# Patient Record
Sex: FEMALE | Race: WHITE | NOT HISPANIC OR LATINO | Employment: FULL TIME | URBAN - METROPOLITAN AREA
[De-identification: names, ages, dates, MRNs, and addresses within clinical notes are randomized per-mention and may not be internally consistent; named-entity substitution may affect disease eponyms.]

---

## 2017-01-19 ENCOUNTER — COMMUNICATION - HEALTHEAST (OUTPATIENT)
Dept: PHYSICAL MEDICINE AND REHAB | Facility: CLINIC | Age: 52
End: 2017-01-19

## 2017-01-19 DIAGNOSIS — M54.12 CERVICAL RADICULOPATHY: ICD-10-CM

## 2017-01-19 DIAGNOSIS — M54.2 NECK PAIN: ICD-10-CM

## 2017-02-14 ENCOUNTER — OFFICE VISIT - HEALTHEAST (OUTPATIENT)
Dept: FAMILY MEDICINE | Facility: CLINIC | Age: 52
End: 2017-02-14

## 2017-02-14 ENCOUNTER — COMMUNICATION - HEALTHEAST (OUTPATIENT)
Dept: FAMILY MEDICINE | Facility: CLINIC | Age: 52
End: 2017-02-14

## 2017-02-14 DIAGNOSIS — F41.1 ANXIETY STATE: ICD-10-CM

## 2017-02-14 DIAGNOSIS — G43.119: ICD-10-CM

## 2017-02-14 DIAGNOSIS — N18.30 CHRONIC KIDNEY DISEASE, STAGE III (MODERATE) (H): ICD-10-CM

## 2017-02-14 DIAGNOSIS — Z00.00 HEALTH CARE MAINTENANCE: ICD-10-CM

## 2017-02-14 DIAGNOSIS — J35.8 TONSILLAR DEBRIS: ICD-10-CM

## 2017-02-14 DIAGNOSIS — Z01.818 PREOP EXAMINATION: ICD-10-CM

## 2017-02-14 DIAGNOSIS — F32.9 MAJOR DEPRESSIVE DISORDER, SINGLE EPISODE, UNSPECIFIED: ICD-10-CM

## 2017-02-14 ASSESSMENT — MIFFLIN-ST. JEOR: SCORE: 1485.5

## 2017-02-23 ENCOUNTER — COMMUNICATION - HEALTHEAST (OUTPATIENT)
Dept: PHYSICAL MEDICINE AND REHAB | Facility: CLINIC | Age: 52
End: 2017-02-23

## 2017-02-23 DIAGNOSIS — M54.12 CERVICAL RADICULAR PAIN: ICD-10-CM

## 2017-02-23 DIAGNOSIS — M48.02 FORAMINAL STENOSIS OF CERVICAL REGION: ICD-10-CM

## 2017-02-23 DIAGNOSIS — M54.2 NECK PAIN: ICD-10-CM

## 2017-02-24 ENCOUNTER — RECORDS - HEALTHEAST (OUTPATIENT)
Dept: ADMINISTRATIVE | Facility: OTHER | Age: 52
End: 2017-02-24

## 2017-02-24 LAB
LAB AP CHARGES (HE HISTORICAL CONVERSION): NORMAL
PATH REPORT.COMMENTS IMP SPEC: NORMAL
PATH REPORT.FINAL DX SPEC: NORMAL
PATH REPORT.GROSS SPEC: NORMAL
PATH REPORT.MICROSCOPIC SPEC OTHER STN: NORMAL
PATH REPORT.RELEVANT HX SPEC: NORMAL
RESULT FLAG (HE HISTORICAL CONVERSION): NORMAL

## 2017-03-11 ENCOUNTER — COMMUNICATION - HEALTHEAST (OUTPATIENT)
Dept: FAMILY MEDICINE | Facility: CLINIC | Age: 52
End: 2017-03-11

## 2017-03-11 DIAGNOSIS — G43.009 MIGRAINE WITHOUT AURA: ICD-10-CM

## 2017-03-20 ENCOUNTER — AMBULATORY - HEALTHEAST (OUTPATIENT)
Dept: PHYSICAL MEDICINE AND REHAB | Facility: CLINIC | Age: 52
End: 2017-03-20

## 2017-03-24 ENCOUNTER — AMBULATORY - HEALTHEAST (OUTPATIENT)
Dept: PHYSICAL MEDICINE AND REHAB | Facility: CLINIC | Age: 52
End: 2017-03-24

## 2017-03-29 ENCOUNTER — RECORDS - HEALTHEAST (OUTPATIENT)
Dept: ADMINISTRATIVE | Facility: OTHER | Age: 52
End: 2017-03-29

## 2017-04-11 ENCOUNTER — RECORDS - HEALTHEAST (OUTPATIENT)
Dept: ADMINISTRATIVE | Facility: OTHER | Age: 52
End: 2017-04-11

## 2017-04-26 ENCOUNTER — RECORDS - HEALTHEAST (OUTPATIENT)
Dept: ADMINISTRATIVE | Facility: OTHER | Age: 52
End: 2017-04-26

## 2017-05-01 ENCOUNTER — COMMUNICATION - HEALTHEAST (OUTPATIENT)
Dept: FAMILY MEDICINE | Facility: CLINIC | Age: 52
End: 2017-05-01

## 2017-05-01 DIAGNOSIS — F41.1 ANXIETY STATE: ICD-10-CM

## 2017-05-01 DIAGNOSIS — F32.9 MAJOR DEPRESSIVE DISORDER, SINGLE EPISODE, UNSPECIFIED: ICD-10-CM

## 2017-05-03 ENCOUNTER — RECORDS - HEALTHEAST (OUTPATIENT)
Dept: ADMINISTRATIVE | Facility: OTHER | Age: 52
End: 2017-05-03

## 2017-05-08 ENCOUNTER — HOSPITAL ENCOUNTER (OUTPATIENT)
Dept: MRI IMAGING | Facility: HOSPITAL | Age: 52
Discharge: HOME | End: 2017-05-08
Attending: PSYCHIATRY & NEUROLOGY

## 2017-05-08 DIAGNOSIS — G43.009 MIGRAINE WITHOUT AURA: ICD-10-CM

## 2017-05-17 ENCOUNTER — COMMUNICATION - HEALTHEAST (OUTPATIENT)
Dept: FAMILY MEDICINE | Facility: CLINIC | Age: 52
End: 2017-05-17

## 2017-05-17 ENCOUNTER — RECORDS - HEALTHEAST (OUTPATIENT)
Dept: ADMINISTRATIVE | Facility: OTHER | Age: 52
End: 2017-05-17

## 2017-05-17 DIAGNOSIS — G43.009 MIGRAINE WITHOUT AURA: ICD-10-CM

## 2017-06-04 ENCOUNTER — COMMUNICATION - HEALTHEAST (OUTPATIENT)
Dept: FAMILY MEDICINE | Facility: CLINIC | Age: 52
End: 2017-06-04

## 2017-06-04 DIAGNOSIS — G43.009 MIGRAINE WITHOUT AURA: ICD-10-CM

## 2017-06-12 ENCOUNTER — RECORDS - HEALTHEAST (OUTPATIENT)
Dept: ADMINISTRATIVE | Facility: OTHER | Age: 52
End: 2017-06-12

## 2017-06-21 ENCOUNTER — RECORDS - HEALTHEAST (OUTPATIENT)
Dept: ADMINISTRATIVE | Facility: OTHER | Age: 52
End: 2017-06-21

## 2017-06-24 ENCOUNTER — RECORDS - HEALTHEAST (OUTPATIENT)
Dept: ADMINISTRATIVE | Facility: OTHER | Age: 52
End: 2017-06-24

## 2017-07-06 ENCOUNTER — COMMUNICATION - HEALTHEAST (OUTPATIENT)
Dept: FAMILY MEDICINE | Facility: CLINIC | Age: 52
End: 2017-07-06

## 2017-07-06 DIAGNOSIS — G43.009 MIGRAINE WITHOUT AURA: ICD-10-CM

## 2017-08-11 ENCOUNTER — COMMUNICATION - HEALTHEAST (OUTPATIENT)
Dept: FAMILY MEDICINE | Facility: CLINIC | Age: 52
End: 2017-08-11

## 2017-08-11 DIAGNOSIS — G43.009 MIGRAINE WITHOUT AURA: ICD-10-CM

## 2017-09-05 ENCOUNTER — RECORDS - HEALTHEAST (OUTPATIENT)
Dept: ADMINISTRATIVE | Facility: OTHER | Age: 52
End: 2017-09-05

## 2017-09-12 ENCOUNTER — COMMUNICATION - HEALTHEAST (OUTPATIENT)
Dept: FAMILY MEDICINE | Facility: CLINIC | Age: 52
End: 2017-09-12

## 2017-09-12 DIAGNOSIS — F41.1 ANXIETY STATE: ICD-10-CM

## 2017-09-12 DIAGNOSIS — F32.9 MAJOR DEPRESSIVE DISORDER, SINGLE EPISODE, UNSPECIFIED: ICD-10-CM

## 2017-10-05 ENCOUNTER — COMMUNICATION - HEALTHEAST (OUTPATIENT)
Dept: PHYSICAL MEDICINE AND REHAB | Facility: CLINIC | Age: 52
End: 2017-10-05

## 2017-10-05 DIAGNOSIS — M54.12 CERVICAL RADICULOPATHY: ICD-10-CM

## 2017-10-05 DIAGNOSIS — M54.2 NECK PAIN: ICD-10-CM

## 2017-10-19 ENCOUNTER — COMMUNICATION - HEALTHEAST (OUTPATIENT)
Dept: SCHEDULING | Facility: CLINIC | Age: 52
End: 2017-10-19

## 2017-10-23 ENCOUNTER — COMMUNICATION - HEALTHEAST (OUTPATIENT)
Dept: FAMILY MEDICINE | Facility: CLINIC | Age: 52
End: 2017-10-23

## 2017-10-26 ENCOUNTER — OFFICE VISIT - HEALTHEAST (OUTPATIENT)
Dept: FAMILY MEDICINE | Facility: CLINIC | Age: 52
End: 2017-10-26

## 2017-10-26 DIAGNOSIS — Z12.11 SCREEN FOR COLON CANCER: ICD-10-CM

## 2017-10-26 DIAGNOSIS — M54.12 CERVICAL RADICULOPATHY: ICD-10-CM

## 2017-10-26 DIAGNOSIS — M54.2 CERVICALGIA: ICD-10-CM

## 2017-10-26 DIAGNOSIS — R79.89 ELEVATED LFTS: ICD-10-CM

## 2017-10-26 DIAGNOSIS — J20.8 ACUTE VIRAL BRONCHITIS: ICD-10-CM

## 2017-10-26 DIAGNOSIS — M54.2 NECK PAIN: ICD-10-CM

## 2017-10-26 DIAGNOSIS — Z12.31 VISIT FOR SCREENING MAMMOGRAM: ICD-10-CM

## 2017-10-27 ENCOUNTER — COMMUNICATION - HEALTHEAST (OUTPATIENT)
Dept: FAMILY MEDICINE | Facility: CLINIC | Age: 52
End: 2017-10-27

## 2017-10-27 ENCOUNTER — AMBULATORY - HEALTHEAST (OUTPATIENT)
Dept: FAMILY MEDICINE | Facility: CLINIC | Age: 52
End: 2017-10-27

## 2017-10-27 DIAGNOSIS — R79.89 ELEVATED LFTS: ICD-10-CM

## 2017-11-02 ENCOUNTER — AMBULATORY - HEALTHEAST (OUTPATIENT)
Dept: LAB | Facility: CLINIC | Age: 52
End: 2017-11-02

## 2017-11-02 ENCOUNTER — AMBULATORY - HEALTHEAST (OUTPATIENT)
Dept: FAMILY MEDICINE | Facility: CLINIC | Age: 52
End: 2017-11-02

## 2017-11-02 ENCOUNTER — COMMUNICATION - HEALTHEAST (OUTPATIENT)
Dept: FAMILY MEDICINE | Facility: CLINIC | Age: 52
End: 2017-11-02

## 2017-11-02 DIAGNOSIS — R74.8 ELEVATED LIVER ENZYMES: ICD-10-CM

## 2017-11-02 DIAGNOSIS — R79.89 ELEVATED LFTS: ICD-10-CM

## 2017-11-09 ENCOUNTER — COMMUNICATION - HEALTHEAST (OUTPATIENT)
Dept: FAMILY MEDICINE | Facility: CLINIC | Age: 52
End: 2017-11-09

## 2017-11-16 ENCOUNTER — AMBULATORY - HEALTHEAST (OUTPATIENT)
Dept: LAB | Facility: CLINIC | Age: 52
End: 2017-11-16

## 2017-11-16 DIAGNOSIS — R74.8 ELEVATED LIVER ENZYMES: ICD-10-CM

## 2017-11-17 ENCOUNTER — COMMUNICATION - HEALTHEAST (OUTPATIENT)
Dept: FAMILY MEDICINE | Facility: CLINIC | Age: 52
End: 2017-11-17

## 2018-01-04 ENCOUNTER — COMMUNICATION - HEALTHEAST (OUTPATIENT)
Dept: FAMILY MEDICINE | Facility: CLINIC | Age: 53
End: 2018-01-04

## 2018-01-04 DIAGNOSIS — G43.009 MIGRAINE WITHOUT AURA: ICD-10-CM

## 2018-01-09 ENCOUNTER — COMMUNICATION - HEALTHEAST (OUTPATIENT)
Dept: FAMILY MEDICINE | Facility: CLINIC | Age: 53
End: 2018-01-09

## 2018-01-09 DIAGNOSIS — M54.2 NECK PAIN: ICD-10-CM

## 2018-01-09 DIAGNOSIS — M54.12 CERVICAL RADICULOPATHY: ICD-10-CM

## 2018-02-15 ENCOUNTER — COMMUNICATION - HEALTHEAST (OUTPATIENT)
Dept: FAMILY MEDICINE | Facility: CLINIC | Age: 53
End: 2018-02-15

## 2018-02-15 DIAGNOSIS — G43.009 MIGRAINE WITHOUT AURA: ICD-10-CM

## 2018-03-06 ENCOUNTER — AMBULATORY - HEALTHEAST (OUTPATIENT)
Dept: LAB | Facility: CLINIC | Age: 53
End: 2018-03-06

## 2018-03-06 DIAGNOSIS — Z52.4 DONOR OF KIDNEY FOR TRANSPLANT: ICD-10-CM

## 2018-03-20 ENCOUNTER — COMMUNICATION - HEALTHEAST (OUTPATIENT)
Dept: FAMILY MEDICINE | Facility: CLINIC | Age: 53
End: 2018-03-20

## 2018-03-20 DIAGNOSIS — M54.12 CERVICAL RADICULOPATHY: ICD-10-CM

## 2018-03-20 DIAGNOSIS — M54.2 NECK PAIN: ICD-10-CM

## 2018-04-12 ENCOUNTER — COMMUNICATION - HEALTHEAST (OUTPATIENT)
Dept: FAMILY MEDICINE | Facility: CLINIC | Age: 53
End: 2018-04-12

## 2018-04-12 DIAGNOSIS — M54.2 NECK PAIN: ICD-10-CM

## 2018-04-12 DIAGNOSIS — M54.12 CERVICAL RADICULOPATHY: ICD-10-CM

## 2018-05-16 ENCOUNTER — COMMUNICATION - HEALTHEAST (OUTPATIENT)
Dept: FAMILY MEDICINE | Facility: CLINIC | Age: 53
End: 2018-05-16

## 2018-05-16 DIAGNOSIS — F32.9 MAJOR DEPRESSIVE DISORDER, SINGLE EPISODE: ICD-10-CM

## 2018-05-16 DIAGNOSIS — F41.1 ANXIETY STATE: ICD-10-CM

## 2018-05-29 ENCOUNTER — COMMUNICATION - HEALTHEAST (OUTPATIENT)
Dept: FAMILY MEDICINE | Facility: CLINIC | Age: 53
End: 2018-05-29

## 2018-05-29 DIAGNOSIS — M54.2 NECK PAIN: ICD-10-CM

## 2018-05-29 DIAGNOSIS — M54.12 CERVICAL RADICULOPATHY: ICD-10-CM

## 2018-06-15 ENCOUNTER — OFFICE VISIT - HEALTHEAST (OUTPATIENT)
Dept: FAMILY MEDICINE | Facility: CLINIC | Age: 53
End: 2018-06-15

## 2018-06-15 DIAGNOSIS — R32 URINARY INCONTINENCE: ICD-10-CM

## 2018-06-15 DIAGNOSIS — F10.10 EXCESSIVE DRINKING ALCOHOL: ICD-10-CM

## 2018-06-15 DIAGNOSIS — Z83.49 FAMILY HISTORY OF THYROID DISEASE: ICD-10-CM

## 2018-06-15 DIAGNOSIS — Z00.00 VISIT FOR PREVENTIVE HEALTH EXAMINATION: ICD-10-CM

## 2018-06-15 DIAGNOSIS — R79.89 ELEVATED LFTS: ICD-10-CM

## 2018-06-15 DIAGNOSIS — N64.4 BREAST PAIN, RIGHT: ICD-10-CM

## 2018-06-15 DIAGNOSIS — R63.5 ABNORMAL WEIGHT GAIN: ICD-10-CM

## 2018-06-15 DIAGNOSIS — R14.0 ABDOMINAL BLOATING: ICD-10-CM

## 2018-06-15 DIAGNOSIS — E66.9 OBESITY: ICD-10-CM

## 2018-06-15 DIAGNOSIS — D64.9 ANEMIA: ICD-10-CM

## 2018-06-15 LAB
ALBUMIN SERPL-MCNC: 4.2 G/DL (ref 3.5–5)
ALP SERPL-CCNC: 70 U/L (ref 45–120)
ALT SERPL W P-5'-P-CCNC: 28 U/L (ref 0–45)
ANION GAP SERPL CALCULATED.3IONS-SCNC: 9 MMOL/L (ref 5–18)
AST SERPL W P-5'-P-CCNC: 22 U/L (ref 0–40)
BASOPHILS # BLD AUTO: 0 THOU/UL (ref 0–0.2)
BASOPHILS NFR BLD AUTO: 1 % (ref 0–2)
BILIRUB SERPL-MCNC: 0.7 MG/DL (ref 0–1)
BUN SERPL-MCNC: 23 MG/DL (ref 8–22)
CALCIUM SERPL-MCNC: 10.1 MG/DL (ref 8.5–10.5)
CHLORIDE BLD-SCNC: 106 MMOL/L (ref 98–107)
CHOLEST SERPL-MCNC: 247 MG/DL
CO2 SERPL-SCNC: 26 MMOL/L (ref 22–31)
CREAT SERPL-MCNC: 1.01 MG/DL (ref 0.6–1.1)
EOSINOPHIL # BLD AUTO: 0.1 THOU/UL (ref 0–0.4)
EOSINOPHIL NFR BLD AUTO: 3 % (ref 0–6)
ERYTHROCYTE [DISTWIDTH] IN BLOOD BY AUTOMATED COUNT: 11.6 % (ref 11–14.5)
FASTING STATUS PATIENT QL REPORTED: NO
GFR SERPL CREATININE-BSD FRML MDRD: 58 ML/MIN/1.73M2
GLUCOSE BLD-MCNC: 87 MG/DL (ref 70–125)
HBA1C MFR BLD: 5.2 % (ref 3.5–6)
HCT VFR BLD AUTO: 42.2 % (ref 35–47)
HDLC SERPL-MCNC: 73 MG/DL
HGB BLD-MCNC: 14 G/DL (ref 12–16)
LDLC SERPL CALC-MCNC: 154 MG/DL
LYMPHOCYTES # BLD AUTO: 2.1 THOU/UL (ref 0.8–4.4)
LYMPHOCYTES NFR BLD AUTO: 49 % (ref 20–40)
MCH RBC QN AUTO: 30.5 PG (ref 27–34)
MCHC RBC AUTO-ENTMCNC: 33.3 G/DL (ref 32–36)
MCV RBC AUTO: 92 FL (ref 80–100)
MONOCYTES # BLD AUTO: 0.3 THOU/UL (ref 0–0.9)
MONOCYTES NFR BLD AUTO: 7 % (ref 2–10)
NEUTROPHILS # BLD AUTO: 1.8 THOU/UL (ref 2–7.7)
NEUTROPHILS NFR BLD AUTO: 41 % (ref 50–70)
PLATELET # BLD AUTO: 301 THOU/UL (ref 140–440)
PMV BLD AUTO: 7 FL (ref 7–10)
POTASSIUM BLD-SCNC: 5.1 MMOL/L (ref 3.5–5)
PROT SERPL-MCNC: 7.5 G/DL (ref 6–8)
RBC # BLD AUTO: 4.6 MILL/UL (ref 3.8–5.4)
SODIUM SERPL-SCNC: 141 MMOL/L (ref 136–145)
TRIGL SERPL-MCNC: 101 MG/DL
TSH SERPL DL<=0.005 MIU/L-ACNC: 1.15 UIU/ML (ref 0.3–5)
WBC: 4.3 THOU/UL (ref 4–11)

## 2018-06-15 ASSESSMENT — MIFFLIN-ST. JEOR: SCORE: 1491.06

## 2018-07-02 ENCOUNTER — COMMUNICATION - HEALTHEAST (OUTPATIENT)
Dept: FAMILY MEDICINE | Facility: CLINIC | Age: 53
End: 2018-07-02

## 2018-07-27 ENCOUNTER — COMMUNICATION - HEALTHEAST (OUTPATIENT)
Dept: FAMILY MEDICINE | Facility: CLINIC | Age: 53
End: 2018-07-27

## 2018-07-27 DIAGNOSIS — G43.009 MIGRAINE WITHOUT AURA: ICD-10-CM

## 2018-08-24 ENCOUNTER — COMMUNICATION - HEALTHEAST (OUTPATIENT)
Dept: FAMILY MEDICINE | Facility: CLINIC | Age: 53
End: 2018-08-24

## 2018-08-25 ENCOUNTER — AMBULATORY - HEALTHEAST (OUTPATIENT)
Dept: FAMILY MEDICINE | Facility: CLINIC | Age: 53
End: 2018-08-25

## 2018-08-25 ENCOUNTER — COMMUNICATION - HEALTHEAST (OUTPATIENT)
Dept: FAMILY MEDICINE | Facility: CLINIC | Age: 53
End: 2018-08-25

## 2018-08-25 DIAGNOSIS — F41.1 ANXIETY STATE: ICD-10-CM

## 2018-08-25 DIAGNOSIS — F32.9 MAJOR DEPRESSIVE DISORDER, SINGLE EPISODE: ICD-10-CM

## 2018-08-26 ENCOUNTER — COMMUNICATION - HEALTHEAST (OUTPATIENT)
Dept: FAMILY MEDICINE | Facility: CLINIC | Age: 53
End: 2018-08-26

## 2018-08-26 DIAGNOSIS — G43.009 MIGRAINE WITHOUT AURA: ICD-10-CM

## 2018-09-02 ENCOUNTER — COMMUNICATION - HEALTHEAST (OUTPATIENT)
Dept: FAMILY MEDICINE | Facility: CLINIC | Age: 53
End: 2018-09-02

## 2018-09-02 DIAGNOSIS — G43.009 MIGRAINE WITHOUT AURA: ICD-10-CM

## 2018-09-06 ENCOUNTER — OFFICE VISIT - HEALTHEAST (OUTPATIENT)
Dept: FAMILY MEDICINE | Facility: CLINIC | Age: 53
End: 2018-09-06

## 2018-09-06 DIAGNOSIS — M54.2 CERVICALGIA: ICD-10-CM

## 2018-09-06 DIAGNOSIS — M48.02 SPINAL STENOSIS IN CERVICAL REGION: ICD-10-CM

## 2018-09-06 DIAGNOSIS — M54.2 NECK PAIN: ICD-10-CM

## 2018-09-12 ENCOUNTER — COMMUNICATION - HEALTHEAST (OUTPATIENT)
Dept: FAMILY MEDICINE | Facility: CLINIC | Age: 53
End: 2018-09-12

## 2018-09-12 DIAGNOSIS — M54.12 CERVICAL RADICULOPATHY: ICD-10-CM

## 2018-09-12 DIAGNOSIS — M54.2 NECK PAIN: ICD-10-CM

## 2018-09-14 ENCOUNTER — HOSPITAL ENCOUNTER (OUTPATIENT)
Dept: MRI IMAGING | Facility: HOSPITAL | Age: 53
Discharge: HOME | End: 2018-09-14
Attending: NURSE PRACTITIONER

## 2018-09-14 DIAGNOSIS — M54.2 NECK PAIN: ICD-10-CM

## 2018-09-14 DIAGNOSIS — M54.2 CERVICALGIA: ICD-10-CM

## 2018-09-14 DIAGNOSIS — M48.02 SPINAL STENOSIS IN CERVICAL REGION: ICD-10-CM

## 2018-09-24 ENCOUNTER — HOSPITAL ENCOUNTER (OUTPATIENT)
Dept: PHYSICAL MEDICINE AND REHAB | Facility: CLINIC | Age: 53
Discharge: HOME | End: 2018-09-24
Attending: PHYSICIAN ASSISTANT

## 2018-09-24 ENCOUNTER — COMMUNICATION - HEALTHEAST (OUTPATIENT)
Dept: PHYSICAL MEDICINE AND REHAB | Facility: CLINIC | Age: 53
End: 2018-09-24

## 2018-09-24 DIAGNOSIS — Z91.041 CONTRAST MEDIA ALLERGY: ICD-10-CM

## 2018-09-24 DIAGNOSIS — R20.2 PARESTHESIA: ICD-10-CM

## 2018-09-24 DIAGNOSIS — M47.812 FACET ARTHROPATHY, CERVICAL: ICD-10-CM

## 2018-09-24 DIAGNOSIS — M54.12 CERVICAL RADICULITIS: ICD-10-CM

## 2018-10-02 ENCOUNTER — OFFICE VISIT - HEALTHEAST (OUTPATIENT)
Dept: PHYSICAL THERAPY | Facility: REHABILITATION | Age: 53
End: 2018-10-02

## 2018-10-02 DIAGNOSIS — R20.2 PARESTHESIAS IN LEFT HAND: ICD-10-CM

## 2018-10-02 DIAGNOSIS — R29.898 DECREASED ROM OF NECK: ICD-10-CM

## 2018-10-02 DIAGNOSIS — M54.2 ACUTE NECK PAIN: ICD-10-CM

## 2018-10-02 DIAGNOSIS — R20.2 PARESTHESIAS IN RIGHT HAND: ICD-10-CM

## 2018-10-04 ENCOUNTER — OFFICE VISIT - HEALTHEAST (OUTPATIENT)
Dept: PHYSICAL THERAPY | Facility: REHABILITATION | Age: 53
End: 2018-10-04

## 2018-10-04 DIAGNOSIS — R20.2 PARESTHESIAS IN LEFT HAND: ICD-10-CM

## 2018-10-04 DIAGNOSIS — R29.898 DECREASED ROM OF NECK: ICD-10-CM

## 2018-10-04 DIAGNOSIS — R20.2 PARESTHESIAS IN RIGHT HAND: ICD-10-CM

## 2018-10-04 DIAGNOSIS — M54.2 ACUTE NECK PAIN: ICD-10-CM

## 2018-10-09 ENCOUNTER — OFFICE VISIT - HEALTHEAST (OUTPATIENT)
Dept: PHYSICAL THERAPY | Facility: REHABILITATION | Age: 53
End: 2018-10-09

## 2018-10-09 DIAGNOSIS — R20.2 PARESTHESIAS IN LEFT HAND: ICD-10-CM

## 2018-10-09 DIAGNOSIS — M54.2 ACUTE NECK PAIN: ICD-10-CM

## 2018-10-09 DIAGNOSIS — R20.2 PARESTHESIAS IN RIGHT HAND: ICD-10-CM

## 2018-10-09 DIAGNOSIS — R29.898 DECREASED ROM OF NECK: ICD-10-CM

## 2018-10-10 ENCOUNTER — COMMUNICATION - HEALTHEAST (OUTPATIENT)
Dept: PHYSICAL MEDICINE AND REHAB | Facility: CLINIC | Age: 53
End: 2018-10-10

## 2018-10-11 ENCOUNTER — OFFICE VISIT - HEALTHEAST (OUTPATIENT)
Dept: FAMILY MEDICINE | Facility: CLINIC | Age: 53
End: 2018-10-11

## 2018-10-11 DIAGNOSIS — H66.92 ACUTE OTITIS MEDIA, LEFT: ICD-10-CM

## 2018-10-11 DIAGNOSIS — R03.0 ELEVATED BLOOD PRESSURE READING WITHOUT DIAGNOSIS OF HYPERTENSION: ICD-10-CM

## 2018-10-11 DIAGNOSIS — J01.91 ACUTE RECURRENT SINUSITIS, UNSPECIFIED LOCATION: ICD-10-CM

## 2018-10-11 DIAGNOSIS — J20.9 ACUTE BRONCHITIS: ICD-10-CM

## 2018-10-11 LAB
BASOPHILS # BLD AUTO: 0 THOU/UL (ref 0–0.2)
BASOPHILS NFR BLD AUTO: 1 % (ref 0–2)
EOSINOPHIL # BLD AUTO: 0.2 THOU/UL (ref 0–0.4)
EOSINOPHIL NFR BLD AUTO: 3 % (ref 0–6)
ERYTHROCYTE [DISTWIDTH] IN BLOOD BY AUTOMATED COUNT: 11.5 % (ref 11–14.5)
HCT VFR BLD AUTO: 39.5 % (ref 35–47)
HGB BLD-MCNC: 13.4 G/DL (ref 12–16)
LYMPHOCYTES # BLD AUTO: 3 THOU/UL (ref 0.8–4.4)
LYMPHOCYTES NFR BLD AUTO: 36 % (ref 20–40)
MCH RBC QN AUTO: 31.1 PG (ref 27–34)
MCHC RBC AUTO-ENTMCNC: 34 G/DL (ref 32–36)
MCV RBC AUTO: 91 FL (ref 80–100)
MONOCYTES # BLD AUTO: 0.4 THOU/UL (ref 0–0.9)
MONOCYTES NFR BLD AUTO: 4 % (ref 2–10)
NEUTROPHILS # BLD AUTO: 4.8 THOU/UL (ref 2–7.7)
NEUTROPHILS NFR BLD AUTO: 57 % (ref 50–70)
PLATELET # BLD AUTO: 246 THOU/UL (ref 140–440)
PMV BLD AUTO: 7 FL (ref 7–10)
RBC # BLD AUTO: 4.32 MILL/UL (ref 3.8–5.4)
WBC: 8.3 THOU/UL (ref 4–11)

## 2018-10-14 ENCOUNTER — OFFICE VISIT - HEALTHEAST (OUTPATIENT)
Dept: FAMILY MEDICINE | Facility: CLINIC | Age: 53
End: 2018-10-14

## 2018-10-14 ENCOUNTER — COMMUNICATION - HEALTHEAST (OUTPATIENT)
Dept: SCHEDULING | Facility: CLINIC | Age: 53
End: 2018-10-14

## 2018-10-14 DIAGNOSIS — R51.9 HEADACHE: ICD-10-CM

## 2018-10-14 DIAGNOSIS — R05.9 COUGH: ICD-10-CM

## 2018-10-14 DIAGNOSIS — R91.8 LUNG FIELD ABNORMAL FINDING ON EXAMINATION: ICD-10-CM

## 2018-10-14 DIAGNOSIS — J10.1 INFLUENZA B: ICD-10-CM

## 2018-10-14 DIAGNOSIS — R11.2 NAUSEA WITH VOMITING: ICD-10-CM

## 2018-10-14 LAB
FLUAV AG SPEC QL IA: ABNORMAL
FLUBV AG SPEC QL IA: ABNORMAL

## 2018-10-15 ENCOUNTER — COMMUNICATION - HEALTHEAST (OUTPATIENT)
Dept: FAMILY MEDICINE | Facility: CLINIC | Age: 53
End: 2018-10-15

## 2018-10-15 DIAGNOSIS — M54.12 CERVICAL RADICULOPATHY: ICD-10-CM

## 2018-10-15 DIAGNOSIS — M54.2 NECK PAIN: ICD-10-CM

## 2018-11-03 ENCOUNTER — COMMUNICATION - HEALTHEAST (OUTPATIENT)
Dept: FAMILY MEDICINE | Facility: CLINIC | Age: 53
End: 2018-11-03

## 2018-11-03 DIAGNOSIS — M54.2 NECK PAIN: ICD-10-CM

## 2018-11-03 DIAGNOSIS — M54.12 CERVICAL RADICULOPATHY: ICD-10-CM

## 2018-11-13 ENCOUNTER — COMMUNICATION - HEALTHEAST (OUTPATIENT)
Dept: TELEHEALTH | Facility: CLINIC | Age: 53
End: 2018-11-13

## 2019-01-28 ENCOUNTER — COMMUNICATION - HEALTHEAST (OUTPATIENT)
Dept: FAMILY MEDICINE | Facility: CLINIC | Age: 54
End: 2019-01-28

## 2019-01-28 ENCOUNTER — COMMUNICATION - HEALTHEAST (OUTPATIENT)
Dept: SCHEDULING | Facility: CLINIC | Age: 54
End: 2019-01-28

## 2019-01-28 DIAGNOSIS — K57.32 DIVERTICULITIS OF COLON: ICD-10-CM

## 2019-01-30 ENCOUNTER — COMMUNICATION - HEALTHEAST (OUTPATIENT)
Dept: SCHEDULING | Facility: CLINIC | Age: 54
End: 2019-01-30

## 2019-01-31 ENCOUNTER — OFFICE VISIT - HEALTHEAST (OUTPATIENT)
Dept: FAMILY MEDICINE | Facility: CLINIC | Age: 54
End: 2019-01-31

## 2019-01-31 ENCOUNTER — COMMUNICATION - HEALTHEAST (OUTPATIENT)
Dept: FAMILY MEDICINE | Facility: CLINIC | Age: 54
End: 2019-01-31

## 2019-01-31 DIAGNOSIS — M54.12 CERVICAL RADICULOPATHY: ICD-10-CM

## 2019-01-31 DIAGNOSIS — M54.2 NECK PAIN: ICD-10-CM

## 2019-01-31 DIAGNOSIS — R39.89 ABNORMAL URINE COLOR: ICD-10-CM

## 2019-01-31 DIAGNOSIS — K57.32 DIVERTICULITIS OF COLON: ICD-10-CM

## 2019-01-31 LAB
ALBUMIN SERPL-MCNC: 3.9 G/DL (ref 3.5–5)
ALBUMIN UR-MCNC: NEGATIVE MG/DL
ALP SERPL-CCNC: 278 U/L (ref 45–120)
ALT SERPL W P-5'-P-CCNC: 136 U/L (ref 0–45)
ANION GAP SERPL CALCULATED.3IONS-SCNC: 11 MMOL/L (ref 5–18)
APPEARANCE UR: CLEAR
AST SERPL W P-5'-P-CCNC: 52 U/L (ref 0–40)
BACTERIA #/AREA URNS HPF: ABNORMAL HPF
BASOPHILS # BLD AUTO: 0 THOU/UL (ref 0–0.2)
BASOPHILS NFR BLD AUTO: 0 % (ref 0–2)
BILIRUB SERPL-MCNC: 0.5 MG/DL (ref 0–1)
BILIRUB UR QL STRIP: NEGATIVE
BUN SERPL-MCNC: 12 MG/DL (ref 8–22)
CALCIUM SERPL-MCNC: 10.1 MG/DL (ref 8.5–10.5)
CHLORIDE BLD-SCNC: 100 MMOL/L (ref 98–107)
CO2 SERPL-SCNC: 28 MMOL/L (ref 22–31)
COLOR UR AUTO: ABNORMAL
CREAT SERPL-MCNC: 1.23 MG/DL (ref 0.6–1.1)
EOSINOPHIL # BLD AUTO: 0.1 THOU/UL (ref 0–0.4)
EOSINOPHIL NFR BLD AUTO: 2 % (ref 0–6)
ERYTHROCYTE [DISTWIDTH] IN BLOOD BY AUTOMATED COUNT: 11.6 % (ref 11–14.5)
GFR SERPL CREATININE-BSD FRML MDRD: 46 ML/MIN/1.73M2
GLUCOSE BLD-MCNC: 106 MG/DL (ref 70–125)
GLUCOSE UR STRIP-MCNC: NEGATIVE MG/DL
HCT VFR BLD AUTO: 41.8 % (ref 35–47)
HGB BLD-MCNC: 13.9 G/DL (ref 12–16)
HGB UR QL STRIP: NEGATIVE
KETONES UR STRIP-MCNC: NEGATIVE MG/DL
LEUKOCYTE ESTERASE UR QL STRIP: ABNORMAL
LYMPHOCYTES # BLD AUTO: 1.4 THOU/UL (ref 0.8–4.4)
LYMPHOCYTES NFR BLD AUTO: 20 % (ref 20–40)
MCH RBC QN AUTO: 30.3 PG (ref 27–34)
MCHC RBC AUTO-ENTMCNC: 33.2 G/DL (ref 32–36)
MCV RBC AUTO: 91 FL (ref 80–100)
MONOCYTES # BLD AUTO: 0.4 THOU/UL (ref 0–0.9)
MONOCYTES NFR BLD AUTO: 6 % (ref 2–10)
NEUTROPHILS # BLD AUTO: 4.9 THOU/UL (ref 2–7.7)
NEUTROPHILS NFR BLD AUTO: 71 % (ref 50–70)
NITRATE UR QL: NEGATIVE
PH UR STRIP: 5.5 [PH] (ref 5–8)
PLATELET # BLD AUTO: 301 THOU/UL (ref 140–440)
PMV BLD AUTO: 7.6 FL (ref 7–10)
POTASSIUM BLD-SCNC: 4.9 MMOL/L (ref 3.5–5)
PROT SERPL-MCNC: 7.6 G/DL (ref 6–8)
RBC # BLD AUTO: 4.58 MILL/UL (ref 3.8–5.4)
RBC #/AREA URNS AUTO: ABNORMAL HPF
SODIUM SERPL-SCNC: 139 MMOL/L (ref 136–145)
SP GR UR STRIP: 1.02 (ref 1–1.03)
SQUAMOUS #/AREA URNS AUTO: ABNORMAL LPF
UROBILINOGEN UR STRIP-ACNC: ABNORMAL
WBC #/AREA URNS AUTO: ABNORMAL HPF
WBC: 6.9 THOU/UL (ref 4–11)

## 2019-02-01 LAB — BACTERIA SPEC CULT: NO GROWTH

## 2019-02-05 ENCOUNTER — AMBULATORY - HEALTHEAST (OUTPATIENT)
Dept: CARE COORDINATION | Facility: CLINIC | Age: 54
End: 2019-02-05

## 2019-02-05 DIAGNOSIS — K57.92 DIVERTICULITIS: ICD-10-CM

## 2019-02-05 DIAGNOSIS — N17.9 ACUTE RENAL FAILURE, UNSPECIFIED ACUTE RENAL FAILURE TYPE (H): ICD-10-CM

## 2019-02-06 ENCOUNTER — COMMUNICATION - HEALTHEAST (OUTPATIENT)
Dept: CARE COORDINATION | Facility: CLINIC | Age: 54
End: 2019-02-06

## 2019-02-07 ENCOUNTER — COMMUNICATION - HEALTHEAST (OUTPATIENT)
Dept: FAMILY MEDICINE | Facility: CLINIC | Age: 54
End: 2019-02-07

## 2019-02-07 ENCOUNTER — OFFICE VISIT - HEALTHEAST (OUTPATIENT)
Dept: FAMILY MEDICINE | Facility: CLINIC | Age: 54
End: 2019-02-07

## 2019-02-07 DIAGNOSIS — R77.8 ABNORMAL SPEP: ICD-10-CM

## 2019-02-07 DIAGNOSIS — R79.89 ELEVATED LFTS: ICD-10-CM

## 2019-02-07 LAB
ALBUMIN SERPL-MCNC: 3.7 G/DL (ref 3.5–5)
ALP SERPL-CCNC: 229 U/L (ref 45–120)
ALT SERPL W P-5'-P-CCNC: 153 U/L (ref 0–45)
ANION GAP SERPL CALCULATED.3IONS-SCNC: 12 MMOL/L (ref 5–18)
AST SERPL W P-5'-P-CCNC: 99 U/L (ref 0–40)
BILIRUB SERPL-MCNC: 0.2 MG/DL (ref 0–1)
BUN SERPL-MCNC: 19 MG/DL (ref 8–22)
CALCIUM SERPL-MCNC: 10.3 MG/DL (ref 8.5–10.5)
CHLORIDE BLD-SCNC: 106 MMOL/L (ref 98–107)
CO2 SERPL-SCNC: 24 MMOL/L (ref 22–31)
CREAT SERPL-MCNC: 0.88 MG/DL (ref 0.6–1.1)
GFR SERPL CREATININE-BSD FRML MDRD: >60 ML/MIN/1.73M2
GLUCOSE BLD-MCNC: 83 MG/DL (ref 70–125)
POTASSIUM BLD-SCNC: 4.8 MMOL/L (ref 3.5–5)
PROT SERPL-MCNC: 7.6 G/DL (ref 6–8)
SODIUM SERPL-SCNC: 142 MMOL/L (ref 136–145)

## 2019-02-08 ENCOUNTER — COMMUNICATION - HEALTHEAST (OUTPATIENT)
Dept: FAMILY MEDICINE | Facility: CLINIC | Age: 54
End: 2019-02-08

## 2019-02-08 ENCOUNTER — RECORDS - HEALTHEAST (OUTPATIENT)
Dept: ADMINISTRATIVE | Facility: OTHER | Age: 54
End: 2019-02-08

## 2019-02-08 ENCOUNTER — AMBULATORY - HEALTHEAST (OUTPATIENT)
Dept: FAMILY MEDICINE | Facility: CLINIC | Age: 54
End: 2019-02-08

## 2019-02-08 DIAGNOSIS — R79.89 ELEVATED LFTS: ICD-10-CM

## 2019-02-12 ENCOUNTER — COMMUNICATION - HEALTHEAST (OUTPATIENT)
Dept: FAMILY MEDICINE | Facility: CLINIC | Age: 54
End: 2019-02-12

## 2019-02-12 LAB
PATH ICD:: NORMAL
PROT PATTERN SERPL IFE-IMP: NORMAL
REVIEWING PATHOLOGIST: NORMAL

## 2019-02-15 ENCOUNTER — COMMUNICATION - HEALTHEAST (OUTPATIENT)
Dept: NURSING | Facility: CLINIC | Age: 54
End: 2019-02-15

## 2019-02-27 ENCOUNTER — OFFICE VISIT - HEALTHEAST (OUTPATIENT)
Dept: SURGERY | Facility: CLINIC | Age: 54
End: 2019-02-27

## 2019-02-27 ENCOUNTER — COMMUNICATION - HEALTHEAST (OUTPATIENT)
Dept: FAMILY MEDICINE | Facility: CLINIC | Age: 54
End: 2019-02-27

## 2019-02-27 DIAGNOSIS — M54.12 CERVICAL RADICULOPATHY: ICD-10-CM

## 2019-02-27 DIAGNOSIS — K57.32 DIVERTICULITIS OF COLON: ICD-10-CM

## 2019-02-27 DIAGNOSIS — M54.2 NECK PAIN: ICD-10-CM

## 2019-02-27 ASSESSMENT — MIFFLIN-ST. JEOR: SCORE: 1536.65

## 2019-02-28 ENCOUNTER — COMMUNICATION - HEALTHEAST (OUTPATIENT)
Dept: FAMILY MEDICINE | Facility: CLINIC | Age: 54
End: 2019-02-28

## 2019-03-01 ENCOUNTER — AMBULATORY - HEALTHEAST (OUTPATIENT)
Dept: FAMILY MEDICINE | Facility: CLINIC | Age: 54
End: 2019-03-01

## 2019-03-01 ENCOUNTER — COMMUNICATION - HEALTHEAST (OUTPATIENT)
Dept: FAMILY MEDICINE | Facility: CLINIC | Age: 54
End: 2019-03-01

## 2019-03-01 DIAGNOSIS — F41.9 ANXIETY: ICD-10-CM

## 2019-03-01 DIAGNOSIS — F32.9 MAJOR DEPRESSIVE DISORDER, SINGLE EPISODE: ICD-10-CM

## 2019-03-01 DIAGNOSIS — F41.1 ANXIETY STATE: ICD-10-CM

## 2019-03-05 ENCOUNTER — COMMUNICATION - HEALTHEAST (OUTPATIENT)
Dept: SURGERY | Facility: CLINIC | Age: 54
End: 2019-03-05

## 2019-03-07 ENCOUNTER — RECORDS - HEALTHEAST (OUTPATIENT)
Dept: ADMINISTRATIVE | Facility: OTHER | Age: 54
End: 2019-03-07

## 2019-03-08 ENCOUNTER — OFFICE VISIT - HEALTHEAST (OUTPATIENT)
Dept: FAMILY MEDICINE | Facility: CLINIC | Age: 54
End: 2019-03-08

## 2019-03-08 DIAGNOSIS — Z01.818 PRE-OP EXAM: ICD-10-CM

## 2019-03-08 DIAGNOSIS — N18.30 CHRONIC KIDNEY DISEASE, STAGE III (MODERATE) (H): ICD-10-CM

## 2019-03-08 DIAGNOSIS — G43.119 INTRACTABLE MIGRAINE WITH AURA WITHOUT STATUS MIGRAINOSUS: ICD-10-CM

## 2019-03-08 DIAGNOSIS — E66.811 CLASS 1 OBESITY DUE TO EXCESS CALORIES WITHOUT SERIOUS COMORBIDITY IN ADULT, UNSPECIFIED BMI: ICD-10-CM

## 2019-03-08 DIAGNOSIS — E66.09 CLASS 1 OBESITY DUE TO EXCESS CALORIES WITHOUT SERIOUS COMORBIDITY IN ADULT, UNSPECIFIED BMI: ICD-10-CM

## 2019-03-08 DIAGNOSIS — K90.0 CELIAC DISEASE: ICD-10-CM

## 2019-03-08 DIAGNOSIS — F32.9 CURRENT EPISODE OF MAJOR DEPRESSIVE DISORDER WITHOUT PRIOR EPISODE, UNSPECIFIED DEPRESSION EPISODE SEVERITY: ICD-10-CM

## 2019-03-08 DIAGNOSIS — K57.32 DIVERTICULITIS OF COLON: ICD-10-CM

## 2019-03-08 DIAGNOSIS — R79.89 ELEVATED LFTS: ICD-10-CM

## 2019-03-08 LAB
ALBUMIN SERPL-MCNC: 4.1 G/DL (ref 3.5–5)
ALP SERPL-CCNC: 133 U/L (ref 45–120)
ALT SERPL W P-5'-P-CCNC: 137 U/L (ref 0–45)
ANION GAP SERPL CALCULATED.3IONS-SCNC: 10 MMOL/L (ref 5–18)
AST SERPL W P-5'-P-CCNC: 63 U/L (ref 0–40)
ATRIAL RATE - MUSE: 74 BPM
BASOPHILS # BLD AUTO: 0 THOU/UL (ref 0–0.2)
BASOPHILS NFR BLD AUTO: 1 % (ref 0–2)
BILIRUB SERPL-MCNC: 0.6 MG/DL (ref 0–1)
BUN SERPL-MCNC: 21 MG/DL (ref 8–22)
CALCIUM SERPL-MCNC: 10.5 MG/DL (ref 8.5–10.5)
CHLORIDE BLD-SCNC: 105 MMOL/L (ref 98–107)
CO2 SERPL-SCNC: 25 MMOL/L (ref 22–31)
CREAT SERPL-MCNC: 0.92 MG/DL (ref 0.6–1.1)
DIASTOLIC BLOOD PRESSURE - MUSE: NORMAL MMHG
EOSINOPHIL # BLD AUTO: 0.2 THOU/UL (ref 0–0.4)
EOSINOPHIL NFR BLD AUTO: 4 % (ref 0–6)
ERYTHROCYTE [DISTWIDTH] IN BLOOD BY AUTOMATED COUNT: 13.1 % (ref 11–14.5)
GFR SERPL CREATININE-BSD FRML MDRD: >60 ML/MIN/1.73M2
GLUCOSE BLD-MCNC: 90 MG/DL (ref 70–125)
HCT VFR BLD AUTO: 41.8 % (ref 35–47)
HGB BLD-MCNC: 14 G/DL (ref 12–16)
INTERPRETATION ECG - MUSE: NORMAL
LYMPHOCYTES # BLD AUTO: 1.8 THOU/UL (ref 0.8–4.4)
LYMPHOCYTES NFR BLD AUTO: 41 % (ref 20–40)
MCH RBC QN AUTO: 30.9 PG (ref 27–34)
MCHC RBC AUTO-ENTMCNC: 33.5 G/DL (ref 32–36)
MCV RBC AUTO: 92 FL (ref 80–100)
MONOCYTES # BLD AUTO: 0.3 THOU/UL (ref 0–0.9)
MONOCYTES NFR BLD AUTO: 6 % (ref 2–10)
NEUTROPHILS # BLD AUTO: 2.2 THOU/UL (ref 2–7.7)
NEUTROPHILS NFR BLD AUTO: 50 % (ref 50–70)
P AXIS - MUSE: 68 DEGREES
PLATELET # BLD AUTO: 306 THOU/UL (ref 140–440)
PMV BLD AUTO: 7 FL (ref 7–10)
POTASSIUM BLD-SCNC: 5 MMOL/L (ref 3.5–5)
PR INTERVAL - MUSE: 156 MS
PROT SERPL-MCNC: 7.7 G/DL (ref 6–8)
QRS DURATION - MUSE: 76 MS
QT - MUSE: 376 MS
QTC - MUSE: 417 MS
R AXIS - MUSE: 36 DEGREES
RBC # BLD AUTO: 4.53 MILL/UL (ref 3.8–5.4)
SODIUM SERPL-SCNC: 140 MMOL/L (ref 136–145)
SYSTOLIC BLOOD PRESSURE - MUSE: NORMAL MMHG
T AXIS - MUSE: 49 DEGREES
VENTRICULAR RATE- MUSE: 74 BPM
WBC: 4.5 THOU/UL (ref 4–11)

## 2019-03-08 ASSESSMENT — MIFFLIN-ST. JEOR: SCORE: 1555.25

## 2019-03-09 ENCOUNTER — COMMUNICATION - HEALTHEAST (OUTPATIENT)
Dept: FAMILY MEDICINE | Facility: CLINIC | Age: 54
End: 2019-03-09

## 2019-03-14 ASSESSMENT — MIFFLIN-ST. JEOR: SCORE: 1553.43

## 2019-03-15 ENCOUNTER — ANESTHESIA - HEALTHEAST (OUTPATIENT)
Dept: SURGERY | Facility: AMBULATORY SURGERY CENTER | Age: 54
End: 2019-03-15

## 2019-03-18 ENCOUNTER — SURGERY - HEALTHEAST (OUTPATIENT)
Dept: SURGERY | Facility: AMBULATORY SURGERY CENTER | Age: 54
End: 2019-03-18

## 2019-03-18 ASSESSMENT — MIFFLIN-ST. JEOR: SCORE: 1553.43

## 2019-03-19 ENCOUNTER — ANESTHESIA - HEALTHEAST (OUTPATIENT)
Dept: SURGERY | Facility: HOSPITAL | Age: 54
End: 2019-03-19

## 2019-03-19 ENCOUNTER — SURGERY - HEALTHEAST (OUTPATIENT)
Dept: SURGERY | Facility: HOSPITAL | Age: 54
End: 2019-03-19

## 2019-03-19 ASSESSMENT — MIFFLIN-ST. JEOR
SCORE: 1548.9
SCORE: 1589.72
SCORE: 1553.43

## 2019-03-20 ASSESSMENT — MIFFLIN-ST. JEOR: SCORE: 1589.72

## 2019-03-24 ASSESSMENT — MIFFLIN-ST. JEOR: SCORE: 1574.75

## 2019-03-25 ASSESSMENT — MIFFLIN-ST. JEOR: SCORE: 1568.4

## 2019-03-26 ASSESSMENT — MIFFLIN-ST. JEOR
SCORE: 1567.04
SCORE: 1567.04

## 2019-03-27 ENCOUNTER — HOME CARE/HOSPICE - HEALTHEAST (OUTPATIENT)
Dept: HOME HEALTH SERVICES | Facility: HOME HEALTH | Age: 54
End: 2019-03-27

## 2019-03-28 ENCOUNTER — COMMUNICATION - HEALTHEAST (OUTPATIENT)
Dept: FAMILY MEDICINE | Facility: CLINIC | Age: 54
End: 2019-03-28

## 2019-04-04 ENCOUNTER — RECORDS - HEALTHEAST (OUTPATIENT)
Dept: ADMINISTRATIVE | Facility: OTHER | Age: 54
End: 2019-04-04

## 2019-04-04 ENCOUNTER — OFFICE VISIT - HEALTHEAST (OUTPATIENT)
Dept: SURGERY | Facility: CLINIC | Age: 54
End: 2019-04-04

## 2019-04-04 DIAGNOSIS — Z98.890 POST-OPERATIVE STATE: ICD-10-CM

## 2019-04-05 ENCOUNTER — COMMUNICATION - HEALTHEAST (OUTPATIENT)
Dept: FAMILY MEDICINE | Facility: CLINIC | Age: 54
End: 2019-04-05

## 2019-04-05 DIAGNOSIS — M54.12 CERVICAL RADICULOPATHY: ICD-10-CM

## 2019-04-05 DIAGNOSIS — M54.2 NECK PAIN: ICD-10-CM

## 2019-04-10 ENCOUNTER — OFFICE VISIT - HEALTHEAST (OUTPATIENT)
Dept: SURGERY | Facility: CLINIC | Age: 54
End: 2019-04-10

## 2019-04-10 DIAGNOSIS — K57.32 DIVERTICULITIS OF COLON: ICD-10-CM

## 2019-04-10 ASSESSMENT — MIFFLIN-ST. JEOR: SCORE: 1499

## 2019-04-22 ENCOUNTER — OFFICE VISIT - HEALTHEAST (OUTPATIENT)
Dept: SURGERY | Facility: CLINIC | Age: 54
End: 2019-04-22

## 2019-04-22 DIAGNOSIS — K57.92 DIVERTICULITIS: ICD-10-CM

## 2019-04-22 ASSESSMENT — MIFFLIN-ST. JEOR: SCORE: 1499

## 2019-04-28 ENCOUNTER — COMMUNICATION - HEALTHEAST (OUTPATIENT)
Dept: FAMILY MEDICINE | Facility: CLINIC | Age: 54
End: 2019-04-28

## 2019-04-28 DIAGNOSIS — F41.1 ANXIETY STATE: ICD-10-CM

## 2019-04-28 DIAGNOSIS — F32.9 MAJOR DEPRESSIVE DISORDER, SINGLE EPISODE: ICD-10-CM

## 2019-04-29 ENCOUNTER — COMMUNICATION - HEALTHEAST (OUTPATIENT)
Dept: SURGERY | Facility: CLINIC | Age: 54
End: 2019-04-29

## 2019-04-30 ENCOUNTER — COMMUNICATION - HEALTHEAST (OUTPATIENT)
Dept: SURGERY | Facility: CLINIC | Age: 54
End: 2019-04-30

## 2019-05-02 ENCOUNTER — COMMUNICATION - HEALTHEAST (OUTPATIENT)
Dept: SURGERY | Facility: CLINIC | Age: 54
End: 2019-05-02

## 2019-05-03 ENCOUNTER — OFFICE VISIT - HEALTHEAST (OUTPATIENT)
Dept: SURGERY | Facility: CLINIC | Age: 54
End: 2019-05-03

## 2019-05-03 DIAGNOSIS — Z98.890 POST-OPERATIVE STATE: ICD-10-CM

## 2019-05-03 ASSESSMENT — MIFFLIN-ST. JEOR: SCORE: 1499

## 2019-05-06 ENCOUNTER — COMMUNICATION - HEALTHEAST (OUTPATIENT)
Dept: FAMILY MEDICINE | Facility: CLINIC | Age: 54
End: 2019-05-06

## 2019-05-06 DIAGNOSIS — F32.9 MAJOR DEPRESSIVE DISORDER, SINGLE EPISODE: ICD-10-CM

## 2019-05-06 DIAGNOSIS — F41.1 ANXIETY STATE: ICD-10-CM

## 2019-05-09 ENCOUNTER — COMMUNICATION - HEALTHEAST (OUTPATIENT)
Dept: SURGERY | Facility: CLINIC | Age: 54
End: 2019-05-09

## 2019-05-13 ENCOUNTER — COMMUNICATION - HEALTHEAST (OUTPATIENT)
Dept: SURGERY | Facility: CLINIC | Age: 54
End: 2019-05-13

## 2019-05-14 ENCOUNTER — COMMUNICATION - HEALTHEAST (OUTPATIENT)
Dept: FAMILY MEDICINE | Facility: CLINIC | Age: 54
End: 2019-05-14

## 2019-05-14 DIAGNOSIS — F32.9 MAJOR DEPRESSIVE DISORDER, SINGLE EPISODE: ICD-10-CM

## 2019-05-14 DIAGNOSIS — F41.1 ANXIETY STATE: ICD-10-CM

## 2019-05-24 ENCOUNTER — COMMUNICATION - HEALTHEAST (OUTPATIENT)
Dept: FAMILY MEDICINE | Facility: CLINIC | Age: 54
End: 2019-05-24

## 2019-05-24 DIAGNOSIS — M54.2 NECK PAIN: ICD-10-CM

## 2019-05-24 DIAGNOSIS — M54.12 CERVICAL RADICULOPATHY: ICD-10-CM

## 2019-05-29 ENCOUNTER — COMMUNICATION - HEALTHEAST (OUTPATIENT)
Dept: SURGERY | Facility: CLINIC | Age: 54
End: 2019-05-29

## 2019-06-04 ENCOUNTER — RECORDS - HEALTHEAST (OUTPATIENT)
Dept: ADMINISTRATIVE | Facility: OTHER | Age: 54
End: 2019-06-04

## 2019-07-04 ENCOUNTER — COMMUNICATION - HEALTHEAST (OUTPATIENT)
Dept: FAMILY MEDICINE | Facility: CLINIC | Age: 54
End: 2019-07-04

## 2019-07-04 DIAGNOSIS — M54.2 NECK PAIN: ICD-10-CM

## 2019-07-04 DIAGNOSIS — M54.12 CERVICAL RADICULOPATHY: ICD-10-CM

## 2019-08-03 ENCOUNTER — COMMUNICATION - HEALTHEAST (OUTPATIENT)
Dept: FAMILY MEDICINE | Facility: CLINIC | Age: 54
End: 2019-08-03

## 2019-08-03 DIAGNOSIS — G43.119 INTRACTABLE MIGRAINE WITH AURA WITHOUT STATUS MIGRAINOSUS: ICD-10-CM

## 2019-08-10 ENCOUNTER — COMMUNICATION - HEALTHEAST (OUTPATIENT)
Dept: SCHEDULING | Facility: CLINIC | Age: 54
End: 2019-08-10

## 2019-08-27 ENCOUNTER — COMMUNICATION - HEALTHEAST (OUTPATIENT)
Dept: SURGERY | Facility: CLINIC | Age: 54
End: 2019-08-27

## 2019-08-28 ENCOUNTER — OFFICE VISIT - HEALTHEAST (OUTPATIENT)
Dept: SURGERY | Facility: CLINIC | Age: 54
End: 2019-08-28

## 2019-08-28 DIAGNOSIS — K43.9 VENTRAL HERNIA WITHOUT OBSTRUCTION OR GANGRENE: ICD-10-CM

## 2019-08-28 DIAGNOSIS — K43.2 INCISIONAL HERNIA, WITHOUT OBSTRUCTION OR GANGRENE: ICD-10-CM

## 2019-09-04 ENCOUNTER — HOSPITAL ENCOUNTER (OUTPATIENT)
Dept: CT IMAGING | Facility: HOSPITAL | Age: 54
Discharge: HOME | End: 2019-09-04
Attending: SURGERY

## 2019-09-04 ENCOUNTER — HOSPITAL ENCOUNTER (OUTPATIENT)
Dept: CT IMAGING | Facility: CLINIC | Age: 54
Discharge: HOME | End: 2019-09-04
Attending: SURGERY

## 2019-09-04 DIAGNOSIS — K43.2 INCISIONAL HERNIA, WITHOUT OBSTRUCTION OR GANGRENE: ICD-10-CM

## 2019-09-04 DIAGNOSIS — K43.9 VENTRAL HERNIA WITHOUT OBSTRUCTION OR GANGRENE: ICD-10-CM

## 2019-09-06 ENCOUNTER — OFFICE VISIT - HEALTHEAST (OUTPATIENT)
Dept: SURGERY | Facility: CLINIC | Age: 54
End: 2019-09-06

## 2019-09-06 DIAGNOSIS — K43.9 VENTRAL HERNIA WITHOUT OBSTRUCTION OR GANGRENE: ICD-10-CM

## 2019-09-06 ASSESSMENT — MIFFLIN-ST. JEOR: SCORE: 1525.31

## 2019-09-30 ENCOUNTER — COMMUNICATION - HEALTHEAST (OUTPATIENT)
Dept: FAMILY MEDICINE | Facility: CLINIC | Age: 54
End: 2019-09-30

## 2019-09-30 ENCOUNTER — OFFICE VISIT - HEALTHEAST (OUTPATIENT)
Dept: FAMILY MEDICINE | Facility: CLINIC | Age: 54
End: 2019-09-30

## 2019-09-30 DIAGNOSIS — Z12.31 VISIT FOR SCREENING MAMMOGRAM: ICD-10-CM

## 2019-09-30 DIAGNOSIS — M48.02 SPINAL STENOSIS IN CERVICAL REGION: ICD-10-CM

## 2019-09-30 DIAGNOSIS — Z00.00 HEALTH CARE MAINTENANCE: ICD-10-CM

## 2019-09-30 DIAGNOSIS — K90.0 CELIAC DISEASE: ICD-10-CM

## 2019-09-30 DIAGNOSIS — K43.9 ABDOMINAL WALL HERNIA: ICD-10-CM

## 2019-09-30 DIAGNOSIS — G43.009 MIGRAINE WITHOUT AURA AND WITHOUT STATUS MIGRAINOSUS, NOT INTRACTABLE: ICD-10-CM

## 2019-09-30 DIAGNOSIS — F41.1 ANXIETY STATE: ICD-10-CM

## 2019-09-30 DIAGNOSIS — Z23 FLU VACCINE NEED: ICD-10-CM

## 2019-09-30 DIAGNOSIS — Z01.818 PRE-OP EXAM: ICD-10-CM

## 2019-09-30 DIAGNOSIS — F32.9 MAJOR DEPRESSIVE DISORDER WITH SINGLE EPISODE, REMISSION STATUS UNSPECIFIED: ICD-10-CM

## 2019-09-30 DIAGNOSIS — K57.92 DIVERTICULITIS: ICD-10-CM

## 2019-09-30 DIAGNOSIS — N18.30 CHRONIC KIDNEY DISEASE, STAGE III (MODERATE) (H): ICD-10-CM

## 2019-09-30 DIAGNOSIS — E87.5 HYPERKALEMIA: ICD-10-CM

## 2019-09-30 LAB
ALBUMIN SERPL-MCNC: 4.1 G/DL (ref 3.5–5)
ALP SERPL-CCNC: 79 U/L (ref 45–120)
ALT SERPL W P-5'-P-CCNC: 26 U/L (ref 0–45)
ANION GAP SERPL CALCULATED.3IONS-SCNC: 9 MMOL/L (ref 5–18)
AST SERPL W P-5'-P-CCNC: 18 U/L (ref 0–40)
BASOPHILS # BLD AUTO: 0 THOU/UL (ref 0–0.2)
BASOPHILS NFR BLD AUTO: 1 % (ref 0–2)
BILIRUB SERPL-MCNC: 0.3 MG/DL (ref 0–1)
BUN SERPL-MCNC: 28 MG/DL (ref 8–22)
CALCIUM SERPL-MCNC: 10.1 MG/DL (ref 8.5–10.5)
CHLORIDE BLD-SCNC: 109 MMOL/L (ref 98–107)
CO2 SERPL-SCNC: 24 MMOL/L (ref 22–31)
CREAT SERPL-MCNC: 0.96 MG/DL (ref 0.6–1.1)
EOSINOPHIL # BLD AUTO: 0.2 THOU/UL (ref 0–0.4)
EOSINOPHIL NFR BLD AUTO: 4 % (ref 0–6)
ERYTHROCYTE [DISTWIDTH] IN BLOOD BY AUTOMATED COUNT: 11.5 % (ref 11–14.5)
GFR SERPL CREATININE-BSD FRML MDRD: >60 ML/MIN/1.73M2
GLUCOSE BLD-MCNC: 91 MG/DL (ref 70–125)
HCT VFR BLD AUTO: 38.8 % (ref 35–47)
HGB BLD-MCNC: 13.3 G/DL (ref 12–16)
LYMPHOCYTES # BLD AUTO: 2.3 THOU/UL (ref 0.8–4.4)
LYMPHOCYTES NFR BLD AUTO: 52 % (ref 20–40)
MCH RBC QN AUTO: 31.6 PG (ref 27–34)
MCHC RBC AUTO-ENTMCNC: 34.3 G/DL (ref 32–36)
MCV RBC AUTO: 92 FL (ref 80–100)
MONOCYTES # BLD AUTO: 0.3 THOU/UL (ref 0–0.9)
MONOCYTES NFR BLD AUTO: 7 % (ref 2–10)
NEUTROPHILS # BLD AUTO: 1.7 THOU/UL (ref 2–7.7)
NEUTROPHILS NFR BLD AUTO: 37 % (ref 50–70)
PLATELET # BLD AUTO: 251 THOU/UL (ref 140–440)
PMV BLD AUTO: 7.4 FL (ref 7–10)
POTASSIUM BLD-SCNC: 5.2 MMOL/L (ref 3.5–5)
PROT SERPL-MCNC: 7 G/DL (ref 6–8)
RBC # BLD AUTO: 4.21 MILL/UL (ref 3.8–5.4)
SODIUM SERPL-SCNC: 142 MMOL/L (ref 136–145)
WBC: 4.5 THOU/UL (ref 4–11)

## 2019-09-30 ASSESSMENT — MIFFLIN-ST. JEOR: SCORE: 1529.62

## 2019-10-01 ENCOUNTER — AMBULATORY - HEALTHEAST (OUTPATIENT)
Dept: SURGERY | Facility: CLINIC | Age: 54
End: 2019-10-01

## 2019-10-01 ENCOUNTER — COMMUNICATION - HEALTHEAST (OUTPATIENT)
Dept: FAMILY MEDICINE | Facility: CLINIC | Age: 54
End: 2019-10-01

## 2019-10-04 ENCOUNTER — COMMUNICATION - HEALTHEAST (OUTPATIENT)
Dept: FAMILY MEDICINE | Facility: CLINIC | Age: 54
End: 2019-10-04

## 2019-10-04 DIAGNOSIS — M54.2 NECK PAIN: ICD-10-CM

## 2019-10-04 DIAGNOSIS — M54.12 CERVICAL RADICULOPATHY: ICD-10-CM

## 2019-10-07 ENCOUNTER — AMBULATORY - HEALTHEAST (OUTPATIENT)
Dept: LAB | Facility: CLINIC | Age: 54
End: 2019-10-07

## 2019-10-07 ENCOUNTER — COMMUNICATION - HEALTHEAST (OUTPATIENT)
Dept: FAMILY MEDICINE | Facility: CLINIC | Age: 54
End: 2019-10-07

## 2019-10-07 DIAGNOSIS — E87.5 HYPERKALEMIA: ICD-10-CM

## 2019-10-07 DIAGNOSIS — R79.89 ABNORMAL CBC: ICD-10-CM

## 2019-10-07 LAB
ANION GAP SERPL CALCULATED.3IONS-SCNC: 9 MMOL/L (ref 5–18)
BUN SERPL-MCNC: 16 MG/DL (ref 8–22)
CALCIUM SERPL-MCNC: 10.3 MG/DL (ref 8.5–10.5)
CHLORIDE BLD-SCNC: 106 MMOL/L (ref 98–107)
CO2 SERPL-SCNC: 27 MMOL/L (ref 22–31)
CREAT SERPL-MCNC: 1.18 MG/DL (ref 0.6–1.1)
GFR SERPL CREATININE-BSD FRML MDRD: 48 ML/MIN/1.73M2
GLUCOSE BLD-MCNC: 93 MG/DL (ref 70–125)
POTASSIUM BLD-SCNC: 5.1 MMOL/L (ref 3.5–5)
SODIUM SERPL-SCNC: 142 MMOL/L (ref 136–145)

## 2019-10-08 ENCOUNTER — AMBULATORY - HEALTHEAST (OUTPATIENT)
Dept: FAMILY MEDICINE | Facility: CLINIC | Age: 54
End: 2019-10-08

## 2019-10-08 ENCOUNTER — COMMUNICATION - HEALTHEAST (OUTPATIENT)
Dept: FAMILY MEDICINE | Facility: CLINIC | Age: 54
End: 2019-10-08

## 2019-10-08 DIAGNOSIS — R94.4 DECREASED GFR: ICD-10-CM

## 2019-10-08 LAB
BASOPHILS # BLD AUTO: 0.1 THOU/UL (ref 0–0.2)
BASOPHILS NFR BLD AUTO: 1 % (ref 0–2)
EOSINOPHIL # BLD AUTO: 0.2 THOU/UL (ref 0–0.4)
EOSINOPHIL NFR BLD AUTO: 3 % (ref 0–6)
ERYTHROCYTE [DISTWIDTH] IN BLOOD BY AUTOMATED COUNT: 10.8 % (ref 11–14.5)
HCT VFR BLD AUTO: 41.6 % (ref 35–47)
HGB BLD-MCNC: 13.9 G/DL (ref 12–16)
LYMPHOCYTES # BLD AUTO: 2.4 THOU/UL (ref 0.8–4.4)
LYMPHOCYTES NFR BLD AUTO: 46 % (ref 20–40)
MCH RBC QN AUTO: 31.1 PG (ref 27–34)
MCHC RBC AUTO-ENTMCNC: 33.3 G/DL (ref 32–36)
MCV RBC AUTO: 93 FL (ref 80–100)
MONOCYTES # BLD AUTO: 0.3 THOU/UL (ref 0–0.9)
MONOCYTES NFR BLD AUTO: 6 % (ref 2–10)
NEUTROPHILS # BLD AUTO: 2.3 THOU/UL (ref 2–7.7)
NEUTROPHILS NFR BLD AUTO: 44 % (ref 50–70)
PLATELET # BLD AUTO: 263 THOU/UL (ref 140–440)
PMV BLD AUTO: 8.7 FL (ref 7–10)
RBC # BLD AUTO: 4.45 MILL/UL (ref 3.8–5.4)
WBC: 5.2 THOU/UL (ref 4–11)

## 2019-10-11 ENCOUNTER — COMMUNICATION - HEALTHEAST (OUTPATIENT)
Dept: FAMILY MEDICINE | Facility: CLINIC | Age: 54
End: 2019-10-11

## 2019-10-11 ENCOUNTER — AMBULATORY - HEALTHEAST (OUTPATIENT)
Dept: FAMILY MEDICINE | Facility: CLINIC | Age: 54
End: 2019-10-11

## 2019-10-11 ENCOUNTER — AMBULATORY - HEALTHEAST (OUTPATIENT)
Dept: LAB | Facility: CLINIC | Age: 54
End: 2019-10-11

## 2019-10-11 DIAGNOSIS — R94.4 DECREASED GFR: ICD-10-CM

## 2019-10-11 DIAGNOSIS — E83.52 HYPERCALCEMIA: ICD-10-CM

## 2019-10-11 LAB
ANION GAP SERPL CALCULATED.3IONS-SCNC: 9 MMOL/L (ref 5–18)
BUN SERPL-MCNC: 24 MG/DL (ref 8–22)
CALCIUM SERPL-MCNC: 10.8 MG/DL (ref 8.5–10.5)
CHLORIDE BLD-SCNC: 106 MMOL/L (ref 98–107)
CO2 SERPL-SCNC: 26 MMOL/L (ref 22–31)
CREAT SERPL-MCNC: 1.16 MG/DL (ref 0.6–1.1)
GFR SERPL CREATININE-BSD FRML MDRD: 49 ML/MIN/1.73M2
GLUCOSE BLD-MCNC: 101 MG/DL (ref 70–125)
POTASSIUM BLD-SCNC: 5.2 MMOL/L (ref 3.5–5)
SODIUM SERPL-SCNC: 141 MMOL/L (ref 136–145)

## 2019-10-21 ENCOUNTER — OFFICE VISIT - HEALTHEAST (OUTPATIENT)
Dept: FAMILY MEDICINE | Facility: CLINIC | Age: 54
End: 2019-10-21

## 2019-10-21 DIAGNOSIS — R05.9 COUGH: ICD-10-CM

## 2019-10-21 DIAGNOSIS — E87.5 SERUM POTASSIUM ELEVATED: ICD-10-CM

## 2019-10-21 LAB
ANION GAP SERPL CALCULATED.3IONS-SCNC: 12 MMOL/L (ref 5–18)
BUN SERPL-MCNC: 14 MG/DL (ref 8–22)
CALCIUM SERPL-MCNC: 10.5 MG/DL (ref 8.5–10.5)
CHLORIDE BLD-SCNC: 103 MMOL/L (ref 98–107)
CO2 SERPL-SCNC: 26 MMOL/L (ref 22–31)
CREAT SERPL-MCNC: 1.04 MG/DL (ref 0.6–1.1)
GFR SERPL CREATININE-BSD FRML MDRD: 56 ML/MIN/1.73M2
GLUCOSE BLD-MCNC: 93 MG/DL (ref 70–125)
POTASSIUM BLD-SCNC: 5.1 MMOL/L (ref 3.5–5)
SODIUM SERPL-SCNC: 141 MMOL/L (ref 136–145)

## 2019-10-23 ENCOUNTER — COMMUNICATION - HEALTHEAST (OUTPATIENT)
Dept: FAMILY MEDICINE | Facility: CLINIC | Age: 54
End: 2019-10-23

## 2019-10-23 DIAGNOSIS — R05.9 COUGH: ICD-10-CM

## 2019-10-24 ENCOUNTER — COMMUNICATION - HEALTHEAST (OUTPATIENT)
Dept: FAMILY MEDICINE | Facility: CLINIC | Age: 54
End: 2019-10-24

## 2019-10-24 ENCOUNTER — COMMUNICATION - HEALTHEAST (OUTPATIENT)
Dept: SCHEDULING | Facility: CLINIC | Age: 54
End: 2019-10-24

## 2019-10-24 DIAGNOSIS — R05.9 COUGH: ICD-10-CM

## 2019-10-28 ENCOUNTER — COMMUNICATION - HEALTHEAST (OUTPATIENT)
Dept: SCHEDULING | Facility: CLINIC | Age: 54
End: 2019-10-28

## 2019-10-28 DIAGNOSIS — R05.9 COUGH: ICD-10-CM

## 2019-11-11 ENCOUNTER — OFFICE VISIT - HEALTHEAST (OUTPATIENT)
Dept: FAMILY MEDICINE | Facility: CLINIC | Age: 54
End: 2019-11-11

## 2019-11-11 DIAGNOSIS — J40 BRONCHITIS: ICD-10-CM

## 2019-11-11 DIAGNOSIS — R05.9 COUGH: ICD-10-CM

## 2019-11-11 LAB — DEPRECATED S PYO AG THROAT QL EIA: NORMAL

## 2019-11-11 ASSESSMENT — MIFFLIN-ST. JEOR: SCORE: 1537.33

## 2019-11-12 LAB — GROUP A STREP BY PCR: NORMAL

## 2019-11-14 ENCOUNTER — COMMUNICATION - HEALTHEAST (OUTPATIENT)
Dept: SURGERY | Facility: CLINIC | Age: 54
End: 2019-11-14

## 2019-11-15 ENCOUNTER — COMMUNICATION - HEALTHEAST (OUTPATIENT)
Dept: FAMILY MEDICINE | Facility: CLINIC | Age: 54
End: 2019-11-15

## 2019-11-15 ENCOUNTER — OFFICE VISIT - HEALTHEAST (OUTPATIENT)
Dept: FAMILY MEDICINE | Facility: CLINIC | Age: 54
End: 2019-11-15

## 2019-11-15 DIAGNOSIS — K21.9 GASTROESOPHAGEAL REFLUX DISEASE WITHOUT ESOPHAGITIS: ICD-10-CM

## 2019-11-15 DIAGNOSIS — R05.9 COUGH: ICD-10-CM

## 2019-11-15 DIAGNOSIS — Z01.818 PRE-OPERATIVE EXAMINATION: ICD-10-CM

## 2019-11-15 DIAGNOSIS — F32.9 MAJOR DEPRESSIVE DISORDER WITH SINGLE EPISODE, REMISSION STATUS UNSPECIFIED: ICD-10-CM

## 2019-11-15 DIAGNOSIS — Z52.4 DONOR OF KIDNEY FOR TRANSPLANT: ICD-10-CM

## 2019-11-15 DIAGNOSIS — K43.9 ABDOMINAL WALL HERNIA: ICD-10-CM

## 2019-11-15 DIAGNOSIS — G43.009 MIGRAINE WITHOUT AURA AND WITHOUT STATUS MIGRAINOSUS, NOT INTRACTABLE: ICD-10-CM

## 2019-11-15 LAB
ALBUMIN SERPL-MCNC: 4.2 G/DL (ref 3.5–5)
ALP SERPL-CCNC: 91 U/L (ref 45–120)
ALT SERPL W P-5'-P-CCNC: 30 U/L (ref 0–45)
ANION GAP SERPL CALCULATED.3IONS-SCNC: 9 MMOL/L (ref 5–18)
AST SERPL W P-5'-P-CCNC: 14 U/L (ref 0–40)
BASOPHILS # BLD AUTO: 0.1 THOU/UL (ref 0–0.2)
BASOPHILS NFR BLD AUTO: 1 % (ref 0–2)
BILIRUB SERPL-MCNC: 0.4 MG/DL (ref 0–1)
BUN SERPL-MCNC: 22 MG/DL (ref 8–22)
CALCIUM SERPL-MCNC: 10 MG/DL (ref 8.5–10.5)
CHLORIDE BLD-SCNC: 106 MMOL/L (ref 98–107)
CO2 SERPL-SCNC: 27 MMOL/L (ref 22–31)
CREAT SERPL-MCNC: 1.02 MG/DL (ref 0.6–1.1)
EOSINOPHIL # BLD AUTO: 0.2 THOU/UL (ref 0–0.4)
EOSINOPHIL NFR BLD AUTO: 3 % (ref 0–6)
ERYTHROCYTE [DISTWIDTH] IN BLOOD BY AUTOMATED COUNT: 11.9 % (ref 11–14.5)
GFR SERPL CREATININE-BSD FRML MDRD: 57 ML/MIN/1.73M2
GLUCOSE BLD-MCNC: 79 MG/DL (ref 70–125)
HCT VFR BLD AUTO: 42 % (ref 35–47)
HGB BLD-MCNC: 14 G/DL (ref 12–16)
LYMPHOCYTES # BLD AUTO: 4 THOU/UL (ref 0.8–4.4)
LYMPHOCYTES NFR BLD AUTO: 43 % (ref 20–40)
MCH RBC QN AUTO: 31.1 PG (ref 27–34)
MCHC RBC AUTO-ENTMCNC: 33.4 G/DL (ref 32–36)
MCV RBC AUTO: 93 FL (ref 80–100)
MONOCYTES # BLD AUTO: 0.5 THOU/UL (ref 0–0.9)
MONOCYTES NFR BLD AUTO: 5 % (ref 2–10)
NEUTROPHILS # BLD AUTO: 4.5 THOU/UL (ref 2–7.7)
NEUTROPHILS NFR BLD AUTO: 49 % (ref 50–70)
PLATELET # BLD AUTO: 265 THOU/UL (ref 140–440)
PMV BLD AUTO: 7.2 FL (ref 7–10)
POTASSIUM BLD-SCNC: 4.8 MMOL/L (ref 3.5–5)
PROT SERPL-MCNC: 7.5 G/DL (ref 6–8)
RBC # BLD AUTO: 4.51 MILL/UL (ref 3.8–5.4)
SODIUM SERPL-SCNC: 142 MMOL/L (ref 136–145)
WBC: 9.3 THOU/UL (ref 4–11)

## 2019-11-15 ASSESSMENT — MIFFLIN-ST. JEOR: SCORE: 1569.08

## 2019-11-15 ASSESSMENT — PATIENT HEALTH QUESTIONNAIRE - PHQ9: SUM OF ALL RESPONSES TO PHQ QUESTIONS 1-9: 11

## 2019-11-20 ENCOUNTER — COMMUNICATION - HEALTHEAST (OUTPATIENT)
Dept: FAMILY MEDICINE | Facility: CLINIC | Age: 54
End: 2019-11-20

## 2019-11-20 ASSESSMENT — MIFFLIN-ST. JEOR: SCORE: 1568.17

## 2019-11-21 ENCOUNTER — COMMUNICATION - HEALTHEAST (OUTPATIENT)
Dept: FAMILY MEDICINE | Facility: CLINIC | Age: 54
End: 2019-11-21

## 2019-11-22 ENCOUNTER — SURGERY - HEALTHEAST (OUTPATIENT)
Dept: SURGERY | Facility: HOSPITAL | Age: 54
End: 2019-11-22

## 2019-11-22 ENCOUNTER — ANESTHESIA - HEALTHEAST (OUTPATIENT)
Dept: SURGERY | Facility: HOSPITAL | Age: 54
End: 2019-11-22

## 2019-11-22 ASSESSMENT — MIFFLIN-ST. JEOR: SCORE: 1584.73

## 2019-11-27 ENCOUNTER — COMMUNICATION - HEALTHEAST (OUTPATIENT)
Dept: SURGERY | Facility: CLINIC | Age: 54
End: 2019-11-27

## 2019-11-27 DIAGNOSIS — K43.9 VENTRAL HERNIA WITHOUT OBSTRUCTION OR GANGRENE: ICD-10-CM

## 2019-12-06 ENCOUNTER — OFFICE VISIT - HEALTHEAST (OUTPATIENT)
Dept: SURGERY | Facility: CLINIC | Age: 54
End: 2019-12-06

## 2019-12-06 DIAGNOSIS — K43.9 VENTRAL HERNIA WITHOUT OBSTRUCTION OR GANGRENE: ICD-10-CM

## 2019-12-06 LAB
ALBUMIN UR-MCNC: NEGATIVE MG/DL
APPEARANCE UR: CLEAR
BILIRUB UR QL STRIP: NEGATIVE
COLOR UR AUTO: YELLOW
GLUCOSE UR STRIP-MCNC: NEGATIVE MG/DL
HGB UR QL STRIP: NEGATIVE
KETONES UR STRIP-MCNC: NEGATIVE MG/DL
LEUKOCYTE ESTERASE UR QL STRIP: NEGATIVE
NITRATE UR QL: NEGATIVE
PH UR STRIP: 5.5 [PH] (ref 5–8)
SP GR UR STRIP: 1.02 (ref 1–1.03)
UROBILINOGEN UR STRIP-ACNC: NORMAL

## 2019-12-06 ASSESSMENT — MIFFLIN-ST. JEOR: SCORE: 1544.36

## 2019-12-20 ENCOUNTER — COMMUNICATION - HEALTHEAST (OUTPATIENT)
Dept: FAMILY MEDICINE | Facility: CLINIC | Age: 54
End: 2019-12-20

## 2019-12-20 DIAGNOSIS — M54.12 CERVICAL RADICULOPATHY: ICD-10-CM

## 2019-12-20 DIAGNOSIS — M54.2 NECK PAIN: ICD-10-CM

## 2019-12-23 ENCOUNTER — COMMUNICATION - HEALTHEAST (OUTPATIENT)
Dept: SURGERY | Facility: CLINIC | Age: 54
End: 2019-12-23

## 2020-03-06 ENCOUNTER — OFFICE VISIT - HEALTHEAST (OUTPATIENT)
Dept: FAMILY MEDICINE | Facility: CLINIC | Age: 55
End: 2020-03-06

## 2020-03-06 DIAGNOSIS — E83.52 HYPERCALCEMIA: ICD-10-CM

## 2020-03-06 DIAGNOSIS — E66.09 CLASS 1 OBESITY DUE TO EXCESS CALORIES WITHOUT SERIOUS COMORBIDITY WITH BODY MASS INDEX (BMI) OF 33.0 TO 33.9 IN ADULT: ICD-10-CM

## 2020-03-06 DIAGNOSIS — Z00.00 HEALTH CARE MAINTENANCE: ICD-10-CM

## 2020-03-06 DIAGNOSIS — M54.2 CERVICALGIA: ICD-10-CM

## 2020-03-06 DIAGNOSIS — F32.9 MAJOR DEPRESSIVE DISORDER WITH SINGLE EPISODE, REMISSION STATUS UNSPECIFIED: ICD-10-CM

## 2020-03-06 DIAGNOSIS — K90.0 CELIAC DISEASE: ICD-10-CM

## 2020-03-06 DIAGNOSIS — G43.119 INTRACTABLE MIGRAINE WITH AURA WITHOUT STATUS MIGRAINOSUS: ICD-10-CM

## 2020-03-06 DIAGNOSIS — R94.4 DECREASED GFR: ICD-10-CM

## 2020-03-06 DIAGNOSIS — E66.811 CLASS 1 OBESITY DUE TO EXCESS CALORIES WITHOUT SERIOUS COMORBIDITY WITH BODY MASS INDEX (BMI) OF 33.0 TO 33.9 IN ADULT: ICD-10-CM

## 2020-03-06 DIAGNOSIS — E55.9 VITAMIN D DEFICIENCY: ICD-10-CM

## 2020-03-06 DIAGNOSIS — E83.42 HYPOMAGNESEMIA: ICD-10-CM

## 2020-03-06 DIAGNOSIS — Z52.4 DONOR OF KIDNEY FOR TRANSPLANT: ICD-10-CM

## 2020-03-06 LAB
ALBUMIN SERPL-MCNC: 4.3 G/DL (ref 3.5–5)
ALP SERPL-CCNC: 184 U/L (ref 45–120)
ALT SERPL W P-5'-P-CCNC: 173 U/L (ref 0–45)
ANION GAP SERPL CALCULATED.3IONS-SCNC: 12 MMOL/L (ref 5–18)
AST SERPL W P-5'-P-CCNC: 85 U/L (ref 0–40)
BILIRUB SERPL-MCNC: 0.4 MG/DL (ref 0–1)
BUN SERPL-MCNC: 19 MG/DL (ref 8–22)
CALCIUM SERPL-MCNC: 10.4 MG/DL (ref 8.5–10.5)
CHLORIDE BLD-SCNC: 106 MMOL/L (ref 98–107)
CHOLEST SERPL-MCNC: 267 MG/DL
CO2 SERPL-SCNC: 25 MMOL/L (ref 22–31)
CREAT SERPL-MCNC: 0.99 MG/DL (ref 0.6–1.1)
FASTING STATUS PATIENT QL REPORTED: ABNORMAL
GFR SERPL CREATININE-BSD FRML MDRD: 59 ML/MIN/1.73M2
GLUCOSE BLD-MCNC: 96 MG/DL (ref 70–125)
HDLC SERPL-MCNC: 76 MG/DL
LDLC SERPL CALC-MCNC: 162 MG/DL
MAGNESIUM SERPL-MCNC: 1.8 MG/DL (ref 1.8–2.6)
POTASSIUM BLD-SCNC: 4.9 MMOL/L (ref 3.5–5)
PROT SERPL-MCNC: 7.7 G/DL (ref 6–8)
SODIUM SERPL-SCNC: 143 MMOL/L (ref 136–145)
TRIGL SERPL-MCNC: 143 MG/DL
TSH SERPL DL<=0.005 MIU/L-ACNC: 1.35 UIU/ML (ref 0.3–5)

## 2020-03-06 ASSESSMENT — PATIENT HEALTH QUESTIONNAIRE - PHQ9: SUM OF ALL RESPONSES TO PHQ QUESTIONS 1-9: 7

## 2020-03-06 ASSESSMENT — ANXIETY QUESTIONNAIRES
4. TROUBLE RELAXING: NEARLY EVERY DAY
7. FEELING AFRAID AS IF SOMETHING AWFUL MIGHT HAPPEN: NOT AT ALL
6. BECOMING EASILY ANNOYED OR IRRITABLE: NOT AT ALL
1. FEELING NERVOUS, ANXIOUS, OR ON EDGE: MORE THAN HALF THE DAYS
5. BEING SO RESTLESS THAT IT IS HARD TO SIT STILL: NOT AT ALL
GAD7 TOTAL SCORE: 5
IF YOU CHECKED OFF ANY PROBLEMS ON THIS QUESTIONNAIRE, HOW DIFFICULT HAVE THESE PROBLEMS MADE IT FOR YOU TO DO YOUR WORK, TAKE CARE OF THINGS AT HOME, OR GET ALONG WITH OTHER PEOPLE: NOT DIFFICULT AT ALL
2. NOT BEING ABLE TO STOP OR CONTROL WORRYING: NOT AT ALL
3. WORRYING TOO MUCH ABOUT DIFFERENT THINGS: NOT AT ALL

## 2020-03-06 ASSESSMENT — MIFFLIN-ST. JEOR: SCORE: 1595.39

## 2020-03-09 LAB
25(OH)D3 SERPL-MCNC: 43.6 NG/ML (ref 30–80)
25(OH)D3 SERPL-MCNC: 43.6 NG/ML (ref 30–80)

## 2020-03-11 ENCOUNTER — COMMUNICATION - HEALTHEAST (OUTPATIENT)
Dept: FAMILY MEDICINE | Facility: CLINIC | Age: 55
End: 2020-03-11

## 2020-03-11 ENCOUNTER — AMBULATORY - HEALTHEAST (OUTPATIENT)
Dept: FAMILY MEDICINE | Facility: CLINIC | Age: 55
End: 2020-03-11

## 2020-03-11 DIAGNOSIS — R74.8 ELEVATED LIVER ENZYMES: ICD-10-CM

## 2020-03-11 DIAGNOSIS — Z52.4 DONOR OF KIDNEY FOR TRANSPLANT: ICD-10-CM

## 2020-03-12 ENCOUNTER — COMMUNICATION - HEALTHEAST (OUTPATIENT)
Dept: FAMILY MEDICINE | Facility: CLINIC | Age: 55
End: 2020-03-12

## 2020-03-12 DIAGNOSIS — G43.119 INTRACTABLE MIGRAINE WITH AURA WITHOUT STATUS MIGRAINOSUS: ICD-10-CM

## 2020-03-13 ENCOUNTER — AMBULATORY - HEALTHEAST (OUTPATIENT)
Dept: LAB | Facility: CLINIC | Age: 55
End: 2020-03-13

## 2020-03-13 ENCOUNTER — COMMUNICATION - HEALTHEAST (OUTPATIENT)
Dept: FAMILY MEDICINE | Facility: CLINIC | Age: 55
End: 2020-03-13

## 2020-03-13 DIAGNOSIS — R74.8 ELEVATED LIVER ENZYMES: ICD-10-CM

## 2020-03-13 LAB
ALBUMIN SERPL-MCNC: 4.4 G/DL (ref 3.5–5)
ALP SERPL-CCNC: 144 U/L (ref 45–120)
ALT SERPL W P-5'-P-CCNC: 160 U/L (ref 0–45)
ANION GAP SERPL CALCULATED.3IONS-SCNC: 13 MMOL/L (ref 5–18)
AST SERPL W P-5'-P-CCNC: 48 U/L (ref 0–40)
BILIRUB SERPL-MCNC: 0.6 MG/DL (ref 0–1)
BUN SERPL-MCNC: 24 MG/DL (ref 8–22)
CALCIUM SERPL-MCNC: 10.3 MG/DL (ref 8.5–10.5)
CHLORIDE BLD-SCNC: 106 MMOL/L (ref 98–107)
CO2 SERPL-SCNC: 25 MMOL/L (ref 22–31)
CREAT SERPL-MCNC: 1.06 MG/DL (ref 0.6–1.1)
GFR SERPL CREATININE-BSD FRML MDRD: 54 ML/MIN/1.73M2
GLUCOSE BLD-MCNC: 110 MG/DL (ref 70–125)
POTASSIUM BLD-SCNC: 4.6 MMOL/L (ref 3.5–5)
PROT SERPL-MCNC: 7.7 G/DL (ref 6–8)
SODIUM SERPL-SCNC: 144 MMOL/L (ref 136–145)

## 2020-03-16 ENCOUNTER — COMMUNICATION - HEALTHEAST (OUTPATIENT)
Dept: FAMILY MEDICINE | Facility: CLINIC | Age: 55
End: 2020-03-16

## 2020-03-16 ENCOUNTER — AMBULATORY - HEALTHEAST (OUTPATIENT)
Dept: FAMILY MEDICINE | Facility: CLINIC | Age: 55
End: 2020-03-16

## 2020-03-16 DIAGNOSIS — R94.4 DECREASED GFR: ICD-10-CM

## 2020-03-16 DIAGNOSIS — R74.8 ELEVATED LIVER ENZYMES: ICD-10-CM

## 2020-03-16 LAB
HAV IGM SERPL QL IA: NEGATIVE
HBV CORE IGM SERPL QL IA: NEGATIVE
HBV SURFACE AG SERPL QL IA: NEGATIVE
HCV AB SERPL QL IA: NEGATIVE

## 2020-03-24 ENCOUNTER — COMMUNICATION - HEALTHEAST (OUTPATIENT)
Dept: FAMILY MEDICINE | Facility: CLINIC | Age: 55
End: 2020-03-24

## 2020-03-24 ENCOUNTER — AMBULATORY - HEALTHEAST (OUTPATIENT)
Dept: FAMILY MEDICINE | Facility: CLINIC | Age: 55
End: 2020-03-24

## 2020-03-24 ENCOUNTER — MEDICAL CORRESPONDENCE (OUTPATIENT)
Dept: HEALTH INFORMATION MANAGEMENT | Facility: CLINIC | Age: 55
End: 2020-03-24

## 2020-03-24 ENCOUNTER — AMBULATORY - HEALTHEAST (OUTPATIENT)
Dept: LAB | Facility: CLINIC | Age: 55
End: 2020-03-24

## 2020-03-24 DIAGNOSIS — R53.82 CHRONIC FATIGUE: ICD-10-CM

## 2020-03-24 DIAGNOSIS — R74.8 ELEVATED LIVER ENZYMES: ICD-10-CM

## 2020-03-24 DIAGNOSIS — E87.5 HYPERKALEMIA: ICD-10-CM

## 2020-03-24 DIAGNOSIS — R94.4 DECREASED GFR: ICD-10-CM

## 2020-03-24 DIAGNOSIS — E83.52 HYPERCALCEMIA: ICD-10-CM

## 2020-03-24 LAB
ALBUMIN SERPL-MCNC: 4.5 G/DL (ref 3.5–5)
ALP SERPL-CCNC: 96 U/L (ref 45–120)
ALT SERPL W P-5'-P-CCNC: 34 U/L (ref 0–45)
ANION GAP SERPL CALCULATED.3IONS-SCNC: 10 MMOL/L (ref 5–18)
AST SERPL W P-5'-P-CCNC: 19 U/L (ref 0–40)
BILIRUB SERPL-MCNC: 0.3 MG/DL (ref 0–1)
BUN SERPL-MCNC: 23 MG/DL (ref 8–22)
CALCIUM SERPL-MCNC: 10.7 MG/DL (ref 8.5–10.5)
CHLORIDE BLD-SCNC: 106 MMOL/L (ref 98–107)
CO2 SERPL-SCNC: 26 MMOL/L (ref 22–31)
CREAT SERPL-MCNC: 0.98 MG/DL (ref 0.6–1.1)
GFR SERPL CREATININE-BSD FRML MDRD: 60 ML/MIN/1.73M2
GLUCOSE BLD-MCNC: 85 MG/DL (ref 70–125)
POTASSIUM BLD-SCNC: 5.2 MMOL/L (ref 3.5–5)
PROT SERPL-MCNC: 7.8 G/DL (ref 6–8)
SODIUM SERPL-SCNC: 142 MMOL/L (ref 136–145)

## 2020-04-21 ENCOUNTER — AMBULATORY - HEALTHEAST (OUTPATIENT)
Dept: LAB | Facility: CLINIC | Age: 55
End: 2020-04-21

## 2020-04-21 DIAGNOSIS — E87.5 HYPERKALEMIA: ICD-10-CM

## 2020-04-21 DIAGNOSIS — E83.52 HYPERCALCEMIA: ICD-10-CM

## 2020-04-21 LAB
ALBUMIN SERPL-MCNC: 4.3 G/DL (ref 3.5–5)
ALP SERPL-CCNC: 171 U/L (ref 45–120)
ALT SERPL W P-5'-P-CCNC: 324 U/L (ref 0–45)
ANION GAP SERPL CALCULATED.3IONS-SCNC: 15 MMOL/L (ref 5–18)
AST SERPL W P-5'-P-CCNC: 124 U/L (ref 0–40)
BILIRUB SERPL-MCNC: 0.6 MG/DL (ref 0–1)
BUN SERPL-MCNC: 26 MG/DL (ref 8–22)
CALCIUM SERPL-MCNC: 10.2 MG/DL (ref 8.5–10.5)
CHLORIDE BLD-SCNC: 104 MMOL/L (ref 98–107)
CO2 SERPL-SCNC: 22 MMOL/L (ref 22–31)
CREAT SERPL-MCNC: 1.03 MG/DL (ref 0.6–1.1)
GFR SERPL CREATININE-BSD FRML MDRD: 56 ML/MIN/1.73M2
GLUCOSE BLD-MCNC: 97 MG/DL (ref 70–125)
POTASSIUM BLD-SCNC: 4.8 MMOL/L (ref 3.5–5)
PROT SERPL-MCNC: 7.9 G/DL (ref 6–8)
SODIUM SERPL-SCNC: 141 MMOL/L (ref 136–145)

## 2020-04-22 ENCOUNTER — COMMUNICATION - HEALTHEAST (OUTPATIENT)
Dept: FAMILY MEDICINE | Facility: CLINIC | Age: 55
End: 2020-04-22

## 2020-04-22 DIAGNOSIS — R79.89 ELEVATED LFTS: ICD-10-CM

## 2020-04-29 ENCOUNTER — AMBULATORY - HEALTHEAST (OUTPATIENT)
Dept: LAB | Facility: CLINIC | Age: 55
End: 2020-04-29

## 2020-04-29 DIAGNOSIS — R79.89 ELEVATED LFTS: ICD-10-CM

## 2020-04-29 LAB
ALBUMIN SERPL-MCNC: 4.3 G/DL (ref 3.5–5)
ALP SERPL-CCNC: 128 U/L (ref 45–120)
ALT SERPL W P-5'-P-CCNC: 84 U/L (ref 0–45)
ANION GAP SERPL CALCULATED.3IONS-SCNC: 11 MMOL/L (ref 5–18)
AST SERPL W P-5'-P-CCNC: 24 U/L (ref 0–40)
BILIRUB SERPL-MCNC: 0.4 MG/DL (ref 0–1)
BUN SERPL-MCNC: 26 MG/DL (ref 8–22)
C REACTIVE PROTEIN LHE: 0.8 MG/DL (ref 0–0.8)
CALCIUM SERPL-MCNC: 10.4 MG/DL (ref 8.5–10.5)
CHLORIDE BLD-SCNC: 106 MMOL/L (ref 98–107)
CO2 SERPL-SCNC: 23 MMOL/L (ref 22–31)
CREAT SERPL-MCNC: 0.97 MG/DL (ref 0.6–1.1)
FERRITIN SERPL-MCNC: 100 NG/ML (ref 10–130)
GFR SERPL CREATININE-BSD FRML MDRD: 60 ML/MIN/1.73M2
GLUCOSE BLD-MCNC: 77 MG/DL (ref 70–125)
POTASSIUM BLD-SCNC: 5.1 MMOL/L (ref 3.5–5)
PROT SERPL-MCNC: 7.6 G/DL (ref 6–8)
SODIUM SERPL-SCNC: 140 MMOL/L (ref 136–145)

## 2020-05-01 LAB — MITOCHONDRIAL M2 ABY IGG: 3 U/ML

## 2020-05-04 ENCOUNTER — COMMUNICATION - HEALTHEAST (OUTPATIENT)
Dept: FAMILY MEDICINE | Facility: CLINIC | Age: 55
End: 2020-05-04

## 2020-05-08 VITALS — HEIGHT: 67 IN | WEIGHT: 200 LBS | BODY MASS INDEX: 31.39 KG/M2

## 2020-05-08 RX ORDER — SENNOSIDES 8.6 MG
1 TABLET ORAL DAILY
COMMUNITY

## 2020-05-08 RX ORDER — TURMERIC/TURMERIC EXT/PEPR EXT 900-100 MG
1 CAPSULE ORAL DAILY
COMMUNITY
End: 2022-09-19

## 2020-05-08 RX ORDER — LANOLIN ALCOHOL/MO/W.PET/CERES
400 CREAM (GRAM) TOPICAL DAILY
COMMUNITY
End: 2024-08-09

## 2020-05-08 RX ORDER — SUMATRIPTAN SUCCINATE 50 MG/1
50 TABLET ORAL
COMMUNITY
End: 2021-07-23 | Stop reason: DRUGHIGH

## 2020-05-08 RX ORDER — MULTIVIT-MIN/IRON/FOLIC ACID/K 18-600-40
500 CAPSULE ORAL 2 TIMES DAILY
COMMUNITY
End: 2023-06-01

## 2020-05-08 RX ORDER — LANOLIN ALCOHOL/MO/W.PET/CERES
CREAM (GRAM) TOPICAL
COMMUNITY
End: 2021-07-23

## 2020-05-08 RX ORDER — PHENOL 1.4 %
10 AEROSOL, SPRAY (ML) MUCOUS MEMBRANE
COMMUNITY
End: 2022-09-19

## 2020-05-08 RX ORDER — DIPHENHYDRAMINE HCL 25 MG
25 TABLET ORAL EVERY 6 HOURS PRN
COMMUNITY
End: 2021-07-23

## 2020-05-08 RX ORDER — FLUOXETINE 20 MG/1
20 TABLET, FILM COATED ORAL DAILY
COMMUNITY
End: 2022-09-19

## 2020-05-08 RX ORDER — ERGOCALCIFEROL 1.25 MG/1
50000 CAPSULE, LIQUID FILLED ORAL WEEKLY
COMMUNITY
End: 2021-07-23

## 2020-05-08 ASSESSMENT — MIFFLIN-ST. JEOR: SCORE: 1544.82

## 2020-05-08 NOTE — PATIENT INSTRUCTIONS
Your BMI is Body mass index is 31.32 kg/m .  Weight management is a personal decision.  If you are interested in exploring weight loss strategies, the following discussion covers the approaches that may be successful. Body mass index (BMI) is one way to tell whether you are at a healthy weight, overweight, or obese. It measures your weight in relation to your height.  A BMI of 18.5 to 24.9 is in the healthy range. A person with a BMI of 25 to 29.9 is considered overweight, and someone with a BMI of 30 or greater is considered obese. More than two-thirds of [place] adults are considered overweight or obese.  Being overweight or obese increases the risk for further weight gain. Excess weight may lead to heart disease and diabetes.  Creating and following plans for healthy eating and physical activity may help you improve your health.  Weight control is part of healthy lifestyle and includes exercise, emotional health, and healthy eating habits. Careful eating habits lifelong are the mainstay of weight control. Though there are significant health benefits from weight loss, long-term weight loss with diet alone may be very difficult to achieve- studies show long-term success with dietary management in less than 10% of people. Attaining a healthy weight may be especially difficult to achieve in those with severe obesity. In some cases, medications, devices and surgical management might be considered.  What can you do?  If you are overweight or obese and are interested in methods for weight loss, you should discuss this with your provider.     Consider reducing daily calorie intake by 500 calories.     Keep a food journal.     Avoiding skipping meals, consider cutting portions instead.    Diet combined with exercise helps maintain muscle while optimizing fat loss. Strength training is particularly important for building and maintaining muscle mass. Exercise helps reduce stress, increase energy, and improves fitness.  Increasing exercise without diet control, however, may not burn enough calories to loose weight.       Start walking three days a week 10-20 minutes at a time    Work towards walking thirty minutes five days a week     Eventually, increase the speed of your walking for 1-2 minutes at time    In addition, we recommend that you review healthy lifestyles and methods for weight loss available through the National Institutes of Health patient information sites:  http://win.niddk.nih.gov/publications/index.htm    And look into health and wellness programs that may be available through your health insurance provider, employer, local community center, or go club.    Weight management plan: Patient was referred to their PCP to discuss a diet and exercise plan.    Drive Safe... Drive Alive     Sleep health profoundly affects your health, mood, and your safety. 33% of the population (one in three of us) is not getting enough sleep and many have a sleep disorder. Not getting enough sleep or having an untreated / undertreated sleep condition may make us sleepy without even knowing it. In fact, our driving could be dramatically impaired due to our sleep health. As your provider, here are some things I would like you to know about driving:     Here are some warning signs for impairment and dangerous drowsy driving:              -Having been awake more than 16 hours               -Looking tired               -Eyelid drooping              -Head nodding (it could be too late at this point)              -Driving for more than 30 minutes     Some things you could do to make the driving safer if you are experiencing some drowsiness:              -Stop driving and rest              -Call for transportation              -Make sure your sleep disorder is adequately treated     Some things that have been shown NOT to work when experiencing drowsiness while driving:              -Turning on the radio              -Opening windows               -Eating any  distracting  /  entertaining  foods (e.g., sunflower seeds, candy, or any other)              -Talking on the phone      Your decision may not only impact your life, but also the life of others. Please, remember to drive safe for yourself and all of us.    [facility]      Treating Insomnia  Good sleeping habits are a key part of treatment. If needed, some medications may help you sleep better at first. Making healthy lifestyle changes and learning to relax can improve your sleep. Treating insomnia takes commitment, but trust that your efforts will pay off. Talk to your doctor before taking any medication.    Healthy Lifestyle  Your lifestyle affects your health and your sleep. Here are some healthy habits:    Keep a regular sleep schedule. Go to bed and get up at the same time each day.    Exercise regularly. It may help you reduce stress. Avoid strenuous exercise for two to four hours before bedtime.    Avoid or limit naps.    Use your bed only for sleep and sex.    Don t spend too much time in bed trying to fall asleep. If you can t fall asleep, get up and do something until you become tired and drowsy.    Avoid or limit caffeine and nicotine. They can keep you awake at night. Also avoid alcohol. It may help you fall asleep at first, but your sleep will not be restful.    Before Bedtime  To sleep better every night, try these tips:    Have a bedtime routine to let your body and mind know when it s time to sleep.    Going to bed should be relaxing so try to do only relaxing things around bedtime. Sleep will come sooner.    If your worries don t let you sleep, write them down in a diary. Then close it, and go to bed.    Make sure the room is not too hot or too cold. If it s not dark enough, an eye mask can help. If it s noisy, try using earplugs.    Learn to Relax  Stress, anxiety, and body tension may keep you awake at night. To unwind before bedtime, try reading a book, meditation,  or yoga. Also, try the following:    Deep breathing. Sit or lie back in a chair. Take a slow, deep breath. Hold it for 5 counts. Then breathe out slowly through your mouth. Keep doing this until you feel relaxed.    Imagery. Think of the last fun trip you took. In your mind, walk through the trip from start to finish. Put as much detail into the memory as you can remember. It will help you relax.    Cognitive Behavioral Treatment (CBT)  CBT is the most effective treatment for long-term insomnia. It tries to address the underlying causes of your sleep problems, including your habits and how you think about sleep.        Suggested Resources  Insomnia Treatment Books     Overcoming Insomnia by Phillip Dimas and Victorina Bucio (2008)    No More Sleepless Nights by Javier Pichardo and Tg Mendez (1996)    [place] to Insomnia by Mike Solis (2009)    The Insomnia Workbook by Jeanette Aaron and Kush Lara (2009)    The Insomnia Answer by Dre Nicole and Bala Damico (2006)      Stress Management and Relaxation Books    The Relaxation and Stress Reduction Workbook by Adrianne Harley, Cindy Piña and Grant Choe (2008)    Stress Management Workbook: Techniques and Self-Assessment Procedures by Sheron Urrutia and Maikel Dukes (1997)    A Mindfulness-Based Stress Reduction Workbook by Cheikh Hale and Lorena Angulo (2010)    The Complete Stress Management Workbook by James Truong and Ed Hancock (1996)    Assert Yourself by Nga Hutchins and Donte Hutchins (1977)    Relaxation Resources for Computer Download   These websites offer resources to help you relax. This list is for information only. [facility] is not responsible for the quality of services or the actions of any person or organization.  Progressive Muscle Relaxation (PMR):     http://www.[employer].com/progressive-muscle-relaxation-exercise.html      http://studentsupport.[facility]/counseling/resources/self-help/relaxation-and-stress-management/   Deep Breathing Exercises:    http://www.[employer]/breathing-awareness.html     Meditation:     www.[employer]rantheTuTanda    www.SM[employer]guidedkaufDAmeditation-site.com You may have to pay for some of these resources.    Guided Imagery:    http://www.[employer]/guided-imagery-scripts.html     http://500px/library/dspecqyspo-rinalt-wzouemy/     Counseling / Behavioral Health  [facility]  Visit [email or web address] or call [phone] to find a clinic close to you.      This is not a prescription and these resources are optional. You must pay for any costs when using these resources. Please ask your insurance carrier if you can be reimbursed for these resources. If so, you are responsible for sending the needed details to your insurance carrier. These resources may also be tax deductible as medical expenses. Check with your .     These programs and publications are not affiliated in any way with [facility].

## 2020-05-11 ENCOUNTER — VIRTUAL VISIT (OUTPATIENT)
Dept: SLEEP MEDICINE | Facility: CLINIC | Age: 55
End: 2020-05-11
Payer: COMMERCIAL

## 2020-05-11 DIAGNOSIS — F51.04 PSYCHOPHYSIOLOGICAL INSOMNIA: Primary | ICD-10-CM

## 2020-05-11 DIAGNOSIS — G47.10 HYPERSOMNOLENCE: ICD-10-CM

## 2020-05-11 PROCEDURE — 99205 OFFICE O/P NEW HI 60 MIN: CPT | Mod: 95 | Performed by: INTERNAL MEDICINE

## 2020-05-11 NOTE — PROGRESS NOTES
"Keiry Mchugh is a 55 year old female who is being evaluated via a billable video visit.      The patient has been notified of following:     \"This video visit will be conducted via a call between you and your physician/provider. We have found that certain health care needs can be provided without the need for an in-person physical exam.  This service lets us provide the care you need with a video conversation.  If a prescription is necessary we can send it directly to your pharmacy.  If lab work is needed we can place an order for that and you can then stop by our lab to have the test done at a later time.    Video visits are billed at different rates depending on your insurance coverage.  Please reach out to your insurance provider with any questions.    If during the course of the call the physician/provider feels a video visit is not appropriate, you will not be charged for this service.\"    Patient has given verbal consent for Video visit? Yes    How would you like to obtain your AVS? Mail a copy    Patient would like the video invitation sent by: Text to cell phone: [phone]    Will anyone else be joining your video visit? No      Sleep Consultation Note:    Date on this visit: 5/11/2020    Keiry Mchugh is sent by No ref. provider found for a sleep consultation regarding chronic fatigue    Primary Physician: No Ref-Primary, Physician     Chief complaint: chronic fatigue    Keiry Mchugh 55 year old with PMH of anxiety, depression, GERD,  who complains of chronic fatigue(for 10 years), insomnia and excessive daytime sleepiness.  She has not had a previous sleep study.    When she was experiencing fatigue that  started 10 years ago, she was thinking of Lyme's disease. She had extensive work up and was negative for Lyme's.  She reports insomnia for several years, the insomnia has become more concerning since last year. 2019 was a hard year for her-she was diagnosed with diverticulitis Jan 2019 was " "hospitalized for one week; March 2019 she underwent colon resection-infection; Oct 2019 Influenza ; Nov 19 underwent hernia surgery.    Sleep Disordered Breathing  Keiry sleeps alone. She was told that she is a quiet sleeper. She does not complain of snoring, snort arousals, choking/gasping for air, witnessed apneas, dry mouth.  She reports occasional morning headaches, but she has migraine headaches.   She reports weight gain of 30 lbs approx in last couple of years.    Sleep Schedule/Sleep Complaints//Daytime Functioning  She reports difficulty both falling asleep and staying asleep. She says\"I don't sleep. I just lay wide awake. I don't have good night sleep\".  She does shift work-morning shift from 6:30AM-2PM or afternoon shift: 1-8 PM. She reports that it is miserable working morning shift and she likes the afternoon shift better.  If she needs to be at work by 6:30AM, she needs to wake up at 5:30AM.   Her usual routine is that she takes her meds at 9 PM: 20 mg melatonin,Magnesium supplement  and one tab benadryl 25 mg. She goes to bed after taking the medications. She usually feels sleepy around 11 AM-1PM. She doesn't t feel like she had slept. She is  conscious of every thing going around and reports frequent awakenings. When she wakes up between 2-4 AM, she may stay awake for long time and may eventually fall asleep or is up for the rest of the night. On non-work days, She falls asleep at 10 PM and gets up 630-8 AM.   She gets 3-6 hours of sleep per night(6 hours for her is a good night sleep but the sleep is still not solid)  Night time awakenings occurs due to bathroom, noises, hotflashes(once in a blue moon)  After awakening, she reports difficulty resuming sleep.  Patient does report active mind, neck pain(ocasionallly)  affect her sleep. She reports frustation about sleep, but does not think she has anxiety/depresion that affects sleep.  She reports non-refreshing sleep, daytime " sleepiness/fatigue.  She tries not to nap, because she is  afraid about her sleep at night. She may nap once in a while for  20 minutes and feels unrefreshed after naps.  Travelled 1.5 yr ago on vacation,  her sleep was still the same.  She reports occasional drowsiness while driving - head bobbing , swerved off the center line, fortunately never met met with accidents.  Patient reads sometimes, uses white noise, TV for noise. Does  use electronics in bed to watch You tube videos on relaxation/mediation.    SLEEP SCALES:  [place] Sleepiness score 20/24   Insomnia severity index:28    Restless Legs symptoms  Keiry does not complain of restlessness feelings in the legs.    Sleep Behaviors  She reports that when she is laughing hard her legs feel like jello. This has been going on for 5 years.  She recently recognized that the symptoms she has been experiencing for the past  5 years is Cataplexy and that she may have Narcolepsy. She denies  sleep paralysis. She reports occasional sleep visual hallucinations , usually after she is first waking up, she describes it as a sparkle coming down from ceiling that turns into a spider/sometime scary, though once in a while it could turn into something pretty like a butterfly.   She denies   sleep walking or sleep eating. She occasionally sleep talks.  She does not complain acting out dreams.    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 Work schedule is as follows:M-Thursday first or second shift detailed above under HPI (she works double shift on Mondays from 630AM-8PM)    She does drink caffeine, about one cup coffee 5 times a week usually in the morning. Last caffeine intake is  not within 6 hours of bed time.  She doesn't drink alcohol.  Patient is former smoker - quit >20 years ago   Patient does  not use drugs.    Allergies:    Allergies   Allergen Reactions     Contrast Dye      Levaquin [Levofloxacin]      Penicillins      Rocephin [Ceftriaxone]       Zithromax [Azithromycin]        Medications:    Current Outpatient Medications   Medication Sig Dispense Refill     Ascorbic Acid (VITAMIN C) 500 MG CAPS        BIOTIN PO Take 10,000 mcg by mouth       Black Pepper-Turmeric (TURMERIC CURCUMIN) 5-1000 MG CAPS Take 2,000 mg by mouth       Coenzyme Q10 (COQ-10) 400 MG CAPS        Cyanocobalamin (VITAMIN B12) 500 MCG TABS        diphenhydrAMINE (BENADRYL) 25 MG tablet Take 25 mg by mouth every 6 hours as needed for itching or allergies       FLUoxetine 20 MG tablet Take 20 mg by mouth daily       magnesium oxide 200 MG TABS Take 400 mg by mouth       Melatonin 10 MG TABS tablet Take 20 mg by mouth nightly as needed for sleep       SUMAtriptan (IMITREX) 50 MG tablet Take 50 mg by mouth at onset of headache for migraine       vitamin D2 (ERGOCALCIFEROL) 47932 units (1250 mcg) capsule Take 50,000 Units by mouth once a week         Problem List:  There are no active problems to display for this patient.       Past Medical/Surgical History:  Per care everywhere:  Migraines       Depression   Seasonal   Anxiety       Anemia       Hiatal hernia       Diverticulitis       GERD (gastroesophageal reflux disease)       Peripheral neuropathy       Celiac disease       Ventral hernia       Chronic kidney disease   donated 1 kidney   Cough       PONV (postoperative nausea and vomiting)       Surgery Date Site/Laterality Comments   LA LAP,SLING OPERATION     Description: Laparoscopic Sling Operation For Stress Incontinence; Recorded: 02/17/2009;     LA CORRJ HALLUX VALGUS W/SESMDC W/DIST METAR OSTEOT     Description: Bunion Correction With Metatarsal Osteotomy Herman Procedure; Proc Date: 07/16/2010;     NEPHRECTOMY LIVING DONOR 4/1/2012 - 4/30/2012 Left      CYSTOSCOPY 7/16/2015 Bladder/N/A Procedure: CYSTOSCOPY; Surgeon: Nate Horta MD; Location: [facility]; Service:      HYSTERECTOMY          COLONOSCOPY 3/18/2019 N/A Procedure: COLONOSCOPY; Surgeon:  Davis Mosqueda MD; Location: [facility] OR; Service: General     HI LAP,SURG,COLECTOMY, PARTIAL, W/ANAST 3/19/2019 Abdomen/N/A Procedure: LAPAROSCOPIC SIGMOID COLON RESECTION; Surgeon: Davis Mosqueda MD; Location: [facility]; Service: General     HI LAP,BILIARY TRACT,UNLISTED 3/19/2019 Abdomen/N/A Procedure: BIOPSY, LIVER, LAPAROSCOPIC; Surgeon: Davis Mosqueda MD; Location: [facility]; Service: General     SIGMOIDOSCOPY 3/19/2019 N/A Procedure: FLEXIBLE SIGMOIDOSCOPY; Surgeon: Davis Mosqueda MD; Location: [facility]; Service: General     CHOLECYSTECTOMY          APPENDECTOMY          COLON SURGERY          HYSTERECTOMY, PAP NO LONGER INDICATED 2009 - 2009            Social History:  Social History     Socioeconomic History     Marital status: Single     Spouse name: Not on file     Number of children: Not on file     Years of education: Not on file     Highest education level: Not on file   Occupational History     Not on file   Social Needs     Financial resource strain: Not on file     Food insecurity     Worry: Not on file     Inability: Not on file     Transportation needs     Medical: Not on file     Non-medical: Not on file   Tobacco Use     Smoking status: Former Smoker     Last attempt to quit: 2000     Years since quittin.0     Smokeless tobacco: Never Used   Substance and Sexual Activity     Alcohol use: Not on file     Drug use: Not on file     Sexual activity: Not on file   Lifestyle     Physical activity     Days per week: Not on file     Minutes per session: Not on file     Stress: Not on file   Relationships     Social connections     Talks on phone: Not on file     Gets together: Not on file     Attends [employer] service: Not on file     Active member of club or organization: Not on file     Attends meetings of clubs or organizations: Not on file     Relationship status: Not on file     Intimate partner violence     Fear of current or ex  partner: Not on file     Emotionally abused: Not on file     Physically abused: Not on file     Forced sexual activity: Not on file   Other Topics Concern     Not on file   Social History Narrative     Not on file       Family History:  Family history pertinent to sleep disorders: mom has CYNTHIA  Per care everywhere:  Breast cancer Maternal Aunt   Hypothyroidism Maternal Aunt   Hypothyroidism Maternal Aunt   Hashimoto's thyroiditis Maternal Aunt   Hashimoto's thyroiditis Maternal Grandfather   Breast cancer Mother   Hashimoto's thyroiditis Mother   Graves' disease Sister   Hypothyroidism Sister   Anesthesia problems Neg Hx         Review of Systems:  A complete review of systems reviewed by me is negative with the exeption of what has been mentioned in the history of present illness,  and symptoms listed below, highlighted in red:  CONSTITUTIONAL: weight gain,  sweats or night sweats(hotflashes), drug allergies.NEGATIVE for fever, chills.  EYES: double vision from eye surgery.NEGATIVE for changes in vision, blind spots  ENT: hearing loss, tinnitus rt. ear which she attribute to azithromycin); NEGATIVE for ear pain, sore throat, sinus pain, post-nasal drip, runny nose, bloody nose  CARDIAC: NEGATIVE for fast heartbeats or fluttering in chest, chest pain or pressure, breathlessness when lying flat, swollen legs or swollen feet.  NEUROLOGIC: headaches, NEGATIVE for weakness or numbness in the arms or legs.  DERMATOLOGIC: rosacea  PULMONARY: NEGATIVE SOB at rest, SOB with activity, dry cough, productive cough, coughing up blood, wheezing or whistling when breathing.    GASTROINTESTINAL:  nausea or vomiting, celiac,NEGATIVE for loose or watery stools, fat or grease in stools, constipation, abdominal pain, bowel movements black in color or blood noted.  GENITOURINARY: NEGATIVE for pain during urination, blood in urine, urinating more frequently than usual  MUSCULOSKELETAL: NEGATIVE for muscle pain, bone or joint pain,  "swollen joints.  ENDOCRINE: NEGATIVE for increased thirst or urination, diabetes.  LYMPHATIC: NEGATIVE for swollen lymph nodes, lumps or bumps in the breasts or nipple discharge.  PSYCHE: NEGATIVE for depression, anxiety    Physical Examination:  Vitals: Ht 1.702 m (5' 7\")   Wt 90.7 kg (200 lb)   BMI 31.32 kg/m    BMI= Body mass index is 31.32 kg/m .  [place] Total Score 5/8/2020   Total score - [place] 20     General: No apparent distress, appropriately groomed  Head: Normocephalic, atraumatic  PSYCH: Alert and oriented times 3; coherent speech, normal   rate and volume, able to articulate logical thoughts, able   to abstract reason, no tangential thoughts, no hallucinations   or delusions  Her affect is normal   RESP: No cough, no audible wheezing, able to talk in full sentences  Remainder of exam unable to be completed due to video visit      Other tests:  Last Comprehensive Metabolic Panel:(04/29/20)    Ref Range & Units  12d ago    Sodium  136 - 145 mmol/L  140     Potassium  3.5 - 5.0 mmol/L  5.1High       Chloride  98 - 107 mmol/L  106     CO2  22 - 31 mmol/L  23     Anion Gap, Calculation  5 - 18 mmol/L  11     Glucose  70 - 125 mg/dL  77     BUN  8 - 22 mg/dL  26High       Creatinine  0.60 - 1.10 mg/dL  0.97     GFR MDRD Af Amer  >60 mL/min/1.73m2  >60     GFR MDRD Non Af Amer  >60 mL/min/1.73m2  60Low       Bilirubin, Total  0.0 - 1.0 mg/dL  0.4     Calcium  8.5 - 10.5 mg/dL  10.4     Protein, Total  6.0 - 8.0 g/dL  7.6     Albumin  3.5 - 5.0 g/dL  4.3     Alkaline Phosphatase  45 - 120 U/L  128High       AST  0 - 40 U/L  24     ALT  0 - 45 U/L  84High       Resulting Agency   [facility]      TSH LEVELS:   Ref Range & Units  2mo ago    TSH  0.30 - 5.00 uIU/mL  1.35     Resulting Agency   [facility]    Specimen Collected: 03/06/20 11:04 AM  Last Resulted: 03/06/20  8:55 PM        VITAMIN D levels(03/06/20)   Ref Range & Units  2mo ago    Vitamin D, Total (25-Hydroxy)  30.0 - 80.0 ng/mL  43.6   "   Resulting Agency   [facility]          Impression/Report/Plan:  Chronic insomnia(initiation and maintenance insomnia)-multifactorial- psychophysiological, paradoxical insomnia, circadian rhythm sleep wake disorder/delayed sleep phase, anxiety,depression, inadequate sleep hygiene  Encouraged to follow good sleep hygiene/behavioral techniques.  We discussed stimulus control measures.  She would be a good candidate for Cognitive Behavioral Therapy, referral has been placed.    Non-restorative  sleep, excessive daytime sleepiness/fatigue:   The symptoms of cataplexy, sleep hallucinations suggest possible narcolepsy,  but the diagnosis cannot be considered without first ruling out other potential causes of the hypersomnolence  such as insufficient sleep and /CYNTHIA.  Recommend the following tests to evaluate for hypersomnolence:  Actigraphy with sleep logs for 2 weeks, followed by overnight PSG, followed by MSLT. (Marked Urgent due to the degree of sleepines and driving concerns)  We also discussed that medication fluoxetine is a REM suppressant and we usually recommend holding off of the medication(that has to be done under physician supervision) for at least 2 weeks preceding the MSLT.  The decision regarding whether or not to proceed with the MSLT following the overnight PSG will be made in the morning after reviewing the actigraphy/sleep log and the PSG results.  Patient was informed that currently because of the Covid pandemic we have not been conducting any sleep tests at our center but as soon as we start the tests our clinic staff will contact him to schedule the sleep tests.  Sleep tests were reviewed.      Frequent nocturnal awakenings could be due to possible CYNTHIA. Risk factors for CYNTHIA are post menopausal status and  positive family h/o CYNTHIA. Obstructive sleep apnea reviewed. Complications of untreated sleep apnea were reviewed . Treatment for CYNTHIA have been discussed.     Patient is a  former smoker and has  "been encouraged to continue to not smoke.    Obesity: We discussed weight management with healthy diet, and exercise.    Patient was strongly advised to avoid driving, operating any heavy machinery or other hazardous situations while drowsy or sleepy.  Patient was counseled on the importance of driving while alert, to pull over if drowsy, or nap before getting into the vehicle if sleepy.        Plan is to follow up via virtual visit within one week after her tests are competed to review the results and discuss plan of care.      CC: No ref. provider found        Video-Visit Details    Type of service:  Video Visit  Total time:55 minutes  Video Start Time:  1:16PM    Video End Time: 2:11 PM    Originating Location (pt. Location): patient was seated in her car parked.   Distant Location (provider location):  [facility] [place]     Platform used for Video Visit: Doximity    The above note was dictated using voice recognition software. Although reviewed after completion, some word and grammatical error may remain . Please contact the author for any clarifications.    \"I spent a total of  55 minutes with Keiry Mchugh during today's video visit. Over 50% of this time was spent counseling the patient and  coordinating care regarding insomnia, psychophysiological insomnia, hypersomnolence, narcolepsy, sleep tests,driving safety, CYNTHIA and chart review.\"     Meri Aquino MD  [facility]  [id number] [place] [address] [id number] [phone]  Dept: [phone]              "

## 2020-05-13 ENCOUNTER — TELEPHONE (OUTPATIENT)
Dept: SLEEP MEDICINE | Facility: CLINIC | Age: 55
End: 2020-05-13

## 2020-05-13 NOTE — TELEPHONE ENCOUNTER
Per Germán Referral to sleep psychology, I clld pt to schedule with Dr. Hunter, NEIL. Mailed AVS to pt.

## 2020-05-15 ENCOUNTER — AMBULATORY - HEALTHEAST (OUTPATIENT)
Dept: FAMILY MEDICINE | Facility: CLINIC | Age: 55
End: 2020-05-15

## 2020-05-15 ENCOUNTER — COMMUNICATION - HEALTHEAST (OUTPATIENT)
Dept: FAMILY MEDICINE | Facility: CLINIC | Age: 55
End: 2020-05-15

## 2020-05-15 ENCOUNTER — VIRTUAL VISIT (OUTPATIENT)
Dept: FAMILY MEDICINE | Facility: OTHER | Age: 55
End: 2020-05-15

## 2020-05-15 ENCOUNTER — COMMUNICATION - HEALTHEAST (OUTPATIENT)
Dept: SCHEDULING | Facility: CLINIC | Age: 55
End: 2020-05-15

## 2020-05-15 DIAGNOSIS — Z20.822 SUSPECTED COVID-19 VIRUS INFECTION: ICD-10-CM

## 2020-05-15 NOTE — PROGRESS NOTES
"Date: 05/15/2020 15:28:01  Clinician: Rizwana Soto  Clinician NPI: [id number]  Patient: Keiry Mchugh  Patient : 1965  Patient Address: [address] [place] [address]  Patient Phone: [phone]  Visit Protocol: URI  Patient Summary:  Keiry is a 55 year old ( : 1965 ) female who initiated a Visit for COVID-19 (Coronavirus) evaluation and screening. When asked the question \"Please sign me up to receive news, health information and promotions. \", Keiry responded \"No\".    Keiry states her symptoms started today.   Her symptoms consist of a cough, chills, a headache, malaise, myalgia, and a sore throat. She is experiencing mild difficulty breathing with activities but can speak normally in full sentences. Keiry also feels feverish.   Symptom details     Cough: Keiry coughs every 5-10 minutes and her cough is more bothersome at night. Phlegm does not come into her throat when she coughs. She does not believe her cough is caused by post-nasal drip.     Sore throat: Keiry reports having mild throat pain (1-3 on a 10 point pain scale), does not have exudate on her tonsils, and can swallow liquids. She is not sure if the lymph nodes in her neck are enlarged. A rash has not appeared on the skin since the sore throat started.     Temperature: Her current temperature is 100.5 degrees Fahrenheit. Keiry has had a temperature over 100 degrees Fahrenheit for 1-2 days.     Headache: She states the headache is moderate (4-6 on a 10 point pain scale).      Keiry denies having nasal congestion, vomiting, nausea, teeth pain, ageusia, diarrhea, facial pain or pressure, ear pain, rhinitis, anosmia, and wheezing. She also denies having a sinus infection within the past year, taking antibiotic medication for the symptoms, and having recent facial or sinus surgery in the past 60 days.   Precipitating events  Within the past week, Keiry has not been exposed to someone with strep throat. She has " not recently been exposed to someone with influenza. Keiry has been in close contact with the following high risk individuals: people with asthma, heart disease or diabetes and adults 65 or older.   Pertinent COVID-19 (Coronavirus) information  In the past 14 days, Keiry has not worked in a congregate living setting.   She either works or volunteers as a healthcare worker or a , or works or volunteers in a healthcare facility. She does not provide direct patient care. 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   Keiry also has not lived in a congregate living setting in the past 14 days. She does not live with a healthcare worker.   Keiry has not had a close contact with a laboratory-confirmed COVID-19 patient within 14 days of symptom onset.   Pertinent medical history  Keiry does not get yeast infections when she takes antibiotics.   Keiry does not need a return to work/school note.   Weight: 200 lbs   Keiry does not smoke or use smokeless tobacco.   Additional information as reported by the patient (free text): My symptoms really only started today but my fever has continued to rise all day and I have severe body aches.   I also only have 1 kidney which fluctuates with problems and I also have liver issues.   Also concerned that I have been hospitalized twice with lung/breathing problems for several days in the past.   Weight: 200 lbs    MEDICATIONS: turmeric root extract oral, Ultra CoQ10 oral, melatonin-trypto-valer-vvlvq-piamn-fqne-B6 oral, vitamin B12-folic acid oral, arginine-vitamin C-vitamin E oral, calcium-vitamin D3-vitamin K oral, sumatriptan-naproxen oral, fluoxetine oral, ALLERGIES: Penicillins, Iodinated Contrast Media, tramadol, Levaquin, Rocephin  Clinician Response:  Dear Keiry,   Dear Keiry  Your symptoms show that you may have coronavirus  "(COVID-19). This illness can cause fever, cough and trouble breathing. Many people get a mild case and get better on their own. Some people can get very sick.  What should I do?  We would like to test you for this virus. This will be a curbside test done outside the clinic.  Please call [phone] to schedule your visit. Explain that you were referred by [facility] to have a COVID-19 test. Be ready to share your [facility] visit ID number.  Starting now:  Stay at least 6 feet away from others. (If someone will drive you to your test, stay in the backseat, as far away from the  as you can.)   Don't go to work, school or anywhere else. When it's time for your test, go straight to the testing site. Don't make any stops on the way there or back.   Wash your hands and face often. Use soap and water.   Cover your mouth and nose with a mask, tissue or washcloth.   Don't touch anyone. No hugging, kissing or handshakes.  While at home   Stay home and away from others (self-isolate) until:  You've had no fever---and no medicine that reduces fever---for 3 full days (72 hours). And...  Your other symptoms have gotten better. For example, your cough or breathing has improved. And...  At least 10 days have passed since your symptoms started.  During this time:  Stay in your own room (and use your own bathroom), if you can.  Don't go to work, school or anywhere else.  Stay away from others in your home. No hugging, kissing or shaking hands.  Don't let anyone visit.  Cover your mouth and nose with a mask, tissue or washcloth to avoid spreading germs.  Clean \"high touch\" surfaces often (doorknobs, counters, handles, etc.). Use a household cleaning spray or wipes.  Wash your hands and face often. Use soap and water.  How can I take care of myself?  1. Get lots of rest. Drink extra fluids (unless your doctor has told you not to).  2. Take Tylenol (acetaminophen) for fever or pain. If you have liver or kidney problems, ask your family " doctor if it's okay to take Tylenol.  Adults can take either:   650 mg (two 325 mg pills) every 4 to 6 hours, or...  1,000 mg (two 500 mg pills) every 8 hours as needed.   Note: Don't take more than 3,000 mg in one day.   Acetaminophen is found in many medicines (both prescribed and over-the-counter medicines). Read all labels to be sure you don't take too much.   For children, check the Tylenol bottle for the right dose. The dose is based on the child's age or weight.  3. If you have other health problems (like cancer, heart failure, an organ transplant or severe kidney disease): Call your specialty clinic if you don't feel better in the next 2 days.  4. Know when to call 911: If your breathing is so bad that it keeps you from doing normal activities, call 911 or go to the emergency room. Tell them that you've been staying home and may have COVID-19.  5. Sign up for [employer]. We know it's scary to hear that you might have COVID-19. We want to track your symptoms to make sure you're okay over the next 2 weeks. Please look for an email from [employer]---this is a free, online program that we'll use to keep in touch. To sign up, follow the link in the email. Learn more at [email or web address].  6. The following will serve as your written order for this Covid Test ordered by me for the indication of suspected Covid [Z20.828]: The test will be ordered in [facility], our electronic health record after you are scheduled and will show as ordered and authorized by Kevin Cortez MD   Order: Covid-19 (Coronavirus) PCR for SYMPTOMATIC testing from [facility]  Where can I get more information?  To learn more about COVID-19 and how to care for yourself at home, please visit the CDC website at https://www.cdc.gov/coronavirus/2019-ncov/about/steps-when-sick.html.  For more about your care at [facility], please visit [email or web address]  If you'd like to be part of a COVID-19 clinical trial  (research study) at the [facility], go to [email or web address] for details.    Diagnosis: Cough  Diagnosis ICD: R05

## 2020-05-16 ENCOUNTER — OFFICE VISIT - HEALTHEAST (OUTPATIENT)
Dept: FAMILY MEDICINE | Facility: CLINIC | Age: 55
End: 2020-05-16

## 2020-05-16 DIAGNOSIS — Z20.822 SUSPECTED COVID-19 VIRUS INFECTION: ICD-10-CM

## 2020-05-17 ENCOUNTER — COMMUNICATION - HEALTHEAST (OUTPATIENT)
Dept: FAMILY MEDICINE | Facility: CLINIC | Age: 55
End: 2020-05-17

## 2020-06-05 ENCOUNTER — COMMUNICATION - HEALTHEAST (OUTPATIENT)
Dept: FAMILY MEDICINE | Facility: CLINIC | Age: 55
End: 2020-06-05

## 2020-06-05 DIAGNOSIS — F32.9 MAJOR DEPRESSIVE DISORDER WITH SINGLE EPISODE, REMISSION STATUS UNSPECIFIED: ICD-10-CM

## 2020-06-05 DIAGNOSIS — G43.119 INTRACTABLE MIGRAINE WITH AURA WITHOUT STATUS MIGRAINOSUS: ICD-10-CM

## 2020-06-10 VITALS — HEIGHT: 67 IN | WEIGHT: 200 LBS | BODY MASS INDEX: 31.39 KG/M2

## 2020-06-10 ASSESSMENT — MIFFLIN-ST. JEOR: SCORE: 1544.82

## 2020-06-10 NOTE — PROGRESS NOTES
"Keiry Mchugh is a 55 year old female who is being evaluated via a billable video visit.      The patient has been notified of following:     \"This video visit will be conducted via a call between you and your physician/provider. We have found that certain health care needs can be provided without the need for an in-person physical exam.  This service lets us provide the care you need with a video conversation.  If a prescription is necessary we can send it directly to your pharmacy.  If lab work is needed we can place an order for that and you can then stop by our lab to have the test done at a later time.    Video visits are billed at different rates depending on your insurance coverage.  Please reach out to your insurance provider with any questions.    If during the course of the call the physician/provider feels a video visit is not appropriate, you will not be charged for this service.\"    Patient has given verbal consent for Video visit? Yes    How would you like to obtain your AVS? Mail a copy    Patient would like the video invitation sent by: Text to cell phone: [phone]    Will anyone else be joining your video visit? No        Video-Visit Details    Type of service:  Video Visit    Video Start Time: 9:40 AM  Video End Time: 10:03 AM    Originating Location (pt. Location): Home    Distant Location (provider location):  [facility] [place]     Platform used for Video Visit: Graciela Hunter PsyD    SLEEP MEDICINE VIRTUAL VIDEO VISIT  Sleep Psychology    Patient Name: Keiry Mchugh  MRN:  [id number]  Date of Service: Jun 11, 2020       Subjective Report     Keiry Mchugh  returns for a telehealth video visit to discuss progress in implementing behavioral strategies for the management of chronic insomnia associated with excessive daytime sleepiness, snoring, snort arousals.  Patient consent for initiation of video visit was obtained and documented prior to initiation of " "visit.     Keiry reports that she has been sleep compression.  She has goes to bed later around 1030-11 PM sleep latency is now within 20 minutes.  She continues however to have frequent awakenings which she feels is gasping worse on arousal with her racing.  She continues to feel sleepy.  Patient states that since she discontinued use of diphenhydramine however that she feels a bit more alert in the morning.    We discussed risk for sleep apnea given that her mother and several other family members have been diagnosed with sleep apnea.  Patient would like to complete a home sleep test and based on our scale data this provider concurs..     .  Patient reports insomnia continues to be severe with multiple awakenings throughout the night and nonrestorative sleep.     Sleep Data:     Source of Data: Sleep diaries    Sleep Latency: 20 min.  Times Awakened: 7-8  Wake Time After Sleep Onset: 45 min.  Total Sleep Time: 7 hours 10 min.  Sleep Efficiency: Greater than 85 %    Total score - Forrest: 21 .    CANDICE Total Score: 28       Interventions     Strategies and recommendations including maintenance of stimulus control, screening for sleep apnea, orientation to signs and symptoms of sleep apnea and its treatment were discussed today.       Vital Signs     Ht 1.702 m (5' 7\")   Wt 90.7 kg (200 lb)   BMI 31.32 kg/m       Mental Status     Speech/Language:  Normal  Thought Process: Intact  Associations:  Normal  Thought Content: Normal    Diagnostic Impressions and Plan       1. Chronic insomnia  Chronic insomnia with improvement in sleep latency but continued excessive daytime sleepiness, related arousals, brief and with gasping/choking.  Extended time in the middle the night nonrestorative sleep.  Recommend completion of home sleep test to evaluate for possible sleep apnea, continue stimulus control, current sleep window of 8 hours and consistently time.        Follow-up: With sleep medicine in 6 to 8 weeks weeks or " after completion of sleep study      Sujit Hunter PsyD, SYLWIA, DBSM  Diplomate, Behavioral Sleep Medicine  [facility] [place]

## 2020-06-10 NOTE — PATIENT INSTRUCTIONS
An order was placed for completion of a home sleep test.  The Sleep clinic will reach out to you when testing is available. You should followup with your sleep provider after completion of the study.  You will be called regarding the results.  Please feel free to set up another appointment with me as needed to discuss your insomnia.

## 2020-06-11 ENCOUNTER — VIRTUAL VISIT (OUTPATIENT)
Dept: SLEEP MEDICINE | Facility: CLINIC | Age: 55
End: 2020-06-11
Payer: COMMERCIAL

## 2020-06-11 DIAGNOSIS — G47.19 EXCESSIVE DAYTIME SLEEPINESS: ICD-10-CM

## 2020-06-11 DIAGNOSIS — F51.04 CHRONIC INSOMNIA: ICD-10-CM

## 2020-06-11 DIAGNOSIS — R29.818 SUSPECTED SLEEP APNEA: Primary | ICD-10-CM

## 2020-06-11 DIAGNOSIS — R06.83 SNORING: ICD-10-CM

## 2020-06-11 PROCEDURE — 90832 PSYTX W PT 30 MINUTES: CPT | Mod: 95 | Performed by: PSYCHOLOGIST

## 2020-06-19 ENCOUNTER — COMMUNICATION - HEALTHEAST (OUTPATIENT)
Dept: FAMILY MEDICINE | Facility: CLINIC | Age: 55
End: 2020-06-19

## 2020-06-19 ENCOUNTER — AMBULATORY - HEALTHEAST (OUTPATIENT)
Dept: LAB | Facility: CLINIC | Age: 55
End: 2020-06-19

## 2020-06-19 ENCOUNTER — OFFICE VISIT - HEALTHEAST (OUTPATIENT)
Dept: FAMILY MEDICINE | Facility: CLINIC | Age: 55
End: 2020-06-19

## 2020-06-19 DIAGNOSIS — R60.9 EDEMA, UNSPECIFIED TYPE: ICD-10-CM

## 2020-06-19 LAB
ALBUMIN SERPL-MCNC: 4.4 G/DL (ref 3.5–5)
ALBUMIN UR-MCNC: NEGATIVE MG/DL
ALP SERPL-CCNC: 125 U/L (ref 45–120)
ALT SERPL W P-5'-P-CCNC: 67 U/L (ref 0–45)
ANION GAP SERPL CALCULATED.3IONS-SCNC: 10 MMOL/L (ref 5–18)
APPEARANCE UR: CLEAR
AST SERPL W P-5'-P-CCNC: 30 U/L (ref 0–40)
BILIRUB SERPL-MCNC: 0.3 MG/DL (ref 0–1)
BILIRUB UR QL STRIP: NEGATIVE
BNP SERPL-MCNC: <10 PG/ML (ref 0–78)
BUN SERPL-MCNC: 21 MG/DL (ref 8–22)
CALCIUM SERPL-MCNC: 10.5 MG/DL (ref 8.5–10.5)
CHLORIDE BLD-SCNC: 105 MMOL/L (ref 98–107)
CO2 SERPL-SCNC: 24 MMOL/L (ref 22–31)
COLOR UR AUTO: YELLOW
CREAT SERPL-MCNC: 0.93 MG/DL (ref 0.6–1.1)
GFR SERPL CREATININE-BSD FRML MDRD: >60 ML/MIN/1.73M2
GLUCOSE BLD-MCNC: 106 MG/DL (ref 70–125)
GLUCOSE UR STRIP-MCNC: NEGATIVE MG/DL
HGB UR QL STRIP: NEGATIVE
KETONES UR STRIP-MCNC: NEGATIVE MG/DL
LEUKOCYTE ESTERASE UR QL STRIP: NEGATIVE
NITRATE UR QL: NEGATIVE
PH UR STRIP: 5 [PH] (ref 5–8)
POTASSIUM BLD-SCNC: 4.7 MMOL/L (ref 3.5–5)
PROT SERPL-MCNC: 7.8 G/DL (ref 6–8)
SODIUM SERPL-SCNC: 139 MMOL/L (ref 136–145)
SP GR UR STRIP: 1.02 (ref 1–1.03)
UROBILINOGEN UR STRIP-ACNC: NORMAL

## 2020-06-20 ENCOUNTER — COMMUNICATION - HEALTHEAST (OUTPATIENT)
Dept: FAMILY MEDICINE | Facility: CLINIC | Age: 55
End: 2020-06-20

## 2020-06-22 ENCOUNTER — COMMUNICATION - HEALTHEAST (OUTPATIENT)
Dept: FAMILY MEDICINE | Facility: CLINIC | Age: 55
End: 2020-06-22

## 2020-06-25 ENCOUNTER — HOSPITAL ENCOUNTER (OUTPATIENT)
Dept: CARDIOLOGY | Facility: HOSPITAL | Age: 55
Discharge: HOME | End: 2020-06-25
Attending: FAMILY MEDICINE

## 2020-06-25 ENCOUNTER — COMMUNICATION - HEALTHEAST (OUTPATIENT)
Dept: FAMILY MEDICINE | Facility: CLINIC | Age: 55
End: 2020-06-25

## 2020-06-25 LAB
AORTIC ROOT: 2.8 CM
AORTIC VALVE MEAN VELOCITY: 85.2 CM/S
ASCENDING AORTA: 3.1 CM
AV DIMENSIONLESS INDEX VTI: 0.9
AV MEAN GRADIENT: 3 MMHG
AV PEAK GRADIENT: 7.3 MMHG
AV VALVE AREA: 3 CM2
AV VELOCITY RATIO: 0.9
BSA FOR ECHO PROCEDURE: 2.13 M2
CV BLOOD PRESSURE: ABNORMAL MMHG
CV ECHO HEIGHT: 67 IN
CV ECHO WEIGHT: 212 LBS
DOP CALC AO PEAK VEL: 135 CM/S
DOP CALC AO VTI: 25.7 CM
DOP CALC LVOT AREA: 3.46 CM2
DOP CALC LVOT DIAMETER: 2.1 CM
DOP CALC LVOT PEAK VEL: 115 CM/S
DOP CALC LVOT STROKE VOLUME: 78.2 CM3
DOP CALCLVOT PEAK VEL VTI: 22.6 CM
EJECTION FRACTION: 65 % (ref 55–75)
FRACTIONAL SHORTENING: 41.8 % (ref 28–44)
INTERVENTRICULAR SEPTUM IN END DIASTOLE: 1.22 CM (ref 0.6–0.9)
IVS/PW RATIO: 1.2
LA AREA 1: 20.6 CM2
LA AREA 2: 15.4 CM2
LEFT ATRIUM LENGTH: 4.9 CM
LEFT ATRIUM SIZE: 3.4 CM
LEFT ATRIUM TO AORTIC ROOT RATIO: 1.21 NO UNITS
LEFT ATRIUM VOLUME INDEX: 25.8 ML/M2
LEFT ATRIUM VOLUME: 55 ML
LEFT VENTRICLE CARDIAC INDEX: 2.8 L/MIN/M2
LEFT VENTRICLE CARDIAC OUTPUT: 5.9 L/MIN
LEFT VENTRICLE DIASTOLIC VOLUME INDEX: 46.9 CM3/M2 (ref 29–61)
LEFT VENTRICLE DIASTOLIC VOLUME: 99.8 CM3 (ref 46–106)
LEFT VENTRICLE HEART RATE: 75 BPM
LEFT VENTRICLE MASS INDEX: 93.4 G/M2
LEFT VENTRICLE SYSTOLIC VOLUME INDEX: 16.5 CM3/M2 (ref 8–24)
LEFT VENTRICLE SYSTOLIC VOLUME: 35.2 CM3 (ref 14–42)
LEFT VENTRICULAR INTERNAL DIMENSION IN DIASTOLE: 4.88 CM (ref 3.8–5.2)
LEFT VENTRICULAR INTERNAL DIMENSION IN SYSTOLE: 2.84 CM (ref 2.2–3.5)
LEFT VENTRICULAR MASS: 198.9 G
LEFT VENTRICULAR OUTFLOW TRACT MEAN GRADIENT: 2 MMHG
LEFT VENTRICULAR OUTFLOW TRACT MEAN VELOCITY: 70 CM/S
LEFT VENTRICULAR OUTFLOW TRACT PEAK GRADIENT: 5 MMHG
LEFT VENTRICULAR POSTERIOR WALL IN END DIASTOLE: 0.98 CM (ref 0.6–0.9)
LV STROKE VOLUME INDEX: 36.7 ML/M2
MITRAL VALVE DECELERATION SLOPE: 5380 MM/S2
MITRAL VALVE E/A RATIO: 1
MITRAL VALVE PRESSURE HALF-TIME: 56 MS
MV AVERAGE E/E' RATIO: 10.2 CM/S
MV DECELERATION TIME: 190 MS
MV E'TISSUE VEL-LAT: 10.1 CM/S
MV E'TISSUE VEL-MED: 9.86 CM/S
MV LATERAL E/E' RATIO: 10.1
MV MEDIAL E/E' RATIO: 10.3
MV PEAK A VELOCITY: 98.5 CM/S
MV PEAK E VELOCITY: 102 CM/S
MV VALVE AREA PRESSURE 1/2 METHOD: 3.9 CM2
NUC REST DIASTOLIC VOLUME INDEX: 3392 LBS
NUC REST SYSTOLIC VOLUME INDEX: 67 IN
TRICUSPID VALVE ANULAR PLANE SYSTOLIC EXCURSION: 1.8 CM

## 2020-06-25 ASSESSMENT — MIFFLIN-ST. JEOR: SCORE: 1594.26

## 2020-07-03 ENCOUNTER — COMMUNICATION - HEALTHEAST (OUTPATIENT)
Dept: FAMILY MEDICINE | Facility: CLINIC | Age: 55
End: 2020-07-03

## 2020-07-03 DIAGNOSIS — M54.2 NECK PAIN: ICD-10-CM

## 2020-07-03 DIAGNOSIS — M54.12 CERVICAL RADICULOPATHY: ICD-10-CM

## 2020-07-14 DIAGNOSIS — Z11.59 SCREENING FOR VIRAL DISEASE: ICD-10-CM

## 2020-08-06 ENCOUNTER — COMMUNICATION - HEALTHEAST (OUTPATIENT)
Dept: FAMILY MEDICINE | Facility: CLINIC | Age: 55
End: 2020-08-06

## 2020-08-11 ENCOUNTER — AMBULATORY - HEALTHEAST (OUTPATIENT)
Dept: FAMILY MEDICINE | Facility: CLINIC | Age: 55
End: 2020-08-11

## 2020-08-11 DIAGNOSIS — K21.9 GASTROESOPHAGEAL REFLUX DISEASE WITHOUT ESOPHAGITIS: ICD-10-CM

## 2020-08-11 DIAGNOSIS — R79.89 ELEVATED LFTS: ICD-10-CM

## 2020-08-11 DIAGNOSIS — R22.43 LOCALIZED SWELLING OF BOTH LOWER LEGS: ICD-10-CM

## 2020-08-11 DIAGNOSIS — K90.0 CELIAC DISEASE/SPRUE: ICD-10-CM

## 2020-08-26 ENCOUNTER — AMBULATORY - HEALTHEAST (OUTPATIENT)
Dept: FAMILY MEDICINE | Facility: CLINIC | Age: 55
End: 2020-08-26

## 2020-08-26 ENCOUNTER — COMMUNICATION - HEALTHEAST (OUTPATIENT)
Dept: FAMILY MEDICINE | Facility: CLINIC | Age: 55
End: 2020-08-26

## 2020-08-26 DIAGNOSIS — R74.8 ELEVATED LIVER ENZYMES: ICD-10-CM

## 2020-09-09 ENCOUNTER — TELEPHONE (OUTPATIENT)
Dept: SLEEP MEDICINE | Facility: CLINIC | Age: 55
End: 2020-09-09

## 2020-09-09 NOTE — TELEPHONE ENCOUNTER
Reason for call:  Other   Patient called regarding (reason for call): appointment  Additional comments:Clinic calling on behalf of pt regarding scheduling pt's sleep study appointment. Please call pt to schedule appointment    Phone number to reach patient:  Home number on file [phone] (home)    Best Time:  ANY    Can we leave a detailed message on this number?  Not Applicable    Travel screening: Not Applicable

## 2020-09-14 ENCOUNTER — TELEPHONE (OUTPATIENT)
Dept: SLEEP MEDICINE | Facility: CLINIC | Age: 55
End: 2020-09-14

## 2020-09-14 ENCOUNTER — HOSPITAL ENCOUNTER (OUTPATIENT)
Dept: MAMMOGRAPHY | Facility: CLINIC | Age: 55
Discharge: HOME | End: 2020-09-14
Attending: FAMILY MEDICINE

## 2020-09-14 DIAGNOSIS — Z12.31 VISIT FOR SCREENING MAMMOGRAM: ICD-10-CM

## 2020-10-19 ENCOUNTER — DOCUMENTATION ONLY (OUTPATIENT)
Dept: SLEEP MEDICINE | Facility: CLINIC | Age: 55
End: 2020-10-19

## 2020-10-19 ENCOUNTER — OFFICE VISIT (OUTPATIENT)
Dept: SLEEP MEDICINE | Facility: CLINIC | Age: 55
End: 2020-10-19
Payer: COMMERCIAL

## 2020-10-19 DIAGNOSIS — R06.83 SNORING: ICD-10-CM

## 2020-10-19 DIAGNOSIS — R29.818 SUSPECTED SLEEP APNEA: ICD-10-CM

## 2020-10-19 DIAGNOSIS — G47.19 EXCESSIVE DAYTIME SLEEPINESS: ICD-10-CM

## 2020-10-19 PROCEDURE — G0399 HOME SLEEP TEST/TYPE 3 PORTA: HCPCS | Performed by: INTERNAL MEDICINE

## 2020-10-20 ENCOUNTER — DOCUMENTATION ONLY (OUTPATIENT)
Dept: SLEEP MEDICINE | Facility: CLINIC | Age: 55
End: 2020-10-20
Payer: COMMERCIAL

## 2020-10-20 DIAGNOSIS — R29.818 SUSPECTED SLEEP APNEA: ICD-10-CM

## 2020-10-21 ENCOUNTER — TELEPHONE (OUTPATIENT)
Dept: SLEEP MEDICINE | Facility: CLINIC | Age: 55
End: 2020-10-21

## 2020-10-21 NOTE — TELEPHONE ENCOUNTER
Left message asking patient to call to schedule an HST follow up with Dr. Dunlap. Patient is scheduled with Dr. Hunter for insomnia return on 10/27.    Janneth Reed MA

## 2020-10-23 NOTE — PROGRESS NOTES
This HSAT was performed using a Noxturnal T3 device which recorded snore, sound, movement activity, body position, nasal pressure, oronasal thermal airflow, pulse, oximetry and both chest and abdominal respiratory effort. HSAT data was restricted to the time patient states they were in bed.     HSAT was scored using 1B 4% hypopnea rule.     AHI 3.5  Snoring was reported as mild.  Time with SpO2 below 89% was 0 minutes.   Overall signal quality was good     Pt will follow up with sleep provider to determine appropriate therapy.       HST POST-STUDY QUESTIONNAIRE    1. What time did you go to bed?  2200  2. How long do you think it took to fall asleep?  0030   3. What time did you wake up to start the day?  0800  4. Did you get up during the night at all?  I woke up several times but did not get out of bed  5. If you woke up, do you remember approximately what time(s)? Unsure   6. Did you have any difficulty with the equipment?  No  7. Did you us any type of treatment with this study?  Oral Appliance  8. Was the head of the bed elevated? Yes  9. Did you sleep in a recliner?  No  10. Did you stop using CPAP at least 3 days before this test?  NA  11. Any other information you'd like us to know? No

## 2020-10-27 ENCOUNTER — VIRTUAL VISIT (OUTPATIENT)
Dept: SLEEP MEDICINE | Facility: CLINIC | Age: 55
End: 2020-10-27
Payer: COMMERCIAL

## 2020-10-27 DIAGNOSIS — F51.04 CHRONIC INSOMNIA: Primary | ICD-10-CM

## 2020-10-27 PROCEDURE — 90832 PSYTX W PT 30 MINUTES: CPT | Mod: 95 | Performed by: PSYCHOLOGIST

## 2020-10-27 NOTE — PROGRESS NOTES
"Keiry Mchugh is a 55 year old female who is being evaluated via a billable telephone visit.      The patient has been notified of following:     \"This telephone visit will be conducted via a call between you and your physician/provider. We have found that certain health care needs can be provided without the need for a physical exam.  This service lets us provide the care you need with a short phone conversation.  If a prescription is necessary we can send it directly to your pharmacy.  If lab work is needed we can place an order for that and you can then stop by our lab to have the test done at a later time.    Telephone visits are billed at different rates depending on your insurance coverage. During this emergency period, for some insurers they may be billed the same as an in-person visit.  Please reach out to your insurance provider with any questions.    If during the course of the call the physician/provider feels a telephone visit is not appropriate, you will not be charged for this service.\"    Patient has given verbal consent for Telephone visit?  Yes    What phone number would you like to be contacted at? [phone]    How would you like to obtain your AVS? Mail a copy    Phone call duration: 36 minutes    Meri Aquino MD  [address] [address] [phone]  Dept: [phone]      Sleep Clinic follow up Telephone visit  Note     Date on this visit: 10/28/2020    Chief complaint: Excessive daytime sleepiness    Keiry Mchugh 55 year old with PMH  of anxiety, depression, GERD, with concerns of  chronic fatigue,  insomnia and excessive daytime sleepiness.    She was last seen by me at the sleep clinic in May 2020.  During the visit, she was recommended referral to sleep psychologist for CBT-I and orders were generated for sleep tests(actigraphy, PSG followed by MSLT) to evaluate for hypersomnolence. These tests " were not scheduled.  She has been following up with my colleague Dr. Sujit Hunter for insomnia.    She underwent HST recently that showed respiratory event index of 3.5 events/hour.    She reported that since her sleep clinic visit in May 2020, she has reduced the dose of melatonin to 5 mg before bed and  discontinued Benadryl.    She reports frequent awakenings and early morning awakenings. There has been no  improvement in insomnia.  She wakes up a couple hours after she falls asleep between 2-5 AM and she may stay awake for 1 to 3 hours or longer. She wakes up with her heart pounding.  She denies other associated symptoms with palpitations such as chest pain or shortness of breath or dizziness.  She reports noticing symptoms suggestive of cataplexy that started  about 5 years ago.  She reported a recent incident when she went hiking with her sister and her sister made her laugh and she lost control and fell on a sharp rock.  She reported another incident when she was walking across the street and was laughing while at the middle of the street and she fell down and just could not move and  a stranger picked her up and moved her to the side of the street.     She does report drowsiness during long distance driving.  It is a 10-minute commute from her home to work and she has had no problems with drowsiness while driving to and from from work and home. 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 Work schedule is as follows:Tues-Fri. She works one Saturday per month with work start time 7:30 AM. On Tuesdays her work starts at 10 AM. On Wednesdays , her work starts at noon.  On Thursdays and Fridays work start time is 6:30 AM. She works double shift on Thursdays.    SLEEP SCALES:  Patient's [place] Sleepiness score 21/24   Insomnia severity index:28    Allergies:    Allergies   Allergen Reactions     Contrast Dye      Levaquin [Levofloxacin]      Penicillins      Rocephin [Ceftriaxone]      Zithromax  [Azithromycin]        Medications:    Current Outpatient Medications   Medication Sig Dispense Refill     Ascorbic Acid (VITAMIN C) 500 MG CAPS        BIOTIN PO Take 10,000 mcg by mouth       Black Pepper-Turmeric (TURMERIC CURCUMIN) 5-1000 MG CAPS Take 2,000 mg by mouth       Coenzyme Q10 (COQ-10) 400 MG CAPS        Cyanocobalamin (VITAMIN B12) 500 MCG TABS        diphenhydrAMINE (BENADRYL) 25 MG tablet Take 25 mg by mouth every 6 hours as needed for itching or allergies       FLUoxetine 20 MG tablet Take 20 mg by mouth daily       magnesium oxide 200 MG TABS Take 400 mg by mouth       Melatonin 10 MG TABS tablet Take 20 mg by mouth nightly as needed for sleep       SUMAtriptan (IMITREX) 50 MG tablet Take 50 mg by mouth at onset of headache for migraine       vitamin D2 (ERGOCALCIFEROL) 67244 units (1250 mcg) capsule Take 50,000 Units by mouth once a week         Problem List:  Patient Active Problem List    Diagnosis Date Noted     Chronic insomnia 2020     Priority: Medium     Insomnia associated with inadequate sleep hygiene and suspected sleep apnea.       Suspected sleep apnea 2020     Priority: Medium     Recommend HST to evaluate for CYNTHIA          Past Medical/Surgical History:    Social History:  Social History     Socioeconomic History     Marital status: Single     Spouse name: Not on file     Number of children: Not on file     Years of education: Not on file     Highest education level: Not on file   Occupational History     Not on file   Social Needs     Financial resource strain: Not on file     Food insecurity     Worry: Not on file     Inability: Not on file     Transportation needs     Medical: Not on file     Non-medical: Not on file   Tobacco Use     Smoking status: Former Smoker     Quit date: 2000     Years since quittin.5     Smokeless tobacco: Never Used   Substance and Sexual Activity     Alcohol use: Yes     Comment: rare     Drug use: Never     Sexual activity: Not on  file   Lifestyle     Physical activity     Days per week: Not on file     Minutes per session: Not on file     Stress: Not on file   Relationships     Social connections     Talks on phone: Not on file     Gets together: Not on file     Attends Protestant service: Not on file     Active member of club or organization: Not on file     Attends meetings of clubs or organizations: Not on file     Relationship status: Not on file     Intimate partner violence     Fear of current or ex partner: Not on file     Emotionally abused: Not on file     Physically abused: Not on file     Forced sexual activity: Not on file   Other Topics Concern     Not on file   Social History Narrative     Not on file       Family History:  No family history on file.    Physical Examination:  Vitals: There were no vitals taken for this visit.  BMI= There is no height or weight on file to calculate BMI.         [place] Total Score 10/27/2020   Total score - [place] 21     General: No apparent distress  Chest: No cough, no audible wheezing, able to talk in full sentences  Psych: coherent speech, normal rate and volume, able to articulate logical thoughts, able   to abstract reason, no tangential thoughts, no hallucinations   or delusions   Her affect is normal  Neuro:  Mental status: Alert and  Oriented X 3  Speech: normal     OTHER TESTS:  [facility](6/19/2020)   Ref Range & Units 4mo ago   Sodium 136 - 145 mmol/L 139    Potassium 3.5 - 5.0 mmol/L 4.7    Chloride 98 - 107 mmol/L 105    CO2 22 - 31 mmol/L 24    Anion Gap, Calculation 5 - 18 mmol/L 10    Glucose 70 - 125 mg/dL 106    BUN 8 - 22 mg/dL 21    Creatinine 0.60 - 1.10 mg/dL 0.93    GFR MDRD Af Amer >60 mL/min/1.73m2 >60    GFR MDRD Non Af Amer >60 mL/min/1.73m2 >60    Bilirubin, Total 0.0 - 1.0 mg/dL 0.3    Calcium 8.5 - 10.5 mg/dL 10.5    Protein, Total 6.0 - 8.0 g/dL 7.8    Albumin 3.5 - 5.0 g/dL 4.4    Alkaline Phosphatase 45 - 120 U/L 125High     AST 0 - 40 U/L 30    ALT 0 - 45 U/L 67High        BNP(6/19/2020):   Ref Range & Units 4mo ago   BNP 0 - 78 pg/mL <10          Impression/Report/ Plan:  Hypersomnolence:  The symptoms of cataplexy and EDS suggest possible narcolepsy,  but the diagnosis cannot be considered without first ruling out other potential causes of the hypersomnolence  such as insufficient sleep  and /CYNTHIA.  The recent  Home sleep apnea test  was negative for CYNTHIA but HSAT may lack sensitivity to identify mild disease. The frequent awakenings with palpitations and nonrestorative sleep still suggests possible CYNTHIA.    Recommend the following tests to evaluate for hypersomnolence:  Actigraphy with sleep logs for 2 weeks, followed by overnight PSG, followed by MSLT. (Marked Urgent)  The decision regarding whether or not to proceed with the MSLT following the overnight PSG will be made in the morning after reviewing the actigraphy/sleep log and the PSG results. Urine tox screen will be obtained between naps 1 and 2 during MSLT.  She is on fluoxetine which is a REM suppressant but the a forementioned sleep tests will be considered while she is on the medication, due to the concern for withdrawal symptoms that can occur with holding off  the medication, and also considering the fact that the medication can be helpful treatment for cataplexy.     We discussed going on short-term medical leave until all the work-up for hypersomnolence is completed.  She shared that it is not an option for her at this time for financial reasons.      Recommended her to avoid driving and I encouraged her to use public transportation or carpooling.   Patient was strongly advised to avoid driving, operating any heavy machinery or other hazardous situations while drowsy or sleepy.  Patient was counseled on the importance of driving while alert, to pull over if drowsy, or nap before getting into the vehicle if sleepy.      Insomnia(Chronic): She was recommended to continue  following the behavioral sleep interventions  "that were recommended by Dr. Sujit Hunter.  Plan is to communicate results of sleep tests in 2-3 days.     CC: No ref. provider found    The above note was dictated using voice recognition software. Although reviewed after completion, some word and grammatical error may remain . Please contact the author for any clarifications.    \"I spent a total of  36 minutes  with Keiry Mchugh during today's telephone visit. Over 50% of this time was spent counseling the patient and  coordinating care regarding hypersomnolence, insomnia, CYNTHIA, actigraphy, PSG and MSLT, and chart review..\"     Meri Aquino MD  [facility] [address] [address] [phone]  Dept: [phone]                      "

## 2020-10-28 ENCOUNTER — VIRTUAL VISIT (OUTPATIENT)
Dept: SLEEP MEDICINE | Facility: CLINIC | Age: 55
End: 2020-10-28
Payer: COMMERCIAL

## 2020-10-28 DIAGNOSIS — G47.10 HYPERSOMNOLENCE: Primary | ICD-10-CM

## 2020-10-28 DIAGNOSIS — R29.818 SUSPECTED SLEEP APNEA: ICD-10-CM

## 2020-10-28 PROCEDURE — 99214 OFFICE O/P EST MOD 30 MIN: CPT | Mod: 95 | Performed by: INTERNAL MEDICINE

## 2020-10-29 ENCOUNTER — TELEPHONE (OUTPATIENT)
Dept: SLEEP MEDICINE | Facility: CLINIC | Age: 55
End: 2020-10-29

## 2020-10-30 NOTE — PROCEDURES
"HOME SLEEP STUDY INTERPRETATION    Patient: Keiry Mchugh  MRN: [id number]  YOB: 1965  Study Date: 10/19/2020  Referring Provider: No Ref-Primary, Physician  Ordering Provider: Le Aquino MD     Indications for Home Study: Keiry Mchugh is a 55 year old female with symptoms suggestive of obstructive sleep apnea.    Estimated body mass index is 31.32 kg/m  as calculated from the following:    Height as of 6/10/20: 1.702 m (5' 7\").    Weight as of 6/10/20: 90.7 kg (200 lb).  Total score - [place]: 21 (10/27/2020  3:00 PM)  STOP-BAN/8    Data: A full night home sleep study was performed recording the standard physiologic parameters including body position, movement, sound, nasal pressure, thermal oral airflow, chest and abdominal movements with respiratory inductance plethysmography, and oxygen saturation by pulse oximetry. Pulse rate was estimated by oximetry recording. This study was considered adequate based on > 4 hours of quality oximetry and respiratory recording. As specified by the AASM Manual for the Scoring of Sleep and Associated events, version 2.3, Rule VIII.D 1B, 4% oxygen desaturation scoring for hypopneas is used as a standard of care on all home sleep apnea testing.    Analysis Time: 569.7 minutes    Respiration:   Sleep Associated Hypoxemia: sustained hypoxemia was not present. Baseline oxygen saturation was 94.0%.  Time with saturation less than or equal to 88% was 0 minutes. The lowest oxygen saturation was 89%.   Snoring: Snoring was present.  Respiratory events: The home study revealed a presence of 5 obstructive apneas and 5 mixed and central apneas. There were 23 hypopneas resulting in a combined apnea/hypopnea index [AHI] of 3.5 events per hour.  AHI was 5.1 per hour supine, n/a prone, 2.1 per hour on left side, and 1.5 per hour on right side.   Pattern: Excluding events noted above, respiratory rate and pattern was Normal.    Position: Percent of time spent: " supine -49.4%, prone -0%, on left -19%, on right -27.7%.    Heart Rate: By pulse oximetry normal rate was noted.     Assessment:   Snoring was reported, but there is no evidence of clinically significant sleep-related breathing disorder.     Recommendations:  Consider obtaining supervised polysomnography if the clinical index of suspicion for CYNTHIA is high. Home sleep apnea testing may lack sensitivity to identify mild disease.  Suggest optimizing sleep hygiene and avoiding sleep deprivation.  Weight management.    Diagnosis Code(s): Snoring R06.83, Sleep disturbance unspecified G 47.9    Meri Aquino MD, October 29, 2020   Diplomate, American Board of Internal Medicine, Sleep Medicine

## 2020-11-09 ENCOUNTER — VIRTUAL VISIT (OUTPATIENT)
Dept: FAMILY MEDICINE | Facility: OTHER | Age: 55
End: 2020-11-09

## 2020-11-09 NOTE — PROGRESS NOTES
"Date: 2020 14:27:08  Clinician: Guevara Bagley  Clinician NPI: [id number]  Patient: Keiry Mchugh  Patient : 1965  Patient Address: [address] [place] [address]  Patient Phone: [phone]  Visit Protocol: URI  Patient Summary:  Keiry is a 55 year old ( : 1965 ) female who initiated a [facility] Visit for COVID-19 (Coronavirus) evaluation and screening. When asked the question \"Please sign me up to receive news, health information and promotions. \", Keiry responded \"No\".    When asked when her symptoms started, Keiry reported that she does not have any symptoms.   She denies taking antibiotic medication in the past month and having recent facial or sinus surgery in the past 60 days.    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 In the past 14 days, Keiry has worked or volunteered at a healthcare facility or a group living setting. 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   In the past 14 days, she has not lived in a congregate living setting.   Keiry has had a close contact with a laboratory-confirmed COVID-19 patient in the last 14 days. She was exposed at her work. Date Keiry was exposed to the laboratory-confirmed COVID-19 patient: 2020   Additional information about contact with COVID-19 (Coronavirus) patient as reported by the patient (free text): My coworker tested positive over the weekend. On 20 she, myself and a few other coworkers ate lunch together at the same table and unmasked.   Keiry is not living in the same household with the COVID-19 positive patient. She was in an enclosed space for greater than 15 minutes with the COVID-19 patient.   During the encounter, neither were wearing masks.   Since 2019, Keiry has been tested for COVID-19 " and has not had upper respiratory infection or influenza-like illness.      Result of COVID-19 test: Negative     Date of her COVID-19 test: 05/27/2020      Pertinent medical history  Keiry does not get yeast infections when she takes antibiotics.   Keiry does not need a return to work/school note.   Weight: 215 lbs   Keiry does not smoke or use smokeless tobacco.   Weight: 215 lbs    MEDICATIONS: sumatriptan-naproxen oral, turmeric root extract oral, arginine-vitamin C-vitamin E oral, fluoxetine oral, calcium-vitamin D3-vitamin K oral, vitamin B12-folic acid oral, Ultra CoQ10 oral, melatonin-trypto-valer-qmynw-ffbhk-rson-B6 oral, ALLERGIES: Rocephin, Levaquin, tramadol, Iodinated Contrast Media, Penicillins, azithromycin  Clinician Response:  Dear Keiry,   Based on your exposure to COVID-19 (coronavirus), we would like to test you for this virus.  1. Please call [phone] to schedule your visit. Explain that you were referred by [facility] to have a COVID-19 test. Be ready to share your [facility] visit ID number.  * If you need to schedule in [facility] please call [phone] or for [employer] [facility] employees please call [phone].   * If you need to schedule in the [place] area please call [phone]. [employer] employees call [phone].   The following will serve as your written order for this COVID Test, ordered by me, for the indication of suspected COVID [Z20.828]: The test will be ordered in [facility], our electronic health record, after you are scheduled. It will show as ordered and authorized by Alexander Cortez MD.  Order: COVID-19 (coronavirus) PCR for ASYMPTOMATIC EXPOSURE testing from [facility].   If you know you have had close contact with someone who tested positive, you should be quarantined for 14 days after this exposure. You should stay in quarantine for the14 days even if the covid test is negative, the optimal time to test after exposure is 5-7 days from the exposure  Quarantine means   What should I  do?  For safety, it's very important to follow these rules. Do this for 14 days after the date you were last exposed to the virus..  Stay home and away from others. Don't go to school or anywhere else. Generally quarantine means staying home from work but there are some exceptions to this. Please contact your workplace.   No hugging, kissing or shaking hands.  Don't let anyone visit.  Cover your mouth and nose with a mask, tissue or washcloth to avoid spreading germs.  Wash your hands and face often. Use soap and water.  What are the symptoms of COVID-19?  The most common symptoms are cough, fever and trouble breathing. Less common symptoms include headache, body aches, fatigue (feeling very tired), chills, sore throat, stuffy or runny nose, diarrhea (loose poop), loss of taste or smell, belly pain, and nausea or vomiting (feeling sick to your stomach or throwing up).  After 14 days, if you have still don't have symptoms, you likely don't have this virus.  If you develop symptoms, follow these guidelines.  If you're normally healthy: Please start another [facility] visit to report your symptoms. Go to [email or web address].  If you have a serious health problem (like cancer, heart failure, an organ transplant or kidney disease): Call your specialty clinic. Let them know that you might have COVID-19.  2. When it's time for your COVID test:  Stay at least 6 feet away from others. (If someone will drive you to your test, stay in the backseat, as far away from the  as you can.)  Cover your mouth and nose with a mask, tissue or washcloth.  Go straight to the testing site. Don't make any stops on the way there or back.  Please note  Caregivers in these groups are at risk for severe illness due to COVID-19:  o People 65 years and older  o People who live in a nursing home or long-term care facility  o People with chronic disease (lung, heart, cancer, diabetes, kidney, liver, immunologic)  o People who have a weakened immune system,  including those who:  Are in cancer treatment  Take medicine that weakens the immune system, such as corticosteroids  Had a bone marrow or organ transplant  Have an immune deficiency  Have poorly controlled HIV or AIDS  Are obese (body mass index of 40 or higher)  Smoke regularly  Where can I get more information?  [facility] -- About COVID-19: [email or web address]  CDC -- What to Do If You're Sick: www.cdc.gov/coronavirus/2019-ncov/about/steps-when-sick.html  CDC -- Ending Home Isolation: www.cdc.gov/coronavirus/2019-ncov/hcp/disposition-in-home-patients.html  [facility] -- Caring for Someone: www.cdc.gov/coronavirus/2019-ncov/if-you-are-sick/care-for-someone.html  [facility] -- Interim Guidance for Hospital Discharge to Home: www.health.[facility].mn./diseases/coronavirus/hcp/hospdischarge.pdf  [facility] clinical trials (COVID-19 research studies): clinicalaffairs.[facility]-clinical-trials  Below are the COVID-19 hotlines at the [place] Department of Health ([facility]). Interpreters are available.  For health questions: Call 691-832-6415 or 1-572.467.6515 (7 a.m. to 7 p.m.)  For questions about schools and childcare: Call 988-737-8917 or 1-523.596.9921 (7 a.m. to 7 p.m.)    Diagnosis: Contact with and (suspected) exposure to other viral communicable diseases  Diagnosis ICD: Z20.828

## 2020-11-10 ENCOUNTER — RECORDS - HEALTHEAST (OUTPATIENT)
Dept: ADMINISTRATIVE | Facility: OTHER | Age: 55
End: 2020-11-10

## 2020-11-13 DIAGNOSIS — Z20.822 SUSPECTED COVID-19 VIRUS INFECTION: Primary | ICD-10-CM

## 2020-11-14 DIAGNOSIS — Z20.822 SUSPECTED COVID-19 VIRUS INFECTION: ICD-10-CM

## 2020-11-14 PROCEDURE — U0003 INFECTIOUS AGENT DETECTION BY NUCLEIC ACID (DNA OR RNA); SEVERE ACUTE RESPIRATORY SYNDROME CORONAVIRUS 2 (SARS-COV-2) (CORONAVIRUS DISEASE [COVID-19]), AMPLIFIED PROBE TECHNIQUE, MAKING USE OF HIGH THROUGHPUT TECHNOLOGIES AS DESCRIBED BY CMS-2020-01-R: HCPCS | Performed by: PHYSICIAN ASSISTANT

## 2020-11-15 LAB
SARS-COV-2 RNA SPEC QL NAA+PROBE: NOT DETECTED
SPECIMEN SOURCE: NORMAL

## 2020-11-17 ENCOUNTER — COMMUNICATION - HEALTHEAST (OUTPATIENT)
Dept: FAMILY MEDICINE | Facility: CLINIC | Age: 55
End: 2020-11-17

## 2020-11-17 DIAGNOSIS — G43.119 INTRACTABLE MIGRAINE WITH AURA WITHOUT STATUS MIGRAINOSUS: ICD-10-CM

## 2020-11-19 ENCOUNTER — COMMUNICATION - HEALTHEAST (OUTPATIENT)
Dept: FAMILY MEDICINE | Facility: CLINIC | Age: 55
End: 2020-11-19

## 2020-11-19 DIAGNOSIS — M54.12 CERVICAL RADICULOPATHY: ICD-10-CM

## 2020-11-19 DIAGNOSIS — M54.2 NECK PAIN: ICD-10-CM

## 2020-11-22 ENCOUNTER — NURSE TRIAGE (OUTPATIENT)
Dept: NURSING | Facility: CLINIC | Age: 55
End: 2020-11-22

## 2020-11-22 NOTE — TELEPHONE ENCOUNTER

## 2020-11-29 ENCOUNTER — COMMUNICATION - HEALTHEAST (OUTPATIENT)
Dept: SCHEDULING | Facility: CLINIC | Age: 55
End: 2020-11-29

## 2020-12-01 ENCOUNTER — RECORDS - HEALTHEAST (OUTPATIENT)
Dept: ADMINISTRATIVE | Facility: OTHER | Age: 55
End: 2020-12-01

## 2020-12-01 LAB
ALT SERPL W/O P-5'-P-CCNC: 148 U/L (ref 7–45)
AST SERPL-CCNC: 56 U/L (ref 8–43)
CHOLEST SERPL-MCNC: 311 MG/DL
CREAT SERPL-MCNC: 1.04 MG/DL (ref 0.59–1.04)
GFR ESTIMATE EXT - HISTORICAL: 61 ML/MIN/BSA
GFR ESTIMATE, IF BLACK EXT - HISTORICAL: 71 ML/MIN/BSA
HDLC SERPL-MCNC: 75 MG/DL
LDLC SERPL CALC-MCNC: 180 MG/DL
NON HDL CHOL. (LDL+VLDL): 236 MG/DL
TRIGLYCERIDES (HISTORICAL CONVERSION): 278 MG/DL

## 2020-12-02 ENCOUNTER — RECORDS - HEALTHEAST (OUTPATIENT)
Dept: ADMINISTRATIVE | Facility: OTHER | Age: 55
End: 2020-12-02

## 2020-12-04 ENCOUNTER — RECORDS - HEALTHEAST (OUTPATIENT)
Dept: ADMINISTRATIVE | Facility: OTHER | Age: 55
End: 2020-12-04

## 2020-12-06 ENCOUNTER — DOCUMENTATION ONLY (OUTPATIENT)
Dept: SLEEP MEDICINE | Facility: CLINIC | Age: 55
End: 2020-12-06

## 2020-12-06 ENCOUNTER — THERAPY VISIT (OUTPATIENT)
Dept: SLEEP MEDICINE | Facility: CLINIC | Age: 55
End: 2020-12-06
Payer: COMMERCIAL

## 2020-12-06 DIAGNOSIS — G47.10 HYPERSOMNOLENCE: ICD-10-CM

## 2020-12-06 PROCEDURE — 95810 POLYSOM 6/> YRS 4/> PARAM: CPT | Performed by: INTERNAL MEDICINE

## 2020-12-07 NOTE — PATIENT INSTRUCTIONS
[facility]    1. Your sleep study will be reviewed by a sleep physician within the next few days.     2. Please follow up in the sleep clinic as scheduled, or, make an appointment with your sleep provider to be seen within two weeks to discuss the results of the sleep study.    3. If you have any questions or problems with your treatment plan, please contact your sleep clinic provider at [phone] to further manage your condition.    4. Please review your attached medication list, and, at your follow-up appointment advise your sleep clinic provider about any changes.    5. Go to [email or web address] for more information about your sleep problems.    Leann Mejía, RPSGT  December 7, 2020

## 2020-12-13 NOTE — PROCEDURES
" SLEEP STUDY INTERPRETATION  DIAGNOSTIC POLYSOMNOGRAPHY REPORT      Patient: VENUS JIMENEZ  YOB: 1965  Study Date: 12/6/2020  MRN: [id number]  Referring Provider: Self  Ordering Provider: MD Kenia, Meri Dunlap    Indications for Polysomnography: The patient is a 55-year-old Female who is 5' 7\" and weighs 200.0 lbs. Her BMI is 31.4, [employer] sleepiness scale 21 and neck circumference is 39 cm. Relevant medical history includes anxiety, depression, GERD, with concerns of chronic fatigue, insomnia and excessive daytime sleepiness. A diagnostic polysomnogram was performed to evaluate for sleep apnea/PLMS.    Polysomnogram Data: A full night polysomnogram recorded the standard physiologic parameters including EEG, EOG, EMG, ECG, nasal and oral airflow. Respiratory parameters of chest and abdominal movements were recorded with respiratory inductance plethysmography. Oxygen saturation was recorded by pulse oximetry. Hypopnea scoring rule used: 1B 4%.    Sleep Architecture: Sleep architecture was remarkable for prolonged initial sleep latency, sleep fragmentation and increased wake after sleep onset with reduced sleep efficiency.  All sleep stages were observed.  Both stages N3 and REM were significantly increased, suggestive of possible sleep deprivation.    The total recording time of the polysomnogram was 537.0 minutes. The total sleep time was 312.0 minutes. Sleep latency was increased at 101.5 minutes with the use of a sleep aid (melatonin 5 mg). REM latency was 100.0 minutes. Arousal index was increased at 24.0 arousals per hour(mostly spontaneous arousals). Sleep efficiency was decreased at 58.1%. Wake after sleep onset was increased at 116.0 minutes. The patient spent 6.7% of total sleep time in Stage N1, 40.9% in Stage N2, 25.5% in Stage N3, and 26.9% in REM. Time in REM supine was 7.0 minutes.    Respiration: Snoring was reported, but there is no evidence of clinically " significant sleep-related breathing disorder.  Events ? The polysomnogram revealed a presence of - obstructive, - central, and - mixed apneas resulting in an apnea index of - events per hour. There were 5 obstructive hypopneas and - central hypopneas resulting in an obstructive hypopnea index of 1.0 and central hypopnea index of - events per hour. The combined apnea/hypopnea index was 1.0 events per hour (central apnea/hypopnea index was - events per hour). The REM AHI was 2.9 events per hour. The supine AHI was 1.8 events per hour. The RERA index was 1.7 events per hour.  The RDI was 2.7 events per hour.    Snoring - was reported as infrequent, generally mild in volume, occasionally loud.    Respiratory rate and pattern - was notable for normal respiratory rate and pattern.    Sustained Sleep Associated Hypoventilation - Transcutaneous carbon dioxide monitoring was not used, however significant hypoventilation was not suggested by oximetry.    Sleep Associated Hypoxemia - (Greater than 5 minutes O2 sat at or below 88%) was not present. Baseline oxygen saturation was 95.1%. Lowest oxygen saturation was 90.0%. Time spent less than or equal to 88% was 0 minutes. Time spent less than or equal to 89% was 0 minutes.    Movement Activity: There were no significant limb movements during the study. Abnormal sleep related behaviors were not noted.    Periodic Limb Activity - There were 15 PLMs during the entire study. The PLM index was 2.9 movements per hour. The PLM Arousal Index was 0.8 per hour.    REM EMG Activity - Excessive transient/sustained muscle activity was not present.    Nocturnal Behavior - Abnormal sleep related behaviors were not noted during/arising out of NREM / REM sleep.     Bruxism - None apparent. (she used mouth guard during the study).    Cardiac Summary: Normal sinus rhythm was noted.  The average pulse rate was 74.9 bpm. The minimum pulse rate was 56.0 bpm. The maximum pulse rate during sleep was  90.1 bpm. (The maximum pulse rate reported as 129.5 bpm was artifactual during wakefulness)  Arrhythmias were not noted.      Assessment:     Snoring was reported, but there is no evidence of clinically significant sleep-related breathing disorder.      Sleep architecture was remarkable for prolonged initial sleep latency, sleep fragmentation and increased wake after sleep onset with reduced sleep efficiency.  All sleep stages were observed.  Both stages N3 and REM were significantly increased, suggestive of possible sleep deprivation.      There were no significant limb movements during the study.      Abnormal sleep related behaviors were not noted.    Normal sinus rhythm was noted.      Recommendations:    Patient may be a candidate for modified dental appliance through referral to Sleep Dentistry for the treatment snoring and bruxism.     Suggest optimizing sleep schedule, sleep hygeine and avoiding sleep deprivation.    Advice regarding the risks of drowsy driving.    Weight management (if BMI > 30).    Pharmacologic therapy should be used for management of restless legs syndrome only if present and clinically indicated and not based on the presence of periodic limb movements alone.    Diagnostic Codes:   Repetitive Intrusions Into Sleep F51.8    12/6/2020 [place] [facility] Sleep Study (200.0 lbs) - AHI 1.0, RDI 2.7, Supine AHI 1.8, REM AHI 2.9, Low O2 90.0%, Time Spent ?88% 0 minutes / Time Spent ?89% 0 minutes.     _____________________________________   Electronically Signed By: (Le Aquino MD), 12/12/2020

## 2020-12-14 ENCOUNTER — VIRTUAL VISIT (OUTPATIENT)
Dept: SLEEP MEDICINE | Facility: CLINIC | Age: 55
End: 2020-12-14
Payer: COMMERCIAL

## 2020-12-14 DIAGNOSIS — R29.818 SUSPECTED SLEEP APNEA: ICD-10-CM

## 2020-12-14 LAB — SLPCOMP: NORMAL

## 2020-12-14 PROCEDURE — 99213 OFFICE O/P EST LOW 20 MIN: CPT | Mod: 95 | Performed by: INTERNAL MEDICINE

## 2020-12-14 NOTE — PROGRESS NOTES
"Keiry Mchugh is a 55 year old female who is being evaluated via a billable telephone visit.      The patient has been notified of following:     \"This telephone visit will be conducted via a call between you and your physician/provider. We have found that certain health care needs can be provided without the need for a physical exam.  This service lets us provide the care you need with a short phone conversation.  If a prescription is necessary we can send it directly to your pharmacy.  If lab work is needed we can place an order for that and you can then stop by our lab to have the test done at a later time.    Telephone visits are billed at different rates depending on your insurance coverage. During this emergency period, for some insurers they may be billed the same as an in-person visit.  Please reach out to your insurance provider with any questions.    If during the course of the call the physician/provider feels a telephone visit is not appropriate, you will not be charged for this service.\"    Patient has given verbal consent for Telephone visit?  Yes    What phone number would you like to be contacted at? [phone]     How would you like to obtain your AVS? Mail a copy    Phone call duration: 18 minutes    Meri Aquino MD  [address] [address] [phone]  Dept: [phone]       SLEEP CLINIC FOLLOW UP VISIT    Date of visit: 12/14/2020    Purpose of visit: review actigraphy/PSG results    HPI: Keiry Mchugh is a 55-year-old female with h/o anxiety, depression, GERD, with concerns of chronic fatigue, insomnia and excessive daytime sleepiness.   Actigraphy with sleep logs for 2 weeks, followed by overnight PSG, followed by MSLT were recommended to evaluate for hypersomnolence.  Actigraphy and PSG were obtained. The diagnostic polysomnogram was performed on 12/6/2020 to evaluate for sleep apnea/PLMS. " Due to insufficient sleep time, she did not qualify for the MSLT hence the MSLT was not obtained. Telephone visit has been scheduled to review the test results.      SLEEP STUDY RESULTS:    Study Date: 12/6/2020    Polysomnogram Data: A full night polysomnogram recorded the standard physiologic parameters including EEG, EOG, EMG, ECG, nasal and oral airflow. Respiratory parameters of chest and abdominal movements were recorded with respiratory inductance plethysmography. Oxygen saturation was recorded by pulse oximetry. Hypopnea scoring rule used: 1B 4%.     Sleep Architecture: Sleep architecture was remarkable for prolonged initial sleep latency, sleep fragmentation and increased wake after sleep onset with reduced sleep efficiency.  All sleep stages were observed.  Both stages N3 and REM were significantly increased, suggestive of possible sleep deprivation.    The total recording time of the polysomnogram was 537.0 minutes. The total sleep time was 312.0 minutes. Sleep latency was increased at 101.5 minutes with the use of a sleep aid (melatonin 5 mg). REM latency was 100.0 minutes. Arousal index was increased at 24.0 arousals per hour(mostly spontaneous arousals). Sleep efficiency was decreased at 58.1%. Wake after sleep onset was increased at 116.0 minutes. The patient spent 6.7% of total sleep time in Stage N1, 40.9% in Stage N2, 25.5% in Stage N3, and 26.9% in REM. Time in REM supine was 7.0 minutes.     Respiration: Snoring was reported, but there is no evidence of clinically significant sleep-related breathing disorder.    Events ? The polysomnogram revealed a presence of - obstructive, - central, and - mixed apneas resulting in an apnea index of - events per hour. There were 5 obstructive hypopneas and - central hypopneas resulting in an obstructive hypopnea index of 1.0 and central hypopnea index of - events per hour. The combined apnea/hypopnea index was 1.0 events per hour (central apnea/hypopnea index  was - events per hour). The REM AHI was 2.9 events per hour. The supine AHI was 1.8 events per hour. The RERA index was 1.7 events per hour.  The RDI was 2.7 events per hour.    Snoring - was reported as infrequent, generally mild in volume, occasionally loud.    Respiratory rate and pattern - was notable for normal respiratory rate and pattern.    Sustained Sleep Associated Hypoventilation - Transcutaneous carbon dioxide monitoring was not used, however significant hypoventilation was not suggested by oximetry.    Sleep Associated Hypoxemia - (Greater than 5 minutes O2 sat at or below 88%) was not present. Baseline oxygen saturation was 95.1%. Lowest oxygen saturation was 90.0%. Time spent less than or equal to 88% was 0 minutes. Time spent less than or equal to 89% was 0 minutes.     Movement Activity: There were no significant limb movements during the study. Abnormal sleep related behaviors were not noted.    Periodic Limb Activity - There were 15 PLMs during the entire study. The PLM index was 2.9 movements per hour. The PLM Arousal Index was 0.8 per hour.    REM EMG Activity - Excessive transient/sustained muscle activity was not present.    Nocturnal Behavior - Abnormal sleep related behaviors were not noted during/arising out of NREM / REM sleep.     Bruxism - None apparent. (she used mouth guard during the study).     Cardiac Summary: Normal sinus rhythm was noted.  The average pulse rate was 74.9 bpm. The minimum pulse rate was 56.0 bpm. The maximum pulse rate during sleep was 90.1 bpm. (The maximum pulse rate reported as 129.5 bpm was artifactual during wakefulness)  Arrhythmias were not noted.        Assessment:     Snoring was reported, but there is no evidence of clinically significant sleep-related breathing disorder.      Sleep architecture was remarkable for prolonged initial sleep latency, sleep fragmentation and increased wake after sleep onset with reduced sleep efficiency.  All sleep stages were  observed.  Both stages N3 and REM were significantly increased, suggestive of possible sleep deprivation.      There were no significant limb movements during the study.      Abnormal sleep related behaviors were not noted.    Normal sinus rhythm was noted.     Test results were discussed with patient in detail.    Current meds:  Current Outpatient Medications   Medication Sig Dispense Refill     Ascorbic Acid (VITAMIN C) 500 MG CAPS        BIOTIN PO Take 10,000 mcg by mouth       Black Pepper-Turmeric (TURMERIC CURCUMIN) 5-1000 MG CAPS Take 2,000 mg by mouth       Coenzyme Q10 (COQ-10) 400 MG CAPS        Cyanocobalamin (VITAMIN B12) 500 MCG TABS        diphenhydrAMINE (BENADRYL) 25 MG tablet Take 25 mg by mouth every 6 hours as needed for itching or allergies       FLUoxetine 20 MG tablet Take 20 mg by mouth daily       magnesium oxide 200 MG TABS Take 400 mg by mouth       Melatonin 10 MG TABS tablet Take 20 mg by mouth nightly as needed for sleep       SUMAtriptan (IMITREX) 50 MG tablet Take 50 mg by mouth at onset of headache for migraine       vitamin D2 (ERGOCALCIFEROL) 60244 units (1250 mcg) capsule Take 50,000 Units by mouth once a week       Problem list:   Patient Active Problem List   Diagnosis     Chronic insomnia     Suspected sleep apnea     Past medical history, Past surgical history, Allergies, Social history, Family history: reviewed, per EMR    Exam:  There were no vitals taken for this visit.  GENERAL APPEARANCE: alert, in no distress  PSYCH: Alert and oriented times 3; coherent speech, normal   rate and volume, able to articulate logical thoughts, able   to abstract reason, no tangential thoughts, no hallucinations   or delusions  Her affect is normal  RESP: No cough, no audible wheezing, able to talk in full sentences  Remainder of exam unable to be completed due to telephone visit     Assessment/Plan:  Hypersomnolence: The results of the sleep tests were discussed with the patient. To  "summarize, during the PSG, sleep architecture was remarkable for prolonged initial sleep latency. Both stages N3 and REM were significantly increased, suggestive of possible sleep deprivation. Due to insufficient sleep time, she did not qualify for the MSLT hence the MSLT was not obtained.   I discussed with the patient that with narcolepsy, we would expect a significantly reduced initial sleep latency. A diagnosis of pathological hypersomnolence cannot be made based on the sleep tests. It is important for her to prioritize sleep, follow a regular sleep schedule and avoid sleep  deprivation. Her work schedule is very erratic and she mentions that she cannot change the work schedule.  She was recommended to continue follow up with Dr. Hunter and to follow the behavioral sleep interventions that were recommended by .  If the symptoms of excessive daytime sleepiness persist despite regularizing sleep schedule and adequate sleep intake of at least 7-8 hours per night, she will return to sleep clinic for further evaluation.    Recommended her to avoid driving and I encouraged her to use public transportation or carpooling.   Patient was strongly advised to avoid driving, operating any heavy machinery or other hazardous situations while drowsy or sleepy.  Patient was counseled on the importance of driving while alert, to pull over if drowsy, or nap before getting into the vehicle if sleepy.       The above note was dictated using voice recognition software. Although reviewed after completion, some word and grammatical error may remain . Please contact the author for any clarifications.    \"I spent a total of 18 minutes with Keiry Mchugh during today's telephone visit. Over 50% of this time was spent counseling the patient and  coordinating care regarding hypersomnolence, regularizing sleep schedule, avoiding sleep deprivation,and  going over sleep study results and chart review..\"     Meri S " MD [place]   of Medicine,  Division of Pulmonary/Sleep Medicine  [facility].

## 2020-12-15 ENCOUNTER — DOCUMENTATION ONLY (OUTPATIENT)
Dept: SLEEP MEDICINE | Facility: CLINIC | Age: 55
End: 2020-12-15
Payer: COMMERCIAL

## 2020-12-15 DIAGNOSIS — R29.818 SUSPECTED SLEEP APNEA: ICD-10-CM

## 2020-12-15 NOTE — PROGRESS NOTES
STM Recheck: Patient called and left a message to schedule with Dr Hunter called patient back and left message to call back. .

## 2020-12-16 ENCOUNTER — RECORDS - HEALTHEAST (OUTPATIENT)
Dept: HEALTH INFORMATION MANAGEMENT | Facility: CLINIC | Age: 55
End: 2020-12-16

## 2020-12-22 ENCOUNTER — DOCUMENTATION ONLY (OUTPATIENT)
Dept: SLEEP MEDICINE | Facility: CLINIC | Age: 55
End: 2020-12-22
Payer: COMMERCIAL

## 2020-12-22 DIAGNOSIS — R29.818 SUSPECTED SLEEP APNEA: ICD-10-CM

## 2020-12-22 NOTE — PROGRESS NOTES
STM Recheck: Patient called and left message to schedule [facility] called patient back and left message to call back.

## 2021-01-13 ENCOUNTER — TELEPHONE (OUTPATIENT)
Dept: SLEEP MEDICINE | Facility: CLINIC | Age: 56
End: 2021-01-13

## 2021-01-14 ENCOUNTER — TELEPHONE (OUTPATIENT)
Dept: SLEEP MEDICINE | Facility: CLINIC | Age: 56
End: 2021-01-14

## 2021-01-14 DIAGNOSIS — R29.818 SUSPECTED SLEEP APNEA: ICD-10-CM

## 2021-01-14 NOTE — TELEPHONE ENCOUNTER
A request to schedule an appointment has been received for Dr. Sujit Hunter.   The patient has been notified that a member of the [facility] will contact the patient to schedule the appointment.    Patient must be called on Monday 1/17/21 between 9am-10am. Per patient she has been trying to get an appointment. [facility]

## 2021-01-18 ENCOUNTER — DOCUMENTATION ONLY (OUTPATIENT)
Dept: SLEEP MEDICINE | Facility: CLINIC | Age: 56
End: 2021-01-18
Payer: COMMERCIAL

## 2021-01-18 DIAGNOSIS — R29.818 SUSPECTED SLEEP APNEA: ICD-10-CM

## 2021-01-25 ENCOUNTER — AMBULATORY - HEALTHEAST (OUTPATIENT)
Dept: NURSING | Facility: CLINIC | Age: 56
End: 2021-01-25

## 2021-02-15 ENCOUNTER — AMBULATORY - HEALTHEAST (OUTPATIENT)
Dept: NURSING | Facility: CLINIC | Age: 56
End: 2021-02-15

## 2021-02-27 ENCOUNTER — COMMUNICATION - HEALTHEAST (OUTPATIENT)
Dept: FAMILY MEDICINE | Facility: CLINIC | Age: 56
End: 2021-02-27

## 2021-02-27 DIAGNOSIS — F32.9 MAJOR DEPRESSIVE DISORDER WITH SINGLE EPISODE, REMISSION STATUS UNSPECIFIED: ICD-10-CM

## 2021-04-20 ENCOUNTER — RECORDS - HEALTHEAST (OUTPATIENT)
Dept: ADMINISTRATIVE | Facility: OTHER | Age: 56
End: 2021-04-20

## 2021-05-02 ENCOUNTER — RECORDS - HEALTHEAST (OUTPATIENT)
Dept: FAMILY MEDICINE | Facility: CLINIC | Age: 56
End: 2021-05-02

## 2021-05-26 ENCOUNTER — RECORDS - HEALTHEAST (OUTPATIENT)
Dept: ADMINISTRATIVE | Facility: CLINIC | Age: 56
End: 2021-05-26

## 2021-05-26 ASSESSMENT — PATIENT HEALTH QUESTIONNAIRE - PHQ9: SUM OF ALL RESPONSES TO PHQ QUESTIONS 1-9: 11

## 2021-05-27 ASSESSMENT — PATIENT HEALTH QUESTIONNAIRE - PHQ9: SUM OF ALL RESPONSES TO PHQ QUESTIONS 1-9: 7

## 2021-05-27 NOTE — PROGRESS NOTES
"HPI: Pt is here for follow up of a lap sigmoid colon resection.  Post op complications of ileus and wound infection that required packing of the lower portion of her incision, home care has been coming out daily to complete.   she is doing well.  Pain is well controlled.  she is eating well and denies fever and chills.         /77   Pulse 94   Temp 97.7  F (36.5  C)   Ht 5' 7\" (1.702 m)   Wt 191 lb (86.6 kg)   BMI 29.91 kg/m      EXAM:  GENERAL:Appears well  ABDOMEN:  Soft, +BS  SURGICAL WOUNDS:  Incisions healing well, 2 wound noted on lower portion of incisions.  Superior opening healing well with 100% granulation tissue, fairly superficial at 2cm x 2 cm x 2 cm.   Lower wound with serosanguinous drainage but does not appear to be infected, tracks straight down roughly 4 cm, fascia intact.      Suture noted to be in between the two open areas holding the wound together.     Repacked with dry gauze and covered.     Assessment/Plan: .   Continue daily dressing changes, pack the wound with dry gauze for 1 week  Pack the wound with Nu Gauze ribbon for 1 week  After that leave the wound without packing and cover with dry gauze until it is completely healed.    The suture was removed from the wound today.    Otherwise doing well after surgery and should follow up as needed.      [facility]  [phone]    "

## 2021-05-27 NOTE — TELEPHONE ENCOUNTER
RN cannot approve Refill Request    RN can NOT refill this medication med is not covered by policy/route to provider. Last office visit: 2/7/2019 Porfirio Linton MD Last Physical: 3/8/2019 Last MTM visit: Visit date not found Last visit same specialty: 2/7/2019 Porfirio Linton MD.  Next visit within 3 mo: Visit date not found  Next physical within 3 mo: Visit date not found      Adrianne Baumann, [facility] Triage/Med Refill 4/5/2019    Requested Prescriptions   Pending Prescriptions Disp Refills     baclofen (LIORESAL) 10 MG tablet [Pharmacy Med Name: BACLOFEN 10MG TABLETS] 30 tablet 0     Sig: TAKE 1 TABLET(10 MG) BY MOUTH EVERY 6 HOURS AS NEEDED    There is no refill protocol information for this order

## 2021-05-27 NOTE — PROGRESS NOTES
HPI: Pt is here for follow up of a lap sigmoid colon resection.  Post op complications of ileus and wound infection that required packing of the lower portion of her incision, home care has been coming out daily to complete.   she is doing well.  Pain is well controlled.  No difficulties with the surgical wound/wounds.  she is eating well and denies fever and chills.         /84 (Patient Site: Right Arm, Patient Position: Sitting, Cuff Size: Adult Regular)   Pulse 81   SpO2 99%   Breastfeeding? No     EXAM:  GENERAL:Appears well  ABDOMEN:  Soft, +BS  SURGICAL WOUNDS:  Incisions healing well, 2 wound noted on lower portion of incisions.  Superior opening healing well with 100% granulation tissue, fairly superficial at 2cm x 2 cm x 2 cm.   Lower wound with serosanguinous drainage with mild malodor but does not appear to be infected, tracks straight down roughly 5 cm, fascia intact.      Suture noted to be in between the two open areas holding the wound together.     Repacked with dry gauze and covered.     Assessment/Plan: .   Continue daily dressing changes and follow up with Dr. Mosqueda in 1 week - can likely have suture removed at that time.    Anticipate packing the lower wound a few more weeks before completely healed.     Otherwise doing well after surgery and should follow up as needed.      Yan Hernandez, [facility] Department of Surgery

## 2021-05-27 NOTE — TELEPHONE ENCOUNTER
Who is calling:  Jae  Reason for Call:  Wants verbal confirmation that PCP will follow patient for care  Date of last appointment with primary care:   Okay to leave a detailed message: Yes

## 2021-05-28 ASSESSMENT — ANXIETY QUESTIONNAIRES: GAD7 TOTAL SCORE: 5

## 2021-05-28 NOTE — TELEPHONE ENCOUNTER
Received FMLA form with request for leave to be extended to 5/20/19. Available information filled out on form and form is currently in provider's folder awaiting final signature.    David Oneil CMA ([facility])     Ph: [phone]    Fx: [phone]

## 2021-05-28 NOTE — TELEPHONE ENCOUNTER
Medication Request  Medication name: fluoxetine 20 mg - take 1 tab daily  Pharmacy Name and Location: [facility]  Reason for request: Refill. Patient stated she requested this a week ago and the Rx was denied, see refill encounter on 4/28 and 5/6. Patient stated Porfirio Linton MD increased her fluoxetine to 40 mg, because of her surgery. Patient stated that now her surgery is done, she would like to go back on the 20 mg - 1 tab per day.  When did you use medication last?:  1 week ago, patient is out  Patient offered appointment:  n/a  Okay to leave a detailed message: yes  [phone]

## 2021-05-28 NOTE — TELEPHONE ENCOUNTER
Pt called with an update of her wound. She reports the wound is doing much better. She has significantly less drainage and the wound is getting smaller. She states she does not need to use much packing. She was scheduled for a wound check today in the office but would like to cancel since she has no concerns.     I cancelled this appointment per her request. She was advised to call us in the future with any concerns.

## 2021-05-28 NOTE — TELEPHONE ENCOUNTER
Patient called and said that she just got off the phone with her disability people because she hasn't been getting paid since April 8th.  She has continued to have issues with her abdominal wound healing and so hasn't been able to return to work as expected with the original date of April 8th that Dr. Mosqueda thought.  She is hoping to go back to work sometime next week but has been talking with Mis Seals and may need to come in on Monday and see Dr. Mosqueda.  She is hoping to have her [place] paperwork revised (she is having this sent to our fax [phone]) and would like us to put that she will be out from 3/18/2019-5/20/2019 (she doesn't work on Fridays and her disability people suggested to put the dates out for more time off just in case she isn't better in a week like she hoped).  Mis Ellis RN

## 2021-05-28 NOTE — TELEPHONE ENCOUNTER
FMLA form signed and faxed to [employer] fx# [phone]. Originals sent to HIM to be scanned into chart.     David Oneil CMA ([facility])     Ph: [phone]    Fx: [phone]

## 2021-05-28 NOTE — PROGRESS NOTES
HPI: Pt is here for follow up of a lap sigmoid colon resection.  Post op complications of ileus and wound infection that required packing of the lower portion of her incision, home care has been coming out daily to complete.   she is doing well.  Pain is well controlled.  she is eating well and denies fever and chills.         There were no vitals taken for this visit.    EXAM:  GENERAL:Appears well  ABDOMEN:  Soft, +BS  SURGICAL WOUNDS:  Incisions healing well,  wound noted on lower portion of incisions.  Superior opening healing well with 100% granulation tissue, fairly superficial at 1cm x 1 cm x 1 cm.       Repacked with dry gauze and covered.     Assessment/Plan: .   Continue daily dressing changes, pack the wound with dry gauze for 1 week    After that leave the wound without packing and cover with dry gauze until it is completely healed.    Otherwise doing well after surgery and should follow up as needed.      [facility]  [phone]

## 2021-05-28 NOTE — TELEPHONE ENCOUNTER
Left message to call back for: Fluoxetine 20 mg  Information to relay to patient: Left detailed message with patient that Dr. Linton has sent Fluoxetine 20 mg refill to [facility] pharmacy. Pt to call back with any further questions or concerns.

## 2021-05-29 NOTE — TELEPHONE ENCOUNTER
RN cannot approve Refill Request    RN can NOT refill this medication med is not covered by policy/route to provider. Last office visit: 2/7/2019 Porfirio Linton MD Last Physical: 3/8/2019 Last MTM visit: Visit date not found Last visit same specialty: 2/7/2019 Porfirio Linton MD.  Next visit within 3 mo: Visit date not found  Next physical within 3 mo: Visit date not found      Madhavi Medina, [facility] Triage/Med Refill 5/24/2019    Requested Prescriptions   Pending Prescriptions Disp Refills     baclofen (LIORESAL) 10 MG tablet [Pharmacy Med Name: BACLOFEN 10MG TABLETS] 30 tablet 0     Sig: TAKE 1 TABLET(10 MG) BY MOUTH EVERY 6 HOURS AS NEEDED       There is no refill protocol information for this order

## 2021-05-29 NOTE — TELEPHONE ENCOUNTER
Received, completed, and faxed [facility] paperwork to [facility] Fx# [phone]. Originals sent to HIM to be scanned into chart.     David Oneil CMA ([facility])     Ph: [phone]    Fx: [phone]

## 2021-05-30 ENCOUNTER — RECORDS - HEALTHEAST (OUTPATIENT)
Dept: ADMINISTRATIVE | Facility: CLINIC | Age: 56
End: 2021-05-30

## 2021-05-30 VITALS — WEIGHT: 188.9 LBS | HEIGHT: 67 IN | BODY MASS INDEX: 29.65 KG/M2

## 2021-05-30 NOTE — TELEPHONE ENCOUNTER
RN cannot approve Refill Request    RN can NOT refill this medication med is not covered by policy/route to provider. Last office visit: 2/7/2019 Porfirio Linton MD Last Physical: 3/8/2019 Last MTM visit: Visit date not found Last visit same specialty: 2/7/2019 Porfirio Linton MD.  Next visit within 3 mo: Visit date not found  Next physical within 3 mo: Visit date not found      Madhavi Medina, [facility] Triage/Med Refill 7/5/2019    Requested Prescriptions   Pending Prescriptions Disp Refills     baclofen (LIORESAL) 10 MG tablet [Pharmacy Med Name: BACLOFEN 10MG TABLETS] 30 tablet 0     Sig: TAKE 1 TABLET(10 MG) BY MOUTH EVERY 6 HOURS AS NEEDED       There is no refill protocol information for this order

## 2021-05-31 ENCOUNTER — RECORDS - HEALTHEAST (OUTPATIENT)
Dept: ADMINISTRATIVE | Facility: CLINIC | Age: 56
End: 2021-05-31

## 2021-05-31 VITALS — BODY MASS INDEX: 30.23 KG/M2 | WEIGHT: 193 LBS

## 2021-05-31 NOTE — TELEPHONE ENCOUNTER
"Pt calling with post op concerns. She is s/p  lap sigmoid colon resection.  Post op complications of ileus and wound infection that required packing of the lower portion of her incision. Pt was doing much better and wound was nearly completely healed when I spoke with her last in early May.     Today pt reports that over the past couple weeks, she has increasing tenderness and bulging to the left of her incision. She states intermittent sensations that \"feel like twisting.\" She denies any changes in bowels, nausea, vomiting, fever.     Pt is concerned with a recurrent hernia and would like to be evaluated by Dr. Mosqueda. I have scheduled her tomorrow in clinic.   "

## 2021-05-31 NOTE — PROGRESS NOTES
GENERAL SURGICAL CONSULTATION    I was requested by Porfirio Linton MD to consult on this pt to evaluate them for a bulge in her abdomin    HPI:  This is a 54 y.o. female here today with a swollen RLQ mass.  She is s/p a sigmoid colon resection and had a post op infection of the sub cut tissues.  She has noticed new swelling to the Right of the lower midline incision that is painful, especially to pressure.     Allergies:Azithromycin; Ceftriaxone; Iodine and iodide containing products; Levofloxacin; Other allergy (see comments); Penicillins; and Tramadol    Past Medical History:   Diagnosis Date     Acute viral bronchitis      Anemia      Anxiety      Chronic kidney disease     Has 1 kidney     Dehydration      Depression     Seasonal     Diverticulitis      Erythema migrans (Lyme disease) 5/31/2016     Hiatal hernia      Migraines        Past Surgical History:   Procedure Laterality Date     COLONOSCOPY N/A 3/18/2019    Procedure: COLONOSCOPY;  Surgeon: Davis Mosqueda MD;  Location: [facility];  Service: General     CYSTOSCOPY N/A 7/16/2015    Procedure: CYSTOSCOPY;  Surgeon: Nate Horta MD;  Location: [facility];  Service:      HYSTERECTOMY       NEPHRECTOMY LIVING DONOR Left APRIL 2012     MN APPENDECTOMY      Description: Appendectomy;  Recorded: 11/20/2007;     MN CORRJ HALLUX VALGUS W/SESMDC W/DIST METAR OSTEOT      Description: Bunion Correction With Metatarsal Osteotomy Herman Procedure;  Proc Date: 07/16/2010;     MN LAP,BILIARY TRACT,UNLISTED N/A 3/19/2019    Procedure: BIOPSY, LIVER, LAPAROSCOPIC;  Surgeon: Davis Mosqueda MD;  Location: [facility];  Service: General     MN LAP,SLING OPERATION      Description: Laparoscopic Sling Operation For Stress Incontinence;  Recorded: 02/17/2009;     MN LAP,SURG,COLECTOMY, PARTIAL, W/ANAST N/A 3/19/2019    Procedure: LAPAROSCOPIC SIGMOID COLON RESECTION;  Surgeon: Davis Mosqueda MD;  Location: [facility];   Service: General     MD REMOVAL GALLBLADDER      Description: Cholecystectomy;  Recorded: 02/17/2009;     MD TOTAL ABDOM HYSTERECTOMY      Description: Hysterectomy;  Recorded: 02/17/2009;     SIGMOIDOSCOPY N/A 3/19/2019    Procedure: FLEXIBLE SIGMOIDOSCOPY;  Surgeon: Davis Mosqueda MD;  [place] [facility]  Service: General       CURRENT MEDS:  Current Outpatient Medications   Medication Sig Dispense Refill     atenolol (TENORMIN) 50 MG tablet Take 50 mg by mouth daily as needed (flush of skin/migraines).              baclofen (LIORESAL) 10 MG tablet TAKE 1 TABLET(10 MG) BY MOUTH EVERY 6 HOURS AS NEEDED 30 tablet 2     BIOTIN ORAL Take 1 tablet by mouth daily.       cholecalciferol, vitamin D3, 5,000 unit Tab Take 5,000 Units by mouth daily.       co-enzyme Q-10 50 mg capsule Take 100 mg by mouth daily.       diphenhydrAMINE (BENADRYL) 50 MG capsule Take 1 cap 6 hrs before procedure, bring 2nd cap to procedure, may take immediately before procedure 2 capsule 0     michael primrose/linoleic/gamoleni (PRIMROSE OIL ORAL) Take by mouth.       FLUoxetine (PROZAC) 20 MG capsule Take 1 capsule (20 mg total) by mouth daily. 90 capsule 3     hydrOXYzine pamoate (VISTARIL) 50 MG capsule Take 1 capsule (50 mg total) by mouth every 4 (four) hours as needed for itching (augment relief of pain). 20 capsule 0     magnesium oxide (MAG-OX) 400 mg tablet Take 400 mg by mouth daily.              melatonin 10 mg Tab Take 1 tablet by mouth at bedtime.              mv,Ca,min-FA-herbal no.157 (MENOPAUSE SUPPLEMENT) 400 mcg Tab Take 2 tablets by oral route once daily       SUMAtriptan (IMITREX) 100 MG tablet TAKE 1/2 TO 1 TABLET BY MOUTH AT ONSET OF HEADACHE 9 tablet 5     UNABLE TO FIND 2 tablets daily. Med Name: AMBEREN       TURMERIC ROOT EXTRACT ORAL Take 2,000 mg by mouth daily.       No current facility-administered medications for this visit.        Family History   Problem Relation Age of Onset     Hashimoto's  thyroiditis Mother      Breast cancer Mother 50     Graves' disease Sister      Hypothyroidism Maternal Aunt      Breast cancer Maternal Aunt 50     Hashimoto's thyroiditis Maternal Grandfather      Hypothyroidism Sister      Hypothyroidism Maternal Aunt      Hashimoto's thyroiditis Maternal Aunt      Anesthesia problems Neg Hx      Family history is not pertinent to this patients Chief Complaint.     reports that she quit smoking about 20 years ago. She has never used smokeless tobacco. She reports that she does not drink alcohol or use drugs.    Review of Systems -   10 point Review of systems is negative except for; as mentioned above in HPI and PMHx    /68 (Patient Site: Right Arm, Patient Position: Sitting, Cuff Size: Adult Small)   Pulse 82   Wt 197 lb 3.2 oz (89.4 kg)   SpO2 99%   BMI 30.89 kg/m    Body mass index is 30.89 kg/m .    EXAM:  GENERAL: Well developed female  HEENT: EOMI, Anicteric Sclera, Moist Mucous Membranes,  In Mouth the pt does not have redness or bleeding gums  CARDIOVASCULAR: RRR w/out murmur   CHEST/LUNG: Clear to Auscultation  ABDOMEN:  Non tender to palpation, +BS, bulge in the lower ab, Right of the midline.  MUSCULOSKELETAL:  No deformities with good range of motion in all extremities  NEURO: She is ambulatory with good strength in both legs.  HEME/LYMPH: No Cervical Adenopathy or tenderness.     IMAGES:  none    Assessment/Plan:  I suspect a ventral hernia status post her colon resection.    CT scan and then re-evaluate in 1 week.      Davis Mosqueda MD  [facility]  [phone]

## 2021-05-31 NOTE — TELEPHONE ENCOUNTER
Refill Approved    Rx renewed per Medication Renewal Policy. Medication was last renewed on 3/8/2019 for 9 tabs/3 refills.  Last OV 3/8/2019 pre-op  Jenni Garsia, [facility] Triage/Med Refill 8/3/2019     Requested Prescriptions   Pending Prescriptions Disp Refills     SUMAtriptan (IMITREX) 100 MG tablet [Pharmacy Med Name: SUMATRIPTAN 100MG TABLETS] 9 tablet 0     Sig: TAKE 1/2 TO 1 TABLET BY MOUTH AT ONSET OF HEADACHE       Triptans Refill Protocol Passed - 8/3/2019 12:59 AM        Passed - PCP or prescribing provider visit in past 12 months       Last office visit with prescriber/PCP: 2/7/2019 Porfirio Linton MD OR same dept: 2/7/2019 Porfirio Linton MD OR same specialty: 2/7/2019 Porfirio Linton MD  Last physical: 3/8/2019 Last MTM visit: Visit date not found   Next visit within 3 mo: Visit date not found  Next physical within 3 mo: Visit date not found  Prescriber OR PCP: Porfirio Linton MD  Last diagnosis associated with med order: 1. Intractable migraine with aura without status migrainosus  - SUMAtriptan (IMITREX) 100 MG tablet [Pharmacy Med Name: SUMATRIPTAN 100MG TABLETS]; TAKE 1/2 TO 1 TABLET BY MOUTH AT ONSET OF HEADACHE  Dispense: 9 tablet; Refill: 0    If protocol passes may refill for 12 months if within 3 months of last provider visit (or a total of 15 months).

## 2021-06-01 ENCOUNTER — RECORDS - HEALTHEAST (OUTPATIENT)
Dept: ADMINISTRATIVE | Facility: CLINIC | Age: 56
End: 2021-06-01

## 2021-06-01 VITALS — WEIGHT: 192.5 LBS | BODY MASS INDEX: 30.6 KG/M2

## 2021-06-01 VITALS — HEIGHT: 67 IN | WEIGHT: 191 LBS | BODY MASS INDEX: 29.98 KG/M2

## 2021-06-01 NOTE — TELEPHONE ENCOUNTER
Patient Returning Call  Reason for call:  Returning call  Information relayed to patient:    Relayed below information to patient.  Patient has additional questions:  Yes  If YES, what are your questions/concerns:    Patient did not seem to understand that below message was from Dr Linton and that her labs were normal.  Patient would not accept that her labs were normal.  Please reach out to patient and advise.  Okay to leave a detailed message?: Yes

## 2021-06-01 NOTE — PROGRESS NOTES
Preoperative Exam    Scheduled Procedure: Hernia Repair (Abdominal Wall)  Surgery Date:  10/17/19  Surgery Location: [facility], fax [phone]    Surgeon:  Dr. Mosqueda    Assessment/Plan:     1. Pre-op exam    - Comprehensive Metabolic Panel  - HM1(CBC and Differential)  - HM1 (CBC with Diff)    2. Abdominal wall hernia    - Comprehensive Metabolic Panel  - HM1(CBC and Differential)  - HM1 (CBC with Diff)    3. Visit for screening mammogram    - Mammo Screening Bilateral; Future    4. Migraine without aura and without status migrainosus, not intractable      5. Cervical Spine Stenosis, STABLE      6. Anxiety Disorder, STABLE      7. Major depressive disorder with single episode, STABLE      8. Celiac disease, STABLE      9. Flu vaccine need    - Influenza, Recombinant, Inj, Quadrivalent, PF, 18+YRS    10. Health care maintenance    - Td, Preservative Free (green label)        Surgical Procedure Risk: Low (reported cardiac risk generally < 1%)  Have you had prior anesthesia?: Yes  Have you or any family members had a previous anesthesia reaction: No  Do you or any family members have a history of a clotting or bleeding disorder?: Yes: mother with a h/o PE  Cardiac Risk Assessment: no increased risk for major cardiac complications    APPROVAL GIVEN to proceed with proposed procedure, without further diagnostic evaluation     Fasting labs    NO VIT E, FISH OIL OR ANTI-INFLAMMATORIES FOR ONE WEEK PRE-OP    Update Td    Flu vaccine           Functional Status: Independent  Patient plans to recover at home with family.     Subjective:      Keiry Mchugh is a 54 y.o. female who presents for a preoperative consultation.      Abdominal pain for a couple months, intermittent, sharp when in the liver area, sharp when in the hernia area and mostly when brushed up against, up to 6/10    All other systems reviewed and are negative, other than those listed in the HPI.    Pertinent History  Do you have difficulty  breathing or chest pain after walking up a flight of stairs: No  History of obstructive sleep apnea: No  Steroid use in the last 6 months: No  Frequent Aspirin/NSAID use: Yes: OCCAS IBUPROFEN USE  Prior Blood Transfusion: No  Prior Blood Transfusion Reaction: No  If for some reason prior to, during or after the procedure, if it is medically indicated, would you be willing to have a blood transfusion?:  There is no transfusion refusal.    Current Outpatient Medications   Medication Sig Dispense Refill     atenolol (TENORMIN) 50 MG tablet Take 50 mg by mouth daily as needed (flush of skin/migraines).              baclofen (LIORESAL) 10 MG tablet TAKE 1 TABLET(10 MG) BY MOUTH EVERY 6 HOURS AS NEEDED 30 tablet 2     BIOTIN ORAL Take 1 tablet by mouth daily.       cholecalciferol, vitamin D3, 5,000 unit Tab Take 5,000 Units by mouth daily.       co-enzyme Q-10 50 mg capsule Take 100 mg by mouth daily.       diphenhydrAMINE (BENADRYL) 50 MG capsule Take 1 cap 6 hrs before procedure, bring 2nd cap to procedure, may take immediately before procedure 2 capsule 0     FLUoxetine (PROZAC) 20 MG capsule Take 1 capsule (20 mg total) by mouth daily. 90 capsule 3     magnesium oxide (MAG-OX) 400 mg tablet Take 400 mg by mouth daily.              melatonin 10 mg Tab Take 2 tablets by mouth at bedtime.              SUMAtriptan (IMITREX) 100 MG tablet TAKE 1/2 TO 1 TABLET BY MOUTH AT ONSET OF HEADACHE 9 tablet 5     No current facility-administered medications for this visit.         Allergies   Allergen Reactions     Azithromycin Tinnitus     Ceftriaxone Hives     Iodine And Iodide Containing Products Hives     Levofloxacin      Annotation: IV Levaquin ---> PO Levaquin ---> large purple blotches.  Tolerates moxifloxacin (2014)       Other Allergy (See Comments) Hives     Contrast Media Ready-Box MISC, 08/04/2008.       Penicillins Hives     Tramadol Itching       Patient Active Problem List   Diagnosis     Symptomatic Post-artificial  Menopause State     Obesity     Anxiety Disorder NOS     Rosacea     Anemia     Peripheral Neuropathy     Esophageal Reflux     Fatigue     Cervical Radiculopathy     Vitamin D Deficiency     Cervical Spondylosis     Cervical Spine Stenosis     Cervicalgia     Common Migraine (Without Aura)     Major Depression, Single Episode     Classic Migraine (With Aura) With Intractable Migraine     Microscopic Hematuria     Diverticulosis     Elevated LFTs     Psychophysiological insomnia     Acute renal failure, unspecified acute renal failure type (H)     Celiac disease/sprue     Celiac disease       Past Medical History:   Diagnosis Date     Acute viral bronchitis      Anemia      Anxiety      Chronic kidney disease     Has 1 kidney     Dehydration      Depression     Seasonal     Diverticulitis      Erythema migrans (Lyme disease) 5/31/2016     Hiatal hernia      Migraines        Past Surgical History:   Procedure Laterality Date     COLONOSCOPY N/A 3/18/2019    Procedure: COLONOSCOPY;  Surgeon: Davis Mosqueda MD;  Location: [facility];  Service: General     CYSTOSCOPY N/A 7/16/2015    Procedure: CYSTOSCOPY;  Surgeon: Nate Horta MD;  Location: [facility];  Service:      HYSTERECTOMY       NEPHRECTOMY LIVING DONOR Left APRIL 2012     NV APPENDECTOMY      Description: Appendectomy;  Recorded: 11/20/2007;     NV CORRJ HALLUX VALGUS W/SESMDC W/DIST METAR OSTEOT      Description: Bunion Correction With Metatarsal Osteotomy Herman Procedure;  Proc Date: 07/16/2010;     NV LAP,BILIARY TRACT,UNLISTED N/A 3/19/2019    Procedure: BIOPSY, LIVER, LAPAROSCOPIC;  Surgeon: Davis Mosqueda MD;  Location: [facility] [place];  Service: General     NV LAP,SLING OPERATION      Description: Laparoscopic Sling Operation For Stress Incontinence;  Recorded: 02/17/2009;     NV LAP,SURG,COLECTOMY, PARTIAL, W/ANAST N/A 3/19/2019    Procedure: LAPAROSCOPIC SIGMOID COLON RESECTION;  Surgeon: Davis Mosqueda MD;   Location: [facility] [place];  Service: General     WA REMOVAL GALLBLADDER      Description: Cholecystectomy;  Recorded: 2009;     WA TOTAL ABDOM HYSTERECTOMY      Description: Hysterectomy;  Recorded: 2009;     SIGMOIDOSCOPY N/A 3/19/2019    Procedure: FLEXIBLE SIGMOIDOSCOPY;  Surgeon: Davis Mosqueda MD;  Location: [facility];  Service: General       Social History     Socioeconomic History     Marital status: Single     Spouse name: Not on file     Number of children: 2     Years of education: Not on file     Highest education level: Not on file   Occupational History     Occupation: Dental assistant     Employer: [employer]   Social Needs     Financial resource strain: Not on file     Food insecurity:     Worry: Not on file     Inability: Not on file     Transportation needs:     Medical: Not on file     Non-medical: Not on file   Tobacco Use     Smoking status: Former Smoker     Last attempt to quit: 1999     Years since quittin.7     Smokeless tobacco: Never Used   Substance and Sexual Activity     Alcohol use: No     Drug use: No     Sexual activity: Not Currently     Partners: Male   Lifestyle     Physical activity:     Days per week: Not on file     Minutes per session: Not on file     Stress: Not on file   Relationships     Social connections:     Talks on phone: Not on file     Gets together: Not on file     Attends Druze service: Not on file     Active member of club or organization: Not on file     Attends meetings of clubs or organizations: Not on file     Relationship status: Not on file     Intimate partner violence:     Fear of current or ex partner: Not on file     Emotionally abused: Not on file     Physically abused: Not on file     Forced sexual activity: Not on file   Other Topics Concern     Not on file   Social History Narrative     Not on file       Patient Care Team:  Porfirio Linton MD as PCP - General  Porfirio Linton MD as Assigned  "PCP       12 SYSTEM REVIEW:  Sweating  Hotflashes  Headaches  Ringing in the right ear and some hearing loss  Pain in the eyes, blind spots and blurred vision with migraines.  Neck pain  Always thirsty and increase in hunger  Urinating more than once a night.  Sleep disturbance      Objective:     Vitals:    09/30/19 0853   BP: 124/75   Pulse: 77   Temp: 98.4  F (36.9  C)   TempSrc: Oral   SpO2: 99%   Weight: 199 lb 8 oz (90.5 kg)   Height: 5' 6.5\" (1.689 m)     Physical Exam:    GEN: FEMALE, ALERT, ORIENTED TIMES THREE, NAD  HEENT:  TM'S NL                  THROAT CL                  PERRL  NECK: SUPPLE, WITHOUT ADENOPATHY, THYROMEGALY, OR CAROTID BRUIT  LUNGS: CTA  COR: RRR WITHOUT MURMUR  ABD: SOFT, POSITIVE BOWEL SOUNDS, NONTX, WITHOUT MASS OR HSM  EXT:  NO CYANOSIS, CLUBBING OR EDEMA  SKIN:  WITHOUT ATYPICAL APPEARING LESIONS.      Labs:  Labs pending at this time.  Results will be reviewed when available.\  Results for orders placed or performed during the hospital encounter of 03/19/19   Basic Metabolic Panel   Result Value Ref Range    Sodium 138 136 - 145 mmol/L    Potassium 4.2 3.5 - 5.0 mmol/L    Chloride 103 98 - 107 mmol/L    CO2 30 22 - 31 mmol/L    Anion Gap, Calculation 5 5 - 18 mmol/L    Glucose 100 70 - 125 mg/dL    Calcium 9.4 8.5 - 10.5 mg/dL    BUN 9 8 - 22 mg/dL    Creatinine 0.90 0.60 - 1.10 mg/dL    GFR MDRD Af Amer >60 >60 mL/min/1.73m2    GFR MDRD Non Af Amer >60 >60 mL/min/1.73m2           Immunization History   Administered Date(s) Administered     DT (pediatric) 01/01/2000     Hep A, Adult IM (19yr & older) 11/12/2007, 03/23/2009     Hep A, historic 11/12/2007, 03/23/2009     Hep B, historic 04/01/1993     Influenza, inj, historic,unspecified 12/20/2010, 10/20/2011     Influenza,seasonal,quad inj =/> 6months 09/14/2016     MMR 05/03/1990, 05/05/1993     Td, Adult, Absorbed 05/05/1993     Tdap 07/14/2010     Electrocardiogram Perform and Read   Order: [id number]   Status:  Final result "   Visible to patient:  Yes (MyChart) Next appt:  10/30/2019 at 10:00 AM in General Surgery (Davis Mosqueda MD) Dx:  Pre-op exam; Diverticulitis of colon     Ref Range & Units 3/8/19 1021 1/27/18 2309    SYSTOLIC BLOOD PRESSURE mmHg      DIASTOLIC BLOOD PRESSURE mmHg      VENTRICULAR RATE BPM 74  68     ATRIAL RATE BPM 74  68     P-R INTERVAL ms 156  184     QRS DURATION ms 76  80     Q-T INTERVAL ms 376  404     QTC CALCULATION (BEZET) ms 417  429     P Axis degrees 68  60     R AXIS degrees 36  31     T AXIS degrees 49  28     MUSE DIAGNOSIS  Normal sinus rhythm   Normal ECG   When compared with ECG of 27-JAN-2018 23:09,   No significant change was found   Confirmed by TATIANA NORMAN MD LOC:[facility] ([id number]) on 3/8/2019 3:49:03 PM  Normal sinus rhythm   Low voltage QRS   Borderline ECG   When compared with ECG of 19-OCT-2017 12:12,   Vent. rate has decreased BY  37 BPM   Confirmed by JD LÓPEZ MD LOC:[facility] ([id number]) on 1/28/2018 6:40:07 PM                  Electronically signed by Porfirio Linton MD 09/30/19 8:55 AM

## 2021-06-01 NOTE — PROGRESS NOTES
Received Short term disability paperwork from [place]. It is in Dr. Mosqueda's folder to be filled out and signed.     Muriel Tobias CMA ([facility])

## 2021-06-01 NOTE — TELEPHONE ENCOUNTER
Left message to call back for: Results  Information to relay to patient:  LMTCB. Please relay Dr. Linton's message below to patient and ask her what questions she had for Dr. Linton regarding her lab results.       Hi Samantha:  Your hemoglobin is back into the NORMAL range.  The liver enzymes have also returned to NORMAL.  The potassium is just slightly elevated, probably artifactual.  Please repeat your potassium in one week in a well hydrated state.  I will place a follow up lab order so just set up a lab appt.  The remaining labs are NORMAL.    Porfirio Linton MD

## 2021-06-01 NOTE — PATIENT INSTRUCTIONS - HE
Fasting labs    NO VIT E, FISH OIL OR ANTI-INFLAMMATORIES FOR ONE WEEK PRE-OP    Update Td    Flu vaccine

## 2021-06-01 NOTE — TELEPHONE ENCOUNTER
Left message to call back for: Results  Information to relay to patient:  LMTCB. Please ask patient what questions she has for Dr. Linton regarding her lab results.

## 2021-06-01 NOTE — PROGRESS NOTES
GENERAL SURGICAL CONSULTATION    I was requested by Porfirio Linton MD to consult on this pt to evaluate them for a bulge in her abdomin    HPI:  This is a 54 y.o. female here today with a swollen RLQ mass.  She is s/p a sigmoid colon resection and had a post op infection of the sub cut tissues.  She has noticed new swelling to the Right of the lower midline incision that is painful, especially to pressure.  Since our last clinic visit the patient had a CT scan which clearly demonstrates a lower midline ventral hernia.    Allergies:Azithromycin; Ceftriaxone; Iodine and iodide containing products; Levofloxacin; Other allergy (see comments); Penicillins; and Tramadol    Past Medical History:   Diagnosis Date     Acute viral bronchitis      Anemia      Anxiety      Chronic kidney disease     Has 1 kidney     Dehydration      Depression     Seasonal     Diverticulitis      Erythema migrans (Lyme disease) 5/31/2016     Hiatal hernia      Migraines        Past Surgical History:   Procedure Laterality Date     COLONOSCOPY N/A 3/18/2019    Procedure: COLONOSCOPY;  Surgeon: Davis Mosqueda MD;  Location: [facility];  Service: General     CYSTOSCOPY N/A 7/16/2015    Procedure: CYSTOSCOPY;  Surgeon: Nate Horta MD;  Location: [facility];  Service:      HYSTERECTOMY       NEPHRECTOMY LIVING DONOR Left APRIL 2012     PA APPENDECTOMY      Description: Appendectomy;  Recorded: 11/20/2007;     PA CORRJ HALLUX VALGUS W/SESMDC W/DIST METAR OSTEOT      Description: Bunion Correction With Metatarsal Osteotomy Herman Procedure;  Proc Date: 07/16/2010;     PA LAP,BILIARY TRACT,UNLISTED N/A 3/19/2019    Procedure: BIOPSY, LIVER, LAPAROSCOPIC;  Surgeon: Davsi Mosqueda MD;  [place] [facility] [place];  Service: General     PA LAP,SLING OPERATION      Description: Laparoscopic Sling Operation For Stress Incontinence;  Recorded: 02/17/2009;     PA LAP,SURG,COLECTOMY, PARTIAL, W/ANAST N/A 3/19/2019     Procedure: LAPAROSCOPIC SIGMOID COLON RESECTION;  Surgeon: Davis Mosqueda MD;  Location: [facility];  Service: General     MS REMOVAL GALLBLADDER      Description: Cholecystectomy;  Recorded: 02/17/2009;     MS TOTAL ABDOM HYSTERECTOMY      Description: Hysterectomy;  Recorded: 02/17/2009;     SIGMOIDOSCOPY N/A 3/19/2019    Procedure: FLEXIBLE SIGMOIDOSCOPY;  Surgeon: Davis Mosqueda MD;  Location: [facility];  Service: General       CURRENT MEDS:  Current Outpatient Medications   Medication Sig Dispense Refill     atenolol (TENORMIN) 50 MG tablet Take 50 mg by mouth daily as needed (flush of skin/migraines).              baclofen (LIORESAL) 10 MG tablet TAKE 1 TABLET(10 MG) BY MOUTH EVERY 6 HOURS AS NEEDED 30 tablet 2     BIOTIN ORAL Take 1 tablet by mouth daily.       cholecalciferol, vitamin D3, 5,000 unit Tab Take 5,000 Units by mouth daily.       co-enzyme Q-10 50 mg capsule Take 100 mg by mouth daily.       diphenhydrAMINE (BENADRYL) 50 MG capsule Take 1 cap 6 hrs before procedure, bring 2nd cap to procedure, may take immediately before procedure 2 capsule 0     michael primrose/linoleic/gamoleni (PRIMROSE OIL ORAL) Take by mouth.       FLUoxetine (PROZAC) 20 MG capsule Take 1 capsule (20 mg total) by mouth daily. 90 capsule 3     hydrOXYzine pamoate (VISTARIL) 50 MG capsule Take 1 capsule (50 mg total) by mouth every 4 (four) hours as needed for itching (augment relief of pain). 20 capsule 0     magnesium oxide (MAG-OX) 400 mg tablet Take 400 mg by mouth daily.              melatonin 10 mg Tab Take 1 tablet by mouth at bedtime.              mv,Ca,min-FA-herbal no.157 (MENOPAUSE SUPPLEMENT) 400 mcg Tab Take 2 tablets by oral route once daily       SUMAtriptan (IMITREX) 100 MG tablet TAKE 1/2 TO 1 TABLET BY MOUTH AT ONSET OF HEADACHE 9 tablet 5     TURMERIC ROOT EXTRACT ORAL Take 2,000 mg by mouth daily.       UNABLE TO FIND 2 tablets daily. Med Name: ZOILA Ambriz current  "facility-administered medications for this visit.        Family History   Problem Relation Age of Onset     Hashimoto's thyroiditis Mother      Breast cancer Mother 50     Graves' disease Sister      Hypothyroidism Maternal Aunt      Breast cancer Maternal Aunt 50     Hashimoto's thyroiditis Maternal Grandfather      Hypothyroidism Sister      Hypothyroidism Maternal Aunt      Hashimoto's thyroiditis Maternal Aunt      Anesthesia problems Neg Hx      Family history is not pertinent to this patients Chief Complaint.     reports that she quit smoking about 20 years ago. She has never used smokeless tobacco. She reports that she does not drink alcohol or use drugs.    Review of Systems -   10 point Review of systems is negative except for; as mentioned above in HPI and PMHx    /80 (Patient Site: Right Arm, Patient Position: Sitting, Cuff Size: Adult Regular)   Pulse 84   Ht 5' 7\" (1.702 m)   Wt 196 lb 12.8 oz (89.3 kg)   SpO2 99%   BMI 30.82 kg/m    Body mass index is 30.82 kg/m .    EXAM:  GENERAL: Well developed female  HEENT: EOMI, Anicteric Sclera, Moist Mucous Membranes,  In Mouth the pt does not have redness or bleeding gums  CARDIOVASCULAR: RRR w/out murmur   CHEST/LUNG: Clear to Auscultation  ABDOMEN:  Non tender to palpation, +BS, bulge in the lower ab, Right of the midline.  MUSCULOSKELETAL:  No deformities with good range of motion in all extremities  NEURO: She is ambulatory with good strength in both legs.  HEME/LYMPH: No Cervical Adenopathy or tenderness.     IMAGES:  EXAM: CT ABDOMEN PELVIS W ORAL WO IV CONTRAST  LOCATION: [facility]  DATE/TIME: 9/4/2019 5:14 PM     INDICATION: Swollen right lower quadrant mass post sigmoid colon resection with postop infection.  COMPARISON: 01/31/2019.  TECHNIQUE: Helical thin-section CT scan of the abdomen and pelvis was performed without IV contrast. Multiplanar reformats were obtained. Dose reduction techniques were used.  CONTRAST: " None.     FINDINGS:   LUNG BASES: No change.     ABDOMEN: There has been considerable increase in size of infraumbilical anterior abdominal wall hernia with nonobstructed small bowel within it. Fascial defect approximately 5.3 cm. Tiny fat-containing paraumbilical hernia. Cholecystectomy. Noncontrast   images of liver, spleen, adrenal glands, right kidney, and pancreas unchanged and unremarkable. Small posterior gastric diverticulum thought to represent an adenoma on prior stable. No aortic aneurysm. No adenopathy. Multiple surgical clips as on prior   within the left upper quadrant and retroperitoneum.     PELVIS: Anterior abdominal wall hernia discussed above. Interval partial sigmoid colon resection. Diverticulosis without diverticulitis. Appendectomy. Hysterectomy. Pelvic organs otherwise unremarkable.     MUSCULOSKELETAL: Degenerative disease. Potential nerve root impingement.     IMPRESSION:   CONCLUSION:   1.  Considerable increase in size of infraumbilical anterior abdominal wall hernia with nonobstructed small bowel within it.  2.  Interval partial sigmoid colon resection. Diverticulosis without diverticulitis.  3.  Remainder stable    Assessment/Plan:  A very pleasant 54-year-old patient status post a colon resection who has a ventral hernia following her surgery.  This patient would benefit from a robotic ventral hernia repair where we can suture the defect closed and reinforce the closure with a piece of mesh..      Davis Mosqueda MD  [facility]  [phone]

## 2021-06-01 NOTE — TELEPHONE ENCOUNTER
Who is calling:  Patient  Reason for Call:  Calling with concerns about her recent lab results from 9/30. Patient has read the message from provider but is still concerned with the elevated numbers she sees on her my chart. Patient was vague regarding her concerns and would not elaborate further. Please advise!  Date of last appointment with primary care: 9/30  Okay to leave a detailed message: Yes

## 2021-06-02 ENCOUNTER — COMMUNICATION - HEALTHEAST (OUTPATIENT)
Dept: FAMILY MEDICINE | Facility: CLINIC | Age: 56
End: 2021-06-02

## 2021-06-02 ENCOUNTER — RECORDS - HEALTHEAST (OUTPATIENT)
Dept: ADMINISTRATIVE | Facility: CLINIC | Age: 56
End: 2021-06-02

## 2021-06-02 VITALS — HEIGHT: 67 IN | BODY MASS INDEX: 29.98 KG/M2 | WEIGHT: 191 LBS

## 2021-06-02 VITALS — BODY MASS INDEX: 30.48 KG/M2 | WEIGHT: 191.7 LBS

## 2021-06-02 VITALS — BODY MASS INDEX: 29.98 KG/M2 | WEIGHT: 191 LBS | HEIGHT: 67 IN

## 2021-06-02 VITALS — WEIGHT: 199.3 LBS | HEIGHT: 67 IN | BODY MASS INDEX: 31.28 KG/M2

## 2021-06-02 VITALS — BODY MASS INDEX: 32.05 KG/M2 | WEIGHT: 201.6 LBS

## 2021-06-02 VITALS — BODY MASS INDEX: 31.86 KG/M2 | WEIGHT: 203 LBS | HEIGHT: 67 IN

## 2021-06-02 VITALS — BODY MASS INDEX: 31.64 KG/M2 | WEIGHT: 199 LBS

## 2021-06-02 VITALS — WEIGHT: 203.4 LBS | BODY MASS INDEX: 31.92 KG/M2 | HEIGHT: 67 IN

## 2021-06-02 VITALS — BODY MASS INDEX: 30.53 KG/M2 | WEIGHT: 192 LBS

## 2021-06-02 VITALS — BODY MASS INDEX: 32.33 KG/M2 | HEIGHT: 67 IN | WEIGHT: 206 LBS

## 2021-06-02 VITALS — WEIGHT: 194.6 LBS | BODY MASS INDEX: 30.48 KG/M2

## 2021-06-02 NOTE — TELEPHONE ENCOUNTER
Left message for pt to call back - please relay message from provider -          Is she out of the cough medicine already?

## 2021-06-02 NOTE — TELEPHONE ENCOUNTER
----- Message from Jatinder Dang CMA sent at 10/23/2019 11:28 AM CDT -----  Pt agrees to the plan for potassium, she also wanted to let you know she still has temp. Last night it was 100.2, today it is 99.2 and starting to raise again. Highest has been around 102. Worse at night. She has only been on the medication for a little over 24 hours. Wondering if she should wait longer for the medication to work or if she should be doing something else.

## 2021-06-02 NOTE — TELEPHONE ENCOUNTER
Last Office Visit  10/21/2019 Patrick Hall MD  Notes:  2. Cough  Exam findings were discussed   Plan:   - albuterol (PROAIR HFA;PROVENTIL HFA;VENTOLIN HFA) 90 mcg/actuation inhaler; Inhale 2 puffs every 6 (six) hours as needed for wheezing.  Dispense: 1 each; Refill: 0  - doxycycline (MONODOX) 100 MG capsule; Take 1 capsule (100 mg total) by mouth 2 (two) times a day for 10 days.  Dispense: 20 capsule; Refill: 0  - codeine-guaiFENesin (CODEINE-GUAIFENESIN)  mg/5 mL liquid; Take 5-10 mL by mouth 4 (four) times a day as needed for cough.  Dispense: 200 mL; Refill: 0          Last Filled:  10/21/19     Next OV:  11/15/19 Porfirio Linton MD          Medication teed up for provider signature

## 2021-06-02 NOTE — TELEPHONE ENCOUNTER
Who is calling:  Patient   Reason for Call:  Patient stated that she would like Porfirio Linton MD to put in an order for HM1 (CBC and Differential) recheck. Patient stated that she is concerned because the neutrophils, lymphocytes, and neutrophils absolute was elevated. Patient stated that she had asked the lab to draw an extra tube for this and is waiting for an order to process.   Date of last appointment with primary care: 9/30/19  Okay to leave a detailed message: Yes  [phone]

## 2021-06-02 NOTE — TELEPHONE ENCOUNTER
Left message to call back for: Return call  Information to relay to patient:    Received message from triage below.  Unclear what is wanted?    Message says cough is not as deep.  Is it productive?  Is she still running a fever? Any other sx?    Which cough medicine?    Does she need more?    What is patient requesting?

## 2021-06-02 NOTE — PROGRESS NOTES
Evin paperwork has been filled out, signed by Dr. Mosqueda and faxed to [phone]. Will be sent to HIM to be scanned into the chart.    Muriel Tobias CMA ([facility])

## 2021-06-02 NOTE — TELEPHONE ENCOUNTER
New Appointment Needed  What is the reason for the visit:    Pre-Op Appt Request  When is the surgery? :  11/22  Where is the surgery?:   [facility]   Who is the surgeon? :  Dr. Frederick  What type of surgery is being done?: Hernia Repair  Provider Preference: PCP only  How soon do you need to be seen?: it was advised to have pre op a week before surgery. There was no times to book. Please call patient with availability.  Waitlist offered?: No  Okay to leave a detailed message:  Yes

## 2021-06-02 NOTE — TELEPHONE ENCOUNTER
Left message explaining recent labs.  The potassium was 5.2 and I have already placed an order for a recheck of her potassium before her upcoming surgery.  I re-iterated that the kidney function itself is normal with a very slight elevation in the BUN, likely on a volume depletion basis.

## 2021-06-02 NOTE — TELEPHONE ENCOUNTER
Left message to call back for: Results  Information to relay to patient:  Would like to clarify lab results for patient.  Please ask what she has questions about or a good number for us to call her back at.

## 2021-06-02 NOTE — TELEPHONE ENCOUNTER
Patient's surgery has been postponed due to cough for the second time. Now her surgery is scheduling for the end of Nov.  The patient is going to make an appointment to come in a week before surgery to update pre-op.  She is asking to refill this ASAP.  Each cough makes her hernia more painful.  Writer suggested she have a large pillow to hug into herself when she coughs to help see if that helps  Patient did not want to talk to RN at this time.  .  She states she has an abd belt also from her last repair that she is going to use to see if that relieves the abd pain.   Patient continues to take the antibiotic and use the inhaler.  Please review.  Controlled Substance Refill Request  Medication Name:   Requested Prescriptions     Pending Prescriptions Disp Refills     codeine-guaiFENesin (CODEINE-GUAIFENESIN)  mg/5 mL liquid 200 mL 0     Sig: Take 5-10 mL by mouth 4 (four) times a day as needed for cough.     Date Last Fill: 10/21/19  Pharmacy: [facility]      Submit electronically to pharmacy  Controlled Substance Agreement Date Scanned:   Encounter-Level CSA Scan Date:    There are no encounter-level csa scan date.       Last office visit with prescriber/PCP: 2/7/2019 Porfirio Linton MD OR same dept: 2/7/2019 Porfirio Linton MD OR same specialty: 10/21/2019 Patrick Hall MD  Last physical: 9/30/2019 Last MTM visit: Visit date not found

## 2021-06-02 NOTE — TELEPHONE ENCOUNTER
RN cannot approve Refill Request    RN can NOT refill this medication med is not covered by policy/route to provider. Last office visit: 2/7/2019 Porfirio Linton MD Last Physical: 9/30/2019 Last MTM visit: Visit date not found Last visit same specialty: 2/7/2019 Porfirio Linton MD.  Next visit within 3 mo: Visit date not found  Next physical within 3 mo: Visit date not found      Jeanette Alfonso, [facility] Triage/Med Refill 10/4/2019    Requested Prescriptions   Pending Prescriptions Disp Refills     baclofen (LIORESAL) 10 MG tablet [Pharmacy Med Name: BACLOFEN 10MG TABLETS] 30 tablet 0     Sig: TAKE 1 TABLET(10 MG) BY MOUTH EVERY 6 HOURS AS NEEDED       There is no refill protocol information for this order

## 2021-06-02 NOTE — TELEPHONE ENCOUNTER
Patient Returning Call  Reason for call:  Returning clinic call.  Information relayed to patient:  Read to the patient note from 10/1/19 .  Patient has additional questions:  Yes  If YES, what are your questions/concerns:  The patient is very concerned with the abnormal results of the kidney tests, as the patient has only one kidney, wishes to talk with the PCP, and ;is asking about getting all the labs rechecked on 10/07/19 when she come in for the potassium recheck.    Can leave detailed message on phone if unable to answer please call [phone]    Okay'to leave a detailed message?: Yes

## 2021-06-02 NOTE — TELEPHONE ENCOUNTER
Call back from pt       Yes is out of the cough medication as of last night      Cough is starting to get better - not as deep - but still bothersome      Nov 22 surgery has been rescheduled  - cough has to have been cleared before then      OK to leave message          Maikel Bourne, RN   Triage and Medication Refills

## 2021-06-02 NOTE — TELEPHONE ENCOUNTER
Who is calling:  Patient    Reason for Call:    Rx request for cough medicine:  codeine-guaiFENesin (CODEINE-GUAIFENESIN)  mg/5 mL liquid 200 mL 0 10/21/2019     Sig - Route: Take 5-10 mL by mouth 4 (four) times a day as needed for cough.      Patient states she getting better, however symptoms still persistent.  Patient is out of this med and is requesting refill.      Date of last appointment with primary care: 10/21/19    Okay to leave a detailed message: Yes

## 2021-06-02 NOTE — PROGRESS NOTES
Assessment/Plan:        1. Serum potassium elevated  - Basic Metabolic Panel; Future  - Basic Metabolic Panel    2. Cough  Exam findings were discussed   Plan:   - albuterol (PROAIR HFA;PROVENTIL HFA;VENTOLIN HFA) 90 mcg/actuation inhaler; Inhale 2 puffs every 6 (six) hours as needed for wheezing.  Dispense: 1 each; Refill: 0  - doxycycline (MONODOX) 100 MG capsule; Take 1 capsule (100 mg total) by mouth 2 (two) times a day for 10 days.  Dispense: 20 capsule; Refill: 0  - codeine-guaiFENesin (CODEINE-GUAIFENESIN)  mg/5 mL liquid; Take 5-10 mL by mouth 4 (four) times a day as needed for cough.  Dispense: 200 mL; Refill: 0        At the conclusion of the encounter the plan of care, disposition and all questions were answered and reviewed, and the patient acknowledged understanding and was involved in the decision making regarding the overall care plan.           Subjective:    Patient ID:   Keiry Mchugh is a 54 y.o. female presenting with fever, congestion and cough x8 days.  She has been getting out of breath easily, and every time she coughs its hurting her stomach due to having a hernia that needs to be repaired.  Her lab work form the recent preop showed mildly elevated potassium and was said to repeat it.      She reports to having had a flu last year and wondering if she has a flu now.  She has been feeling feverish but not running any fever.  She is asking something for the cough, been taking Robitussin and NyQuil over-the-counter.  She states that has used albuterol in the past helping her coughing.         Review of Systems  Allergy: reviewed  General : negative  A complete 5 point review of systems was obtained and is negative other than what is stated in the HPI.       The following patient's history were reviewed and updated as appropriate:   She  has a past medical history of Anemia, Anxiety, Celiac disease, Chronic kidney disease, Depression, Diverticulitis, GERD (gastroesophageal reflux  disease), Hiatal hernia, Migraines, and Peripheral neuropathy..      Outpatient Encounter Medications as of 10/21/2019   Medication Sig Dispense Refill     atenolol (TENORMIN) 50 MG tablet Take 50 mg by mouth daily as needed (flush of skin/migraines).              baclofen (LIORESAL) 10 MG tablet TAKE 1 TABLET(10 MG) BY MOUTH EVERY 6 HOURS AS NEEDED 30 tablet 2     BIOTIN ORAL Take 1 tablet by mouth daily.       cholecalciferol, vitamin D3, 5,000 unit Tab Take 5,000 Units by mouth daily.       co-enzyme Q-10 50 mg capsule Take 100 mg by mouth daily.       diphenhydrAMINE (BENADRYL) 50 MG capsule Take 1 cap 6 hrs before procedure, bring 2nd cap to procedure, may take immediately before procedure (Patient taking differently: Take 25 mg by mouth at bedtime as needed. Take 1 cap 6 hrs before procedure, bring 2nd cap to procedure, may take immediately before procedure      ) 2 capsule 0     FLUoxetine (PROZAC) 20 MG capsule Take 1 capsule (20 mg total) by mouth daily. 90 capsule 3     HEMP OIL OR EXTRACT OR OTHER CBD CANNABINOID, NOT MEDICAL CANNABIS, 1.5 drops.       magnesium oxide (MAG-OX) 400 mg tablet Take 400 mg by mouth daily.              melatonin 10 mg Tab Take 2 tablets by mouth at bedtime.              SUMAtriptan (IMITREX) 100 MG tablet TAKE 1/2 TO 1 TABLET BY MOUTH AT ONSET OF HEADACHE 9 tablet 5     No facility-administered encounter medications on file as of 10/21/2019.          Objective:   /79 (Patient Site: Right Arm, Patient Position: Sitting, Cuff Size: Adult Large)   Pulse 82   Temp 99  F (37.2  C)   Wt 201 lb (91.2 kg)   SpO2 98%   BMI 31.96 kg/m        Physical Exam    General Appearance:    Alert, cooperative, no distress   Head:    Normocephalic, Sinus: Non tender     Eyes:    PERRL, conjunctiva/corneas clear, EOM's intact, both eyes   Throat:   mucous membranes moist, pharynx normal without lesions   Neck:   Supple, symmetrical, trachea midline, no adenopathy;     thyroid:  no  enlargement/tenderness/nodules;    Lungs:     clear to auscultation, no wheezes, rales or rhonchi, symmetric air entry    Chest Wall:    No tenderness or deformity    Heart:    Regular rate and rhythm, S1 and S2 normal, no murmur, rub   or gallop   Skin:   Skin color, texture, turgor normal, no rashes or lesions

## 2021-06-03 ENCOUNTER — AMBULATORY - HEALTHEAST (OUTPATIENT)
Dept: FAMILY MEDICINE | Facility: CLINIC | Age: 56
End: 2021-06-03

## 2021-06-03 VITALS
HEIGHT: 67 IN | HEART RATE: 77 BPM | WEIGHT: 199.5 LBS | OXYGEN SATURATION: 99 % | BODY MASS INDEX: 31.31 KG/M2 | TEMPERATURE: 98.4 F | DIASTOLIC BLOOD PRESSURE: 75 MMHG | SYSTOLIC BLOOD PRESSURE: 124 MMHG

## 2021-06-03 VITALS
DIASTOLIC BLOOD PRESSURE: 88 MMHG | WEIGHT: 201.2 LBS | SYSTOLIC BLOOD PRESSURE: 132 MMHG | HEIGHT: 67 IN | RESPIRATION RATE: 18 BRPM | HEART RATE: 94 BPM | TEMPERATURE: 98.7 F | OXYGEN SATURATION: 98 % | BODY MASS INDEX: 31.58 KG/M2

## 2021-06-03 VITALS — BODY MASS INDEX: 30.89 KG/M2 | WEIGHT: 197.2 LBS

## 2021-06-03 VITALS
DIASTOLIC BLOOD PRESSURE: 84 MMHG | RESPIRATION RATE: 24 BRPM | SYSTOLIC BLOOD PRESSURE: 137 MMHG | HEIGHT: 67 IN | WEIGHT: 208.2 LBS | HEART RATE: 85 BPM | BODY MASS INDEX: 32.68 KG/M2 | TEMPERATURE: 98.6 F

## 2021-06-03 VITALS
TEMPERATURE: 99 F | OXYGEN SATURATION: 98 % | SYSTOLIC BLOOD PRESSURE: 128 MMHG | DIASTOLIC BLOOD PRESSURE: 79 MMHG | WEIGHT: 201 LBS | BODY MASS INDEX: 31.96 KG/M2 | HEART RATE: 82 BPM

## 2021-06-03 VITALS
DIASTOLIC BLOOD PRESSURE: 80 MMHG | OXYGEN SATURATION: 99 % | SYSTOLIC BLOOD PRESSURE: 120 MMHG | BODY MASS INDEX: 30.89 KG/M2 | HEIGHT: 67 IN | HEART RATE: 84 BPM | WEIGHT: 196.8 LBS

## 2021-06-03 VITALS — BODY MASS INDEX: 29.98 KG/M2 | WEIGHT: 191 LBS | HEIGHT: 67 IN

## 2021-06-03 DIAGNOSIS — J34.89 SORE IN NOSE: ICD-10-CM

## 2021-06-03 NOTE — TELEPHONE ENCOUNTER
Patient Returning Call  Reason for call:  Patient is returning call.  Information relayed to patient:  Message below asking if there is paperwork needed by her employer.  Patient has additional questions:  Yes  If YES, what are your questions/concerns:  Patient stated there are no forms that are needed.     Patient stated her short term disability company, [employer], is requesting a letter be written by her provider because she is beginning short term disability sooner than her surgery date.  Patient stated the start date in the letter should be listed as Monday, 11/18/2019 and should also state that she will continue to be on short term disability until she is cleared by her surgeon to return back to work following her surgery.     Please see claim number and return fax number in the original message below.  Okay to leave a detailed message?: Yes

## 2021-06-03 NOTE — ANESTHESIA CARE TRANSFER NOTE
Last vitals:   Vitals:    11/22/19 1537   BP: 133/80   Pulse: 80   Resp: 20   Temp: 36  C (96.8  F)   SpO2: 100%     Patient's level of consciousness is awake and drowsy  Spontaneous respirations: yes  Maintains airway independently: yes  Dentition unchanged: yes  Oropharynx: oropharynx clear of all foreign objects    QCDR Measures:  ASA# 20 - Surgical Safety Checklist: WHO surgical safety checklist completed prior to induction    PQRS# 430 - Adult PONV Prevention: 4558F - Pt received => 2 anti-emetic agents (different classes) preop & intraop  ASA# 8 - Peds PONV Prevention: 4558F - Pt received => 2 anti-emetic agents (different classes) preop & intraop  PQRS# 424 - Ericka-op Temp Management: 4559F - At least one body temp DOCUMENTED => 35.5C or 95.9F within required timeframe  PQRS# 426 - PACU Transfer Protocol: - Transfer of care checklist used  ASA# 14 - Acute Post-op Pain: ASA14B - Patient did NOT experience pain >= 7 out of 10

## 2021-06-03 NOTE — ANESTHESIA POSTPROCEDURE EVALUATION
Patient: Keiry Mchugh  ROBOTIC VENTRAL HERNIA REPAIR  Anesthesia type: general    Patient location: PACU  Last vitals:   Vitals Value Taken Time   /76 11/22/2019  5:03 PM   Temp 36.6  C (97.8  F) 11/22/2019  5:03 PM   Pulse 86 11/22/2019  5:03 PM   Resp 17 11/22/2019  5:03 PM   SpO2 97 % 11/22/2019  5:03 PM     Post vital signs: stable  Level of consciousness: awake and responds to simple questions  Post-anesthesia pain: pain controlled  Post-anesthesia nausea and vomiting: no  Pulmonary: unassisted  Cardiovascular: stable  Hydration: adequate  Anesthetic events: no    QCDR Measures:  ASA# 11 - Ericka-op Cardiac Arrest: ASA11B - Patient did NOT experience unanticipated cardiac arrest  ASA# 12 - Ericka-op Mortality Rate: ASA12B - Patient did NOT die  ASA# 13 - PACU Re-Intubation Rate: ASA13B - Patient did NOT require a new airway mgmt  ASA# 10 - Composite Anes Safety: ASA10A - No serious adverse event    Additional Notes:

## 2021-06-03 NOTE — PROGRESS NOTES
Preoperative Exam    Scheduled Procedure: Hernia Repair  Surgery Date:  11/22/19  Surgery Location: [facility], fax [phone]    Surgeon:  Dr. Mosqueda    Assessment/Plan:     1. Pre-operative examination    2. Abdominal wall hernia    3. Cough  Normal chest xray on 11-11-19; cough ? Related to diaphragmatic irritation, TRIAL OF TESSALON PERLES  - omeprazole (PRILOSEC) 20 MG capsule; Take 1 capsule (20 mg total) by mouth 2 (two) times a day before meals.  Dispense: 60 capsule; Refill: 0    4. Gastroesophageal reflux disease without esophagitis  On two times a day omperoazole    5. Major depressive disorder with single episode, remission status unspecified  stable    6. Migraine without aura and without status migrainosus, not intractable  On chronic beta-blocker    7. Donor of kidney for transplant  Stable renal function       Surgical Procedure Risk: Intermediate (reported cardiac risk generally 1-5%)  Have you had prior anesthesia?: Yes  Have you or any family members had a previous anesthesia reaction:  No  Do you or any family members have a history of a clotting or bleeding disorder?: Yes: mother once had a PE in the per-operative period  Cardiac Risk Assessment: no increased risk for major cardiac complications    APPROVAL GIVEN to proceed with proposed procedure, without further diagnostic evaluation      Functional Status: Independent  Patient plans to recover at home with family.     Subjective:      Keiry Mchugh is a 54 y.o. female who presents for a preoperative consultation.      All other systems reviewed and are negative, other than those listed in the HPI.    Pertinent History  Do you have difficulty breathing or chest pain after walking up a flight of stairs: No  (just slight now if coughing)  History of obstructive sleep apnea: No  Steroid use in the last 6 months: Yes:  Last dose 11-14-19  Frequent Aspirin/NSAID use: No  Prior Blood Transfusion: No  Prior Blood Transfusion Reaction:  No  If for some reason prior to, during or after the procedure, if it is medically indicated, would you be willing to have a blood transfusion?:  There is no transfusion refusal.    Current Outpatient Medications   Medication Sig Dispense Refill     albuterol (PROAIR HFA;PROVENTIL HFA;VENTOLIN HFA) 90 mcg/actuation inhaler Inhale 2 puffs every 6 (six) hours as needed for wheezing. 1 each 0     atenolol (TENORMIN) 50 MG tablet Take 50 mg by mouth daily as needed (flush of skin/migraines).              baclofen (LIORESAL) 10 MG tablet TAKE 1 TABLET(10 MG) BY MOUTH EVERY 6 HOURS AS NEEDED 30 tablet 2     BIOTIN ORAL Take 1 tablet by mouth daily.       cholecalciferol, vitamin D3, 5,000 unit Tab Take 5,000 Units by mouth daily.       co-enzyme Q-10 50 mg capsule Take 100 mg by mouth daily.       diphenhydrAMINE (BENADRYL) 50 MG capsule Take 1 cap 6 hrs before procedure, bring 2nd cap to procedure, may take immediately before procedure 2 capsule 0     FLUoxetine (PROZAC) 20 MG capsule Take 1 capsule (20 mg total) by mouth daily. 90 capsule 3     HEMP OIL OR EXTRACT OR OTHER CBD CANNABINOID, NOT MEDICAL CANNABIS, 1.5 drops.       magnesium oxide (MAG-OX) 400 mg tablet Take 400 mg by mouth daily.              melatonin 10 mg Tab Take 2 tablets by mouth at bedtime.              omeprazole (PRILOSEC) 20 MG capsule Take 1 capsule (20 mg total) by mouth 2 (two) times a day before meals. 60 capsule 0     SUMAtriptan (IMITREX) 100 MG tablet TAKE 1/2 TO 1 TABLET BY MOUTH AT ONSET OF HEADACHE 9 tablet 5     benzonatate (TESSALON PERLES) 100 MG capsule Take 1 capsule (100 mg total) by mouth every 6 (six) hours as needed for cough. 30 capsule 0     No current facility-administered medications for this visit.         Allergies   Allergen Reactions     Azithromycin Tinnitus     Iodine And Iodide Containing Products Hives     Latex      Added based on information entered during case entry, please review and add reactions, type, and  severity as needed     Levofloxacin      Annotation: IV Levaquin ---> PO Levaquin ---> large purple blotches.  Tolerates moxifloxacin (2014)       Penicillins Hives     Rocephin [Ceftriaxone] Hives     Tramadol Itching     Latex Rash       Patient Active Problem List   Diagnosis     Symptomatic Post-artificial Menopause State     Obesity     Anxiety Disorder NOS     Rosacea     Anemia     Peripheral Neuropathy     Esophageal Reflux     Fatigue     Cervical Radiculopathy     Vitamin D Deficiency     Cervical Spondylosis     Cervical Spine Stenosis     Cervicalgia     Common Migraine (Without Aura)     Major Depression, Single Episode     Classic Migraine (With Aura) With Intractable Migraine     Microscopic Hematuria     Diverticulosis     Elevated LFTs     Psychophysiological insomnia     Acute renal failure, unspecified acute renal failure type (H)     Celiac disease/sprue     Celiac disease     Donor of kidney for transplant       Past Medical History:   Diagnosis Date     Anemia      Anxiety      Celiac disease      Chronic kidney disease     Has 1 kidney     Depression     Seasonal     Diverticulitis      GERD (gastroesophageal reflux disease)      Hiatal hernia      Migraines      Peripheral neuropathy        Past Surgical History:   Procedure Laterality Date     APPENDECTOMY       CHOLECYSTECTOMY       COLON SURGERY       COLONOSCOPY N/A 3/18/2019    Procedure: COLONOSCOPY;  Surgeon: Davis Mosqueda MD;  Location: [facility];  Service: General     CYSTOSCOPY N/A 7/16/2015    Procedure: CYSTOSCOPY;  Surgeon: Nate Horta MD;  Location: [facility];  Service:      HYSTERECTOMY       HYSTERECTOMY, PAP NO LONGER INDICATED  02/2009     NEPHRECTOMY LIVING DONOR Left APRIL 2012     IL CORRJ HALLUX VALGUS W/SESMDC W/DIST METAR OSTEOT      Description: Bunion Correction With Metatarsal Osteotomy Herman Procedure;  Proc Date: 07/16/2010;     IL LAP,BILIARY TRACT,UNLISTED N/A 3/19/2019     Procedure: BIOPSY, LIVER, LAPAROSCOPIC;  Surgeon: Davis Mosqueda MD;  [place] [facility] [place];  Service: General     KS LAP,SLING OPERATION      Description: Laparoscopic Sling Operation For Stress Incontinence;  Recorded: 2009;     KS LAP,SURG,COLECTOMY, PARTIAL, W/ANAST N/A 3/19/2019    Procedure: LAPAROSCOPIC SIGMOID COLON RESECTION;  Surgeon: Davis Mosqueda MD;  [place] [facility] [place];  Service: General     SIGMOIDOSCOPY N/A 3/19/2019    Procedure: FLEXIBLE SIGMOIDOSCOPY;  Surgeon: Davis Mosqueda MD;  [place] [facility] [place];  Service: General       Social History     Socioeconomic History     Marital status: Single     Spouse name: Not on file     Number of children: 2     Years of education: Not on file     Highest education level: Not on file   Occupational History     Occupation: Dental assistant     Employer: [employer]   Social Needs     Financial resource strain: Not on file     Food insecurity:     Worry: Not on file     Inability: Not on file     Transportation needs:     Medical: Not on file     Non-medical: Not on file   Tobacco Use     Smoking status: Former Smoker     Packs/day: 0.00     Last attempt to quit: 1999     Years since quittin.8     Smokeless tobacco: Never Used   Substance and Sexual Activity     Alcohol use: No     Drug use: No     Sexual activity: Not Currently     Partners: Male   Lifestyle     Physical activity:     Days per week: Not on file     Minutes per session: Not on file     Stress: Not on file   Relationships     Social connections:     Talks on phone: Not on file     Gets together: Not on file     Attends Mu-ism service: Not on file     Active member of club or organization: Not on file     Attends meetings of clubs or organizations: Not on file     Relationship status: Not on file     Intimate partner violence:     Fear of current or ex partner: Not on file     Emotionally abused: Not on file     Physically abused: Not on  "file     Forced sexual activity: Not on file   Other Topics Concern     Not on file   Social History Narrative     Not on file             12 system review:    Headaches  Rosacea problems  Hearing loss and ringing in the ear  Changes in vision  Eyes sensitive to light  Pain in the eyes with mimibraines  Light flashes with migraines  Blurred vision  Diplopia occas  Nasal discharge  Hoarseness  Pain in the neckintolerance of cold  Dry cough  Fingers turn white in cold  N, V, D with gluten exposure  abd pain in the area of hernia and above  Can't get to sleep  Urinating multiple times at night  Urinating excessively    Objective:     Vitals:    11/15/19 0954   BP: 137/84   Pulse: 85   Resp: 24   Temp: 98.6  F (37  C)   TempSrc: Oral   Weight: 208 lb 3.2 oz (94.4 kg)   Height: 5' 6.5\" (1.689 m)       Physical Exam:    GEN: FEMALE, ALERT, ORIENTED TIMES THREE, NAD  HEENT:  TM'S NL                  THROAT CL                  PERRL  NECK: SUPPLE, WITHOUT ADENOPATHY, THYROMEGALY, OR CAROTID BRUIT  LUNGS: CTA  COR: RRR WITHOUT MURMUR  ABD: SOFT, POSITIVE BOWEL SOUNDS, MILD LOWER ABDOMINAL TX IN AREA OF HERNIA,  MILD EPIGATRIC TX, WITHOUT MASS OR HSM  EXT:  NO CYANOSIS, CLUBBING OR EDEMA  SKIN:  WITHOUT ATYPICAL APPEARING LESIONS.    Labs:  Labs pending at this time.  Results will be reviewed when available.    Immunization History   Administered Date(s) Administered     DT (pediatric) 01/01/2000     Hep A, Adult IM (19yr & older) 11/12/2007, 03/23/2009     Hep A, historic 11/12/2007, 03/23/2009     Hep B, historic 04/01/1993     INFLUENZA,RECOMBINANT,INJ,PF QUADRIVALENT 18+YRS 09/30/2019     Influenza, inj, historic,unspecified 12/20/2010, 10/20/2011     Influenza,seasonal,quad inj =/> 6months 09/14/2016     MMR 05/03/1990, 05/05/1993     Td, Adult, Absorbed 05/05/1993     Td, adult adsorbed, PF 09/30/2019     Tdap 07/14/2010       XR Chest 2 Views (Order [id number])   Imaging   Date: 11/11/2019 Department: [facility] " Care Ordering/Authorizing: Jeovanny Alcocer MD   Study Result        EXAM DATE:         11/11/2019     EXAM: X-RAY CHEST, 2 VIEWS, FRONTAL AND LATERAL  LOCATION: [facility]  DATE/TIME: 11/11/2019 7:15 PM     INDICATION: Chronic cough  COMPARISON: 10/23/2019     IMPRESSION: No pleural fluid or pneumothorax. No airspace disease or edema.  Normal size of the heart.           Electrocardiogram Perform and Read   Order: [id number]   Status:  Final result   Visible to patient:  Yes (MyChart) Next appt:  12/06/2019 at 01:30 PM in General Surgery (Davis Mosqueda MD) Dx:  Pre-op exam; Diverticulitis of colon     Ref Range & Units 3/8/19 1021 1/27/18 2309    SYSTOLIC BLOOD PRESSURE mmHg      DIASTOLIC BLOOD PRESSURE mmHg      VENTRICULAR RATE BPM 74  68     ATRIAL RATE BPM 74  68     P-R INTERVAL ms 156  184     QRS DURATION ms 76  80     Q-T INTERVAL ms 376  404     QTC CALCULATION (BEZET) ms 417  429     P Axis degrees 68  60     R AXIS degrees 36  31     T AXIS degrees 49  28     MUSE DIAGNOSIS  Normal sinus rhythm   Normal ECG   When compared with ECG of 27-JAN-2018 23:09,   No significant change was found   Confirmed by EMERSON JAVIER, TATAINA LOC:JN ([id number]) on 3/8/2019 3:49:03 PM  Normal sinus rhythm   Low voltage QRS   Borderline ECG   When compared with ECG of 19-OCT-2017 12:12,   Vent. rate has decreased BY  37 BPM   Confirmed by MARIBEL JAVIER, JD LOC:SJ ([id number]) on 1/28/2018 6:40:07 PM                Electronically signed by Porfirio Linton MD 11/15/19 9:56 AM

## 2021-06-03 NOTE — TELEPHONE ENCOUNTER
Per Samantha, she was cleared for surgery 11/22/19.    Cortney Maguire,  Surgery Scheduling  [phone]

## 2021-06-03 NOTE — TELEPHONE ENCOUNTER
Who is requesting the letter?  Patient  Why is the letter needed? Patient stated she needs Porfirio Linton MD to write a letter to Evin Lazaro, to excuse her from 11/18/19 until after her surgery. Patient stated the letter also needs to state she will not be attending work after her surgery on 11/22/19. Please make sure to put the patient's claim #[id number], on the letter.  How would you like this letter returned? Fax to [phone]   Okay to leave a detailed message? Yes  [phone]

## 2021-06-03 NOTE — TELEPHONE ENCOUNTER
Pt calling for a refill of Dilaudid. She is s/p ventral hernia repair on 11/22/19. Was admitted for post-op pain control. Percocet did not control pain. Pt was sent home with Dilaudid #12 tablets. She reports this has been helpful but has #2 tablets left. She has not been taking the medication regularly because she afraid of running out, especially with the upcoming Holiday weekend.     She still reports having a lot of pain. She is also recovering from bronchitis, causing an increase in pain with coughing.     Will review with Dr. Mosqueda and call pt back if refill is ok.

## 2021-06-03 NOTE — TELEPHONE ENCOUNTER
Pt called to report that she is still sick with Bronchitis and was not cleared for her pre-op. She is scheduled for a robotic ventral hernia repair with Dr. Mosqueda on 11/22/19.     I will send this message to Cortney Maguire, surgery scheduler. Recommend that she wait to re-schedule a new date until she is fully recovered.

## 2021-06-03 NOTE — PROGRESS NOTES
Walk In Care Note                                                                                 Date of Visit: 11/11/2019     Chief Complaint   Keiry Mchugh is a(n) 54 y.o. White or  female who presents to [facility] with the following complaint(s):  Cough (X5 weeks. Surgery 11/22 (hernia in stomach). )       Assessment and Plan   1. Cough  - Rapid Strep A Screen-Throat  - XR Chest 2 Views  - omeprazole (PRILOSEC) 20 MG capsule; Take 1 capsule (20 mg total) by mouth daily before breakfast.  Dispense: 30 capsule; Refill: 0  - Group A Strep, RNA Direct Detection, Throat    2. Bronchitis  - predniSONE (DELTASONE) 10 mg tablet; Take 40mg x 3 days, then 30mg x 3 days, then 20mg x 3 days, then 10mg x 3 days.  Dispense: 30 tablet; Refill: 0      Patient presenting with 5-week history of persistent dry cough. Was seen by primary care on 10/21/2019 and prescribed doxycycline x10 days, albuterol as needed, and guaifenesin-codeine as needed.  Had a negative chest x-ray on 10/23/2019.  Received a refill of guaifenesin-codeine on 10/28/2019.  Is scheduled to have a ventral hernia repair on 11/22/2019. Has history of gastroesophageal reflux disease, currently untreated. Strep screen is negative. Reflex strep testing is in process. Chest x-ray completed to evaluate for infiltrate, effusion, and lymphadenopathy. I have personally reviewed this patient's chest x-ray and noted the following: No infiltrate, pleural effusion, pneumothorax, vascular congestion, or lymphadenopathy.  Patient has history of similar prolonged episodes of bronchitis in the past, and actually required hospitalization for a similar illness in October 2017.  Treating bronchitis with prednisone burst/taper as listed above.  No indication for antibiotic therapy at this time, especially given the fact that the patient completed a 10-day course of doxycycline last month without improvement in her symptoms.  Have prescribed omeprazole as listed above  due to potential contribution of GERD to her cough symptoms.  Recommended use of over-the-counter guaifenesin/dextromethorphan as needed for daytime cough control.  Instructed patient to hydrate well while taking this medication.  Patient will continue using guaifenesin/codeine at bedtime.    Counseled patient regarding assessment and plan for evaluation and treatment. Questions were answered. See AVS for the specific written instructions and educational handout(s) regarding viral bronchitis and chronic cough that were provided at the conclusion of the visit.     Discussed signs / symptoms that warrant urgent / emergent medical attention.     Follow up with Primary Care as scheduled in 4 days.     History of Present Illness   Primary symptom: Cough  Onset: 5 weeks ago  Progression: Persisting  Description of cough: Dry  Sputum production: Occasional, clear to white.   Hemoptysis: No  Shortness of breath: Yes  Chest wall pain: Only while coughing.   Upper respiratory symptoms: Denies nasal congestion, rhinorrhea, post-nasal drip, and sinus pain / pressure. Has a sore throat with coughing since yesterday.   Fevers: In the first 1-2 weeks of illness, Tmax 103 F.   Chills: Initially  Additional symptoms: None  Home therapies utilized: Zyrtec, diphenhydramine, acetaminophen. Completed doxycycline as prescribed on 10/21/2019. Using the albuterol inhaler that was prescribed on 10/21/2019 at least three times a day with temporary improvement in cough for approximately 15 minutes. Using guaifenesin-codeine at bedtime.   History of asthma: No  History of COPD: No  History of pneumonia: Yes  History of CHF: No  Recent travel: No  Ill contacts: No  Tobacco use / exposure: Former  Additional pertinent history: Was seen by primary care on 10/21/2019 and prescribed doxycycline x10 days, albuterol as needed, and guaifenesin-codeine as needed.  Had a negative chest x-ray on 10/23/2019.  Received a refill of guaifenesin-codeine on  "10/28/2019.  Is scheduled to have a ventral hernia repair on 11/22/2019.       Review of Systems   Review of Systems   All other systems reviewed and are negative.       Physical Exam   Vitals:    11/11/19 1829   BP: 132/88   Patient Site: Right Arm   Patient Position: Sitting   Cuff Size: Adult Large   Pulse: 94   Resp: 18   Temp: 98.7  F (37.1  C)   TempSrc: Oral   SpO2: 98%   Weight: 201 lb 3.2 oz (91.3 kg)   Height: 5' 6.5\" (1.689 m)     Physical Exam  Vitals signs and nursing note reviewed.   Constitutional:       General: She is not in acute distress.     Appearance: She is well-developed and normal weight. She is not ill-appearing or toxic-appearing.   HENT:      Head: Normocephalic and atraumatic.      Right Ear: Tympanic membrane, ear canal and external ear normal.      Left Ear: Tympanic membrane, ear canal and external ear normal.      Nose: No mucosal edema or rhinorrhea.      Mouth/Throat:      Mouth: Mucous membranes are moist. No oral lesions.      Pharynx: Uvula midline. Posterior oropharyngeal erythema (streaky) present. No oropharyngeal exudate.   Eyes:      General: Lids are normal.      Conjunctiva/sclera: Conjunctivae normal.   Neck:      Musculoskeletal: Neck supple. No edema or erythema.   Cardiovascular:      Rate and Rhythm: Normal rate and regular rhythm.      Heart sounds: S1 normal and S2 normal. No murmur. No friction rub. No gallop.    Pulmonary:      Effort: Pulmonary effort is normal.      Breath sounds: Normal breath sounds. No stridor. No wheezing, rhonchi or rales.      Comments: Demonstrates a frequent dry, harsh cough.   Lymphadenopathy:      Cervical: No cervical adenopathy.   Skin:     General: Skin is warm and dry.      Coloration: Skin is not pale.      Findings: No rash.   Neurological:      General: No focal deficit present.      Mental Status: She is alert and oriented to person, place, and time.          Diagnostic Studies   Laboratory:  Results for orders placed or " performed in visit on 11/11/19   Rapid Strep A Screen-Throat   Result Value Ref Range    Rapid Strep A Antigen No Group A Strep detected, presumptive negative No Group A Strep detected, presumptive negative     Radiology:  EXAM DATE:         11/11/2019  EXAM: X-RAY CHEST, 2 VIEWS, FRONTAL AND LATERAL  LOCATION: [facility]  DATE/TIME: 11/11/2019 7:15 PM  INDICATION: Chronic cough  COMPARISON: 10/23/2019  IMPRESSION: No pleural fluid or pneumothorax. No airspace disease or edema.  Normal size of the heart.    Electrocardiogram:  N/A     Procedure Note   N/A     Pertinent History   The following portions of the patient's history were reviewed and updated as appropriate: allergies, current medications, past family history, past medical history, past social history, past surgical history and problem list.    Patient has Symptomatic Post-artificial Menopause State; Obesity; Anxiety Disorder NOS; Rosacea; Anemia; Peripheral Neuropathy; Esophageal Reflux; Fatigue; Cervical Radiculopathy; Vitamin D Deficiency; Cervical Spondylosis; Cervical Spine Stenosis; Cervicalgia; Common Migraine (Without Aura); Major Depression, Single Episode; Classic Migraine (With Aura) With Intractable Migraine; Microscopic Hematuria; Diverticulosis; Elevated LFTs; Psychophysiological insomnia; Acute renal failure, unspecified acute renal failure type (H); Celiac disease/sprue; and Celiac disease on their problem list.    Patient has a past medical history of Anemia, Anxiety, Celiac disease, Chronic kidney disease, Depression, Diverticulitis, GERD (gastroesophageal reflux disease), Hiatal hernia, Migraines, and Peripheral neuropathy.    Patient has a past surgical history that includes pr lap,sling operation; pr corrj hallux valgus w/sesmdc w/dist metar osteot; Nephrectomy living donor (Left, APRIL 2012); Cystoscopy (N/A, 7/16/2015); Hysterectomy; Colonoscopy (N/A, 3/18/2019); pr lap,surg,colectomy, partial, w/anast (N/A,  3/19/2019); pr lap,biliary tract,unlisted (N/A, 3/19/2019); Sigmoidoscopy (N/A, 3/19/2019); Cholecystectomy; Appendectomy; Colon surgery; and Hysterectomy - Pap no longer indicated (02/2009).    Patient's family history includes Breast cancer (age of onset: 50) in her maternal aunt and mother; Graves' disease in her sister; Hashimoto's thyroiditis in her maternal aunt, maternal grandfather, and mother; Hypothyroidism in her maternal aunt, maternal aunt, and sister.    Patient reports that she quit smoking about 20 years ago. She smoked 0.00 packs per day. She has never used smokeless tobacco. She reports that she does not drink alcohol or use drugs.     Portions of this note have been dictated using voice recognition software. Any grammatical or context distortions are unintentional and inherent to the software.     Jeovanny Alcocer MD  [facility]

## 2021-06-03 NOTE — TELEPHONE ENCOUNTER
Left message for pt asking if her employer has paperwork that needs to be filled out for her leave. Typically they send us paperwork instead of us just faxing a letter. Please verify with pt and document response.

## 2021-06-03 NOTE — ANESTHESIA PREPROCEDURE EVALUATION
Anesthesia Evaluation      Patient summary reviewed   No history of anesthetic complications     Airway   Mallampati: II  Neck ROM: full   Pulmonary - negative ROS and normal exam    breath sounds clear to auscultation    ROS comment: Chronic dry cough, unclear etiology. Mildly improved with PPI. No improvement with tessalon pearls. CXR negative. Hypothesized to be 2/2 diaphragmatic irritation from hernia.                          Cardiovascular - negative ROS and normal exam  Exercise tolerance: > or = 4 METS  ECG reviewed  Rhythm: regular  Rate: normal,         Neuro/Psych    (+) neuromuscular disease (cervical radiculopathy; peripheral neuropathy),  depression, anxiety/panic attacks,     Comments: migraines    Endo/Other    (+) obesity (bmi 32),      GI/Hepatic/Renal    (+) hiatal hernia, GERD,   chronic renal disease (s/p kidney donor),     Comments: Diverticulitis  Celiac disease  S/f robotic ventral hernia repair        Other findings: Frequent dry cough. Hoarse voice.       Dental - normal exam                          Anesthesia Plan  Planned anesthetic: general endotracheal  GETA  Pre-op tylenol 1g, gabapentin 300mg  Ketamine 50 mg IV post induction, magnesium gtt for opioid sparing analgesia   Will discuss TAP blocks pending surgeon request   Scop patch, Decadron 10 mg IV, Zofran 4mg IV  ASA 3   Induction: intravenous   Anesthetic plan and risks discussed with: patient  Anesthesia plan special considerations: antiemetics,   Post-op plan: routine recovery      Results for orders placed or performed in visit on 11/15/19   Comprehensive Metabolic Panel   Result Value Ref Range    Sodium 142 136 - 145 mmol/L    Potassium 4.8 3.5 - 5.0 mmol/L    Chloride 106 98 - 107 mmol/L    CO2 27 22 - 31 mmol/L    Anion Gap, Calculation 9 5 - 18 mmol/L    Glucose 79 70 - 125 mg/dL    BUN 22 8 - 22 mg/dL    Creatinine 1.02 0.60 - 1.10 mg/dL    GFR MDRD Af Amer >60 >60 mL/min/1.73m2    GFR MDRD Non Af Amer 57 (L) >60  mL/min/1.73m2    Bilirubin, Total 0.4 0.0 - 1.0 mg/dL    Calcium 10.0 8.5 - 10.5 mg/dL    Protein, Total 7.5 6.0 - 8.0 g/dL    Albumin 4.2 3.5 - 5.0 g/dL    Alkaline Phosphatase 91 45 - 120 U/L    AST 14 0 - 40 U/L    ALT 30 0 - 45 U/L   HM1 (CBC with Diff)   Result Value Ref Range    WBC 9.3 4.0 - 11.0 thou/uL    RBC 4.51 3.80 - 5.40 mill/uL    Hemoglobin 14.0 12.0 - 16.0 g/dL    Hematocrit 42.0 35.0 - 47.0 %    MCV 93 80 - 100 fL    MCH 31.1 27.0 - 34.0 pg    MCHC 33.4 32.0 - 36.0 g/dL    RDW 11.9 11.0 - 14.5 %    Platelets 265 140 - 440 thou/uL    MPV 7.2 7.0 - 10.0 fL    Neutrophils % 49 (L) 50 - 70 %    Lymphocytes % 43 (H) 20 - 40 %    Monocytes % 5 2 - 10 %    Eosinophils % 3 0 - 6 %    Basophils % 1 0 - 2 %    Neutrophils Absolute 4.5 2.0 - 7.7 thou/uL    Lymphocytes Absolute 4.0 0.8 - 4.4 thou/uL    Monocytes Absolute 0.5 0.0 - 0.9 thou/uL    Eosinophils Absolute 0.2 0.0 - 0.4 thou/uL    Basophils Absolute 0.1 0.0 - 0.2 thou/uL

## 2021-06-03 NOTE — TELEPHONE ENCOUNTER
I spoke to Samantha this afternoon. Samantha still has a cough from her bronchitis. She is seeing her primary care provider tomorrow for a pre-op physical. I told Samantha to keep that appointment and see what her primary doctor says. If PCP wants the patient to wait, I told Samantha to ask if 12/6/19 would be a possibility as I do have something available that day for surgery with Dr. Mosqueda. Samantha will call me back Friday afternoon with an update.    Cortney Maguire,  Surgery Scheduling  [phone]

## 2021-06-04 VITALS — WEIGHT: 212 LBS | HEIGHT: 67 IN | BODY MASS INDEX: 33.27 KG/M2

## 2021-06-04 VITALS
DIASTOLIC BLOOD PRESSURE: 88 MMHG | HEART RATE: 78 BPM | SYSTOLIC BLOOD PRESSURE: 136 MMHG | WEIGHT: 212.25 LBS | HEIGHT: 67 IN | RESPIRATION RATE: 16 BRPM | BODY MASS INDEX: 33.31 KG/M2 | TEMPERATURE: 97.9 F

## 2021-06-04 VITALS
HEART RATE: 89 BPM | SYSTOLIC BLOOD PRESSURE: 134 MMHG | DIASTOLIC BLOOD PRESSURE: 84 MMHG | BODY MASS INDEX: 31.55 KG/M2 | WEIGHT: 201 LBS | HEIGHT: 67 IN | OXYGEN SATURATION: 99 %

## 2021-06-04 VITALS — WEIGHT: 209.9 LBS | BODY MASS INDEX: 32.94 KG/M2 | HEIGHT: 67 IN

## 2021-06-04 NOTE — PROGRESS NOTES
"Surgery:  Post op Check    HPI: Pt is here for follow up of a Robotic Ventral hernia repair.   she is doing well except she is having some symptoms with urination.  Her urine is darker then usual.   Pain is fairly well controlled.  No difficulties with the surgical wound/wounds.  she is eating well and denies fever and chills.         /84 (Patient Site: Right Arm, Patient Position: Sitting, Cuff Size: Adult Regular)   Pulse 89   Ht 5' 7\" (1.702 m)   Wt 201 lb (91.2 kg)   SpO2 99%   Breastfeeding No   BMI 31.48 kg/m      EXAM:  GENERAL:Appears well  ABDOMEN:  Soft, +BS  SURGICAL WOUNDS:  Incisions healing well, some enduration or drainage.    Urinalysis Pending    Assessment/Plan: . Doing well after surgery with some of the expected discomfort.  She does have dark urine and we will do a Urinalysis to further assess.  She should follow up as needed.      Davis Mosqueda MD  [facility] Department of Surgery  "

## 2021-06-04 NOTE — TELEPHONE ENCOUNTER
RN cannot approve Refill Request    RN can NOT refill this medication med is not covered by policy/route to provider. Last office visit: 2/7/2019 Porfirio Linton MD Last Physical: 11/15/2019 Last MTM visit: Visit date not found Last visit same specialty: 10/21/2019 Patrick Hall MD.  Next visit within 3 mo: Visit date not found  Next physical within 3 mo: Visit date not found      Stefani Plascencia, [facility] Triage/Med Refill 12/21/2019    Requested Prescriptions   Pending Prescriptions Disp Refills     baclofen (LIORESAL) 10 MG tablet [Pharmacy Med Name: BACLOFEN 10MG TABLETS] 30 tablet 2     Sig: TAKE 1 TABLET(10 MG) BY MOUTH EVERY 6 HOURS AS NEEDED       There is no refill protocol information for this order

## 2021-06-06 NOTE — TELEPHONE ENCOUNTER
Left message to call back for: Recommendations  Information to relay to patient:  See message below.  Please relay information.

## 2021-06-06 NOTE — PATIENT INSTRUCTIONS - HE
"Fasting labs    Mammogram is scheduled    Pap smear not indicated but pt will be seeing [facility] in the near future.    Colonoscopy is UTD    Referral to the Bariatric Clinic for weight loss discussiion    Trial of \"BLACK COHASH\" for hot flashes.    "

## 2021-06-06 NOTE — TELEPHONE ENCOUNTER
Patient Returning Call  Reason for call:  Return call   Information relayed to patient:  No message was relay.   Patient has additional questions:  Yes  If YES, what are your questions/concerns:  Caller is wondering if the call was about her lab appointment. Caller stated that she is already schedule for today at 11:15 to come into clinic to get them done, and if Porfirio Linton MD nurse would like to ask her something to find her than or to call as soon as possible if its regards to something else.   Okay to leave a detailed message?: Yes

## 2021-06-06 NOTE — TELEPHONE ENCOUNTER
Please convey the lab letter to the pt.  I had just sent it but want someone to review it with her.  Thanks.

## 2021-06-06 NOTE — TELEPHONE ENCOUNTER
Test Results  Who is calling?:  patient  Who ordered the test:  Porfirio Linton MD  Type of test: Lab  Date of test:  3/6/2020  Where was the test performed:  [place]  What are your questions/concerns?:  Requesting results, please advise!  Okay to leave a detailed message?:  Yes

## 2021-06-06 NOTE — TELEPHONE ENCOUNTER
Message left for pt regarding orders I placed to recheck the liver functions in one week along with a hepatitis panel.  If she is not dcomfortable with waiting one week we could recheck these tests on Friday 3-13-20

## 2021-06-06 NOTE — PROGRESS NOTES
"Assessment:      Healthy female exam.    Encounter Diagnoses   Name Primary?     Donor of kidney for transplant Yes     Celiac , stable on gluten free diet.      Intractable migraine with aura without status migrainosus      Major depressive disorder with single episode, remission status unspecified, stable on fluoxetine      Vitamin D deficiency, on replacement      Cervicalgia, stable on baclofen, sumitriptan      Health care maintenance      Hypomagnesemia, on replacement.      Class 1 obesity due to excess calories without serious comorbidity with body mass index (BMI) of 33.0 to 33.9 in adult              Ongoing concerns with weight gain,  Referral to the bariatric clinic.       Plan:       All questions answered.       Fasting labs    Mammogram is scheduled    Pap smear not indicated but pt will be seeing [facility] in the near future.    Colonoscopy is UTD    Referral to the Bariatric Clinic for weight loss discussiion    Trial of \"BLACK COHASH\" for hot flashes.         Subjective:      Keiry Mchugh is a 54 y.o. female who presents for an annual exam. The patient is not sexually active. The patient participates in regular exercise: yes. The patient reports that there is not domestic violence in her life.     Healthy Habits:   Regular Exercise: Yes and mallwaks 3 times a week  Sunscreen Use: Yes  Healthy Diet: Yes  Dental Visits Regularly: Yes  Seat Belt: Yes  Sexually active: No  Self Breast Exam Monthly:occas  Hemoccults: N/A  Flex Sig: N/A  Colonoscopy: Yes and 3-18-19  Lipid Profile: Yes  Glucose Screen: Yes  Prevention of Osteoporosis: Yes  Last Dexa: N/A  Guns at Home:  No      Immunization History   Administered Date(s) Administered     DT (pediatric) 01/01/2000     Hep A, Adult IM (19yr & older) 11/12/2007, 03/23/2009     Hep A, historic 11/12/2007, 03/23/2009     Hep B, historic 04/01/1993     INFLUENZA,RECOMBINANT,INJ,PF QUADRIVALENT 18+YRS 09/30/2019     Influenza, inj, historic,unspecified " 12/20/2010, 10/20/2011     Influenza,seasonal,quad inj =/> 6months 09/14/2016     MMR 05/03/1990, 05/05/1993     Td, Adult, Absorbed 05/05/1993     Td, adult adsorbed, PF 09/30/2019     Tdap 07/14/2010     Immunization status: up to date and documented.    No exam data present    Gynecologic History  No LMP recorded. Patient has had a hysterectomy.  Contraception: none  Last Pap: hysto 2003 for menorrhagia . Results were: no h/o abnormal pap smears  Last mammogram: 10-5-16. Results were: normal      OB History   No obstetric history on file.       Current Outpatient Medications   Medication Sig Dispense Refill     acetaminophen (TYLENOL) 500 MG tablet Take 500 mg by mouth every 6 (six) hours as needed for pain.       atenolol (TENORMIN) 50 MG tablet Take 50 mg by mouth daily as needed (flush of skin/migraines).              baclofen (LIORESAL) 10 MG tablet TAKE 1 TABLET(10 MG) BY MOUTH EVERY 6 HOURS AS NEEDED 30 tablet 2     BIOTIN ORAL Take 1 tablet by mouth daily.       cholecalciferol, vitamin D3, 5,000 unit Tab Take 5,000 Units by mouth daily.       diphenhydrAMINE (BENADRYL) 25 mg capsule Take 25 mg by mouth at bedtime.       FLUoxetine (PROZAC) 20 MG capsule Take 20 mg by mouth at bedtime.       HEMP OIL OR EXTRACT OR OTHER CBD CANNABINOID, NOT MEDICAL CANNABIS, Take 1.5 drops by mouth as needed.              magnesium oxide (MAG-OX) 400 mg tablet Take 400 mg by mouth at bedtime.        melatonin 10 mg Tab Take 20 mg by mouth at bedtime.        SUMAtriptan (IMITREX) 100 MG tablet Take  mg by mouth every 2 (two) hours as needed for migraine.       ubidecarenone (COENZYME Q10 ORAL) Take 1 capsule by mouth at bedtime.       No current facility-administered medications for this visit.      Past Medical History:   Diagnosis Date     Anemia      Anxiety      Celiac disease      Chronic kidney disease     donated 1 kidney     Cough      Depression     Seasonal     Diverticulitis      GERD (gastroesophageal  reflux disease)      Hiatal hernia      Migraines      Peripheral neuropathy      PONV (postoperative nausea and vomiting)      Ventral hernia      Past Surgical History:   Procedure Laterality Date     APPENDECTOMY       CHOLECYSTECTOMY       COLON SURGERY       COLONOSCOPY N/A 3/18/2019    Procedure: COLONOSCOPY;  Surgeon: Davis Mosqueda MD;  Location: [facility];  Service: General     CYSTOSCOPY N/A 7/16/2015    Procedure: CYSTOSCOPY;  Surgeon: Nate Horta MD;  Location: [facility];  Service:      HYSTERECTOMY       HYSTERECTOMY, PAP NO LONGER INDICATED  02/2009     NEPHRECTOMY LIVING DONOR Left APRIL 2012     MA CORRJ HALLUX VALGUS W/SESMDC W/DIST METAR OSTEOT      Description: Bunion Correction With Metatarsal Osteotomy Herman Procedure;  Proc Date: 07/16/2010;     MA LAP,BILIARY TRACT,UNLISTED N/A 3/19/2019    Procedure: BIOPSY, LIVER, LAPAROSCOPIC;  Surgeon: Davis Mosqueda MD;  Location: [facility];  Service: General     MA LAP,SLING OPERATION      Description: Laparoscopic Sling Operation For Stress Incontinence;  Recorded: 02/17/2009;     MA LAP,SURG,COLECTOMY, PARTIAL, W/ANAST N/A 3/19/2019    Procedure: LAPAROSCOPIC SIGMOID COLON RESECTION;  Surgeon: Davis Mosqueda MD;  Location: [facility];  Service: General     SIGMOIDOSCOPY N/A 3/19/2019    Procedure: FLEXIBLE SIGMOIDOSCOPY;  Surgeon: Davis Mosqueda MD;  Location: [facility];  Service: General     Azithromycin; Gluten; Iodine and iodide containing products; Levofloxacin; Nsaids (non-steroidal anti-inflammatory drug); Penicillins; Rocephin [ceftriaxone]; Tramadol; and Latex  Family History   Problem Relation Age of Onset     Hashimoto's thyroiditis Mother      Breast cancer Mother 50     Graves' disease Sister      Hypothyroidism Maternal Aunt      Breast cancer Maternal Aunt 50     Hashimoto's thyroiditis Maternal Grandfather      Hypothyroidism Sister      Hypothyroidism Maternal Aunt       Hashimoto's thyroiditis Maternal Aunt      Anesthesia problems Neg Hx      Social History     Socioeconomic History     Marital status: Single     Spouse name: Not on file     Number of children: 2     Years of education: Not on file     Highest education level: Not on file   Occupational History     Occupation: Dental assistant     Employer: [employer]   Social Needs     Financial resource strain: Not on file     Food insecurity:     Worry: Not on file     Inability: Not on file     Transportation needs:     Medical: Not on file     Non-medical: Not on file   Tobacco Use     Smoking status: Former Smoker     Packs/day: 0.00     Last attempt to quit: 1999     Years since quittin.1     Smokeless tobacco: Never Used   Substance and Sexual Activity     Alcohol use: No     Drug use: Not Currently     Sexual activity: Not Currently     Partners: Male   Lifestyle     Physical activity:     Days per week: Not on file     Minutes per session: Not on file     Stress: Not on file   Relationships     Social connections:     Talks on phone: Not on file     Gets together: Not on file     Attends [employer] service: Not on file     Active member of club or organization: Not on file     Attends meetings of clubs or organizations: Not on file     Relationship status: Not on file     Intimate partner violence:     Fear of current or ex partner: Not on file     Emotionally abused: Not on file     Physically abused: Not on file     Forced sexual activity: Not on file   Other Topics Concern     Not on file   Social History Narrative     Not on file       Review of Systems  General:  hoarseness currently assoc with her cold;  Intolerance to heat or cold; can't go to sleep  Eyes: changes in vision, eyes sensitive to light, pain in the eyes, eyes tire easily, seeing spots, blind spots, blurred and double vision  Ears/Nose/Throat: hearing loss in right ear after use of zithromax;  Ringing in the right ear  Cardiovascular:  "Denies problem  Respiratory: dry cough  Gastrointestinal:  N/V/D with gluten;  H/o heartburn better since  surgery  Genitourinary: urinary incontinence, has had a sling and other urological procedures.  Musculoskeletal:  pain in the neck;  Pain in fingers exposed to cold  Skin: Denies problem  Neurologic: headaches  Psychiatric: Denies problem  Endocrine: Denies problem  Heme/Lymphatic: Denies problem   Allergic/Immunologic: Denies problem        Objective:         Vitals:    03/06/20 1013   BP: 148/86   Pulse: 84   Resp: 16   Temp: 97.9  F (36.6  C)   TempSrc: Oral   Weight: 212 lb 4 oz (96.3 kg)   Height: 5' 7\" (1.702 m)     Body mass index is 33.24 kg/m .  Wt Readings from Last 3 Encounters:   03/06/20 212 lb 4 oz (96.3 kg)   12/06/19 201 lb (91.2 kg)   11/22/19 209 lb 14.4 oz (95.2 kg)      Physical Exam:  General Appearance: Alert, cooperative, no distress, appears stated age  Head: Normocephalic, without obvious abnormality, atraumatic  Eyes: PERRL, conjunctiva/corneas clear, EOM's intact  Ears: Normal TM's and external ear canals, both ears  Nose: Nares normal, septum midline,mucosa normal, no drainage  Throat: Lips, mucosa, and tongue normal; teeth and gums normal  Neck: Supple, symmetrical, trachea midline, no adenopathy;  thyroid: not enlarged, symmetric, no tenderness/mass/nodules; no carotid bruit or JVD  Back: Symmetric, no curvature, ROM normal, no CVA tenderness  Lungs: Clear to auscultation bilaterally, respirations unlabored  Breasts:  To be done at upcoming [facility] appt  Heart: Regular rate and rhythm, S1 and S2 normal, no murmur, rub, or gallop,   Abdomen: Soft, non-tender, bowel sounds active all four quadrants,  no masses, no organomegaly  Pelvic:Not examined  Extremities: Extremities normal, atraumatic, no cyanosis or edema  Skin: Skin color, texture, turgor normal, no rashes or lesions  Lymph nodes: Cervical, supraclavicular, and axillary nodes normal  Neurologic: Normal        "

## 2021-06-06 NOTE — TELEPHONE ENCOUNTER
I would suggest checking them no sooner than one week.  At that time I will also include an order for a hepatitis screen.  The liver enzymes tend to change slowly so one week should be ok.  If she is still not ok with that then we could recheck Friday.

## 2021-06-06 NOTE — TELEPHONE ENCOUNTER
Spoke to pt and reviewed result letter. Pt is very concerned about liver levels and waiting a month to recheck them. Pt does not drink alcohol other than maybe 1-2 times per year for special occasions. Pt has had issues with elevated liver enzymes in the past and has gone to a completely gluten free diet per recommendations to help with the liver. She said they did come down after the diet modification so she is concerned that they are now going back up. She stated in the past when they were high they were checking them every couple days so pt is wondering if we can recheck in a couple days to see how they are trending

## 2021-06-07 ENCOUNTER — COMMUNICATION - HEALTHEAST (OUTPATIENT)
Dept: OTOLARYNGOLOGY | Facility: CLINIC | Age: 56
End: 2021-06-07

## 2021-06-07 ENCOUNTER — OFFICE VISIT - HEALTHEAST (OUTPATIENT)
Dept: OTOLARYNGOLOGY | Facility: CLINIC | Age: 56
End: 2021-06-07

## 2021-06-07 DIAGNOSIS — J30.2 SEASONAL ALLERGIC RHINITIS, UNSPECIFIED TRIGGER: ICD-10-CM

## 2021-06-07 DIAGNOSIS — J34.89 NASAL VESTIBULITIS: ICD-10-CM

## 2021-06-07 DIAGNOSIS — J01.91 ACUTE RECURRENT SINUSITIS, UNSPECIFIED LOCATION: ICD-10-CM

## 2021-06-07 DIAGNOSIS — J34.89 NASAL SORE: ICD-10-CM

## 2021-06-07 DIAGNOSIS — J34.2 DEVIATED NASAL SEPTUM: ICD-10-CM

## 2021-06-08 NOTE — PROGRESS NOTES
3...............................................................................3  Assessment/Plan:      Visit for Preoperative Exam.    Encounter Diagnoses   Name Primary?     Preop examination Yes     Tonsillar debris      Chronic Kidney Disease, Stage 3      Intractable migraine with aura      Major Depression, Single Episode      Anxiety Disorder NOS         Patient approved for surgery with general or local anesthesia.   Patient Instructions   STAY OFF SULINDAC AND EXCEDRIN FOR 10 DAYS PRE-OP    CHECK CBC AND BMP       Subjective:     Scheduled Procedure: TONSILLECTOMY  Surgery Date:  23 FEB 2017  Surgery Location:  [facility] [place]  Surgeon:  DR KALIN DUNCAN    Current Outpatient Prescriptions   Medication Sig Dispense Refill     acetaminophen (TYLENOL) 650 MG CR tablet Take 1,300 mg by mouth every 8 (eight) hours as needed for pain.       atenolol (TENORMIN) 50 MG tablet TAKE 1 TABLET BY MOUTH ONCE DAILY WITH FLUSHING OF SKIN IF NEEDED 90 tablet 0     baclofen (LIORESAL) 10 MG tablet Take 1 tablet po q 6-8 hours PRN pain/spasms. 60 tablet 3     BIOTIN ORAL Take 1 tablet by mouth daily.       cholecalciferol, vitamin D3, 10,000 unit capsule Take 10,000 Units by mouth every other day.       FLUoxetine (PROZAC) 20 MG capsule TAKE 1 CAPSULE BY MOUTH EVERY DAY 90 capsule 0     magnesium oxide-Mg AA chelate (MAGNESIUM, AMINO ACID CHELATE,) 133 mg Tab Take 1 tablet by mouth daily.        ONABOTULINUMTOXINA (BOTOX INJ) Inject 1 application as directed every 3 (three) months.       sulindac (CLINORIL) 150 MG tablet TAKE 1 TABLET BY MOUTH TWICE DAILY. TAKE WITH FOOD/WATER TO PREVENT STOMACH UPSET 60 tablet 0     SUMAtriptan (IMITREX) 50 MG tablet Take 1 tablet (50 mg total) by mouth 2 (two) times a day as needed for migraine. 1 tab at onset of headache, may repeat in 2 hours as needed 10 tablet 3     No current facility-administered medications for this visit.        Allergies   Allergen  Reactions     Ceftriaxone Hives     Levofloxacin      Annotation: IV Levaquin ---> PO Levaquin ---> large purple blotches.  Tolerates moxifloxacin (2014)       Other Allergy (See Comments) Hives     Contrast Media Ready-Box MISC, 08/04/2008.       Penicillins Hives     Tramadol Itching       Immunization History   Administered Date(s) Administered     DT (pediatric) 01/01/2000     Hep A, historic 11/12/2007, 03/23/2009     Influenza, inj, historic 12/20/2010, 10/20/2011     Influenza, seasonal,quad inj 36+ mos 09/14/2016     Tdap 07/14/2010       Patient Active Problem List   Diagnosis     Symptomatic Post-artificial Menopause State     Obesity     Anxiety Disorder NOS     Rosacea     Anemia     Peripheral Neuropathy     Esophageal Reflux     Fatigue     Cervical Radiculopathy     Vitamin D Deficiency     Cervical Spondylosis     Cervical Spine Stenosis     Cervicalgia     Common Migraine (Without Aura)     Major Depression, Single Episode     Classic Migraine (With Aura) With Intractable Migraine     Microscopic Hematuria     Diverticulosis     Chronic Kidney Disease, Stage 3     Erythema migrans (Lyme disease)       Past Medical History:   Diagnosis Date     Migraines        Social History     Social History     Marital status: Single     Spouse name: N/A     Number of children: 2     Years of education: N/A     Occupational History     Dental assistant       [employer]     Social History Main Topics     Smoking status: Former Smoker     Smokeless tobacco: Not on file     Alcohol use No     Drug use: No     Sexual activity: Not on file     Other Topics Concern     Not on file     Social History Narrative       Past Surgical History:   Procedure Laterality Date     CYSTOSCOPY N/A 7/16/2015    Procedure: CYSTOSCOPY;  Surgeon: Nate Horta MD;  Location: [facility] Main OR;  Service:      HYSTERECTOMY       NEPHRECTOMY LIVING DONOR Left APRIL 2012     UT APPENDECTOMY      Description: Appendectomy;   Recorded: 11/20/2007;     AR CORRJ HALLUX VALGUS W/SESMDC W/DIST METAR OSTEOT      Description: Bunion Correction With Metatarsal Osteotomy Herman Procedure;  Proc Date: 07/16/2010;     AR LAP,SLING OPERATION      Description: Laparoscopic Sling Operation For Stress Incontinence;  Recorded: 02/17/2009;     AR REMOVAL GALLBLADDER      Description: Cholecystectomy;  Recorded: 02/17/2009;     AR TOTAL ABDOM HYSTERECTOMY      Description: Hysterectomy;  Recorded: 02/17/2009;       History of Present Illness  Recent Health  Fever: no  Chills: no  Fatigue: yes  Chest Pain: no  Cough: no  Dyspnea: no  Urinary Frequency: yes  Nausea: no  Vomiting: no  Diarrhea: no  Abdominal Pain: no  Easy Bruising: no  Lower Extremity Swelling: no  Poor Exercise Tolerance: no    Most recent Health Maintenance Visit:  5 month(s) ago    Pertinent History  Prior Anesthesia: yes  Previous Anesthesia Reaction:  no  Diabetes: no  Cardiovascular Disease: no  Pulmonary Disease: no  Renal Disease: yes;  H/O MILD CKD  POST NEPHRECTOMY  GI Disease: no  Sleep Apnea: no  Thromboembolic Problems: no  Clotting Disorder: no  Bleeding Disorder: no  Transfusion Reaction: NO H/O OF PRIOR BLOOD TRANSFUSION  Impaired Immunity: no  Steroid use in the last 6 months: YES; ROUND OF STEROIDS A COUPLE MONTHS AGO  Frequent Aspirin use: yes; EXCEDRIN FOR MIGRAINES    Family history of anesthesia reaction (MOTHER) and MI (MOTHER)     Social history of patient does not wear denture or partial plates, there is no transfusion refusal and there are no concerns regarding care after surgery    After surgery, the patient plans to recover at home with family.    Review of Systems  A 12 point comprehensive review of systems was negative except as noted.   HAS ROSACEA  CONSTANT MIGRAINES  CONSTANT SORE THROATS  VOIDS AT LEAST 1+ TIMES DURING NIGHT  NIGHT-TIME BURNING IN FEET          Objective:         Vitals:    02/14/17 0932   BP: 102/70   Pulse: 68   Resp: 16   Temp: 98.7  F  "(37.1  C)   TempSrc: Oral   Weight: 188 lb 14.4 oz (85.7 kg)   Height: 5' 6.75\" (1.695 m)       Physical Exam:  Physical Exam:  General Appearance: Alert, cooperative, no distress, appears stated age  Head: Normocephalic, without obvious abnormality, atraumatic  Eyes: PERRL, conjunctiva/corneas clear, EOM's intact  Ears: Normal TM's and external ear canals, both ears  Nose: Nares normal, septum midline,mucosa normal, no drainage  Throat: Lips, mucosa, and tongue normal; teeth and gums normal  Neck: Supple, symmetrical, trachea midline, no adenopathy;  thyroid: not enlarged, symmetric, no tenderness/mass/nodules; no carotid bruit or JVD  Back: Symmetric, no curvature, ROM normal, no CVA tenderness  Lungs: Clear to auscultation bilaterally, respirations unlabored  Breasts: Not examined  Heart: Regular rate and rhythm, S1 and S2 normal, no murmur, rub, or gallop,   Abdomen: Soft, non-tender, bowel sounds active all four quadrants,  no masses, no organomegaly  Pelvic:Not examined  Extremities: Extremities normal, atraumatic, no cyanosis or edema  Skin: Skin color, texture, turgor normal, no rashes or lesions  Lymph nodes: Cervical, supraclavicular, and axillary nodes normal  Neurologic: Normal        "

## 2021-06-08 NOTE — TELEPHONE ENCOUNTER
RN cannot approve Refill Request    RN can NOT refill this medication historical medication requested.      Kaye Case, [facility] Triage/Med Refill 6/9/2020    Requested Prescriptions   Pending Prescriptions Disp Refills     SUMAtriptan (IMITREX) 100 MG tablet [Pharmacy Med Name: SUMATRIPTAN 100MG TABLETS] 9 tablet 2     Sig: TAKE 1/2 TO 1 TABLET BY MOUTH AT ONSET OF HEADACHE       Triptans Refill Protocol Passed - 6/5/2020  9:36 PM        Passed - PCP or prescribing provider visit in past 12 months       Last office visit with prescriber/PCP: 2/7/2019 Porfirio Linton MD OR same dept: Visit date not found OR same specialty: 10/21/2019 Patrick Hall MD  Last physical: 3/6/2020 Last MTM visit: Visit date not found   Next visit within 3 mo: Visit date not found  Next physical within 3 mo: Visit date not found  Prescriber OR PCP: Porfirio Linton MD  Last diagnosis associated with med order: 1. Intractable migraine with aura without status migrainosus  - SUMAtriptan (IMITREX) 100 MG tablet [Pharmacy Med Name: SUMATRIPTAN 100MG TABLETS]; TAKE 1/2 TO 1 TABLET BY MOUTH AT ONSET OF HEADACHE  Dispense: 9 tablet; Refill: 2    If protocol passes may refill for 12 months if within 3 months of last provider visit (or a total of 15 months).                FLUoxetine (PROZAC) 20 MG capsule [Pharmacy Med Name: FLUOXETINE 20MG CAPSULES] 90 capsule 0     Sig: TAKE 1 CAPSULE(20 MG) BY MOUTH DAILY       SSRI Refill Protocol  Passed - 6/5/2020  9:36 PM        Passed - PCP or prescribing provider visit in last year     Last office visit with prescriber/PCP: 2/7/2019 Porfirio Linton MD OR same dept: Visit date not found OR same specialty: 10/21/2019 Patrick Hall MD  Last physical: 3/6/2020 Last MTM visit: Visit date not found   Next visit within 3 mo: Visit date not found  Next physical within 3 mo: Visit date not found  Prescriber OR PCP: Porfirio Linton MD  Last diagnosis  associated with med order: 1. Intractable migraine with aura without status migrainosus  - SUMAtriptan (IMITREX) 100 MG tablet [Pharmacy Med Name: SUMATRIPTAN 100MG TABLETS]; TAKE 1/2 TO 1 TABLET BY MOUTH AT ONSET OF HEADACHE  Dispense: 9 tablet; Refill: 2    If protocol passes may refill for 12 months if within 3 months of last provider visit (or a total of 15 months).

## 2021-06-08 NOTE — PATIENT INSTRUCTIONS - HE
Your symptoms show that you may have coronavirus (COVID-19). This illness can cause fever, cough and trouble breathing. Many people get a mild case and get better on their own. Some people can get very sick.     Not all patients are tested for COVID-19. If you need to be tested, your care team will let you know.     How can I protect others?    Without a test, we can't know for sure that you have COVID-19. For safety, it's very important to follow these rules.    Stay home and away from others (self-isolate) until:    At least 10 days have passed since your symptoms started. And     You've had no fever--and no medicine that reduces fever--for 3 full days (72 hours). And      Your other symptoms have resolved (gotten better).     During this time:    Stay in your own room (and use your own bathroom), if you can.    Stay away from others in your home. No hugging, kissing or shaking hands.    No visitors.    Don't go to work, school or anywhere else.     Clean  high touch  surfaces often (doorknobs, counters, handles, etc.). Use a household cleaning spray or wipes.    Cover your mouth and nose with a mask, tissue or wash cloth to avoid spreading germs.    Wash your hands and face often. Use soap and water.    For more tips, go to https://www.cdc.gov/coronavirus/2019-ncov/downloads/10Things.pdf.    How can I take care of myself?    1. Get lots of rest. Drink extra fluids (unless a doctor has told you not to).     2. Take Tylenol (acetaminophen) for fever or pain. If you have liver or kidney problems, ask your family doctor if it's okay to take Tylenol.     Adults can take either:     650 mg (two 325 mg pills) every 4 to 6 hours, or     1,000 mg (two 500 mg pills) every 8 hours as needed.     Note: Don't take more than 3,000 mg in one day.   Acetaminophen is found in many medicines (both prescribed and over-the-counter medicines). Read all labels to be sure you don't take too much.   For children, check the Tylenol  bottle for the right dose. The dose is based on the child's age or weight.    3. If you have other health problems (like cancer, heart failure, an organ transplant or severe kidney disease): Call your specialty clinic if you don't feel better in the next 2 days.    4. Know when to call 911: If your breathing is so bad that it keeps you from doing normal activities, call 911 or go to the emergency room. Tell them that you've been staying home and may have COVID-19.      What are the symptoms of COVID-19?     The most common symptoms are cough, fever and trouble breathing.     Less common symptoms include body aches, chills, diarrhea (loose, watery poops), fatigue (feeling very tired), headache, runny nose, sore throat and loss of smell.     COVID-19 can cause severe coughing (bronchitis) and lung infection (pneumonia).    How does it spread?     The virus may spread when a person coughs or sneezes into the air. The virus can travel about 6 feet this way, and it can live on surfaces.      Common  (household disinfectants) will kill the virus.    Who is at risk?  Anyone can catch COVID-19 if they're around someone who has the virus.    How can others protect themselves?     Stay away from people who have COVID-19 (or symptoms of COVID-19).    Wash hands often with soap and water. Or, use hand  with at least 60% alcohol.    Avoid touching the eyes, nose or mouth.     Wear a face mask when you go out in public, when sick or when caring for a sick person.      For more about COVID-19 and caring for yourself at home, please visit the CDC website at https://www.cdc.gov/coronavirus/2019-ncov/about/steps-when-sick.html.     To learn about care at [facility], go to [email or web address].    Below are the COVID-19 hotlines at the [place] Department of Health ([facility]). Interpreters are available.     For health questions: Call 035-987-8114 or 1-525.294.1768 (7 a.m. to 7  p.m.)    For questions about schools and childcare: Call 826-498-4667 or 1-286.662.4641 (7 a.m. to 7 p.m.)

## 2021-06-08 NOTE — TELEPHONE ENCOUNTER
Informed patient Dr. Poole would like her to be assessed through [facility] and that Dr. Linton said it is OK for her to take 1 dose of tylenol to get her fever down.  Pt voiced understanding.  Appt today with Dr. Poole cancelled.

## 2021-06-08 NOTE — TELEPHONE ENCOUNTER
"Triage Call  Patient calls with concerns of having a cough, non productive  Fever, last checked 1330, 100.5 - increasing over the last couple hours  Shortness of breath \"minimal\" with exertion  Started \"feeling not great\" a couple days ago, today says she \"feels sick\"  Body aches  Sore throat  headache  Reports only having one kidney and liver problems, concerned that since she can not take tylenol or ibuprofen wondering what she should take    Disposition  Call PCP now. Will scheduled a telephone visit. Educated per Care Advice, verbalized understanding. Scheduled with Dr Poole at 1530    Reason for Disposition    MILD difficulty breathing (e.g., minimal/no SOB at rest, SOB with walking, pulse <100)    COVID-19 Home Isolation, questions about    COVID-19 Prevention and Healthy Living, questions about    COVID-19 Testing, questions about    COVID-19, questions about    Protocols used: CORONAVIRUS (COVID-19) DIAGNOSED OR AZFETIRZV-J-UP 4.22.20    COVID 19 Nurse Triage Plan/Patient Instructions    Please be aware that novel coronavirus (COVID-19) may be circulating in the community. If you develop symptoms such as fever, cough, or SOB or if you have concerns about the presence of another infection including coronavirus (COVID-19), please contact your health care provider or visit [email or web address].     Disposition/Instructions    Patient to stay at home and follow home care protocol based instructions., Patient to have scheduled Telephone Visit with a provider. Follow System Ambulatory Workflow for COVID 19.     The clinic staff will assist you to schedule an appointment to complete the Telephone Visit with a provider during normal clinic hours.       Call Back If: Your symptoms worsen before you are able to complete your Telephone Visit with a provider.     and Additional COVID19 information to add for patients.       Additional General Information About COVID-19    Whether or not you've been tested for COVID-19    Stay " home and away from others (self-isolate) until:    At least 10 days have passed since your symptoms started. And     You've had no fever--and no medicine that reduces fever--for 3 full days (72 hours). And      Your other symptoms have resolved (gotten better).     During this time:    Stay in your own room (and use your own bathroom), if you can.    Stay away from others in your home. No hugging, kissing or shaking hands.    No visitors.    Don't go to work, school or anywhere else.     Clean  high touch  surfaces often (doorknobs, counters, handles, etc.). Use a household cleaning spray or wipes.    Cover your mouth and nose with a mask, tissue or wash cloth to avoid spreading germs.    Wash your hands and face often. Use soap and water.    For more tips, go to https://www.cdc.gov/coronavirus/2019-ncov/downloads/10Things.pdf.    How can I take care of myself?    1. Get lots of rest. Drink extra fluids (unless a doctor has told you not to).     2. Take Tylenol (acetaminophen) for fever or pain. If you have liver or kidney problems, ask your family doctor if it's okay to take Tylenol.     Adults can take either:     650 mg (two 325 mg pills) every 4 to 6 hours, or     1,000 mg (two 500 mg pills) every 8 hours as needed.     Note: Don't take more than 3,000 mg in one day.   Acetaminophen is found in many medicines (both prescribed and over-the-counter medicines). Read all labels to be sure you don't take too much.   For children, check the Tylenol bottle for the right dose. The dose is based on  the child's age or weight.    3. If you have other health problems (like cancer, heart failure, an organ transplant or severe kidney disease): Call your specialty clinic if you don't feel better in the next 2 days.    4. Know when to call 911: If your breathing is so bad that it keeps you from doing normal activities, call 911 or go to the emergency room. Tell them that you've been staying home and may have  COVID-19..      What are the symptoms of COVID-19?     The most common symptoms are cough, fever and trouble breathing.     Less common symptoms include body aches, chills, diarrhea (loose, watery poops), fatigue (feeling very tired), headache, runny nose, sore throat and loss of smell.     COVID-19 can cause severe coughing (bronchitis) and lung infection (pneumonia).    How does it spread?     The virus may spread when a person coughs or sneezes into the air. The virus can travel about 6 feet this way, and it can live on surfaces.      Common  (household disinfectants) will kill the virus.    Who is at risk?  Anyone can catch COVID-19 if they're around someone who has the virus.    How can others protect themselves?     Stay away from people who have COVID-19 (or symptoms of COVID-19).    Wash hands often with soap and water. Or, use hand  with at least 60% alcohol.    Avoid touching the eyes, nose or mouth.     Wear a face mask when you go out in public, when sick or when caring for a sick person.      For more about COVID-19 and caring for yourself at home, please visit the CDC website at https://www.cdc.gov/coronavirus/2019-ncov/about/steps-when-sick.html.     To learn about care at [facility], go to [email or web address].    Below are the COVID-19 hotlines at the [place] Department of Health ([facility]). Interpreters are available.     For health questions: Call 400-439-0038 or 1-584.766.7144 (7 a.m. to 7 p.m.)    For questions about schools and childcare: Call 633-102-0446 or 1-285.258.2235 (7 a.m. to 7 p.m.)          Thank you for limiting contact with others, wearing a simple mask to cover your cough, practice good hand hygiene habits and accessing our virtual services where possible to limit the spread of this virus.    For more information about COVID19 and options for caring for yourself at home, please visit the CDC website at  https://www.cdc.gov/coronavirus/2019-ncov/about/steps-when-sick.html  For more options for care at [facility], please visit our website at [email or web address]    For more information, please use the [place] Department of Health COVID-19 Website: [email or web address]  [place] Department of Health ([facility]) COVID-19 Hotlines (Interpreters available):      Health questions: Phone Number: 144.824.8224 or 1-664.107.2629 and Hours: 7 a.m. to 7 p.m.    Schools and  questions: Phone Number: 200.660.8251 or 1-420.706.4254 and Hours 7 a.m. to 7 p.m.    Alycia Rader RN [facility] 5/15/2020 2:24 PM

## 2021-06-09 NOTE — PROGRESS NOTES
"Keiry Mchugh is a 55 y.o. female who is being evaluated via a billable video visit.      The patient has been notified of following:     \"This video visit will be conducted via a call between you and your physician/provider. We have found that certain health care needs can be provided without the need for an in-person physical exam.  This service lets us provide the care you need with a video conversation.  If a prescription is necessary we can send it directly to your pharmacy.  If lab work is needed we can place an order for that and you can then stop by our lab to have the test done at a later time.    Video visits are billed at different rates depending on your insurance coverage. Please reach out to your insurance provider with any questions.    If during the course of the call the physician/provider feels a video visit is not appropriate, you will not be charged for this service.\"    Patient has given verbal consent to a Video visit? Yes    Will anyone else be joining your video visit? No     Text Cell Phone: [phone]        Video Start Time: 8:58 AM     HPI:      < 3 weeks ago had bad swelling in the feet.  Saw Dr Beckford who works for company and he is not comfortable with her returning to work.  The feet are not always swelling.   They will seem fine for a period of time and then they may swell for 3-4 days.  Could feel fluid jiggling on top of the feet.  Feels like she does get winded more easily.  No chest pain.  Tries to be careful about limiting salt intake.  BP yesterday 118/80.  No fluttering in the chest.  When the legs would swell it is always bilaterally and symmetric and up to the knees.  Usually in the morning more of the swlling is in the face and fingers and as the day goes on it settles in the feetl.    Results for orders placed or performed in visit on 10/21/19   Basic Metabolic Panel   Result Value Ref Range    Sodium 141 136 - 145 mmol/L    Potassium 5.1 (H) 3.5 - 5.0 mmol/L    " Chloride 103 98 - 107 mmol/L    CO2 26 22 - 31 mmol/L    Anion Gap, Calculation 12 5 - 18 mmol/L    Glucose 93 70 - 125 mg/dL    Calcium 10.5 8.5 - 10.5 mg/dL    BUN 14 8 - 22 mg/dL    Creatinine 1.04 0.60 - 1.10 mg/dL    GFR MDRD Af Amer >60 >60 mL/min/1.73m2    GFR MDRD Non Af Amer 56 (L) >60 mL/min/1.73m2     Results for orders placed or performed in visit on 04/29/20   Comprehensive Metabolic Panel   Result Value Ref Range    Sodium 140 136 - 145 mmol/L    Potassium 5.1 (H) 3.5 - 5.0 mmol/L    Chloride 106 98 - 107 mmol/L    CO2 23 22 - 31 mmol/L    Anion Gap, Calculation 11 5 - 18 mmol/L    Glucose 77 70 - 125 mg/dL    BUN 26 (H) 8 - 22 mg/dL    Creatinine 0.97 0.60 - 1.10 mg/dL    GFR MDRD Af Amer >60 >60 mL/min/1.73m2    GFR MDRD Non Af Amer 60 (L) >60 mL/min/1.73m2    Bilirubin, Total 0.4 0.0 - 1.0 mg/dL    Calcium 10.4 8.5 - 10.5 mg/dL    Protein, Total 7.6 6.0 - 8.0 g/dL    Albumin 4.3 3.5 - 5.0 g/dL    Alkaline Phosphatase 128 (H) 45 - 120 U/L    AST 24 0 - 40 U/L    ALT 84 (H) 0 - 45 U/L     Neg covid testing May 2020.  Lab Results   Component Value Date    TSH 1.35 03/06/2020     Lab Results   Component Value Date    WBC 9.3 11/15/2019    HGB 14.0 11/15/2019    HCT 42.0 11/15/2019    MCV 93 11/15/2019     11/15/2019       Encounter Diagnosis   Name Primary?     Edema, unspecified type Yes     PLAN:   Check BNP    Recheck CMP    Check  UA    Schedule ECHO  I spent 25 min with patient face-to-face, of which 50% or greater was spent in counseling and coordination of care in regards to patient's  Leg and feet swelling and evaluation going forward.   Please see plan above              Additional provider notes: GENERAL: Healthy, alert and no distress  EYES: Eyes grossly normal to inspection. No discharge or erythema, or obvious scleral/conjunctival abnormalities.  RESP: No audible wheeze, cough, or visible cyanosis.  No visible retractions or increased work of breathing.    NEURO: Cranial nerves  grossly intact. Mentation and speech appropriate for age.  PSYCH: Mentation appears normal, affect normal/bright, judgement and insight intact, normal speech and appearance well-groomed      Video-Visit Details    Type of service:  Video Visit    Video End Time (time video stopped): 9:24 AM  Originating Location (pt. Location): Home    Distant Location (provider location):  [facility] MEDICINE/OB     Platform used for Video Visit: SysClass      Porfirio Linton MD

## 2021-06-09 NOTE — TELEPHONE ENCOUNTER
RN cannot approve Refill Request    RN can NOT refill this medication med is not covered by policy/route to provider. Last office visit: 2/7/2019 Porfirio Linton MD Last Physical: 3/6/2020 Last MTM visit: Visit date not found Last visit same specialty: 10/21/2019 Patrick Hall MD.  Next visit within 3 mo: Visit date not found  Next physical within 3 mo: Visit date not found      Stefani Plascencia, [facility] Triage/Med Refill 7/4/2020    Requested Prescriptions   Pending Prescriptions Disp Refills     baclofen (LIORESAL) 10 MG tablet [Pharmacy Med Name: BACLOFEN 10MG TABLETS] 30 tablet 2     Sig: TAKE 1 TABLET(10 MG) BY MOUTH EVERY 6 HOURS AS NEEDED       There is no refill protocol information for this order

## 2021-06-09 NOTE — PATIENT INSTRUCTIONS - HE
Check BNP    Recheck CMP    Check  UA.  IF ANY TRACES OF PROTEIN IN THE URINE THEN NEEDS A 24 HOUR URINE COLLECTION TO QUANTIFY PROTEIN.    Schedule ECHO

## 2021-06-09 NOTE — TELEPHONE ENCOUNTER
Spoke with pt and she just wanted to update you that she will need and ok to go back to work ASAP once her echo is done on Thursday. She heard from her boss that if she is unable to return on Monday 6/29 she will lose her job. The pt does not want to lose her job and said that she does feel safe going back to work because her company has gone above and beyond the recommendations for precautions and feels she is more at risk going to the grocery store than going to work. Pt said she will call back after her echo is complete to check in.

## 2021-06-09 NOTE — TELEPHONE ENCOUNTER
Who is calling:  The patient   Reason for Call:  The patient states she would like a call from Shaila Burciaga Mark Richard, MD's nurse to discuss going back to work.   Date of last appointment with primary care:   Okay to leave a detailed message: Yes

## 2021-06-09 NOTE — TELEPHONE ENCOUNTER
On 6-25-20 I discussed the ECHO report with the pt.  I also wrote a letter clearing her to return to work without restriction.  She will access this letter on [place].

## 2021-06-10 NOTE — TELEPHONE ENCOUNTER
I spoke to [facility] - they are need the order/referral with dx about the liver. THey two you placed does not indicate about the liver in the dx. They will not schedule until they receive the correct referral. Please advise and place order if appropriate. Thanks!

## 2021-06-10 NOTE — TELEPHONE ENCOUNTER
Referral Request  Type of referral: Cardiology ( recent echo ) gastroenterology , liver specialist and esophogeal   Who s requesting: Patient  Why the request: Caller stated that she was told to get each referral sent over due to different specialty that patient is getting done.   Have you been seen for this request: N/A  Does patient have a preference on a group/provider? [phone] [facility]   Okay to leave a detailed message?  Yes  ID # FOR [facility] [id number]

## 2021-06-10 NOTE — TELEPHONE ENCOUNTER
Referral Request  Type of referral: Hepatic   Who s requesting: Patient  Why the request:  Per provider  Have you been seen for this request: N/A  Does patient have a preference on a group/provider? [facility], they are still waiting on this referral.  Okay to leave a detailed message?  Yes

## 2021-06-10 NOTE — TELEPHONE ENCOUNTER
Please clarify the last phone message.   Is pt needing multiple referrals to [facility]?  Usually when a pt is in their system they take care of the internal referrals.  Please T up any referrals needed for my signature.

## 2021-06-10 NOTE — TELEPHONE ENCOUNTER
I reached out to [facility] - spoke to Deborah with [facility] Cardiology dept first and they are waiting on the imaging for the Echo. I called our imaging to push those over. [facility] should have it soon.    I also spoke to Mee with [facility] Gastroenterology and she confirmed they have everything we faxed over. She states that one of her colleagues attempted to call patient yesterday to schedule appointment but unable to get a hold of pt. They are just waiting for patient to return the call to schedule the appointment. Mee provided phone # [phone] to me to give to pt to call them back to schedule.     I reached out to patient but unable to get a hold of patient. I left detailed msg on pt's VM. OK to call back if there's any questions.

## 2021-06-10 NOTE — TELEPHONE ENCOUNTER
Type of referral:    [facility]  1. Gastro- per gastro requesting two separate for individual body part.  A. Upper Esophageal   B. Liver    2. Cardiology     What provider or facility are you being referred to?    [facility]    Do you have an appointment scheduled?  No     What is your question?    Caller states if there are 3 or more referral request,the [facility] requires a request for each of the referrals.  2 for gastro and one for cardio.    Okay to leave a detailed message: Yes     Please place referral, send demographics, Insurance info as well as any pertinent medical records. Notify the patient to schedule appointment.

## 2021-06-10 NOTE — TELEPHONE ENCOUNTER
Left message to call back for: Clarification  Information to relay to patient:  MD message below

## 2021-06-10 NOTE — TELEPHONE ENCOUNTER
Called [facility] they stated they never got it and to refax it to them. I refaxed the orders and records to them at the fax # [phone].    Dr Prado [phone]

## 2021-06-10 NOTE — TELEPHONE ENCOUNTER
Who is calling:  Patient   Reason for Call:  Patient states her referrals for   1. Gastro- per gastro requesting two separate for individual body part.  A. Upper Esophageal   B. Liver   2. Cardiology    [facility] clinic did not received patient is requesting to to call [facility] phone # [phone] and give verbal order .[facility] patient ID is [id number]. Please call patient and inform after calling [facility] that referrals processed. [facility] fax # [phone] .  Date of last appointment with primary care: 06/19/20  Okay to leave a detailed message: No

## 2021-06-10 NOTE — TELEPHONE ENCOUNTER
Who is calling:  Patient   Reason for Call:  Calling stating that [facility] never got the liver referral. Please call a verbal  referral into [facility]  instead of faxing. Numbers are listed below  Date of last appointment with primary care: 06/19/20  Okay to leave a detailed message: Yes    Patient would like to know when this has been done. Please call and leave a message is she does not answer.

## 2021-06-13 NOTE — TELEPHONE ENCOUNTER
Refill Approved    Rx renewed per Medication Renewal Policy. Medication was last renewed on 6/9/20.    Kaye Case, [facility] Connection Triage/Med Refill 11/19/2020     Requested Prescriptions   Pending Prescriptions Disp Refills     SUMAtriptan (IMITREX) 100 MG tablet [Pharmacy Med Name: SUMATRIPTAN 100MG TABLETS] 9 tablet 2     Sig: TAKE 1/2 TO 1 TABLET BY MOUTH AT ONSET OF HEADACHE       Triptans Refill Protocol Passed - 11/17/2020  5:46 PM        Passed - PCP or prescribing provider visit in past 12 months       Last office visit with prescriber/PCP: 2/7/2019 Porfirio Linton MD OR same dept: Visit date not found OR same specialty: 10/21/2019 Patrick Hall MD  Last physical: 3/6/2020 Last MTM visit: Visit date not found   Next visit within 3 mo: Visit date not found  Next physical within 3 mo: Visit date not found  Prescriber OR PCP: Porfirio Linton MD  Last diagnosis associated with med order: 1. Intractable migraine with aura without status migrainosus  - SUMAtriptan (IMITREX) 100 MG tablet [Pharmacy Med Name: SUMATRIPTAN 100MG TABLETS]; TAKE 1/2 TO 1 TABLET BY MOUTH AT ONSET OF HEADACHE  Dispense: 9 tablet; Refill: 2    If protocol passes may refill for 12 months if within 3 months of last provider visit (or a total of 15 months).

## 2021-06-13 NOTE — TELEPHONE ENCOUNTER
"Pt is calling in about pain that she has on the skin of her back and stomach that goes around to the mid left side around to her back that started last night. Pt reports the pain is about a \"3\", but has occasional sharp pains that are about \"7-8\". Pt denies any rash or redness in the area, and no itching. Pt denies any fever.   Care advice given, and unable to find protocol for shingle that has no rash, but patient thinks she has shingles. On call doctor called. Pt is not contagious, as she does not have any rash or blisters. Pt has a 3 day appointment at the [facility], so she should have a doctor look at it down there. Pt was called to relay message and she agrees with plan. Pt was advised to call back if she has any other questions or concerns. Pt verbalized understanding.       Clifford Guzman RN [facility] Triage/Medication Refill    Reason for Disposition    SEVERE pain (e.g., excruciating)    Additional Information    Negative: Difficult to awaken or acting confused (e.g., disoriented, slurred speech)    Negative: Sounds like a life-threatening emergency to the triager    Negative: [1] Localized rash AND [2] doesn't match the SYMPTOMS of shingles    Negative: [1] Back pain AND [2] doesn't match the SYMPTOMS of shingles    Negative: Shingles Vaccine (Recombinant Zoster Vaccine; RZV; Shingrix), questions about    Negative: Patient sounds very sick or weak to the triager    Negative: [1] Shingles rash (matches SYMPTOMS) AND [2] weak immune system (e.g., HIV positive,  cancer chemotherapy, chronic steroid treatment, splenectomy) AND [3] NOT taking antiviral medication    Negative: Shingles rash on the eyelid or tip of the nose    Negative: [1] Shingles rash of face AND [2] eye pain or blurred vision    Negative: [1] Shingles rash of face AND [2] facial weakness    Negative: [1] Shingles rash of face or ear AND [2] earache or ringing in the ear    Negative: [1] Shingles rash AND [2] spots start appearing " other places on body    Negative: Fever > 100.5 F (38.1 C)    Protocols used: SHINGLES-A-CHAD

## 2021-06-13 NOTE — PROGRESS NOTES
DIAGNOSIS:  1. Visit for screening mammogram  Mammo Screening Bilateral   2. Screen for colon cancer  Ambulatory referral for Colonoscopy   3. Neck pain  baclofen (LIORESAL) 10 MG tablet   4. Cervical radiculopathy  baclofen (LIORESAL) 10 MG tablet   5. Acute viral bronchitis  Basic Metabolic Panel    HM1(CBC and Differential)    HM1 (CBC with Diff)   6. Elevated LFTs  Hepatic Profile   7. Cervicalgia         PLAN:  There are no Patient Instructions on file for this visit.    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  She also felt chest tightness and was unable to take deep breaths.  She was given nebulizer in the ER and felt better but was unable to walk due to generalized weakness.     Acute viral bronchitis  --Improving with low-dose steroids and nebulizers  Patient refused Tamiflu  --Pro calcitonin is normal therefore discontinue azithromycin  --Continue low-dose prednisone and nebulizers for symptomatic relief.  --Continue dextromethorphan for symptomatic relief of cough.  --patient was able to ambulate to the bathroom without shortness of breath   --discharge home today.       Generalized malaise, myalgias  --Likely due to acute viral syndrome  --Given acute symptoms, postpone flu shot by 1 week      Elevated LFT   --trending downwards  --Likely due to underlying viral illness  --Monospot screen ordered and it is negative  --CT of the abdomen is negative for gallstones   ++++++++++++++++++++++++++++++++++++++++++++++++++++++++++++++++++++++++++++++++++++++++++++++++++++    At present feels better than a week ago.  Still coughing a lot, low grade fever and with shortness of  breath.  Eating ok, appetite is up with the prednisone.  Energy is low.  Myalgias are gone.  No abd pain.  RARE USE OF ALCOHOL            Current Outpatient Prescriptions on File Prior to Visit   Medication Sig Dispense Refill     acetaminophen (TYLENOL) 325 MG tablet Take 650 mg by mouth every 4 (four) hours as needed for pain.       albuterol (PROAIR HFA;PROVENTIL HFA;VENTOLIN HFA) 90 mcg/actuation inhaler Inhale 2 puffs every 6 (six) hours as needed for wheezing. 8.5 g 0     atenolol (TENORMIN) 50 MG tablet Take 50 mg by mouth daily as needed. With flushing of skin/migraines       BIOTIN ORAL Take 1 tablet by mouth daily.       cholecalciferol, vitamin D3, 1,000 unit tablet Take 2,000 Units by mouth daily.       dextromethorphan-guaiFENesin (ROBITUSSIN-DM)  mg/5 mL liquid Take 5 mL by mouth every 4 (four) hours as needed. 118 mL 0     FLUoxetine (PROZAC) 20 MG capsule Take 20 mg by mouth daily.       ONABOTULINUMTOXINA (BOTOX INJ) Inject 1 application as directed every 3 (three) months.        predniSONE (DELTASONE) 20 MG tablet Take 1 tablet (20 mg total) by mouth 2 (two) times a day. 10 tablet 0     SUMAtriptan (IMITREX) 50 MG tablet Take 50 mg by mouth every 2 (two) hours as needed for migraine.       traZODone (DESYREL) 50 MG tablet Take 1 tablet (50 mg total) by mouth at bedtime as needed for sleep. 10 tablet 0     UNABLE TO FIND Take 1 capsule by mouth daily. Med Name:Tuermic       [DISCONTINUED] baclofen (LIORESAL) 10 MG tablet Take 1 tablet po q 6-8 hours PRN pain/spasms. (Patient taking differently: Take 10 mg by mouth every 6 (six) hours as needed. ) 60 tablet 3     famotidine (PEPCID) 20 MG tablet Take 1 tablet (20 mg total) by mouth 2 (two) times a day for 10 days. 20 tablet 0     magnesium oxide (MAG-OX) 400 mg tablet Take 400 mg by mouth daily.       No current facility-administered medications on file prior to visit.        Pmh: reviewed  Psh: reviewed  Allergy:  reviewed      EXAM:    BP  102/70 (Patient Site: Right Arm, Patient Position: Sitting, Cuff Size: Adult Large)  Pulse 80  Temp 99  F (37.2  C) (Oral)   Resp 18  Wt 193 lb (87.5 kg)  SpO2 96%  BMI 30.23 kg/m2  GEN:   ALERT, NAD, ORIENTED TIMES THREE  NECK: SUPPLE WITHOUT ADENOPATHY OR THYROMEGALY  LUNGS: CTA  COR: RRR WITHOUT MURMUR  ABD: SOFT, +BS, NONTX WITHOUT GUARDING OR PERITONEAL SIGNS  SKIN: NO RASH , ULCERS OR LESIONS NOTED  EXT: WITHOUT EDEMA OR SWELLING    Recent Results (from the past 168 hour(s))   ECG 12 lead nursing unit performed    Collection Time: 10/19/17 12:12 PM   Result Value Ref Range    SYSTOLIC BLOOD PRESSURE  mmHg    DIASTOLIC BLOOD PRESSURE  mmHg    VENTRICULAR RATE 105 BPM    ATRIAL RATE 105 BPM    P-R INTERVAL 152 ms    QRS DURATION 68 ms    Q-T INTERVAL 332 ms    QTC CALCULATION (BEZET) 438 ms    P Axis 78 degrees    R AXIS 45 degrees    T AXIS 51 degrees    MUSE DIAGNOSIS       Sinus tachycardia  Otherwise normal ECG  No previous ECGs available  Confirmed by ARUN GRIFFIN MD LOC: ([id number]) on 10/19/2017 4:08:43 PM     Comprehensive Metabolic Panel    Collection Time: 10/19/17 12:30 PM   Result Value Ref Range    Sodium 141 136 - 145 mmol/L    Potassium 4.6 3.5 - 5.0 mmol/L    Chloride 105 98 - 107 mmol/L    CO2 27 22 - 31 mmol/L    Anion Gap, Calculation 9 5 - 18 mmol/L    Glucose 97 70 - 125 mg/dL    BUN 11 8 - 22 mg/dL    Creatinine 0.90 0.60 - 1.10 mg/dL    GFR MDRD Af Amer >60 >60 mL/min/1.73m2    GFR MDRD Non Af Amer >60 >60 mL/min/1.73m2    Bilirubin, Total 0.3 0.0 - 1.0 mg/dL    Calcium 9.7 8.5 - 10.5 mg/dL    Protein, Total 7.8 6.0 - 8.0 g/dL    Albumin 3.9 3.5 - 5.0 g/dL    Alkaline Phosphatase 179 (H) 45 - 120 U/L     (H) 0 - 40 U/L     (H) 0 - 45 U/L   Lactic Acid    Collection Time: 10/19/17 12:30 PM   Result Value Ref Range    Lactic Acid 1.7 0.5 - 2.2 mmol/L   Procalcitonin    Collection Time: 10/19/17 12:30 PM   Result Value Ref Range    Procalcitonin 0.09 0.00 - 0.49 ng/mL    Influenza A/B Rapid Test    Collection Time: 10/19/17 12:30 PM   Result Value Ref Range    Influenza  A, Rapid Antigen No Influenza A antigen detected No Influenza A antigen detected    Influenza B, Rapid Antigen No Influenza B antigen detected No Influenza B antigen detected   HM1 (CBC with Diff)    Collection Time: 10/19/17 12:30 PM   Result Value Ref Range    WBC 5.2 4.0 - 11.0 thou/uL    RBC 4.66 3.80 - 5.40 mill/uL    Hemoglobin 14.7 12.0 - 16.0 g/dL    Hematocrit 43.1 35.0 - 47.0 %    MCV 93 80 - 100 fL    MCH 31.5 27.0 - 34.0 pg    MCHC 34.1 32.0 - 36.0 g/dL    RDW 11.9 11.0 - 14.5 %    Platelets 242 140 - 440 thou/uL    MPV 10.1 8.5 - 12.5 fL    Neutrophils % 52 50 - 70 %    Lymphocytes % 37 20 - 40 %    Monocytes % 8 2 - 10 %    Eosinophils % 2 0 - 6 %    Basophils % 1 0 - 2 %    Neutrophils Absolute 2.7 2.0 - 7.7 thou/uL    Lymphocytes Absolute 1.9 0.8 - 4.4 thou/uL    Monocytes Absolute 0.4 0.0 - 0.9 thou/uL    Eosinophils Absolute 0.1 0.0 - 0.4 thou/uL    Basophils Absolute 0.0 0.0 - 0.2 thou/uL   Blood Culture 1st    Collection Time: 10/19/17 12:30 PM   Result Value Ref Range    Anaerobic Blood Culture Bottle No Growth No Growth, No organisms seen, bottle returned to instrument, Specimen not received    Aerobic Blood Culture Bottle No Growth No Growth, No organisms seen, bottle returned to instrument, Specimen not received   Mononucleosis Screen    Collection Time: 10/19/17 12:30 PM   Result Value Ref Range    Mono Screen Negative Negative   Acetaminophen(Tylenol )    Collection Time: 10/19/17 12:30 PM   Result Value Ref Range    Acetaminophen <3.0 (L) 10.0 - 20.0 ug/mL   Acetaminophen(Tylenol )    Collection Time: 10/19/17 12:30 PM   Result Value Ref Range    Acetaminophen <3.0 (L) 10.0 - 20.0 ug/mL   Blood Culture 2nd    Collection Time: 10/19/17  3:00 PM   Result Value Ref Range    Anaerobic Blood Culture Bottle No Growth No Growth, No organisms seen, bottle returned to instrument, Specimen not  received    Aerobic Blood Culture Bottle No Growth No Growth, No organisms seen, bottle returned to instrument, Specimen not received   CMV (Cytomegalovirus) Antibodies IgG & IgM    Collection Time: 10/19/17  3:00 PM   Result Value Ref Range    Cytomegalovirus Antibody IgM Negative Negative    Cytomegalovirus Antibody IgG Positive (!) Negative   Hepatitis Acute Evaluation    Collection Time: 10/19/17  3:01 PM   Result Value Ref Range    Hepatitis A Antibody, IgM Negative Negative    Hepatitis B Core Earlene, IgM Negative Negative    Hepatitis B Surface Ag Negative Negative    Hepatitis C Ab Negative Negative   Abad-Barr Virus (EBV) Antibody Profile    Collection Time: 10/19/17  3:01 PM   Result Value Ref Range    EBV VCA IGM AB, S Negative Negative    EBV VCA IGG AB, S Positive Negative    EBNA AB, S Positive Negative    INTERPRETATION Results suggest past infection.    Basic Metabolic Panel    Collection Time: 10/20/17  6:12 AM   Result Value Ref Range    Sodium 137 136 - 145 mmol/L    Potassium 5.1 (H) 3.5 - 5.0 mmol/L    Chloride 105 98 - 107 mmol/L    CO2 23 22 - 31 mmol/L    Anion Gap, Calculation 9 5 - 18 mmol/L    Glucose 112 70 - 125 mg/dL    Calcium 9.5 8.5 - 10.5 mg/dL    BUN 10 8 - 22 mg/dL    Creatinine 0.79 0.60 - 1.10 mg/dL    GFR MDRD Af Amer >60 >60 mL/min/1.73m2    GFR MDRD Non Af Amer >60 >60 mL/min/1.73m2   Hepatic Profile    Collection Time: 10/20/17  6:12 AM   Result Value Ref Range    Bilirubin, Total 0.4 0.0 - 1.0 mg/dL    Bilirubin, Direct 0.1 <=0.5 mg/dL    Protein, Total 6.6 6.0 - 8.0 g/dL    Albumin 3.3 (L) 3.5 - 5.0 g/dL    Alkaline Phosphatase 152 (H) 45 - 120 U/L     (H) 0 - 40 U/L     (H) 0 - 45 U/L   HM2(CBC w/o Differential)    Collection Time: 10/20/17  6:12 AM   Result Value Ref Range    WBC 7.4 4.0 - 11.0 thou/uL    RBC 3.97 3.80 - 5.40 mill/uL    Hemoglobin 12.3 12.0 - 16.0 g/dL    Hematocrit 36.7 35.0 - 47.0 %    MCV 92 80 - 100 fL    MCH 31.0 27.0 - 34.0 pg    MCHC  33.5 32.0 - 36.0 g/dL    RDW 11.9 11.0 - 14.5 %    Platelets 203 140 - 440 thou/uL    MPV 10.4 8.5 - 12.5 fL   Procalcitonin    Collection Time: 10/20/17  6:12 AM   Result Value Ref Range    Procalcitonin <0.05 0.00 - 0.49 ng/mL   Mononucleosis Screen    Collection Time: 10/20/17  6:12 AM   Result Value Ref Range    Mono Screen Negative Negative   Hepatic Profile    Collection Time: 10/21/17  6:17 AM   Result Value Ref Range    Bilirubin, Total 0.3 0.0 - 1.0 mg/dL    Bilirubin, Direct <0.1 <=0.5 mg/dL    Protein, Total 6.9 6.0 - 8.0 g/dL    Albumin 3.5 3.5 - 5.0 g/dL    Alkaline Phosphatase 123 (H) 45 - 120 U/L    AST 67 (H) 0 - 40 U/L     (H) 0 - 45 U/L   Basic Metabolic Panel    Collection Time: 10/21/17  6:17 AM   Result Value Ref Range    Sodium 142 136 - 145 mmol/L    Potassium 4.4 3.5 - 5.0 mmol/L    Chloride 106 98 - 107 mmol/L    CO2 29 22 - 31 mmol/L    Anion Gap, Calculation 7 5 - 18 mmol/L    Glucose 99 70 - 125 mg/dL    Calcium 9.6 8.5 - 10.5 mg/dL    BUN 15 8 - 22 mg/dL    Creatinine 0.82 0.60 - 1.10 mg/dL    GFR MDRD Af Amer >60 >60 mL/min/1.73m2    GFR MDRD Non Af Amer >60 >60 mL/min/1.73m2        Study Result   XR CHEST PA AND LATERAL  10/20/2017 8:29 AM     INDICATION: cough  COMPARISON: 10/19/2017     FINDINGS: Negative chest

## 2021-06-13 NOTE — TELEPHONE ENCOUNTER
RN cannot approve Refill Request    RN can NOT refill this medication med is not covered by policy/route to provider. Last office visit: 2/7/2019 Porfirio Linton MD Last Physical: 3/6/2020 Last MTM visit: Visit date not found Last visit same specialty: 10/21/2019 Patrick Hall MD.  Next visit within 3 mo: Visit date not found  Next physical within 3 mo: Visit date not found      Janneth Bowden, [facility] Triage/Med Refill 11/19/2020    Requested Prescriptions   Pending Prescriptions Disp Refills     baclofen (LIORESAL) 10 MG tablet 30 tablet 2       There is no refill protocol information for this order

## 2021-06-13 NOTE — TELEPHONE ENCOUNTER
Requested Prescriptions     Pending Prescriptions Disp Refills     baclofen (LIORESAL) 10 MG tablet 30 tablet 2     Last Office Visit:  Pt had virtual visit on 6/19/2020  Last Refill:  10/25/2020  CSA:  N/A  Date of Last Labs:  6/19/2020

## 2021-06-15 NOTE — TELEPHONE ENCOUNTER
Refill Approved    Rx renewed per Medication Renewal Policy. Medication was last renewed on 6/9/20.    Porfirio Bridges, [facility] Connection Triage/Med Refill 2/28/2021     Requested Prescriptions   Pending Prescriptions Disp Refills     FLUoxetine (PROZAC) 20 MG capsule [Pharmacy Med Name: FLUOXETINE 20MG CAPSULES] 90 capsule 1     Sig: TAKE 1 CAPSULE(20 MG) BY MOUTH DAILY       SSRI Refill Protocol  Passed - 2/27/2021 10:19 AM        Passed - PCP or prescribing provider visit in last year     Last office visit with prescriber/PCP: 2/7/2019 Porfirio Linton MD OR same dept: Visit date not found OR same specialty: 10/21/2019 Patrick Hall MD  Last physical: 3/6/2020 Last MTM visit: Visit date not found   Next visit within 3 mo: Visit date not found  Next physical within 3 mo: Visit date not found  Prescriber OR PCP: Porfirio Linton MD  Last diagnosis associated with med order: 1. Major depressive disorder with single episode, remission status unspecified  - FLUoxetine (PROZAC) 20 MG capsule [Pharmacy Med Name: FLUOXETINE 20MG CAPSULES]; TAKE 1 CAPSULE(20 MG) BY MOUTH DAILY  Dispense: 90 capsule; Refill: 1    If protocol passes may refill for 12 months if within 3 months of last provider visit (or a total of 15 months).

## 2021-06-16 NOTE — TELEPHONE ENCOUNTER
Telephone Encounter by Diamond Parekh CMA at 10/24/2019  4:30 PM     Author: Diamond Parekh CMA Service: -- Author Type: Certified Medical Assistant    Filed: 10/24/2019  4:30 PM Encounter Date: 10/24/2019 Status: Signed    : Diamond Parekh CMA (Certified Medical Assistant)       Porfirio Linton MD [place], Mark Care Team Pool 23 minutes ago (4:06 PM)      May use saved or reserved slots for her needed pre-op appt    Routing comment

## 2021-06-16 NOTE — TELEPHONE ENCOUNTER
Telephone Encounter by Cindy Carter LPN at 10/29/2019  9:25 AM     Author: Cindy Carter LPN Service: -- Author Type: Licensed Nurse    Filed: 10/29/2019  9:31 AM Encounter Date: 10/28/2019 Status: Signed    : Cindy Carter LPN (Licensed Nurse)       Pt has productive phlegmy cough, denies fever, chest congestion, shortness of breath/wheezing. Pt states cough is painful due to abdominal hernia.  Pt is requesting refill of codeine-guaiFENesin (CODEINE-GUAIFENESIN)  mg/5 mL liquid to use at bedtime.  Please advise.    Medication: codeine-guaiFENesin (CODEINE-GUAIFENESIN)  mg/5 mL liquid    Last Date Filled: 10/21/19     pulled: YES      Only PCP Prescribing?: NO    Taken as prescribed from physician notes?: Seen by Dr. Hall 10/21/19 for cough  2. Cough  Exam findings were discussed   Plan:   - albuterol (PROAIR HFA;PROVENTIL HFA;VENTOLIN HFA) 90 mcg/actuation inhaler; Inhale 2 puffs every 6 (six) hours as needed for wheezing.  Dispense: 1 each; Refill: 0  - doxycycline (MONODOX) 100 MG capsule; Take 1 capsule (100 mg total) by mouth 2 (two) times a day for 10 days.  Dispense: 20 capsule; Refill: 0  - codeine-guaiFENesin (CODEINE-GUAIFENESIN)  mg/5 mL liquid; Take 5-10 mL by mouth 4 (four) times a day as needed for cough.  Dispense: 200 mL; Refill: 0     CSA in last year: NO    Random Utox in last year: NO    Opioids + benzodiazepines? NO

## 2021-06-17 NOTE — TELEPHONE ENCOUNTER
Telephone Encounter by Sabrina Marinelli LPN at 8/7/2020 12:10 PM     Author: Sabrina Marinelli LPN Service: -- Author Type: Licensed Nurse    Filed: 8/7/2020 12:16 PM Encounter Date: 8/6/2020 Status: Signed    : Sabrina Marinelli LPN (Licensed Nurse)       Patient Returning Call  Reason for call:  Patient returning call   Information relayed to patient:  Jennifer Rolon MA           10:48 AM   Note      Left message to call back for: Clarification  Information to relay to patient:  MD message below         August 6, 2020   Porfirio Linton MD   to Porfirio Linton Care Team [place]         9:23 PM   Note      Please clarify the last phone message.   Is pt needing multiple referrals to [facility]?  Usually when a pt is in their system they take care of the internal referrals.  Please T up any referrals needed for my signature.      Patient has additional questions:  No  If YES, what are your questions/concerns:  Patient states if the referral is less than two [place] can do internal referral , if it is  more than two the PCP had to send referral .   Okay to leave a detailed message?: No

## 2021-06-17 NOTE — TELEPHONE ENCOUNTER
RN cannot approve Refill Request    RN can NOT refill this medication med is not covered by policy/route to provider. Last office visit: 2/7/2019 Porfirio Linton MD Last Physical: 3/6/2020 Last MTM visit: Visit date not found Last visit same specialty: 10/21/2019 Patrick Hall MD.  Next visit within 3 mo: Visit date not found  Next physical within 3 mo: Visit date not found      Janneth Bowden, [facility] Triage/Med Refill 5/2/2021    Requested Prescriptions   Pending Prescriptions Disp Refills     baclofen (LIORESAL) 10 MG tablet [Pharmacy Med Name: BACLOFEN 10MG TABLETS] 30 tablet 2     Sig: TAKE 1 TABLET(10 MG) BY MOUTH EVERY 6 HOURS AS NEEDED       There is no refill protocol information for this order

## 2021-06-17 NOTE — PATIENT INSTRUCTIONS - HE
Patient Instructions by Jeovanny Alcocer MD at 11/11/2019  6:20 PM     Author: Jeovanny Alcocer MD Service: -- Author Type: Physician    Filed: 11/11/2019  7:33 PM Encounter Date: 11/11/2019 Status: Addendum    : Jeovanny Alcocer MD (Physician)    Related Notes: Original Note by Jeovanny Alcocer MD (Physician) filed at 11/11/2019  7:32 PM       -Rapid strep test is negative.  A confirmatory strep test is in process and will be finalized tomorrow.  -We will only reach out to you if the confirmatory strep test is positive.  An antibiotic will be prescribed if this test is positive.  -Chest x-ray is normal/negative.  -No indication for antibiotic therapy at this time.  -Take prednisone burst/taper as directed.  Be sure to take this with food.  -Take omeprazole once daily for the next month to treat possible underlying reflux.  -Continue using your albuterol inhaler as needed.  -Recommend using over-the-counter guaifenesin + dextromethorphan (such as Robitussin-DM) as needed for daytime cough.  Be sure to hydrate well while taking this medication.  -Continue using guaifenesin + codeine as needed at bedtime for cough suppression.  Patient Education     Viral Bronchitis (Adult)    You have a viral bronchitis. Bronchitis is inflammation and swelling of the lining of the lungs. This is often caused by an infection. Symptoms include a dry, hacking cough that is worse at night. The cough may bring up yellow-green mucus. You may also feel short of breath or wheeze. Other symptoms may include tiredness, chest discomfort, and chills.  Bronchitis that is caused by a virus is not treated with antibiotics. Instead, medicines may be given to help relieve symptoms. Symptoms can last up to 2 weeks, although the cough may last much longer.  This illness is contagious during the first few days and is spread through the air by coughing and sneezing, or by direct contact (touching the sick person and then touching your  own eyes, nose, or mouth).  Most viral illnesses resolve within 10 to 14 days with rest and simple home remedies, although they may sometimes last for several weeks.  Home care    If symptoms are severe, rest at home for the first 2 to 3 days. When you go back to your usual activities, don't let yourself get too tired.    Do not smoke. Also avoid being exposed to secondhand smoke.    You may use over-the-counter medicine to control fever or pain, unless another pain medicine was prescribed. If you have chronic liver or kidney disease or have ever had a stomach ulcer or gastrointestinal bleeding, talk with your healthcare provider before using these medicines. Also talk to your provider if you are taking medicine to prevent blood clots. Aspirin should never be given to anyone younger than 18 years of age who is ill with a viral infection or fever. It may cause severe liver or brain damage.    Your appetite may be poor, so a light diet is fine. Avoid dehydration by drinking 6 to 8 glasses of fluids per day (such as water, soft drinks, sports drinks, juices, tea, or soup). Extra fluids will help loosen secretions in the nose and lungs.    Over-the-counter cough, cold, and sore-throat medicines will not shorten the length of the illness, but they may help to reduce symptoms. Don't use decongestants if you have high blood pressure.  Follow-up care  Follow up with your healthcare provider, or as advised. If you had an X-ray or ECG (electrocardiogram), a specialist will review it. You will be notified of any new findings that may affect your care.  If you are age 65 or older, or if you have a chronic lung disease or condition that affects your immune system, or you smoke, ask your healthcare provider about getting a pneumococcal vaccine and a yearly flu shot (influenza vaccine).  When to seek medical advice  Call your healthcare provider right away if any of these occur:    Fever of 100.4 F (38 C) or higher, or as directed  by your healthcare provider    Coughing up increased amounts of colored sputum    Weakness, drowsiness, headache, facial pain, ear pain, or a stiff neck  Call 911  Call 911 if any of these occur:    Coughing up blood    Worsening weakness, drowsiness, headache, or stiff neck    Trouble breathing, wheezing, or pain with breathing  Date Last Reviewed: 9/13/2015 2000-2017 [employer]. [address] [place] [address]. All rights reserved. This information is not intended as a substitute for professional medical care. Always follow your healthcare professional's instructions.           Patient Education     Chronic Cough with Uncertain Cause (Adult)    Everyone has had a cough as part of the common cold, flu, or bronchitis. This kind of cough occurs along with an achy feeling, low-grade fever, nasal and sinus congestion, and a scratchy or sore throat. This usually gets better in 2 to 3 weeks. A cough that lasts longer than 3 weeks may be due to other causes.  If your cough does not improve over the next 2 weeks, further testing may be needed. Follow up with your healthcare provider as advised. Cough suppressants may be recommended. Based on your exam today, the exact cause of your cough is not certain. Below are some common causes for persistent cough.  Smoker's cough  Smokers cough doesnt go away. If you continue to smoke, it only gets worse. The cough is from irritation in the air passages. Talk to your healthcare provider about quitting. Medicines or nicotine-replacement products, like gum or the patch, may make quitting easier.  Postnasal drip  A cough that is worse at night may be due to postnasal drip. Excess mucus in the nose drains from the back of your nose to your throat. This triggers the cough reflex. Postnasal drip may be due to a sinus infection or allergy. Common allergens include dust, tobacco smoke (both inhaled and secondhand smoke), environmental pollutants, pollen, mold,  pets, cleaning agents, room deodorizers, and chemical fumes. Over-the-counter antihistamines or decongestants may be helpful for allergies. A sinus infection may requires antibiotic treatment. See your healthcare provider if symptoms continue.  Medicines  Certain prescribed medicines can cause a chronic cough in some people:    ACE inhibitors for high blood pressure. These include benazepril, captopril, enalapril, fosinopril, lisinopril, quinapril, ramipril, and others.    Beta-blockers for high blood pressure and other conditions. These include propranolol, atenolol, metoprolol, nadolol, and others.  Let your healthcare provider know if you are taking any of these.  Asthma  Cough may be the only sign of mild asthma. You may have tests to find out if asthma is causing your cough. You may also take asthma medicine on a trial basis.  Acid reflux (heartburn, GERD)  The esophagus is the tube that carries food from the mouth to the stomach. A valve at its lower end prevents stomach acids from flowing upward. If this valve does not work properly, acid from the stomach enters the esophagus. This may cause a burning pain in the upper abdomen or lower chest, belching, or cough. Symptoms are often worse when lying flat. Avoid eating or drinking before bedtime. Try using extra pillows to raise your upper body, or place 4-inch blocks under the head of your bed. You may try an over-the-counter antacid or an acid-blocking medicine such as famotidine, cimetidine, ranitidine, esomeprazole, lansoprazole, or omeprazole. Stronger medicines for this condition can be prescribed by your healthcare provider.  Follow-up care  Follow up with your healthcare provider, or as advised, if your cough does not improve. Further testing may be needed.  Note: If an X-ray was taken, a specialist will review it. You will be notified of any new findings that may affect your care.  When to seek medical advice  Call your healthcare provider right away if  any of these occur:    Mild wheezing or difficulty breathing    Fever of 100.4 F (38 C) or higher, or as directed by your healthcare provider    Unexpected weight loss    Coughing up large amounts of colored sputum    Night sweats (sheets and pajamas get soaking wet)  Call 911  Call 911 if any of these occur:    Coughing up blood    Moderate to severe trouble breathing or wheezing  Date Last Reviewed: 9/13/2015 2000-2017 [employer]. [address] [place] [address]. All rights reserved. This information is not intended as a substitute for professional medical care. Always follow your healthcare professional's instructions.

## 2021-06-18 NOTE — LETTER
Letter by Porfirio Linton MD at      Author: Porfirio Linton MD Service: -- Author Type: --    Filed:  Encounter Date: 1/31/2019 Status: (Other)       Keiry Mchugh  [address]  [facility] [address]             February 1, 2019         Dear Ms. Mchugh,    Below are the results from your recent visit:    Resulted Orders   Urinalysis-UC if Indicated   Result Value Ref Range    Color, UA Other (!) Colorless, Yellow, Straw, Light Yellow      Comment:      COLOR: DARK YELLOW    Clarity, UA Clear Clear    Glucose, UA Negative Negative    Bilirubin, UA Negative Negative    Ketones, UA Negative Negative    Specific Gravity, UA 1.020 1.005 - 1.030    Blood, UA Negative Negative    pH, UA 5.5 5.0 - 8.0    Protein, UA Negative Negative mg/dL    Urobilinogen, UA 0.2 E.U./dL 0.2 E.U./dL, 1.0 E.U./dL    Nitrite, UA Negative Negative    Leukocytes, UA Trace (!) Negative    Bacteria, UA None Seen None Seen hpf    RBC, UA None Seen None Seen, 0-2 hpf    WBC, UA 0-5 None Seen, 0-5 hpf    Squam Epithel, UA 0-5 None Seen, 0-5 lpf    Narrative    Urine Culture ordered based on [facility] criteria   Comprehensive Metabolic Panel   Result Value Ref Range    Sodium 139 136 - 145 mmol/L    Potassium 4.9 3.5 - 5.0 mmol/L    Chloride 100 98 - 107 mmol/L    CO2 28 22 - 31 mmol/L    Anion Gap, Calculation 11 5 - 18 mmol/L    Glucose 106 70 - 125 mg/dL    BUN 12 8 - 22 mg/dL    Creatinine 1.23 (H) 0.60 - 1.10 mg/dL    GFR MDRD Af Amer 56 (L) >60 mL/min/1.73m2    GFR MDRD Non Af Amer 46 (L) >60 mL/min/1.73m2    Bilirubin, Total 0.5 0.0 - 1.0 mg/dL    Calcium 10.1 8.5 - 10.5 mg/dL    Protein, Total 7.6 6.0 - 8.0 g/dL    Albumin 3.9 3.5 - 5.0 g/dL    Alkaline Phosphatase 278 (H) 45 - 120 U/L    AST 52 (H) 0 - 40 U/L     (H) 0 - 45 U/L    Narrative    Fasting Glucose reference range is 70-99 mg/dL per  American Diabetes Association (ADA) guidelines.   HM1 (CBC with Diff)   Result Value Ref Range    WBC  6.9 4.0 - 11.0 thou/uL    RBC 4.58 3.80 - 5.40 mill/uL    Hemoglobin 13.9 12.0 - 16.0 g/dL    Hematocrit 41.8 35.0 - 47.0 %    MCV 91 80 - 100 fL    MCH 30.3 27.0 - 34.0 pg    MCHC 33.2 32.0 - 36.0 g/dL    RDW 11.6 11.0 - 14.5 %    Platelets 301 140 - 440 thou/uL    MPV 7.6 7.0 - 10.0 fL    Neutrophils % 71 (H) 50 - 70 %    Lymphocytes % 20 20 - 40 %    Monocytes % 6 2 - 10 %    Eosinophils % 2 0 - 6 %    Basophils % 0 0 - 2 %    Neutrophils Absolute 4.9 2.0 - 7.7 thou/uL    Lymphocytes Absolute 1.4 0.8 - 4.4 thou/uL    Monocytes Absolute 0.4 0.0 - 0.9 thou/uL    Eosinophils Absolute 0.1 0.0 - 0.4 thou/uL    Basophils Absolute 0.0 0.0 - 0.2 thou/uL   Culture, Urine   Result Value Ref Range    Culture No Growth        Hi Samantha:  The urine culture showed no infection.    Please call with questions or contact us using [employer].    Sincerely,        Electronically signed by Porfirio Linton MD

## 2021-06-18 NOTE — LETTER
Letter by Porfirio Linton MD at      Author: Porfirio Linton MD Service: -- Author Type: --    Filed:  Encounter Date: 2/12/2019 Status: (Other)       Keiry Mchugh  [address] [place]  [place] [address]             February 12, 2019         Dear Ms. Mchugh,    Below are the results from your recent visit:    Resulted Orders   Comprehensive Metabolic Panel   Result Value Ref Range    Sodium 142 136 - 145 mmol/L    Potassium 4.8 3.5 - 5.0 mmol/L    Chloride 106 98 - 107 mmol/L    CO2 24 22 - 31 mmol/L    Anion Gap, Calculation 12 5 - 18 mmol/L    Glucose 83 70 - 125 mg/dL    BUN 19 8 - 22 mg/dL    Creatinine 0.88 0.60 - 1.10 mg/dL    GFR MDRD Af Amer >60 >60 mL/min/1.73m2    GFR MDRD Non Af Amer >60 >60 mL/min/1.73m2    Bilirubin, Total 0.2 0.0 - 1.0 mg/dL    Calcium 10.3 8.5 - 10.5 mg/dL    Protein, Total 7.6 6.0 - 8.0 g/dL    Albumin 3.7 3.5 - 5.0 g/dL    Alkaline Phosphatase 229 (H) 45 - 120 U/L    AST 99 (H) 0 - 40 U/L     (H) 0 - 45 U/L    Narrative    Fasting Glucose reference range is 70-99 mg/dL per  American Diabetes Association (ADA) guidelines.   Immunofixation Electrophoresis, Serum   Result Value Ref Range    Immunofixation Electrophoresis, Serum Unremarkable immunofixation electrophoresis.      Path ICD: R77.8     Interpreted By: Nicholas Salguero MD        Hi Samantha:  The immunofilxation testing was unremarkable.  I read your recent Hepatology consultation.  Will watch for Dr Mosqueda's notes.      Please call with questions or contact us using [employer].    Sincerely,        Electronically signed by Porfirio Linton MD

## 2021-06-18 NOTE — LETTER
Letter by Porfirio Linton MD at      Author: Porfirio Linton MD Service: -- Author Type: --    Filed:  Encounter Date: 3/9/2019 Status: (Other)       Keiry Mchugh  [address]  [place] [address]             March 9, 2019         Dear Ms. Mchugh,    Below are the results from your recent visit:    Resulted Orders   Comprehensive Metabolic Panel   Result Value Ref Range    Sodium 140 136 - 145 mmol/L    Potassium 5.0 3.5 - 5.0 mmol/L    Chloride 105 98 - 107 mmol/L    CO2 25 22 - 31 mmol/L    Anion Gap, Calculation 10 5 - 18 mmol/L    Glucose 90 70 - 125 mg/dL    BUN 21 8 - 22 mg/dL    Creatinine 0.92 0.60 - 1.10 mg/dL    GFR MDRD Af Amer >60 >60 mL/min/1.73m2    GFR MDRD Non Af Amer >60 >60 mL/min/1.73m2    Bilirubin, Total 0.6 0.0 - 1.0 mg/dL    Calcium 10.5 8.5 - 10.5 mg/dL    Protein, Total 7.7 6.0 - 8.0 g/dL    Albumin 4.1 3.5 - 5.0 g/dL    Alkaline Phosphatase 133 (H) 45 - 120 U/L    AST 63 (H) 0 - 40 U/L     (H) 0 - 45 U/L    Narrative    Fasting Glucose reference range is 70-99 mg/dL per  American Diabetes Association (ADA) guidelines.   HM1 (CBC with Diff)   Result Value Ref Range    WBC 4.5 4.0 - 11.0 thou/uL    RBC 4.53 3.80 - 5.40 mill/uL    Hemoglobin 14.0 12.0 - 16.0 g/dL    Hematocrit 41.8 35.0 - 47.0 %    MCV 92 80 - 100 fL    MCH 30.9 27.0 - 34.0 pg    MCHC 33.5 32.0 - 36.0 g/dL    RDW 13.1 11.0 - 14.5 %    Platelets 306 140 - 440 thou/uL    MPV 7.0 7.0 - 10.0 fL    Neutrophils % 50 50 - 70 %    Lymphocytes % 41 (H) 20 - 40 %    Monocytes % 6 2 - 10 %    Eosinophils % 4 0 - 6 %    Basophils % 1 0 - 2 %    Neutrophils Absolute 2.2 2.0 - 7.7 thou/uL    Lymphocytes Absolute 1.8 0.8 - 4.4 thou/uL    Monocytes Absolute 0.3 0.0 - 0.9 thou/uL    Eosinophils Absolute 0.2 0.0 - 0.4 thou/uL    Basophils Absolute 0.0 0.0 - 0.2 thou/uL       Hi Samantha:  Your pre-op labs look good.  The hemoglobin is back up to normal.  The liver enzymes remain elevated but have improved from one  month ago.    Please call with questions or contact us using [employer]    Sincerely,        Electronically signed by Porfirio Linton MD

## 2021-06-18 NOTE — LETTER
Letter by Porfirio Linton MD at      Author: Porfirio Linton MD Service: -- Author Type: --    Filed:  Encounter Date: 1/31/2019 Status: (Other)       Keiry Mchugh  [address]  [facility] [address]             January 31, 2019         Dear Ms. Mchugh,    Below are the results from your recent visit:    Resulted Orders   Urinalysis-UC if Indicated   Result Value Ref Range    Color, UA Other (!) Colorless, Yellow, Straw, Light Yellow      Comment:      COLOR: DARK YELLOW    Clarity, UA Clear Clear    Glucose, UA Negative Negative    Bilirubin, UA Negative Negative    Ketones, UA Negative Negative    Specific Gravity, UA 1.020 1.005 - 1.030    Blood, UA Negative Negative    pH, UA 5.5 5.0 - 8.0    Protein, UA Negative Negative mg/dL    Urobilinogen, UA 0.2 E.U./dL 0.2 E.U./dL, 1.0 E.U./dL    Nitrite, UA Negative Negative    Leukocytes, UA Trace (!) Negative    Bacteria, UA None Seen None Seen hpf    RBC, UA None Seen None Seen, 0-2 hpf    WBC, UA 0-5 None Seen, 0-5 hpf    Squam Epithel, UA 0-5 None Seen, 0-5 lpf    Narrative    Urine Culture ordered based on [facility] criteria   Comprehensive Metabolic Panel   Result Value Ref Range    Sodium 139 136 - 145 mmol/L    Potassium 4.9 3.5 - 5.0 mmol/L    Chloride 100 98 - 107 mmol/L    CO2 28 22 - 31 mmol/L    Anion Gap, Calculation 11 5 - 18 mmol/L    Glucose 106 70 - 125 mg/dL    BUN 12 8 - 22 mg/dL    Creatinine 1.23 (H) 0.60 - 1.10 mg/dL    GFR MDRD Af Amer 56 (L) >60 mL/min/1.73m2    GFR MDRD Non Af Amer 46 (L) >60 mL/min/1.73m2    Bilirubin, Total 0.5 0.0 - 1.0 mg/dL    Calcium 10.1 8.5 - 10.5 mg/dL    Protein, Total 7.6 6.0 - 8.0 g/dL    Albumin 3.9 3.5 - 5.0 g/dL    Alkaline Phosphatase 278 (H) 45 - 120 U/L    AST 52 (H) 0 - 40 U/L     (H) 0 - 45 U/L    Narrative    Fasting Glucose reference range is 70-99 mg/dL per  American Diabetes Association (ADA) guidelines.   HM1 (CBC with Diff)   Result Value Ref Range    WBC  6.9 4.0 - 11.0 thou/uL    RBC 4.58 3.80 - 5.40 mill/uL    Hemoglobin 13.9 12.0 - 16.0 g/dL    Hematocrit 41.8 35.0 - 47.0 %    MCV 91 80 - 100 fL    MCH 30.3 27.0 - 34.0 pg    MCHC 33.2 32.0 - 36.0 g/dL    RDW 11.6 11.0 - 14.5 %    Platelets 301 140 - 440 thou/uL    MPV 7.6 7.0 - 10.0 fL    Neutrophils % 71 (H) 50 - 70 %    Lymphocytes % 20 20 - 40 %    Monocytes % 6 2 - 10 %    Eosinophils % 2 0 - 6 %    Basophils % 0 0 - 2 %    Neutrophils Absolute 4.9 2.0 - 7.7 thou/uL    Lymphocytes Absolute 1.4 0.8 - 4.4 thou/uL    Monocytes Absolute 0.4 0.0 - 0.9 thou/uL    Eosinophils Absolute 0.1 0.0 - 0.4 thou/uL    Basophils Absolute 0.0 0.0 - 0.2 thou/uL       Hi Samantha:  As we discussed today the liver enzymes are rising and the renal function is decreasing.  Because of this in combination with your persistent fever and diverticulitis I have sent you to the ER for another evaluation and  probable in-patient treatment.  I did review your case with an ER physician at [facility].    Please call with questions or contact us using [employer].    Sincerely,        Electronically signed by Porfirio Linton MD

## 2021-06-18 NOTE — LETTER
Letter by Porfirio Linton MD at      Author: Porfirio Linton MD Service: -- Author Type: --    Filed:  Encounter Date: 2/12/2019 Status: (Other)       February 12, 2019     Patient: Keiry Mchugh   YOB: 1965   Date of Visit: 2/12/2019       To Whom It May Concern:    It is my medical opinion that Keiry Mchugh may return to work on 02/14/19.    If you have any questions or concerns, please don't hesitate to call.    Sincerely,        Electronically signed by Porfirio Linton MD

## 2021-06-18 NOTE — LETTER
Letter by Porfirio Linton MD at      Author: Porfirio Linton MD Service: -- Author Type: --    Filed:  Encounter Date: 2/7/2019 Status: (Other)       February 7, 2019     Patient: Keiry Mchugh   YOB: 1965   Date of Visit: 2/7/2019       To Whom It May Concern:    It is my medical opinion that Keiry Mchugh may return to work on 2-11-19 without restriction.    If you have any questions or concerns, please don't hesitate to call.    Sincerely,        Electronically signed by Porfirio Linton MD

## 2021-06-18 NOTE — PROGRESS NOTES
FEMALE ADULT PREVENTIVE EXAM      ASSESSMENT & PLAN  Keiry was seen today for annual exam.    Diagnoses and all orders for this visit:    Visit for preventive health examination  Routine history and physical, updated in EMR. Lab obtained today. Hasn't had colon cancer screening and decided to go with [facility]. Hasn't had mammogram last year yet.   -     Lipid Cascade  -     Comprehensive Metabolic Panel  -     Cologuard    Elevated LFTs  Excessive drinking alcohol  She was seen in the ED on 1/27/2018 for alcohol intoxication and fall. Not drinking heavily anymore. It was more binge drinking. Will check lab work today.  -     Comprehensive Metabolic Panel    Anemia  -     HM1(CBC and Differential)  -     HM1 (CBC with Diff)    Abnormal weight gain  She showed me her food diary, not much carbohydrates yet continue to gain weight. Is postmenopausal, s/p hysterectomy.   -     Thyroid Cascade    Family history of thyroid disease  -     Thyroid Cascade    Abdominal bloating  In the last 2 months. Constant. Not associated with food. Was on estrogen without progesterone for a few months. Will check pelvis US for ovarian causes or any other causes of her bloating.   -     US Pelvis With Transvaginal Non OB; Future  - US Abdomen Complete    Obesity  Performed high BMI intervention including dietary/exercise counseling. She will return in a month. She will log her food.  -     Glycosylated Hemoglobin A1c    Urinary incontinence  H/o hysterectomy, bladder sling. Will review chart and see what happened to her. Will need GYN referral.     Breast pain, right  Didn't feel mass on exam today but there was tenderness on palpation right breast at 9 o'clock position.   -     US Breast Limited (Focal) Bilateral; Future  -     Cancel: Mammo Screening Bilateral; Future  -     Mammo Diagnostic Bilateral; Future    General  Alcohol use, safety and moderation discussed.  Recommended adequate calcium intake/osteoporosis  prevention.  Discussed colon cancer screening at age 50, 45 if -American.  Diet and exercise reviewed, including goal of aerobic exercise 30-90 minutes most days of the week, moderation of portions sizes, avoiding eating out and fast food and increase in fruits and vegetables.  Discussed sun protection.  Discussed weight management.    This note was created using Dragon dictation software, spelling errors may occur.    Edna Ramos MD      CHIEF COMPLAINT:  Female preventive exam.    SUBJECTIVE:  Keiry Mchugh is a 53 y.o. female with a pmh of cervicalgia, excessive alcohol drinking, obesity who is here for annual physical examination. Went to [facility] about 7 months ago and was started on HRTfor hot flashes but couldn't be seen in follow up and she finally just stopped taking progesterone at least 3-4 months ago and stopped estrogen 2 weeks ago because of repeated yeast infection. Since then she has had bloating in her stomach all the time constant in the last 2 months but denies belching or flatus. Has fatigue. Has cramps sometimes. Denies vaginal bleeding. Still has ovaries but s/p hysterectomy. Hasn't had a papsmear because of no cervix. Continues to gain weight. Her hot flashes have not returned since discontinuation of estrogen. Couldn't tolerate gabapentin in the past. Also complains of breast pain on the right breast. Hasn't had a mammogram in the last year. Has h/o breast cancer in her family - mother and maternal aunt. Also reports urinary incontinence recently. Has had hysterectomy, mesh placement, bladder sling in the past.       She  has a past medical history of Acute viral bronchitis; Dehydration; Erythema migrans (Lyme disease) (5/31/2016); and Migraines.    Lab Results   Component Value Date    WBC 6.0 01/27/2018    HGB 14.6 01/27/2018    HCT 42.4 01/27/2018    MCV 90 01/27/2018     01/27/2018     01/27/2018    K 3.7 01/27/2018    BUN 14 01/27/2018     Lab Results    Component Value Date    CHOL 232 (H) 09/14/2016    HDL 71 09/14/2016    LDLCALC 138 (H) 09/14/2016    TRIG 115 09/14/2016     Lab Results   Component Value Date    TSH 1.52 09/14/2016     BP Readings from Last 3 Encounters:   06/15/18 122/84   01/28/18 96/58   10/26/17 102/70       Surgeries:    Past Surgical History:   Procedure Laterality Date     CYSTOSCOPY N/A 7/16/2015    Procedure: CYSTOSCOPY;  Surgeon: Nate Horta MD;  Location: [facility] OR;  Service:      HYSTERECTOMY       NEPHRECTOMY LIVING DONOR Left APRIL 2012     TN APPENDECTOMY      Description: Appendectomy;  Recorded: 11/20/2007;     TN CORRJ HALLUX VALGUS W/SESMDC W/DIST METAR OSTEOT      Description: Bunion Correction With Metatarsal Osteotomy Herman Procedure;  Proc Date: 07/16/2010;     TN LAP,SLING OPERATION      Description: Laparoscopic Sling Operation For Stress Incontinence;  Recorded: 02/17/2009;     TN REMOVAL GALLBLADDER      Description: Cholecystectomy;  Recorded: 02/17/2009;     TN TOTAL ABDOM HYSTERECTOMY      Description: Hysterectomy;  Recorded: 02/17/2009;       Family History:  Her family history includes Breast cancer (age of onset: 50) in her maternal aunt and mother; Graves' disease in her sister; Hashimoto's thyroiditis in her maternal aunt, maternal grandfather, and mother; Hypothyroidism in her maternal aunt, maternal aunt, and sister. There is no history of Anesthesia problems.    Social History:  She  reports that she has quit smoking. She has never used smokeless tobacco. She reports that she does not drink alcohol or use illicit drugs.    Medications:    Current Outpatient Prescriptions:      melatonin 10 mg Tab, Take 1 tablet by mouth., Disp: , Rfl:      acetaminophen (TYLENOL) 325 MG tablet, Take 650 mg by mouth every 4 (four) hours as needed for pain., Disp: , Rfl:      albuterol (PROAIR HFA;PROVENTIL HFA;VENTOLIN HFA) 90 mcg/actuation inhaler, Inhale 2 puffs every 6 (six) hours as needed for  wheezing., Disp: 8.5 g, Rfl: 0     atenolol (TENORMIN) 50 MG tablet, Take 50 mg by mouth daily as needed. With flushing of skin/migraines, Disp: , Rfl:      baclofen (LIORESAL) 10 MG tablet, TAKE 1 TABLET(10 MG) BY MOUTH EVERY 6 HOURS AS NEEDED, Disp: 30 tablet, Rfl: 3     BIOTIN ORAL, Take 1 tablet by mouth daily., Disp: , Rfl:      cholecalciferol, vitamin D3, 1,000 unit tablet, Take 2,000 Units by mouth daily., Disp: , Rfl:      dextromethorphan-guaiFENesin (ROBITUSSIN-DM)  mg/5 mL liquid, Take 5 mL by mouth every 4 (four) hours as needed., Disp: 118 mL, Rfl: 0     FLUoxetine (PROZAC) 20 MG capsule, Take 20 mg by mouth daily., Disp: , Rfl:      FLUoxetine (PROZAC) 20 MG capsule, Take 1 capsule (20 mg total) by mouth daily., Disp: 90 capsule, Rfl: 0     magnesium oxide (MAG-OX) 400 mg tablet, Take 500 mg by mouth daily. , Disp: , Rfl:      ONABOTULINUMTOXINA (BOTOX INJ), Inject 1 application as directed every 3 (three) months. , Disp: , Rfl:      predniSONE (DELTASONE) 20 MG tablet, Take 1 tablet (20 mg total) by mouth 2 (two) times a day., Disp: 10 tablet, Rfl: 0     SUMAtriptan (IMITREX) 50 MG tablet, Take 50 mg by mouth every 2 (two) hours as needed for migraine., Disp: , Rfl:      SUMAtriptan (IMITREX) 50 MG tablet, TAKE 1 TABLET BY MOUTH AT ONSET OF HEADACHE. MAY REPEAT IN 2 HOURS AS NEEDED, Disp: 10 tablet, Rfl: 4     traZODone (DESYREL) 50 MG tablet, Take 1 tablet (50 mg total) by mouth at bedtime as needed for sleep., Disp: 10 tablet, Rfl: 0     UNABLE TO FIND, Take 1 capsule by mouth daily. Med Name:Lisaic, Disp: , Rfl:   HELD MEDICATIONS: None.    Allergies:  No latex allergies.  Allergies   Allergen Reactions     Ceftriaxone Hives     Levofloxacin      Annotation: IV Levaquin ---> PO Levaquin ---> large purple blotches.  Tolerates moxifloxacin (2014)       Other Allergy (See Comments) Hives     Contrast Media Ready-Box MISC, 08/04/2008.       Penicillins Hives     Tramadol Itching       RISK  BEHAVIOR & HEALTH HABITS  History of abnormal pap smear: none.  Date of previous pap smear: long time ago due to hysterectomy.  Mammography: 10/5/2016 and normal. .  Self Breast Exam: No.  Colon/Flex Sig: None.  DEXA: None.   Regular Exercise: Yes.  Balanced diet: Yes.  Seat Belt Use: Yes.  Calcium intake/Osteoporosis prevention: No.  Guns: NO  Sun Screen: YES  Dental Care: YES    REVIEW OF SYSTEMS:  Complete head to toe review of systems is otherwise negative except as above.    OBJECTIVE:  VITAL SIGNS:    Vitals:    06/15/18 1249   BP: 122/84   Pulse: 68   Temp: 98.8  F (37.1  C)   SpO2: 98%     GENERAL:  Patient alert, in no acute distress.  EYES: PERRLA. Extraoccular movements intact, pupils equal, reactive to light and accommodation.  Normal conjunctiva and lids.    ENT:  Hearing grossly normal.  Normal appearance to ears and nose. Normal posterior oropharynx normal. Normal lips, gums and teeth.  Normal nasal mucosa, septum and turbinates.  NECK:  Supple, without thyromegaly or mass.  RESP:  Clear to auscultation without crackles, wheezes or distress.  Normal respiratory effort.   CV:  Regular rate and rhythm without murmurs, rubs or gallops.  Normal carotid, abdominal aorta, femoral and pedal pulses.  No varicosities or edema.  ABDOMEN:  Soft, non-tender, without hepatosplenomegaly, masses, or hernias.   BREASTS:  Tender to palpation of right breast, 9 o'clock position, didn't feel a mass, no nipple discharge, erythema, or axillary adenopathy.    :  Normal pelvic exam, including external genitalia with normal appearance and no lesions.  Normal vaginal exam, including no discharge and normal pelvic support, and no lesions.   Normal ureters, with no masses, tenerness or scarring.  Normal bladder, with no fullness, masses or tenderness.  Cervix not visualized. No adnexal tenderness  LYMPHATIC: Normal palpation of neck, groin and axilla. No lymphadenopathy.  No bruising.  NEURO:  CN II-XII intact, motor &  sensory function all intact.  DTR and reflexes normal.  PSYCHIATRIC:  Alert & oriented with normal mood and affect.  Good judgment and insight.  SKIN:  Normal inspection and palpation.  MUSCULOSKELETAL: Normal gait and station.  - Spine / Ribs / Pelvis: Normal inspection, ROM, stability and strength: Spine, Head, Neck, Upper and Lower Extremities.

## 2021-06-19 NOTE — LETTER
Letter by Davis Mosqueda MD at      Author: Davis Mosqueda MD Service: -- Author Type: --    Filed:  Encounter Date: 4/10/2019 Status: (Other)         April 10, 2019     Patient: Keiry Mchugh   YOB: 1965   Date of Visit: 4/10/2019       To Whom It May Concern:    It is my medical opinion that Keiry Mchugh may return to work on Tuesday April 23th with no restrictions. .    If you have any questions or concerns, please don't hesitate to call.    Sincerely,        Electronically signed by Davis Mosqueda MD

## 2021-06-19 NOTE — LETTER
Letter by Mis Seals RN at      Author: Mis Seals RN Service: -- Author Type: --    Filed:  Encounter Date: 4/30/2019 Status: (Other)         April 30, 2019     Patient: Keiry Mchugh   YOB: 1965   Date of Visit: 4/30/2019       To Whom it May Concern:    It is my medical opinion that Keiry Mchugh remain out of work until 5/7/19 to ensure necessary healing from her surgery. She will follow up in clinic next week for re-evaluation if she is not progressing.        Electronically signed by Davis Mosqueda MD

## 2021-06-19 NOTE — LETTER
Letter by Porfirio Linton MD at      Author: Porfirio Linton MD Service: -- Author Type: --    Filed:  Encounter Date: 11/15/2019 Status: Signed         Keiry Morillo [facility] [address]             November 15, 2019         Dear Ms. Mchugh,    Below are the results from your recent visit:    Resulted Orders   Comprehensive Metabolic Panel   Result Value Ref Range    Sodium 142 136 - 145 mmol/L    Potassium 4.8 3.5 - 5.0 mmol/L    Chloride 106 98 - 107 mmol/L    CO2 27 22 - 31 mmol/L    Anion Gap, Calculation 9 5 - 18 mmol/L    Glucose 79 70 - 125 mg/dL    BUN 22 8 - 22 mg/dL    Creatinine 1.02 0.60 - 1.10 mg/dL    GFR MDRD Af Amer >60 >60 mL/min/1.73m2    GFR MDRD Non Af Amer 57 (L) >60 mL/min/1.73m2    Bilirubin, Total 0.4 0.0 - 1.0 mg/dL    Calcium 10.0 8.5 - 10.5 mg/dL    Protein, Total 7.5 6.0 - 8.0 g/dL    Albumin 4.2 3.5 - 5.0 g/dL    Alkaline Phosphatase 91 45 - 120 U/L    AST 14 0 - 40 U/L    ALT 30 0 - 45 U/L    Narrative    Fasting Glucose reference range is 70-99 mg/dL per  American Diabetes Association (ADA) guidelines.   HM1 (CBC with Diff)   Result Value Ref Range    WBC 9.3 4.0 - 11.0 thou/uL    RBC 4.51 3.80 - 5.40 mill/uL    Hemoglobin 14.0 12.0 - 16.0 g/dL    Hematocrit 42.0 35.0 - 47.0 %    MCV 93 80 - 100 fL    MCH 31.1 27.0 - 34.0 pg    MCHC 33.4 32.0 - 36.0 g/dL    RDW 11.9 11.0 - 14.5 %    Platelets 265 140 - 440 thou/uL    MPV 7.2 7.0 - 10.0 fL    Neutrophils % 49 (L) 50 - 70 %    Lymphocytes % 43 (H) 20 - 40 %    Monocytes % 5 2 - 10 %    Eosinophils % 3 0 - 6 %    Basophils % 1 0 - 2 %    Neutrophils Absolute 4.5 2.0 - 7.7 thou/uL    Lymphocytes Absolute 4.0 0.8 - 4.4 thou/uL    Monocytes Absolute 0.5 0.0 - 0.9 thou/uL    Eosinophils Absolute 0.2 0.0 - 0.4 thou/uL    Basophils Absolute 0.1 0.0 - 0.2 thou/uL       Hi Samantha:  The potassium is back into the normal range!  The kidney function improved by a point (GFR was 56 and now 57) and is negligibly  diminished.  The remaining pre-op labs are NORMAL.  All should be ok for surgery.    Please call with questions or contact us using MyChart.    Sincerely,        Electronically signed by Porfirio Linton MD

## 2021-06-19 NOTE — LETTER
Letter by Jeovanny Alcocer MD at      Author: Jeovanny Alcocer MD Service: -- Author Type: --    Filed:  Encounter Date: 11/11/2019 Status: Signed         November 11, 2019     Patient: Keiry Mchugh   YOB: 1965   Date of Visit: 11/11/2019       To Whom it May Concern:    Keiry Mchugh was seen in my clinic on 11/11/2019.  Please excuse her absence from work on Tuesday, 11/12/2019 and Wednesday, 11/13/2019 due to illness.  Anticipate return to work on Thursday, 11/14/2019.    If you have any questions or concerns, please don't hesitate to call.    Sincerely,         Electronically signed by Jeovanny Alcocer MD

## 2021-06-19 NOTE — LETTER
Letter by Porfirio Linton MD at      Author: Porfirio Linton MD Service: -- Author Type: --    Filed:  Encounter Date: 9/30/2019 Status: Signed         Keiry ELIZONDO  [facility], [place] [address]             September 30, 2019         Dear Ms. Mchugh,    Below are the results from your recent visit:    Resulted Orders   Comprehensive Metabolic Panel   Result Value Ref Range    Sodium 142 136 - 145 mmol/L    Potassium 5.2 (H) 3.5 - 5.0 mmol/L    Chloride 109 (H) 98 - 107 mmol/L    CO2 24 22 - 31 mmol/L    Anion Gap, Calculation 9 5 - 18 mmol/L    Glucose 91 70 - 125 mg/dL    BUN 28 (H) 8 - 22 mg/dL    Creatinine 0.96 0.60 - 1.10 mg/dL    GFR MDRD Af Amer >60 >60 mL/min/1.73m2    GFR MDRD Non Af Amer >60 >60 mL/min/1.73m2    Bilirubin, Total 0.3 0.0 - 1.0 mg/dL    Calcium 10.1 8.5 - 10.5 mg/dL    Protein, Total 7.0 6.0 - 8.0 g/dL    Albumin 4.1 3.5 - 5.0 g/dL    Alkaline Phosphatase 79 45 - 120 U/L    AST 18 0 - 40 U/L    ALT 26 0 - 45 U/L    Narrative    Fasting Glucose reference range is 70-99 mg/dL per  American Diabetes Association (ADA) guidelines.   HM1 (CBC with Diff)   Result Value Ref Range    WBC 4.5 4.0 - 11.0 thou/uL    RBC 4.21 3.80 - 5.40 mill/uL    Hemoglobin 13.3 12.0 - 16.0 g/dL    Hematocrit 38.8 35.0 - 47.0 %    MCV 92 80 - 100 fL    MCH 31.6 27.0 - 34.0 pg    MCHC 34.3 32.0 - 36.0 g/dL    RDW 11.5 11.0 - 14.5 %    Platelets 251 140 - 440 thou/uL    MPV 7.4 7.0 - 10.0 fL    Neutrophils % 37 (L) 50 - 70 %    Lymphocytes % 52 (H) 20 - 40 %    Monocytes % 7 2 - 10 %    Eosinophils % 4 0 - 6 %    Basophils % 1 0 - 2 %    Neutrophils Absolute 1.7 (L) 2.0 - 7.7 thou/uL    Lymphocytes Absolute 2.3 0.8 - 4.4 thou/uL    Monocytes Absolute 0.3 0.0 - 0.9 thou/uL    Eosinophils Absolute 0.2 0.0 - 0.4 thou/uL    Basophils Absolute 0.0 0.0 - 0.2 thou/uL       Hi Samantha:  Your hemoglobin is back into the NORMAL range.  The liver enzymes have also returned to NORMAL.  The potassium is just  slightly elevated, probably artifactual.  Please repeat your potassium in one week in a well hydrated state.  I will place a follow up lab order so just set up a lab appt.  The remaining labs are NORMAL.      Please call with questions or contact us using [employer].    Sincerely,        Electronically signed by Porfirio Linton MD

## 2021-06-19 NOTE — LETTER
Letter by Mis Seals RN at      Author: Mis Seals RN Service: -- Author Type: --    Filed:  Encounter Date: 4/29/2019 Status: (Other)         April 29, 2019     Patient: Keiry Mchugh   YOB: 1965   Date of Visit: 4/29/2019       To Whom It May Concern:    It is my medical opinion that Keiry Mchugh may return to work on 4/30/19 with no restrictions. Her wound is healing well and I have no concerns about her being back at work.    If you have any questions or concerns, please don't hesitate to call.    Sincerely,        Electronically signed by Davis Rodriguez MD

## 2021-06-19 NOTE — LETTER
Letter by Mis Seals RN at      Author: Mis Seals RN Service: -- Author Type: --    Filed:  Encounter Date: 5/13/2019 Status: (Other)         May 13, 2019     Patient: Keiry Mchugh   YOB: 1965   Date of Visit: 5/13/2019       To Whom It May Concern:    It is my medical opinion that Keiry Mchugh may return to work on 5/14/19 with no restrictions.     If you have any questions or concerns, please don't hesitate to call.    Sincerely,        Electronically signed by Davis Mosqueda MD

## 2021-06-19 NOTE — LETTER
Letter by Porfirio Linton MD at      Author: Porfirio Linton MD Service: -- Author Type: --    Filed:  Encounter Date: 10/11/2019 Status: Signed         Keiry Morillo [facility] [address]             October 11, 2019         Dear Ms. Mchugh,    Below are the results from your recent visit:    Resulted Orders   Basic Metabolic Panel   Result Value Ref Range    Sodium 141 136 - 145 mmol/L    Potassium 5.2 (H) 3.5 - 5.0 mmol/L    Chloride 106 98 - 107 mmol/L    CO2 26 22 - 31 mmol/L    Anion Gap, Calculation 9 5 - 18 mmol/L    Glucose 101 70 - 125 mg/dL    Calcium 10.8 (H) 8.5 - 10.5 mg/dL    BUN 24 (H) 8 - 22 mg/dL    Creatinine 1.16 (H) 0.60 - 1.10 mg/dL    GFR MDRD Af Amer 59 (L) >60 mL/min/1.73m2    GFR MDRD Non Af Amer 49 (L) >60 mL/min/1.73m2    Narrative    Fasting Glucose reference range is 70-99 mg/dL per  American Diabetes Association (ADA) guidelines.       Hi Samantha:  Your GFR is now 49 instead of 48 and the potassium is still at 5.2.  I think we should have you follow up with your transplant clinic because of this slight change.  Also the calcium is slightly up and should be rechecked in a month or so.  If you need help in scheduling or a referral to a nephrologist let me know.  If you have questions you can reach me on Talend this weekend.    Please call with questions or contact us using Talend.    Sincerely,        Electronically signed by Porfirio Linton MD

## 2021-06-19 NOTE — LETTER
Letter by Porfirio Linton MD at      Author: Porfirio Linton MD Service: -- Author Type: --    Filed:  Encounter Date: 11/21/2019 Status: Signed         November 21, 2019       Patient: Keiry Mchugh   YOB: 1965   Date of Visit: 11/21/2019     To [place] [facility]  Fax:  [phone]   Claim:  #[id number]    To Whom It May Concern:    It is my medical opinion that Keiry Mchugh should begin her Short Term Disability on 11-18-19.  The patient had a laparoscopic sigmoid resection on 3-19-19.  This was complicated by a slow post op healing process and also the development of an abdominal wall hernia.  The hernia has been very limiting in terms of the pt's ability to function without pain.  She will be having surgical repair of her abdominal wall hernia on 11-22-19 by Dr Davis Mosqueda.  I am in agreement with Short Term Disability beginning on 11-18-19 and continuing until her surgeon releases her for return to work.    If you have any questions or concerns, please don't hesitate to call.    Sincerely,        Electronically signed by Porfirio Linton MD

## 2021-06-19 NOTE — LETTER
Letter by Porfirio Linton MD at      Author: Porfirio Linton MD Service: -- Author Type: --    Filed:  Encounter Date: 10/8/2019 Status: Signed         Keiry ELIZONDO  [facility] [address]             October 8, 2019         Dear Ms. Mchugh,    Below are the results from your recent visit:    Resulted Orders   Basic Metabolic Panel   Result Value Ref Range    Sodium 142 136 - 145 mmol/L    Potassium 5.1 (H) 3.5 - 5.0 mmol/L    Chloride 106 98 - 107 mmol/L    CO2 27 22 - 31 mmol/L    Anion Gap, Calculation 9 5 - 18 mmol/L    Glucose 93 70 - 125 mg/dL    Calcium 10.3 8.5 - 10.5 mg/dL    BUN 16 8 - 22 mg/dL    Creatinine 1.18 (H) 0.60 - 1.10 mg/dL    GFR MDRD Af Amer 58 (L) >60 mL/min/1.73m2    GFR MDRD Non Af Amer 48 (L) >60 mL/min/1.73m2    Narrative    Fasting Glucose reference range is 70-99 mg/dL per  American Diabetes Association (ADA) guidelines.   HM1 (CBC with Diff)   Result Value Ref Range    WBC 5.2 4.0 - 11.0 thou/uL    RBC 4.45 3.80 - 5.40 mill/uL    Hemoglobin 13.9 12.0 - 16.0 g/dL    Hematocrit 41.6 35.0 - 47.0 %    MCV 93 80 - 100 fL    MCH 31.1 27.0 - 34.0 pg    MCHC 33.3 32.0 - 36.0 g/dL    RDW 10.8 (L) 11.0 - 14.5 %    Platelets 263 140 - 440 thou/uL    MPV 8.7 7.0 - 10.0 fL    Neutrophils % 44 (L) 50 - 70 %    Lymphocytes % 46 (H) 20 - 40 %    Monocytes % 6 2 - 10 %    Eosinophils % 3 0 - 6 %    Basophils % 1 0 - 2 %    Neutrophils Absolute 2.3 2.0 - 7.7 thou/uL    Lymphocytes Absolute 2.4 0.8 - 4.4 thou/uL    Monocytes Absolute 0.3 0.0 - 0.9 thou/uL    Eosinophils Absolute 0.2 0.0 - 0.4 thou/uL    Basophils Absolute 0.1 0.0 - 0.2 thou/uL       Hi Samantha:  Your potassium is a hair up at 5.1 but not worrisome.  The GFR we discussed this morning (10-8-19) and we plan to recheck it in a well hydrated state on Friday 10-11-19.  Once again the blood sugar is entirely normal, so no evidence of diabetes or pre-diabetes.    Please call with questions or contact us using  [employer]Doc[employer].    Sincerely,        Electronically signed by Porfirio Linton MD

## 2021-06-20 NOTE — LETTER
Letter by Porfirio Linton MD at      Author: Porfirio Linton MD Service: -- Author Type: --    Filed:  Encounter Date: 6/25/2020 Status: (Other)         June 25, 2020     Patient: Keiry Mchugh   YOB: 1965   Date of Visit: 6/25/2020       To Whom It May Concern:    It is my medical opinion that Keiry Mchugh may return to work without restriction.  She may proceed with fitting for a protective face mask.    If you have any questions or concerns, please don't hesitate to call.    Sincerely,        Electronically signed by Porfirio Linton MD

## 2021-06-20 NOTE — PROGRESS NOTES
Assessment:     1. Acute otitis media, left  flunisolide (NASALIDE) 25 mcg (0.025 %) Spry    oxymetazoline (AFRIN, OXYMETAZOLINE,) 0.05 % nasal spray    azithromycin (ZITHROMAX) 250 MG tablet   2. Acute bronchitis  HM1(CBC and Differential)    albuterol nebulizer solution 3 mL (PROVENTIL)    HM1 (CBC with Diff)   3. Elevated blood pressure reading without diagnosis of hypertension     4. Acute recurrent sinusitis, unspecified location          Acute Bronchitis and Acute otitis media of the left ear.    Discussed with her to check her blood pressures once or twice a week.  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  She informs me that usually her blood pressure is only mildly high.  She will return for follow-up with PCP after the acute illness.     Plan:      Antibiotics per medication orders.  Avoid exposure to tobacco smoke and fumes.  B-agonist inhaler.  Call if shortness of breath worsens, blood in sputum, change in character of cough, development of fever or chills, inability to maintain nutrition and hydration. Avoid exposure to tobacco smoke and fumes.  Trial of steroid nasal spray.     Subjective:      Chief Complaint   Patient presents with     Cough     cough progressively getting worse since monday, SOB, ear pain       Keiry Mchugh is a 53 y.o. female here for evaluation of a cough. Onset of symptoms was 4 days ago. Symptoms have been rapidly worsening since that time. The cough is dry, harsh, nonproductive, paroxysmal and raspy and is aggravated by exercise. Associated symptoms include: change in voice, chills, shortness of breath and wheezing. Patient does not have a history of asthma. Patient does have a history of environmental allergens. Patient has not traveled recently. Patient does not have a history of smoking. Patient has had a previous chest x-ray.     She feels as though she is unable to get a good breath in.  Her ears are hurting really bad.  She has not  had any high fevers.    Had similar symptoms last year where she was admitted briefly.  The discharge note from last hospital admission 10/2017 was reviewed as follows  Assessment/Plan     Acute viral bronchitis  Patient refused Tamiflu  --Pro calcitonin is normal therefore discontinue azithromycin  --Order low-dose prednisone and nebulizers for symptomatic relief.  --Order dextromethorphan for symptomatic relief of cough.  --Likely discharge next a.m.     Generalized malaise, myalgias  --Likely due to acute viral syndrome  --Given acute symptoms no flu vaccine to be given today     Elevated LFT   --trending downwards  --Likely due to underlying viral illness  --Monospot screen ordered and it is negative  --CT of the abdomen is negative for gallstones     Dehydration resolved  --DC IV fluids     Hyperkalemia mild  --Continue to monitor  --dc IVF       The following portions of the patient's history were reviewed and updated as appropriate: allergies, current medications, past family history, past medical history, past social history, past surgical history and problem list.  Allergies   Allergen Reactions     Ceftriaxone Hives     Levofloxacin      Annotation: IV Levaquin ---> PO Levaquin ---> large purple blotches.  Tolerates moxifloxacin (2014)       Other Allergy (See Comments) Hives     Contrast Media Ready-Box MISC, 08/04/2008.       Penicillins Hives     Tramadol Itching       Current Outpatient Prescriptions on File Prior to Visit   Medication Sig Dispense Refill     acetaminophen (TYLENOL) 325 MG tablet Take 650 mg by mouth every 4 (four) hours as needed for pain.       atenolol (TENORMIN) 50 MG tablet Take 50 mg by mouth daily as needed. With flushing of skin/migraines       baclofen (LIORESAL) 10 MG tablet TAKE 1 TABLET(10 MG) BY MOUTH EVERY 6 HOURS AS NEEDED 30 tablet 0     BIOTIN ORAL Take 1 tablet by mouth daily.       cholecalciferol, vitamin D3, 1,000 unit tablet Take 2,000 Units by mouth daily.        FLUoxetine (PROZAC) 20 MG capsule TAKE 1 CAPSULE BY MOUTH EVERY DAY 90 capsule 1     magnesium oxide (MAG-OX) 400 mg tablet Take 500 mg by mouth daily.        melatonin 10 mg Tab Take 1 tablet by mouth.       methylPREDNISolone (MEDROL, JULIANNA,) 4 mg tablet follow package directions 21 tablet 0     NON FORMULARY amberen -otc for menopause       SUMAtriptan (IMITREX) 50 MG tablet Take 50 mg by mouth every 2 (two) hours as needed for migraine.       TURMERIC ROOT EXTRACT ORAL Take 2,000 mg by mouth daily.       UNABLE TO FIND Take 1 capsule by mouth daily. Med Name:Tuermic       diphenhydrAMINE (BENADRYL) 50 MG capsule Take 1 cap 6 hrs before procedure, bring 2nd cap to procedure, may take immediately before procedure 2 capsule 0     predniSONE (DELTASONE) 50 MG tablet Take 1 tab 13 hrs before injection, take 1 tab 7 hrs before injection, take 1 tab 1 hr before injection 3 tablet 0     No current facility-administered medications on file prior to visit.        Patient Active Problem List   Diagnosis     Symptomatic Post-artificial Menopause State     Obesity     Anxiety Disorder NOS     Rosacea     Anemia     Peripheral Neuropathy     Esophageal Reflux     Fatigue     Cervical Radiculopathy     Vitamin D Deficiency     Cervical Spondylosis     Cervical Spine Stenosis     Cervicalgia     Common Migraine (Without Aura)     Major Depression, Single Episode     Classic Migraine (With Aura) With Intractable Migraine     Microscopic Hematuria     Diverticulosis     Chronic Kidney Disease, Stage 3     Cough     Fever     Myalgia     Elevated LFTs     Psychophysiological insomnia     Excessive drinking alcohol       Past Medical History:   Diagnosis Date     Acute viral bronchitis      Dehydration      Erythema migrans (Lyme disease) 5/31/2016     Migraines        Past Surgical History:   Procedure Laterality Date     CYSTOSCOPY N/A 7/16/2015    Procedure: CYSTOSCOPY;  Surgeon: Nate Horta MD;  Location: [facility]  Main OR;  Service:      HYSTERECTOMY       NEPHRECTOMY LIVING DONOR Left APRIL 2012     [place] APPENDECTOMY      Description: Appendectomy;  Recorded: 11/20/2007;     [place] CORRJ HALLUX VALGUS W/SESMDC W/DIST METAR OSTEOT      Description: Bunion Correction With Metatarsal Osteotomy Herman Procedure;  Proc Date: 07/16/2010;     NY LAP,SLING OPERATION      Description: Laparoscopic Sling Operation For Stress Incontinence;  Recorded: 02/17/2009;     NY REMOVAL GALLBLADDER      Description: Cholecystectomy;  Recorded: 02/17/2009;     [place] TOTAL ABDOM HYSTERECTOMY      Description: Hysterectomy;  Recorded: 02/17/2009;       Family History   Problem Relation Age of Onset     Hashimoto's thyroiditis Mother      Breast cancer Mother 50     Graves' disease Sister      Hypothyroidism Maternal Aunt      Breast cancer Maternal Aunt 50     Hashimoto's thyroiditis Maternal Grandfather      Hypothyroidism Sister      Hypothyroidism Maternal Aunt      Hashimoto's thyroiditis Maternal Aunt      Anesthesia problems Neg Hx        Social History     Social History     Marital status: Single     Spouse name: N/A     Number of children: 2     Years of education: N/A     Occupational History     Dental assistant       [employer]     Social History Main Topics     Smoking status: Former Smoker     Smokeless tobacco: Never Used     Alcohol use No     Drug use: No     Sexual activity: Not Currently     Partners: Male     Other Topics Concern     None     Social History Narrative       Review of Systems  Cardiovascular: negative  Gastrointestinal: negative  Genitourinary:negative  Hematologic/lymphatic: negative      Objective:     BP (!) 163/95  Pulse 97  Temp 98.4  F (36.9  C) (Oral)   Resp 24  Wt 201 lb 9.6 oz (91.4 kg)  SpO2 99%  BMI 32.05 kg/m2   Blood pressure pressure recheck by me and it is 156/85 mmHg    General:Healthy, alert and in no acute distress  Head:  NCAT w/o lesions or tenderness  Eyes: conjunctivae/corneas clear. PERRL,  EOM's intact. Fundi benign  Ears: normal external ear canals bilateral, the left ear is acutely inflamed in the upper part with some fluid behind the membrane.  Sinus tender: negative  Nose: Enlarged and erythematous turbinates  Mouth: lips, mucosa, and tongue normal. Teeth and gums normal. No tonsillar endangerment , moderate erythema of pharynx  Neck: supple, symmetrical, trachea midline.  Lungs: clear to auscultation bilaterally  Heart: RRR, No murmurs  Abdomen: Soft NTND, No HSM  Skin: No rashes

## 2021-06-20 NOTE — LETTER
Letter by Porfirio Linton MD at      Author: Porfirio Linton MD Service: -- Author Type: --    Filed:  Encounter Date: 3/11/2020 Status: (Other)         Keiry [address] [facility] [address]             March 11, 2020         Dear Ms. Mchugh,    Below are the results from your recent visit:    Resulted Orders   Lipid Walden FASTING   Result Value Ref Range    Cholesterol 267 (H) <=199 mg/dL    Triglycerides 143 <=149 mg/dL    HDL Cholesterol 76 >=50 mg/dL    LDL Calculated 162 (H) <=129 mg/dL    Patient Fasting > 8hrs? Unknown    Thyroid Cascade   Result Value Ref Range    TSH 1.35 0.30 - 5.00 uIU/mL   Vitamin D, Total (25-Hydroxy)   Result Value Ref Range    Vitamin D, Total (25-Hydroxy) 43.6 30.0 - 80.0 ng/mL    Narrative    Deficiency <10.0 ng/mL  Insufficiency 10.0-29.9 ng/mL  Sufficiency 30.0-80.0 ng/mL  Toxicity (possible) >100.0 ng/mL   Magnesium   Result Value Ref Range    Magnesium 1.8 1.8 - 2.6 mg/dL   Comprehensive Metabolic Panel   Result Value Ref Range    Sodium 143 136 - 145 mmol/L    Potassium 4.9 3.5 - 5.0 mmol/L    Chloride 106 98 - 107 mmol/L    CO2 25 22 - 31 mmol/L    Anion Gap, Calculation 12 5 - 18 mmol/L    Glucose 96 70 - 125 mg/dL    BUN 19 8 - 22 mg/dL    Creatinine 0.99 0.60 - 1.10 mg/dL    GFR MDRD Af Amer >60 >60 mL/min/1.73m2    GFR MDRD Non Af Amer 59 (L) >60 mL/min/1.73m2    Bilirubin, Total 0.4 0.0 - 1.0 mg/dL    Calcium 10.4 8.5 - 10.5 mg/dL    Protein, Total 7.7 6.0 - 8.0 g/dL    Albumin 4.3 3.5 - 5.0 g/dL    Alkaline Phosphatase 184 (H) 45 - 120 U/L    AST 85 (H) 0 - 40 U/L     (H) 0 - 45 U/L    Narrative    Fasting Glucose reference range is 70-99 mg/dL per  American Diabetes Association (ADA) guidelines.       Hi Samantha:  Your kidney function is near normal.  The GFR of 59 is a just a tad low.  Avoid use of any anti-inflammatory (advil, ibuprofen, motrin, aleve, excedrin, or aspirin (in excess of 81 mg daily))    Your liver enzymes are  markedly elevated.  If you are drinking any alcohol you must stop completely and recheck your liver enzymes in a month.  I will put in the order for follow up labs.    The Vit D, magnesium, thyroid function and blood sugar are all normal.  Remaining labs are NORMAL.  The cholesterol panel  Has a mildly elevated LDL but and excellent HDL.  This puts you at a low risk for a vascular event.    Sincerely,        Electronically signed by Porfirio Linton MD

## 2021-06-20 NOTE — LETTER
Letter by Porfirio Linton MD at      Author: Porfirio Linton MD Service: -- Author Type: --    Filed:  Encounter Date: 3/16/2020 Status: (Other)         Keiry Mchugh  [address] [place] [address]             March 16, 2020         Dear Ms. Mchugh,    Below are the results from your recent visit:    Resulted Orders   Comprehensive Metabolic Panel   Result Value Ref Range    Sodium 144 136 - 145 mmol/L    Potassium 4.6 3.5 - 5.0 mmol/L    Chloride 106 98 - 107 mmol/L    CO2 25 22 - 31 mmol/L    Anion Gap, Calculation 13 5 - 18 mmol/L    Glucose 110 70 - 125 mg/dL    BUN 24 (H) 8 - 22 mg/dL    Creatinine 1.06 0.60 - 1.10 mg/dL    GFR MDRD Af Amer >60 >60 mL/min/1.73m2    GFR MDRD Non Af Amer 54 (L) >60 mL/min/1.73m2    Bilirubin, Total 0.6 0.0 - 1.0 mg/dL    Calcium 10.3 8.5 - 10.5 mg/dL    Protein, Total 7.7 6.0 - 8.0 g/dL    Albumin 4.4 3.5 - 5.0 g/dL    Alkaline Phosphatase 144 (H) 45 - 120 U/L    AST 48 (H) 0 - 40 U/L     (H) 0 - 45 U/L    Narrative    Fasting Glucose reference range is 70-99 mg/dL per  American Diabetes Association (ADA) guidelines.   Hepatitis Acute Evaluation   Result Value Ref Range    Hepatitis A Antibody, IgM Negative Negative    Hepatitis B Core Earlene, IgM Negative Negative    Hepatitis B Surface Ag Negative Negative    Hepatitis C Ab Negative Negative       Hi Samantha:  Your screening for Hepatitis A,B and C is NEGATIVE.  I would repeat the liver enzymes in a couple weeks to make sure they are continuing to fall.  There was a downward trend on the last check and as I have mentioned this usually is a slow decline in the enzyme levels.    Please call with questions or contact us using [facility].    Sincerely,        Electronically signed by Porfirio Linton MD

## 2021-06-20 NOTE — PROGRESS NOTES
Assessment:   Keiry Mchugh is a 53 y.o. y.o. female with past medical history significant for obesity, anxiety, peripheral neuropathy, GERD, vitamin D deficiency, migraine, chronic kidney disease stage III who presents today for follow-up regarding a 2.5 month flare up of chronic right neck pain with right upper extremity paresthesias and a 2.5 month history of left neck pain with left upper extremity paresthesias.  MRI cervical spine shows a diffuse annular bulge at C6-7 which results in moderate right and mild left foraminal stenosis.  Patient also has significant facet arthropathy, worse on the left than the right.  Patient previously had greater than 75% relief of her pain lasting over 2 years following a right C6-7 transforaminal epidural steroid injection which was performed in December 2015.  Patient states the current pain is the exact same as the pain was before that injection, although at that time he was only right-sided and now she has bilateral symptoms. Patient demonstrated a sensory deficit in the first 3 fingers bilaterally.    NDI: 66       Plan:     A shared decision making plan was used.  The patient's values and choices were respected.  The following represents what was discussed and decided upon by the physician assistant and the patient.      1.  DIAGNOSTIC TESTS: I reviewed the MRI cervical spine in detail with the patient with help of a spine model.  No further diagnostic test was ordered.  -If paresthesias persist, I would recommend obtaining an EMG of bilateral upper extremities.  Patient reportedly had an EMG several years ago which was normal.    2.  PHYSICAL THERAPY: I placed an order for patient to attend physical therapy at the [facility].    3.  MEDICATIONS:   -Patient has a contrast dye allergy.  I provided a prescription for a prednisone prep.  Also provided a prescription for Benadryl to be taken prior to the procedure.  Patient reports that with her first  epidural steroid injections, she had significant itching which required IV Benadryl, even though she had completed a prednisone prep.  With her second cervical epidural steroid injection she pretreated with both prednisone and Benadryl and did not have any reaction.  -No other changes are made to the patient's medications.  Uses baclofen 10 mg at bedtime and ibuprofen 800 mg twice daily.  She would really like to avoid opiates.  -If patient is unable to get epidural steroid injection before she leaves for her trip to [place] with her grandson in three weeks, I would prescribe a course of oral steroids.  Patient completed a course of oral steroids earlier this month with some improvement in her pain.    4.  INTERVENTIONS: Patient is requesting a repeat epidural steroid injection.  Her right C6-7 transforaminal epidural steroid injection in December 2015 provided 75% relief of her pain for over 2 years.  Patient states that with her current level of pain she is having significant difficulty at work.  She works as a dental hygienist.  She is also having significant difficulty sleeping.  I offered the patient a C7-T1 intralaminar epidural steroid injection.  Patient indicated she would like to proceed and an order was placed.  -If the epidural steroid injection fails to provide relief, I recommend cervical facet joint injections.    5.  PATIENT EDUCATION: Patient is in agreement with the above plan.  All questions were answered.    6.  FOLLOW-UP: I will see the patient back in the clinic for a two-week post procedure follow-up visit.  If she has any questions or concerns in the meantime, she should not hesitate to contact our clinic.    Subjective:     Keiry Mchugh is a 53 y.o. female who presents today for follow-up regarding a flare up of chronic right neck pain with right upper extremity paresthesias and a 2.5 month history of left neck pain with left upper paresthesias.  Patient is status post a right C6-7  transforaminal epidural steroid injection on December 4, 2015.  Patient reports this provided 75% relief of her pain for over 2-1/2 years.  She states that she had some persistent mild neck pain but it was easily managed with turmeric.  Patient states that 2-1/2 months ago, she woke up with severe pain.  She denies any injury or event to cause the pain.  Patient states that the right-sided pain feels exactly like the pain that she had before her injection and she also woke up with left-sided pain.  Patient states that left-sided neck pain feels just like the right-sided neck pain, although left-sided neck pain is more severe.    Patient complains of bilateral posterior neck pain.  Pain is worse on the left than the right.  She states the pain is 70% left-sided and 30% right-sided.  Pain extends into the upper trapezius muscles bilaterally.  She denies any pain radiating further down the arms, however, she experiences numbness and tingling involving digits 1 through 3 bilaterally.  Numbness and tingling is worse in the right hand compared to the left.  She states that she feels weak in her hands.  She states that she has been dropping instruments at work.  She states that she feels that some of the dentists do not want to work with her anymore because they are concerned about her ability to perform her job safely.  Pain is aggravated with turning her neck, driving, looking down.  She states that during her workday she spends most of the day with her head flexed and laterally rotated which makes her pain much worse.  She is having significant difficulty sleeping because of the pain.  Her pain is alleviated somewhat with applying heat and ice.  Patient states that she has chronic migraine.  She endorses difficulty with balance.  She denies any recent fevers, chills, sweats.    Patient participating in physical therapy 3 years ago.  She states that she had been doing her home exercises on a daily basis, however, at this  point her pain is so severe that she cannot tolerate her home exercise program.  She tried chiropractic manipulation once at the onset of her pain 2 and half months ago.  This was not helpful.  She has not had any more recent epidural steroid injection since 2015.  Patient has been using ibuprofen 800 mg twice daily.  She states that she would like to be able to use less of this medication because she only has one kidney.  She also uses baclofen 10 mg at bedtime.  Patient completed a course of oral steroids earlier this month.  She said that provided slight relief of her pain.    Past medical history is reviewed and is pertinent for being hospitalized in October 2017 for viral bronchitis.  It is also pertinent for an ER visit in January 2018 due to an intoxicated fall.  Cervical spine CT was negative for fracture.  Head CT was negative.    Family history is reviewed and is pertinent for her mother being diagnosed with cervical spinal stenosis.    Review of Systems:  Positive for hoarseness, poor sleep quality, joint pain, headache, heat intolerance, color changes in hands or feet, numbness/tingling, weakness, balance changes.  Negative for loss of bowel/bladder control, footdrop dizziness, blurry vision, nausea/vomiting.     Objective:   CONSTITUTIONAL:  Vital signs as above.  No acute distress.  The patient is well nourished and well groomed.    PSYCHIATRIC:  The patient is awake, alert, oriented to person, place and time.  The patient is answering questions appropriately with clear speech.  Normal affect.  HEENT: Normocephalic, atraumatic.  Sclera clear.  Neck is supple.  SKIN:  Skin over the face, neck bilateral upper extremities is clean, dry, intact without rashes.  MUSCULOSKELETAL: The patient has 5/5 strength for the bilateral shoulder abductors, elbow flexors/extensors, wrist extensors, finger flexors/abductors.  Cervical range of motion is mildly restricted with flexion, moderately restricted with  extension, mildly restricted lateral rotation right, moderately with lateral rotation left, severely restricted lateral flexion bilaterally.  Patient has significant hypertonicity in the left greater than right cervical paraspinous muscles and upper trapezius muscles, but she does not endorse tenderness to palpation.  Positive Spurling sign bilaterally to reproduce paresthesias in the hands.  Spurling sign on the right also causes a stinging pain down the right arm.  NEUROLOGICAL: Reflexes are 2+ bilateral biceps, 1+ triceps, 1+ brachioradialis.  Negative Kaur's bilaterally.  Sensation to light touch is subjectively diminished over digits 1 through 3 bilaterally.  Negative Tinel sign at the wrist bilaterally.  Negative Phalen sign.     RESULTS:  I reviewed the MRI cervical spine from [facility] dated September 14, 20180  .At C6-7 there is a diffuse annular bulge with moderate right and mild left foraminal stenosis.  At C2-3 there is mild left foraminal stenosis.  At C3-4 there is moderate right foraminal stenosis.  At C4-5 there is mild right foraminal stenosis.  Patient has facet arthropathy which is prominent on the left at C2-3.  There is also facet arthropathy at C3-4, C4-5, and C5-6.  Please report for further details.

## 2021-06-20 NOTE — PROGRESS NOTES
Pt given Nebulizer treatment at 2:05 pm  Albuterol Sulfate solution  2.5 mg/ 3 ml  Lot number [id number]  Expiration date 1/2020  NDC number 4967-1977-03  CECILIO BorregoA

## 2021-06-20 NOTE — LETTER
Letter by Mis Seals RN at      Author: Mis Seals RN Service: -- Author Type: --    Filed:  Encounter Date: 12/23/2019 Status: Signed         December 23, 2019     Patient: Keiry Mchugh   YOB: 1965   Date of Visit: 12/23/2019       To Whom It May Concern:    It is my medical opinion that Keiry Mchugh may return to work on 12/23/19 with no restrictions.    If you have any questions or concerns, please don't hesitate to call.    Sincerely,        Electronically signed by Davis Mosqueda MD

## 2021-06-20 NOTE — PROGRESS NOTES
[facility] Daily Progress     Patient Name: Keiry Mchugh  Date: 10/9/2018  Visit #: 3  PTA visit #:1   PRECAUTIONS: none  Referral Diagnosis: Cervical radiculitis  Referring provider: Damaris Leslie PA-C  Visit Diagnosis:     ICD-10-CM    1. Acute neck pain M54.2    2. Decreased ROM of neck R29.898    3. Paresthesias in left hand R20.2    4. Paresthesias in right hand R20.2          Assessment:     Signs/sx consistent with acute bilateral cervical radiculopathy within the C6 distribution, with differential diagnoses for bilateral thoracic outlet syndrome, although relatively unlikely.  HEP/POC compliance is  good .  Patient is appropriate to continue with skilled physical therapy intervention, as indicated by initial plan of care.   Pt reports decreased neck pain and bilateral hand paresthesias with cervical distraction.    Goal Status:Ongoing  Pt. will be independent with home exercise program in : 4 weeks  Pt. will decrease use of medication for pain for improved quality of life in : 4 weeks  Pt. will have improved quality of sleep: waking less times/night;in 4 weeks  Patient will twist/turn : in bed;with no pain;in 6 weeks  Patient will transfer: supine/sit;for in/out of bed;with no pain;in 6 weeks  Patient Turn [place]: for driving;Comment  Comment: L/R ROT >= 65* with pain <= 1/10  Patient will look up / down: for computer work;Comment;in 6 weeks  Comment: flexion/extension >= 45* with pain <= 1/10  Patient will decrease : NDI score;for improved quality of life;in 6 weeks;by _ points  by ___ points: >= 5  Patient will show increased : tolerance for ___;in 6 weeks  Patient will show increased tolerance for : work without increaed pain     Plan / Patient Education:     Continue cervical distraction, as well as other MT as appropriate to reduce pain and improve mobility.  As tolerated, continue to progress C-T spine postural strengthening ex back to baseline.    Subjective:     Pain Rating:  "8-9/10  Reports increased pain with cervical rot and SB, doing minimal ex  Injections to the neck scheduled for next Wed, 10/10. Hoping to get over the pain quickly, because she does have a vacation to [place] scheduled for 10/16.    Objective:     Reviewed HEP with min cueing for correct performance..  Increased muscle spasm present cervical paraspinals, scalenes. Improved moderately post distraction.    Treatment Today     TREATMENT MINUTES COMMENTS   Evaluation     Self-care/ Home management     Manual therapy 20 -Prone central PAs grade II to C2-C7, T1, grade III to T2-T5  -Supine cervical distraction 60\" hold, 10\" rest   Neuromuscular Re-education     Therapeutic Activity     Therapeutic Exercises 5 Ex per flow sheet   Gait training     Modality__________________                Total 25    Blank areas are intentional and mean the treatment did not include these items.       Holly Beltran  10/9/2018    "

## 2021-06-20 NOTE — PROGRESS NOTES
[facility] Daily Progress     Patient Name: Keiry Mchugh  Date: 10/4/2018  Visit #: 2  PTA visit #:    PRECAUTIONS: none  Referral Diagnosis: Cervical radiculitis  Referring provider: Damaris Leslie PA-C  Visit Diagnosis:     ICD-10-CM    1. Acute neck pain M54.2    2. Decreased ROM of neck R29.898    3. Paresthesias in left hand R20.2    4. Paresthesias in right hand R20.2          Assessment:     Signs/sx consistent with acute bilateral cervical radiculopathy within the C6 distribution, with differential diagnoses for bilateral thoracic outlet syndrome, although relatively unlikely.  HEP/POC compliance is  good .  Patient is appropriate to continue with skilled physical therapy intervention, as indicated by initial plan of care.   Pt reports decreased neck pain and bilateral hand paresthesias with cervical distraction.    Goal Status:  Pt. will be independent with home exercise program in : 4 weeks  Pt. will decrease use of medication for pain for improved quality of life in : 4 weeks  Pt. will have improved quality of sleep: waking less times/night;in 4 weeks  Patient will twist/turn : in bed;with no pain;in 6 weeks  Patient will transfer: supine/sit;for in/out of bed;with no pain;in 6 weeks  Patient Turn [place]: for driving;Comment  Comment: L/R ROT >= 65* with pain <= 1/10  Patient will look up / down: for computer work;Comment;in 6 weeks  Comment: flexion/extension >= 45* with pain <= 1/10  Patient will decrease : NDI score;for improved quality of life;in 6 weeks;by _ points  by ___ points: >= 5  Patient will show increased : tolerance for ___;in 6 weeks  Patient will show increased tolerance for : work without increaed pain     Plan / Patient Education:     Continue cervical distraction, as well as other MT as appropriate to reduce pain and improve mobility.  As tolerated, continue to progress C-T spine postural strengthening ex back to baseline.    Subjective:     Pain Ratin-8/10  Reports  "pain feels to be getting worse and worse. Did get some short-term relief with last PT session, but overall pain worse than that day. Doing HEP regularly.  Injections to the neck scheduled for next Wed, 10/10. Hoping to get over the pain quickly, because she does have a vacation to [place] scheduled for 10/16.    Objective:     Reviewed HEP with min cueing for correct performance.  Mild hypomobility present with central PAs to C-T spine.  Increased muscle spasm present cervical paraspinals, scalenes. Improved moderately post distraction.    Treatment Today     TREATMENT MINUTES COMMENTS   Evaluation     Self-care/ Home management     Manual therapy 17 -Prone central PAs grade II to C2-C7, T1, grade III to T2-T5  -Supine cervical distraction 60\" hold, 10\" rest   Neuromuscular Re-education     Therapeutic Activity     Therapeutic Exercises 9 Ex per flow sheet   Gait training     Modality__________________                Total 26    Blank areas are intentional and mean the treatment did not include these items.       Alin Fam  10/4/2018    "

## 2021-06-20 NOTE — LETTER
Letter by Rosario Mccallum RN at      Author: Rosario Mccallum RN Service: -- Author Type: --    Filed:  Encounter Date: 5/17/2020 Status: (Other)       5/17/2020        [place] [address]    This letter provides a written record that you were tested for COVID-19 on 5/16/20.     Your result was negative.    This means that we didnt find the virus that causes COVID-19 in your sample. A test may show negative when you do actually have the virus. This can happen when the virus is in the early stages of infection, before you feel illness symptoms.    Even if you dont have symptoms, they may still appear. For safety, its very important to follow these rules.    Keep yourself away from others (self-isolation):      Stay home. Dont go to work, school or anywhere else.     Stay in your own room (and use your own bathroom), if you can.    Stay away from others in your home. No hugging, kissing or shaking hands. No visitors.    Clean high touch surfaces often (doorknobs, counters, handles, etc.). Use a household cleaning spray or wipes.    Cover your mouth and nose with a mask, tissue or washcloth to avoid spreading germs.    Wash your hands and face often with soap and water.    Stay in self-isolation until you meet ALL of the guidelines below:    1. You have had no fever for at least 72 hours (that is 3 full days of no fever without the use of medicine that reduces fevers), AND  2. other symptoms (such as cough, shortness of breath) have gotten better, AND  3. at least 10 days have passed since your symptoms first appeared.    Going back to work  Check with your employer for any guidelines to follow for going back to work.    Employers: This document serves as formal notice that your employee tested negative for COVID-19, as of the testing date shown above.    For questions regarding this letter or your Negative COVID-19 result, call [phone] between 8A to 6:30P (M-F) and 10A to  6:30P (weekends).

## 2021-06-20 NOTE — LETTER
Letter by Porfirio Linton MD at      Author: Porfirio Linton MD Service: -- Author Type: --    Filed:  Encounter Date: 4/22/2020 Status: (Other)         Keiry Mchugh  [address] [place] [address]             April 22, 2020         Dear Ms. Mchugh,    Below are the results from your recent visit:    Resulted Orders   Comprehensive Metabolic Panel   Result Value Ref Range    Sodium 141 136 - 145 mmol/L    Potassium 4.8 3.5 - 5.0 mmol/L    Chloride 104 98 - 107 mmol/L    CO2 22 22 - 31 mmol/L    Anion Gap, Calculation 15 5 - 18 mmol/L    Glucose 97 70 - 125 mg/dL    BUN 26 (H) 8 - 22 mg/dL    Creatinine 1.03 0.60 - 1.10 mg/dL    GFR MDRD Af Amer >60 >60 mL/min/1.73m2    GFR MDRD Non Af Amer 56 (L) >60 mL/min/1.73m2    Bilirubin, Total 0.6 0.0 - 1.0 mg/dL    Calcium 10.2 8.5 - 10.5 mg/dL    Protein, Total 7.9 6.0 - 8.0 g/dL    Albumin 4.3 3.5 - 5.0 g/dL    Alkaline Phosphatase 171 (H) 45 - 120 U/L     (H) 0 - 40 U/L     (H) 0 - 45 U/L    Narrative    Fasting Glucose reference range is 70-99 mg/dL per  American Diabetes Association (ADA) guidelines.       Hi Samantha:  Here are the labs we discussed on the phone today (4-22-20).  The plan is to repeat the CMP in one week along with a ferritin, CRP and anti-microsomal antibodies.  Then we will decide if you need to follow up with the liver specialist.    Please call with questions or contact us using [employer].    Sincerely,        Electronically signed by Porfirio Linton MD

## 2021-06-20 NOTE — PROGRESS NOTES
[facility]   Initial Evaluation    Patient Name: Keiry Mchugh  Date of evaluation: 10/2/2018  PRECAUTIONS: none  Referral Diagnosis: Cervical radiculitis  Referring provider: Damaris Leslie PA-C  Visit Diagnosis:     ICD-10-CM    1. Acute neck pain M54.2    2. Decreased ROM of neck R29.898    3. Paresthesias in left hand R20.2    4. Paresthesias in right hand R20.2        Assessment:      Pt. is appropriate for skilled PT intervention as outlined in the Plan of Care (POC).  Pt. is a good candidate for skilled PT services to improve pain levels and function.  Goals and POC established in collaboration with the patient.  Signs/sx consistent with acute bilateral cervical radiculopathy within the C6 distribution, with differential diagnoses for bilateral thoracic outlet syndrome.  Pt reports decreased neck pain and bilateral hand paresthesias with cervical distraction.  HEP initiated today, and patient with good tolerance for all ex.    Goals:  Pt. will be independent with home exercise program in : 4 weeks  Pt. will decrease use of medication for pain for improved quality of life in : 4 weeks  Pt. will have improved quality of sleep: waking less times/night;in 4 weeks  Patient will twist/turn : in bed;with no pain;in 6 weeks  Patient will transfer: supine/sit;for in/out of bed;with no pain;in 6 weeks  Patient Turn [place]: for driving;Comment  Comment: L/R ROT >= 65* with pain <= 1/10  Patient will look up / down: for computer work;Comment;in 6 weeks  Comment: flexion/extension >= 45* with pain <= 1/10  Patient will decrease : NDI score;for improved quality of life;in 6 weeks;by _ points  by ___ points: >= 5  Patient will show increased : tolerance for ___;in 6 weeks  Patient will show increased tolerance for : work without increaed pain     Patient's expectations/goals are realistic.    Barriers to Learning or Achieving Goals:  Chronicity: prior neck issues       Plan / Patient Instructions:        Plan  "of Care:   Communication with: Referral Source  Patient Related Instruction: Nature of Condition;Treatment plan and rationale;Self Care instruction;Basis of treatment;Body mechanics;Posture;Precautions;Next steps;Expected outcome  Times per Week: 2  Number of Weeks: 4-6  Number of Visits: 12  Therapeutic Exercise: ROM;Stretching;Strengthening  Neuromuscular Reeducation: posture  Manual Therapy: joint mobilization;muscle energy;myofascial release;soft tissue mobilization  Modalities: traction;electrical stimulation (PRN)    Plan for next visit: UBE > Review HEP, adding doorway pec stretch as tolerated. Continue c-distraction.     Subjective:       History of Present Illness:    Keiry is a 53 y.o. female who presents to therapy today with complaints of left>right neck pain, 1-3rd digit numbness/tingling.  Has been getting HA, but feels to be more migraines in nature, which she has had for quite some time. Feels to be hormonal.   [facility]24/18: \"Keiry Mchugh is a 53 y.o. y.o. female with past medical history significant for obesity, anxiety, peripheral neuropathy, GERD, vitamin D deficiency, migraine, chronic kidney disease stage III who presents today for follow-up regarding a 2.5 month flare up of chronic right neck pain with right upper extremity paresthesias and a 2.5 month history of left neck pain with left upper extremity paresthesias.  MRI cervical spine shows a diffuse annular bulge at C6-7 which results in moderate right and mild left foraminal stenosis.  Patient also has significant facet arthropathy, worse on the left than the right.  Patient previously had greater than 75% relief of her pain lasting over 2 years following a right C6-7 transforaminal epidural steroid injection which was performed in December 2015.  Patient states the current pain is the exact same as the pain was before that injection, although at that time he was only right-sided and now she has bilateral " "symptoms. Patient demonstrated a sensory deficit in the first 3 fingers bilaterally.\"  Onset: Woke up 3 months ago to intense pain.  Duration: Constant  Worse with getting in/out of bed, turning over in bed, work, lifting/carrying, washing/doing hair, typing/writing/gripping, mvmt of the head and neck  Better with previous injection, muscle relaxers but only temporarily  Pain Medication: Taking muscle relaxers and Advil frequently for pain relief.  Sleep: Reports waking 10+ times/night due to pain.  Works as a dental assistant; still working full time.    Pt seeks PT to \"relieve pain, get feeling back in fingers.\"    Pain Ratin  Pain rating at best: 6  Pain rating at worst: 10  Pain description: aching, burning, dull, numbness, pain, sharp, soreness and tingling     Objective:      Patient Outcome Measures :    Neck Disability Score in %: 68   Scores range from 0-100%, where a score of 0% represents minimal pain and maximal function. The minmal clinically important difference is a score reduction of 10%.     PER EMR:  [facility]  MR CERVICAL SPINE WO CONTRAST  2018 1:22 PM     INDICATION: Spinal stenosis, cervical  TECHNIQUE: MRI of the cervical spine without contrast.  COMPARISON: 2014.     FINDINGS: Good anatomic alignment of cervical spine with no evidence of subluxation. The vertebral body heights are well-maintained throughout and have appropriate signal characteristics for the patient's age. Normal appearance of the craniocervical   junction and C1-C2. Patent foramen magnum. Normal cervicomedullary junction. No abnormal cord signal. The visualized intracranial contents are grossly normal. There is soft tissue filling the left side of the vallecula. This was seen on the previous MRI.   It may represent a mucous retention cyst. Recommend clinical correlation and direct visualization.     C2-C3: The disc space height and signal is maintained. There is no significant disc bulge or " herniation. There is prominent left facet arthropathy now leading to mild left neural foraminal narrowing. This has progressed in the interval.     C3-C4: There is a slight loss of disc space height and signal. There is a broad posterior disc osteophyte complex again more prominent to the right paracentral region. There is no evidence of canal compromise. This along with the facet arthropathy and   uncinate spurring does lead to moderate right neural foraminal narrowing.     C4-C5: The disc space height and signal is maintained. There is a mild diffuse annular bulge more prominent to the right posterior lateral region. There is no evidence of canal compromise. There is stable mild right neural foraminal narrowing secondary   to uncinate spurring and facet arthropathy.     C5-C6: The disc space height and signal is maintained. There is no significant disc bulge or herniation. There is facet arthropathy but the neural foramen are patent bilaterally.     C6-C7: There is a moderate loss of disc space height and signal. There is a diffuse annular bulge with no evidence of canal compromise. There is facet arthropathy and uncinate spurring leading to moderate right neural foraminal narrowing and mild left   neural foraminal narrowing.     C7-T1: At the disc space height and signal is maintained. There is no significant disc bulge or herniation to lead to canal compromise. There is no significant facet arthropathy     IMPRESSION:   CONCLUSION:  1.  Good anatomic alignment of the cervical spine and the vertebral body heights are maintained.      2.  At the C2-C3 disc space level there is new mild left neural foraminal narrowing.     3.  At the C3-C4 disc space level stable moderate right neural foraminal narrowing.     4.  At the C4-C5 disc space level there is stable mild right neural foraminal narrowing.     5.  A the C6-C7 disc space level there is stable moderate right neural foraminal narrowing and mild left neural  foraminal narrowing    Examination  1. Acute neck pain     2. Decreased ROM of neck     3. Paresthesias in left hand     4. Paresthesias in right hand       Involved side: Bilateral  Posture Observation:      General sitting posture is  fair.  General standing posture is fair.  Cervical:  Mild forward head  Shoulder/Thoracic complex: Mild bilateral scapular protraction   Mildly increased CT junction thoracic kyphosis  Mildly increased upper thoracic kyphosis    Cervical ROM. Reports pain with all motions, worst with extension, L SB, and L ROT.  Date:      *Degrees AROM AROM AROM   Cervical Flexion 20     Cervical Extension 20      Right Left Right Left Right Left   Cervical Sidebending 15 5       Cervical Rotation 50 40       Cervical Protraction      Cervical Retraction      Thoracic Flexion      Thoracic Extension      Thoracic Sidebending         Thoracic Rotation           Strength  Date:      Cervical Myotomes/5 Right Left Right Left Right Left   Cervical Flexion (C1-2)         Cervical Sidebending (C3)         Shoulder Elevation (C4) 5 5       Shoulder Abduction (C5) 5 5       Elbow Flexion (C6) 5 5       Elbow Extension (C7) 5 5       Wrist Flexion (C7)         Wrist Extension (C6)         Thumb abduction (C8) 4+ 4+       Finger Abduction (T1) 4+ 4+         Cervical Special Tests  Cervical Special Tests Right Left UE Nerve Mobility Right Left   Cervical compression   Median nerve + +   Cervical distraction + + Ulnar nerve     Spurling s test   Radial nerve     Shoulder abduction sign   Thoracic outlet     Deep neck flexor endurance test   Neville + +   Upper cervical rotation   Adson s Inconclusive Inconclusive   Sharper-Yaritza   Cervical rotation lateral flexion Inconclusive Inconclusive   Alar ligament test   Other:  Bracing - -   Other:   Other: HyperABD test               Treatment Today     TREATMENT MINUTES COMMENTS   Evaluation 20    Self-care/ Home management     Manual therapy 15 Supine cervical  "distraction 60\" hold, 10\" rest   Neuromuscular Re-education     Therapeutic Activity     Therapeutic Exercises 10 -Discussed therapy diagnosis, prognosis, POC, relevant anatomy and basis for tx.  -Reviewed and performed HEP with handouts provided.   Gait training     Modality__________________                Total 45    Blank areas are intentional and mean the treatment did not include these items.            PT Evaluation Code: (Please list factors)  Patient History/Comorbidities: chronicity/previous neck issues  Examination: Neck  Clinical Presentation: Stable  Clinical Decision Making: Low    Patient History/  Comorbidities Examination  (body structures and functions, activity limitations, and/or participation restrictions) Clinical Presentation Clinical Decision Making (Complexity)   No documented Comorbidities or personal factors 1-2 Elements Stable and/or uncomplicated Low   1-2 documented comorbidities or personal factor 3 Elements Evolving clinical presentation with changing characteristics Moderate   3-4 documented comorbidities or personal factors 4 or more Unstable and unpredictable High           Alin Fam  10/2/2018  9:46 AM                 "

## 2021-06-20 NOTE — LETTER
Letter by Porfirio Linton MD at      Author: Porfirio Linton MD Service: -- Author Type: --    Filed:  Encounter Date: 6/25/2020 Status: (Other)         Keiry Mchugh  [address] [place]  [place] [facility] [address]             June 25, 2020         Dear Ms. Mchugh,    Below are the results from your recent visit:    Resulted Orders   Echo Complete   Result Value Ref Range    LV volume diastolic 99.8 46 - 106 cm3    LV volume systolic 35.2 14 - 42 cm3    HR 75 bpm    IVSd 1.22 (!) 0.6 - 0.9 cm    LVIDd 4.88 3.8 - 5.2 cm    LVIDs 2.84 2.2 - 3.5 cm    LVOT diam 2.1 cm    LVOT mean gradient 2 mmHg    LVOT peak VTI 22.6 cm    LVOT mean lisa 70 cm/s    LVOT peak lisa 115 cm/s    LVOT peak gradient 5 mmHg    LV PWd 0.978 (!) 0.6 - 0.9 cm    MV E' lat lisa 10.1 cm/s    MV E' med lisa 9.86 cm/s    AV mean lisa 85.2 cm/s    AV mean gradient 3 mmHg    AV VTI 25.7 cm    AV peak lisa 135 cm/s    AO root 2.8 cm    AO ascending 3.1 cm    LA size 3.4 cm    LA/AO root ratio 1.21 no units    MV decel slope 5,380 mm/s2    MV decel time 190 ms    MV P 1/2 time 56 ms    MV peak A lisa 98.5 cm/s    MV peak E lisa 102 cm/s    BSA 2.13 m2    Hieght 67 in    Weight 3,392 lbs    /82 mmHg    IVS/PW ratio 1.2     LV FS 41.8 28 - 44 %    Echo LVEF calculated 65 55 - 75 %    LV mass 198.9 g    AV area 3.0 cm2    AV DIM IND lisa 0.9     MV area p 1/2 time 3.9 cm2    MV E/A Ratio 1.0     LVOT area 3.46 cm2    LVOT SV 78.2 cm3    AV peak gradient 7.3 mmHg    LV systolic volume index 16.5 8 - 24 cm3/m2    LV diastolic volume index 46.9 29 - 61 cm3/m2    LV mass index 93.4 g/m2    LV SVi 36.7 ml/m2    MV med E/e' ratio 10.3     MV lat E/e' ratio 10.1     LV CO 5.9 l/min    LV Ci 2.8 l/min/m2    Height 67.0 in    Weight 212 lbs    MV Avg E/e' Ratio 10.2 cm/s    AV DIM IND VTI 0.9     LA volume 55.0 mL    LA volume index 25.8 mL/m2    TAPSE 1.8 cm    LA area 2 15.4 cm2    LA area 1 20.6 cm2    LA length 4.9 cm    Narrative      Normal left  "ventricular size with borderline increased wall thickness.    Left ventricle ejection fraction is normal. The calculated left   ventricular ejection fraction is 65%.    Normal right ventricular size and systolic function.    No hemodynamically significant valvular heart abnormalities.    No previous study for comparison.          Danie Singh:  As we discussed this is a normal ECHO.  We could consider rechecking it in a year due to the mentioned \"borderline increased wall thickness\".      Please call with questions or contact us using [employer].    Sincerely,        Electronically signed by Porfirio Linton MD       "

## 2021-06-20 NOTE — LETTER
Letter by Porfirio Linton MD at      Author: Porfirio Linton MD Service: -- Author Type: --    Filed:  Encounter Date: 6/20/2020 Status: (Other)         Keiry Mchugh  [address]  [facility] [address]             June 20, 2020         Dear Ms. Mchugh,    Below are the results from your recent visit:    Resulted Orders   BNP(B-type Natriuretic Peptide)   Result Value Ref Range    BNP <10 0 - 78 pg/mL   Comprehensive Metabolic Panel   Result Value Ref Range    Sodium 139 136 - 145 mmol/L    Potassium 4.7 3.5 - 5.0 mmol/L    Chloride 105 98 - 107 mmol/L    CO2 24 22 - 31 mmol/L    Anion Gap, Calculation 10 5 - 18 mmol/L    Glucose 106 70 - 125 mg/dL    BUN 21 8 - 22 mg/dL    Creatinine 0.93 0.60 - 1.10 mg/dL    GFR MDRD Af Amer >60 >60 mL/min/1.73m2    GFR MDRD Non Af Amer >60 >60 mL/min/1.73m2    Bilirubin, Total 0.3 0.0 - 1.0 mg/dL    Calcium 10.5 8.5 - 10.5 mg/dL    Protein, Total 7.8 6.0 - 8.0 g/dL    Albumin 4.4 3.5 - 5.0 g/dL    Alkaline Phosphatase 125 (H) 45 - 120 U/L    AST 30 0 - 40 U/L    ALT 67 (H) 0 - 45 U/L    Narrative    Fasting Glucose reference range is 70-99 mg/dL per  American Diabetes Association (ADA) guidelines.   Urinalysis-UC if Indicated   Result Value Ref Range    Color, UA Yellow Colorless, Yellow, Straw, Light Yellow    Clarity, UA Clear Clear    Glucose, UA Negative Negative    Bilirubin, UA Negative Negative    Ketones, UA Negative Negative    Specific Gravity, UA 1.020 1.005 - 1.030    Blood, UA Negative Negative    pH, UA 5.0 5.0 - 8.0    Protein, UA Negative Negative mg/dL    Urobilinogen, UA 0.2 E.U./dL 0.2 E.U./dL, 1.0 E.U./dL    Nitrite, UA Negative Negative    Leukocytes, UA Negative Negative    Narrative    Microscopic not indicated  UC not indicated       Hi Samantha:  There was no protein in the urine on the UA.  So at present there doesn't seem to be a need to do the 24 hour urine for protein.  However if the swelling problem persists we may want to do it  anyway.    The BNP (heart failure marker) is completely normal.  I still want you to get the ECHO done to look at the heart anatomy (wall motion and valves).    One liver enzyme is slightly elevated and stable;  looks quite good in light of past results.  The kidney function and remaining labs are NORMAL.    Please call with questions or contact us using [email or web address] [id number].    Sincerely,        Electronically signed by Porfirio Linton MD

## 2021-06-20 NOTE — PROGRESS NOTES
ASSESSMENT:  1. Cervicalgia  methylPREDNISolone (MEDROL DOSEPACK) 4 mg tablet    MR Cervical Spine Without Contrast   2. Neck pain  MR Cervical Spine Without Contrast   3. Cervical Spine Stenosis  MR Cervical Spine Without Contrast       PLAN:  Patient to follow-up for MRI of the  cervical spine.  Medrol Dosepak prescribed today to take care of more acute pain, we will follow for MRI results and change plan accordingly, can refer to orthopedics for spine clinic if needed based on MRI results.  No problem-specific Assessment & Plan notes found for this encounter.    The following high BMI interventions were performed this visit: encouragement to exercise and weight monitoring  There are no Patient Instructions on file for this visit.    Orders Placed This Encounter   Procedures     MR Cervical Spine Without Contrast     Standing Status:   Future     Standing Expiration Date:   9/6/2019     Order Specific Question:   Is the patient pregnant?     Answer:   No     Order Specific Question:   Can the procedure be changed per Radiologist protocol?     Answer:   Yes     Order Specific Question:   If this is a diagnostic procedure, have the patient's age and recent imaging history been considered?     Answer:   Yes     Order Specific Question:   Is the patient claustrophobic and in need of sedation to complete their MR scan?     Answer:   No     Medications Discontinued During This Encounter   Medication Reason     atenolol (TENORMIN) 50 MG tablet Duplicate order       No Follow-up on file.    CHIEF COMPLAINT:  Chief Complaint   Patient presents with     Neck Pain     worsened neck pain x 2 months, numbness in fingers       HISTORY OF PRESENT ILLNESS:  Keiry is a 53 y.o. female here today with worsening neck pain ×2 months.  Patient has a history of cervical spine stenosis and has had some on and off problems with neck pain and radicular pain in the arms for the past few years.  Over the past 2 months symptoms have been  worsening again she is having some numbness in her first 3 fingers of her right hand on and off at times.  Also has severe pain in the neck that radiates into the trapezius.  She feels very tight in her muscles as well.  Patient has not had recent imaging but had a CT of the cervical spine in January.  At that time they noted some mild loss of disc space height at C6 and C7, moderate to marked facet arthropathy left C2-C3, bilaterally at C3-C4 and lower grade changes elsewhere.  She also had moderate right C6-C7 and C3-C4 foraminal narrowing.  Pain is quite severe, patient is here today for evaluation.  Previously had  seen spine doctor at orthopedics, would be interested in going back to the spine center potentially if symptoms persist or if MRI warrants this.    REVIEW OF SYSTEMS:      Pertinent items are noted in HPI.  All other systems are negative  PFSH:  Reviewed, no changes      TOBACCO USE:  History   Smoking Status     Former Smoker   Smokeless Tobacco     Never Used       VITALS:  Vitals:    09/06/18 1438 09/06/18 1511   BP: (!) 143/100 (!) 130/98   Patient Site: Left Arm Left Arm   Patient Position: Sitting Sitting   Cuff Size: Adult Regular Adult Large   Pulse: 94    Resp: 18    Temp: 97.5  F (36.4  C)    TempSrc: Oral    Weight: 192 lb 8 oz (87.3 kg)      Wt Readings from Last 3 Encounters:   09/06/18 192 lb 8 oz (87.3 kg)   06/15/18 191 lb (86.6 kg)   01/27/18 180 lb (81.6 kg)       PHYSICAL EXAM:   BP (!) 130/98 (Patient Site: Left Arm, Patient Position: Sitting, Cuff Size: Adult Large) Comment: manual cuff  Pulse 94  Temp 97.5  F (36.4  C) (Oral)   Resp 18  Wt 192 lb 8 oz (87.3 kg)  BMI 30.6 kg/m2  General appearance: alert, appears stated age and cooperative  Neck: no adenopathy, no carotid bruit, no JVD, supple, symmetrical, trachea midline, thyroid not enlarged, symmetric, no tenderness/mass/nodules and tight paraspinals cervical spine, tender to palpation lateral to c-spine  Back: symmetric,  no curvature. ROM normal. No CVA tenderness.  Extremities: extremities normal, atraumatic, no cyanosis or edema  Pulses: 2+ and symmetric  Skin: Skin color, texture, turgor normal. No rashes or lesions  Neurologic: Grossly normal  Psychologic: Mood and affect normal.      DATA REVIEWED:  Additional History from Old Records Summarized (2): reviewed imaging from 2018  Decision to Obtain Records (1): 0  Radiology Tests Summarized or Ordered (1): 1  Labs Reviewed or Ordered (1): 0  Medicine Test Summarized or Ordered (1): 0  Independent Review of EKG or X-RAY(2 each): 0    The visit lasted a total of 25 minutes face to face with the patient. Over 50% of the time was spent counseling and educating the patient about plan of care.    MEDICATIONS:  Current Outpatient Prescriptions   Medication Sig Dispense Refill     acetaminophen (TYLENOL) 325 MG tablet Take 650 mg by mouth every 4 (four) hours as needed for pain.       atenolol (TENORMIN) 50 MG tablet Take 50 mg by mouth daily as needed. With flushing of skin/migraines       baclofen (LIORESAL) 10 MG tablet TAKE 1 TABLET(10 MG) BY MOUTH EVERY 6 HOURS AS NEEDED 30 tablet 3     BIOTIN ORAL Take 1 tablet by mouth daily.       cholecalciferol, vitamin D3, 1,000 unit tablet Take 2,000 Units by mouth daily.       FLUoxetine (PROZAC) 20 MG capsule TAKE 1 CAPSULE BY MOUTH EVERY DAY 90 capsule 1     magnesium oxide (MAG-OX) 400 mg tablet Take 500 mg by mouth daily.        melatonin 10 mg Tab Take 1 tablet by mouth.       NON FORMULARY amberen -otc for menopause       SUMAtriptan (IMITREX) 50 MG tablet Take 50 mg by mouth every 2 (two) hours as needed for migraine.       SUMAtriptan (IMITREX) 50 MG tablet TAKE 1 TABLET BY MOUTH AT THE ONSET OF HEADACHE- MAY REPEAT IN 2 HOURS IF NEEDED 10 tablet 3     TURMERIC ROOT EXTRACT ORAL Take 2,000 mg by mouth daily.       UNABLE TO FIND Take 1 capsule by mouth daily. Med Name:Tuermic       methylPREDNISolone (MEDROL DOSEPACK) 4 mg tablet  Take 1 tablet (4 mg total) by mouth daily for 6 days. Follow package directions 21 tablet 0     No current facility-administered medications for this visit.        This note has been dictated using voice recognition software. Any grammatical or context distortions are unintentional and inherent to the software

## 2021-06-20 NOTE — LETTER
Letter by Porfirio Linton MD at      Author: Porfirio Linton MD Service: -- Author Type: --    Filed:  Encounter Date: 3/24/2020 Status: (Other)         Keiry Mchugh  [address]  [place] [address]             March 24, 2020         Dear Ms. Mchugh,    Below are the results from your recent visit:    Resulted Orders   Comprehensive Metabolic Panel   Result Value Ref Range    Sodium 142 136 - 145 mmol/L    Potassium 5.2 (H) 3.5 - 5.0 mmol/L    Chloride 106 98 - 107 mmol/L    CO2 26 22 - 31 mmol/L    Anion Gap, Calculation 10 5 - 18 mmol/L    Glucose 85 70 - 125 mg/dL    BUN 23 (H) 8 - 22 mg/dL    Creatinine 0.98 0.60 - 1.10 mg/dL    GFR MDRD Af Amer >60 >60 mL/min/1.73m2    GFR MDRD Non Af Amer 60 (L) >60 mL/min/1.73m2    Bilirubin, Total 0.3 0.0 - 1.0 mg/dL    Calcium 10.7 (H) 8.5 - 10.5 mg/dL    Protein, Total 7.8 6.0 - 8.0 g/dL    Albumin 4.5 3.5 - 5.0 g/dL    Alkaline Phosphatase 96 45 - 120 U/L    AST 19 0 - 40 U/L    ALT 34 0 - 45 U/L    Narrative    Fasting Glucose reference range is 70-99 mg/dL per  American Diabetes Association (ADA) guidelines.       Hi Samantha:  As we discussed this evening the liver functions and kidney functions are now normal.  There was some trace elevation of both the potassium and calcium, though on previous recent labs these were both normal.  We will plan to recheck a metabolic panel again in one month via a lab appt.  I have placed an order.  In addition we talked about your tiredness, despite normal thyroid function, CBC and other labs.  I have entered a referral for a Sleep Clinic Consultation which you can do once this whole Covid-19 situation abates.    Please call with questions or contact us using [employer].    Sincerely,        Electronically signed by Porfirio Linton MD

## 2021-06-20 NOTE — LETTER
Letter by Porfirio Linton MD at      Author: Porfirio Linton MD Service: -- Author Type: --    Filed:  Encounter Date: 5/4/2020 Status: (Other)         Keiry Mchugh  [address]  [facility] [address]             May 4, 2020         Dear Ms. Mchugh,    Below are the results from your recent visit:    Resulted Orders   Comprehensive Metabolic Panel   Result Value Ref Range    Sodium 140 136 - 145 mmol/L    Potassium 5.1 (H) 3.5 - 5.0 mmol/L    Chloride 106 98 - 107 mmol/L    CO2 23 22 - 31 mmol/L    Anion Gap, Calculation 11 5 - 18 mmol/L    Glucose 77 70 - 125 mg/dL    BUN 26 (H) 8 - 22 mg/dL    Creatinine 0.97 0.60 - 1.10 mg/dL    GFR MDRD Af Amer >60 >60 mL/min/1.73m2    GFR MDRD Non Af Amer 60 (L) >60 mL/min/1.73m2    Bilirubin, Total 0.4 0.0 - 1.0 mg/dL    Calcium 10.4 8.5 - 10.5 mg/dL    Protein, Total 7.6 6.0 - 8.0 g/dL    Albumin 4.3 3.5 - 5.0 g/dL    Alkaline Phosphatase 128 (H) 45 - 120 U/L    AST 24 0 - 40 U/L    ALT 84 (H) 0 - 45 U/L    Narrative    Fasting Glucose reference range is 70-99 mg/dL per  American Diabetes Association (ADA) guidelines.   Ferritin   Result Value Ref Range    Ferritin 100 10 - 130 ng/mL   C-Reactive Protein(CRP)   Result Value Ref Range    CRP 0.8 0.0 - 0.8 mg/dL   Mitochondrial M2 Antibody, IgG   Result Value Ref Range    Mitochondrial M2 Earlene IgG 3 <4 U/mL      Comment:      Negative    Performed and/or entered by:  [address] [address]        Hi Samantha:  Again the liver enzymes have markedly improved with the ALT  Being elevated but to < 2 times normal.  The kidney function is normal.  The potassium is at the upper normal (1/10 of a point above normal) range.  The additional tests I did to look at your liver were all normal.  I would still suggest you follow up with your liver specialist sometime in the coming months.    Please call with questions or contact us using  [employer].    Sincerely,        Electronically signed by Porfirio Linton MD

## 2021-06-21 NOTE — PROGRESS NOTES
[facility] Discharge Summary  Patient Name: Keiry Mchugh  Date: 11/21/2018  Referral Diagnosis: Cervical radiculitis  Referring provider: Damaris Leslie PA-C  Visit Diagnosis:   1. Acute neck pain     2. Decreased ROM of neck     3. Paresthesias in left hand     4. Paresthesias in right hand         Goals: NOT MET  Pt. will be independent with home exercise program in : 4 weeks  Pt. will decrease use of medication for pain for improved quality of life in : 4 weeks  Pt. will have improved quality of sleep: waking less times/night;in 4 weeks  Patient will twist/turn : in bed;with no pain;in 6 weeks  Patient will transfer: supine/sit;for in/out of bed;with no pain;in 6 weeks  Patient Turn [place]: for driving;Comment  Comment: L/R ROT >= 65* with pain <= 1/10  Patient will look up / down: for computer work;Comment;in 6 weeks  Comment: flexion/extension >= 45* with pain <= 1/10    Patient will decrease : NDI score;for improved quality of life;in 6 weeks;by _ points  by ___ points: >= 5  Patient will show increased : tolerance for ___;in 6 weeks  Patient will show increased tolerance for : work without increaed pain       Patient was seen for 3 visits from 10/2 to 10/9 with several missed appointments.  Review of EMR reveals patient's expected course of PT with spinal injections complicated by severe ear infection.  Pt has not called to schedule any further follow-up since last PT visit.    Therapy will be discontinued at this time.  The patient will need a new referral to resume.    Thank you for your referral.  Alin Fam  11/21/2018  10:06 AM

## 2021-06-23 NOTE — TELEPHONE ENCOUNTER
Switched her to bactrim and flagyl.  Gave her additional 10 tablets of oxycodone  Gave her additional 15 tablets of zofran    If not better, need clinic follow up before any further refill of oxycodone/zofran

## 2021-06-23 NOTE — TELEPHONE ENCOUNTER
Reason for Disposition    SEVERE pain (e.g., excruciating, scale 8-10) and present > 1 hour    Protocols used: FLANK PAIN-A-OH

## 2021-06-23 NOTE — PATIENT INSTRUCTIONS - HE
Follow up with Dr Mosqueda due to recurrent diverticulitis.    Repeat  Liver enzymes and kidney function    Check serum immunfixation due to slightly abnormal SPEP    If the additional testing is normal then consider follow up with [facility]

## 2021-06-23 NOTE — TELEPHONE ENCOUNTER
Triage call:   Diverticulitis antibiotic Monday afternoon, pain is still bad, doesn't want to take hydrocodone as she state that it makes her feel worse. Drinking adequate fluids and urinating but noticed that her Urine is dark madelyn orange color. Kidney function was checked in the ER as patient only has one kidney and it was fine.      Triaged to be seen within 2 weeks for urinary symptoms and patient requested to make an appointment with PCP for and ER follow up visit tomorrow. Patient warm transferred to scheduling for appointment. Appointment scheduled for tomorrow at 9:10 am with PCP.      Denisse Coy RN [facility] Triage/Med Refill 1/30/2019 2:17 PM    Reason for Disposition    All other urine symptoms    Protocols used: URINARY SYMPTOMS-A-AH

## 2021-06-23 NOTE — TELEPHONE ENCOUNTER
"RN Triage:    Spoke with 53 yr old Samantha who called earlier today for triage and was advised to seek care at  ED for symptoms of diverticulitis x 4 days.    Pt reports the following:    Leaving [facility] ED now and has concerns.    Dx with Infected diverticulitis but not abscessed.    CT scan was done.    Kidney function was normal.    Running fever on Tylenol around 100.0    Has allergy to PCN with hives but was given Augmentin and is requesting a different Rx for AB.    Pt also feels 5 pain pills of Oxycodone HCL 5 mg, 1 tablet every 6 hours as needed may not be enough with the pain she has been having.    This is the same for Ondansetron HCL; given 8 tablets, but does not think this would be enough.    Requesting if her PCP or covering MD would consider changing AB and giving addition Rx for pain and nausea.    PLAN:  Will consult with PCP or covering MD regarding Rx questions per patient request.  Please advise.    Iliana Nathan RN   [facility] RN Triage      Reason for Disposition    [1] Request for URGENT new prescription or refill of \"essential\" medication (i.e., likelihood of harm to patient if not taken) AND [2] triager unable to fill per unit policy    Protocols used: MEDICATION QUESTION CALL-A-AH      "

## 2021-06-23 NOTE — PATIENT INSTRUCTIONS - HE
Continue antibiotics    Go to the ER if increasing pain or pain failing to improve    Check CMP, UA and CBC

## 2021-06-23 NOTE — PROGRESS NOTES
DIAGNOSIS:  1. Elevated LFTs  Comprehensive Metabolic Panel   2. Abnormal SPEP  Immunofixation Electrophoresis, Serum       PLAN:  Patient Instructions   Follow up with Dr Mosqueda due to recurrent diverticulitis.    Repeat  Liver enzymes and kidney function    Check serum immunfixation due to slightly abnormal SPEP    meds reviewed and reconciled          HPI:  F/u hospital stay 1-31-19 to 2-4-19 for  Failed outpatient treatment of diverticulitis.   Treated with IV antibiotics.  By 2-4-19 the kidney function had normalized and the liver functions were only slightly elevated.   On flagyl and  moxafloxacin until 2-11-19. Other than nausea has tolerated the antibiotics.  Twinges of pain that are sharp but  Far and few between.              Current Outpatient Medications on File Prior to Visit   Medication Sig Dispense Refill     atenolol (TENORMIN) 50 MG tablet Take 50 mg by mouth daily as needed. With flushing of skin/migraines       baclofen (LIORESAL) 10 MG tablet Take 1 tablet (10 mg total) by mouth every 6 (six) hours as needed. 30 tablet 0     BIOTIN ORAL Take 1 tablet by mouth daily.       cholecalciferol, vitamin D3, 5,000 unit Tab Take 5,000 Units by mouth daily.       co-enzyme Q-10 50 mg capsule Take 100 mg by mouth daily.       diphenhydrAMINE (BENADRYL) 50 MG capsule Take 1 cap 6 hrs before procedure, bring 2nd cap to procedure, may take immediately before procedure 2 capsule 0     FLUoxetine (PROZAC) 20 MG capsule TAKE 1 CAPSULE BY MOUTH EVERY DAY 90 capsule 1     magnesium oxide (MAG-OX) 400 mg tablet Take 400 mg by mouth daily.              melatonin 10 mg Tab Take 1 tablet by mouth at bedtime.              metroNIDAZOLE (FLAGYL) 500 MG tablet Take 1 tablet (500 mg total) by mouth 3 (three) times a day for 7 days. 21 tablet 0     moxifloxacin (AVELOX) 400 mg tablet Take 1 tablet (400 mg total) by mouth Daily at 6:00 am for 7 days. 7 tablet 0     SUMAtriptan (IMITREX) 50 MG tablet Take 50 mg by mouth  every 2 (two) hours as needed for migraine.       TURMERIC ROOT EXTRACT ORAL Take 2,000 mg by mouth daily.       [DISCONTINUED] ondansetron (ZOFRAN ODT) 4 MG disintegrating tablet Take 1 tablet (4 mg total) by mouth every 8 (eight) hours as needed for nausea. 15 tablet 0     [DISCONTINUED] oxyCODONE (ROXICODONE) 5 MG immediate release tablet Take 1 tablet (5 mg total) by mouth every 6 (six) hours as needed for pain. 5 tablet 0     No current facility-administered medications on file prior to visit.        Pmh: reviewed  Psh: reviewed  Allergy:  reviewed      EXAM:    /77 (Patient Site: Left Arm, Patient Position: Sitting, Cuff Size: Adult Large)   Pulse 84   Temp 98.4  F (36.9  C) (Oral)   Resp 20   Wt 194 lb 9.6 oz (88.3 kg)   BMI 30.48 kg/m    GEN:   ALERT, NAD, ORIENTED TIMES THREE  NECK: SUPPLE WITHOUT ADENOPATHY OR THYROMEGALY  LUNGS: CTA  COR: RRR WITHOUT MURMUR  ABD: SOFT, +BS, NONTX WITHOUT GUARDING OR PERITONEAL SIGNS  SKIN: NO RASH , ULCERS OR LESIONS NOTED  EXT: WITHOUT EDEMA OR SWELLING    Recent Results (from the past 168 hour(s))   Lipase    Collection Time: 01/31/19  9:23 PM   Result Value Ref Range    Lipase 20 0 - 52 U/L   Hepatic Profile    Collection Time: 01/31/19  9:23 PM   Result Value Ref Range    Bilirubin, Total 0.3 0.0 - 1.0 mg/dL    Bilirubin, Direct 0.1 <=0.5 mg/dL    Protein, Total 7.1 6.0 - 8.0 g/dL    Albumin 3.5 3.5 - 5.0 g/dL    Alkaline Phosphatase 238 (H) 45 - 120 U/L    AST 40 0 - 40 U/L     (H) 0 - 45 U/L   Basic Metabolic Panel    Collection Time: 01/31/19  9:23 PM   Result Value Ref Range    Sodium 138 136 - 145 mmol/L    Potassium 4.1 3.5 - 5.0 mmol/L    Chloride 103 98 - 107 mmol/L    CO2 22 22 - 31 mmol/L    Anion Gap, Calculation 13 5 - 18 mmol/L    Glucose 92 70 - 125 mg/dL    Calcium 9.5 8.5 - 10.5 mg/dL    BUN 13 8 - 22 mg/dL    Creatinine 1.08 0.60 - 1.10 mg/dL    GFR MDRD Af Amer >60 >60 mL/min/1.73m2    GFR MDRD Non Af Amer 53 (L) >60 mL/min/1.73m2    Comprehensive metabolic panel    Collection Time: 02/01/19  6:02 AM   Result Value Ref Range    Sodium 136 136 - 145 mmol/L    Potassium 4.4 3.5 - 5.0 mmol/L    Chloride 105 98 - 107 mmol/L    CO2 23 22 - 31 mmol/L    Anion Gap, Calculation 8 5 - 18 mmol/L    Glucose 96 70 - 125 mg/dL    BUN 10 8 - 22 mg/dL    Creatinine 1.00 0.60 - 1.10 mg/dL    GFR MDRD Af Amer >60 >60 mL/min/1.73m2    GFR MDRD Non Af Amer 58 (L) >60 mL/min/1.73m2    Bilirubin, Total 0.6 0.0 - 1.0 mg/dL    Calcium 8.8 8.5 - 10.5 mg/dL    Protein, Total 6.4 6.0 - 8.0 g/dL    Albumin 3.1 (L) 3.5 - 5.0 g/dL    Alkaline Phosphatase 284 (H) 45 - 120 U/L    AST 68 (H) 0 - 40 U/L     (H) 0 - 45 U/L   Hepatitis Acute Evaluation    Collection Time: 02/01/19  6:02 AM   Result Value Ref Range    Hepatitis A Antibody, IgM Negative Negative    Hepatitis B Core Earlene, IgM Negative Negative    Hepatitis B Surface Ag Negative Negative    Hepatitis C Ab Negative Negative   HM1 (CBC with Diff)    Collection Time: 02/01/19  6:02 AM   Result Value Ref Range    WBC 6.4 4.0 - 11.0 thou/uL    RBC 3.80 3.80 - 5.40 mill/uL    Hemoglobin 11.6 (L) 12.0 - 16.0 g/dL    Hematocrit 34.9 (L) 35.0 - 47.0 %    MCV 92 80 - 100 fL    MCH 30.5 27.0 - 34.0 pg    MCHC 33.2 32.0 - 36.0 g/dL    RDW 11.9 11.0 - 14.5 %    Platelets 212 140 - 440 thou/uL    MPV 10.5 8.5 - 12.5 fL    Neutrophils % 67 50 - 70 %    Lymphocytes % 21 20 - 40 %    Monocytes % 10 2 - 10 %    Eosinophils % 1 0 - 6 %    Basophils % 0 0 - 2 %    Neutrophils Absolute 4.2 2.0 - 7.7 thou/uL    Lymphocytes Absolute 1.3 0.8 - 4.4 thou/uL    Monocytes Absolute 0.6 0.0 - 0.9 thou/uL    Eosinophils Absolute 0.1 0.0 - 0.4 thou/uL    Basophils Absolute 0.0 0.0 - 0.2 thou/uL   Culture, Blood    Collection Time: 02/01/19  6:47 AM   Result Value Ref Range    Anaerobic Blood Culture Bottle No Growth No Growth, No organisms seen, bottle returned to instrument, Specimen not received    Aerobic Blood Culture Bottle No Growth  No Growth, No organisms seen, bottle returned to instrument, Specimen not received   Antinuclear Antibody (TAHIRA) Cascade    Collection Time: 02/01/19  2:59 PM   Result Value Ref Range    TAHIRA Screen Cascade 0.4 <=2.9 U   Anti-Smooth Muscle Antibody    Collection Time: 02/01/19  2:59 PM   Result Value Ref Range    F-Actin (Smooth Muscle) Ab, IgG by ZUNILDA 8 0 - 19 Units   Electrophoresis, Protein, Serum    Collection Time: 02/01/19  2:59 PM   Result Value Ref Range    Albumin % 56.3 51.0 - 67.0 %    Albumin  3.5 3.2 - 4.7 g/dL    Alpha 1 % 4.4 (H) 2.0 - 4.0 %    Alpha 1 0.3 0.1 - 0.3 g/dL    Alpha 2 % 14.7 (H) 5.0 - 13.0 %    Alpha 2 0.9 0.4 - 0.9 g/dL    % Beta 12.8 10.0 - 17.0 %    Beta 0.8 0.7 - 1.2 g/dL    Gamma Globulin % 11.8 9.0 - 20.0 %    Gamma Globulin 0.7 0.6 - 1.4 g/dL    ELP Comment       Possible faint band is visible in the gamma globulin region of the gel strip, which may represent a monoclonal globulin, but is too faint to quantify. Suggest immunofixation electrophoresis to further characterize and/or repeat electrophoreses at intervals.      Protein, Total 6.3 6.0 - 8.0 g/dL    Path ICD: K57.32     Interpreted By: Nicholas Salguero MD    Basic Metabolic Panel    Collection Time: 02/02/19  6:48 AM   Result Value Ref Range    Sodium 138 136 - 145 mmol/L    Potassium 4.3 3.5 - 5.0 mmol/L    Chloride 105 98 - 107 mmol/L    CO2 26 22 - 31 mmol/L    Anion Gap, Calculation 7 5 - 18 mmol/L    Glucose 112 70 - 125 mg/dL    Calcium 8.9 8.5 - 10.5 mg/dL    BUN 9 8 - 22 mg/dL    Creatinine 0.93 0.60 - 1.10 mg/dL    GFR MDRD Af Amer >60 >60 mL/min/1.73m2    GFR MDRD Non Af Amer >60 >60 mL/min/1.73m2   Hepatic Profile    Collection Time: 02/02/19  6:48 AM   Result Value Ref Range    Bilirubin, Total 0.4 0.0 - 1.0 mg/dL    Bilirubin, Direct 0.2 <=0.5 mg/dL    Protein, Total 6.5 6.0 - 8.0 g/dL    Albumin 2.9 (L) 3.5 - 5.0 g/dL    Alkaline Phosphatase 244 (H) 45 - 120 U/L    AST 34 0 - 40 U/L    ALT 83 (H) 0 - 45 U/L    HM1 (CBC with Diff)    Collection Time: 02/02/19  6:48 AM   Result Value Ref Range    WBC 5.3 4.0 - 11.0 thou/uL    RBC 3.72 (L) 3.80 - 5.40 mill/uL    Hemoglobin 11.3 (L) 12.0 - 16.0 g/dL    Hematocrit 34.9 (L) 35.0 - 47.0 %    MCV 94 80 - 100 fL    MCH 30.4 27.0 - 34.0 pg    MCHC 32.4 32.0 - 36.0 g/dL    RDW 11.7 11.0 - 14.5 %    Platelets 226 140 - 440 thou/uL    MPV 10.1 8.5 - 12.5 fL    Neutrophils % 61 50 - 70 %    Lymphocytes % 29 20 - 40 %    Monocytes % 8 2 - 10 %    Eosinophils % 2 0 - 6 %    Basophils % 1 0 - 2 %    Neutrophils Absolute 3.2 2.0 - 7.7 thou/uL    Lymphocytes Absolute 1.5 0.8 - 4.4 thou/uL    Monocytes Absolute 0.4 0.0 - 0.9 thou/uL    Eosinophils Absolute 0.1 0.0 - 0.4 thou/uL    Basophils Absolute 0.0 0.0 - 0.2 thou/uL   Hepatic Profile    Collection Time: 02/03/19  5:58 AM   Result Value Ref Range    Bilirubin, Total 0.2 0.0 - 1.0 mg/dL    Bilirubin, Direct 0.1 <=0.5 mg/dL    Protein, Total 6.3 6.0 - 8.0 g/dL    Albumin 2.9 (L) 3.5 - 5.0 g/dL    Alkaline Phosphatase 223 (H) 45 - 120 U/L    AST 21 0 - 40 U/L    ALT 66 (H) 0 - 45 U/L   HM2(CBC w/o Differential)    Collection Time: 02/03/19  5:59 AM   Result Value Ref Range    WBC 4.3 4.0 - 11.0 thou/uL    RBC 3.77 (L) 3.80 - 5.40 mill/uL    Hemoglobin 11.4 (L) 12.0 - 16.0 g/dL    Hematocrit 35.3 35.0 - 47.0 %    MCV 94 80 - 100 fL    MCH 30.2 27.0 - 34.0 pg    MCHC 32.3 32.0 - 36.0 g/dL    RDW 11.7 11.0 - 14.5 %    Platelets 263 140 - 440 thou/uL    MPV 9.9 8.5 - 12.5 fL   Urinalysis-UC if Indicated    Collection Time: 02/03/19 12:05 PM   Result Value Ref Range    Color, UA Yellow Colorless, Yellow, Straw, Light Yellow    Clarity, UA Clear Clear    Glucose, UA Negative Negative    Bilirubin, UA Negative Negative    Ketones, UA Negative Negative    Specific Gravity, UA 1.015 1.001 - 1.030    Blood, UA Negative Negative    pH, UA 6.0 4.5 - 8.0    Protein, UA Negative Negative mg/dL    Urobilinogen, UA <2.0 E.U./dL <2.0 E.U./dL, 2.0 E.U./dL     Nitrite, UA Negative Negative    Leukocytes, UA Small (!) Negative    Bacteria, UA None Seen None Seen hpf    RBC, UA 0-2 None Seen, 0-2 hpf    WBC, UA 0-5 None Seen, 0-5 hpf    Squam Epithel, UA 0-5 None Seen, 0-5 lpf    Mucus, UA Few (!) None Seen lpf   Culture, Urine    Collection Time: 02/03/19 12:05 PM   Result Value Ref Range    Culture 10,000-50,000 col/ml Enterococcus faecalis (!)        Susceptibility    Enterococcus faecalis - ISMAEL     Ampicillin 2 Sensitive      Nitrofurantoin <=16 Sensitive      Levofloxacin <=0.5 Sensitive      Tetracycline >8 Resistant    Hepatic Profile    Collection Time: 02/04/19  6:22 AM   Result Value Ref Range    Bilirubin, Total 0.2 0.0 - 1.0 mg/dL    Bilirubin, Direct <0.1 <=0.5 mg/dL    Protein, Total 6.1 6.0 - 8.0 g/dL    Albumin 2.9 (L) 3.5 - 5.0 g/dL    Alkaline Phosphatase 181 (H) 45 - 120 U/L    AST 18 0 - 40 U/L    ALT 49 (H) 0 - 45 U/L   Platelet Count - every other day x 3    Collection Time: 02/04/19  6:22 AM   Result Value Ref Range    Platelets 298 140 - 440 thou/uL   HM2(CBC W/O DIFF)    Collection Time: 02/04/19  6:22 AM   Result Value Ref Range    WBC 4.3 4.0 - 11.0 thou/uL    RBC 3.68 (L) 3.80 - 5.40 mill/uL    Hemoglobin 11.0 (L) 12.0 - 16.0 g/dL    Hematocrit 34.2 (L) 35.0 - 47.0 %    MCV 93 80 - 100 fL    MCH 29.9 27.0 - 34.0 pg    MCHC 32.2 32.0 - 36.0 g/dL    RDW 11.7 11.0 - 14.5 %    Platelets 298 140 - 440 thou/uL    MPV 9.7 8.5 - 12.5 fL     Results for orders placed or performed during the hospital encounter of 01/31/19   Basic Metabolic Panel   Result Value Ref Range    Sodium 138 136 - 145 mmol/L    Potassium 4.3 3.5 - 5.0 mmol/L    Chloride 105 98 - 107 mmol/L    CO2 26 22 - 31 mmol/L    Anion Gap, Calculation 7 5 - 18 mmol/L    Glucose 112 70 - 125 mg/dL    Calcium 8.9 8.5 - 10.5 mg/dL    BUN 9 8 - 22 mg/dL    Creatinine 0.93 0.60 - 1.10 mg/dL    GFR MDRD Af Amer >60 >60 mL/min/1.73m2    GFR MDRD Non Af Amer >60 >60 mL/min/1.73m2

## 2021-06-23 NOTE — TELEPHONE ENCOUNTER
Test Results  Who is calling?:  patient  Who ordered the test:  pcp  Type of test: Lab  Date of test:  2/7/2019  Where was the test performed:  [facility]  What are your questions/concerns?:  Requesting results. Patient reports she is very concerned about her liver results as she can see the results in her mychart. Patient is asking for provider to call her asap!  Okay to leave a detailed message?:  Yes

## 2021-06-23 NOTE — PROGRESS NOTES
DIAGNOSIS:  1. Abnormal urine color  Urinalysis-UC if Indicated    Comprehensive Metabolic Panel    HM1(CBC and Differential)    HM1 (CBC with Diff)   2. Diverticulitis of colon         PLAN:  Patient Instructions   Continue antibiotics    Go to the ER if increasing pain or pain failing to improve    Check CMP, UA and CBC  25 min spent with pt > 50% counseling and coordination of care           HPI:  For a week felt some pain in the lower left abdomen, and progressed to nausea and vomiting.  Developed fever up to 102, still low grade.   Had a bowel movement about 2 days ago.  No diarrhea. Staying hydrated and drinking clear fluids, so urinating a lot.  Urine a weird orange color on the 28th Jan after going to the ER..  This morning the urine still wasn't completely normal    Appetite is terrible. Feels better than yesterday.          Current Outpatient Medications on File Prior to Visit   Medication Sig Dispense Refill     acetaminophen (TYLENOL) 325 MG tablet Take 650 mg by mouth every 4 (four) hours as needed for pain.       atenolol (TENORMIN) 50 MG tablet Take 50 mg by mouth daily as needed. With flushing of skin/migraines       baclofen (LIORESAL) 10 MG tablet TAKE 1 TABLET BY MOUTH EVERY 6 HOURS AS NEEDED 30 tablet 0     BIOTIN ORAL Take 1 tablet by mouth daily.       cholecalciferol, vitamin D3, 1,000 unit tablet Take 2,000 Units by mouth daily.       diphenhydrAMINE (BENADRYL) 50 MG capsule Take 1 cap 6 hrs before procedure, bring 2nd cap to procedure, may take immediately before procedure 2 capsule 0     FLUoxetine (PROZAC) 20 MG capsule TAKE 1 CAPSULE BY MOUTH EVERY DAY 90 capsule 1     magnesium oxide (MAG-OX) 400 mg tablet Take 500 mg by mouth daily.        melatonin 10 mg Tab Take 1 tablet by mouth.       metroNIDAZOLE (FLAGYL) 500 MG tablet Take 1 tablet (500 mg total) by mouth 3 (three) times a day for 10 days. 30 tablet 0     NON FORMULARY amberen -otc for menopause       ondansetron (ZOFRAN ODT) 4  MG disintegrating tablet Take 1 tablet (4 mg total) by mouth every 8 (eight) hours as needed for nausea. 15 tablet 0     sulfamethoxazole-trimethoprim (BACTRIM DS) 800-160 mg per tablet Take 1 tablet by mouth 2 (two) times a day for 10 days. 20 tablet 0     SUMAtriptan (IMITREX) 50 MG tablet Take 50 mg by mouth every 2 (two) hours as needed for migraine.       TURMERIC ROOT EXTRACT ORAL Take 2,000 mg by mouth daily.       albuterol (PROAIR HFA;PROVENTIL HFA;VENTOLIN HFA) 90 mcg/actuation inhaler Inhale 2 puffs every 6 (six) hours as needed for wheezing. 1 each 0     flunisolide (NASALIDE) 25 mcg (0.025 %) Spry 2 spray each nostril daily 1 Bottle 1     methylPREDNISolone (MEDROL, JULIANNA,) 4 mg tablet follow package directions 21 tablet 0     [] ondansetron (ZOFRAN) 4 MG tablet Take 1 tablet (4 mg total) by mouth every 6 (six) hours for 2 days. 8 tablet 0     oxyCODONE (ROXICODONE) 5 MG immediate release tablet Take 1 tablet (5 mg total) by mouth every 6 (six) hours as needed for pain. 5 tablet 0     oxyCODONE (ROXICODONE) 5 MG immediate release tablet Take 1 tablet (5 mg total) by mouth every 4 (four) hours as needed for pain. 10 tablet 0     oxymetazoline (AFRIN, OXYMETAZOLINE,) 0.05 % nasal spray Use 1 spray in both nares up to 2 times a day.  Do not use for more than 3 days  0     predniSONE (DELTASONE) 50 MG tablet Take 1 tab 13 hrs before injection, take 1 tab 7 hrs before injection, take 1 tab 1 hr before injection 3 tablet 0     UNABLE TO FIND Take 1 capsule by mouth daily. Med Name:[facility]       No current facility-administered medications on file prior to visit.        Pmh: reviewed  Psh: reviewed  Allergy:  reviewed      EXAM:    /83 (Patient Site: Left Arm, Patient Position: Sitting, Cuff Size: Adult Large)   Pulse 87   Temp 98.8  F (37.1  C) (Oral)   Resp 20   Wt 192 lb (87.1 kg)   BMI 30.53 kg/m    GEN:   ALERT, NAD, ORIENTED TIMES THREE  NECK: SUPPLE WITHOUT ADENOPATHY OR  THYROMEGALY  LUNGS: CTA  COR: RRR WITHOUT MURMUR  ABD: SOFT, +BS,  With marked LLQ TX.  SKIN: NO RASH , ULCERS OR LESIONS NOTED  EXT: WITHOUT EDEMA OR SWELLING    Recent Results (from the past 168 hour(s))   Comprehensive Metabolic Panel    Collection Time: 01/28/19 10:31 AM   Result Value Ref Range    Sodium 140 136 - 145 mmol/L    Potassium 3.9 3.5 - 5.0 mmol/L    Chloride 104 98 - 107 mmol/L    CO2 28 22 - 31 mmol/L    Anion Gap, Calculation 8 5 - 18 mmol/L    Glucose 112 70 - 125 mg/dL    BUN 11 8 - 22 mg/dL    Creatinine 0.95 0.60 - 1.10 mg/dL    GFR MDRD Af Amer >60 >60 mL/min/1.73m2    GFR MDRD Non Af Amer >60 >60 mL/min/1.73m2    Bilirubin, Total 0.9 0.0 - 1.0 mg/dL    Calcium 10.0 8.5 - 10.5 mg/dL    Protein, Total 7.8 6.0 - 8.0 g/dL    Albumin 4.1 3.5 - 5.0 g/dL    Alkaline Phosphatase 101 45 - 120 U/L    AST 29 0 - 40 U/L    ALT 49 (H) 0 - 45 U/L   Lactic Acid    Collection Time: 01/28/19 10:31 AM   Result Value Ref Range    Lactic Acid 1.2 0.5 - 2.2 mmol/L   Lipase    Collection Time: 01/28/19 10:31 AM   Result Value Ref Range    Lipase 29 0 - 52 U/L   HM2 (CBC W/O DIFF)    Collection Time: 01/28/19 10:31 AM   Result Value Ref Range    WBC 8.6 4.0 - 11.0 thou/uL    RBC 4.79 3.80 - 5.40 mill/uL    Hemoglobin 14.7 12.0 - 16.0 g/dL    Hematocrit 43.1 35.0 - 47.0 %    MCV 90 80 - 100 fL    MCH 30.7 27.0 - 34.0 pg    MCHC 34.1 32.0 - 36.0 g/dL    RDW 11.9 11.0 - 14.5 %    Platelets 270 140 - 440 thou/uL    MPV 10.1 8.5 - 12.5 fL   HCG, Qual    Collection Time: 01/28/19 10:31 AM   Result Value Ref Range    Beta hCG Qualitative Negative Negative   Urinalysis-UC if Indicated    Collection Time: 01/28/19 12:23 PM   Result Value Ref Range    Color, UA Yellow Colorless, Yellow, Straw, Light Yellow    Clarity, UA Clear Clear    Glucose, UA Negative Negative    Bilirubin, UA Negative Negative    Ketones, UA Negative Negative, 60 mg/dL    Specific Gravity, UA 1.010 1.001 - 1.030    Blood, UA Negative Negative    pH,  UA 5.5 4.5 - 8.0    Protein, UA Negative Negative mg/dL    Urobilinogen, UA <2.0 E.U./dL <2.0 E.U./dL, 2.0 E.U./dL    Nitrite, UA Negative Negative    Leukocytes, UA Negative Negative     Lab Results   Component Value Date    WBC 6.9 01/31/2019    HGB 13.9 01/31/2019    HCT 41.8 01/31/2019    MCV 91 01/31/2019     01/31/2019        CT Abdomen Pelvis Without Oral Without IV Contrast (Order [id number])   Imaging   Date: 1/28/2019 Department: [facility] Emergency Department Released By/Authorizing: Javier Zavala MD (auto-released)   Study Result     [facility] RADIOLOGY     EXAM: CT ABDOMEN PELVIS WO ORAL WO IV CONTRAST  LOCATION: [facility]  DATE/TIME: 1/28/2019 12:14 PM     INDICATION: Left lower quadrant pain. Flank pain.  COMPARISON: 06/24/2017.  TECHNIQUE: Helical thin-section CT scan of the abdomen and pelvis was performed without oral or IV contrast. Multiplanar reformats were obtained. Dose reduction techniques were used.  CONTRAST: None.     FINDINGS:   LUNG BASES: Negative.     ABDOMEN: Surgical changes cholecystectomy and left nephrectomy. Unremarkable noncontrast liver, pancreas, spleen, adrenals and right kidney. Nonaneurysmal aorta. No free air or adenopathy.     PELVIS: Mid sigmoid colonic wall thickening and surrounding stranding in region of diverticulosis. No bowel obstruction. Appendectomy. Minimal free fluid.     MUSCULOSKELETAL: Negative.     IMPRESSION:   CONCLUSION:      1.  Acute mid sigmoid colonic diverticulitis. No free air or collection.     2.  Cholecystectomy and left nephrectomy.

## 2021-06-24 NOTE — TELEPHONE ENCOUNTER
RN cannot approve Refill Request    RN can NOT refill this medication med is not covered by policy/route to provider     . Last office visit: 2/7/2019 Porfirio Linton MD Last Physical: 2/14/2017 Last MTM visit: Visit date not found Last visit same specialty: 2/7/2019 Porfirio Linton MD.  Next visit within 3 mo: Visit date not found  Next physical within 3 mo: Visit date not found      Kaye Case, [facility] Triage/Med Refill 2/27/2019    Requested Prescriptions   Pending Prescriptions Disp Refills     baclofen (LIORESAL) 10 MG tablet [Pharmacy Med Name: BACLOFEN 10MG TABLETS] 30 tablet 0     Sig: TAKE 1 TABLET(10 MG) BY MOUTH EVERY 6 HOURS AS NEEDED    There is no refill protocol information for this order

## 2021-06-24 NOTE — TELEPHONE ENCOUNTER
----- Message from Mis Seals RN sent at 3/13/2019  1:23 PM CDT -----  Regarding: FMLA  Pt calling on status of FMLA. It is due in 2 days. Would like a call back. She said she is re-sending it via fax today.

## 2021-06-24 NOTE — TELEPHONE ENCOUNTER
Disability paperwork filled out and awaiting provider signature.     David Oneil CMA ([facility])     Ph: [phone]    Fx: [phone]

## 2021-06-24 NOTE — TELEPHONE ENCOUNTER
Who is calling:  Patient  Reason for Call:  Patient stated that she feels like she is having increased anxiety or panic attacks around the time she goes to bed. Patient stated that she does have a lot going on lately and believes it is stress. Patient stated that she takes 15-20mg of melatonin, a benadryl, and has a diffuser going at bedtime. Patient stated that she will fall asleep for only a few minutes, wake up with her heart racing, and is unable to fall asleep again. Patient stated that she has an appointment on 3/8 but feels like she can not wait until then to talk to Porfirio Linton MD.  Date of last appointment with primary care: 2/7  Has the patient been recently seen:  Yes  Okay to leave a detailed message: Yes [phone]

## 2021-06-24 NOTE — TELEPHONE ENCOUNTER
Disability paperwork from [facility] received, filled out, and placed in folder for provider signature.     David Oneil CMA ([facility])     Ph: [phone]    Fx: [phone]

## 2021-06-24 NOTE — PROGRESS NOTES
ASSESSMENT      1. Diverticulitis of colon    53-year-old patient's had recurrent troubles with diverticulitis.  The last episode she had was very painful.  I recommend this patient has a colonoscopy followed by a sigmoid colon resection..            PLAN      Schedule for an upcoming colonoscopy and the next day we will plan to do a colon resection with that same bowel prep         CHIEF COMPLAINT          Chief Complaint   Patient presents with     Abnormal Lab     Diverticulitis            HPI      Keiry Mchugh is a 53 y.o. female who has been hospitalized twice in the recent past for issues with diverticulitis and her last bout of diverticulitis about a month ago was very uncomfortable for the patient.  She does not want to go through this again.  She has not had a colonoscopy up to this point.  She does not have a family history of colon cancer that she knows of.  She is finished her antibiotics and this is a symptoms are mostly gone although she still feels abdominal discomfort from time to time.    Of note this patient did donate a kidney and she has a lower midline scar related to that prior surgery.                          PAST MEDICAL HISTORY SURGICAL HISTORY           Past Medical History:   Diagnosis Date     Acute viral bronchitis       Dehydration       Erythema migrans (Lyme disease) 5/31/2016     Migraines               Past Surgical History:   Procedure Laterality Date     CYSTOSCOPY N/A 7/16/2015     Procedure: CYSTOSCOPY;  Surgeon: Nate Horta MD;  Location: [facility];  Service:      HYSTERECTOMY         NEPHRECTOMY LIVING DONOR Left APRIL 2012     CT APPENDECTOMY         Description: Appendectomy;  Recorded: 11/20/2007;     CT CORRJ HALLUX VALGUS W/SESMDC W/DIST METAR OSTEOT         Description: Bunion Correction With Metatarsal Osteotomy Herman Procedure;  Proc Date: 07/16/2010;     CT LAP,SLING OPERATION         Description: Laparoscopic Sling Operation For Stress  Incontinence;  Recorded: 02/17/2009;     GA REMOVAL GALLBLADDER         Description: Cholecystectomy;  Recorded: 02/17/2009;     GA TOTAL ABDOM HYSTERECTOMY         Description: Hysterectomy;  Recorded: 02/17/2009;             CURRENT MEDICATIONS ALLERGIES      [unfilled]       Allergies   Allergen Reactions     Azithromycin Tinnitus     Ceftriaxone Hives     Iodine And Iodide Containing Products Hives     Levofloxacin         Annotation: IV Levaquin ---> PO Levaquin ---> large purple blotches.  Tolerates moxifloxacin (2014)       Other Allergy (See Comments) Hives       Contrast Media Ready-Box MISC, 08/04/2008.       Penicillins Hives     Tramadol Itching             FAMILY HISTORY            Family History   Problem Relation Age of Onset     Hashimoto's thyroiditis Mother       Breast cancer Mother 50     Graves' disease Sister       Hypothyroidism Maternal Aunt       Breast cancer Maternal Aunt 50     Hashimoto's thyroiditis Maternal Grandfather       Hypothyroidism Sister       Hypothyroidism Maternal Aunt       Hashimoto's thyroiditis Maternal Aunt       Anesthesia problems Neg Hx              SOCIAL HISTORY      Social History            Socioeconomic History     Marital status: Single       Spouse name: None     Number of children: 2     Years of education: None     Highest education level: None   Social Needs     Financial resource strain: None     Food insecurity - worry: None     Food insecurity - inability: None     Transportation needs - medical: None     Transportation needs - non-medical: None   Occupational History     Occupation: Dental assistant       Employer: [employer]   Tobacco Use     Smoking status: Former Smoker     Smokeless tobacco: Never Used   Substance and Sexual Activity     Alcohol use: No     Drug use: No     Sexual activity: Not Currently       Partners: Male   Other Topics Concern     None   Social History Narrative     None            REVIEW OF SYSTEMS       12 point review of  "systems are unremarkable except for the symptoms described in the HPI     PHYSICAL EXAM      VITAL SIGNS: /81 (Patient Site: Right Arm, Patient Position: Sitting, Cuff Size: Adult Regular)   Pulse 77   Ht 5' 7\" (1.702 m)   Wt 199 lb 4.8 oz (90.4 kg)   SpO2 96%   Breastfeeding? No   BMI 31.21 kg/m    General : Alert, cooperative, appears stated age   Skin: Skin color, texture, turgor normal, no rashes or lesions   Lymph nodes: No obvious adenopathy   Head: Normocephalic, without obvious abnormality, atraumatic   HEENT: PERRL, conjunctiva/corneas clear, EOM's intact, no scleral icterus   Throat: Lips, mucosa, and tongue normal; teeth and gums normal    Neck: Supple, thyroid not enlarged   Back: No CVA tenderness   Lungs: Clear to auscultation bilaterally, respirations unlabored, no rales, rhonchi or wheezes   Chest Wall:No tenderness or deformity   Heart: Regular rate and rhythm, S1, S2 normal, no murmur, rub or gallop   Abdomen: Soft, tender to palpation in the lower ab, no guarding, + bowel sounds active, lower midline abdominal scar  Extremities : No obvious swelling,  Neurologic: Cranial Nerves II-XII normal, moves all extremities equally, no focal findings              RADIOLOGY      US Abdomen Limited   Final Result   CONCLUSION:   1.  Mild hepatic steatosis.       CT Abdomen Pelvis Without Oral Without IV Contrast   Final Result   CONCLUSION:    1.  Evidence for progression of patient's acute sigmoid diverticulitis. GI consult would be helpful to consider endoscopy and exclude other underlying pathology.   2.  Stable left adrenal gland adenoma.   3.  Stable anterior abdominal wall fat-containing hernias.         "

## 2021-06-24 NOTE — PROGRESS NOTES
"Patient was identified as a potential candidate for the Clinic Care Coordination program and has declined participating. Samantha states that she is feeling \"more human\" again she returned to work for her first time 2/14/19- She was exhausted by the end of the day but she was happy to be back and continues to feel well. She is following up with all of her doctors( Liver specialist and her surgeon) She is able to make all her own appointments and has support at home.   I will discontinue outreach to the patient at this time. If the provider feels this patient is a good fit in the future I have asked them to resend to the [facility] (formerly [facility] referral bucket. I have also provided the patient with my contact information should they change their mind.   "

## 2021-06-24 NOTE — TELEPHONE ENCOUNTER
Name of form/paperwork: Other:  Short Term Disability - [facility]  Have you been seen for this request: Unknown  Do we have the form: Yes- Shaila Gallegos's Desk  When is form needed by: 03/04/19  How would you like the form returned: Faxed to [facility]  Fax Number: [phone]  Patient Notified form requests are processed in 3-5 business days: No, NA faxed by [facility] not the patient.  (If patient needs form sooner, please note that in this message.)  Okay to leave a detailed message? Yes, for [facility]

## 2021-06-24 NOTE — TELEPHONE ENCOUNTER
Spoke with Samantha and determined forms from [place] were sent to the old fax number. I advised her that I haven't received them as of yet and provided the correct fax number to send forms in for completion. [employer] paperwork has been faxed in and is scanned into chart.    David Oneil CMA ([facility])     Ph: [phone]    Fx: [phone]

## 2021-06-25 NOTE — ANESTHESIA PREPROCEDURE EVALUATION
Anesthesia Evaluation      Patient summary reviewed   No history of anesthetic complications     Airway   Mallampati: II  Neck ROM: full   Pulmonary - negative ROS and normal exam                          Cardiovascular - negative ROS and normal exam  Exercise tolerance: > or = 4 METS  ECG reviewed        Neuro/Psych    (+) neuromuscular disease (cervical radiculopathy; peripheral neuropathy),  depression, anxiety/panic attacks,     Comments: migraines    Endo/Other    (+) obesity (bmi 32),      GI/Hepatic/Renal    (+) hiatal hernia, GERD,   chronic renal disease (s/p kidney donor),     Comments: Diverticulitis  Celiac disease            Dental - normal exam                          Anesthesia Plan  Planned anesthetic: general endotracheal  RSI    Ketamine 50 mg IV  Decadron 10 mg IV  Magnesium 1 gram/hour x 4 hours for pain  Precedex   ITN for post operative pain control per surgeon's request.  Pt agrees to ITN and has tolerated morphine in the past.  Risks and benefits were discussed.  ERAS protocol    Scopolamine patch  Decadron     ASA 3   Induction: intravenous   Anesthetic plan and risks discussed with: patient and child/children  Anesthesia plan special considerations: rapid sequence induction, antiemetics, dexmedetomidine  Post-op plan: routine recovery      Results for orders placed or performed in visit on 03/08/19   Comprehensive Metabolic Panel   Result Value Ref Range    Sodium 140 136 - 145 mmol/L    Potassium 5.0 3.5 - 5.0 mmol/L    Chloride 105 98 - 107 mmol/L    CO2 25 22 - 31 mmol/L    Anion Gap, Calculation 10 5 - 18 mmol/L    Glucose 90 70 - 125 mg/dL    BUN 21 8 - 22 mg/dL    Creatinine 0.92 0.60 - 1.10 mg/dL    GFR MDRD Af Amer >60 >60 mL/min/1.73m2    GFR MDRD Non Af Amer >60 >60 mL/min/1.73m2    Bilirubin, Total 0.6 0.0 - 1.0 mg/dL    Calcium 10.5 8.5 - 10.5 mg/dL    Protein, Total 7.7 6.0 - 8.0 g/dL    Albumin 4.1 3.5 - 5.0 g/dL    Alkaline Phosphatase 133 (H) 45 - 120 U/L    AST 63 (H) 0 -  40 U/L     (H) 0 - 45 U/L   HM1 (CBC with Diff)   Result Value Ref Range    WBC 4.5 4.0 - 11.0 thou/uL    RBC 4.53 3.80 - 5.40 mill/uL    Hemoglobin 14.0 12.0 - 16.0 g/dL    Hematocrit 41.8 35.0 - 47.0 %    MCV 92 80 - 100 fL    MCH 30.9 27.0 - 34.0 pg    MCHC 33.5 32.0 - 36.0 g/dL    RDW 13.1 11.0 - 14.5 %    Platelets 306 140 - 440 thou/uL    MPV 7.0 7.0 - 10.0 fL    Neutrophils % 50 50 - 70 %    Lymphocytes % 41 (H) 20 - 40 %    Monocytes % 6 2 - 10 %    Eosinophils % 4 0 - 6 %    Basophils % 1 0 - 2 %    Neutrophils Absolute 2.2 2.0 - 7.7 thou/uL    Lymphocytes Absolute 1.8 0.8 - 4.4 thou/uL    Monocytes Absolute 0.3 0.0 - 0.9 thou/uL    Eosinophils Absolute 0.2 0.0 - 0.4 thou/uL    Basophils Absolute 0.0 0.0 - 0.2 thou/uL   Electrocardiogram Perform and Read   Result Value Ref Range    SYSTOLIC BLOOD PRESSURE  mmHg    DIASTOLIC BLOOD PRESSURE  mmHg    VENTRICULAR RATE 74 BPM    ATRIAL RATE 74 BPM    P-R INTERVAL 156 ms    QRS DURATION 76 ms    Q-T INTERVAL 376 ms    QTC CALCULATION (BEZET) 417 ms    P Axis 68 degrees    R AXIS 36 degrees    T AXIS 49 degrees    MUSE DIAGNOSIS       Normal sinus rhythm  Normal ECG  When compared with ECG of 27-JAN-2018 23:09,  No significant change was found  Confirmed by TATIANA NORMAN MD LOC:JESSICA ([id number]) on 3/8/2019 3:49:03 PM

## 2021-06-25 NOTE — ANESTHESIA CARE TRANSFER NOTE
Last vitals:   Vitals:    03/18/19 1245   BP: 96/66   Pulse: 77   Resp: 16   Temp: 36.4  C (97.5  F)   SpO2: 95%     Patient's level of consciousness is drowsy  Spontaneous respirations: yes  Maintains airway independently: yes  Dentition unchanged: yes  Oropharynx: oropharynx clear of all foreign objects    QCDR Measures:  ASA# 20 - Surgical Safety Checklist: WHO surgical safety checklist completed prior to induction    PQRS# 430 - Adult PONV Prevention: 4558F - Pt received => 2 anti-emetic agents (different classes) preop & intraop  ASA# 8 - Peds PONV Prevention: NA - Not pediatric patient, not GA or 2 or more risk factors NOT present  PQRS# 424 - Ericka-op Temp Management: 4559F - At least one body temp DOCUMENTED => 35.5C or 95.9F within required timeframe  PQRS# 426 - PACU Transfer Protocol: - Transfer of care checklist used  ASA# 14 - Acute Post-op Pain: ASA14B - Patient did NOT experience pain >= 7 out of 10

## 2021-06-25 NOTE — ANESTHESIA PREPROCEDURE EVALUATION
Anesthesia Evaluation      Patient summary reviewed   No history of anesthetic complications     Airway   Mallampati: II  Neck ROM: full   Pulmonary - negative ROS and normal exam                          Cardiovascular - negative ROS and normal exam  Exercise tolerance: > or = 4 METS  ECG reviewed        Neuro/Psych    (+) neuromuscular disease (cervical radiculopathy; peripheral neuropathy),  depression, anxiety/panic attacks,     Comments: migraines    Endo/Other    (+) obesity (bmi 32),      GI/Hepatic/Renal    (+) hiatal hernia, GERD well controlled and intermittent,   chronic renal disease (s/p kidney donor) CRI,     Comments: Diverticulitis  Celiac disease            Dental - normal exam                        Anesthesia Plan  Planned anesthetic: MAC  Propofol ggt  ASA 3   Induction: intravenous   Anesthetic plan and risks discussed with: patient and child/children  Anesthesia plan special considerations: antiemetics,   Post-op plan: routine recovery      Results for orders placed or performed in visit on 03/08/19   Comprehensive Metabolic Panel   Result Value Ref Range    Sodium 140 136 - 145 mmol/L    Potassium 5.0 3.5 - 5.0 mmol/L    Chloride 105 98 - 107 mmol/L    CO2 25 22 - 31 mmol/L    Anion Gap, Calculation 10 5 - 18 mmol/L    Glucose 90 70 - 125 mg/dL    BUN 21 8 - 22 mg/dL    Creatinine 0.92 0.60 - 1.10 mg/dL    GFR MDRD Af Amer >60 >60 mL/min/1.73m2    GFR MDRD Non Af Amer >60 >60 mL/min/1.73m2    Bilirubin, Total 0.6 0.0 - 1.0 mg/dL    Calcium 10.5 8.5 - 10.5 mg/dL    Protein, Total 7.7 6.0 - 8.0 g/dL    Albumin 4.1 3.5 - 5.0 g/dL    Alkaline Phosphatase 133 (H) 45 - 120 U/L    AST 63 (H) 0 - 40 U/L     (H) 0 - 45 U/L   HM1 (CBC with Diff)   Result Value Ref Range    WBC 4.5 4.0 - 11.0 thou/uL    RBC 4.53 3.80 - 5.40 mill/uL    Hemoglobin 14.0 12.0 - 16.0 g/dL    Hematocrit 41.8 35.0 - 47.0 %    MCV 92 80 - 100 fL    MCH 30.9 27.0 - 34.0 pg    MCHC 33.5 32.0 - 36.0 g/dL    RDW 13.1 11.0 -  14.5 %    Platelets 306 140 - 440 thou/uL    MPV 7.0 7.0 - 10.0 fL    Neutrophils % 50 50 - 70 %    Lymphocytes % 41 (H) 20 - 40 %    Monocytes % 6 2 - 10 %    Eosinophils % 4 0 - 6 %    Basophils % 1 0 - 2 %    Neutrophils Absolute 2.2 2.0 - 7.7 thou/uL    Lymphocytes Absolute 1.8 0.8 - 4.4 thou/uL    Monocytes Absolute 0.3 0.0 - 0.9 thou/uL    Eosinophils Absolute 0.2 0.0 - 0.4 thou/uL    Basophils Absolute 0.0 0.0 - 0.2 thou/uL   Electrocardiogram Perform and Read   Result Value Ref Range    SYSTOLIC BLOOD PRESSURE  mmHg    DIASTOLIC BLOOD PRESSURE  mmHg    VENTRICULAR RATE 74 BPM    ATRIAL RATE 74 BPM    P-R INTERVAL 156 ms    QRS DURATION 76 ms    Q-T INTERVAL 376 ms    QTC CALCULATION (BEZET) 417 ms    P Axis 68 degrees    R AXIS 36 degrees    T AXIS 49 degrees    MUSE DIAGNOSIS       Normal sinus rhythm  Normal ECG  When compared with ECG of 27-JAN-2018 23:09,  No significant change was found  Confirmed by TATIANA NORMAN MD LOC:JESSICA ([id number]) on 3/8/2019 3:49:03 PM

## 2021-06-25 NOTE — ANESTHESIA CARE TRANSFER NOTE
Last vitals:   Vitals:    03/19/19 1255   BP: 124/60   Pulse: 62   Resp: 16   Temp: 37.4  C (99.3  F)   SpO2: 100%     Patient's level of consciousness is drowsy  Spontaneous respirations: yes  Maintains airway independently: yes  Dentition unchanged: yes  Oropharynx: oropharynx clear of all foreign objects    QCDR Measures:  ASA# 20 - Surgical Safety Checklist: WHO surgical safety checklist completed prior to induction    PQRS# 430 - Adult PONV Prevention: 4558F - Pt received => 2 anti-emetic agents (different classes) preop & intraop  ASA# 8 - Peds PONV Prevention: NA - Not pediatric patient, not GA or 2 or more risk factors NOT present  PQRS# 424 - Ericka-op Temp Management: 4559F - At least one body temp DOCUMENTED => 35.5C or 95.9F within required timeframe  PQRS# 426 - PACU Transfer Protocol: - Transfer of care checklist used  ASA# 14 - Acute Post-op Pain: ASA14B - Patient did NOT experience pain >= 7 out of 10

## 2021-06-25 NOTE — TELEPHONE ENCOUNTER
Paperwork has been signed and faxed to [phone]. Will be sent to HIM to be scanned into the chart.     Muriel Tobias CMA ([facility])

## 2021-06-25 NOTE — ANESTHESIA PROCEDURE NOTES
Spinal Block    Patient location during procedure: OR  Start time: 3/19/2019 9:39 AM  End time: 3/19/2019 9:43 AM  Reason for block: primary anesthetic    Staffing:  Performing  Anesthesiologist: Guy Reddy MD    Preanesthetic Checklist  Completed: patient identified, risks, benefits, and alternatives discussed, timeout performed, consent obtained, airway assessed, oxygen available, suction available, emergency drugs available and hand hygiene performed  Spinal Block  Patient position: sitting  Prep: ChloraPrep and site prepped and draped  Patient monitoring: heart rate, continuous pulse ox and blood pressure  Approach: midline  Location: L3-4  Injection technique: single-shot  Needle type: pencil-tip   Needle gauge: 24 G      Additional Notes:  ITN for post operative pain control per surgeon's request

## 2021-06-25 NOTE — TELEPHONE ENCOUNTER
"Reason for Call: Request for a referral:    Order or referral being requested: ENT - per patient, she was in [place] a couple weeks ago and one of her nostrils was hurting and now day 13, it feels like \"like an ulcer, bump\". Patient is not sure what to do and would like a referral to see ENT instead of coming in for an OV to have provider take a look then go to see a specialist.    Date needed: as soon as possible    Has the patient been seen by the PCP for this problem? NO    Additional comments: NA    Phone number Patient can be reached at:    Cell number on file:    Telephone Information:   Mobile [phone]       Best Time:  Any time    Can we leave a detailed message on this number?  Yes    Call taken on 6/2/2021 at 12:26 PM by Augustine Bauer    "

## 2021-06-25 NOTE — ANESTHESIA POSTPROCEDURE EVALUATION
Patient: Keiry Mchugh  LAPAROSCOPIC SIGMOID COLON RESECTION, BIOPSY, LIVER, LAPAROSCOPIC, FLEXIBLE SIGMOIDOSCOPY  Anesthesia type: general    Patient location: PACU  Last vitals:   Vitals:    03/19/19 1631   BP:    Pulse:    Resp:    Temp:    SpO2: 94%     Post vital signs: stable  Level of consciousness: awake, alert and oriented  Post-anesthesia pain: pain controlled  Post-anesthesia nausea and vomiting: no  Pulmonary: unassisted, nasal cannula  Cardiovascular: stable, hypotensive and mild.  Treated with IVF bolus.  Hydration: adequate  Anesthetic events: no    QCDR Measures:  ASA# 11 - Ericka-op Cardiac Arrest: ASA11B - Patient did NOT experience unanticipated cardiac arrest  ASA# 12 - Ericka-op Mortality Rate: ASA12B - Patient did NOT die  ASA# 13 - PACU Re-Intubation Rate: ASA13B - Patient did NOT require a new airway mgmt  ASA# 10 - Composite Anes Safety: ASA10A - No serious adverse event    Additional Notes:

## 2021-06-25 NOTE — ANESTHESIA POSTPROCEDURE EVALUATION
Patient: Keriy Mchugh  COLONOSCOPY  Anesthesia type: MAC    Patient location: Phase II Recovery  Last vitals:   Vitals:    03/18/19 1245   BP: 96/66   Pulse: 77   Resp: 16   Temp: 36.4  C (97.5  F)   SpO2: 95%     Post vital signs: stable  Level of consciousness: awake and responds to simple questions  Post-anesthesia pain: pain controlled  Post-anesthesia nausea and vomiting: no  Pulmonary: unassisted, return to baseline  Cardiovascular: stable and blood pressure at baseline  Hydration: adequate  Anesthetic events: no    QCDR Measures:  ASA# 11 - Ericka-op Cardiac Arrest: ASA11B - Patient did NOT experience unanticipated cardiac arrest  ASA# 12 - Ericka-op Mortality Rate: ASA12B - Patient did NOT die  ASA# 13 - PACU Re-Intubation Rate: ASA13B - Patient did NOT require a new airway mgmt  ASA# 10 - Composite Anes Safety: ASA10A - No serious adverse event    Additional Notes:

## 2021-06-26 NOTE — PROGRESS NOTES
Progress Notes by Dre Kinsey DO at 10/14/2018 11:00 AM     Author: Dre Kinsey DO Service: -- Author Type: Physician    Filed: 10/14/2018 11:32 PM Encounter Date: 10/14/2018 Status: Signed    : Dre Kinsey DO (Physician)       Chief Complaint   Patient presents with   ? Cough     patient vomiting since starting thursday, severe ear ringing, ongoing coughing, fevers,         History of Present Illness: Rooming staff notes reviewed. Patient states her nausea and bilateral tinitis started after taking her azithromycin recently prescribed for treatment of a middle ear infection and bronchitis.  She was prescribed this medication on 10/11/2018.  Left ear has hurt more then right at exam. She has a headache at time of exam. Patient has taken oral prednisone yesterday, and the day before. This was prescribed through the [facility] for treatment of neck pain. She is urinating less she believes due to not drinking as much fluids because of her nausea.    Review of systems: See history of present illness, otherwise negative.     Current Outpatient Prescriptions   Medication Sig Dispense Refill   ? acetaminophen (TYLENOL) 325 MG tablet Take 650 mg by mouth every 4 (four) hours as needed for pain.     ? albuterol (PROAIR HFA;PROVENTIL HFA;VENTOLIN HFA) 90 mcg/actuation inhaler Inhale 2 puffs every 6 (six) hours as needed for wheezing. 1 each 0   ? atenolol (TENORMIN) 50 MG tablet Take 50 mg by mouth daily as needed. With flushing of skin/migraines     ? azithromycin (ZITHROMAX) 250 MG tablet Take 500 mg (2 x 250 mg tablets) on day 1 followed by 250 mg (1 tablet) on days 2-5. 6 tablet 0   ? baclofen (LIORESAL) 10 MG tablet TAKE 1 TABLET(10 MG) BY MOUTH EVERY 6 HOURS AS NEEDED 30 tablet 0   ? BIOTIN ORAL Take 1 tablet by mouth daily.     ? cholecalciferol, vitamin D3, 1,000 unit tablet Take 2,000 Units by mouth daily.     ? diphenhydrAMINE (BENADRYL) 50 MG capsule Take 1 cap 6 hrs before procedure, bring 2nd  cap to procedure, may take immediately before procedure 2 capsule 0   ? flunisolide (NASALIDE) 25 mcg (0.025 %) Spry 2 spray each nostril daily 1 Bottle 1   ? FLUoxetine (PROZAC) 20 MG capsule TAKE 1 CAPSULE BY MOUTH EVERY DAY 90 capsule 1   ? magnesium oxide (MAG-OX) 400 mg tablet Take 500 mg by mouth daily.      ? melatonin 10 mg Tab Take 1 tablet by mouth.     ? methylPREDNISolone (MEDROL, JULIANNA,) 4 mg tablet follow package directions 21 tablet 0   ? NON FORMULARY amberen -otc for menopause     ? oxymetazoline (AFRIN, OXYMETAZOLINE,) 0.05 % nasal spray Use 1 spray in both nares up to 2 times a day.  Do not use for more than 3 days  0   ? predniSONE (DELTASONE) 50 MG tablet Take 1 tab 13 hrs before injection, take 1 tab 7 hrs before injection, take 1 tab 1 hr before injection 3 tablet 0   ? SUMAtriptan (IMITREX) 50 MG tablet Take 50 mg by mouth every 2 (two) hours as needed for migraine.     ? TURMERIC ROOT EXTRACT ORAL Take 2,000 mg by mouth daily.     ? UNABLE TO FIND Take 1 capsule by mouth daily. Med Name:Tuermic     ? ondansetron (ZOFRAN-ODT) 8 MG disintegrating tablet Take 1 tablet (8 mg total) by mouth every 8 (eight) hours as needed for nausea. 6 tablet 0   ? oseltamivir (TAMIFLU) 75 MG capsule Take 1 capsule (75 mg total) by mouth 2 (two) times a day for 5 days. 10 capsule 0     No current facility-administered medications for this visit.      Past Medical History:   Diagnosis Date   ? Acute viral bronchitis    ? Dehydration    ? Erythema migrans (Lyme disease) 5/31/2016   ? Migraines       Past Surgical History:   Procedure Laterality Date   ? CYSTOSCOPY N/A 7/16/2015    Procedure: CYSTOSCOPY;  Surgeon: Nate Horta MD;  Location: [facility] OR;  Service:    ? HYSTERECTOMY     ? NEPHRECTOMY LIVING DONOR Left APRIL 2012   ? SD APPENDECTOMY      Description: Appendectomy;  Recorded: 11/20/2007;   ? SD CORRJ HALLUX VALGUS W/SESMDC W/DIST METAR OSTEOT      Description: Bunion Correction With  Metatarsal Osteotomy Herman Procedure;  Proc Date: 07/16/2010;   ? AZ LAP,SLING OPERATION      Description: Laparoscopic Sling Operation For Stress Incontinence;  Recorded: 02/17/2009;   ? AZ REMOVAL GALLBLADDER      Description: Cholecystectomy;  Recorded: 02/17/2009;   ? AZ TOTAL ABDOM HYSTERECTOMY      Description: Hysterectomy;  Recorded: 02/17/2009;      Social History   Substance Use Topics   ? Smoking status: Former Smoker   ? Smokeless tobacco: Never Used   ? Alcohol use No        Family History   Problem Relation Age of Onset   ? Hashimoto's thyroiditis Mother    ? Breast cancer Mother 50   ? Graves' disease Sister    ? Hypothyroidism Maternal Aunt    ? Breast cancer Maternal Aunt 50   ? Hashimoto's thyroiditis Maternal Grandfather    ? Hypothyroidism Sister    ? Hypothyroidism Maternal Aunt    ? Hashimoto's thyroiditis Maternal Aunt    ? Anesthesia problems Neg Hx        Vitals:    10/14/18 1106   BP: 128/88   Pulse: 82   Resp: 18   Temp: 98.6  F (37  C)   TempSrc: Oral   SpO2: 95%   Weight: 191 lb 11.2 oz (87 kg)       EXAM:   General: Vital signs reviewed. Patient is in some distress due to feeling ill in several ways, to include ear discomfort, and headache discomfort. Breathing is non labored appearing. Patient is alert and oriented x 3.   Tympanic membrane of bilateral ears are slightly dull, and injected, left more injected and then right. No turbinate injection or edema, and No rhinorrhea noted. No pharyngeal injection or exudate noted.  Neck: supple with no adenoapthy.  Heart:  Heart rate is normal, regular rhythm without murmur.  Lungs:  Lung sounds show slight crackles noted in right lower lobe, left lower lobe and right upper lobe.  Skin: warm and diaphoretic  Recent Results (from the past 24 hour(s))   Influenza A/B Rapid Test   Result Value Ref Range    Influenza  A, Rapid Antigen No Influenza A antigen detected No Influenza A antigen detected    Influenza B, Rapid Antigen Influenza B antigen  detected (!) No Influenza B antigen detected    Results from exam reviewed with patient.  I reviewed the chest x-ray study, and discussed with patient at time of exam.  I did not find any acute abnormalities.  The report was reviewed with patient also, which also showed no acute abnormalities.  I told patient the CBC study was canceled, because of her recent taking of prednisone which was going to alter the white blood cell count, and not be significantly useful in trying to determine the nature of her illness because of this.  Patient started to get some relief of her nausea by the time of discharge with treatment using ondansetron.  Assessment/Plan   1. Nausea with vomiting  ondansetron disintegrating tablet 8 mg (ZOFRAN-ODT)    ondansetron (ZOFRAN-ODT) 8 MG disintegrating tablet   2. Cough  XR Chest 2 Views    CANCELED: HM1(CBC and Differential)    CANCELED: HM1 (CBC with Diff)   3. Lung field abnormal finding on examination  XR Chest 2 Views    CANCELED: HM1(CBC and Differential)    CANCELED: HM1 (CBC with Diff)   4. Headache  Influenza A/B Rapid Test   5. Influenza B  oseltamivir (TAMIFLU) 75 MG capsule       Patient Instructions     Stop taking the azithromycin, and start taking the Tamiflu. Your symptoms to include the ear pressure can be from the flu. Be seen again in 24 hours, sooner if symptoms are not better, sooner if feeling any worse. The Tinnitus can be relates to the azithromycin, or the viral infection.       The Flu (Influenza)     The virus that causes the flu spreads through the air in droplets when someone who has the flu coughs, sneezes, laughs, or talks.   The flu (influenza) is an infection that affects your respiratory tract. This tract is made up of your mouth, nose, and lungs, and the passages between them. Unlike a cold, the flu can make you very ill. And it can lead to pneumonia, a serious lung infection. The flu can have serious complications and even cause death.  Who is at risk for  the flu?  Anyone can get the flu. But you are more likely to become infected if you:    Have a weakened immune system    Work in a healthcare setting where you may be exposed to flu germs    Live or work with someone who has the flu    Havent had an annual flu shot  How does the flu spread?  The flu is caused by a virus. The virus spreads through the air in droplets when someone who has the flu coughs, sneezes, laughs, or talks. You can become infected when you inhale these viruses directly. You can also become infected when you touch a surface on which the droplets have landed and then transfer the germs to your eyes, nose, or mouth. Touching used tissues, or sharing utensils, drinking glasses, or a toothbrush from an infected person can expose you to flu viruses, too.  What are the symptoms of the flu?  Flu symptoms tend to come on quickly and may last a few days to a few weeks. They include:    Fever usually higher than 100.4 F  (38 C) and chills    Sore throat and headache    Dry cough    Runny nose    Tiredness and weakness    Muscle aches  Who is at risk for flu complications?  For some people, the flu can be very serious. The risk for complications is greater for:    Children younger than age 5    Adults ages 65 and older    People with a chronic illness such as diabetes or heart, kidney, or lung disease    People who live in a nursing home or long-term care facility   How is the flu treated?  The flu usually gets better after 7 days or so. In some cases, your healthcare provider may prescribe an antiviral medicine. This may help you get well a little sooner. For the medicine to help, you need to take it as soon as possible (ideally within 48 hours) after your symptoms start. If you develop pneumonia or other serious illness, you may need to stay in the hospital.  Easing flu symptoms    Drink lots of fluids such as water, juice, and warm soup. A good rule is to drink enough so that you urinate your normal  amount.    Get plenty of rest.    Ask your healthcare provider what to take for fever and pain.    Call your provider if your fever is 100.4 F (38 C) or higher, or you become dizzy, lightheaded, or short of breath.  Taking steps to protect others    Wash your hands often, especially after coughing or sneezing. Or clean your hands with an alcohol-based hand  containing at least 60% alcohol.    Cough or sneeze into a tissue. Then throw the tissue away and wash your hands. If you dont have a tissue, cough and sneeze into your elbow.    Stay home until at least 24 hours after you no longer have a fever or chills. Be sure the fever isnt being hidden by fever-reducing medicine.    Dont share food, utensils, drinking glasses, or a toothbrush with others.    Ask your healthcare provider if others in your household should get antiviral medicine to help them avoid infection.  How can the flu be prevented?    One of the best ways to avoid the flu is to get a flu vaccine each year. The virus that causes the flu changes from year to year. For that reason, healthcare providers recommend getting the flu vaccine each year, as soon as it's available in your area. The vaccine is given as a shot. Your healthcare provider can tell you which vaccine is right for you. The nasal spray is not recommended for the 6728-8048 flu season. The CDC says the nasal spray did not seem to protect against the flu over the last several flu seasons.    Wash your hands often. Frequent handwashing is a proven way to help prevent infection.    Carry an alcohol-based hand gel containing at least 60% alcohol. Use it when you can't use soap and water. Then wash your hands as soon as you can.    Avoid touching your eyes, nose, and mouth.    At home and work, clean phones, computer keyboards, and toys often with disinfectant wipes.    If possible, avoid close contact with others who have the flu or symptoms of the flu.  Handwashing tips  Handwashing is  one of the best ways to prevent many common infections. If you are caring for or visiting someone with the flu, wash your hands each time you enter and leave the room. Follow these steps:    Use warm water and plenty of soap. Rub your hands together well.    Clean the whole hand, including under your nails, between your fingers, and up the wrists.    Wash for at least 15 seconds.    Rinse, letting the water run down your fingers, not up your wrists.    Dry your hands well. Use a paper towel to turn off the faucet and open the door.  Using alcohol-based hand   Alcohol-based hand  are also a good choice. Use them when you can't use soap and water. Follow these steps:    Squeeze about a tablespoon of gel into the palm of one hand.    Rub your hands together briskly, cleaning the backs of your hands, the palms, between your fingers, and up the wrists.    Rub until the gel is gone and your hands are completely dry.  Preventing the flu in healthcare settings  The flu is a special concern for people in hospitals and long-term care facilities. To help prevent the spread of flu, many hospitals and nursing homes take these steps:    Healthcare providers wash their hands or use an alcohol-based hand  before and after treating each patient.    People with the flu have private rooms and bathrooms or share a room with someone with the same infection.    People who are at high risk for the flu but don't have it are encouraged to get the flu and pneumonia vaccines.    All healthcare workers are encouraged or required to get flu shots.   Date Last Reviewed: 12/1/2016 2000-2017 [employer]. [address] [place] [address], [place] [address]. All rights reserved. This information is not intended as a substitute for professional medical care. Always follow your healthcare professional's instructions.           Dre Kinsey,

## 2021-06-26 NOTE — PROGRESS NOTES
"CHIEF COMPLAINT:   Nose Concern        HISTORY OF PRESENT ILLNESS    Keiry Mchugh   was seen at the behest of Shaila, Porfirio High,*  for Sore in nose  - Primary.    Patient complains of nasal crusting for the last several weeks.  She also feels like she has ulceration in her nose.  She has never been tested for allergies but thinks she may have seasonal allergies.  She also has nasal obstruction and nasal congestion as well as nasal crusting and nasal soreness.  No other ear nose or throat related complaints today.  No history of nose or sinus surgery.      Order or referral being requested: ENT - per patient, she was in [place] a couple weeks ago and one of her nostrils was hurting and now day 13, it feels like \"like an ulcer, bump\". Patient is not sure what to do and would like a referral to see ENT instead of coming in for an OV to have provider take a look then go to see a specialist.       REVIEW OF SYSTEMS    Review of Systems: a 10-system review is reviewed at this encounter.  See scanned document.         PHYSICAL EXAM:        HEAD: Normal appearance and symmetry:  No cutaneous lesions.      EARS:    Normal TM's bilaterally. Normal auditory canals and external ears. Non-tender.         NOSE:    Dorsum:   straight  Septum: deviation present  Mucosa:  Ulcer present  Inferior turbinates:  Normal  Vestibular skin: crusting present       ORAL CAVITY/OROPHARYNX:    Lips:  Normal.  Tongue: normal, midline  Mucosa:   no lesions  Tonsils:  1+      NECK:  Trachea:  midline.   Thyroid:  normal   Adenopathy:  none       NEURO:   Alert and Oriented       RESPIRATORY:   Symmetry and Respiratory effort    PSYCH:   normal mood and affect    SKIN:  warm and dry         IMPRESSION:    Encounter Diagnoses   Name Primary?     Nasal sore Yes     Nasal vestibulitis      Seasonal allergic rhinitis, unspecified trigger      Deviated nasal septum      Acute recurrent sinusitis, unspecified location        RECOMMENDATIONS:    Orders " Placed This Encounter   Procedures     CT Sinuses Without Contrast     Standing Status:   Future     Standing Expiration Date:   6/7/2022     Order Specific Question:   Is the patient pregnant?     Answer:   No     Order Specific Question:   Can the procedure be changed per Radiologist protocol?     Answer:   Yes     Order Specific Question:   If this is a diagnostic procedure, have the patient's age and recent imaging history been considered?     Answer:   Yes     Ambulatory referral to Allergy     Referral Priority:   Routine     Referral Type:   Allergy, Asthma, and Immunology     Referral Reason:   Evaluation and Treatment     Referred to Provider:   Jeanette Sinha MD     Requested Specialty:   Allergy     Number of Visits Requested:   1      Medications Ordered   Medications     azelastine (ASTELIN) 137 mcg (0.1 %) nasal spray     Sig: Use in each nostril as directed     Dispense:  30 mL     Refill:  12     2 sprays each nostril 1-2x daily as needed for nasal congestion (use nightly for first 2 week)     mupirocin (BACTROBAN) 2 % ointment     Sig: Apply pea sized amount to inside of left nostril 3x daily for 14 days     Dispense:  22 g     Refill:  0

## 2021-06-27 NOTE — PROGRESS NOTES
Progress Notes by Porfirio Linton MD at 3/8/2019  9:30 AM     Author: Porfirio Linton MD Service: -- Author Type: Physician    Filed: 3/8/2019 10:38 AM Encounter Date: 3/8/2019 Status: Signed    : Porfirio Linton MD (Physician)       Preoperative Exam    Scheduled Procedure: Laparoscopic Sigmoid Colon Resection  Surgery Date:  03/19/19  Surgery Location: [facility], fax [phone]    Surgeon:  Dr. Mosqueda    Assessment/Plan:     1. Pre-op exam      2. Diverticulitis of colon, recurrent      3. Intractable migraine with aura without status migrainosus      4. Current episode of major depressive disorder without prior episode, unspecified depression episode severity      5. Chronic Kidney Disease, Stage 3      6. Elevated LFTs      7. Celiac disease      8. Class 1 obesity due to excess calories without serious comorbidity in adult, unspecified BMI          Surgical Procedure Risk: Intermediate (reported cardiac risk generally 1-5%)  Have you had prior anesthesia?: Yes  Have you or any family members had a previous anesthesia reaction:  No  Do you or any family members have a history of a clotting or bleeding disorder?: Yes: mother had a post-op blood clot but no personal or family hisotry of bleeding or clotting disorders  Cardiac Risk Assessment: no increased risk for major cardiac complications    Patient approved for surgery with general or local anesthesia.    Functional Status: Independent  Patient plans to recover at home with family.     Subjective:      Keiry Mchugh is a 53 y.o. female who presents for a preoperative consultation.      All other systems reviewed and are negative, other than those listed in the HPI.    Pertinent History  Do you have difficulty breathing or chest pain after walking up a flight of stairs: No  History of obstructive sleep apnea: No  Steroid use in the last 6 months: Yes:  Oct 2018 had a course of prednisone for a neck  disorder  Frequent Aspirin/NSAID use: No  Prior Blood Transfusion: No  Prior Blood Transfusion Reaction: No  If for some reason prior to, during or after the procedure, if it is medically indicated, would you be willing to have a blood transfusion?:  There is no transfusion refusal.    Current Outpatient Medications   Medication Sig Dispense Refill   ? atenolol (TENORMIN) 50 MG tablet Take 50 mg by mouth daily as needed. With flushing of skin/migraines     ? baclofen (LIORESAL) 10 MG tablet TAKE 1 TABLET(10 MG) BY MOUTH EVERY 6 HOURS AS NEEDED 30 tablet 0   ? BIOTIN ORAL Take 1 tablet by mouth daily.     ? cholecalciferol, vitamin D3, 5,000 unit Tab Take 5,000 Units by mouth daily.     ? co-enzyme Q-10 50 mg capsule Take 100 mg by mouth daily.     ? diphenhydrAMINE (BENADRYL) 50 MG capsule Take 1 cap 6 hrs before procedure, bring 2nd cap to procedure, may take immediately before procedure 2 capsule 0   ? FLUoxetine (PROZAC) 20 MG capsule Take 2 capsules (40 mg total) by mouth daily. 180 capsule 1   ? magnesium oxide (MAG-OX) 400 mg tablet Take 400 mg by mouth daily.            ? melatonin 10 mg Tab Take 1 tablet by mouth at bedtime.            ? SUMAtriptan (IMITREX) 50 MG tablet Take 50 mg by mouth every 2 (two) hours as needed for migraine.     ? TURMERIC ROOT EXTRACT ORAL Take 2,000 mg by mouth daily.     ? UNABLE TO FIND 2 tablets daily. Med Name: ZOILA       No current facility-administered medications for this visit.         Allergies   Allergen Reactions   ? Azithromycin Tinnitus   ? Ceftriaxone Hives   ? Iodine And Iodide Containing Products Hives   ? Levofloxacin      Annotation: IV Levaquin ---> PO Levaquin ---> large purple blotches.  Tolerates moxifloxacin (2014)     ? Other Allergy (See Comments) Hives     Contrast Media Ready-Box MISC, 08/04/2008.     ? Penicillins Hives   ? Tramadol Itching       Patient Active Problem List   Diagnosis   ? Symptomatic Post-artificial Menopause State   ? Obesity   ?  Anxiety Disorder NOS   ? Rosacea   ? Anemia   ? Peripheral Neuropathy   ? Esophageal Reflux   ? Fatigue   ? Cervical Radiculopathy   ? Vitamin D Deficiency   ? Cervical Spondylosis   ? Cervical Spine Stenosis   ? Cervicalgia   ? Common Migraine (Without Aura)   ? Major Depression, Single Episode   ? Classic Migraine (With Aura) With Intractable Migraine   ? Microscopic Hematuria   ? Diverticulosis   ? Chronic Kidney Disease, Stage 3   ? Cough   ? Fever   ? Myalgia   ? Elevated LFTs   ? Psychophysiological insomnia   ? Diverticulitis   ? Acute renal failure, unspecified acute renal failure type (H)   ? Failure of outpatient treatment   ? Celiac disease       Past Medical History:   Diagnosis Date   ? Acute viral bronchitis    ? Dehydration    ? Erythema migrans (Lyme disease) 5/31/2016   ? Migraines        Past Surgical History:   Procedure Laterality Date   ? CYSTOSCOPY N/A 7/16/2015    Procedure: CYSTOSCOPY;  Surgeon: Nate Horta MD;  Location: [facility] Main OR;  Service:    ? NEPHRECTOMY LIVING DONOR Left APRIL 2012   ? MD APPENDECTOMY      Description: Appendectomy;  Recorded: 11/20/2007;   ? MD CORRJ HALLUX VALGUS W/SESMDC W/DIST METAR OSTEOT      Description: Bunion Correction With Metatarsal Osteotomy Herman Procedure;  Proc Date: 07/16/2010;   ? MD LAP,SLING OPERATION      Description: Laparoscopic Sling Operation For Stress Incontinence;  Recorded: 02/17/2009;   ? MD REMOVAL GALLBLADDER      Description: Cholecystectomy;  Recorded: 02/17/2009;   ? MD TOTAL ABDOM HYSTERECTOMY      Description: Hysterectomy;  Recorded: 02/17/2009;       Social History     Socioeconomic History   ? Marital status: Single     Spouse name: Not on file   ? Number of children: 2   ? Years of education: Not on file   ? Highest education level: Not on file   Occupational History   ? Occupation: Dental assistant     Employer: [employer]   Social Needs   ? Financial resource strain: Not on file   ? Food insecurity:      "Worry: Not on file     Inability: Not on file   ? Transportation needs:     Medical: Not on file     Non-medical: Not on file   Tobacco Use   ? Smoking status: Former Smoker   ? Smokeless tobacco: Never Used   Substance and Sexual Activity   ? Alcohol use: No   ? Drug use: No   ? Sexual activity: Not Currently     Partners: Male   Lifestyle   ? Physical activity:     Days per week: Not on file     Minutes per session: Not on file   ? Stress: Not on file   Relationships   ? Social connections:     Talks on phone: Not on file     Gets together: Not on file     Attends Hinduism service: Not on file     Active member of club or organization: Not on file     Attends meetings of clubs or organizations: Not on file     Relationship status: Not on file   ? Intimate partner violence:     Fear of current or ex partner: Not on file     Emotionally abused: Not on file     Physically abused: Not on file     Forced sexual activity: Not on file   Other Topics Concern   ? Not on file   Social History Narrative   ? Not on file       Patient Care Team:  Porfirio Linton MD as PCP - [place] [id number] system review:  Skin dryness and itching  Hearing decrease  Ear pain  Ringing in the ear assoc with zithromax  Changes in the vision  Eyes sensitive to light  Blurred vision  Diplopia  Pain in the neck  Fingers turn white when exposed to cold  Nausea, vomiting and diarrhea assoc with diverticulitis  Constipation  Heartburn  Abdominal pain assoc with episodes of diverticulitis  Muscle cramps  Can't get to sleep      Objective:     Vitals:    03/08/19 0930   BP: 121/78   Pulse: 91   Resp: 16   Temp: 98.5  F (36.9  C)   TempSrc: Oral   Weight: 203 lb 6.4 oz (92.3 kg)   Height: 5' 7\" (1.702 m)   Body mass index is 31.86 kg/m .        Physical Exam:        GEN: FEMALE, ALERT, ORIENTED TIMES THREE, NAD  HEENT:  TM'S NL                  THROAT CL                  PERRL  NECK: SUPPLE, WITHOUT ADENOPATHY, THYROMEGALY, OR CAROTID " BRUIT  LUNGS: CTA  COR: RRR WITHOUT MURMUR  ABD: SOFT, POSITIVE BOWEL SOUNDS, slight LLQ tx , WITHOUT MASS OR HSM  EXT:  NO CYANOSIS, CLUBBING OR EDEMA  SKIN:  WITHOUT ATYPICAL APPEARING LESIONS.      Patient Instructions   Herbals and otc meds on hold until post-op    Labs:  Labs pending at this time.  Results will be reviewed when available.    Immunization History   Administered Date(s) Administered   ? DT (pediatric) 01/01/2000   ? Hep A, Adult IM (19yr & older) 11/12/2007, 03/23/2009   ? Hep A, historic 11/12/2007, 03/23/2009   ? Hep B, historic 04/01/1993   ? Influenza, inj, historic,unspecified 12/20/2010, 10/20/2011   ? Influenza, seasonal,quad inj 36+ mos 09/14/2016   ? MMR 05/03/1990, 05/05/1993   ? Td, Adult, Absorbed 05/05/1993   ? Tdap 07/14/2010     Electrocardiogram Perform and Read   Order: [id number]   Status:  In process   Visible to patient:  No (Not Released) Next appt:  None Dx:  Pre-op exam; Diverticulitis of colon     Ref Range & Units 3/8/19 1021 1/27/18 2309    SYSTOLIC BLOOD PRESSURE mmHg      DIASTOLIC BLOOD PRESSURE mmHg      VENTRICULAR RATE BPM 74  68     ATRIAL RATE BPM 74  68     P-R INTERVAL ms 156  184     QRS DURATION ms 76  80     Q-T INTERVAL ms 376  404     QTC CALCULATION (BEZET) ms 417  429     P Axis degrees 68  60     R AXIS degrees 36  31     T AXIS degrees 49  28     MUSE DIAGNOSIS  Normal sinus rhythm   Normal ECG   When compared with ECG of 27-JAN-2018 23:09,   No significant change was found     Normal sinus rhythm   Low voltage QRS   Borderline ECG   When compared with ECG of 19-OCT-2017 12:12,   Vent. rate has decreased BY  37 BPM   Confirmed by MARIBEL JAVIER, JD LOC: ([id number]) on 1/28/2018 6:40:07 PM                     Electronically signed by Porfirio Linton MD 03/08/19 9:23 AM

## 2021-06-27 NOTE — PROGRESS NOTES
"Progress Notes by Yan Hernandez FNP at 5/3/2019  3:45 PM     Author: Yan Hernandez FNP Service: -- Author Type: Nurse Practitioner    Filed: 5/4/2019 12:30 AM Encounter Date: 5/3/2019 Status: Signed    : Yan Hernandez FNP (Nurse Practitioner)       HPI: Pt is here for follow up of a lap sigmoid colon resection.  Post op complications of ileus and wound infection that required packing of the lower portion of her incision.  She comes in today with concerns that the wound is not healing as fast as she was told that it should and she feels that the top part of her incision may be starting to open as well and she has noticed some redness at this upper portion as well.   Pain is well controlled.  she is eating well and denies fever and chills.         /80 (Patient Site: Right Arm, Patient Position: Sitting, Cuff Size: Adult Large)   Pulse 72   Ht 5' 7\" (1.702 m)   Wt 191 lb (86.6 kg)   SpO2 98%   Breastfeeding? No   BMI 29.91 kg/m      EXAM:  GENERAL:Appears well  ABDOMEN:  Soft, +BS  SURGICAL WOUNDS:  Incisions healing well,  wound noted on lower portion of incisions, roughly 1 cm x 1 cm x 1cm - 100% granulation tissue, small pinpoint scab on the top of the incision - does not appear open, scant serous drainage, no s/s of infection, slightly pink area surrounding but appears to be more consistent with possible tape irritation - no induration/fluctuance/warmth noted upon palpation          Repacked with dry gauze and covered.     Assessment/Plan: .   Continue daily dressing changes, pack the wound with dry gauze  Follow up next week for wound recheck and to ensure improvement in erythema along the top of the incison    Did not prescribe ABX at this time - discussed s/s of infection and patient to call with any s/s/worsending of pain/redness/discharge    Yan Hernandez CNP    [phone] [facility]         "

## 2021-06-30 ENCOUNTER — HOSPITAL ENCOUNTER (OUTPATIENT)
Dept: CT IMAGING | Facility: HOSPITAL | Age: 56
Discharge: HOME | End: 2021-06-30
Attending: OTOLARYNGOLOGY
Payer: COMMERCIAL

## 2021-06-30 DIAGNOSIS — J01.91 ACUTE RECURRENT SINUSITIS, UNSPECIFIED LOCATION: ICD-10-CM

## 2021-06-30 DIAGNOSIS — J34.2 DEVIATED NASAL SEPTUM: ICD-10-CM

## 2021-07-03 NOTE — ADDENDUM NOTE
Addendum Note by Zuri Daniels MA at 1/31/2019  9:10 AM     Author: Zuri Daniels MA Service: -- Author Type: Certified Medical Assistant    Filed: 1/31/2019  9:56 AM Encounter Date: 1/31/2019 Status: Signed    : Zuri Daniels MA (Certified Medical Assistant)    Addended by: ZURI DANIELS on: 1/31/2019 09:56 AM        Modules accepted: Orders

## 2021-07-03 NOTE — ADDENDUM NOTE
Addendum Note by Maksim Vargas RN at 8/28/2019  9:00 AM     Author: Maksim Vargas RN Service: -- Author Type: Registered Nurse    Filed: 9/4/2019  2:12 PM Encounter Date: 8/28/2019 Status: Signed    : Maksim Vargas RN (Registered Nurse)    Addended by: MAKSIM VARGAS on: 9/4/2019 02:12 PM        Modules accepted: Orders

## 2021-07-03 NOTE — ADDENDUM NOTE
Addendum Note by Geeta Linton MD at 1/31/2019  9:10 AM     Author: Geeta Linton MD Service: -- Author Type: Physician    Filed: 1/31/2019 10:58 AM Encounter Date: 1/31/2019 Status: Signed    : Geeta Linton MD (Physician)    Addended by: GEETA LINTON on: 1/31/2019 10:58 AM        Modules accepted: Orders

## 2021-07-07 ENCOUNTER — SURGERY - HEALTHEAST (OUTPATIENT)
Dept: SURGERY | Facility: AMBULATORY SURGERY CENTER | Age: 56
End: 2021-07-07

## 2021-07-09 ENCOUNTER — COMMUNICATION - HEALTHEAST (OUTPATIENT)
Dept: OTOLARYNGOLOGY | Facility: CLINIC | Age: 56
End: 2021-07-09

## 2021-07-13 ENCOUNTER — TELEPHONE (OUTPATIENT)
Dept: FAMILY MEDICINE | Facility: CLINIC | Age: 56
End: 2021-07-13

## 2021-07-13 NOTE — TELEPHONE ENCOUNTER
Left detailed message for patient that she is scheduled for pre-op physical 7/23/21 at 7:50 AM with Dr. Linton.

## 2021-07-13 NOTE — TELEPHONE ENCOUNTER
Reason for call:  New Appointment Needed  Requested Provider:     PCP: Dr. Porfirio Linton    Reason for visit: Pre op physical- deviated septum repair at [facility] w/ Dr. Case 8/3    Duration of symptoms: N/A    Have you been treated for this in the past? N/A    Additional comments: Patient isn't able to answer the phone while at work, she asked for the appt to just be made and she will work around it. Wednesday mornings work best for her and she is willing to see anyone. Also requesting a call back with detailed message about appt.      Phone number to reach patient:  Cell number on file:    Telephone Information:   Mobile [phone]       Best Time:  anytime    Can we leave a detailed message on this number?  YES    Travel screening: Not Applicable

## 2021-07-20 DIAGNOSIS — Z11.59 ENCOUNTER FOR SCREENING FOR OTHER VIRAL DISEASES: ICD-10-CM

## 2021-07-21 ENCOUNTER — RECORDS - HEALTHEAST (OUTPATIENT)
Dept: ADMINISTRATIVE | Facility: CLINIC | Age: 56
End: 2021-07-21

## 2021-07-23 ENCOUNTER — OFFICE VISIT (OUTPATIENT)
Dept: FAMILY MEDICINE | Facility: CLINIC | Age: 56
End: 2021-07-23
Payer: COMMERCIAL

## 2021-07-23 VITALS
TEMPERATURE: 98.3 F | WEIGHT: 219 LBS | DIASTOLIC BLOOD PRESSURE: 85 MMHG | HEART RATE: 76 BPM | BODY MASS INDEX: 34.3 KG/M2 | SYSTOLIC BLOOD PRESSURE: 135 MMHG | RESPIRATION RATE: 20 BRPM

## 2021-07-23 DIAGNOSIS — G43.119 INTRACTABLE MIGRAINE WITH AURA WITHOUT STATUS MIGRAINOSUS: ICD-10-CM

## 2021-07-23 DIAGNOSIS — J34.2 DEVIATED NASAL SEPTUM: ICD-10-CM

## 2021-07-23 DIAGNOSIS — Z01.818 PRE-OP EXAM: Primary | ICD-10-CM

## 2021-07-23 DIAGNOSIS — F51.04 CHRONIC INSOMNIA: Chronic | ICD-10-CM

## 2021-07-23 DIAGNOSIS — K75.81 NASH (NONALCOHOLIC STEATOHEPATITIS): ICD-10-CM

## 2021-07-23 LAB
ALBUMIN SERPL-MCNC: 4.5 G/DL (ref 3.5–5)
ALP SERPL-CCNC: 102 U/L (ref 45–120)
ALT SERPL W P-5'-P-CCNC: 47 U/L (ref 0–45)
ANION GAP SERPL CALCULATED.3IONS-SCNC: 12 MMOL/L (ref 5–18)
AST SERPL W P-5'-P-CCNC: 22 U/L (ref 0–40)
ATRIAL RATE - MUSE: 69 BPM
BILIRUB SERPL-MCNC: 0.6 MG/DL (ref 0–1)
BUN SERPL-MCNC: 17 MG/DL (ref 8–22)
CALCIUM SERPL-MCNC: 10.5 MG/DL (ref 8.5–10.5)
CHLORIDE BLD-SCNC: 105 MMOL/L (ref 98–107)
CO2 SERPL-SCNC: 24 MMOL/L (ref 22–31)
CREAT SERPL-MCNC: 0.95 MG/DL (ref 0.6–1.1)
DIASTOLIC BLOOD PRESSURE - MUSE: NORMAL MMHG
ERYTHROCYTE [DISTWIDTH] IN BLOOD BY AUTOMATED COUNT: 11.4 % (ref 10–15)
GFR SERPL CREATININE-BSD FRML MDRD: 68 ML/MIN/1.73M2
GLUCOSE BLD-MCNC: 104 MG/DL (ref 70–125)
HCT VFR BLD AUTO: 42.8 % (ref 35–47)
HGB BLD-MCNC: 14.4 G/DL (ref 11.7–15.7)
INTERPRETATION ECG - MUSE: NORMAL
MCH RBC QN AUTO: 30.8 PG (ref 26.5–33)
MCHC RBC AUTO-ENTMCNC: 33.6 G/DL (ref 31.5–36.5)
MCV RBC AUTO: 92 FL (ref 78–100)
P AXIS - MUSE: 67 DEGREES
PLATELET # BLD AUTO: 296 10E3/UL (ref 150–450)
POTASSIUM BLD-SCNC: 4.7 MMOL/L (ref 3.5–5)
PR INTERVAL - MUSE: 164 MS
PROT SERPL-MCNC: 7.9 G/DL (ref 6–8)
QRS DURATION - MUSE: 70 MS
QT - MUSE: 386 MS
QTC - MUSE: 413 MS
R AXIS - MUSE: 13 DEGREES
RBC # BLD AUTO: 4.68 10E6/UL (ref 3.8–5.2)
SODIUM SERPL-SCNC: 141 MMOL/L (ref 136–145)
SYSTOLIC BLOOD PRESSURE - MUSE: NORMAL MMHG
T AXIS - MUSE: 39 DEGREES
TSH SERPL DL<=0.005 MIU/L-ACNC: 1.33 UIU/ML (ref 0.3–5)
VENTRICULAR RATE- MUSE: 69 BPM
WBC # BLD AUTO: 4.2 10E3/UL (ref 4–11)

## 2021-07-23 PROCEDURE — 80053 COMPREHEN METABOLIC PANEL: CPT | Performed by: FAMILY MEDICINE

## 2021-07-23 PROCEDURE — 93005 ELECTROCARDIOGRAM TRACING: CPT | Performed by: FAMILY MEDICINE

## 2021-07-23 PROCEDURE — 84443 ASSAY THYROID STIM HORMONE: CPT | Performed by: FAMILY MEDICINE

## 2021-07-23 PROCEDURE — 93010 ELECTROCARDIOGRAM REPORT: CPT | Performed by: INTERNAL MEDICINE

## 2021-07-23 PROCEDURE — 99215 OFFICE O/P EST HI 40 MIN: CPT | Performed by: FAMILY MEDICINE

## 2021-07-23 PROCEDURE — 36415 COLL VENOUS BLD VENIPUNCTURE: CPT | Performed by: FAMILY MEDICINE

## 2021-07-23 PROCEDURE — 85027 COMPLETE CBC AUTOMATED: CPT | Performed by: FAMILY MEDICINE

## 2021-07-23 RX ORDER — ACETAMINOPHEN 160 MG
2000 TABLET,DISINTEGRATING ORAL 2 TIMES DAILY
COMMUNITY
End: 2023-06-01

## 2021-07-23 RX ORDER — BACLOFEN 10 MG/1
10 TABLET ORAL 2 TIMES DAILY PRN
Status: ON HOLD | COMMUNITY
Start: 2021-05-03 | End: 2021-08-31

## 2021-07-23 RX ORDER — SUMATRIPTAN 100 MG/1
TABLET, FILM COATED ORAL
COMMUNITY
Start: 2021-05-03 | End: 2021-10-27

## 2021-07-23 RX ORDER — AZELASTINE 1 MG/ML
SPRAY, METERED NASAL
COMMUNITY
Start: 2021-06-07 | End: 2021-08-29

## 2021-07-23 RX ORDER — ATENOLOL 50 MG/1
50 TABLET ORAL DAILY PRN
COMMUNITY
End: 2023-01-16

## 2021-07-23 ASSESSMENT — PATIENT HEALTH QUESTIONNAIRE - PHQ9: SUM OF ALL RESPONSES TO PHQ QUESTIONS 1-9: 13

## 2021-07-23 NOTE — LETTER
July 24, 2021      Keiry Mchugh  [address] [place] [id number]        To Whom It May Concern:    Keiry Mchugh was seen in our clinic. She may return to work with the following: {work restrictions for clinic work note:875336} on or about ***.      Sincerely,        Porfirio Linton MD

## 2021-07-23 NOTE — PROGRESS NOTES
+[facility]  [address] [facility], [place] [address]  Phone: [phone]  Fax: [phone]  Primary Provider: No Ref-Primary, Physician        PREOPERATIVE EVALUATION:  Today's date: 7/23/2021    Keiry Mchugh is a 56 year old female who presents for a preoperative evaluation.    Surgical Information:  Surgery/Procedure:   Deviated septum   Surgery Location: [facility]   Surgeon:  Dr Case  Surgery Date: 8/3/2021  Time of Surgery:   Where patient plans to recover: At home alone  Fax number for surgical facility: Note does not need to be faxed, will be available electronically in Epic.    Type of Anesthesia Anticipated: General    Assessment & Plan    Encounter Diagnoses   Name Primary?     Encounter Diagnoses   Name Primary?     Pre-op exam Yes     Deviated nasal septum      Intractable migraine with aura without status migrainosus      Chronic insomnia      HARDIN (nonalcoholic steatohepatitis)      Yes                          The proposed surgical procedure is considered LOW risk.         RECOMMENDATION:  APPROVAL GIVEN to proceed with proposed procedure, without further diagnostic evaluation.    EKG personally read (NSR with acute findings) and reviewed with the pt.    I spent 40 with patient face-to-face, of which 50% or greater was spent in counseling and coordination of care in regards to patient's  Upcoming surgery and reivew of history, including  ECHO from [place] and today's EKG.   Please see plan above     [id number]}    Subjective     HPI related to upcoming procedure: na    Preop Questions 7/23/2021   1. Have you ever had a heart attack or stroke? No   2. Have you ever had surgery on your heart or blood vessels, such as a stent placement, a coronary artery bypass, or surgery on an artery in your head, neck, heart, or legs? No   3. Do you have chest pain with activity? No   4. Do you have a history of  heart failure? No   5. Do you currently have a cold,  bronchitis or symptoms of other infection? YES;  remants of a cough from a cold 3 weeks ago   6. Do you have a cough, shortness of breath, or wheezing? YES - remants of a cough from a cold 3 weeks ago   7. Do you or anyone in your family have previous history of blood clots? YES - mother with a h/o PE   8. Do you or does anyone in your family have a serious bleeding problem such as prolonged bleeding following surgeries or cuts? No   9. Have you ever had problems with anemia or been told to take iron pills? YES - occas hgb dips as pt is not a red meat eater   10. Have you had any abnormal blood loss such as black, tarry or bloody stools, or abnormal vaginal bleeding? No   11. Have you ever had a blood transfusion? No   12. Are you willing to have a blood transfusion if it is medically needed before, during, or after your surgery? Yes   13. Have you or any of your relatives ever had problems with anesthesia? No   14. Do you have sleep apnea, excessive snoring or daytime drowsiness? YES - daytime drowsiness   14a. Do you have a CPAP machine? No   15. Do you have any artifical heart valves or other implanted medical devices like a pacemaker, defibrillator, or continuous glucose monitor? No   16. Do you have artificial joints? No   17. Are you allergic to latex? No   18. Is there any chance that you may be pregnant? No       Health Care Directive:  Patient does not have a Health Care Directive or Living Will      Review of Systems   h/o headaches  Bad vision  Pain in the neck and uses baclofen for this  Urinary frequency    Constitutional, neuro, ENT, endocrine, pulmonary, cardiac, gastrointestinal, genitourinary, musculoskeletal, integument and psychiatric systems are negative, except as otherwise noted.    Patient Active Problem List    Diagnosis Date Noted     Chronic insomnia 06/11/2020     Priority: Medium     Insomnia associated with inadequate sleep hygiene and suspected sleep apnea.       Suspected sleep apnea  06/11/2020     Priority: Medium     Recommend HST to evaluate for CYNTHIA        Past Medical History:   Diagnosis Date     Anemia      Anxiety      Celiac disease      Chronic kidney disease     donated 1 kidney     Cough      Depression     Seasonal     Diverticulitis      GERD (gastroesophageal reflux disease)      Hiatal hernia      Migraines      Peripheral neuropathy      PONV (postoperative nausea and vomiting)      Ventral hernia      Past Surgical History:   Procedure Laterality Date     APPENDECTOMY       CHOLECYSTECTOMY       COLON SURGERY       COLONOSCOPY N/A 3/18/2019    Procedure: COLONOSCOPY;  Surgeon: Davis Mosqueda MD;  Location: [facility];  Service: General     CYSTOSCOPY N/A 7/16/2015    Procedure: CYSTOSCOPY;  Surgeon: Nate Horta MD;  Location: [facility];  Service:      HC CORRECT BUNION,METATARSAL OSTEOTOMY      Description: Bunion Correction With Metatarsal Osteotomy Herman Procedure;  Proc Date: 07/16/2010;     HYSTERECTOMY       HYSTERECTOMY, PAP NO LONGER INDICATED  02/2009     NEPHRECTOMY LIVING DONOR Left APRIL 2012     CT LAP,BILIARY TRACT,UNLISTED N/A 3/19/2019    Procedure: BIOPSY, LIVER, LAPAROSCOPIC;  Surgeon: Davis Mosqueda MD;  [place] [facility];  Service: General     CT LAP,SLING OPERATION      Description: Laparoscopic Sling Operation For Stress Incontinence;  Recorded: 02/17/2009;     CT LAP,SURG,COLECTOMY, PARTIAL, W/ANAST N/A 3/19/2019    Procedure: LAPAROSCOPIC SIGMOID COLON RESECTION;  Surgeon: Davis Mosqueda MD;  [place] [facility];  Service: General     SIGMOIDOSCOPY FLEXIBLE N/A 3/19/2019    Procedure: FLEXIBLE SIGMOIDOSCOPY;  Surgeon: Davis Mosqueda MD;  [place] [facility];  Service: General     Current Outpatient Medications   Medication Sig Dispense Refill     Ascorbic Acid (VITAMIN C) 500 MG CAPS        atenolol (TENORMIN) 50 MG tablet as needed       azelastine (ASTELIN) 0.1 % nasal spray         baclofen (LIORESAL) 10 MG tablet        BIOTIN PO Take 10,000 mcg by mouth       Black Pepper-Turmeric (TURMERIC CURCUMIN) 5-1000 MG CAPS Take 2,000 mg by mouth       Cholecalciferol (VITAMIN D3) 50 MCG (2000 UT) CAPS 2,000 Units 2 times daily       Coenzyme Q10 (COQ-10) 400 MG CAPS        FLUoxetine 20 MG tablet Take 20 mg by mouth daily       HEMP OIL OR EXTRACT OR OTHER CBD CANNABINOID, NOT MEDICAL CANNABIS, Take 1.5 drops by mouth       magnesium oxide 200 MG TABS Take 400 mg by mouth       Melatonin 10 MG TABS tablet Take 20 mg by mouth nightly as needed for sleep       SUMAtriptan (IMITREX) 100 MG tablet TAKE 1/2 TO 1 TABLET BY MOUTH AT ONSET OF HEADACHE         Allergies   Allergen Reactions     Contrast Dye      Levaquin [Levofloxacin]      Pcn [Penicillins]      Rocephin [Ceftriaxone]      Zithromax [Azithromycin]         Social History     Tobacco Use     Smoking status: Former Smoker     Packs/day: 0.00     Quit date: 1999     Years since quittin.5     Smokeless tobacco: Never Used   Substance Use Topics     Alcohol use: No     Comment: rare       History   Drug Use Unknown         Objective     /85 (BP Location: Left arm, Patient Position: Sitting, Cuff Size: Adult Large)   Pulse 76   Temp 98.3  F (36.8  C) (Oral)   Resp 20   Wt 99.3 kg (219 lb)   BMI 34.30 kg/m      Physical Exam    GENERAL APPEARANCE: healthy, alert and no distress     EYES: EOMI, PERRL     HENT: ear canals and TM's normal and nose and mouth without ulcers or lesions     NECK: no adenopathy, no asymmetry, masses, or scars and thyroid normal to palpation     RESP: lungs clear to auscultation - no rales, rhonchi or wheezes     CV: regular rates and rhythm, normal S1 S2, no S3 or S4 and no murmur, click or rub     ABDOMEN:  soft, nontender, no HSM or masses and bowel sounds normal     MS: extremities normal- no gross deformities noted, no evidence of inflammation in joints, FROM in all extremities.     SKIN: no  suspicious lesions or rashes     NEURO: Normal strength and tone, sensory exam grossly normal, mentation intact and speech normal     PSYCH: mentation appears normal. and affect normal/bright     LYMPHATICS: No cervical adenopathy    Recent Labs   Lab Test 12/01/20  0000 06/19/20  1257 04/29/20  1050 11/15/19  1030 10/07/19  1251   HGB  --   --   --  14.0 13.9   PLT  --   --   --  265 263   NA  --  139 140 142  --    POTASSIUM  --  4.7 5.1* 4.8  --    CR 1.04 0.93 0.97 1.02  --     Echo Transthoracic (TTE) (12-4-20 [place])    Component 7 mo ago   Ejection Fraction  65       SOV  29       Proximal Ascending Aorta  31       Mid-Ascending Aorta  32       LV Mass Index  66       LV End-Diastolic Diameter  47       LV End-Systolic Diameter  29       LV End-Diastolic Volume  101       LV End-Systolic Volume  36       E Velocity  0.7       A Velocity  1       E/A  0.7       eVelocity Medial  0.06       eVelocity Lateral  0.1       E/eMedial  11.7       E/eLateral  7       Left ventricular stroke volume  30       Cardiac Output  5.53       Cardiac Index  2.58       LV Global Longitudinal Strain  -23       LV Interventricular Septal Wall Thickness  9       LV Posterior Wall Thickness  9       Relative Wall Thickness  38       RA Pressure  5       AV mean gradient  3       Aortic valve area  2.61       Dimensionless Index  0.69       LA Volume Index  26       Impression    No previous studies available for comparison.  LEFT VENTRICLE:  Normal left ventricular chamber   size.  Normal left ventricular wall thickness.  Sigmoid ventricular septum with basal septal   prominence: 13 mm  Left ventricular mass index by 2D 66 g/m^2.  Calculated 2-D biplane   volumetric left ventricular ejection fraction 65 %.  Global averaged left ventricular   longitudinal peak systolic strain is normal at -23 % (normal = more negative than -18%).  Left   ventricular cardiac index 2.58 l/min/m^2.  No regional wall motion abnormalities.   Indeterminate  left ventricular filling pressure.  RIGHT VENTRICLE:  Normal right ventricular   chamber size.  Normal right ventricular systolic function.  Unable to detect peak tricuspid   regurgitation velocity for pulmonary artery systolic pressure calculation.  ATRIA:  Normal left   atrial size.  Left atrial volume index 26 ml/m^2.  Normal right atrial size.  CARDIAC VALVES:   Trileaflet aortic valve.  Thickened aortic valve.  Trivial aortic valve regurgitation.  Mildly   thickened mitral valve.  Trivial mitral valve regurgitation.  Normal pulmonary valve.  Trivial   pulmonary valve regurgitation.  Normal tricuspid valve.  Trivial tricuspid valve regurgitation.    OTHER ECHO FINDINGS:  Normal inferior vena cava size with normal inspiratory collapse (>50%).   Normal ascending aorta diameter.  Abdominal aorta incompletely visualized.  No atrial level   shunt by color flow imaging.  No intracardiac mass or thrombus, but the left atrial appendage   cannot be visualized adequately with transthoracic echo to exclude thrombus in this location.   No pericardial effusion.     For the complete report, see the Order-Level Documents.  Narrative      For the complete report, see the Order-Level Documents.     Final Impressions   1. Normal left ventricular chamber size and wall thickness. Calculated EF 65%.   2. No regional wall motion abnormalities.  Global averaged LV longitudinal peak systolic strain   is normal (-23 %).   3. Normal right ventricular chamber size and systolic function. Unable to estimate RVSP.   4. Normal sized atria.   5. No hemodynamically significant valvular heart disease.   6. Normal inferior vena cava size with normal inspiratory collapse (>50%).  No pericardial   effusion.      Diagnostics:  Labs pending at this time.  Results will be reviewed when available.     EKG 12-lead, tracing only  Order: [id number]  Status:  Preliminary result   Visible to patient:  No (not released) Next appt:  07/30/2021 at 11:15 AM [facility]  [facility] Dx:  Pre-op exam   0 Result Notes    Ref Range & Units 7/23/21  8:38 AM 3/8/19 12:00 AM 1/27/18 10:55 PM 10/19/17 12:30 PM    Systolic Blood Pressure mmHg           Diastolic Blood Pressure mmHg           Ventricular Rate BPM 69  74  68  105     Atrial Rate BPM 69  74  68  105     MI Interval ms 164  156  184  152     QRS Duration ms 70  76  80  68     QT ms 386  376  404  332     QTc ms 413  417  429  438     P Axis degrees 67  68  60  78     R AXIS degrees 13  36  31  45     T Axis degrees 39  49  28  51     Interpretation ECG  Sinus rhythm   Normal ECG   When compared with ECG of 08-MAR-2019 10:21,   No significant change was found  Normal sinus rhythm   Normal ECG   When compared with ECG of 27-JAN-2018 23:09,   No significant change was found   Confirmed by TATIANA NORMAN MD LOC:[facility]N ([id number]) on 3/8/2019 3:49:03 PM  Normal sinus rhythm   Low voltage QRS   Borderline ECG   When compared with ECG of 19-OCT-2017 12:12,   Vent. rate has decreased BY  37 BPM   Confirmed by JD LÓPEZ MD LOC:[facility] ([id number]) on 1/28/2018 6:40:07 PM  Sinus tachycardia   Otherwise normal ECG   No previous ECGs available   Confirmed by ARUN GRIFFIN MD LOC:[facility]N ([id number]) on 10/19/2017 4:08:43 PM             Revised Cardiac Risk Index (RCRI):  The patient has the following serious cardiovascular risks for perioperative complications:   - No serious cardiac risks = 0 points     RCRI Interpretation: 0 points: Class I (very low risk - 0.4% complication rate)           Signed Electronically by: Porfirio Linton MD  Copy of this evaluation report is provided to requesting physician.

## 2021-07-23 NOTE — LETTER
July 24, 2021      Keiry Mchugh  [address] [facility] [address]        Dear ,    We are writing to inform you of your test results.    Danie Singh:  Your recent pre-op labs look good.  The one liver enzyme (ALT) was negligibly elevated.  The thyroid function and remaining labs are normal.  All should be set for your upcoming surgery.    Resulted Orders   CBC with platelets   Result Value Ref Range    WBC Count 4.2 4.0 - 11.0 10e3/uL    RBC Count 4.68 3.80 - 5.20 10e6/uL    Hemoglobin 14.4 11.7 - 15.7 g/dL    Hematocrit 42.8 35.0 - 47.0 %    MCV 92 78 - 100 fL    MCH 30.8 26.5 - 33.0 pg    MCHC 33.6 31.5 - 36.5 g/dL    RDW 11.4 10.0 - 15.0 %    Platelet Count 296 150 - 450 10e3/uL   Comprehensive metabolic panel (BMP + Alb, Alk Phos, ALT, AST, Total. Bili, TP)   Result Value Ref Range    Sodium 141 136 - 145 mmol/L    Potassium 4.7 3.5 - 5.0 mmol/L    Chloride 105 98 - 107 mmol/L    Carbon Dioxide (CO2) 24 22 - 31 mmol/L    Anion Gap 12 5 - 18 mmol/L    Urea Nitrogen 17 8 - 22 mg/dL    Creatinine 0.95 0.60 - 1.10 mg/dL    Calcium 10.5 8.5 - 10.5 mg/dL    Glucose 104 70 - 125 mg/dL    Alkaline Phosphatase 102 45 - 120 U/L    AST 22 0 - 40 U/L    ALT 47 (H) 0 - 45 U/L    Protein Total 7.9 6.0 - 8.0 g/dL    Albumin 4.5 3.5 - 5.0 g/dL    Bilirubin Total 0.6 0.0 - 1.0 mg/dL    GFR Estimate 68 >60 mL/min/1.73m2      Comment:      As of July 11, 2021, eGFR is calculated by the CKD-EPI creatinine equation, without race adjustment. eGFR can be influenced by muscle mass, exercise, and diet. The reported eGFR is an estimation only and is only applicable if the renal function is stable.   TSH with free T4 reflex   Result Value Ref Range    TSH 1.33 0.30 - 5.00 uIU/mL       If you have any questions or concerns, please call the clinic at the number listed above.       Sincerely,

## 2021-07-26 NOTE — PROGRESS NOTES
"TCM DISCHARGE FOLLOW UP CALL    Discharge Date:  2/4/2019  Reason for hospital stay (discharge diagnosis)::  Diverticulitis, MICK  Are you feeling better, the same or worse since your discharge?:  Patient is feeling worse  Why are you feeling worse?:  State still a little nauseous, \"from Flagyl,\" but better; however, the past 24 noticing an increase in generalized abdominal pain but more significant in LLQ.  Rated pain 1/10 on the day of d/c, now 3-4/10, \"but I have an appt tomorrow morning.\"  She is having loose stools \"every time I urinate something comes out, from small amount of stool plus 1-2 normal amounts of stool.\"  No diarrhea or blood.  No fevers or chills.  Do you feel like you have a plan in the event of a health emergency?: Yes (Adult children, ER.  RN informed pt of 24/7 nurse triage)    As part of your discharge plan, were  home care services ordered for you?: No    Did you receive any new medications, or was there a change to your medications?: Yes    Are you taking those medications, or do you have any established regiment?:  RN reviewed discharge medications w/pt.  Pt states she is taking moxifloxacin 400 mg once a day & metronidazole 500 mg three times a day as prescribed.  Pt confirmed having stopped Bactrim & Tylenol, as instructed.  Do you have any follow up visits scheduled with your PCP or Specialist?:  Yes, with PCP (Dr. Linton 2/7/19)  RN NOTES::  Reminded pt to call Dr. Mosqueda's office to schedule 3-4 weeks  f/u appt.  Return to ER for fevers, severe pain.  Advised pt stay away from nuts, seeds, high fiber foods.  Pt states she is eating \"a bland diet.\"  Pt verbalized understanding & agrees w/plan.      Leann Platt RN Care Manager, Population Health    " normal

## 2021-07-28 ENCOUNTER — TRANSFERRED RECORDS (OUTPATIENT)
Dept: HEALTH INFORMATION MANAGEMENT | Facility: CLINIC | Age: 56
End: 2021-07-28

## 2021-07-30 ENCOUNTER — LAB (OUTPATIENT)
Dept: LAB | Facility: CLINIC | Age: 56
End: 2021-07-30
Payer: COMMERCIAL

## 2021-07-30 DIAGNOSIS — Z11.59 ENCOUNTER FOR SCREENING FOR OTHER VIRAL DISEASES: ICD-10-CM

## 2021-07-30 PROCEDURE — U0005 INFEC AGEN DETEC AMPLI PROBE: HCPCS

## 2021-07-30 PROCEDURE — U0003 INFECTIOUS AGENT DETECTION BY NUCLEIC ACID (DNA OR RNA); SEVERE ACUTE RESPIRATORY SYNDROME CORONAVIRUS 2 (SARS-COV-2) (CORONAVIRUS DISEASE [COVID-19]), AMPLIFIED PROBE TECHNIQUE, MAKING USE OF HIGH THROUGHPUT TECHNOLOGIES AS DESCRIBED BY CMS-2020-01-R: HCPCS

## 2021-07-30 RX ORDER — MIRABEGRON 50 MG/1
50 TABLET, EXTENDED RELEASE ORAL DAILY
Status: ON HOLD | COMMUNITY
End: 2021-08-31

## 2021-07-30 RX ORDER — ONDANSETRON 4 MG/1
TABLET, ORALLY DISINTEGRATING ORAL
COMMUNITY
End: 2021-08-29

## 2021-07-30 RX ORDER — MUPIROCIN 20 MG/G
OINTMENT TOPICAL
COMMUNITY
End: 2021-08-20

## 2021-07-31 LAB — SARS-COV-2 RNA RESP QL NAA+PROBE: NEGATIVE

## 2021-08-02 ENCOUNTER — ANESTHESIA EVENT (OUTPATIENT)
Dept: SURGERY | Facility: AMBULATORY SURGERY CENTER | Age: 56
End: 2021-08-02
Payer: COMMERCIAL

## 2021-08-03 ENCOUNTER — ANESTHESIA (OUTPATIENT)
Dept: SURGERY | Facility: AMBULATORY SURGERY CENTER | Age: 56
End: 2021-08-03
Payer: COMMERCIAL

## 2021-08-03 ENCOUNTER — HOSPITAL ENCOUNTER (OUTPATIENT)
Facility: AMBULATORY SURGERY CENTER | Age: 56
End: 2021-08-03
Attending: OTOLARYNGOLOGY
Payer: COMMERCIAL

## 2021-08-03 VITALS
SYSTOLIC BLOOD PRESSURE: 140 MMHG | TEMPERATURE: 97 F | OXYGEN SATURATION: 93 % | HEART RATE: 85 BPM | RESPIRATION RATE: 16 BRPM | DIASTOLIC BLOOD PRESSURE: 67 MMHG

## 2021-08-03 DIAGNOSIS — Z98.890 S/P NASAL SEPTOPLASTY: Primary | ICD-10-CM

## 2021-08-03 PROCEDURE — 30999 UNLISTED PROCEDURE NOSE: CPT | Performed by: OTOLARYNGOLOGY

## 2021-08-03 PROCEDURE — 30802 ABLATE INF TURBINATE SUBMUC: CPT | Performed by: OTOLARYNGOLOGY

## 2021-08-03 RX ORDER — KETAMINE HYDROCHLORIDE 10 MG/ML
INJECTION INTRAMUSCULAR; INTRAVENOUS PRN
Status: DISCONTINUED | OUTPATIENT
Start: 2021-08-03 | End: 2021-08-03

## 2021-08-03 RX ORDER — LIDOCAINE 40 MG/G
CREAM TOPICAL
Status: DISCONTINUED | OUTPATIENT
Start: 2021-08-03 | End: 2021-08-04 | Stop reason: HOSPADM

## 2021-08-03 RX ORDER — ONDANSETRON 4 MG/1
4 TABLET, FILM COATED ORAL EVERY 8 HOURS PRN
Qty: 12 TABLET | Refills: 0 | Status: SHIPPED | OUTPATIENT
Start: 2021-08-03 | End: 2021-08-20

## 2021-08-03 RX ORDER — ECHINACEA PURPUREA EXTRACT 125 MG
TABLET ORAL
Qty: 30 ML | Refills: 1 | Status: SHIPPED | OUTPATIENT
Start: 2021-08-03 | End: 2021-08-29

## 2021-08-03 RX ORDER — ONDANSETRON 2 MG/ML
4 INJECTION INTRAMUSCULAR; INTRAVENOUS EVERY 30 MIN PRN
Status: DISCONTINUED | OUTPATIENT
Start: 2021-08-03 | End: 2021-08-04 | Stop reason: HOSPADM

## 2021-08-03 RX ORDER — MAGNESIUM SULFATE HEPTAHYDRATE 40 MG/ML
4 INJECTION, SOLUTION INTRAVENOUS ONCE
Status: COMPLETED | OUTPATIENT
Start: 2021-08-03 | End: 2021-08-03

## 2021-08-03 RX ORDER — ONDANSETRON 4 MG/1
4 TABLET, ORALLY DISINTEGRATING ORAL EVERY 30 MIN PRN
Status: DISCONTINUED | OUTPATIENT
Start: 2021-08-03 | End: 2021-08-04 | Stop reason: HOSPADM

## 2021-08-03 RX ORDER — FENTANYL CITRATE 50 UG/ML
50 INJECTION, SOLUTION INTRAMUSCULAR; INTRAVENOUS EVERY 5 MIN PRN
Status: DISCONTINUED | OUTPATIENT
Start: 2021-08-03 | End: 2021-08-04 | Stop reason: HOSPADM

## 2021-08-03 RX ORDER — NEOSTIGMINE METHYLSULFATE 1 MG/ML
VIAL (ML) INJECTION PRN
Status: DISCONTINUED | OUTPATIENT
Start: 2021-08-03 | End: 2021-08-03

## 2021-08-03 RX ORDER — MEPERIDINE HYDROCHLORIDE 25 MG/ML
12.5 INJECTION INTRAMUSCULAR; INTRAVENOUS; SUBCUTANEOUS
Status: DISCONTINUED | OUTPATIENT
Start: 2021-08-03 | End: 2021-08-04 | Stop reason: HOSPADM

## 2021-08-03 RX ORDER — ONDANSETRON 2 MG/ML
INJECTION INTRAMUSCULAR; INTRAVENOUS PRN
Status: DISCONTINUED | OUTPATIENT
Start: 2021-08-03 | End: 2021-08-03

## 2021-08-03 RX ORDER — SODIUM CHLORIDE, SODIUM LACTATE, POTASSIUM CHLORIDE, CALCIUM CHLORIDE 600; 310; 30; 20 MG/100ML; MG/100ML; MG/100ML; MG/100ML
INJECTION, SOLUTION INTRAVENOUS CONTINUOUS
Status: DISCONTINUED | OUTPATIENT
Start: 2021-08-03 | End: 2021-08-04 | Stop reason: HOSPADM

## 2021-08-03 RX ORDER — GLYCOPYRROLATE 0.2 MG/ML
INJECTION, SOLUTION INTRAMUSCULAR; INTRAVENOUS PRN
Status: DISCONTINUED | OUTPATIENT
Start: 2021-08-03 | End: 2021-08-03

## 2021-08-03 RX ORDER — OXYMETAZOLINE HYDROCHLORIDE 0.05 G/100ML
SPRAY NASAL PRN
Status: DISCONTINUED | OUTPATIENT
Start: 2021-08-03 | End: 2021-08-03 | Stop reason: HOSPADM

## 2021-08-03 RX ORDER — MUPIROCIN 20 MG/G
OINTMENT TOPICAL
Qty: 30 G | Refills: 0 | Status: SHIPPED | OUTPATIENT
Start: 2021-08-03 | End: 2021-08-29

## 2021-08-03 RX ORDER — LIDOCAINE HYDROCHLORIDE AND EPINEPHRINE 10; 10 MG/ML; UG/ML
INJECTION, SOLUTION INFILTRATION; PERINEURAL PRN
Status: DISCONTINUED | OUTPATIENT
Start: 2021-08-03 | End: 2021-08-03 | Stop reason: HOSPADM

## 2021-08-03 RX ORDER — ONDANSETRON 4 MG/1
4-8 TABLET, ORALLY DISINTEGRATING ORAL EVERY 8 HOURS PRN
Qty: 4 TABLET | Refills: 0 | Status: SHIPPED | OUTPATIENT
Start: 2021-08-03 | End: 2021-08-29

## 2021-08-03 RX ORDER — DEXAMETHASONE SODIUM PHOSPHATE 10 MG/ML
10 INJECTION, SOLUTION INTRAMUSCULAR; INTRAVENOUS ONCE
Status: COMPLETED | OUTPATIENT
Start: 2021-08-03 | End: 2021-08-03

## 2021-08-03 RX ORDER — HYDROMORPHONE HCL IN WATER/PF 6 MG/30 ML
0.2 PATIENT CONTROLLED ANALGESIA SYRINGE INTRAVENOUS EVERY 5 MIN PRN
Status: DISCONTINUED | OUTPATIENT
Start: 2021-08-03 | End: 2021-08-04 | Stop reason: HOSPADM

## 2021-08-03 RX ORDER — LIDOCAINE HYDROCHLORIDE 20 MG/ML
INJECTION, SOLUTION INFILTRATION; PERINEURAL PRN
Status: DISCONTINUED | OUTPATIENT
Start: 2021-08-03 | End: 2021-08-03

## 2021-08-03 RX ORDER — OXYMETAZOLINE HYDROCHLORIDE 0.05 G/100ML
2 SPRAY NASAL ONCE
Status: COMPLETED | OUTPATIENT
Start: 2021-08-03 | End: 2021-08-03

## 2021-08-03 RX ORDER — HYDROCODONE BITARTRATE AND ACETAMINOPHEN 5; 325 MG/1; MG/1
1 TABLET ORAL EVERY 6 HOURS PRN
Status: DISCONTINUED | OUTPATIENT
Start: 2021-08-03 | End: 2021-08-04 | Stop reason: HOSPADM

## 2021-08-03 RX ORDER — HYDROCODONE BITARTRATE AND ACETAMINOPHEN 5; 325 MG/1; MG/1
1-2 TABLET ORAL EVERY 4 HOURS PRN
Qty: 12 TABLET | Refills: 0 | Status: SHIPPED | OUTPATIENT
Start: 2021-08-03 | End: 2021-08-06

## 2021-08-03 RX ORDER — PROPOFOL 10 MG/ML
INJECTION, EMULSION INTRAVENOUS PRN
Status: DISCONTINUED | OUTPATIENT
Start: 2021-08-03 | End: 2021-08-03

## 2021-08-03 RX ORDER — FENTANYL CITRATE 50 UG/ML
INJECTION, SOLUTION INTRAMUSCULAR; INTRAVENOUS PRN
Status: DISCONTINUED | OUTPATIENT
Start: 2021-08-03 | End: 2021-08-03

## 2021-08-03 RX ORDER — OXYCODONE HYDROCHLORIDE 5 MG/1
5 TABLET ORAL EVERY 4 HOURS PRN
Status: DISCONTINUED | OUTPATIENT
Start: 2021-08-03 | End: 2021-08-04 | Stop reason: HOSPADM

## 2021-08-03 RX ORDER — PROPOFOL 10 MG/ML
INJECTION, EMULSION INTRAVENOUS CONTINUOUS PRN
Status: DISCONTINUED | OUTPATIENT
Start: 2021-08-03 | End: 2021-08-03

## 2021-08-03 RX ORDER — FENTANYL CITRATE 50 UG/ML
25 INJECTION, SOLUTION INTRAMUSCULAR; INTRAVENOUS EVERY 5 MIN PRN
Status: SHIPPED | OUTPATIENT
Start: 2021-08-03 | End: 2021-08-03

## 2021-08-03 RX ADMIN — FENTANYL CITRATE 50 MCG: 50 INJECTION, SOLUTION INTRAMUSCULAR; INTRAVENOUS at 15:04

## 2021-08-03 RX ADMIN — GLYCOPYRROLATE 0.8 MG: 0.2 INJECTION, SOLUTION INTRAMUSCULAR; INTRAVENOUS at 14:38

## 2021-08-03 RX ADMIN — SODIUM CHLORIDE, SODIUM LACTATE, POTASSIUM CHLORIDE, CALCIUM CHLORIDE: 600; 310; 30; 20 INJECTION, SOLUTION INTRAVENOUS at 12:48

## 2021-08-03 RX ADMIN — Medication 5 MG: at 14:38

## 2021-08-03 RX ADMIN — KETAMINE HYDROCHLORIDE 30 MG: 10 INJECTION INTRAMUSCULAR; INTRAVENOUS at 13:58

## 2021-08-03 RX ADMIN — PROPOFOL 50 MG: 10 INJECTION, EMULSION INTRAVENOUS at 14:15

## 2021-08-03 RX ADMIN — FENTANYL CITRATE 50 MCG: 50 INJECTION, SOLUTION INTRAMUSCULAR; INTRAVENOUS at 15:15

## 2021-08-03 RX ADMIN — GLYCOPYRROLATE 0.2 MG: 0.2 INJECTION, SOLUTION INTRAMUSCULAR; INTRAVENOUS at 13:58

## 2021-08-03 RX ADMIN — DEXAMETHASONE SODIUM PHOSPHATE 10 MG: 10 INJECTION, SOLUTION INTRAMUSCULAR; INTRAVENOUS at 14:00

## 2021-08-03 RX ADMIN — FENTANYL CITRATE 50 MCG: 50 INJECTION, SOLUTION INTRAMUSCULAR; INTRAVENOUS at 14:20

## 2021-08-03 RX ADMIN — LIDOCAINE HYDROCHLORIDE 60 MG: 20 INJECTION, SOLUTION INFILTRATION; PERINEURAL at 13:58

## 2021-08-03 RX ADMIN — Medication 30 MG: at 13:59

## 2021-08-03 RX ADMIN — Medication 10 MG: at 14:18

## 2021-08-03 RX ADMIN — MAGNESIUM SULFATE HEPTAHYDRATE 4 G: 40 INJECTION, SOLUTION INTRAVENOUS at 13:10

## 2021-08-03 RX ADMIN — OXYMETAZOLINE HYDROCHLORIDE 2 SPRAY: 0.05 SPRAY NASAL at 13:10

## 2021-08-03 RX ADMIN — FENTANYL CITRATE 100 MCG: 50 INJECTION, SOLUTION INTRAMUSCULAR; INTRAVENOUS at 13:58

## 2021-08-03 RX ADMIN — PROPOFOL 100 MG: 10 INJECTION, EMULSION INTRAVENOUS at 14:30

## 2021-08-03 RX ADMIN — HYDROCODONE BITARTRATE AND ACETAMINOPHEN 1 TABLET: 5; 325 TABLET ORAL at 15:49

## 2021-08-03 RX ADMIN — PROPOFOL 200 MG: 10 INJECTION, EMULSION INTRAVENOUS at 13:58

## 2021-08-03 RX ADMIN — ONDANSETRON 4 MG: 2 INJECTION INTRAMUSCULAR; INTRAVENOUS at 14:38

## 2021-08-03 RX ADMIN — PROPOFOL 200 MCG/KG/MIN: 10 INJECTION, EMULSION INTRAVENOUS at 14:00

## 2021-08-03 RX ADMIN — FENTANYL CITRATE 50 MCG: 50 INJECTION, SOLUTION INTRAMUSCULAR; INTRAVENOUS at 15:24

## 2021-08-03 RX ADMIN — FENTANYL CITRATE 50 MCG: 50 INJECTION, SOLUTION INTRAMUSCULAR; INTRAVENOUS at 14:53

## 2021-08-03 RX ADMIN — PROPOFOL 50 MG: 10 INJECTION, EMULSION INTRAVENOUS at 14:24

## 2021-08-03 NOTE — ANESTHESIA PROCEDURE NOTES
Airway       Patient location during procedure: OR       Procedure Start/Stop Times: 8/3/2021 2:02 PM  Staff -        CRNA: Jessica Almanza APRN CRNA       Performed By: CRNA  Consent for Airway        Urgency: elective  Indications and Patient Condition       Indications for airway management: vanita-procedural       Induction type:intravenous       Mask difficulty assessment: 1 - vent by mask    Final Airway Details       Final airway type: endotracheal airway       Successful airway: ETT - single, Oral and SAMUEL  Endotracheal Airway Details        ETT size (mm): 7.0       Cuffed: yes       Successful intubation technique: video laryngoscopy       VL Blade Size: Glidescope 3       Grade View of Cords: 1       Adjucts: stylet and tooth guard       Position: Left       Measured from: gums/teeth       Bite block used: None    Post intubation assessment        Placement verified by: capnometry, equal breath sounds and chest rise        Number of attempts at approach: 2 (1 DL attempt with mac 3)       Secured with: silk tape       Ease of procedure: easy (with glidescope)       Dentition: Intact and Unchanged

## 2021-08-03 NOTE — ANESTHESIA POSTPROCEDURE EVALUATION
"Patient: Keiry Mchugh    Procedure(s):  SEPTOPLASTY, NOSE, WITH TURBINOPLASTY RADIOFREQUENCY BALLON ASSISTED, CRYOBLATION OF NASAL TISSUE    Diagnosis:Deviated nasal septum [J34.2]  PND (post-nasal drip) [R09.82]  Hypertrophy of inferior nasal turbinate [J34.3]  Diagnosis Additional Information: No value filed.    Anesthesia Type:  General    Note:  Disposition: Outpatient   Postop Pain Control: Uneventful            Sign Out: Well controlled pain   PONV: No   Neuro/Psych: Uneventful            Sign Out: Acceptable/Baseline neuro status   Airway/Respiratory: Uneventful            Sign Out: Acceptable/Baseline resp. status   CV/Hemodynamics: Uneventful            Sign Out: Acceptable CV status; No obvious hypovolemia; No obvious fluid overload   Other NRE: NONE   DID A NON-ROUTINE EVENT OCCUR? No    Event details/Postop Comments:  Patient with significant palatal \"pressure\" but denies any issues with her teeth. No injury noted in palate. Very likely a sensation to be expected after her procedure.                Last vitals:  Vitals Value Taken Time   /72 08/03/21 1528   Temp 97.5  F (36.4  C) 08/03/21 1448   Pulse 76 08/03/21 1528   Resp 16 08/03/21 1528   SpO2 96 % 08/03/21 1528       Electronically Signed By: Abiel Watkins MD  August 3, 2021  3:41 PM  "

## 2021-08-03 NOTE — OP NOTE
Otolaryngology Operative Note    Date of Operation:  08/03/21      Pre-operative Diagnosis:  1. Nasal Septal Deviation  2. Inferior turbinate Hypertrophy   Post-operative Diagnosis:  same  Procedure(s):  1.  Nasal Septoplasty with Nasal Endoscopic guidance 2. Radiofrequency and outfracture of inferior turbinates 3. Cryoablation of posterior nasal tissue.     Surgeon:  Randy Case MD  Assistant(s):  None  Anesthesia:  General     Indications for Procedure:  The risks and the benefits of the procedure were discussed with the patient. A consent form was then signed and placed into the chart.    Procedure in Detail:  The patient was brought into the operating room and placed on the table in a supine position. Anesthesia intubated without any difficulties. A time out was performed with all pertinent personnel in the room. The bed was then rotated 90 degrees.      The nasal septum is injected with 1.5 ml of 1% Lidocaine with Epinephrine at 1:1000,000.   Afrin soaked nasal pledgets were then placed bilaterally     After 5 minutes, the pledgets are removed.    .    Each inferior turbinate is injected with 1.5 mls of normal saline.     Using the Coblation PTR wand at 7/3; each inferior turbinate was treated with 30 second passes (superior and inferior) on each side.     Next using the septal balloon it was positioned along the floor of the nasal cavity on the right-hand side inflated up to 8atm  with good effect.  This had the effect of straightening the septum as well as outfracture the inferior turbinate.    On the left-hand side the same procedure was performed.      Finally, cryoablation of the posterior nasal tissue was accomplished with the clarifix device using 30 seconds of freezing on each side.     Note that the entire case was done under nasal endoscopic guidance.  Finally Silastic splint was trimmed to the appropriate side and secured with a single 2-0 nylon suture.      Patient tolerated the procedure well  without incident he will return to the laryngology clinic in 1 week for a postop visit.    The patient tolerated the procedure well without incident.     Findings: Septal deflection on right along floor of nose;  3+ turbinaate hypertrophy              EBL:  Less than 10 ml    Complications:  none    Disposition: The patient was extubated in the operating room and was transferred to the PACU in stable condition.       Randy Case MD

## 2021-08-03 NOTE — ANESTHESIA CARE TRANSFER NOTE
Patient: Keiry Mchugh    Procedure(s):  SEPTOPLASTY, NOSE, WITH TURBINOPLASTY RADIOFREQUENCY BALLON ASSISTED, CRYOBLATION OF NASAL TISSUE    Diagnosis: Deviated nasal septum [J34.2]  PND (post-nasal drip) [R09.82]  Hypertrophy of inferior nasal turbinate [J34.3]  Diagnosis Additional Information: No value filed.    Anesthesia Type:   General     Note:    Oropharynx: oropharynx clear of all foreign objects and spontaneously breathing  Level of Consciousness: drowsy  Oxygen Supplementation: face mask  Level of Supplemental Oxygen (L/min / FiO2): 10  Independent Airway: airway patency satisfactory and stable  Dentition: dentition unchanged  Vital Signs Stable: post-procedure vital signs reviewed and stable  Report to RN Given: handoff report given  Patient transferred to: PACU    Handoff Report: Identifed the Patient, Identified the Reponsible Provider, Reviewed the pertinent medical history, Discussed the surgical course, Reviewed Intra-OP anesthesia mangement and issues during anesthesia, Set expectations for post-procedure period and Allowed opportunity for questions and acknowledgement of understanding      Vitals:  Vitals Value Taken Time   /84 08/03/21 1448   Temp 97.5  F (36.4  C) 08/03/21 1448   Pulse 95 08/03/21 1448   Resp 16 08/03/21 1448   SpO2 100 % 08/03/21 1448       Electronically Signed By: MING JIMENES CRNA  August 3, 2021  2:50 PM

## 2021-08-03 NOTE — DISCHARGE INSTRUCTIONS
Start nasal saline the day after surgery.    Start mupirocin ointment the day after surgery  Avoid nose blowing 1 week  Sleep with head elevated on several pillows for the first 3 nights to help with swelling  You may use ice or cold packs to face as needed to reduce pain/swelling (20 minutes on, then 20 minutes off)  Follow up appointment in 1 week  Call [phone] if you are not contacted by my office     Do not take any additional Tylenol if taking the Vicodin.  Do not exceed 4,000mg of Tylenol in a 24hr period.

## 2021-08-03 NOTE — ANESTHESIA PREPROCEDURE EVALUATION
Anesthesia Pre-Procedure Evaluation    Patient: Keiry Mchugh   MRN: [id number] : 1965        Preoperative Diagnosis: Deviated nasal septum [J34.2]  PND (post-nasal drip) [R09.82]  Hypertrophy of inferior nasal turbinate [J34.3]   Procedure : Procedure(s):  SEPTOPLASTY, NOSE, WITH TURBINOPLASTY RADIOFREQUENCY BALLON ASSISTED, CRYOBLATION OF NASAL TISSUE     Past Medical History:   Diagnosis Date     Anemia      Anxiety      Celiac disease      Chronic kidney disease     donated 1 kidney     Cough      Depression     Seasonal     Diverticulitis      Enlarged LV     wall     GERD (gastroesophageal reflux disease)      Hepatitis      Hiatal hernia      Hypertension      Migraines      HARDIN (nonalcoholic steatohepatitis)      Peripheral neuropathy      PONV (postoperative nausea and vomiting)      Ventral hernia       Past Surgical History:   Procedure Laterality Date     APPENDECTOMY       CHOLECYSTECTOMY       COLON SURGERY       COLONOSCOPY N/A 3/18/2019    Procedure: COLONOSCOPY;  Surgeon: Davis Mosqueda MD;  Location: [facility];  Service: General     CYSTOSCOPY N/A 2015    Procedure: CYSTOSCOPY;  Surgeon: Nate Horta MD;  Location: [facility];  Service:      HC CORRECT BUNION,METATARSAL OSTEOTOMY      Description: Bunion Correction With Metatarsal Osteotomy Herman Procedure;  Proc Date: 2010;     HYSTERECTOMY       HYSTERECTOMY, PAP NO LONGER INDICATED  2009     NEPHRECTOMY LIVING DONOR Left 2012     NH LAP,BILIARY TRACT,UNLISTED N/A 3/19/2019    Procedure: BIOPSY, LIVER, LAPAROSCOPIC;  Surgeon: Davis Mosqueda MD;  Location: [facility];  Service: General     NH LAP,SLING OPERATION      Description: Laparoscopic Sling Operation For Stress Incontinence;  Recorded: 2009;     NH LAP,SURG,COLECTOMY, PARTIAL, W/ANAST N/A 3/19/2019    Procedure: LAPAROSCOPIC SIGMOID COLON RESECTION;  Surgeon: Davis Mosqueda MD;  Location: [facility];   Service: General     SIGMOIDOSCOPY FLEXIBLE N/A 3/19/2019    Procedure: FLEXIBLE SIGMOIDOSCOPY;  Surgeon: Davis Mosqueda MD;  Location: [facility] OR;  Service: General      Allergies   Allergen Reactions     Contrast Dye      Levaquin [Levofloxacin]      Pcn [Penicillins]      Rocephin [Ceftriaxone]      Zithromax [Azithromycin]       Social History     Tobacco Use     Smoking status: Former Smoker     Packs/day: 0.00     Quit date: 1999     Years since quittin.6     Smokeless tobacco: Never Used   Substance Use Topics     Alcohol use: No     Comment: rare      Wt Readings from Last 1 Encounters:   21 99.3 kg (219 lb)        Anesthesia Evaluation   Pt has had prior anesthetic.     History of anesthetic complications  - PONV.      ROS/MED HX  ENT/Pulmonary: Comment: Chronic insomnia - cody w/u has been inconclusive - neg pulmonary ROS   (+) recent URI, resolved, 4 weeks ago; now afebrile, no productive cough:     Neurologic:  - neg neurologic ROS   (+) peripheral neuropathy, - peripheral.     Cardiovascular:  - neg cardiovascular ROS   (+) hypertension-----    METS/Exercise Tolerance:     Hematologic:  - neg hematologic  ROS     Musculoskeletal:  - neg musculoskeletal ROS     GI/Hepatic:  - neg GI/hepatic ROS   (+) GERD, Asymptomatic on medication, hiatal hernia, liver disease (HARDIN),     Renal/Genitourinary:  - neg Renal ROS   (+) renal disease (s/p kidney donor), type: CRI,     Endo:  - neg endo ROS   (+) Obesity,     Psychiatric/Substance Use:  - neg psychiatric ROS   (+) psychiatric history anxiety and depression     Infectious Disease:  - neg infectious disease ROS     Malignancy:  - neg malignancy ROS     Other:  - neg other ROS          Physical Exam    Airway  airway exam normal      Mallampati: II   TM distance: > 3 FB   Neck ROM: full   Mouth opening: > 3 cm    Respiratory Devices and Support         Dental  no notable dental history         Cardiovascular   cardiovascular exam  normal       Rhythm and rate: regular and normal     Pulmonary   pulmonary exam normal        breath sounds clear to auscultation           OUTSIDE LABS:  CBC:   Lab Results   Component Value Date    WBC 4.2 07/23/2021    WBC 9.3 11/15/2019    HGB 14.4 07/23/2021    HGB 14.0 11/15/2019    HCT 42.8 07/23/2021    HCT 42.0 11/15/2019     07/23/2021     11/15/2019     BMP:   Lab Results   Component Value Date     07/23/2021     06/19/2020    POTASSIUM 4.7 07/23/2021    POTASSIUM 4.7 06/19/2020    CHLORIDE 105 07/23/2021    CHLORIDE 105 06/19/2020    CO2 24 07/23/2021    CO2 24 06/19/2020    BUN 17 07/23/2021    BUN 21 06/19/2020    CR 0.95 07/23/2021    CR 1.04 12/01/2020     07/23/2021     06/19/2020     COAGS: No results found for: PTT, INR, FIBR  POC:   Lab Results   Component Value Date    HCGS Negative 01/28/2019     HEPATIC:   Lab Results   Component Value Date    ALBUMIN 4.5 07/23/2021    PROTTOTAL 7.9 07/23/2021    ALT 47 (H) 07/23/2021    AST 22 07/23/2021    ALKPHOS 102 07/23/2021    BILITOTAL 0.6 07/23/2021     OTHER:   Lab Results   Component Value Date    LACT 1.3 03/20/2019    A1C 5.2 06/15/2018    MORENO 10.5 07/23/2021    MAG 1.8 03/06/2020    LIPASE 20 01/31/2019    TSH 1.33 07/23/2021    CRP 0.8 04/29/2020       Anesthesia Plan    ASA Status:  3   NPO Status:  NPO Appropriate    Anesthesia Type: General.     - Airway: ETT   Induction: Propofol, Intravenous.   Maintenance: TIVA.        Consents    Anesthesia Plan(s) and associated risks, benefits, and realistic alternatives discussed. Questions answered and patient/representative(s) expressed understanding.     - Discussed with:  Patient         Postoperative Care    Pain management: Multi-modal analgesia.   PONV prophylaxis: Ondansetron (or other 5HT-3), Dexamethasone or Solumedrol     Comments:    Reviewed anesthetic options and risks, including risk of dental trauma. Patient agrees to proceed.             Abiel WILKINS  MD Roland

## 2021-08-04 ENCOUNTER — TELEPHONE (OUTPATIENT)
Dept: OTOLARYNGOLOGY | Facility: CLINIC | Age: 56
End: 2021-08-04

## 2021-08-04 NOTE — TELEPHONE ENCOUNTER
Patient notified that numbness of the soft and hard palette can last 2-3 weeks after surgery. She is going to stick with liquids and soft food to avoid choking. Post op appointment is next week.    Georgie Noel RN  [facility]  [phone]

## 2021-08-04 NOTE — TELEPHONE ENCOUNTER
Patient called stating her hard and soft palate are completely numb and she is wondering is this is normal. She had a septoplasty by Dr. Case yesterday. Will check with him.    Georgie Noel RN  [facility]  [phone]

## 2021-08-11 ENCOUNTER — OFFICE VISIT (OUTPATIENT)
Dept: OTOLARYNGOLOGY | Facility: CLINIC | Age: 56
End: 2021-08-11
Payer: COMMERCIAL

## 2021-08-11 DIAGNOSIS — Z98.890 POSTOPERATIVE STATE: Primary | ICD-10-CM

## 2021-08-11 PROCEDURE — 99024 POSTOP FOLLOW-UP VISIT: CPT | Performed by: OTOLARYNGOLOGY

## 2021-08-11 NOTE — LETTER
8/11/2021         RE: Keiry Mchugh  [address]  [place] [address]        Dear Colleague,    Thank you for referring your patient, Keiry Mchugh, to the [facility]. Please see a copy of my visit note below.    CHIEF COMPLAINT:  Recheck      HISTORY OF PRESENT ILLNESS    Samantha was seen in follow up for post operative check s/p septoplasty.            REVIEW OF SYSTEMS    Review of Systems: a 10-system review is reviewed at this encounter.  See scanned document.     Contrast dye, Levaquin [levofloxacin], Pcn [penicillins], Rocephin [ceftriaxone], and Zithromax [azithromycin]     PHYSICAL EXAM:        HEAD: Normal appearance and symmetry:  No cutaneous lesions.      EARS:   Auricles normal         NOSE:    Dorsum:   Straight  Septum: midline  Turbinates: reduced nicely (some crusting removed from left anterior turbinate)       ORAL CAVITY/OROPHARYNX:    Lips:  Normal.     NECK:  Trachea:  midline       NEURO:   Alert and Oriented    GAIT AND STATION:  normal     RESPIRATORY:   Symmetry and Respiratory effort    PSYCH:   normal mood and affect    SKIN:  warm and dry         IMPRESSION:   Encounter Diagnosis   Name Primary?     Postoperative state Yes              RECOMMENDATIONS:       You may blow nose gently  Continue ointment as directed for 1 week  You may resume allergy medication  Return visit 1 month      Again, thank you for allowing me to participate in the care of your patient.        Sincerely,        Randy Case MD

## 2021-08-11 NOTE — PROGRESS NOTES
CHIEF COMPLAINT:  Recheck      HISTORY OF PRESENT ILLNESS    Samantha was seen in follow up for post operative check s/p septoplasty.            REVIEW OF SYSTEMS    Review of Systems: a 10-system review is reviewed at this encounter.  See scanned document.     Contrast dye, Levaquin [levofloxacin], Pcn [penicillins], Rocephin [ceftriaxone], and Zithromax [azithromycin]     PHYSICAL EXAM:        HEAD: Normal appearance and symmetry:  No cutaneous lesions.      EARS:   Auricles normal         NOSE:    Dorsum:   Straight  Septum: midline  Turbinates: reduced nicely (some crusting removed from left anterior turbinate)       ORAL CAVITY/OROPHARYNX:    Lips:  Normal.     NECK:  Trachea:  midline       NEURO:   Alert and Oriented    GAIT AND STATION:  normal     RESPIRATORY:   Symmetry and Respiratory effort    PSYCH:   normal mood and affect    SKIN:  warm and dry         IMPRESSION:   Encounter Diagnosis   Name Primary?     Postoperative state Yes              RECOMMENDATIONS:       You may blow nose gently  Continue ointment as directed for 1 week  You may resume allergy medication  Return visit 1 month

## 2021-08-11 NOTE — PATIENT INSTRUCTIONS
You may blow nose gently  Continue ointment as directed for 1 week  You may resume allergy medication  Return visit 1 month

## 2021-08-17 ENCOUNTER — TELEPHONE (OUTPATIENT)
Dept: FAMILY MEDICINE | Facility: CLINIC | Age: 56
End: 2021-08-17

## 2021-08-17 NOTE — TELEPHONE ENCOUNTER
Incoming call from patient wanting to let Patrica know that she will be faxing over a form for Dr. 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 Provided fax # [phone] to patient. She will attn: Dr. Linton/Patrica.    Please be on the look out for the form.

## 2021-08-18 NOTE — TELEPHONE ENCOUNTER
LMTCB. Please inform patient we received the OS Respirator Medical Questionnaire, wondering if Dr. Linton just needs to sign off on the questionnaire or if there is another form that needs to be completed.

## 2021-08-18 NOTE — TELEPHONE ENCOUNTER
Spoke to patient and she states she needs Dr. Linton to Ok for her to wear N95 at work and sign form.

## 2021-08-18 NOTE — TELEPHONE ENCOUNTER
Incoming call from patient wanting Patrica to call her back. Pt declined for msg to be relayed to, but would like Patrica to call her back.

## 2021-08-20 ENCOUNTER — ANCILLARY PROCEDURE (OUTPATIENT)
Dept: GENERAL RADIOLOGY | Facility: CLINIC | Age: 56
End: 2021-08-20
Attending: FAMILY MEDICINE
Payer: COMMERCIAL

## 2021-08-20 ENCOUNTER — OFFICE VISIT (OUTPATIENT)
Dept: FAMILY MEDICINE | Facility: CLINIC | Age: 56
End: 2021-08-20
Payer: COMMERCIAL

## 2021-08-20 VITALS
BODY MASS INDEX: 34.93 KG/M2 | HEART RATE: 82 BPM | DIASTOLIC BLOOD PRESSURE: 86 MMHG | WEIGHT: 223 LBS | RESPIRATION RATE: 20 BRPM | SYSTOLIC BLOOD PRESSURE: 129 MMHG

## 2021-08-20 DIAGNOSIS — R05.9 COUGH: Primary | ICD-10-CM

## 2021-08-20 DIAGNOSIS — R05.9 COUGH: ICD-10-CM

## 2021-08-20 DIAGNOSIS — K21.9 GASTROESOPHAGEAL REFLUX DISEASE WITHOUT ESOPHAGITIS: ICD-10-CM

## 2021-08-20 PROCEDURE — 99213 OFFICE O/P EST LOW 20 MIN: CPT | Performed by: FAMILY MEDICINE

## 2021-08-20 PROCEDURE — 71046 X-RAY EXAM CHEST 2 VIEWS: CPT | Mod: TC | Performed by: RADIOLOGY

## 2021-08-20 RX ORDER — OMEPRAZOLE 40 MG/1
40 CAPSULE, DELAYED RELEASE ORAL 2 TIMES DAILY
Qty: 60 CAPSULE | Refills: 1 | Status: SHIPPED | OUTPATIENT
Start: 2021-08-20 | End: 2022-09-19

## 2021-08-20 RX ORDER — OMEPRAZOLE 40 MG/1
40 CAPSULE, DELAYED RELEASE ORAL DAILY
Qty: 60 CAPSULE | Refills: 1 | Status: SHIPPED | OUTPATIENT
Start: 2021-08-20 | End: 2021-08-20

## 2021-08-20 NOTE — PATIENT INSTRUCTIONS
Avoid caffeine.    INCREASE THE OMEPRAZOLE TO 40 MG TWICE DAILY    If not improving over 2-4 weeks then I would suggest [place] follow up.    Weight loss will help

## 2021-08-20 NOTE — PROGRESS NOTES
DIAGNOSIS:  Encounter Diagnoses   Name Primary?     Cough Yes     Gastroesophageal reflux disease without esophagitis         PLAN:     Avoid caffeine.    INCREASE THE OMEPRAZOLE TO 40 MG TWICE DAILY  If not improving over 2-4 weeks then I would suggest [place] follow up.      Weight loss will help               HPI:  0756  H/o HH.    About 5 weeks ago had a cold and cough.   The cough is getting worse.  By night time her stomach gets distended.  In the middle of night awakes from cough bothering her.  No fever.  No green or yellow phlegm./  Covid vaccines 1-25 and 2-15-21  Presently takes omeprazole 20 mg twice daily and keeps head of bed elevated.  When the acid starts going up her throat then it feels like she will vomit.  Bowels are regular.  Daily soft bms  No BRBPR or melena.   Still does caffeinated water once a day.       CXR:  NO ACTUE FINDINGS. (personally read and reviewed with pt)      Current Outpatient Medications   Medication     Ascorbic Acid (VITAMIN C) 500 MG CAPS     atenolol (TENORMIN) 50 MG tablet     azelastine (ASTELIN) 0.1 % nasal spray     baclofen (LIORESAL) 10 MG tablet     BIOTIN PO     Black Pepper-Turmeric (TURMERIC CURCUMIN) 5-1000 MG CAPS     Cholecalciferol (VITAMIN D3) 50 MCG (2000 UT) CAPS     Coenzyme Q10 (COQ-10) 400 MG CAPS     FLUoxetine 20 MG tablet     HEMP OIL OR EXTRACT OR OTHER CBD CANNABINOID, NOT MEDICAL CANNABIS,     magnesium oxide 200 MG TABS     Melatonin 10 MG TABS tablet     mirabegron (MYRBETRIQ) 50 MG 24 hr tablet     mupirocin (BACTROBAN) 2 % external ointment     omeprazole (PRILOSEC) 40 MG DR capsule     ondansetron (ZOFRAN-ODT) 4 MG ODT tab     ondansetron (ZOFRAN-ODT) 4 MG ODT tab     sodium chloride (OCEAN) 0.65 % nasal spray     SUMAtriptan (IMITREX) 100 MG tablet     No current facility-administered medications for this visit.       Pmh: reviewed  Psh: reviewed  Allergy:  reviewed      EXAM:    /86 (BP Location: Left arm, Patient Position: Sitting,  Cuff Size: Adult Regular)   Pulse 82   Resp 20   Wt 101.2 kg (223 lb)   BMI 34.93 kg/m    GEN:   ALERT, NAD, ORIENTED TIMES THREE  LUNGS: CTA  COR: RRR WITHOUT MURMUR  ABD: SOFT, +BS,  WITHOUT GUARDING.  SLIGHT EPIGASTRIC TX  EXT: WITHOUT EDEMA/SWELLING

## 2021-08-25 ENCOUNTER — TELEPHONE (OUTPATIENT)
Dept: OTOLARYNGOLOGY | Facility: CLINIC | Age: 56
End: 2021-08-25

## 2021-08-25 NOTE — TELEPHONE ENCOUNTER
Patient would like to talk to Dr. Case's nurse please call her by 10:20 today if possible, if not she is still having problems with pallet, feels numb, hard time swallowing, feels like hair in month.  Is there anything she can do to make things better.  Ok to leave a detailed message.

## 2021-08-26 NOTE — TELEPHONE ENCOUNTER
Left message for patient that I will talk to Dr. Case about her symptoms and give her a call back tomorrow when he is in the office.    Georgie Noel RN  [facility]  [phone]

## 2021-08-27 NOTE — TELEPHONE ENCOUNTER
Left message that her symptoms can take up to 6 months to fully resolve and Dr. Case expects them to resolve in 1-2 months.    Georgie Noel RN  [facility]  [phone]

## 2021-08-29 ENCOUNTER — APPOINTMENT (OUTPATIENT)
Dept: CT IMAGING | Facility: HOSPITAL | Age: 56
End: 2021-08-29
Attending: EMERGENCY MEDICINE
Payer: COMMERCIAL

## 2021-08-29 ENCOUNTER — HOSPITAL ENCOUNTER (INPATIENT)
Facility: HOSPITAL | Age: 56
LOS: 1 days | Discharge: HOME | End: 2021-08-31
Attending: EMERGENCY MEDICINE | Admitting: INTERNAL MEDICINE
Payer: COMMERCIAL

## 2021-08-29 ENCOUNTER — NURSE TRIAGE (OUTPATIENT)
Dept: NURSING | Facility: CLINIC | Age: 56
End: 2021-08-29

## 2021-08-29 DIAGNOSIS — K57.32 SIGMOID DIVERTICULITIS: ICD-10-CM

## 2021-08-29 LAB
ALBUMIN SERPL-MCNC: 4.1 G/DL (ref 3.5–5)
ALBUMIN UR-MCNC: NEGATIVE MG/DL
ALP SERPL-CCNC: 122 U/L (ref 45–120)
ALT SERPL W P-5'-P-CCNC: 92 U/L (ref 0–45)
ANION GAP SERPL CALCULATED.3IONS-SCNC: 11 MMOL/L (ref 5–18)
APPEARANCE UR: CLEAR
AST SERPL W P-5'-P-CCNC: 52 U/L (ref 0–40)
BILIRUB SERPL-MCNC: 1.5 MG/DL (ref 0–1)
BILIRUB UR QL STRIP: NEGATIVE
BUN SERPL-MCNC: 11 MG/DL (ref 8–22)
CALCIUM SERPL-MCNC: 9.9 MG/DL (ref 8.5–10.5)
CHLORIDE BLD-SCNC: 104 MMOL/L (ref 98–107)
CO2 SERPL-SCNC: 26 MMOL/L (ref 22–31)
COLOR UR AUTO: COLORLESS
CREAT SERPL-MCNC: 0.88 MG/DL (ref 0.6–1.1)
ERYTHROCYTE [DISTWIDTH] IN BLOOD BY AUTOMATED COUNT: 11.9 % (ref 10–15)
GFR SERPL CREATININE-BSD FRML MDRD: 75 ML/MIN/1.73M2
GLUCOSE BLD-MCNC: 111 MG/DL (ref 70–125)
GLUCOSE UR STRIP-MCNC: NEGATIVE MG/DL
HCT VFR BLD AUTO: 41.2 % (ref 35–47)
HGB BLD-MCNC: 13.9 G/DL (ref 11.7–15.7)
HGB UR QL STRIP: NEGATIVE
HOLD SPECIMEN: NORMAL
KETONES UR STRIP-MCNC: NEGATIVE MG/DL
LACTATE SERPL-SCNC: 0.7 MMOL/L (ref 0.7–2)
LEUKOCYTE ESTERASE UR QL STRIP: NEGATIVE
MCH RBC QN AUTO: 31.4 PG (ref 26.5–33)
MCHC RBC AUTO-ENTMCNC: 33.7 G/DL (ref 31.5–36.5)
MCV RBC AUTO: 93 FL (ref 78–100)
NITRATE UR QL: NEGATIVE
PH UR STRIP: 5.5 [PH] (ref 5–7)
PLATELET # BLD AUTO: 251 10E3/UL (ref 150–450)
POTASSIUM BLD-SCNC: 3.9 MMOL/L (ref 3.5–5)
PROT SERPL-MCNC: 7.8 G/DL (ref 6–8)
RBC # BLD AUTO: 4.43 10E6/UL (ref 3.8–5.2)
RBC URINE: 1 /HPF
SARS-COV-2 RNA RESP QL NAA+PROBE: NEGATIVE
SODIUM SERPL-SCNC: 141 MMOL/L (ref 136–145)
SP GR UR STRIP: >1.05 (ref 1–1.03)
SQUAMOUS EPITHELIAL: 1 /HPF
UROBILINOGEN UR STRIP-MCNC: <2 MG/DL
WBC # BLD AUTO: 7.6 10E3/UL (ref 4–11)
WBC URINE: 1 /HPF

## 2021-08-29 PROCEDURE — 80053 COMPREHEN METABOLIC PANEL: CPT | Performed by: EMERGENCY MEDICINE

## 2021-08-29 PROCEDURE — 99222 1ST HOSP IP/OBS MODERATE 55: CPT | Performed by: SURGERY

## 2021-08-29 PROCEDURE — G0378 HOSPITAL OBSERVATION PER HR: HCPCS

## 2021-08-29 PROCEDURE — 99285 EMERGENCY DEPT VISIT HI MDM: CPT | Mod: 25

## 2021-08-29 PROCEDURE — 250N000013 HC RX MED GY IP 250 OP 250 PS 637: Performed by: INTERNAL MEDICINE

## 2021-08-29 PROCEDURE — 258N000003 HC RX IP 258 OP 636: Performed by: INTERNAL MEDICINE

## 2021-08-29 PROCEDURE — 96365 THER/PROPH/DIAG IV INF INIT: CPT

## 2021-08-29 PROCEDURE — C9803 HOPD COVID-19 SPEC COLLECT: HCPCS

## 2021-08-29 PROCEDURE — 96376 TX/PRO/DX INJ SAME DRUG ADON: CPT

## 2021-08-29 PROCEDURE — 250N000013 HC RX MED GY IP 250 OP 250 PS 637: Performed by: SURGERY

## 2021-08-29 PROCEDURE — 87635 SARS-COV-2 COVID-19 AMP PRB: CPT | Performed by: EMERGENCY MEDICINE

## 2021-08-29 PROCEDURE — 250N000011 HC RX IP 250 OP 636: Performed by: INTERNAL MEDICINE

## 2021-08-29 PROCEDURE — 96372 THER/PROPH/DIAG INJ SC/IM: CPT | Performed by: INTERNAL MEDICINE

## 2021-08-29 PROCEDURE — 99223 1ST HOSP IP/OBS HIGH 75: CPT | Mod: AI | Performed by: INTERNAL MEDICINE

## 2021-08-29 PROCEDURE — 74177 CT ABD & PELVIS W/CONTRAST: CPT

## 2021-08-29 PROCEDURE — 85027 COMPLETE CBC AUTOMATED: CPT | Performed by: EMERGENCY MEDICINE

## 2021-08-29 PROCEDURE — 96361 HYDRATE IV INFUSION ADD-ON: CPT

## 2021-08-29 PROCEDURE — 96367 TX/PROPH/DG ADDL SEQ IV INF: CPT

## 2021-08-29 PROCEDURE — 83605 ASSAY OF LACTIC ACID: CPT | Performed by: EMERGENCY MEDICINE

## 2021-08-29 PROCEDURE — 96375 TX/PRO/DX INJ NEW DRUG ADDON: CPT

## 2021-08-29 PROCEDURE — 250N000011 HC RX IP 250 OP 636: Performed by: EMERGENCY MEDICINE

## 2021-08-29 PROCEDURE — 81001 URINALYSIS AUTO W/SCOPE: CPT | Performed by: EMERGENCY MEDICINE

## 2021-08-29 PROCEDURE — 258N000003 HC RX IP 258 OP 636: Performed by: EMERGENCY MEDICINE

## 2021-08-29 PROCEDURE — 36415 COLL VENOUS BLD VENIPUNCTURE: CPT | Performed by: EMERGENCY MEDICINE

## 2021-08-29 RX ORDER — ACETAMINOPHEN 325 MG/1
650 TABLET ORAL EVERY 6 HOURS PRN
Status: DISCONTINUED | OUTPATIENT
Start: 2021-08-29 | End: 2021-08-31 | Stop reason: HOSPADM

## 2021-08-29 RX ORDER — MEROPENEM 1 G/1
1 INJECTION, POWDER, FOR SOLUTION INTRAVENOUS ONCE
Status: COMPLETED | OUTPATIENT
Start: 2021-08-29 | End: 2021-08-29

## 2021-08-29 RX ORDER — NALOXONE HYDROCHLORIDE 0.4 MG/ML
0.2 INJECTION, SOLUTION INTRAMUSCULAR; INTRAVENOUS; SUBCUTANEOUS
Status: DISCONTINUED | OUTPATIENT
Start: 2021-08-29 | End: 2021-08-31 | Stop reason: HOSPADM

## 2021-08-29 RX ORDER — MIRABEGRON 25 MG/1
50 TABLET, FILM COATED, EXTENDED RELEASE ORAL DAILY
Status: DISCONTINUED | OUTPATIENT
Start: 2021-08-29 | End: 2021-08-31 | Stop reason: HOSPADM

## 2021-08-29 RX ORDER — MAGNESIUM OXIDE 400 MG/1
400 TABLET ORAL DAILY
Status: DISCONTINUED | OUTPATIENT
Start: 2021-08-29 | End: 2021-08-31 | Stop reason: HOSPADM

## 2021-08-29 RX ORDER — MEROPENEM 1 G/1
1 INJECTION, POWDER, FOR SOLUTION INTRAVENOUS EVERY 8 HOURS
Status: DISCONTINUED | OUTPATIENT
Start: 2021-08-29 | End: 2021-08-29

## 2021-08-29 RX ORDER — NALOXONE HYDROCHLORIDE 0.4 MG/ML
0.4 INJECTION, SOLUTION INTRAMUSCULAR; INTRAVENOUS; SUBCUTANEOUS
Status: DISCONTINUED | OUTPATIENT
Start: 2021-08-29 | End: 2021-08-31 | Stop reason: HOSPADM

## 2021-08-29 RX ORDER — LIDOCAINE 40 MG/G
CREAM TOPICAL
Status: DISCONTINUED | OUTPATIENT
Start: 2021-08-29 | End: 2021-08-31 | Stop reason: HOSPADM

## 2021-08-29 RX ORDER — ONDANSETRON 2 MG/ML
4 INJECTION INTRAMUSCULAR; INTRAVENOUS ONCE
Status: DISCONTINUED | OUTPATIENT
Start: 2021-08-29 | End: 2021-08-29

## 2021-08-29 RX ORDER — PANTOPRAZOLE SODIUM 20 MG/1
40 TABLET, DELAYED RELEASE ORAL
Status: DISCONTINUED | OUTPATIENT
Start: 2021-08-29 | End: 2021-08-31 | Stop reason: HOSPADM

## 2021-08-29 RX ORDER — VITAMIN B COMPLEX
50 TABLET ORAL 2 TIMES DAILY
Status: DISCONTINUED | OUTPATIENT
Start: 2021-08-29 | End: 2021-08-31 | Stop reason: HOSPADM

## 2021-08-29 RX ORDER — MORPHINE SULFATE 4 MG/ML
4 INJECTION, SOLUTION INTRAMUSCULAR; INTRAVENOUS ONCE
Status: COMPLETED | OUTPATIENT
Start: 2021-08-29 | End: 2021-08-29

## 2021-08-29 RX ORDER — MORPHINE SULFATE 2 MG/ML
2 INJECTION, SOLUTION INTRAMUSCULAR; INTRAVENOUS
Status: DISCONTINUED | OUTPATIENT
Start: 2021-08-29 | End: 2021-08-31 | Stop reason: HOSPADM

## 2021-08-29 RX ORDER — DIPHENHYDRAMINE HYDROCHLORIDE 50 MG/ML
50 INJECTION INTRAMUSCULAR; INTRAVENOUS ONCE
Status: COMPLETED | OUTPATIENT
Start: 2021-08-29 | End: 2021-08-29

## 2021-08-29 RX ORDER — SODIUM CHLORIDE 9 MG/ML
INJECTION, SOLUTION INTRAVENOUS CONTINUOUS
Status: DISCONTINUED | OUTPATIENT
Start: 2021-08-29 | End: 2021-08-31

## 2021-08-29 RX ORDER — IOPAMIDOL 755 MG/ML
100 INJECTION, SOLUTION INTRAVASCULAR ONCE
Status: COMPLETED | OUTPATIENT
Start: 2021-08-29 | End: 2021-08-29

## 2021-08-29 RX ORDER — SUMATRIPTAN 50 MG/1
50 TABLET, FILM COATED ORAL DAILY PRN
Status: DISCONTINUED | OUTPATIENT
Start: 2021-08-29 | End: 2021-08-31 | Stop reason: HOSPADM

## 2021-08-29 RX ORDER — HYDROCODONE BITARTRATE AND ACETAMINOPHEN 5; 325 MG/1; MG/1
1-2 TABLET ORAL EVERY 6 HOURS PRN
Status: DISCONTINUED | OUTPATIENT
Start: 2021-08-29 | End: 2021-08-31 | Stop reason: HOSPADM

## 2021-08-29 RX ORDER — CIPROFLOXACIN 2 MG/ML
400 INJECTION, SOLUTION INTRAVENOUS EVERY 12 HOURS
Status: DISCONTINUED | OUTPATIENT
Start: 2021-08-29 | End: 2021-08-31

## 2021-08-29 RX ORDER — HEPARIN SODIUM 5000 [USP'U]/.5ML
5000 INJECTION, SOLUTION INTRAVENOUS; SUBCUTANEOUS EVERY 12 HOURS
Status: DISCONTINUED | OUTPATIENT
Start: 2021-08-29 | End: 2021-08-31 | Stop reason: HOSPADM

## 2021-08-29 RX ADMIN — FLUOXETINE 20 MG: 20 CAPSULE ORAL at 17:49

## 2021-08-29 RX ADMIN — MORPHINE SULFATE 4 MG: 4 INJECTION INTRAVENOUS at 10:02

## 2021-08-29 RX ADMIN — METRONIDAZOLE 500 MG: 500 INJECTION, SOLUTION INTRAVENOUS at 12:45

## 2021-08-29 RX ADMIN — Medication 50 MCG: at 20:12

## 2021-08-29 RX ADMIN — IOPAMIDOL 100 ML: 755 INJECTION, SOLUTION INTRAVENOUS at 10:38

## 2021-08-29 RX ADMIN — CIPROFLOXACIN 400 MG: 2 INJECTION, SOLUTION INTRAVENOUS at 17:49

## 2021-08-29 RX ADMIN — SODIUM CHLORIDE: 9 INJECTION, SOLUTION INTRAVENOUS at 11:53

## 2021-08-29 RX ADMIN — SODIUM CHLORIDE: 9 INJECTION, SOLUTION INTRAVENOUS at 16:39

## 2021-08-29 RX ADMIN — MIRABEGRON 50 MG: 25 TABLET, FILM COATED, EXTENDED RELEASE ORAL at 15:55

## 2021-08-29 RX ADMIN — HYDROCODONE BITARTRATE AND ACETAMINOPHEN 2 TABLET: 5; 325 TABLET ORAL at 20:12

## 2021-08-29 RX ADMIN — MORPHINE SULFATE 2 MG: 2 INJECTION, SOLUTION INTRAMUSCULAR; INTRAVENOUS at 15:46

## 2021-08-29 RX ADMIN — MAGNESIUM OXIDE TAB 400 MG (241.3 MG ELEMENTAL MG) 400 MG: 400 (241.3 MG) TAB at 14:58

## 2021-08-29 RX ADMIN — PROCHLORPERAZINE EDISYLATE 5 MG: 5 INJECTION INTRAMUSCULAR; INTRAVENOUS at 21:41

## 2021-08-29 RX ADMIN — PANTOPRAZOLE SODIUM 40 MG: 20 TABLET, DELAYED RELEASE ORAL at 15:53

## 2021-08-29 RX ADMIN — MEROPENEM 1 G: 1 INJECTION, POWDER, FOR SOLUTION INTRAVENOUS at 11:53

## 2021-08-29 RX ADMIN — SUMATRIPTAN SUCCINATE 50 MG: 50 TABLET ORAL at 21:40

## 2021-08-29 RX ADMIN — HYDROCODONE BITARTRATE AND ACETAMINOPHEN 2 TABLET: 5; 325 TABLET ORAL at 12:31

## 2021-08-29 RX ADMIN — SODIUM CHLORIDE 1000 ML: 9 INJECTION, SOLUTION INTRAVENOUS at 09:49

## 2021-08-29 RX ADMIN — DIPHENHYDRAMINE HYDROCHLORIDE 50 MG: 50 INJECTION INTRAMUSCULAR; INTRAVENOUS at 10:15

## 2021-08-29 RX ADMIN — HEPARIN SODIUM 5000 UNITS: 10000 INJECTION, SOLUTION INTRAVENOUS; SUBCUTANEOUS at 15:00

## 2021-08-29 RX ADMIN — PROCHLORPERAZINE EDISYLATE 5 MG: 5 INJECTION INTRAMUSCULAR; INTRAVENOUS at 10:00

## 2021-08-29 ASSESSMENT — ACTIVITIES OF DAILY LIVING (ADL): DEPENDENT_IADLS:: INDEPENDENT

## 2021-08-29 ASSESSMENT — MIFFLIN-ST. JEOR: SCORE: 1607.86

## 2021-08-29 NOTE — PHARMACY-ADMISSION MEDICATION HISTORY
Pharmacy Note - Admission Medication History    Pertinent Provider Information: None     ______________________________________________________________________    Prior To Admission (PTA) med list completed and updated in EMR.       PTA Med List   Medication Sig Note Last Dose     Ascorbic Acid (VITAMIN C) 500 MG CAPS Take 500 mg by mouth 2 times daily   Past Week at Unknown time     atenolol (TENORMIN) 50 MG tablet Take 50 mg by mouth daily as needed (Migraines)   Past Month at Unknown time     baclofen (LIORESAL) 10 MG tablet Take 10 mg by mouth 2 times daily as needed for muscle spasms   Past Month at Unknown time     BIOTIN PO Take 10,000 mcg by mouth daily   Past Week at Unknown time     Black Pepper-Turmeric (TURMERIC CURCUMIN) 5-1000 MG CAPS Take 1 tablet by mouth daily   Past Week at Unknown time     Cholecalciferol (VITAMIN D3) 50 MCG (2000 UT) CAPS Take 2,000 Units by mouth 2 times daily   Past Week at Unknown time     Coenzyme Q10 (COQ-10) 400 MG CAPS Take 1 capsule by mouth daily   Past Week at Unknown time     FLUoxetine 20 MG tablet Take 20 mg by mouth daily  Past Week at Unknown time     HEMP OIL OR EXTRACT OR OTHER CBD CANNABINOID, NOT MEDICAL CANNABIS, Take 1.5 drops by mouth  more than a month     magnesium oxide (MAG-OX) 400 (240 Mg) MG tablet Take 400 mg by mouth daily   Past Week at Unknown time     Melatonin 10 MG TABS tablet Take 10 mg by mouth nightly as needed for sleep   Past Week at Unknown time     mirabegron (MYRBETRIQ) 50 MG 24 hr tablet Take 50 mg by mouth daily  8/29/2021: Patient is using a free sample right now and plans on discontinuing after the sample runs out due to cost Past Week at Unknown time     omeprazole (PRILOSEC) 40 MG DR capsule Take 1 capsule (40 mg) by mouth 2 times daily  Past Week at Unknown time     SUMAtriptan (IMITREX) 100 MG tablet TAKE 1/2 TO 1 TABLET BY MOUTH AT ONSET OF HEADACHE  Past Week at Unknown time       Information source(s): Patient, Family member  and Howard/Laura  Method of interview communication: in-person    Summary of Changes to PTA Med List  New: none  Discontinued: azelastine, hydrocodone/acetaminophen, mupirocin, ondansetron, sodium chloride nasal spray  Changed: melatonin (from 20mg to 10mg)     Patient was asked about OTC/herbal products specifically.  PTA med list reflects this.    In the past week, patient estimated taking medication this percent of the time:  50-90% due to illness.    Allergies were reviewed, assessed, and updated with the patient.      Patient does not use any multi-dose medications prior to admission.    The information provided in this note is only as accurate as the sources available at the time of the update(s).    Thank you for the opportunity to participate in the care of this patient.    Ramona Crabtree  8/29/2021 11:40 AM

## 2021-08-29 NOTE — CONSULTS
"History:  56 year old year old female who I have been consulted by Dr. Marshlal for evaluation of diverticulitis.  The patient presented to the ED with a complaint of bloating and abdominal pain.  This has been going on for the last 5 days.  She feels like her clothing has been getting tight because of the bloating.  The pain is mostly periumbilical and is described as a constant ache with intermittent \"contractions.\"  She has had associated fever, nausea, and an urge to have bowel movements without having any substantial bowel movement.  She has had multiple episodes of diverticulitis in the past.  She thinks at least 10 episodes.  Only once was she hospitalized for diverticulitis.  None of the episodes were complicated.  She underwent partial colectomy in 2019 for her recurrent sigmoid diverticulitis.    Allergies:  Contrast dye, Levaquin [levofloxacin], Ondansetron, Pcn [penicillins], Rocephin [ceftriaxone], and Zithromax [azithromycin]    Past medical history:  Anxiety/depression  GERD  Obesity    Past surgical history:  Septoplasty  Laparoscopic sigmoid colectomy with liver biopsy  Nephrectomy (donated)  Robotic ventral hernia repair  Laparoscopic sling for urinary incontinence  Hysterectomy  Appendectomy  Cholecystectomy  Bunionectomy    Current medications:     Ascorbic Acid (VITAMIN C) 500 MG CAPS, Take 500 mg by mouth 2 times daily , Disp: , Rfl:      atenolol (TENORMIN) 50 MG tablet, Take 50 mg by mouth daily as needed (Migraines) , Disp: , Rfl:      baclofen (LIORESAL) 10 MG tablet, Take 10 mg by mouth 2 times daily as needed for muscle spasms , Disp: , Rfl:      BIOTIN PO, Take 10,000 mcg by mouth daily , Disp: , Rfl:      Black Pepper-Turmeric (TURMERIC CURCUMIN) 5-1000 MG CAPS, Take 1 tablet by mouth daily , Disp: , Rfl:      Cholecalciferol (VITAMIN D3) 50 MCG (2000 UT) CAPS, Take 2,000 Units by mouth 2 times daily , Disp: , Rfl:      Coenzyme Q10 (COQ-10) 400 MG CAPS, Take 1 capsule by mouth daily , " "Disp: , Rfl:      FLUoxetine 20 MG tablet, Take 20 mg by mouth daily, Disp: , Rfl:      HEMP OIL OR EXTRACT OR OTHER CBD CANNABINOID, NOT MEDICAL CANNABIS,, Take 1.5 drops by mouth, Disp: , Rfl:      magnesium oxide (MAG-OX) 400 (240 Mg) MG tablet, Take 400 mg by mouth daily , Disp: , Rfl:      Melatonin 10 MG TABS tablet, Take 10 mg by mouth nightly as needed for sleep , Disp: , Rfl:      mirabegron (MYRBETRIQ) 50 MG 24 hr tablet, Take 50 mg by mouth daily , Disp: , Rfl:      omeprazole (PRILOSEC) 40 MG DR capsule, Take 1 capsule (40 mg) by mouth 2 times daily, Disp: 60 capsule, Rfl: 1     SUMAtriptan (IMITREX) 100 MG tablet, TAKE 1/2 TO 1 TABLET BY MOUTH AT ONSET OF HEADACHE, Disp: , Rfl:     Family history:  Mother history of breast cancer, Hashimoto thyroiditis, and postoperative PE  No known family history of anesthesia problems    Social history:  Reports that she quit smoking about 22 years ago. She smoked 0.00 packs per day. She has never used smokeless tobacco. She reports that she does not drink alcohol or use drugs.    Review of Systems:  General: No complaints or constitutional symptoms  Skin: No complaints or symptoms   Hematologic/Lymphatic: No symptoms or complaints  Psychiatric: No symptoms or complaints  Endocrine: No excessive fatigue, no hypermetabolic symptoms reported  Respiratory: No cough, shortness of breath, or wheezing  Cardiovascular: No chest pain or dyspnea on exertion  Gastrointestinal: As per HPI  Musculoskeletal: No recent injuries reported  Neurological: No focal neurologic defects reported.      Exam:  /63   Pulse 88   Temp 98  F (36.7  C) (Temporal)   Resp 18   Ht 1.702 m (5' 7\")   Wt 97.5 kg (215 lb)   SpO2 98%   Breastfeeding No   BMI 33.67 kg/m    Body mass index is 33.67 kg/m .  General: Alert, cooperative, appears stated age   Skin: Skin color, texture, turgor normal, no rashes or lesions   Lymphatic: No obvious adenopathy, no swelling   Eyes: No scleral icterus, " pupils equal  HENT: No traumatic injury to the head or face, no gross abnormalities  Lungs: Normal respiratory effort, breath sounds equal bilaterally  Heart: Regular rate and rhythm  Abdomen: Obese, soft, nondistended, tender to palpation in left lower quadrant without guarding or rebound  Musculoskeletal: No obvious swelling  Neurologic: Grossly intact    Labs:  Lab Results   Component Value Date    WBC 7.6 08/29/2021    HGB 13.9 08/29/2021    HCT 41.2 08/29/2021    MCV 93 08/29/2021     08/29/2021     Recent Labs   Lab 08/29/21  0915      CO2 26   BUN 11     Lab Results   Component Value Date    ALT 92 (H) 08/29/2021    AST 52 (H) 08/29/2021    ALKPHOS 122 (H) 08/29/2021       Imaging:   Pertinent images personally reviewed by myself and discussed with the patient.  Radiology reports:  EXAM: CT ABDOMEN PELVIS W CONTRAST  LOCATION: [facility]  DATE/TIME: 8/29/2021 10:29 AM     INDICATION: Abdominal abscess/infection suspected  COMPARISON: 09/04/2019  TECHNIQUE: CT scan of the abdomen and pelvis was performed following injection of IV contrast. Multiplanar reformats were obtained. Dose reduction techniques were used.  CONTRAST: 100ml isovue 370     FINDINGS:   LOWER CHEST: Normal.     HEPATOBILIARY: Mild diffuse hepatic steatosis. Gallbladder removed.     PANCREAS: Normal.     SPLEEN: Normal.     ADRENAL GLANDS: Normal adrenal glands. Small diverticulum off the fundus of the stomach lies adjacent to the left adrenal gland of no significance.     KIDNEYS/BLADDER: Post left nephrectomy. No significant findings in the right kidney.     BOWEL: Previous partial sigmoid colon resection. Moderate acute diverticulitis above the level of the anastomosis. No abscesses. No free air.     LYMPH NODES: Normal.     VASCULATURE: Unremarkable.     PELVIC ORGANS: Postop change.     MUSCULOSKELETAL: Normal.                                                                      IMPRESSION:   1.   Moderately severe acute diverticulitis upper sigmoid colon without complication.      Assessment:   Keiry Mchugh is a 56 year old female with acute abdominal pain secondary to acute uncomplicated diverticulitis    - history of recurrent diverticulitis requiring partial colectomy.                  - complicated by wound infection and incisional hernia requiring hernia repair.    Plan:  CT reviewed by myself and discussed with the patient.  Reviewed pathophysiology of diverticulitis and management for her current uncomplicated episode of diverticulitis which starts with bowel rest and antibiotics.  Diet can be advanced as tolerated.  Hospital length of stay will be dependent upon pain control.    Amberly Roberts DO  General Surgeon  [facility]  Surgery Clinic - [address]  [place] [address]  Office: [phone]  Employed by - [facility]

## 2021-08-29 NOTE — ED PROVIDER NOTES
EMERGENCY DEPARTMENT ENCOUNTER      NAME: Keiry Mchugh  AGE: 56 year old female  YOB: 1965  MRN: [id number]  EVALUATION DATE & TIME: 8/29/2021  8:41 AM    PCP: Porfirio Linton    ED PROVIDER: Dre Marshall M.D.      Chief Complaint   Patient presents with     Fever     Abdominal Pain         FINAL IMPRESSION:  Acute sigmoid diverticulitis      ED COURSE & MEDICAL DECISION MAKING:    Pertinent Labs & Imaging studies reviewed. (See chart for details)  56 year old female presents to the Emergency Department for evaluation of abdominal pain and fevers.  Patient reports change in her stooling with decreased output over the last week to 5 days.  With this she has had gradual worsening abdominal pain and distention.  No stool output now for 2 days.  Patient with prior hysterectomy, cholecystectomy, appendectomy, large bowel resection and hernia repair.  Patient also reports fevers of 102 onset earlier today.  She does report slight urinary frequency but no obvious dysuria.  Some nausea without obvious vomiting.  On exam she is a moderately obese female in moderate distress.  Heart and lungs unremarkable.  Abdomen distended, absent bowel sounds with marked central tenderness and moderate peripheral tenderness.  Primary concern is of small bowel obstruction.  She does not have any obvious rebound or rigidity to suggest peritonitis.  We will proceed with laboratory evaluation and CT imaging.  Urine analysis also been obtained.  Patient is vaccinated for Covid and has had testing within the last few weeks which has been negative.  No obvious exposures.  Patient has had a dry cough over the last month which has been improving with increased dosing Prilosec.  Patient be treated symptomatically with intravenous morphine, normal saline bolus.  Zofran for nausea vomiting.    9:16 AM I met with the patient for the initial interview and physical examination. Discussed plan for treatment and workup in the ED.    9:40  AM.  Patient reports headaches with Zofran.  Patient given intravenous Compazine for nausea.  At the conclusion of the encounter I discussed the results of all of the tests and the disposition. The questions were answered and return precautions provided. The patient or family acknowledged understanding and was agreeable with the care plan.   10:04 AM.  Electrolytes unremarkable.  Alkaline phosphatase minimally elevated.  Transaminases minimally elevated with total bilirubin 1.5.  Possibility of retained stone.  Lactic acid normal.  CBC unremarkable.  Imaging pending.  10:08 AM.  Patient reports having itching with intravenous contrast.  Typically pretreated with Benadryl with good effect.  Benadryl 50 mg intravenously ordered  10:51 AM.  Preliminary read of CT imaging without evidence of obstructive process.  Patient with likely sigmoid diverticulitis.  Awaiting definitive read.  11:04 AM.  Patient with sigmoid diverticulitis on imaging.  Patient with multiple drug allergies.  Discussed with the pharmacist.  We will proceed with intravenous meropenem and Flagyl.  Need for hospitalization discussed with patient and her daughter.  Diverticulitis also explained at length.  Call placed to the hospitalist.  Surgery will also be consulted given her prior need for resection.  11:15 AM Spoke to Dr. Roberts with [facility] regarding the patient's case.   11:16 AM I spoke with Dr. White with the hospitalist service who agrees to admit the patient.      PPE: Provider wore gloves, N95 mask, eye protection, face shield, surgical cap, and paper mask.     MEDICATIONS GIVEN IN THE EMERGENCY:  Medications - No data to display    NEW PRESCRIPTIONS STARTED AT TODAY'S ER VISIT  New Prescriptions    No medications on file          =================================================================    HPI    Patient information was obtained from: Patient    Use of Intrepreter: N/A         Keiry Mchugh is a 56 year old  female with a pertient medical history of GERD, chronic kidney disease, HTN, diverticulitis, celiac disease, appendectomy, cholecystectomy, and hysterectomy who presents to the ED for evaluation of abdominal pain, fevers and decreased stooling    Per chart review, patient was seen at [facility] on 8/20/21 for evaluation of a cough and abdominal pain. She reported at this time that 5 weeks ago she had a cold and a cough with the cough worsening since. Patient also noted that at night her stomach gets distended. She is Covid vaccinated. Chest x-ray with no acute findings. Plan to increase omeprazole to 40 mg twice daily and if no improvement over 2-4 weeks, follow up with [facility]. Patient advised to avoid caffeine and that weight loss may help.     Patient presents stating that she hasn't felt well for the last week. She has had slowly increased abdominal aching with decreased stool. Patient has not had a bowel movement for 3 days and has had increased abdominal distension. She has also had no flatus since yesterday. Patient has a history of multiple abdominal surgeries with the most recent one being 2 years ago.     Additionally patient reports increased urinary frequency along with nausea. Denies vomiting. Patient has also had a fever since yesterday. She notes a slight dry cough over the last month which improved after increasing Prilosec.      REVIEW OF SYSTEMS   Constitutional: Endorses fever. Denies chills  Respiratory: Endorses cough (past 1 month, improving after increased Prilosec). Denies increased work of breathing  Cardiovascular:  Denies chest pain, palpitations  GI: Endorses abdominal pain, nausea, reduced flatus, and decreased bowel movements. Denies vomiting.  : Endorses decreased urinary frequency.  Musculoskeletal:  Denies any new muscle/joint swelling  Skin:  Denies rash   Neurologic:  Denies focal weakness  All systems negative except as marked.     PAST MEDICAL  HISTORY:  Past Medical History:   Diagnosis Date     Anemia      Anxiety      Celiac disease      Chronic kidney disease     donated 1 kidney     Cough      Depression     Seasonal     Diverticulitis      Enlarged LV     wall     GERD (gastroesophageal reflux disease)      Hepatitis      Hiatal hernia      Hypertension      Migraines      HARDIN (nonalcoholic steatohepatitis)      Peripheral neuropathy      PONV (postoperative nausea and vomiting)      Ventral hernia        PAST SURGICAL HISTORY:  Past Surgical History:   Procedure Laterality Date     APPENDECTOMY       CHOLECYSTECTOMY       COLON SURGERY       COLONOSCOPY N/A 3/18/2019    Procedure: COLONOSCOPY;  Surgeon: Davis Mosqueda MD;  Location: [facility];  Service: General     CYSTOSCOPY N/A 7/16/2015    Procedure: CYSTOSCOPY;  Surgeon: Nate Horta MD;  Location: [facility];  Service:      HC CORRECT BUNION,METATARSAL OSTEOTOMY      Description: Bunion Correction With Metatarsal Osteotomy Herman Procedure;  Proc Date: 07/16/2010;     HYSTERECTOMY       HYSTERECTOMY, PAP NO LONGER INDICATED  02/2009     NEPHRECTOMY LIVING DONOR Left APRIL 2012     LA LAP,BILIARY TRACT,UNLISTED N/A 3/19/2019    Procedure: BIOPSY, LIVER, LAPAROSCOPIC;  Surgeon: Davis Mosqueda MD;  Location: [facility];  Service: General     LA LAP,SLING OPERATION      Description: Laparoscopic Sling Operation For Stress Incontinence;  Recorded: 02/17/2009;     LA LAP,SURG,COLECTOMY, PARTIAL, W/ANAST N/A 3/19/2019    Procedure: LAPAROSCOPIC SIGMOID COLON RESECTION;  Surgeon: Davis Mosqueda MD;  Location: [facility];  Service: General     SEPTOPLASTY, TURBINOPLASTY, COMBINED N/A 8/3/2021    Procedure: SEPTOPLASTY, NOSE, WITH TURBINOPLASTY RADIOFREQUENCY BALLON ASSISTED, CRYOBLATION OF NASAL TISSUE;  Surgeon: Randy Case MD;  Location: [facility]     SIGMOIDOSCOPY FLEXIBLE N/A 3/19/2019    Procedure: FLEXIBLE SIGMOIDOSCOPY;  Surgeon:  Davis Mosqueda MD;  Location: [facility];  Service: General         CURRENT MEDICATIONS:    No current facility-administered medications for this encounter.    Current Outpatient Medications:      Ascorbic Acid (VITAMIN C) 500 MG CAPS, , Disp: , Rfl:      atenolol (TENORMIN) 50 MG tablet, as needed, Disp: , Rfl:      azelastine (ASTELIN) 0.1 % nasal spray, , Disp: , Rfl:      baclofen (LIORESAL) 10 MG tablet, , Disp: , Rfl:      BIOTIN PO, Take 10,000 mcg by mouth, Disp: , Rfl:      Black Pepper-Turmeric (TURMERIC CURCUMIN) 5-1000 MG CAPS, Take 2,000 mg by mouth, Disp: , Rfl:      Cholecalciferol (VITAMIN D3) 50 MCG (2000 UT) CAPS, 2,000 Units 2 times daily, Disp: , Rfl:      Coenzyme Q10 (COQ-10) 400 MG CAPS, , Disp: , Rfl:      FLUoxetine 20 MG tablet, Take 20 mg by mouth daily, Disp: , Rfl:      HEMP OIL OR EXTRACT OR OTHER CBD CANNABINOID, NOT MEDICAL CANNABIS,, Take 1.5 drops by mouth, Disp: , Rfl:      magnesium oxide 200 MG TABS, Take 400 mg by mouth, Disp: , Rfl:      Melatonin 10 MG TABS tablet, Take 20 mg by mouth nightly as needed for sleep, Disp: , Rfl:      mirabegron (MYRBETRIQ) 50 MG 24 hr tablet, every 24 hours, Disp: , Rfl:      mupirocin (BACTROBAN) 2 % external ointment, Apply a pea sized amount to inside of each nostril 3x daily with Qtip for 10 days, Disp: 30 g, Rfl: 0     omeprazole (PRILOSEC) 40 MG DR capsule, Take 1 capsule (40 mg) by mouth 2 times daily, Disp: 60 capsule, Rfl: 1     ondansetron (ZOFRAN-ODT) 4 MG ODT tab, Take 1-2 tablets (4-8 mg) by mouth every 8 hours as needed for nausea, Disp: 4 tablet, Rfl: 0     ondansetron (ZOFRAN-ODT) 4 MG ODT tab, ondansetron 4 mg disintegrating tablet, Disp: , Rfl:      sodium chloride (OCEAN) 0.65 % nasal spray, 2 spray each nostril 4x daily for 10 days, Disp: 30 mL, Rfl: 1     SUMAtriptan (IMITREX) 100 MG tablet, TAKE 1/2 TO 1 TABLET BY MOUTH AT ONSET OF HEADACHE, Disp: , Rfl:     ALLERGIES:  Allergies   Allergen Reactions     Contrast  Dye      Levaquin [Levofloxacin]      Pcn [Penicillins]      Rocephin [Ceftriaxone]      Zithromax [Azithromycin]        FAMILY HISTORY:  Family History   Problem Relation Age of Onset     Hashimoto's thyroiditis Mother      Breast Cancer Mother 50.00     Graves' disease Sister      Hypothyroidism Maternal Aunt      Breast Cancer Maternal Aunt 50.00     Hashimoto's thyroiditis Maternal Grandfather      Hypothyroidism Sister      Hypothyroidism Maternal Aunt      Hashimoto's thyroiditis Maternal Aunt      Anesthesia Reaction No family hx of        SOCIAL HISTORY:   Social History     Socioeconomic History     Marital status: Single     Spouse name: None     Number of children: 2     Years of education: None     Highest education level: None   Occupational History     None   Tobacco Use     Smoking status: Former Smoker     Packs/day: 0.00     Quit date: 1999     Years since quittin.6     Smokeless tobacco: Never Used   Substance and Sexual Activity     Alcohol use: No     Comment: rare     Drug use: Not Currently     Sexual activity: Not Currently     Partners: Male   Other Topics Concern     None   Social History Narrative     None     Social Determinants of Health     Financial Resource Strain:      Difficulty of Paying Living Expenses:    Food Insecurity:      Worried About Running Out of Food in the Last Year:      Ran Out of Food in the Last Year:    Transportation Needs:      Lack of Transportation (Medical):      Lack of Transportation (Non-Medical):    Physical Activity:      Days of Exercise per Week:      Minutes of Exercise per Session:    Stress:      Feeling of Stress :    Social Connections:      Frequency of Communication with Friends and Family:      Frequency of Social Gatherings with Friends and Family:      Attends Shinto Services:      Active Member of Clubs or Organizations:      Attends Club or Organization Meetings:      Marital Status:    Intimate Partner Violence:      Fear of  "Current or Ex-Partner:      Emotionally Abused:      Physically Abused:      Sexually Abused:        VITALS:  Patient Vitals for the past 24 hrs:   BP Temp Temp src Pulse Resp SpO2 Height Weight   08/29/21 0839 (!) 155/88 98  F (36.7  C) Temporal 104 18 97 % 1.702 m (5' 7\") 97.5 kg (215 lb)        PHYSICAL EXAM    Constitutional:  Awake, alert, in no apparent distress  HENT:  Normocephalic, Atraumatic. Bilateral external ears normal. Oropharynx moist. Nose normal. Neck- Normal range of motion with no guarding, No midline cervical tenderness, Supple, No stridor.   Eyes:  PERRL, EOMI with no signs of entrapment, Conjunctiva normal, No discharge.   Respiratory:  Normal breath sounds, No respiratory distress, No wheezing.    Cardiovascular:  Normal heart rate, Normal rhythm, No appreciable rubs or gallops.   GI: Abdomen slightly distended. Absent bowel sounds. Diffusely tender, but maximal centrally. Soft, No palpable masses  Musculoskeletal:  Intact distal pulses, No edema. Good range of motion in all major joints. No tenderness to palpation or major deformities noted.  Integument:  Warm, Dry, No erythema, No rash.   Neurologic:  Alert & oriented, Normal motor function, Normal sensory function, No focal deficits noted.   Psychiatric:  Affect normal, Judgment normal, Mood normal.     LAB:  All pertinent labs reviewed and interpreted.  Results for orders placed or performed during the hospital encounter of 08/29/21   Abd/pelvis CT,  IV  contrast only TRAUMA / AAA     Status: None    Narrative    EXAM: CT ABDOMEN PELVIS W CONTRAST  LOCATION: [facility]  DATE/TIME: 8/29/2021 10:29 AM    INDICATION: Abdominal abscess/infection suspected  COMPARISON: 09/04/2019  TECHNIQUE: CT scan of the abdomen and pelvis was performed following injection of IV contrast. Multiplanar reformats were obtained. Dose reduction techniques were used.  CONTRAST: 100ml isovue 370    FINDINGS:   LOWER CHEST: " Normal.    HEPATOBILIARY: Mild diffuse hepatic steatosis. Gallbladder removed.    PANCREAS: Normal.    SPLEEN: Normal.    ADRENAL GLANDS: Normal adrenal glands. Small diverticulum off the fundus of the stomach lies adjacent to the left adrenal gland of no significance.    KIDNEYS/BLADDER: Post left nephrectomy. No significant findings in the right kidney.    BOWEL: Previous partial sigmoid colon resection. Moderate acute diverticulitis above the level of the anastomosis. No abscesses. No free air.    LYMPH NODES: Normal.    VASCULATURE: Unremarkable.    PELVIC ORGANS: Postop change.    MUSCULOSKELETAL: Normal.      Impression    IMPRESSION:   1.  Moderately severe acute diverticulitis upper sigmoid colon without complication.   Extra Red Top Tube     Status: None   Result Value Ref Range    Hold Specimen JIC    Extra Green Top (Lithium Heparin) Tube     Status: None   Result Value Ref Range    Hold Specimen JIC    Extra Purple Top Tube     Status: None   Result Value Ref Range    Hold Specimen JIC    Comprehensive metabolic panel     Status: Abnormal   Result Value Ref Range    Sodium 141 136 - 145 mmol/L    Potassium 3.9 3.5 - 5.0 mmol/L    Chloride 104 98 - 107 mmol/L    Carbon Dioxide (CO2) 26 22 - 31 mmol/L    Anion Gap 11 5 - 18 mmol/L    Urea Nitrogen 11 8 - 22 mg/dL    Creatinine 0.88 0.60 - 1.10 mg/dL    Calcium 9.9 8.5 - 10.5 mg/dL    Glucose 111 70 - 125 mg/dL    Alkaline Phosphatase 122 (H) 45 - 120 U/L    AST 52 (H) 0 - 40 U/L    ALT 92 (H) 0 - 45 U/L    Protein Total 7.8 6.0 - 8.0 g/dL    Albumin 4.1 3.5 - 5.0 g/dL    Bilirubin Total 1.5 (H) 0.0 - 1.0 mg/dL    GFR Estimate 75 >60 mL/min/1.73m2   Lactic acid whole blood     Status: Normal   Result Value Ref Range    Lactic Acid 0.7 0.7 - 2.0 mmol/L   CBC (+ platelets, no diff)     Status: Normal   Result Value Ref Range    WBC Count 7.6 4.0 - 11.0 10e3/uL    RBC Count 4.43 3.80 - 5.20 10e6/uL    Hemoglobin 13.9 11.7 - 15.7 g/dL    Hematocrit 41.2 35.0 -  47.0 %    MCV 93 78 - 100 fL    MCH 31.4 26.5 - 33.0 pg    MCHC 33.7 31.5 - 36.5 g/dL    RDW 11.9 10.0 - 15.0 %    Platelet Count 251 150 - 450 10e3/uL   [facility]ovray Draw     Status: None    Narrative    The following orders were created for panel order [facility] Draw.  Procedure                               Abnormality         Status                     ---------                               -----------         ------                     Extra Red Top Tube[[id number]]                               Final result               Extra Green Top (Lithium...[[id number]]                      Final result               Extra Purple Top Tube[[id number]]                            Final result                 Please view results for these tests on the individual orders.     I, Nataly Sommer, am serving as a scribe to document services personally performed by Dre Marshall MD, based on my observation and the provider's statements to me. I, Dre Marshall MD attest that Nataly Sommer is acting in a scribe capacity, has observed my performance of the services and has documented them in accordance with my direction.    Dre Marshall M.D.  Emergency Medicine  [facility] EMERGENCY DEPARTMENT       Dre Marshall MD  08/29/21 112

## 2021-08-29 NOTE — TELEPHONE ENCOUNTER
"Triage call.    Patient calling.    Reporting history of colon resection in 2019 along with hernia repair with mesh.    Stating for past 3 days stomach has become more distended. Stating she has not been able to have a bowel movement in past 3 days. Reporting she feels \"dehydrated.\" Nausea. Decreased fluid intake, voiding smaller amounts of urine. Rating pain level as \"severe.\"   With sharp intermittent pain ranging to \"10\" at times.  Temp 101-102 Tympanic in past 2 days.     Disposition Emergency Room now. Patient agrees to have her daughter drive her to [facility] ED.    Racquel Renteria RN  [facility]    COVID 19 Nurse Triage Plan/Patient Instructions    Please be aware that novel coronavirus (COVID-19) may be circulating in the community. If you develop symptoms such as fever, cough, or SOB or if you have concerns about the presence of another infection including coronavirus (COVID-19), please contact your health care provider or visit [email or web address].     Disposition/Instructions    ED Visit recommended. Follow protocol based instructions.     Bring Your Own Device:  Please also bring your smart device(s) (smart phones, tablets, laptops) and their charging cables for your personal use and to communicate with your care team during your visit.    Thank you for taking steps to prevent the spread of this virus.  o Limit your contact with others.  o Wear a simple mask to cover your cough.  o Wash your hands well and often.    Resources    [facility]: About COVID-19: [email or web address]    CDC: What to Do If You're Sick: www.cdc.gov/coronavirus/2019-ncov/about/steps-when-sick.html    CDC: Ending Home Isolation: www.cdc.gov/coronavirus/2019-ncov/hcp/disposition-in-home-patients.html     CDC: Caring for Someone: www.cdc.gov/coronavirus/2019-ncov/if-you-are-sick/care-for-someone.html     [facility]: Interim Guidance for Hospital Discharge to Home: " "www.health.[email or web address].[place]/diseases/coronavirus/hcp/hospdischarge.pdf    [facility] clinical trials (COVID-19 research studies): [email or web address]     Below are the COVID-19 hotlines at the [place] Department of [facility] ([facility]). Interpreters are available.   o For health questions: Call 134-564-9414 or 1-106.572.8686 (7 a.m. to 7 p.m.)  o For questions about schools and childcare: Call 790-617-9928 or 1-214.567.9292 (7 a.m. to 7 p.m.)                       Reason for Disposition    [1] SEVERE pain (e.g., excruciating) AND [2] present > 1 hour    Additional Information    Negative: Shock suspected (e.g., cold/pale/clammy skin, too weak to stand, low BP, rapid pulse)    Negative: Difficult to awaken or acting confused (e.g., disoriented, slurred speech)    Negative: Passed out (i.e., lost consciousness, collapsed and was not responding)    Negative: Sounds like a life-threatening emergency to the triager    Negative: Chest pain    Negative: Pain is mainly in upper abdomen  (if needed ask: \"is it mainly above the belly button?\")    Negative: Followed an abdomen (stomach) injury    Negative: [1] Abdominal pain AND [2] pregnant < 20 weeks    Negative: [1] Abdominal pain AND [2] pregnant > 20 weeks    Negative: [1] Abdominal pain AND [2] postpartum (from 0 to 6 weeks after delivery)    Protocols used: ABDOMINAL PAIN - FEMALE-A-AH      "

## 2021-08-29 NOTE — H&P
ADMISSION HISTORY & PHYSICAL    CODE STATUS:  Full code  Porfirio Linton, [phone]    Patient YOB: 1965  Admit date: 8/29/2021  Date of Service: 8/29/2021    ASSESSMENT AND PLAN:  56 year old female presenting with:    Abdominal pain, distention and fever  Acute sigmoid diverticulitis  - Patient presenting with a wk of abdominal discomfort, distention and fever, found to have acute sigmoid diverticulitis  - Keep NPO. IVF. IV antibiotics. IV pain meds for now  - Surgical service consulted from ED given patient's prior surgical history  - Patient received IV meropenem and flagyl in ED. Allergy list reviewed. Patient doesn't believe she is allergic to levaquin. Will try IV cipro and flagyl here.    Abnormal LFTs:  -Elevated transaminases, alk phos and total bili noted.   -CT showed no acute hepatobiliary issues.   -This is likely due to hepatosteatosis, as evident on CT  - Monitor. Viral hepatitis panel was negative on 3/13/20    Anxiety  -Cont home meds    GERD:  -Cont home meds    Disposition:  -Anticipated Length of Stay in midnights and medical necessity (including a midnight in the Emergency Department after triage if applicable): >2      CHIEF COMPLAINT:  Abdominal pain, fever    HISTORY OF PRESENTING ILLNESS:  Patient is a 56 year old female with PMH significant for GERD, anxiety, multiple abdominal surgeries- appendectomy, cholecystectomy, hysterectomy who presented to our ED for evaluation of 1 wk of abdominal discomfort.     Patient reports her symptoms started around last week. Started with abdominal pain and distention. Patient reports no BM for the last 3 days. No gas since yesterday. Patient reports having a fever or 102F at home prior or coming to ED. Admits having poor appetite and nausea, but no vomiting. Denies any urinary symptoms.     PMH/PSH:  Patient Active Problem List   Diagnosis     Chronic insomnia     Suspected sleep apnea     HARDIN (nonalcoholic steatohepatitis)     Sigmoid  diverticulitis       Past Medical History:   Diagnosis Date     Anemia      Anxiety      Celiac disease      Chronic kidney disease     donated 1 kidney     Cough      Depression     Seasonal     Diverticulitis      Enlarged LV     wall     GERD (gastroesophageal reflux disease)      Hepatitis      Hiatal hernia      Hypertension      Migraines      HARDIN (nonalcoholic steatohepatitis)      Peripheral neuropathy      PONV (postoperative nausea and vomiting)      Ventral hernia         Past Surgical History:   Procedure Laterality Date     APPENDECTOMY       CHOLECYSTECTOMY       COLON SURGERY       COLONOSCOPY N/A 3/18/2019    Procedure: COLONOSCOPY;  Surgeon: Davis Mosqueda MD;  Location: [facility];  Service: General     CYSTOSCOPY N/A 7/16/2015    Procedure: CYSTOSCOPY;  Surgeon: Nate Horta MD;  Location: [facility];  Service:      HC CORRECT BUNION,METATARSAL OSTEOTOMY      Description: Bunion Correction With Metatarsal Osteotomy Herman Procedure;  Proc Date: 07/16/2010;     HYSTERECTOMY       HYSTERECTOMY, PAP NO LONGER INDICATED  02/2009     NEPHRECTOMY LIVING DONOR Left APRIL 2012     [facility] LAP,BILIARY TRACT,UNLISTED N/A 3/19/2019    Procedure: BIOPSY, LIVER, LAPAROSCOPIC;  Surgeon: Davis Mosqueda MD;  Location: [facility];  Service: General     VA LAP,SLING OPERATION      Description: Laparoscopic Sling Operation For Stress Incontinence;  Recorded: 02/17/2009;     [facility] LAP,SURG,COLECTOMY, PARTIAL, W/ANAST N/A 3/19/2019    Procedure: LAPAROSCOPIC SIGMOID COLON RESECTION;  Surgeon: Davis Mosqueda MD;  Location: [facility];  Service: General     SEPTOPLASTY, TURBINOPLASTY, COMBINED N/A 8/3/2021    Procedure: SEPTOPLASTY, NOSE, WITH TURBINOPLASTY RADIOFREQUENCY BALLON ASSISTED, CRYOBLATION OF NASAL TISSUE;  Surgeon: Randy Case MD;  Location: [facility]     SIGMOIDOSCOPY FLEXIBLE N/A 3/19/2019    Procedure: FLEXIBLE SIGMOIDOSCOPY;  Surgeon: Jaylin  "Davis ROJAS MD;  [facility];  Service: General          ALLERGIES:  Allergies   Allergen Reactions     Contrast Dye      Levaquin [Levofloxacin] Hives     Patient states she got purple spots all over      Ondansetron Headache     Pcn [Penicillins] Hives     As a child     Rocephin [Ceftriaxone] Hives     Patient states this started immediately     Zithromax [Azithromycin] Tinnitus     \"ear ringing and hearing loss\"       MEDICATIONS:  Reviewed.  Current Facility-Administered Medications   Medication     acetaminophen (TYLENOL) tablet 650 mg     HYDROcodone-acetaminophen (NORCO) 5-325 MG per tablet 1-2 tablet     metroNIDAZOLE (FLAGYL) infusion 500 mg     morphine (PF) injection 2 mg     sodium chloride 0.9% infusion     Current Outpatient Medications   Medication     Ascorbic Acid (VITAMIN C) 500 MG CAPS     atenolol (TENORMIN) 50 MG tablet     baclofen (LIORESAL) 10 MG tablet     BIOTIN PO     Black Pepper-Turmeric (TURMERIC CURCUMIN) 5-1000 MG CAPS     Cholecalciferol (VITAMIN D3) 50 MCG (2000 UT) CAPS     Coenzyme Q10 (COQ-10) 400 MG CAPS     FLUoxetine 20 MG tablet     HEMP OIL OR EXTRACT OR OTHER CBD CANNABINOID, NOT MEDICAL CANNABIS,     magnesium oxide (MAG-OX) 400 (240 Mg) MG tablet     Melatonin 10 MG TABS tablet     mirabegron (MYRBETRIQ) 50 MG 24 hr tablet     omeprazole (PRILOSEC) 40 MG DR capsule     SUMAtriptan (IMITREX) 100 MG tablet       Current Facility-Administered Medications:      acetaminophen (TYLENOL) tablet 650 mg, 650 mg, Oral, Q6H PRN, Amberly Roberts DO     HYDROcodone-acetaminophen (NORCO) 5-325 MG per tablet 1-2 tablet, 1-2 tablet, Oral, Q6H PRN, Amberly Roberts DO     metroNIDAZOLE (FLAGYL) infusion 500 mg, 500 mg, Intravenous, Once, Dre Marshall MD     morphine (PF) injection 2 mg, 2 mg, Intravenous, Q3H PRN, Amberly Roberts DO     [COMPLETED] 0.9% sodium chloride BOLUS, 1,000 mL, Intravenous, Once, Last Rate: 1,000 mL/hr at 08/29/21 0949, 1,000 mL at 08/29/21 0949 " **FOLLOWED BY** sodium chloride 0.9% infusion, , Intravenous, Continuous, Dre Marshall MD, Last Rate: 125 mL/hr at 08/29/21 1153, New Bag at 08/29/21 1153    Current Outpatient Medications:      Ascorbic Acid (VITAMIN C) 500 MG CAPS, Take 500 mg by mouth 2 times daily , Disp: , Rfl:      atenolol (TENORMIN) 50 MG tablet, Take 50 mg by mouth daily as needed (Migraines) , Disp: , Rfl:      baclofen (LIORESAL) 10 MG tablet, Take 10 mg by mouth 2 times daily as needed for muscle spasms , Disp: , Rfl:      BIOTIN PO, Take 10,000 mcg by mouth daily , Disp: , Rfl:      Black Pepper-Turmeric (TURMERIC CURCUMIN) 5-1000 MG CAPS, Take 1 tablet by mouth daily , Disp: , Rfl:      Cholecalciferol (VITAMIN D3) 50 MCG (2000 UT) CAPS, Take 2,000 Units by mouth 2 times daily , Disp: , Rfl:      Coenzyme Q10 (COQ-10) 400 MG CAPS, Take 1 capsule by mouth daily , Disp: , Rfl:      FLUoxetine 20 MG tablet, Take 20 mg by mouth daily, Disp: , Rfl:      HEMP OIL OR EXTRACT OR OTHER CBD CANNABINOID, NOT MEDICAL CANNABIS,, Take 1.5 drops by mouth, Disp: , Rfl:      magnesium oxide (MAG-OX) 400 (240 Mg) MG tablet, Take 400 mg by mouth daily , Disp: , Rfl:      Melatonin 10 MG TABS tablet, Take 10 mg by mouth nightly as needed for sleep , Disp: , Rfl:      mirabegron (MYRBETRIQ) 50 MG 24 hr tablet, Take 50 mg by mouth daily , Disp: , Rfl:      omeprazole (PRILOSEC) 40 MG DR capsule, Take 1 capsule (40 mg) by mouth 2 times daily, Disp: 60 capsule, Rfl: 1     SUMAtriptan (IMITREX) 100 MG tablet, TAKE 1/2 TO 1 TABLET BY MOUTH AT ONSET OF HEADACHE, Disp: , Rfl:    Scheduled Meds:    metroNIDAZOLE  500 mg Intravenous Once     Continuous Infusions:    sodium chloride 125 mL/hr at 08/29/21 1153     PRN Meds:.acetaminophen, HYDROcodone-acetaminophen, morphine    SOCIAL HISTORY:  Social History     Socioeconomic History     Marital status: Single     Spouse name: Not on file     Number of children: 2     Years of education: Not on file     Highest  education level: Not on file   Occupational History     Not on file   Tobacco Use     Smoking status: Former Smoker     Packs/day: 0.00     Quit date: 1999     Years since quittin.6     Smokeless tobacco: Never Used   Substance and Sexual Activity     Alcohol use: No     Comment: rare     Drug use: Not Currently     Sexual activity: Not Currently     Partners: Male   Other Topics Concern     Not on file   Social History Narrative     Not on file     Social Determinants of Health     Financial Resource Strain:      Difficulty of Paying Living Expenses:    Food Insecurity:      Worried About Running Out of Food in the Last Year:      Ran Out of Food in the Last Year:    Transportation Needs:      Lack of Transportation (Medical):      Lack of Transportation (Non-Medical):    Physical Activity:      Days of Exercise per Week:      Minutes of Exercise per Session:    Stress:      Feeling of Stress :    Social Connections:      Frequency of Communication with Friends and Family:      Frequency of Social Gatherings with Friends and Family:      Attends Buddhism Services:      Active Member of Clubs or Organizations:      Attends Club or Organization Meetings:      Marital Status:    Intimate Partner Violence:      Fear of Current or Ex-Partner:      Emotionally Abused:      Physically Abused:      Sexually Abused:        FAMILY HISTORY:  Family History   Problem Relation Age of Onset     Hashimoto's thyroiditis Mother      Breast Cancer Mother 50.00     Graves' disease Sister      Hypothyroidism Maternal Aunt      Breast Cancer Maternal Aunt 50.00     Hashimoto's thyroiditis Maternal Grandfather      Hypothyroidism Sister      Hypothyroidism Maternal Aunt      Hashimoto's thyroiditis Maternal Aunt      Anesthesia Reaction No family hx of         ROS:  12 point review of systems reviewed and is negative except for what has already been mentioned in HPI.       PHYSICAL EXAM:  GENRL:  Not in acute distress   BP  "124/77   Pulse 91   Temp 98  F (36.7  C) (Temporal)   Resp 18   Ht 1.702 m (5' 7\")   Wt 97.5 kg (215 lb)   SpO2 99%   Breastfeeding No   BMI 33.67 kg/m    No intake/output data recorded.  No intake/output data recorded.  HEENT: NC/AT      Eyes-  Pupil round and reactive to light bilaterally       Neck- supple, no JVP elevation,       Sclera- anicteric      Oropharynx- moist and pink  CHEST: Clear to auscultation bilateral anteriorly, no ronchi or wheezing  HEART: S1S2 normal, regular.   ABDMN: Soft. Tenderness over lower abdominal area. Mild distention noted. No guarding or rigidity. Bowel sounds- active  EXTRM: No pedal edema   INTGM: No skin rash, no cyanosis or clubbing  MUSCULOSKELETAL: no joint tenderness or swelling on upper and lower extremities  NEURO: Alert and awake. Cranial nerves II-XII grossly intact. No focal neurological deficit.  PSYCH:     GENITOURINARY:       DIAGNOSTIC DATA:    Recent Labs   Lab 08/29/21  0915   WBC 7.6   HGB 13.9   HCT 41.2          Recent Labs   Lab 08/29/21  0915      CO2 26   BUN 11       No results for input(s): INR in the last 168 hours.    Recent Labs   Lab 08/29/21  0915      CO2 26   BUN 11   ALBUMIN 4.1   ALKPHOS 122*   ALT 92*   AST 52*       [unfilled]    XR Chest 2 Views    Result Date: 8/20/2021  EXAM: XR CHEST 2 VW LOCATION: [facility] DATE/TIME: 8/20/2021 8:15 AM INDICATION:  Cough COMPARISON: 11/11/2019.     IMPRESSION: Negative chest. No change from 11/11/2019.    Abd/pelvis CT,  IV  contrast only TRAUMA / AAA    Result Date: 8/29/2021  EXAM: CT ABDOMEN PELVIS W CONTRAST LOCATION: [facility] DATE/TIME: 8/29/2021 10:29 AM INDICATION: Abdominal abscess/infection suspected COMPARISON: 09/04/2019 TECHNIQUE: CT scan of the abdomen and pelvis was performed following injection of IV contrast. Multiplanar reformats were obtained. Dose reduction techniques were used. CONTRAST: 100ml " isovue 370 FINDINGS: LOWER CHEST: Normal. HEPATOBILIARY: Mild diffuse hepatic steatosis. Gallbladder removed. PANCREAS: Normal. SPLEEN: Normal. ADRENAL GLANDS: Normal adrenal glands. Small diverticulum off the fundus of the stomach lies adjacent to the left adrenal gland of no significance. KIDNEYS/BLADDER: Post left nephrectomy. No significant findings in the right kidney. BOWEL: Previous partial sigmoid colon resection. Moderate acute diverticulitis above the level of the anastomosis. No abscesses. No free air. LYMPH NODES: Normal. VASCULATURE: Unremarkable. PELVIC ORGANS: Postop change. MUSCULOSKELETAL: Normal.     IMPRESSION: 1.  Moderately severe acute diverticulitis upper sigmoid colon without complication.      Patient's new lab studies reviewed personally.  Patient's new radiology reports reviewed personally.    Note created using dragon voice recognition software.  Errors in spelling or words which seems out of context are unintentional.  Sounds alike errors may have escaped editing.     08/29/2021  Tony White MD  HOSPITALIST, [facility]  PAGER NO. [phone]

## 2021-08-29 NOTE — ED TRIAGE NOTES
Pt having abdominal pain and fever at home. Pt has history of bowel resection a couple of years ago. Pt states that last BM was a few days ago. Pt c/o nausea, no vomiting.

## 2021-08-29 NOTE — CONSULTS
"Care Management Initial Consult    General Information  Assessment completed with: Patient, Samantha  Type of CM/SW Visit: Initial Assessment    Primary Care Provider verified and updated as needed: Yes   Readmission within the last 30 days: no previous admission in last 30 days      Reason for Consult: discharge planning  Advance Care Planning: Advance Care Planning Reviewed: other (comment) (\"I don't have one written out at this time\")          Communication Assessment  Patient's communication style: spoken language (English or Bilingual)             Cognitive  Cognitive/Neuro/Behavioral: WDL                      Living Environment:   People in home: child(betty), adult, parent(s), other (see comments) (\"mother, 30y/o son, son's fiance\")     Current living Arrangements: house (\"I could live on 1 level if needed\")      Able to return to prior arrangements: yes       Family/Social Support:  Care provided by: self  Provides care for: no one  Marital Status: Single  Children, Parent(s)          Description of Support System: Supportive, Involved    Support Assessment: Adequate family and caregiver support, Adequate social supports, Patient communicates needs well met    Current Resources:   Patient receiving home care services: No     Community Resources: None  Equipment currently used at home: none  Supplies currently used at home: Other (\"mouth guard at night\")    Employment/Financial:  Employment Status: employed full-time     Employment/ Comments: \"no  history\"  Financial Concerns:     Referral to Financial Counselor: No       Lifestyle & Psychosocial Needs:  Social Determinants of Health     Tobacco Use: Medium Risk     Smoking Tobacco Use: Former Smoker     Smokeless Tobacco Use: Never Used   Alcohol Use:      Frequency of Alcohol Consumption:      Average Number of Drinks:      Frequency of Binge Drinking:    Financial Resource Strain:      Difficulty of Paying Living Expenses:    Food Insecurity:  "     Worried About Running Out of Food in the Last Year:      Ran Out of Food in the Last Year:    Transportation Needs:      Lack of Transportation (Medical):      Lack of Transportation (Non-Medical):    Physical Activity:      Days of Exercise per Week:      Minutes of Exercise per Session:    Stress:      Feeling of Stress :    Social Connections:      Frequency of Communication with Friends and Family:      Frequency of Social Gatherings with Friends and Family:      Attends Zoroastrian Services:      Active Member of Clubs or Organizations:      Attends Club or Organization Meetings:      Marital Status:    Intimate Partner Violence:      Fear of Current or Ex-Partner:      Emotionally Abused:      Physically Abused:      Sexually Abused:    Depression: Not at risk     PHQ-2 Score: 2   Housing Stability:      Unable to Pay for Housing in the Last Year:      Number of Places Lived in the Last Year:      Unstable Housing in the Last Year:        Functional Status:  Prior to admission patient needed assistance:   Dependent ADLs:: Independent  Dependent IADLs:: Independent  Assesssment of Functional Status: At functional baseline    Mental Health Status:          Chemical Dependency Status:                Values/Beliefs:  Spiritual, Cultural Beliefs, Zoroastrian Practices, Values that affect care:                 Additional Information:  Samantha lives in a house with her mother, 30 y/o son, and son's finace. She is independent with ADLs at baseline.    Likely no discharge needs at this time.    Family to transport at discharge.    Meghan Mendoza RN

## 2021-08-29 NOTE — PLAN OF CARE
PRIMARY DIAGNOSIS: ACUTE PAIN  OUTPATIENT/OBSERVATION GOALS TO BE MET BEFORE DISCHARGE:  1. Pain Status:  Pt in severe pain with acute diverticulitis.    2. Return to near baseline physical activity:no    3. Cleared for discharge by consultants (if involved): No    Discharge Planner Nurse   Safe discharge environment identified: No  Barriers to discharge: Yes       Entered by: Dylan Donald 08/29/2021 7:00 PM   Pt on clear liquids with acute diverticulitis, receiving pain meds for acute pain. No BM for 5 days. Pt receiving antibiotics.   Please review provider order for any additional goals.   Nurse to notify provider when observation goals have been met and patient is ready for discharge.

## 2021-08-29 NOTE — ED NOTES
CT scan completed. Pt pre-treated with IV benadryl before scan due to hx of hives with contrast. She is doing well, no signs of allergic reaction at this time.

## 2021-08-29 NOTE — PLAN OF CARE
Problem: Adult Inpatient Plan of Care  Goal: Absence of Hospital-Acquired Illness or Injury  Intervention: Prevent and Manage VTE (Venous Thromboembolism) Risk  Recent Flowsheet Documentation  Taken 8/29/2021 1400 by Malina Fisher RN  VTE Prevention/Management: pneumatic compression device   Patient will have scd's.      Problem: Constipation  Goal: Effective Bowel Elimination  Outcome: Improving   Patient had diarrhea 2 days ago but nothing since.  She states she feels bloated and swollen with abdominal pain related to diverticulitis.    Patient has abdominal pain 4/10.  She states the [place] she received in ED helped make the pain more tolerable.

## 2021-08-29 NOTE — LETTER
[place] [address]  [address] [place] [id number]  [phone]    2021    Re: Keiry Mchugh  [address] [facility] [address]  [phone] (home) [phone] (work)    : 1965      To Whom It May Concern:      Keiry Mchugh was hospitalized from 2021 until 2021  1:59 PM due to medical illness.  She may return to work on 21 with restrictions to be determined by outpatient physician at follow up.        Sincerely,  Dr. Soren Malcolm DO, SAYRA

## 2021-08-30 LAB
ALBUMIN SERPL-MCNC: 3 G/DL (ref 3.5–5)
ALP SERPL-CCNC: 148 U/L (ref 45–120)
ALT SERPL W P-5'-P-CCNC: 180 U/L (ref 0–45)
ANION GAP SERPL CALCULATED.3IONS-SCNC: 5 MMOL/L (ref 5–18)
AST SERPL W P-5'-P-CCNC: 120 U/L (ref 0–40)
BILIRUB SERPL-MCNC: 1 MG/DL (ref 0–1)
BUN SERPL-MCNC: 7 MG/DL (ref 8–22)
CALCIUM SERPL-MCNC: 8.4 MG/DL (ref 8.5–10.5)
CHLORIDE BLD-SCNC: 110 MMOL/L (ref 98–107)
CO2 SERPL-SCNC: 25 MMOL/L (ref 22–31)
CREAT SERPL-MCNC: 0.8 MG/DL (ref 0.6–1.1)
ERYTHROCYTE [DISTWIDTH] IN BLOOD BY AUTOMATED COUNT: 11.8 % (ref 10–15)
GFR SERPL CREATININE-BSD FRML MDRD: 84 ML/MIN/1.73M2
GLUCOSE BLD-MCNC: 105 MG/DL (ref 70–125)
HCT VFR BLD AUTO: 34.3 % (ref 35–47)
HGB BLD-MCNC: 11.1 G/DL (ref 11.7–15.7)
MCH RBC QN AUTO: 31.2 PG (ref 26.5–33)
MCHC RBC AUTO-ENTMCNC: 32.4 G/DL (ref 31.5–36.5)
MCV RBC AUTO: 96 FL (ref 78–100)
PLATELET # BLD AUTO: 186 10E3/UL (ref 150–450)
POTASSIUM BLD-SCNC: 3.9 MMOL/L (ref 3.5–5)
PROT SERPL-MCNC: 6 G/DL (ref 6–8)
RBC # BLD AUTO: 3.56 10E6/UL (ref 3.8–5.2)
SODIUM SERPL-SCNC: 140 MMOL/L (ref 136–145)
WBC # BLD AUTO: 4.9 10E3/UL (ref 4–11)

## 2021-08-30 PROCEDURE — 250N000011 HC RX IP 250 OP 636: Performed by: INTERNAL MEDICINE

## 2021-08-30 PROCEDURE — G0378 HOSPITAL OBSERVATION PER HR: HCPCS

## 2021-08-30 PROCEDURE — 99232 SBSQ HOSP IP/OBS MODERATE 35: CPT | Performed by: HOSPITALIST

## 2021-08-30 PROCEDURE — 96376 TX/PRO/DX INJ SAME DRUG ADON: CPT

## 2021-08-30 PROCEDURE — 99231 SBSQ HOSP IP/OBS SF/LOW 25: CPT | Performed by: PHYSICIAN ASSISTANT

## 2021-08-30 PROCEDURE — 96372 THER/PROPH/DIAG INJ SC/IM: CPT | Performed by: INTERNAL MEDICINE

## 2021-08-30 PROCEDURE — 258N000003 HC RX IP 258 OP 636: Performed by: INTERNAL MEDICINE

## 2021-08-30 PROCEDURE — 36415 COLL VENOUS BLD VENIPUNCTURE: CPT | Performed by: INTERNAL MEDICINE

## 2021-08-30 PROCEDURE — 99207 PR CDG-CODE CATEGORY CHANGED: CPT | Performed by: HOSPITALIST

## 2021-08-30 PROCEDURE — 82040 ASSAY OF SERUM ALBUMIN: CPT | Performed by: INTERNAL MEDICINE

## 2021-08-30 PROCEDURE — 250N000013 HC RX MED GY IP 250 OP 250 PS 637: Performed by: INTERNAL MEDICINE

## 2021-08-30 PROCEDURE — 85027 COMPLETE CBC AUTOMATED: CPT | Performed by: INTERNAL MEDICINE

## 2021-08-30 PROCEDURE — 120N000001 HC R&B MED SURG/OB

## 2021-08-30 RX ADMIN — METRONIDAZOLE 500 MG: 500 INJECTION, SOLUTION INTRAVENOUS at 00:41

## 2021-08-30 RX ADMIN — HEPARIN SODIUM 5000 UNITS: 10000 INJECTION, SOLUTION INTRAVENOUS; SUBCUTANEOUS at 03:13

## 2021-08-30 RX ADMIN — CIPROFLOXACIN 400 MG: 2 INJECTION, SOLUTION INTRAVENOUS at 03:14

## 2021-08-30 RX ADMIN — Medication 50 MCG: at 20:41

## 2021-08-30 RX ADMIN — Medication 50 MCG: at 08:02

## 2021-08-30 RX ADMIN — HEPARIN SODIUM 5000 UNITS: 10000 INJECTION, SOLUTION INTRAVENOUS; SUBCUTANEOUS at 17:27

## 2021-08-30 RX ADMIN — MIRABEGRON 50 MG: 25 TABLET, FILM COATED, EXTENDED RELEASE ORAL at 08:02

## 2021-08-30 RX ADMIN — SODIUM CHLORIDE: 9 INJECTION, SOLUTION INTRAVENOUS at 22:55

## 2021-08-30 RX ADMIN — CIPROFLOXACIN 400 MG: 2 INJECTION, SOLUTION INTRAVENOUS at 17:25

## 2021-08-30 RX ADMIN — HYDROCODONE BITARTRATE AND ACETAMINOPHEN 2 TABLET: 5; 325 TABLET ORAL at 17:46

## 2021-08-30 RX ADMIN — MORPHINE SULFATE 2 MG: 2 INJECTION, SOLUTION INTRAMUSCULAR; INTRAVENOUS at 00:40

## 2021-08-30 RX ADMIN — PANTOPRAZOLE SODIUM 40 MG: 20 TABLET, DELAYED RELEASE ORAL at 07:01

## 2021-08-30 RX ADMIN — PROCHLORPERAZINE EDISYLATE 5 MG: 5 INJECTION INTRAMUSCULAR; INTRAVENOUS at 17:46

## 2021-08-30 RX ADMIN — SODIUM CHLORIDE 125 ML/HR: 9 INJECTION, SOLUTION INTRAVENOUS at 11:52

## 2021-08-30 RX ADMIN — HYDROCODONE BITARTRATE AND ACETAMINOPHEN 2 TABLET: 5; 325 TABLET ORAL at 11:16

## 2021-08-30 RX ADMIN — PROCHLORPERAZINE EDISYLATE 5 MG: 5 INJECTION INTRAMUSCULAR; INTRAVENOUS at 10:21

## 2021-08-30 RX ADMIN — MAGNESIUM OXIDE TAB 400 MG (241.3 MG ELEMENTAL MG) 400 MG: 400 (241.3 MG) TAB at 08:03

## 2021-08-30 RX ADMIN — METRONIDAZOLE 500 MG: 500 INJECTION, SOLUTION INTRAVENOUS at 11:52

## 2021-08-30 RX ADMIN — PANTOPRAZOLE SODIUM 40 MG: 20 TABLET, DELAYED RELEASE ORAL at 17:24

## 2021-08-30 RX ADMIN — SODIUM CHLORIDE: 9 INJECTION, SOLUTION INTRAVENOUS at 03:14

## 2021-08-30 RX ADMIN — MORPHINE SULFATE 2 MG: 2 INJECTION, SOLUTION INTRAMUSCULAR; INTRAVENOUS at 07:59

## 2021-08-30 RX ADMIN — HYDROCODONE BITARTRATE AND ACETAMINOPHEN 2 TABLET: 5; 325 TABLET ORAL at 03:16

## 2021-08-30 RX ADMIN — ACETAMINOPHEN 650 MG: 325 TABLET ORAL at 20:41

## 2021-08-30 RX ADMIN — FLUOXETINE 20 MG: 20 CAPSULE ORAL at 08:03

## 2021-08-30 NOTE — PROGRESS NOTES
General Surgery Progress Note:    Hospital Day # 0    ASSESSMENT:   1. Sigmoid diverticulitis        Keiry Mchugh is a 56 year old female with acute diverticulitis.     PLAN:   -ok adv to soft mechanical. Not much on full liquid diet for gluten free  -Continue IV abx  -Oral pain control  -anticipate discharge tomorrow unless feeling much better later tday      SUBJECTIVE:   Keiry Mchugh is feeling bloated still. Pain ongoing and had rough night. Fever over night. No nausea. Passing gas. No bm.     Patient Vitals for the past 24 hrs:   BP Temp Temp src Pulse Resp SpO2   08/30/21 0709 104/58 97.8  F (36.6  C) Oral 75 18 95 %   08/30/21 0357 103/56 98.2  F (36.8  C) Oral 73 18 96 %   08/29/21 2322 118/59 100  F (37.8  C) Oral 87 18 93 %   08/29/21 2009 101/55 (!) 100.8  F (38.2  C) Oral 98 18 95 %   08/29/21 1556 112/57 98.9  F (37.2  C) Oral 88 16 96 %   08/29/21 1334 105/55 99.3  F (37.4  C) Oral 88 18 97 %   08/29/21 1320 -- -- -- 88 -- 95 %   08/29/21 1310 -- -- -- 89 -- 98 %   08/29/21 1300 115/64 -- -- 90 -- 100 %   08/29/21 1250 -- -- -- 91 -- 100 %   08/29/21 1248 -- -- -- 91 -- 100 %   08/29/21 1240 -- -- -- 90 -- 99 %   08/29/21 1230 121/75 -- -- 92 -- 98 %   08/29/21 1200 126/79 -- -- 93 -- 99 %   08/29/21 1130 124/77 -- -- 91 -- 99 %   08/29/21 1115 -- -- -- 89 -- 99 %   08/29/21 1100 130/71 -- -- 91 -- 99 %       Physical Exam:  General: NAD, pleasant  CV:RRR  LUNGS:CTA bilaterally  ABD: soft. Tenderness rlq and generalized. No rebound   EXT:no CCE    Admission on 08/29/2021   Component Date Value     Hold Specimen 08/29/2021 JIC      Hold Specimen 08/29/2021 JIC      Hold Specimen 08/29/2021 JIC      Sodium 08/29/2021 141      Potassium 08/29/2021 3.9      Chloride 08/29/2021 104      Carbon Dioxide (CO2) 08/29/2021 26      Anion Gap 08/29/2021 11      Urea Nitrogen 08/29/2021 11      Creatinine 08/29/2021 0.88      Calcium 08/29/2021 9.9      Glucose 08/29/2021 111      Alkaline Phosphatase  08/29/2021 122*     AST 08/29/2021 52*     ALT 08/29/2021 92*     Protein Total 08/29/2021 7.8      Albumin 08/29/2021 4.1      Bilirubin Total 08/29/2021 1.5*     GFR Estimate 08/29/2021 75      Lactic Acid 08/29/2021 0.7      Color Urine 08/29/2021 Colorless      Appearance Urine 08/29/2021 Clear      Glucose Urine 08/29/2021 Negative      Bilirubin Urine 08/29/2021 Negative      Ketones Urine 08/29/2021 Negative      Specific Gravity Urine 08/29/2021 >1.050*     Blood Urine 08/29/2021 Negative      pH Urine 08/29/2021 5.5      Protein Albumin Urine 08/29/2021 Negative      Urobilinogen Urine 08/29/2021 <2.0      Nitrite Urine 08/29/2021 Negative      Leukocyte Esterase Urine 08/29/2021 Negative      RBC Urine 08/29/2021 1      WBC Urine 08/29/2021 1      Squamous Epithelials Uri* 08/29/2021 1      WBC Count 08/29/2021 7.6      RBC Count 08/29/2021 4.43      Hemoglobin 08/29/2021 13.9      Hematocrit 08/29/2021 41.2      MCV 08/29/2021 93      MCH 08/29/2021 31.4      MCHC 08/29/2021 33.7      RDW 08/29/2021 11.9      Platelet Count 08/29/2021 251      SARS CoV2 PCR 08/29/2021 Negative      Sodium 08/30/2021 140      Potassium 08/30/2021 3.9      Chloride 08/30/2021 110*     Carbon Dioxide (CO2) 08/30/2021 25      Anion Gap 08/30/2021 5      Urea Nitrogen 08/30/2021 7*     Creatinine 08/30/2021 0.80            Ollie Ballard PA-C

## 2021-08-30 NOTE — PLAN OF CARE
PRIMARY DIAGNOSIS: ACUTE PAIN  OUTPATIENT/OBSERVATION GOALS TO BE MET BEFORE DISCHARGE:  1. Pain Status: Improved but still requiring IV narcotics.    2. Return to near baseline physical activity: Yes    3. Cleared for discharge by consultants (if involved): No    Discharge Planner Nurse   Safe discharge environment identified: Yes  Barriers to discharge: Yes       Entered by: Tatiana Teixeira 08/30/2021 4:32 AM   Pt cont to have abdominal pain 7/10. Prn iv morphine given as requested. Will cont to monitor and treat as needed.   Please review provider order for any additional goals.   Nurse to notify provider when observation goals have been met and patient is ready for discharge.

## 2021-08-30 NOTE — PLAN OF CARE
"PRIMARY DIAGNOSIS: \"GENERIC\" NURSING  OUTPATIENT/OBSERVATION GOALS TO BE MET BEFORE DISCHARGE:  ADLs back to baseline: Yes    Activity and level of assistance: Ambulating independently.    Pain status: Improved but still requiring IV narcotics.    Return to near baseline physical activity: Yes     Discharge Planner Nurse   Safe discharge environment identified: Yes  Barriers to discharge: Yes       Entered by: Katlin Kinsey 08/30/2021 10:47 AM  Pt still rating pain at 7-is very nauseated requiring compazine for nausea.     Please review provider order for any additional goals.   Nurse to notify provider when observation goals have been met and patient is ready for discharge.  "

## 2021-08-30 NOTE — PLAN OF CARE
PRIMARY DIAGNOSIS: ACUTE PAIN  OUTPATIENT/OBSERVATION GOALS TO BE MET BEFORE DISCHARGE:  1. Pain Status: Acute pain migraine and diverticulitis     2. Return to near baseline physical activity: No    3. Cleared for discharge by consultants (if involved): Not at this time    Discharge Planner Nurse   Safe discharge environment identified: No  Barriers to discharge: Yes Acute Diverticulitis, receiving antibiotics and clear liquid diet.        Entered by: Dylan Donald 08/29/2021 11:15 PM     Please review provider order for any additional goals.   Nurse to notify provider when observation goals have been met and patient is ready for discharge.

## 2021-08-30 NOTE — PLAN OF CARE
PRIMARY DIAGNOSIS: ACUTE PAIN  OUTPATIENT/OBSERVATION GOALS TO BE MET BEFORE DISCHARGE:  1. Pain Status: Improved but still requiring IV narcotics.    2. Return to near baseline physical activity: No    3. Cleared for discharge by consultants (if involved): No    Discharge Planner Nurse   Safe discharge environment identified: Yes  Barriers to discharge: Yes       Entered by: Tatiana Teixeira 08/30/2021 3:00 AM   Pt complained of abd pain at 0040, prn iv morphine given as requested. Pt is sleeping at this time.  Please review provider order for any additional goals.   Nurse to notify provider when observation goals have been met and patient is ready for discharge.

## 2021-08-30 NOTE — PLAN OF CARE
Care Management Follow Up    Length of Stay (days): 0    Expected Discharge Date: 09/01/2021     Concerns to be Addressed:     Medical Progression, surgery following  Patient plan of care discussed at interdisciplinary rounds: Yes    Anticipated Discharge Disposition:  Home    Additional Information:  Care management following medical progression. Plan is for the patient to discharge home once medically cleared.      Stephen Ramon RN         No

## 2021-08-30 NOTE — UTILIZATION REVIEW
Admission Status; Secondary Review Determination   Under the authority of the Utilization Management Committee, the utilization review process indicated a secondary review on Keiry Mchugh. The review outcome is based on review of the medical records, discussions with staff, and applying clinical experience noted on the date of the review.   (x) Inpatient Status Appropriate - This patient's medical care is consistent with medical management for inpatient care and reasonable inpatient medical practice.     RATIONALE FOR DETERMINATION   56 yr old female presented 8/29/21 with abdominal pain.  Noted diverticulitis at prior anastomosis site.  At this time ongoing IV abx and work on attempting oral intake.  Still requiring IV pain meds intermittently despite oral pain meds.  Fever ongoing and 100.8 last evening.  Oral intake poor and continues IVF for hydration.      At the time of admission with the information available to the attending physician more than 2 nights Hospital complex care was anticipated, based on patient risk of adverse outcome if treated as outpatient and complex care required. Inpatient admission is appropriate based on the Medicare guidelines.   The information on this document is developed by the utilization review team in order for the business office to ensure compliance. This only denotes the appropriateness of proper admission status and does not reflect the quality of care rendered.   The definitions of Inpatient Status and Observation Status used in making the determination above are those provided in the CMS Coverage Manual, Chapter 1 and Chapter 6, section 70.4.   Sincerely,   Lucille Rodriguez MD  Utilization Review  Physician Advisor  [facility]

## 2021-08-30 NOTE — PROGRESS NOTES
"[facility]  Hospitalist Progress Note  [facility]        Date of Service (when I saw the patient): 2021  Soren Malcolm,   2021        Interval History:      Patient states she continues to have pain, discussed plan of care, verbalized understanding.          Assessment and Plan:        56 year old female with PMH significant for GERD, anxiety, multiple abdominal surgeries- appendectomy, cholecystectomy, hysterectomy who presented to our ED for evaluation of 1 wk of abdominal discomfort.      Abdominal pain, distention and fever, Acute sigmoid diverticulitis Patient presenting with a wk of abdominal discomfort, distention and fever, found to have acute sigmoid diverticulitis  - NPO.  - IVF.   - IV antibiotics.  - Pain control.   - Surgical service consulted  - IV cipro and flagyl.     Abnormal LFTs: Elevated transaminases, alk phos and total bili noted. CT showed no acute hepatobiliary issues. This is likely due to hepatosteatosis, as evident on CT. Viral hepatitis panel was negative on 3/13/20  - Monitor.      Anxiety  - Cont home meds     GERD:  - Cont home meds     Diet: Orders Placed This Encounter      Clear Liquid Diet    DVT Prophylaxis: Pneumatic Compression Devices    Code: Full Code    Anticipated discharge date:  pending surgery evaluation.   Milestones/needs for discharge: Pain control and diet progression.   Pending tests or labs: None.   Status of family updates: Updated patient.   Length of Stay:  LOS: 0 days /LOS: 0                   Physical Exam:           Blood pressure 104/58, pulse 75, temperature 97.8  F (36.6  C), temperature source Oral, resp. rate 18, height 1.702 m (5' 7\"), weight 97.5 kg (215 lb), SpO2 95 %, not currently breastfeeding.    Vital Sign Ranges  Temperature Temp  Av  F (37.2  C)  Min: 97.8  F (36.6  C)  Max: 100.8  F (38.2  C)   Blood pressure Systolic (24hrs), Av , Min:101 , Max:155        Diastolic " (24hrs), Av, Min:55, Max:88      Pulse Pulse  Av.6  Min: 73  Max: 104   Respirations Resp  Av.7  Min: 16  Max: 18   Pulse oximetry SpO2  Av.6 %  Min: 93 %  Max: 100 %     Vital Signs with Ranges  Temp:  [97.8  F (36.6  C)-100.8  F (38.2  C)] 97.8  F (36.6  C)  Pulse:  [] 75  Resp:  [16-18] 18  BP: (101-155)/(55-88) 104/58  SpO2:  [93 %-100 %] 95 %  Temp:  [97.8  F (36.6  C)-100.8  F (38.2  C)] 97.8  F (36.6  C)  Pulse:  [] 75  Resp:  [16-18] 18  BP: (101-155)/(55-88) 104/58  SpO2:  [93 %-100 %] 95 %    I/O Last 3 Shifts:   I/O last 3 completed shifts:  In:  [P.O.:240; I.V.:743; IV Piggyback:1000]  Out: -     I/O past 24 hours:     Intake/Output Summary (Last 24 hours) at 2021 0746  Last data filed at 2021 0557  Gross per 24 hour   Intake  ml   Output --   Net  ml       Oxygen  Temp: 97.8  F (36.6  C) Temp src: Oral BP: 104/58 Pulse: 75   Resp: 18 SpO2: 95 % O2 Device: None (Room air)    Vitals:    21 0839   Weight: 97.5 kg (215 lb)       Lines  Peripheral IV 21 Left  (Active)   Site Assessment WDL 21 0200   Line Status Infusing 21 020   Dressing Intervention New dressing  21 1400   Phlebitis Scale 0-->no symptoms 21 020   Infiltration Scale 0 21 0200   Infiltration Site Treatment Method  None 21 020   Number of days: 1     GENERAL: Alert and oriented. NAD. Conversational, appropriate.   HEENT: Normocephalic. EOMI. No icterus or injection. Nares normal.   LUNGS: Clear to auscultation. No dyspnea at rest.   HEART: Regular rate. Extremities perfused.   ABDOMEN: Soft, tender, and distended. Positive bowel sounds.   EXTREMITIES: No LE edema noted.   NEUROLOGIC: Moves extremities x4, follows commands.          Prior to Admission Medications:        Medications Prior to Admission   Medication Sig Dispense Refill Last Dose     Ascorbic Acid (VITAMIN C) 500 MG CAPS Take 500 mg by mouth 2 times daily    Past Week at Unknown  time     atenolol (TENORMIN) 50 MG tablet Take 50 mg by mouth daily as needed (Migraines)    Past Month at Unknown time     baclofen (LIORESAL) 10 MG tablet Take 10 mg by mouth 2 times daily as needed for muscle spasms    Past Month at Unknown time     BIOTIN PO Take 10,000 mcg by mouth daily    Past Week at Unknown time     Black Pepper-Turmeric (TURMERIC CURCUMIN) 5-1000 MG CAPS Take 1 tablet by mouth daily    Past Week at Unknown time     Cholecalciferol (VITAMIN D3) 50 MCG (2000 UT) CAPS Take 2,000 Units by mouth 2 times daily    Past Week at Unknown time     Coenzyme Q10 (COQ-10) 400 MG CAPS Take 1 capsule by mouth daily    Past Week at Unknown time     FLUoxetine 20 MG tablet Take 20 mg by mouth daily   Past Week at Unknown time     HEMP OIL OR EXTRACT OR OTHER CBD CANNABINOID, NOT MEDICAL CANNABIS, Take 1.5 drops by mouth   more than a month     magnesium oxide (MAG-OX) 400 (240 Mg) MG tablet Take 400 mg by mouth daily    Past Week at Unknown time     Melatonin 10 MG TABS tablet Take 10 mg by mouth nightly as needed for sleep    Past Week at Unknown time     mirabegron (MYRBETRIQ) 50 MG 24 hr tablet Take 50 mg by mouth daily    Past Week at Unknown time     omeprazole (PRILOSEC) 40 MG DR capsule Take 1 capsule (40 mg) by mouth 2 times daily 60 capsule 1 Past Week at Unknown time     SUMAtriptan (IMITREX) 100 MG tablet TAKE 1/2 TO 1 TABLET BY MOUTH AT ONSET OF HEADACHE   Past Week at Unknown time            Medications:        Current Facility-Administered Medications   Medication Last Rate     sodium chloride 125 mL/hr at 08/30/21 0314     Current Facility-Administered Medications   Medication Dose Route Frequency     ciprofloxacin  400 mg Intravenous Q12H     FLUoxetine  20 mg Oral Daily     heparin ANTICOAGULANT  5,000 Units Subcutaneous Q12H     magnesium oxide  400 mg Oral Daily     metroNIDAZOLE  500 mg Intravenous Q12H     mirabegron  50 mg Oral Daily     pantoprazole  40 mg Oral BID AC     sodium  chloride (PF)  3 mL Intracatheter Q8H     Vitamin D3  50 mcg Oral BID     Current Facility-Administered Medications   Medication Dose Route Frequency     acetaminophen  650 mg Oral Q6H PRN     HYDROcodone-acetaminophen  1-2 tablet Oral Q6H PRN     lidocaine 4%   Topical Q1H PRN     lidocaine (buffered or not buffered)  0.1-1 mL Other Q1H PRN     melatonin  1 mg Oral At Bedtime PRN     morphine  2 mg Intravenous Q3H PRN     naloxone  0.2 mg Intravenous Q2 Min PRN    Or     naloxone  0.4 mg Intravenous Q2 Min PRN    Or     naloxone  0.2 mg Intramuscular Q2 Min PRN    Or     naloxone  0.4 mg Intramuscular Q2 Min PRN     prochlorperazine  5 mg Intravenous Q6H PRN     sodium chloride (PF)  3 mL Intracatheter q1 min prn     SUMAtriptan  50 mg Oral Daily PRN     ___________________________________________________________________________  Medications     sodium chloride 125 mL/hr at 08/30/21 0314       ciprofloxacin  400 mg Intravenous Q12H     FLUoxetine  20 mg Oral Daily     heparin ANTICOAGULANT  5,000 Units Subcutaneous Q12H     magnesium oxide  400 mg Oral Daily     metroNIDAZOLE  500 mg Intravenous Q12H     mirabegron  50 mg Oral Daily     pantoprazole  40 mg Oral BID AC     sodium chloride (PF)  3 mL Intracatheter Q8H     Vitamin D3  50 mcg Oral BID          Data:      Lab data reviewed.     Data   Recent Labs   Lab 08/30/21  0454 08/29/21  0915   WBC 4.9 7.6   HGB 11.1* 13.9   MCV 96 93    251    141   POTASSIUM 3.9 3.9   CHLORIDE 110* 104   CO2 25 26   BUN 7* 11   CR 0.80 0.88   ANIONGAP 5 11   MORENO 8.4* 9.9    111   ALBUMIN 3.0* 4.1   PROTTOTAL 6.0 7.8   BILITOTAL 1.0 1.5*   ALKPHOS 148* 122*   * 92*   * 52*           Imaging:      Imaging data reviewed.     Recent Results (from the past 24 hour(s))   Abd/pelvis CT,  IV  contrast only TRAUMA / AAA    Narrative    EXAM: CT ABDOMEN PELVIS W CONTRAST  LOCATION: [facility]  DATE/TIME: 8/29/2021 10:29  AM    INDICATION: Abdominal abscess/infection suspected  COMPARISON: 09/04/2019  TECHNIQUE: CT scan of the abdomen and pelvis was performed following injection of IV contrast. Multiplanar reformats were obtained. Dose reduction techniques were used.  CONTRAST: 100ml isovue 370    FINDINGS:   LOWER CHEST: Normal.    HEPATOBILIARY: Mild diffuse hepatic steatosis. Gallbladder removed.    PANCREAS: Normal.    SPLEEN: Normal.    ADRENAL GLANDS: Normal adrenal glands. Small diverticulum off the fundus of the stomach lies adjacent to the left adrenal gland of no significance.    KIDNEYS/BLADDER: Post left nephrectomy. No significant findings in the right kidney.    BOWEL: Previous partial sigmoid colon resection. Moderate acute diverticulitis above the level of the anastomosis. No abscesses. No free air.    LYMPH NODES: Normal.    VASCULATURE: Unremarkable.    PELVIC ORGANS: Postop change.    MUSCULOSKELETAL: Normal.      Impression    IMPRESSION:   1.  Moderately severe acute diverticulitis upper sigmoid colon without complication.       Dr. Soren Malcolm DO, SAYRA  [facility]  Pager [phone]

## 2021-08-30 NOTE — PLAN OF CARE
"PRIMARY DIAGNOSIS: \"GENERIC\" NURSING  OUTPATIENT/OBSERVATION GOALS TO BE MET BEFORE DISCHARGE:  ADLs back to baseline: Yes    Activity and level of assistance: Ambulating independently.    Pain status: Improved but still requiring IV narcotics.    Return to near baseline physical activity: Yes     Discharge Planner Nurse   Safe discharge environment identified: Yes  Barriers to discharge: Yes       Entered by: Katlin Kinsey 08/30/2021 3:05 PM Rating pain at 6-tolerated small amt of soft food. No bm this shift.     Please review provider order for any additional goals.   Nurse to notify provider when observation goals have been met and patient is ready for discharge.  "

## 2021-08-31 VITALS
HEART RATE: 77 BPM | HEIGHT: 67 IN | OXYGEN SATURATION: 97 % | RESPIRATION RATE: 16 BRPM | DIASTOLIC BLOOD PRESSURE: 71 MMHG | WEIGHT: 215 LBS | BODY MASS INDEX: 33.74 KG/M2 | SYSTOLIC BLOOD PRESSURE: 122 MMHG | TEMPERATURE: 98.8 F

## 2021-08-31 LAB
ALBUMIN SERPL-MCNC: 3 G/DL (ref 3.5–5)
ALP SERPL-CCNC: 158 U/L (ref 45–120)
ALT SERPL W P-5'-P-CCNC: 154 U/L (ref 0–45)
ANION GAP SERPL CALCULATED.3IONS-SCNC: 7 MMOL/L (ref 5–18)
AST SERPL W P-5'-P-CCNC: 63 U/L (ref 0–40)
BILIRUB SERPL-MCNC: 0.5 MG/DL (ref 0–1)
BUN SERPL-MCNC: 7 MG/DL (ref 8–22)
CALCIUM SERPL-MCNC: 8.9 MG/DL (ref 8.5–10.5)
CHLORIDE BLD-SCNC: 109 MMOL/L (ref 98–107)
CO2 SERPL-SCNC: 25 MMOL/L (ref 22–31)
CREAT SERPL-MCNC: 0.78 MG/DL (ref 0.6–1.1)
ERYTHROCYTE [DISTWIDTH] IN BLOOD BY AUTOMATED COUNT: 11.6 % (ref 10–15)
GFR SERPL CREATININE-BSD FRML MDRD: 86 ML/MIN/1.73M2
GLUCOSE BLD-MCNC: 105 MG/DL (ref 70–125)
HCT VFR BLD AUTO: 35 % (ref 35–47)
HGB BLD-MCNC: 11.5 G/DL (ref 11.7–15.7)
MCH RBC QN AUTO: 31 PG (ref 26.5–33)
MCHC RBC AUTO-ENTMCNC: 32.9 G/DL (ref 31.5–36.5)
MCV RBC AUTO: 94 FL (ref 78–100)
PLATELET # BLD AUTO: 188 10E3/UL (ref 150–450)
POTASSIUM BLD-SCNC: 3.9 MMOL/L (ref 3.5–5)
PROT SERPL-MCNC: 6.2 G/DL (ref 6–8)
RBC # BLD AUTO: 3.71 10E6/UL (ref 3.8–5.2)
SODIUM SERPL-SCNC: 141 MMOL/L (ref 136–145)
WBC # BLD AUTO: 3.6 10E3/UL (ref 4–11)

## 2021-08-31 PROCEDURE — 85027 COMPLETE CBC AUTOMATED: CPT | Performed by: HOSPITALIST

## 2021-08-31 PROCEDURE — 99231 SBSQ HOSP IP/OBS SF/LOW 25: CPT | Performed by: PHYSICIAN ASSISTANT

## 2021-08-31 PROCEDURE — 36415 COLL VENOUS BLD VENIPUNCTURE: CPT | Performed by: HOSPITALIST

## 2021-08-31 PROCEDURE — 250N000013 HC RX MED GY IP 250 OP 250 PS 637: Performed by: INTERNAL MEDICINE

## 2021-08-31 PROCEDURE — 258N000003 HC RX IP 258 OP 636: Performed by: INTERNAL MEDICINE

## 2021-08-31 PROCEDURE — 99239 HOSP IP/OBS DSCHRG MGMT >30: CPT | Performed by: HOSPITALIST

## 2021-08-31 PROCEDURE — 80053 COMPREHEN METABOLIC PANEL: CPT | Performed by: HOSPITALIST

## 2021-08-31 PROCEDURE — 250N000013 HC RX MED GY IP 250 OP 250 PS 637: Performed by: HOSPITALIST

## 2021-08-31 PROCEDURE — 250N000011 HC RX IP 250 OP 636: Performed by: INTERNAL MEDICINE

## 2021-08-31 RX ORDER — METRONIDAZOLE 250 MG/1
250 TABLET ORAL 3 TIMES DAILY
Status: DISCONTINUED | OUTPATIENT
Start: 2021-08-31 | End: 2021-08-31 | Stop reason: HOSPADM

## 2021-08-31 RX ORDER — METRONIDAZOLE 250 MG/1
250 TABLET ORAL 3 TIMES DAILY
Qty: 21 TABLET | Refills: 0 | Status: SHIPPED | OUTPATIENT
Start: 2021-08-31 | End: 2021-09-07

## 2021-08-31 RX ORDER — CIPROFLOXACIN 500 MG/1
500 TABLET, FILM COATED ORAL EVERY 12 HOURS
Qty: 14 TABLET | Refills: 0 | Status: SHIPPED | OUTPATIENT
Start: 2021-08-31 | End: 2021-09-07

## 2021-08-31 RX ORDER — CIPROFLOXACIN 500 MG/1
500 TABLET, FILM COATED ORAL EVERY 12 HOURS SCHEDULED
Status: DISCONTINUED | OUTPATIENT
Start: 2021-08-31 | End: 2021-08-31 | Stop reason: HOSPADM

## 2021-08-31 RX ADMIN — ACETAMINOPHEN 650 MG: 325 TABLET ORAL at 07:42

## 2021-08-31 RX ADMIN — Medication 50 MCG: at 07:45

## 2021-08-31 RX ADMIN — HEPARIN SODIUM 5000 UNITS: 10000 INJECTION, SOLUTION INTRAVENOUS; SUBCUTANEOUS at 06:47

## 2021-08-31 RX ADMIN — MAGNESIUM OXIDE TAB 400 MG (241.3 MG ELEMENTAL MG) 400 MG: 400 (241.3 MG) TAB at 07:45

## 2021-08-31 RX ADMIN — MIRABEGRON 50 MG: 25 TABLET, FILM COATED, EXTENDED RELEASE ORAL at 07:45

## 2021-08-31 RX ADMIN — METRONIDAZOLE 250 MG: 250 TABLET, FILM COATED ORAL at 12:40

## 2021-08-31 RX ADMIN — CIPROFLOXACIN 400 MG: 2 INJECTION, SOLUTION INTRAVENOUS at 04:10

## 2021-08-31 RX ADMIN — SODIUM CHLORIDE 125 ML/HR: 9 INJECTION, SOLUTION INTRAVENOUS at 09:03

## 2021-08-31 RX ADMIN — PANTOPRAZOLE SODIUM 40 MG: 20 TABLET, DELAYED RELEASE ORAL at 06:46

## 2021-08-31 RX ADMIN — FLUOXETINE 20 MG: 20 CAPSULE ORAL at 07:46

## 2021-08-31 RX ADMIN — METRONIDAZOLE 500 MG: 500 INJECTION, SOLUTION INTRAVENOUS at 00:01

## 2021-08-31 NOTE — PLAN OF CARE
Problem: Pain (Intestinal Obstruction)  Goal: Acceptable Pain Control  Intervention: Monitor and Manage Pain  Recent Flowsheet Documentation  Taken 8/31/2021 0030 by Arabella Madrid RN  Pain Management Interventions: (takes tylenol prn.) declines   Slept well, no complaints.  Takes tylenol prn as needed.  Continues on IV antibiotics.  No nausea or emesis overnight.

## 2021-08-31 NOTE — DISCHARGE SUMMARY
Physician Discharge Summary        Primary Care Physician:  Porfirio Linton Admission Date: 8/29/2021   Discharge Provider: Soren Malcolm DO Discharge Date: 8/31/2021   Disposition: Home Code Status: Full Code   Activity: Up as tolerated.  Diet: Orders Placed This Encounter      Advance Diet as Tolerated: Low Fiber; Gluten Free Diet      Diet        Condition at Discharge: Stable.      Discharge Diagnoses/Hospital Summary:      56 year old female with PMH significant for GERD, anxiety, multiple abdominal surgeries- appendectomy, cholecystectomy, hysterectomy who presented to our ED for evaluation of 1 wk of abdominal discomfort.      Abdominal pain, distention and fever, Acute sigmoid diverticulitis Patient presenting with a wk of abdominal discomfort, distention and fever, found to have acute sigmoid diverticulitis  - Low residue diet.   - Transition to oral antibiotics.  - Surgical service follow up as directed.   - Close outpatient follow up.      Abnormal LFTs: Elevated transaminases, alk phos and total bili noted. CT showed no acute hepatobiliary issues. This is likely due to hepatosteatosis, as evident on CT. Viral hepatitis panel was negative on 3/13/20  - Monitor closely as outpatient.      Anxiety  - Cont previously established outpatient regimen.      GERD:  - Cont previously established outpatient regimen.     Discharge Medications:      Review of your medicines      START taking      Dose / Directions   ciprofloxacin 500 MG tablet  Commonly known as: CIPRO  Indication: Infection Within the Abdomen      Dose: 500 mg  Take 1 tablet (500 mg) by mouth every 12 hours for 7 days  Quantity: 14 tablet  Refills: 0     metroNIDAZOLE 250 MG tablet  Commonly known as: FLAGYL  Indication: Infection Within the Abdomen      Dose: 250 mg  Take 1 tablet (250 mg) by mouth 3 times daily for 7 days  Quantity: 21 tablet  Refills: 0        CONTINUE these medicines which have NOT CHANGED      Dose / Directions    atenolol 50 MG tablet  Commonly known as: TENORMIN      Dose: 50 mg  Take 50 mg by mouth daily as needed (Migraines)  Refills: 0     CoQ-10 400 MG Caps      Dose: 1 capsule  Take 1 capsule by mouth daily  Refills: 0     FLUoxetine 20 MG tablet      Dose: 20 mg  Take 20 mg by mouth daily  Refills: 0     HEMP OIL OR EXTRACT OR OTHER CBD CANNABINOID (NOT MEDICAL CANNABIS)      Dose: 1.5 drop  Take 1.5 drops by mouth  Refills: 0     magnesium oxide 400 (240 Mg) MG tablet  Commonly known as: MAG-OX      Dose: 400 mg  Take 400 mg by mouth daily  Refills: 0     Melatonin 10 MG Tabs tablet      Dose: 10 mg  Take 10 mg by mouth nightly as needed for sleep  Refills: 0     omeprazole 40 MG DR capsule  Commonly known as: priLOSEC  Used for: Gastroesophageal reflux disease without esophagitis      Dose: 40 mg  Take 1 capsule (40 mg) by mouth 2 times daily  Quantity: 60 capsule  Refills: 1     SUMAtriptan 100 MG tablet  Commonly known as: IMITREX      TAKE 1/2 TO 1 TABLET BY MOUTH AT ONSET OF HEADACHE  Refills: 0     Turmeric Curcumin 5-1000 MG Caps      Dose: 1 tablet  Take 1 tablet by mouth daily  Refills: 0     Vitamin C 500 MG Caps      Dose: 500 mg  Take 500 mg by mouth 2 times daily  Refills: 0     vitamin D3 50 MCG (2000 UT) Caps      Dose: 2,000 Units  Take 2,000 Units by mouth 2 times daily  Refills: 0        STOP taking    baclofen 10 MG tablet  Commonly known as: LIORESAL        BIOTIN PO        Myrbetriq 50 MG 24 hr tablet  Generic drug: mirabegron              Where to get your medicines      Some of these will need a paper prescription and others can be bought over the counter. Ask your nurse if you have questions.    Bring a paper prescription for each of these medications    ciprofloxacin 500 MG tablet    metroNIDAZOLE 250 MG tablet               Discharge Instructions:  Follow up appointment with Primary Care Physician: Porfirio Linton  Follow up appointment with Specialist: Surgery follow up as directed.         Vital Signs in last 24 hours:    Temp:  [98.3  F (36.8  C)-98.8  F (37.1  C)] 98.8  F (37.1  C)  Pulse:  [77-83] 77  Resp:  [16-18] 16  BP: (113-131)/(59-71) 122/71  SpO2:  [96 %-97 %] 97 %    GENERAL: Alert and oriented. NAD. Conversational, appropriate.   HEENT: Normocephalic. EOMI. No icterus or injection. Nares normal.   LUNGS: Clear to auscultation. No dyspnea at rest.   HEART: Regular rate. Extremities perfused.   ABDOMEN: Soft, minimally tender, improved, and less distended. Positive bowel sounds.   EXTREMITIES: No LE edema noted.   NEUROLOGIC: Moves extremities x4, follows commands.     Total Time on discharge process is: 36 minutes    Dr. Soren Malcolm DO, MBA  Internal Medicine Hospitalist  8/31/2021

## 2021-08-31 NOTE — PLAN OF CARE
Problem: Constipation  Goal: Effective Bowel Elimination  Outcome: Improving     Problem: Fluid Deficit (Intestinal Obstruction)  Goal: Fluid Balance  Outcome: Improving     Problem: Infection (Intestinal Obstruction)  Goal: Absence of Infection Signs and Symptoms  Outcome: Improving     Problem: Nausea and Vomiting (Intestinal Obstruction)  Goal: Nausea and Vomiting Relief  Outcome: Improving  Intervention: Prevent and Manage Nausea and Vomiting  Recent Flowsheet Documentation  Taken 8/30/2021 1731 by Hume, Alice, RN  Nausea/Vomiting Interventions: antiemetic     Problem: Pain (Intestinal Obstruction)  Goal: Acceptable Pain Control  Outcome: Improving  Intervention: Monitor and Manage Pain  Recent Flowsheet Documentation  Taken 8/30/2021 1731 by Hume, Alice, RN  Pain Management Interventions: medication (see MAR)   Patient c/o abd pain and nausea/vomit. PRN norco and compazine iv with some relief. Patient had an emesis episode after norco was given, hasn't had any episode since then. IVF @125ml/hr for hydration. Up and ambulating in the room, steady on her feet. BS active x 4, no BM this shift. On iv abx for infection. Prn tylenol given at bedtime for pain with relief.

## 2021-08-31 NOTE — PLAN OF CARE
Pt discharged at this time to home. She has taken plain tylenol for pain today with fair results. Unable to take may narcotics due to nausea. Tolerated regular diet-low fiber gluten free. She had a medium -large bm. She has been walking in the halls. She will follow up with her PMD within the week.

## 2021-08-31 NOTE — PROGRESS NOTES
General Surgery Progress Note:    Hospital Day # 2    ASSESSMENT:   1. Sigmoid diverticulitis        Keiry Mchugh is a 56 year old female with acute diverticulitis, making improvements    PLAN:   -ok to ADAT  -would be ok from our standpoint to transition to PO abx and discharge today if medically stable  -Surgery will sign off at this point, please reach out if any further needs arise    Jeison Ambrose PA-C  Pager - [phone]  Phone - [phone]   General Surgery        SUBJECTIVE:   Keiry Mchugh is feeling a little better, certainly not worse, has been off narcotics since they seem to make her nauseous, hopeful to go home if able    Patient Vitals for the past 24 hrs:   BP Temp Temp src Pulse Resp SpO2   08/31/21 0731 122/71 98.8  F (37.1  C) Oral 77 16 97 %   08/30/21 2326 113/59 98.3  F (36.8  C) Oral 81 16 97 %   08/30/21 1915 131/71 98.5  F (36.9  C) Axillary 82 18 96 %   08/30/21 1507 127/65 98.7  F (37.1  C) Oral 83 18 96 %       Physical Exam:  General: NAD, pleasant  CV:RRR  LUNGS:CTA bilaterally  ABD: soft. ttp to moderate palpation suprapubic and LLQ  EXT:no CCE    Admission on 08/29/2021   Component Date Value     Hold Specimen 08/29/2021 JIC      Hold Specimen 08/29/2021 JIC      Hold Specimen 08/29/2021 JIC      Sodium 08/29/2021 141      Potassium 08/29/2021 3.9      Chloride 08/29/2021 104      Carbon Dioxide (CO2) 08/29/2021 26      Anion Gap 08/29/2021 11      Urea Nitrogen 08/29/2021 11      Creatinine 08/29/2021 0.88      Calcium 08/29/2021 9.9      Glucose 08/29/2021 111      Alkaline Phosphatase 08/29/2021 122*     AST 08/29/2021 52*     ALT 08/29/2021 92*     Protein Total 08/29/2021 7.8      Albumin 08/29/2021 4.1      Bilirubin Total 08/29/2021 1.5*     GFR Estimate 08/29/2021 75      Lactic Acid 08/29/2021 0.7      Color Urine 08/29/2021 Colorless      Appearance Urine 08/29/2021 Clear      Glucose Urine 08/29/2021 Negative      Bilirubin Urine 08/29/2021 Negative      Ketones  Urine 08/29/2021 Negative      Specific Gravity Urine 08/29/2021 >1.050*     Blood Urine 08/29/2021 Negative      pH Urine 08/29/2021 5.5      Protein Albumin Urine 08/29/2021 Negative      Urobilinogen Urine 08/29/2021 <2.0      Nitrite Urine 08/29/2021 Negative      Leukocyte Esterase Urine 08/29/2021 Negative      RBC Urine 08/29/2021 1      WBC Urine 08/29/2021 1      Squamous Epithelials Uri* 08/29/2021 1      WBC Count 08/29/2021 7.6      RBC Count 08/29/2021 4.43      Hemoglobin 08/29/2021 13.9      Hematocrit 08/29/2021 41.2      MCV 08/29/2021 93      MCH 08/29/2021 31.4      MCHC 08/29/2021 33.7      RDW 08/29/2021 11.9      Platelet Count 08/29/2021 251      SARS CoV2 PCR 08/29/2021 Negative      Sodium 08/30/2021 140      Potassium 08/30/2021 3.9      Chloride 08/30/2021 110*     Carbon Dioxide (CO2) 08/30/2021 25      Anion Gap 08/30/2021 5      Urea Nitrogen 08/30/2021 7*     Creatinine 08/30/2021 0.80            Jeison Ambrose PA-C

## 2021-08-31 NOTE — PROGRESS NOTES
"[facility]  Hospitalist Progress Note  [facility]        Date of Service (when I saw the patient): 2021  Soren Malcolm,   2021        Interval History:      Patient doing well, discussed plan of care.          Assessment and Plan:        56 year old female with PMH significant for GERD, anxiety, multiple abdominal surgeries- appendectomy, cholecystectomy, hysterectomy who presented to our ED for evaluation of 1 wk of abdominal discomfort.      Abdominal pain, distention and fever, Acute sigmoid diverticulitis Patient presenting with a wk of abdominal discomfort, distention and fever, found to have acute sigmoid diverticulitis  - IVF.   - antibiotics.  - Pain control.   - Surgical service following.   - IV cipro and flagyl.     Abnormal LFTs: Elevated transaminases, alk phos and total bili noted. CT showed no acute hepatobiliary issues. This is likely due to hepatosteatosis, as evident on CT. Viral hepatitis panel was negative on 3/13/20  - Monitor.      Anxiety  - Cont home meds     GERD:  - Cont home meds     Diet: Orders Placed This Encounter      Clear Liquid Diet     DVT Prophylaxis: Pneumatic Compression Devices    Anticipated discharge date:   Milestones/needs for discharge: None.   Pending tests or labs: None.   Status of family updates: Updated patient.   Length of Stay:  LOS: 1 day /LOS: 1                   Physical Exam:           Blood pressure 122/71, pulse 77, temperature 98.8  F (37.1  C), temperature source Oral, resp. rate 16, height 1.702 m (5' 7\"), weight 97.5 kg (215 lb), SpO2 97 %, not currently breastfeeding.    Vital Sign Ranges  Temperature Temp  Av.6  F (37  C)  Min: 98.3  F (36.8  C)  Max: 98.8  F (37.1  C)   Blood pressure Systolic (24hrs), Av , Min:113 , Max:131        Diastolic (24hrs), Av, Min:59, Max:71      Pulse Pulse  Av.8  Min: 77  Max: 83   Respirations Resp  Av  Min: 16  Max: 18   Pulse " oximetry SpO2  Av.5 %  Min: 96 %  Max: 97 %     Vital Signs with Ranges  Temp:  [98.3  F (36.8  C)-98.8  F (37.1  C)] 98.8  F (37.1  C)  Pulse:  [77-83] 77  Resp:  [16-18] 16  BP: (113-131)/(59-71) 122/71  SpO2:  [96 %-97 %] 97 %  Temp:  [98.3  F (36.8  C)-98.8  F (37.1  C)] 98.8  F (37.1  C)  Pulse:  [77-83] 77  Resp:  [16-18] 16  BP: (113-131)/(59-71) 122/71  SpO2:  [96 %-97 %] 97 %    I/O Last 3 Shifts:   I/O last 3 completed shifts:  In: 3529.58 [P.O.:540; I.V.:2989.58]  Out: -     I/O past 24 hours:     Intake/Output Summary (Last 24 hours) at 2021 0744  Last data filed at 2021 0644  Gross per 24 hour   Intake 3529.58 ml   Output --   Net 3529.58 ml       Oxygen  Temp: 98.8  F (37.1  C) Temp src: Oral BP: 122/71 Pulse: 77   Resp: 16 SpO2: 97 % O2 Device: None (Room air)    Vitals:    21 0839   Weight: 97.5 kg (215 lb)       Lines  Peripheral IV 21 Left  (Active)   Site Assessment WDL 21 0030   Line Status Infusing 21 0030   Dressing Intervention New dressing  21 1400   Phlebitis Scale 0-->no symptoms 21 0030   Infiltration Scale 0 21 0030   Infiltration Site Treatment Method  None 21 0800   Number of days: 2     GENERAL: Alert and oriented. NAD. Conversational, appropriate.   HEENT: Normocephalic. EOMI. No icterus or injection. Nares normal.   LUNGS: Clear to auscultation. No dyspnea at rest.   HEART: Regular rate. Extremities perfused.   ABDOMEN: Soft, minimally tender, and distended. Positive bowel sounds.   EXTREMITIES: No LE edema noted.   NEUROLOGIC: Moves extremities x4, follows commands.          Prior to Admission Medications:        Medications Prior to Admission   Medication Sig Dispense Refill Last Dose     Ascorbic Acid (VITAMIN C) 500 MG CAPS Take 500 mg by mouth 2 times daily    Past Week at Unknown time     atenolol (TENORMIN) 50 MG tablet Take 50 mg by mouth daily as needed (Migraines)    Past Month at Unknown time     baclofen  (LIORESAL) 10 MG tablet Take 10 mg by mouth 2 times daily as needed for muscle spasms    Past Month at Unknown time     BIOTIN PO Take 10,000 mcg by mouth daily    Past Week at Unknown time     Black Pepper-Turmeric (TURMERIC CURCUMIN) 5-1000 MG CAPS Take 1 tablet by mouth daily    Past Week at Unknown time     Cholecalciferol (VITAMIN D3) 50 MCG (2000 UT) CAPS Take 2,000 Units by mouth 2 times daily    Past Week at Unknown time     Coenzyme Q10 (COQ-10) 400 MG CAPS Take 1 capsule by mouth daily    Past Week at Unknown time     FLUoxetine 20 MG tablet Take 20 mg by mouth daily   Past Week at Unknown time     HEMP OIL OR EXTRACT OR OTHER CBD CANNABINOID, NOT MEDICAL CANNABIS, Take 1.5 drops by mouth   more than a month     magnesium oxide (MAG-OX) 400 (240 Mg) MG tablet Take 400 mg by mouth daily    Past Week at Unknown time     Melatonin 10 MG TABS tablet Take 10 mg by mouth nightly as needed for sleep    Past Week at Unknown time     mirabegron (MYRBETRIQ) 50 MG 24 hr tablet Take 50 mg by mouth daily    Past Week at Unknown time     omeprazole (PRILOSEC) 40 MG DR capsule Take 1 capsule (40 mg) by mouth 2 times daily 60 capsule 1 Past Week at Unknown time     SUMAtriptan (IMITREX) 100 MG tablet TAKE 1/2 TO 1 TABLET BY MOUTH AT ONSET OF HEADACHE   Past Week at Unknown time            Medications:        Current Facility-Administered Medications   Medication Last Rate     sodium chloride 125 mL/hr at 08/30/21 0378     Current Facility-Administered Medications   Medication Dose Route Frequency     ciprofloxacin  400 mg Intravenous Q12H     FLUoxetine  20 mg Oral Daily     heparin ANTICOAGULANT  5,000 Units Subcutaneous Q12H     magnesium oxide  400 mg Oral Daily     metroNIDAZOLE  500 mg Intravenous Q12H     mirabegron  50 mg Oral Daily     pantoprazole  40 mg Oral BID AC     sodium chloride (PF)  3 mL Intracatheter Q8H     Vitamin D3  50 mcg Oral BID     Current Facility-Administered Medications   Medication Dose  Route Frequency     acetaminophen  650 mg Oral Q6H PRN     HYDROcodone-acetaminophen  1-2 tablet Oral Q6H PRN     lidocaine 4%   Topical Q1H PRN     lidocaine (buffered or not buffered)  0.1-1 mL Other Q1H PRN     melatonin  1 mg Oral At Bedtime PRN     morphine  2 mg Intravenous Q3H PRN     naloxone  0.2 mg Intravenous Q2 Min PRN    Or     naloxone  0.4 mg Intravenous Q2 Min PRN    Or     naloxone  0.2 mg Intramuscular Q2 Min PRN    Or     naloxone  0.4 mg Intramuscular Q2 Min PRN     prochlorperazine  5 mg Intravenous Q6H PRN     sodium chloride (PF)  3 mL Intracatheter q1 min prn     SUMAtriptan  50 mg Oral Daily PRN     ___________________________________________________________________________  Medications     sodium chloride 125 mL/hr at 08/30/21 2255       ciprofloxacin  400 mg Intravenous Q12H     FLUoxetine  20 mg Oral Daily     heparin ANTICOAGULANT  5,000 Units Subcutaneous Q12H     magnesium oxide  400 mg Oral Daily     metroNIDAZOLE  500 mg Intravenous Q12H     mirabegron  50 mg Oral Daily     pantoprazole  40 mg Oral BID AC     sodium chloride (PF)  3 mL Intracatheter Q8H     Vitamin D3  50 mcg Oral BID          Data:      Lab data reviewed.     Data   Recent Labs   Lab 08/31/21  0623 08/30/21  0454 08/29/21  0915   WBC 3.6* 4.9 7.6   HGB 11.5* 11.1* 13.9   MCV 94 96 93    186 251    140 141   POTASSIUM 3.9 3.9 3.9   CHLORIDE 109* 110* 104   CO2 25 25 26   BUN 7* 7* 11   CR 0.78 0.80 0.88   ANIONGAP 7 5 11   MORENO 8.9 8.4* 9.9    105 111   ALBUMIN 3.0* 3.0* 4.1   PROTTOTAL 6.2 6.0 7.8   BILITOTAL 0.5 1.0 1.5*   ALKPHOS 158* 148* 122*   * 180* 92*   AST 63* 120* 52*           Imaging:      Imaging data reviewed.     No results found for this or any previous visit (from the past 24 hour(s)).    Dr. Soren Malcolm DO, MBA  [facility]  Pager [phone]

## 2021-09-01 ENCOUNTER — TELEPHONE (OUTPATIENT)
Dept: FAMILY MEDICINE | Facility: CLINIC | Age: 56
End: 2021-09-01

## 2021-09-01 ENCOUNTER — VIRTUAL VISIT (OUTPATIENT)
Dept: SURGERY | Facility: CLINIC | Age: 56
End: 2021-09-01
Payer: COMMERCIAL

## 2021-09-01 VITALS — BODY MASS INDEX: 34.53 KG/M2 | WEIGHT: 220 LBS | HEIGHT: 67 IN

## 2021-09-01 DIAGNOSIS — E88.810 METABOLIC SYNDROME: ICD-10-CM

## 2021-09-01 DIAGNOSIS — G43.809 OTHER MIGRAINE WITHOUT STATUS MIGRAINOSUS, NOT INTRACTABLE: ICD-10-CM

## 2021-09-01 DIAGNOSIS — E66.811 CLASS 1 OBESITY WITH BODY MASS INDEX (BMI) OF 34.0 TO 34.9 IN ADULT, UNSPECIFIED OBESITY TYPE, UNSPECIFIED WHETHER SERIOUS COMORBIDITY PRESENT: Primary | ICD-10-CM

## 2021-09-01 DIAGNOSIS — E78.5 DYSLIPIDEMIA: ICD-10-CM

## 2021-09-01 DIAGNOSIS — I10 ESSENTIAL HYPERTENSION: ICD-10-CM

## 2021-09-01 DIAGNOSIS — K90.0 CELIAC DISEASE: ICD-10-CM

## 2021-09-01 PROCEDURE — 99215 OFFICE O/P EST HI 40 MIN: CPT | Mod: 95 | Performed by: FAMILY MEDICINE

## 2021-09-01 RX ORDER — PHENTERMINE HYDROCHLORIDE 37.5 MG/1
18.75-37.5 TABLET ORAL
Qty: 90 TABLET | Refills: 0 | Status: SHIPPED | OUTPATIENT
Start: 2021-09-01 | End: 2021-11-30

## 2021-09-01 RX ORDER — PROPRANOLOL HYDROCHLORIDE 10 MG/1
10 TABLET ORAL 2 TIMES DAILY
Qty: 180 TABLET | Refills: 3 | Status: SHIPPED | OUTPATIENT
Start: 2021-09-01 | End: 2022-09-19

## 2021-09-01 ASSESSMENT — MIFFLIN-ST. JEOR: SCORE: 1630.54

## 2021-09-01 NOTE — LETTER
9/1/2021         RE: Keiry Mchugh  [address]  [place] [address]        Dear Colleague,    Thank you for referring your patient, Keiry Mchugh, to the [facility]ATR[facility] [place]. Please see a copy of my visit note below.    Keiry Mchugh is 56 year old  female who presents for a billable video visit today.    How would you like to obtain your AVS? MyChart  If dropped from the video visit, the video invitation should be resent by: Text to cell phone: [phone]  Will anyone else be joining your video visit? No      Video Start Time:10:30    BARIATRIC CONSULTATION    Impression: Keiry Mchugh is a 56 year old year old female with  has a past medical history of Anemia, Anxiety, Celiac disease, Chronic kidney disease, Cough, Depression, Diverticulitis, Dyslipidemia, Enlarged LV, GERD (gastroesophageal reflux disease), Hepatitis, Hiatal hernia, Hypertension, Metabolic syndrome, Migraines, HARDIN (nonalcoholic steatohepatitis), Peripheral neuropathy, PONV (postoperative nausea and vomiting), and Ventral hernia.  Poor functional capacity and musculoskeletal disability in the setting of the abovementioned weight related co-morbidities. Her Body mass index is 34.46 kg/m ..    Plan: DIET: 3 meals introduced protein first. Celiac   EXERCISE well beats   REFERRAL(S) 24 week plan   PHARMACOTHERAPY  Propranolol for headache and night time palpitations/panic and phentermine in the am for appetite suppression. Continue 4,000U vitaminD, C and add Super B complex.    We discussed [facility] Bariatric Basics including:  -eating 3 meals daily  -eating protein first  -eating slowly, chewing food well  -avoiding/limiting calorie containing beverages  -choosing wheat, not white with breads, crackers, pastas, venu, bagels, tortillas, rice  -limiting restaurant or cafeteria eating to twice a week or less    We discussed the importance of restorative sleep and stress management in  maintaining a healthy weight.    We reviewed medications associated with weight gain.    We discussed insulin resistance and glycemic index as it relates to appetite and weight control.     We discussed the National Weight Control Registry healthy weight maintenance strategies and ways to optimize metabolism.  We discussed the importance of physical activity including cardiovascular and strength training in maintaining a healthier weight and explored viable options.    We discussed medications available for weight loss including Phentermine, Phendimetrazine, Topamax, Qsymia, Diethylproprion, Orlistat, Contrave, Saxenda, Wegovy, and Vyvanse. We discussed the risks and benefits of each. We discussed indications, contraindications, potential side effects, and estimated costs of each. Literature was provided. Samantha understands that not using a weight loss medication is an option.      History Surrounding Consultation  Struggles with weight started as an adult 30-50, especially after 52 Frustrated with perimenopause  Her weight at age 20 was lean  She has had several past supervised and unsupervised weight loss attempts  The most weight lost was: 45#  Unfortunately there was not durable weight maintenance.  History of bulimia, anorexia, or binge eating disorder? no  If Present has eating disorder been in remission at least 3 years? NA  Night time eating? no    Dietary History  Meals per day: 2-3  Snacks: sometimes  Typical Snack: crackers, bananas, peanut butter,   Who does the grocery shopping? She does  Who does the cooking? She does  A typical meal includes: B: 2-3 eggs, turkey sausage and coffee with protein shake L: cottage cheese, crackers, fruit, chips,  D: cereal-fruity zena  Regular Pop: none  Juice: no  Caffeine: in the am with protein shake  Amount of restaurant eating per week: rare  Eating a the table with the TV off? yes    Physical Activity Patterns  Current physical activity routine includes: used to be  "good, walking, weight lifting at home, giovani now winded with stairs, Up until 1 year ago no exercise. Has gained 30# in the past year    Limitations from being physically active on a regular basis includes: fatigue    She describes her general health as: fair    Past Medical History  HTN: yes  Dyslipidemia: yes  CYNTHIA: no  Obesity Hypoventilation: NO  DM2: no DM1: no DX: no Most recent AIC: no  Neuropathy: sciatica  Nephropathy: no  Retinopathy: no Glaucoma no  IFG or \"pre-DM\": no  MI: no  CVA:no  CHF: no  Heart Valves: native  Previous cardiac testing includes: no  Cancers: no  Kidney Disease: no  DVT: no  PE: no  Colitis: no  Crohn's: no  IBS: no  PUD: no  Fatty Liver: yes  Abnormal LFTs: yes  Hepatitis: no  Asthma: no  Bronchitis: yes chronic  Pneumonia: has had  Other Lung Problems: no  Back Pain:yes  DDD: yes  Gout: no  Fibromyalgia: no  USI: yes  Severe Headaches: no  Seizures: no If so, last seizure: no  Pseudotumor: no  PCOS: no  Menstrual Irregularity: none  Menorrhagia: no  Infertility: no hyst at 38  Thyroid problems: no  Thyroid medications: no  HIV positive: NO  MRSA/VRE history: no  History of Blood transfusion: no  Anemia: no    Health Care Maintenance  Colonoscopy: UTD  Mammogram: UTD  Pap: hyst    Medications     Current Outpatient Medications:      Ascorbic Acid (VITAMIN C) 500 MG CAPS, Take 500 mg by mouth 2 times daily , Disp: , Rfl:      Black Pepper-Turmeric (TURMERIC CURCUMIN) 5-1000 MG CAPS, Take 1 tablet by mouth daily , Disp: , Rfl:      Cholecalciferol (VITAMIN D3) 50 MCG (2000 UT) CAPS, Take 2,000 Units by mouth 2 times daily , Disp: , Rfl:      ciprofloxacin (CIPRO) 500 MG tablet, Take 1 tablet (500 mg) by mouth every 12 hours for 7 days, Disp: 14 tablet, Rfl: 0     Coenzyme Q10 (COQ-10) 400 MG CAPS, Take 1 capsule by mouth daily , Disp: , Rfl:      FLUoxetine 20 MG tablet, Take 20 mg by mouth daily, Disp: , Rfl:      magnesium oxide (MAG-OX) 400 (240 Mg) MG tablet, Take 400 mg by " mouth daily , Disp: , Rfl:      Melatonin 10 MG TABS tablet, Take 10 mg by mouth nightly as needed for sleep , Disp: , Rfl:      metroNIDAZOLE (FLAGYL) 250 MG tablet, Take 1 tablet (250 mg) by mouth 3 times daily for 7 days, Disp: 21 tablet, Rfl: 0     omeprazole (PRILOSEC) 40 MG DR capsule, Take 1 capsule (40 mg) by mouth 2 times daily, Disp: 60 capsule, Rfl: 1     phentermine (ADIPEX-P) 37.5 MG tablet, Take 0.5-1 tablets (18.75-37.5 mg) by mouth every morning (before breakfast), Disp: 90 tablet, Rfl: 0     propranolol (INDERAL) 10 MG tablet, Take 1 tablet (10 mg) by mouth 2 times daily, Disp: 180 tablet, Rfl: 3     SUMAtriptan (IMITREX) 100 MG tablet, TAKE 1/2 TO 1 TABLET BY MOUTH AT ONSET OF HEADACHE, Disp: , Rfl:      atenolol (TENORMIN) 50 MG tablet, Take 50 mg by mouth daily as needed (Migraines)  (Patient not taking: Reported on 9/1/2021), Disp: , Rfl:      HEMP OIL OR EXTRACT OR OTHER CBD CANNABINOID, NOT MEDICAL CANNABIS,, Take 1.5 drops by mouth (Patient not taking: Reported on 9/1/2021), Disp: , Rfl:    Allergies   Gluten meal, Contrast dye, Ondansetron, Pcn [penicillins], Rocephin [ceftriaxone], and Zithromax [azithromycin]  Past Surgical History  Past Surgical History:   Procedure Laterality Date     APPENDECTOMY       CHOLECYSTECTOMY       COLON SURGERY       COLONOSCOPY N/A 3/18/2019    Procedure: COLONOSCOPY;  Surgeon: Davis Mosqueda MD;  Location: [facility];  Service: General     CYSTOSCOPY N/A 7/16/2015    Procedure: CYSTOSCOPY;  Surgeon: Nate Horta MD;  Location: [facility];  Service:      HC CORRECT BUNION,METATARSAL OSTEOTOMY      Description: Bunion Correction With Metatarsal Osteotomy Herman Procedure;  Proc Date: 07/16/2010;     HYSTERECTOMY       HYSTERECTOMY, PAP NO LONGER INDICATED  02/2009     NEPHRECTOMY LIVING DONOR Left APRIL 2012     HI LAP,BILIARY TRACT,UNLISTED N/A 3/19/2019    Procedure: BIOPSY, LIVER, LAPAROSCOPIC;  Surgeon: Davis Mosqueda MD;   Location: [facility];  Service: General     WA LAP,SLING OPERATION      Description: Laparoscopic Sling Operation For Stress Incontinence;  Recorded: 2009;     WA LAP,SURG,COLECTOMY, PARTIAL, W/ANAST N/A 3/19/2019    Procedure: LAPAROSCOPIC SIGMOID COLON RESECTION;  Surgeon: Davis Mosqueda MD;  Location: [facility];  Service: General     SEPTOPLASTY, TURBINOPLASTY, COMBINED N/A 8/3/2021    Procedure: SEPTOPLASTY, NOSE, WITH TURBINOPLASTY RADIOFREQUENCY BALLON ASSISTED, CRYOBLATION OF NASAL TISSUE;  Surgeon: Randy Case MD;  Location: [facility] OR     SIGMOIDOSCOPY FLEXIBLE N/A 3/19/2019    Procedure: FLEXIBLE SIGMOIDOSCOPY;  Surgeon: Davis Mosqueda MD;  Location: [facility];  Service: General     History of problems with anesthesia: no  History of Malignant Hyperthermia: NO    Gynecological History    Problems getting pregnant: no  MD Involvement: no If so, explanation/Diagnosis: no  : 3  Para: 2012  C-S: 0  Vaginal deliveries: 2  SAB:1  EAB: 0  Gestational DM: no  Gestational HTN: no  Preeclampsia: no  Current Birth Control: hyst    Family History  family history includes Breast Cancer (age of onset: 50.00) in her maternal aunt and mother; Graves' disease in her sister; Hashimoto's thyroiditis in her maternal aunt, maternal grandfather, and mother; Hypothyroidism in her maternal aunt, maternal aunt, and sister.    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      Addiction History  Smoking History:   Started smoking: age 14  Quit smokin Total years of tobacco use: 18  Alcohol use: none  Current or Past history of alcohol or substance abuse: no  Last used:   Chemical Dependency Treatment History: no  Chemicals: no    Psychiatric History  Diagnoses: depression and anxiety  Treated by: fluoxetine  Psychiatric Hospitalizations: no  Suicide attempts: no  ECT: no  Panic attacks: no  History of Abuse:     Palliative Medicine  "History  Involvement in a pain clinic: no    ROS  Sleep  Snoring: no  PND: no  Witnessed Apneas: no  [place]:   STOP BANG:   General  Fatigue: yes  Sleep Quality:not enough 5 hours  HEENT  Visual changes: no  Gastrointestinal  Heartburn: yes  Dysphagia: no  Cardiovascular  Murmur: no  Elevated BP: no  Chest Pain with Exertion: no  Dyspnea with Exertion: eys  Palpitations: yes wakes at night with them  Lower Extremity Edema: yes  Syncope: no  Pulmonary  Shortness of breath at rest: no  Snoring: no  PND: no  Wheezing: no  CPAP use: no  Gastrointestinal  Trouble swallowing:no  Heartburn: yes  HX UGI/EGD: yes HH  Abdominal pain: no  Hematochezia: no  Urologic  Hesitancy: no  Urgency: yes  Genitourinary  ED: NA  Menorrhagia: NA  Dysmenorrhea: NA  Neurologic  Severe headache:migraine  Paresthesias: no  Psychiatric  Moods Stable: yes  Hallucinations: no  Rheumatologic  Myalgias: yes  Arthralgias: yes  Endocrine  Polydipsia: no  Polyuria: no  Galactorrhea: no  Heat intolerance: no  Hirsutism: no  Musculoskeletal  Joint pain;yes  Falls:no  Use of cane, crutch or motorized scooter: no  Hematologic  Abnormal Bleeding or Clotting: no  Dermatologic  Skin Tags:   Striae: yes  [place]:   Acne:   Intertrigo:   Lower Leg ulcers: no      Physical Exam  Ht 1.702 m (5' 7\")   Wt 99.8 kg (220 lb)   BMI 34.46 kg/m          General Appearance  No acute distress. Obesity: central  Alert: yes  Sleepy: no  HEENT  PERRLA, EOMI  Neck  Stout: no   Airway:   Cardiovascular  Color pink  Pulmonary  [place] Score: 2+  Lungs :non labored respirations  Abdomen    Extremities:  Neurologic  Tremors: no  Psychiatric  Thought Content Organized  Mood appears stable  Endocrine  Moon Facies: NO  Dorsal Thoracic Prominence: NO  Skin tags:   Acanthosis nigricans: no  Dermatologic  Intertrigo: no    Total time spent on the date of this encounter doing: chart review, review of test results, patient visit, physical exam, education, counseling, developing " plan of care, and documenting = 60 minutes.        Video-Visit Details    Type of service:  Video Visit    Video End Time (time video stopped): 11:30  Originating Location (pt. Location): Home    Distant Location (provider location):  [facility]ATRICS [facility]     Platform used for Video Visit: Noreen      Again, thank you for allowing me to participate in the care of your patient.        Sincerely,        Radha Gómez MD

## 2021-09-01 NOTE — TELEPHONE ENCOUNTER
Pt work contacted us asking for form to be re-emailed to them. Form is not scanned into chart yet. Spoke with Samantha and she said if a letter could be written and printed on letterhead stating that she is cleared to wear a N95 respirator for work that would be acceptable.     Please email letter to [email or web address]

## 2021-09-01 NOTE — PROGRESS NOTES
Keiry Mchugh is 56 year old  female who presents for a billable video visit today.    How would you like to obtain your AVS? MyChart  If dropped from the video visit, the video invitation should be resent by: Text to cell phone: [phone]  Will anyone else be joining your video visit? No      Video Start Time:10:30    BARIATRIC CONSULTATION    Impression: Keiry Mchugh is a 56 year old year old female with  has a past medical history of Anemia, Anxiety, Celiac disease, Chronic kidney disease, Cough, Depression, Diverticulitis, Dyslipidemia, Enlarged LV, GERD (gastroesophageal reflux disease), Hepatitis, Hiatal hernia, Hypertension, Metabolic syndrome, Migraines, HARDIN (nonalcoholic steatohepatitis), Peripheral neuropathy, PONV (postoperative nausea and vomiting), and Ventral hernia.  Poor functional capacity and musculoskeletal disability in the setting of the abovementioned weight related co-morbidities. Her Body mass index is 34.46 kg/m ..    Plan: DIET: 3 meals introduced protein first. Celiac   EXERCISE well beats   REFERRAL(S) 24 week plan   PHARMACOTHERAPY  Propranolol for headache and night time palpitations/panic and phentermine in the am for appetite suppression. Continue 4,000U vitaminD, C and add Super B complex.    We discussed [facility] Bariatric Basics including:  -eating 3 meals daily  -eating protein first  -eating slowly, chewing food well  -avoiding/limiting calorie containing beverages  -choosing wheat, not white with breads, crackers, pastas, venu, bagels, tortillas, rice  -limiting restaurant or cafeteria eating to twice a week or less    We discussed the importance of restorative sleep and stress management in maintaining a healthy weight.    We reviewed medications associated with weight gain.    We discussed insulin resistance and glycemic index as it relates to appetite and weight control.     We discussed the National Weight Control Registry healthy weight maintenance strategies and  ways to optimize metabolism.  We discussed the importance of physical activity including cardiovascular and strength training in maintaining a healthier weight and explored viable options.    We discussed medications available for weight loss including Phentermine, Phendimetrazine, Topamax, Qsymia, Diethylproprion, Orlistat, Contrave, Saxenda, Wegovy, and Vyvanse. We discussed the risks and benefits of each. We discussed indications, contraindications, potential side effects, and estimated costs of each. Literature was provided. Samantha understands that not using a weight loss medication is an option.      History Surrounding Consultation  Struggles with weight started as an adult 30-50, especially after 52 Frustrated with perimenopause  Her weight at age 20 was lean  She has had several past supervised and unsupervised weight loss attempts  The most weight lost was: 45#  Unfortunately there was not durable weight maintenance.  History of bulimia, anorexia, or binge eating disorder? no  If Present has eating disorder been in remission at least 3 years? NA  Night time eating? no    Dietary History  Meals per day: 2-3  Snacks: sometimes  Typical Snack: crackers, bananas, peanut butter,   Who does the grocery shopping? She does  Who does the cooking? She does  A typical meal includes: B: 2-3 eggs, turkey sausage and coffee with protein shake L: cottage cheese, crackers, fruit, chips,  D: cereal-fruity zena  Regular Pop: none  Juice: no  Caffeine: in the am with protein shake  Amount of restaurant eating per week: rare  Eating a the table with the TV off? yes    Physical Activity Patterns  Current physical activity routine includes: used to be good, walking, weight lifting at home, calisthenics now winded with stairs, Up until 1 year ago no exercise. Has gained 30# in the past year    Limitations from being physically active on a regular basis includes: fatigue    She describes her general health as: fair    Past  "Medical History  HTN: yes  Dyslipidemia: yes  CYNTHIA: no  Obesity Hypoventilation: NO  DM2: no DM1: no DX: no Most recent AIC: no  Neuropathy: sciatica  Nephropathy: no  Retinopathy: no Glaucoma no  IFG or \"pre-DM\": no  MI: no  CVA:no  CHF: no  Heart Valves: native  Previous cardiac testing includes: no  Cancers: no  Kidney Disease: no  DVT: no  PE: no  Colitis: no  Crohn's: no  IBS: no  PUD: no  Fatty Liver: yes  Abnormal LFTs: yes  Hepatitis: no  Asthma: no  Bronchitis: yes chronic  Pneumonia: has had  Other Lung Problems: no  Back Pain:yes  DDD: yes  Gout: no  Fibromyalgia: no  USI: yes  Severe Headaches: no  Seizures: no If so, last seizure: no  Pseudotumor: no  PCOS: no  Menstrual Irregularity: none  Menorrhagia: no  Infertility: no hyst at 38  Thyroid problems: no  Thyroid medications: no  HIV positive: NO  MRSA/VRE history: no  History of Blood transfusion: no  Anemia: no    Health Care Maintenance  Colonoscopy: UTD  Mammogram: UTD  Pap: hyst    Medications     Current Outpatient Medications:      Ascorbic Acid (VITAMIN C) 500 MG CAPS, Take 500 mg by mouth 2 times daily , Disp: , Rfl:      Black Pepper-Turmeric (TURMERIC CURCUMIN) 5-1000 MG CAPS, Take 1 tablet by mouth daily , Disp: , Rfl:      Cholecalciferol (VITAMIN D3) 50 MCG (2000 UT) CAPS, Take 2,000 Units by mouth 2 times daily , Disp: , Rfl:      ciprofloxacin (CIPRO) 500 MG tablet, Take 1 tablet (500 mg) by mouth every 12 hours for 7 days, Disp: 14 tablet, Rfl: 0     Coenzyme Q10 (COQ-10) 400 MG CAPS, Take 1 capsule by mouth daily , Disp: , Rfl:      FLUoxetine 20 MG tablet, Take 20 mg by mouth daily, Disp: , Rfl:      magnesium oxide (MAG-OX) 400 (240 Mg) MG tablet, Take 400 mg by mouth daily , Disp: , Rfl:      Melatonin 10 MG TABS tablet, Take 10 mg by mouth nightly as needed for sleep , Disp: , Rfl:      metroNIDAZOLE (FLAGYL) 250 MG tablet, Take 1 tablet (250 mg) by mouth 3 times daily for 7 days, Disp: 21 tablet, Rfl: 0     omeprazole (PRILOSEC) " 40 MG DR capsule, Take 1 capsule (40 mg) by mouth 2 times daily, Disp: 60 capsule, Rfl: 1     phentermine (ADIPEX-P) 37.5 MG tablet, Take 0.5-1 tablets (18.75-37.5 mg) by mouth every morning (before breakfast), Disp: 90 tablet, Rfl: 0     propranolol (INDERAL) 10 MG tablet, Take 1 tablet (10 mg) by mouth 2 times daily, Disp: 180 tablet, Rfl: 3     SUMAtriptan (IMITREX) 100 MG tablet, TAKE 1/2 TO 1 TABLET BY MOUTH AT ONSET OF HEADACHE, Disp: , Rfl:      atenolol (TENORMIN) 50 MG tablet, Take 50 mg by mouth daily as needed (Migraines)  (Patient not taking: Reported on 9/1/2021), Disp: , Rfl:      HEMP OIL OR EXTRACT OR OTHER CBD CANNABINOID, NOT MEDICAL CANNABIS,, Take 1.5 drops by mouth (Patient not taking: Reported on 9/1/2021), Disp: , Rfl:    Allergies   Gluten meal, Contrast dye, Ondansetron, Pcn [penicillins], Rocephin [ceftriaxone], and Zithromax [azithromycin]  Past Surgical History  Past Surgical History:   Procedure Laterality Date     APPENDECTOMY       CHOLECYSTECTOMY       COLON SURGERY       COLONOSCOPY N/A 3/18/2019    Procedure: COLONOSCOPY;  Surgeon: Davis Mosqueda MD;  Location: [facility];  Service: General     CYSTOSCOPY N/A 7/16/2015    Procedure: CYSTOSCOPY;  Surgeon: Nate Horta MD;  Location: [facility] OR;  Service:      HC CORRECT BUNION,METATARSAL OSTEOTOMY      Description: Bunion Correction With Metatarsal Osteotomy Herman Procedure;  Proc Date: 07/16/2010;     HYSTERECTOMY       HYSTERECTOMY, PAP NO LONGER INDICATED  02/2009     NEPHRECTOMY LIVING DONOR Left APRIL 2012     OK LAP,BILIARY TRACT,UNLISTED N/A 3/19/2019    Procedure: BIOPSY, LIVER, LAPAROSCOPIC;  Surgeon: Davis Mosqueda MD;  Location: [facility];  Service: General     OK LAP,SLING OPERATION      Description: Laparoscopic Sling Operation For Stress Incontinence;  Recorded: 02/17/2009;     OK LAP,SURG,COLECTOMY, PARTIAL, W/ANAST N/A 3/19/2019    Procedure: LAPAROSCOPIC SIGMOID COLON  RESECTION;  Surgeon: Davis Mosqueda MD;  Location: [facility] OR;  Service: General     SEPTOPLASTY, TURBINOPLASTY, COMBINED N/A 8/3/2021    Procedure: SEPTOPLASTY, NOSE, WITH TURBINOPLASTY RADIOFREQUENCY BALLON ASSISTED, CRYOBLATION OF NASAL TISSUE;  Surgeon: Randy Case MD;  Location: [facility] OR     SIGMOIDOSCOPY FLEXIBLE N/A 3/19/2019    Procedure: FLEXIBLE SIGMOIDOSCOPY;  Surgeon: Davis Mosqueda MD;  Location: [facility] OR;  Service: General     History of problems with anesthesia: no  History of Malignant Hyperthermia: NO    Gynecological History    Problems getting pregnant: no  MD Involvement: no If so, explanation/Diagnosis: no  : 3  Para: 2012  C-S: 0  Vaginal deliveries: 2  SAB:1  EAB: 0  Gestational DM: no  Gestational HTN: no  Preeclampsia: no  Current Birth Control: hyst    Family History  family history includes Breast Cancer (age of onset: 50.00) in her maternal aunt and mother; Graves' disease in her sister; Hashimoto's thyroiditis in her maternal aunt, maternal grandfather, and mother; Hypothyroidism in her maternal aunt, maternal aunt, and sister.    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      Addiction History  Smoking History:   Started smoking: age 14  Quit smokin Total years of tobacco use: 18  Alcohol use: none  Current or Past history of alcohol or substance abuse: no  Last used:   Chemical Dependency Treatment History: no  Chemicals: no    Psychiatric History  Diagnoses: depression and anxiety  Treated by: fluoxetine  Psychiatric Hospitalizations: no  Suicide attempts: no  ECT: no  Panic attacks: no  History of Abuse:     Palliative Medicine History  Involvement in a pain clinic: no    ROS  Sleep  Snoring: no  PND: no  Witnessed Apneas: no  [place]:   STOP BANG:   General  Fatigue: yes  Sleep Quality:not enough 5 hours  HEENT  Visual changes: no  Gastrointestinal  Heartburn: yes  Dysphagia:  "no  Cardiovascular  Murmur: no  Elevated BP: no  Chest Pain with Exertion: no  Dyspnea with Exertion: eys  Palpitations: yes wakes at night with them  Lower Extremity Edema: yes  Syncope: no  Pulmonary  Shortness of breath at rest: no  Snoring: no  PND: no  Wheezing: no  CPAP use: no  Gastrointestinal  Trouble swallowing:no  Heartburn: yes  HX UGI/EGD: yes HH  Abdominal pain: no  Hematochezia: no  Urologic  Hesitancy: no  Urgency: yes  Genitourinary  ED: NA  Menorrhagia: NA  Dysmenorrhea: NA  Neurologic  Severe headache:migraine  Paresthesias: no  Psychiatric  Moods Stable: yes  Hallucinations: no  Rheumatologic  Myalgias: yes  Arthralgias: yes  Endocrine  Polydipsia: no  Polyuria: no  Galactorrhea: no  Heat intolerance: no  Hirsutism: no  Musculoskeletal  Joint pain;yes  Falls:no  Use of cane, crutch or motorized scooter: no  Hematologic  Abnormal Bleeding or Clotting: no  Dermatologic  Skin Tags:   Striae: yes  Furuncless:   Acne:   Intertrigo:   Lower Leg ulcers: no      Physical Exam  Ht 1.702 m (5' 7\")   Wt 99.8 kg (220 lb)   BMI 34.46 kg/m          General Appearance  No acute distress. Obesity: central  Alert: yes  Sleepy: no  HEENT  PERRLA, EOMI  Neck  Stout: no   Airway:   Cardiovascular  Color pink  Pulmonary  Dayton Score: 2+  Lungs :non labored respirations  Abdomen    Extremities:  Neurologic  Tremors: no  Psychiatric  Thought Content Organized  Mood appears stable  Endocrine  Moon Facies: NO  Dorsal Thoracic Prominence: NO  Skin tags:   Acanthosis nigricans: no  Dermatologic  Intertrigo: no    Total time spent on the date of this encounter doing: chart review, review of test results, patient visit, physical exam, education, counseling, developing plan of care, and documenting = 60 minutes.        Video-Visit Details    Type of service:  Video Visit    Video End Time (time video stopped): 11:30  Originating Location (pt. Location): Home    Distant Location (provider location):  [facility]" SURGERY CLINIC AND BARIATRICS CARE [place]     Platform used for Video Visit: Noreen

## 2021-09-02 NOTE — TELEPHONE ENCOUNTER
The requested letter has been written.  It may be either sent now or put on my desk if a signature is required.

## 2021-09-02 NOTE — TELEPHONE ENCOUNTER
Calling to check status of the letter-   Pt left the paperwork on 8/20 visit but will take a written letter also    Zee #[phone] or cell# [phone] if questions

## 2021-09-04 ENCOUNTER — HEALTH MAINTENANCE LETTER (OUTPATIENT)
Age: 56
End: 2021-09-04

## 2021-09-07 NOTE — PROGRESS NOTES
CHIEF COMPLAINT:  Recheck      HISTORY OF PRESENT ILLNESS    Samantha was seen in follow up for post op check ater Nasal Septoplasty, Turbinate reduction, and cryoablation on 8/3/2021l.    Date of Operation:  08/03/21     Pre-operative Diagnosis:  1. Nasal Septal Deviation  2. Inferior turbinate Hypertrophy   Post-operative Diagnosis:  same  Procedure(s):  1.  Nasal Septoplasty with Nasal Endoscopic guidance 2. Radiofrequency and outfracture of inferior turbinates 3. Cryoablation of posterior nasal tissue.     Patient is doing exceedingly well after her septoplasty.  She is breathing through the nose and has no complaints.  The palate numbness has 80% resolved and continues to improve.  Her other concern today is chronic cough.  She suspects that she has a hiatal hernia and but that has not had this looked at.  She has a referral for GI pending.  She is on max PPI medication     REVIEW OF SYSTEMS    Review of Systems: a 10-system review is reviewed at this encounter.  See scanned document.     Gluten meal, Contrast dye, Ondansetron, Pcn [penicillins], Rocephin [ceftriaxone], and Zithromax [azithromycin]     PHYSICAL EXAM:        HEAD: Normal appearance and symmetry:  No cutaneous lesions.      EARS:   Auricles normal     NOSE:    Dorsum:   Straight  Septum: midline  Turbinates:  2+, well healed,       ORAL CAVITY/OROPHARYNX:    Lips:  Normal.     NECK:  Trachea:  midline       NEURO:   Alert and Oriented    GAIT AND STATION:  normal     RESPIRATORY:   Symmetry and Respiratory effort    PSYCH:   normal mood and affect    SKIN:  warm and dry         IMPRESSION:   Encounter Diagnoses   Name Primary?     Hiatal hernia Yes     Chronic cough      Recommend referral to Kevin Proctor DO for work-up of hiatal hernia which is and reflux disease.  We will schedule x-ray esophagram because Dr. Clemons will want to review this before she is seen.  All questions were answered.  She is agreed with plan of care.  She has a contrast  allergy to show an allergy to contrast dye (hives) so we will prescribe Benadryl to be taken before her esophagram         RECOMMENDATIONS:    No orders of the defined types were placed in this encounter.     Orders Placed This Encounter   Medications     diphenhydrAMINE (BENADRYL ALLERGY) 25 MG capsule     Sig: Take 2 capsules (50 mg) by mouth every 6 hours as needed for itching or allergies Administer 30 min - 2 hours pre - IV contrast injection     Dispense:  2 capsule     Refill:  0      Referral to Kevin Proctor DO for suspected hiatal hernia  XR esophagram  Return as needed

## 2021-09-08 ENCOUNTER — OFFICE VISIT (OUTPATIENT)
Dept: FAMILY MEDICINE | Facility: CLINIC | Age: 56
End: 2021-09-08
Payer: COMMERCIAL

## 2021-09-08 ENCOUNTER — OFFICE VISIT (OUTPATIENT)
Dept: OTOLARYNGOLOGY | Facility: CLINIC | Age: 56
End: 2021-09-08
Payer: COMMERCIAL

## 2021-09-08 ENCOUNTER — VIRTUAL VISIT (OUTPATIENT)
Dept: SURGERY | Facility: CLINIC | Age: 56
End: 2021-09-08
Payer: COMMERCIAL

## 2021-09-08 VITALS
BODY MASS INDEX: 34.24 KG/M2 | HEART RATE: 79 BPM | DIASTOLIC BLOOD PRESSURE: 75 MMHG | TEMPERATURE: 98.4 F | RESPIRATION RATE: 20 BRPM | WEIGHT: 218.6 LBS | SYSTOLIC BLOOD PRESSURE: 136 MMHG

## 2021-09-08 DIAGNOSIS — E66.9 OBESITY (BMI 30-39.9): Primary | ICD-10-CM

## 2021-09-08 DIAGNOSIS — R05.3 CHRONIC COUGH: ICD-10-CM

## 2021-09-08 DIAGNOSIS — R19.5 LOOSE STOOLS: ICD-10-CM

## 2021-09-08 DIAGNOSIS — K57.32 DIVERTICULITIS OF COLON: ICD-10-CM

## 2021-09-08 DIAGNOSIS — K75.81 NASH (NONALCOHOLIC STEATOHEPATITIS): ICD-10-CM

## 2021-09-08 DIAGNOSIS — R13.10 DYSPHAGIA, UNSPECIFIED TYPE: Primary | ICD-10-CM

## 2021-09-08 DIAGNOSIS — K44.9 HIATAL HERNIA: Primary | ICD-10-CM

## 2021-09-08 LAB
ALBUMIN SERPL-MCNC: 4.3 G/DL (ref 3.5–5)
ALP SERPL-CCNC: 127 U/L (ref 45–120)
ALT SERPL W P-5'-P-CCNC: 56 U/L (ref 0–45)
ANION GAP SERPL CALCULATED.3IONS-SCNC: 11 MMOL/L (ref 5–18)
AST SERPL W P-5'-P-CCNC: 25 U/L (ref 0–40)
BILIRUB SERPL-MCNC: 0.6 MG/DL (ref 0–1)
BUN SERPL-MCNC: 23 MG/DL (ref 8–22)
C DIFF TOX B STL QL: NEGATIVE
CALCIUM SERPL-MCNC: 10.2 MG/DL (ref 8.5–10.5)
CHLORIDE BLD-SCNC: 105 MMOL/L (ref 98–107)
CO2 SERPL-SCNC: 24 MMOL/L (ref 22–31)
CREAT SERPL-MCNC: 1.06 MG/DL (ref 0.6–1.1)
ERYTHROCYTE [DISTWIDTH] IN BLOOD BY AUTOMATED COUNT: 11.7 % (ref 10–15)
GFR SERPL CREATININE-BSD FRML MDRD: 60 ML/MIN/1.73M2
GLUCOSE BLD-MCNC: 116 MG/DL (ref 70–125)
HCT VFR BLD AUTO: 42 % (ref 35–47)
HGB BLD-MCNC: 14.1 G/DL (ref 11.7–15.7)
MCH RBC QN AUTO: 30.8 PG (ref 26.5–33)
MCHC RBC AUTO-ENTMCNC: 33.6 G/DL (ref 31.5–36.5)
MCV RBC AUTO: 92 FL (ref 78–100)
PLATELET # BLD AUTO: 345 10E3/UL (ref 150–450)
POTASSIUM BLD-SCNC: 4.7 MMOL/L (ref 3.5–5)
PROT SERPL-MCNC: 7.5 G/DL (ref 6–8)
RBC # BLD AUTO: 4.58 10E6/UL (ref 3.8–5.2)
SODIUM SERPL-SCNC: 140 MMOL/L (ref 136–145)
WBC # BLD AUTO: 4.1 10E3/UL (ref 4–11)

## 2021-09-08 PROCEDURE — 87506 IADNA-DNA/RNA PROBE TQ 6-11: CPT | Performed by: FAMILY MEDICINE

## 2021-09-08 PROCEDURE — 97802 MEDICAL NUTRITION INDIV IN: CPT | Mod: 95 | Performed by: DIETITIAN, REGISTERED

## 2021-09-08 PROCEDURE — 87493 C DIFF AMPLIFIED PROBE: CPT | Mod: 59 | Performed by: FAMILY MEDICINE

## 2021-09-08 PROCEDURE — 36415 COLL VENOUS BLD VENIPUNCTURE: CPT | Performed by: FAMILY MEDICINE

## 2021-09-08 PROCEDURE — 80053 COMPREHEN METABOLIC PANEL: CPT | Performed by: FAMILY MEDICINE

## 2021-09-08 PROCEDURE — 85027 COMPLETE CBC AUTOMATED: CPT | Performed by: FAMILY MEDICINE

## 2021-09-08 PROCEDURE — 99214 OFFICE O/P EST MOD 30 MIN: CPT | Performed by: FAMILY MEDICINE

## 2021-09-08 PROCEDURE — 99214 OFFICE O/P EST MOD 30 MIN: CPT | Performed by: OTOLARYNGOLOGY

## 2021-09-08 RX ORDER — DIPHENHYDRAMINE HCL 25 MG
50 CAPSULE ORAL EVERY 6 HOURS PRN
Qty: 2 CAPSULE | Refills: 0 | Status: SHIPPED | OUTPATIENT
Start: 2021-09-08 | End: 2023-06-01

## 2021-09-08 NOTE — LETTER
September 9, 2021      Keiry Mchugh  [address]  [place] [address]        Dear ,    We are writing to inform you of your test results.    Hi Samantha:  Your stool tests were all negative (for C dif and other bacteria/viruses screened for).  Continue to avoid dairy products.  Also take an Activia yogurt daily to help restore normal bowel function.   If the loose stools are persisting then I will place a referral to [facility].  The BUN and creatinine were slightly up suggesting you were a bit volume depleted/dehydrated at the time of your appt. Work on getting adequate hydration daily.  The liver enzymes look much better!  The remaining labs are normal.    Resulted Orders   Comprehensive metabolic panel (BMP + Alb, Alk Phos, ALT, AST, Total. Bili, TP)   Result Value Ref Range    Sodium 140 136 - 145 mmol/L    Potassium 4.7 3.5 - 5.0 mmol/L    Chloride 105 98 - 107 mmol/L    Carbon Dioxide (CO2) 24 22 - 31 mmol/L    Anion Gap 11 5 - 18 mmol/L    Urea Nitrogen 23 (H) 8 - 22 mg/dL    Creatinine 1.06 0.60 - 1.10 mg/dL    Calcium 10.2 8.5 - 10.5 mg/dL    Glucose 116 70 - 125 mg/dL    Alkaline Phosphatase 127 (H) 45 - 120 U/L    AST 25 0 - 40 U/L    ALT 56 (H) 0 - 45 U/L    Protein Total 7.5 6.0 - 8.0 g/dL    Albumin 4.3 3.5 - 5.0 g/dL    Bilirubin Total 0.6 0.0 - 1.0 mg/dL    GFR Estimate 60 (L) >60 mL/min/1.73m2      Comment:      As of July 11, 2021, eGFR is calculated by the CKD-EPI creatinine equation, without race adjustment. eGFR can be influenced by muscle mass, exercise, and diet. The reported eGFR is an estimation only and is only applicable if the renal function is stable.   CBC with platelets   Result Value Ref Range    WBC Count 4.1 4.0 - 11.0 10e3/uL    RBC Count 4.58 3.80 - 5.20 10e6/uL    Hemoglobin 14.1 11.7 - 15.7 g/dL    Hematocrit 42.0 35.0 - 47.0 %    MCV 92 78 - 100 fL    MCH 30.8 26.5 - 33.0 pg    MCHC 33.6 31.5 - 36.5 g/dL    RDW 11.7 10.0 - 15.0 %    Platelet Count 345 150 - 450 10e3/uL    Clostridium difficile Toxin B PCR   Result Value Ref Range    C Difficile Toxin B by PCR Negative Negative      Comment:      A negative result does not exclude actual disease due to C. difficile and may be due to improper collection, handling and storage of the specimen or the number of organisms in the specimen is below the detection limit of the assay.    Narrative    The Authentic8 Xpert C. difficile Assay, performed on the [employer]  Instrument Systems, is a qualitative in vitro diagnostic test for rapid detection of toxin B gene sequences from unformed (liquid or soft) stool specimens collected from patients suspected of having Clostridioides difficile infection (CDI). The test utilizes automated real-time polymerase chain reaction (PCR) to detect toxin gene sequences associated with toxin producing C. difficile. The Xpert C. difficile Assay is intended as an aid in the diagnosis of CDI.   Enteric Bacteria and Virus Panel by MARIBEL Stool   Result Value Ref Range    Campylobacter group Not Detected Not Detected    Salmonella species Not Detected Not Detected    Shigella species Not Detected Not Detected    Vibrio group Not Detected Not Detected    Rotavirus Not Detected Not Detected    Shiga toxin 1 gene Not Detected Not Detected    Shiga toxin 2 gene Not Detected Not Detected    Norovirus I and II Not Detected Not Detected    Yersinia enterocolitica Not Detected Not Detected    Narrative    Testing performed by multiplexed, qualitative PCR using the [employer] Enteric Pathogens Nucleic Acid Test. Results should not be used as the sole basis for diagnosis, treatment or other patient management decisions. Positive results do not rule out co-infection with other organisms that are not detected by this test and may not be the sole or definitive cause of patient illness. Negative results in the setting of clinical illness compatible with gastroenteritis may be due to infection by pathogens that are not  detected by this test or non-infectious causes such as ulcerative colitis, irritable bowel syndrome or Crohn's disease. Note: Shiga toxin producing E. coli (STEC) typically harbor one or both genes that encode for Shiga toxins 1 and 2.       If you have any questions or concerns, please call the clinic at the number listed above.       Sincerely,      Porfirio Linton MD

## 2021-09-08 NOTE — LETTER
9/8/2021         RE: Keiry Mchugh  [address]  [place] [address]        Dear Colleague,    Thank you for referring your patient, Keiry Mchugh, to the [facility] AND BARIATRICS CARE [place]. Please see a copy of my visit note below.    Keiry Mchugh is a 56 year old who is being evaluated via a billable video visit.      How would you like to obtain your AVS? MyChart  If the video visit is dropped, the invitation should be resent by: Send to e-mail at: [email or web address]  Will anyone else be joining your video visit? No      Video Start Time: 11:00 am      Medical Weight Loss Initial Diet Evaluation - 24 week program  Assessment:  Keiry is presenting today for a new weight management nutrition consultation. Pt has had an initial appointment with Dr. Gómez.  Weight loss medication: Phentermine. Propanalol.     Personal Goals: Wants to be able to do toe nails, would like to get to 150-160 lbs     Anthropometrics:    BMI: There is no height or weight on file to calculate BMI.   Ideal body weight: 61.6 kg (135 lb 12.9 oz)  Adjusted ideal body weight: 76.6 kg (168 lb 14.7 oz)  Estimated RMR (Fulton-St Jeor equation):  1623 kcals x 1.2 (sedentary) = 1948 kcals (for weight maintenance)    Medical History:  Patient Active Problem List   Diagnosis     Chronic insomnia     Suspected sleep apnea     HARDIN (nonalcoholic steatohepatitis)     Sigmoid diverticulitis     Dyslipidemia     Metabolic syndrome     Hypertension     Celiac disease      Diabetes: No  HbA1c:  No results found for: HGBA1C    Nutrition History:   Food allergies/intolerances/cultural or religous food customs: Yes, celiac diagnosed 3 years ago, one kidney (monitors protein intake)    Looking for accountability, stopping with yo-yo pattern    Dietary Recall:  She work 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 She does not reliably get a break during the work day which makes it very difficult for her to get in  "fluids and meals. She plans to discuss this with her supervisor. This food recall was from her day off yesterday.  Breakfast: 2 soft boiled eggs, and 1 turkey sausage link, a little later a nectarine  Lunch: Grilled pork, green leaf salad  Dinner: [place], cottage cheese, and gluten free crackers  Typical Snacks: Trying not to now    Beverages:   Water: sometimes adds sugar free flavoring, 60 oz or more throughout the day    Exercise:  None in the last several months due to medical problems    Nutrition Diagnosis (PES statement):   Overweight/Obesity (NC 3.3) related to overeating and poor lifestyle habits as evidenced by BMI 34.24    Nutrition Intervention  1. Food and/or Nutrient Delivery   a. Placed emphasis on importance of developing a healthy meal routine, aiming for 3 meals a day and no snacks.  b.   2. Nutrition Education   a. Educated on sources of lean protein, portion sizes, the amount of grams found in each source. Recommend patient to aim for 20-30g protein at each meal.  b. Discussed the importance of adequate hydration, with emphasis on drinking 64oz of water or zero calorie beverages per day.  3. Nutrition Counseling   a. Encouraged importance of developing routine exercise for health benefits and weight loss.    Goals established by patient:   1. Focus on maintaining her good eating habits: 3 meals per day, nutrient dense food intake  2. Talk to manager about work shift.    3. Improve her mindset - shift from the \"all or nothing\" mindset to a mindset of achieving balance      Handouts provided:  Recipe Resources    Assessment/Plan:    Pt will follow up with bariatrician and 1 month(s) with dietitian.     Video-Visit Details    Type of service:  Video Visit    Video End Time:11:35 am    Originating Location (pt. Location): Home    Distant Location (provider location):  [facility] SURGERY [facility] AND BARIATRICS CARE [place]     Platform used for Video Visit: Graciela Yi, " ZULEYKA        Again, thank you for allowing me to participate in the care of your patient.        Sincerely,        Celia Yi RD

## 2021-09-08 NOTE — PROGRESS NOTES
Hospital Follow-up Visit:    Hospital/Nursing Home/IP Rehab Facility: [facility]  Date of Admission: 8-29-21  Date of Discharge: 8-31-21  Reason(s) for Admission: diverticulitis      Was your hospitalization related to COVID-19? No   Problems taking medications regularly:  Loose stools  Medication changes since discharge: None  Problems adhering to non-medication therapy:  Loose stools    Summary of hospitalization:  [facility] reviewed  Diagnostic Tests/Treatments reviewed.  Follow up needed: follow up LFT/s  Other Healthcare Providers Involved in Patient s Care:         Surgical follow-up appointment - Dr Mosqueda  Update since discharge: improved.       HPI: follow up diverticulitis.  Bad  Diarrhea for a-2 days.  2-3  Watery stools daily.  Completing 10 day course of antibiotics today.  No fever.  Stomach still hurts some in area of diverticulitis and in the liver area.  90% better than when she went in the hospital.  Sigmoid colectomy in March 2019 and this is the first episode since then.  Also battling a hiatal hernia.  Off coffee as not tolerated.  By end of day upper abd bloats.  Eats small bits at a time which she can then tolerate.  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    Food getting stuck at times with swallowing.  Anything with texture to it seems to require to go down.    Sometimes feels like a strand of hair in the back of the throat.  On omeprazole 40 mg twice daily.  Drinking no caffeine.     Post Discharge Medication Reconciliation: discharge medications reconciled, continue medications without change.  Plan of care communicated with patient    Exam:    /75 (BP Location: Left arm, Patient Position: Sitting, Cuff Size: Adult Large)   Pulse 79   Temp 98.4  F (36.9  C) (Oral)   Resp 20   Wt 99.2 kg (218 lb 9.6 oz)   BMI 34.24 kg/m      Exam:     Gen:  Alert, o3, nad  Lungs cta  Cor rrr without murmur  abd  soft, +bs, mild llq tx without rebound, guarding.  No pain with abd shake or heel tap.  Slight ruq tx.  Ext without edema    Last Comprehensive Metabolic Panel:  Sodium   Date Value Ref Range Status   08/31/2021 141 136 - 145 mmol/L Final     Potassium   Date Value Ref Range Status   08/31/2021 3.9 3.5 - 5.0 mmol/L Final     Chloride   Date Value Ref Range Status   08/31/2021 109 (H) 98 - 107 mmol/L Final     Carbon Dioxide (CO2)   Date Value Ref Range Status   08/31/2021 25 22 - 31 mmol/L Final     Anion Gap   Date Value Ref Range Status   08/31/2021 7 5 - 18 mmol/L Final     Glucose   Date Value Ref Range Status   08/31/2021 105 70 - 125 mg/dL Final     Urea Nitrogen   Date Value Ref Range Status   08/31/2021 7 (L) 8 - 22 mg/dL Final     Creatinine   Date Value Ref Range Status   08/31/2021 0.78 0.60 - 1.10 mg/dL Final     GFR Estimate   Date Value Ref Range Status   08/31/2021 86 >60 mL/min/1.73m2 Final     Comment:     As of July 11, 2021, eGFR is calculated by the CKD-EPI creatinine equation, without race adjustment. eGFR can be influenced by muscle mass, exercise, and diet. The reported eGFR is an estimation only and is only applicable if the renal function is stable.   12/01/2020 61 >=60 mL/min/BSA Final   06/19/2020 >60 >60 mL/min/1.73m2 Final     Calcium   Date Value Ref Range Status   08/31/2021 8.9 8.5 - 10.5 mg/dL Final     Bilirubin Total   Date Value Ref Range Status   08/31/2021 0.5 0.0 - 1.0 mg/dL Final     Alkaline Phosphatase   Date Value Ref Range Status   08/31/2021 158 (H) 45 - 120 U/L Final     ALT   Date Value Ref Range Status   08/31/2021 154 (H) 0 - 45 U/L Final     AST   Date Value Ref Range Status   08/31/2021 63 (H) 0 - 40 U/L Final           Encounter Diagnoses   Name Primary?     Diverticulitis of colon      HARDIN (nonalcoholic steatohepatitis)      Loose stools      Dysphagia, unspecified type Yes     PLAN:   Stool for C Dif and bacterial/viral screen    Check CMP and CBC    Avoid dairy  products    Set up and Upper Endoscopy with a dilation if needed.     If fever, increase pain then should be seen in the ER.    If any recurrent sxs or slow resolution of sxs then should follow up with Dr Mosqueda.    Follow up in a week if not continued resolution of sxs.    HOSPITAL SUMMARY:  Abdominal pain, distention and fever, Acute sigmoid diverticulitis Patient presenting with a wk of abdominal discomfort, distention and fever, found to have acute sigmoid diverticulitis  - Low residue diet.   - Transition to oral antibiotics.  - Surgical service follow up as directed.   - Close outpatient follow up.      Abnormal LFTs: Elevated transaminases, alk phos and total bili noted. CT showed no acute hepatobiliary issues. This is likely due to hepatosteatosis, as evident on CT. Viral hepatitis panel was negative on 3/13/20  - Monitor closely as outpatient.      Anxiety  - Cont previously established outpatient regimen.      GERD:  - Cont previously established outpatient regimen.      Discharge Medications:           Review of your medicines           START taking      Dose / Directions   ciprofloxacin 500 MG tablet  Commonly known as: CIPRO  Indication: Infection Within the Abdomen       Dose: 500 mg  Take 1 tablet (500 mg) by mouth every 12 hours for 7 days  Quantity: 14 tablet  Refills: 0      metroNIDAZOLE 250 MG tablet  Commonly known as: FLAGYL  Indication: Infection Within the Abdomen       Dose: 250 mg  Take 1 tablet (250 mg) by mouth 3 times daily for 7 days  Quantity: 21 tablet  Refills: 0

## 2021-09-08 NOTE — PROGRESS NOTES
Keiry Mchugh is a 56 year old who is being evaluated via a billable video visit.      How would you like to obtain your AVS? MyChart  If the video visit is dropped, the invitation should be resent by: Send to e-mail at: [email or web address]  Will anyone else be joining your video visit? No      Video Start Time: 11:00 am      Medical Weight Loss Initial Diet Evaluation - 24 week program  Assessment:  Keiry is presenting today for a new weight management nutrition consultation. Pt has had an initial appointment with Dr. Gómez.  Weight loss medication: Phentermine. Propanalol.     Personal Goals: Wants to be able to do toe nails, would like to get to 150-160 lbs     Anthropometrics:    BMI: There is no height or weight on file to calculate BMI.   Ideal body weight: 61.6 kg (135 lb 12.9 oz)  Adjusted ideal body weight: 76.6 kg (168 lb 14.7 oz)  Estimated RMR ([place]-St Jeor equation):  1623 kcals x 1.2 (sedentary) = 1948 kcals (for weight maintenance)    Medical History:  Patient Active Problem List   Diagnosis     Chronic insomnia     Suspected sleep apnea     HARDIN (nonalcoholic steatohepatitis)     Sigmoid diverticulitis     Dyslipidemia     Metabolic syndrome     Hypertension     Celiac disease      Diabetes: No  HbA1c:  No results found for: HGBA1C    Nutrition History:   Food allergies/intolerances/cultural or religous food customs: Yes, celiac diagnosed 3 years ago, one kidney (monitors protein intake)    Looking for accountability, stopping with yo-yo pattern    Dietary Recall:  She work 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 She does not reliably get a break during the work day which makes it very difficult for her to get in fluids and meals. She plans to discuss this with her supervisor. This food recall was from her day off yesterday.  Breakfast: 2 soft boiled eggs, and 1 turkey sausage link, a little later a nectarine  Lunch: Grilled pork, green leaf salad  Dinner: [place], cottage cheese, and gluten  "free crackers  Typical Snacks: Trying not to now    Beverages:   Water: sometimes adds sugar free flavoring, 60 oz or more throughout the day    Exercise:  None in the last several months due to medical problems    Nutrition Diagnosis (PES statement):   Overweight/Obesity (NC 3.3) related to overeating and poor lifestyle habits as evidenced by BMI 34.24    Nutrition Intervention  1. Food and/or Nutrient Delivery   a. Placed emphasis on importance of developing a healthy meal routine, aiming for 3 meals a day and no snacks.  b.   2. Nutrition Education   a. Educated on sources of lean protein, portion sizes, the amount of grams found in each source. Recommend patient to aim for 20-30g protein at each meal.  b. Discussed the importance of adequate hydration, with emphasis on drinking 64oz of water or zero calorie beverages per day.  3. Nutrition Counseling   a. Encouraged importance of developing routine exercise for health benefits and weight loss.    Goals established by patient:   1. Focus on maintaining her good eating habits: 3 meals per day, nutrient dense food intake  2. Talk to manager about work shift.    3. Improve her mindset - shift from the \"all or nothing\" mindset to a mindset of achieving balance      Handouts provided:  Recipe Resources    Assessment/Plan:    Pt will follow up with bariatrician and 1 month(s) with dietitian.     Video-Visit Details    Type of service:  Video Visit    Video End Time:11:35 am    Originating Location (pt. Location): Home    Distant Location (provider location):  [facility] [place]     Platform used for Video Visit: Graciela Yi RD    "

## 2021-09-08 NOTE — LETTER
9/8/2021         RE: Keiry Mchugh  [address]  [place] [address]        Dear Colleague,    Thank you for referring your patient, Keiry Mchugh, to the [facility]. Please see a copy of my visit note below.    CHIEF COMPLAINT:  Recheck      HISTORY OF PRESENT ILLNESS    Samantha was seen in follow up for post op check ater Nasal Septoplasty, Turbinate reduction, and cryoablation on 8/3/2021l.    Date of Operation:  08/03/21     Pre-operative Diagnosis:  1. Nasal Septal Deviation  2. Inferior turbinate Hypertrophy   Post-operative Diagnosis:  same  Procedure(s):  1.  Nasal Septoplasty with Nasal Endoscopic guidance 2. Radiofrequency and outfracture of inferior turbinates 3. Cryoablation of posterior nasal tissue.     Patient is doing exceedingly well after her septoplasty.  She is breathing through the nose and has no complaints.  The palate numbness has 80% resolved and continues to improve.  Her other concern today is chronic cough.  She suspects that she has a hiatal hernia and but that has not had this looked at.  She has a referral for GI pending.  She is on max PPI medication     REVIEW OF SYSTEMS    Review of Systems: a 10-system review is reviewed at this encounter.  See scanned document.     Gluten meal, Contrast dye, Ondansetron, Pcn [penicillins], Rocephin [ceftriaxone], and Zithromax [azithromycin]     PHYSICAL EXAM:        HEAD: Normal appearance and symmetry:  No cutaneous lesions.      EARS:   Auricles normal     NOSE:    Dorsum:   Straight  Septum: midline  Turbinates:  2+, well healed,       ORAL CAVITY/OROPHARYNX:    Lips:  Normal.     NECK:  Trachea:  midline       NEURO:   Alert and Oriented    GAIT AND STATION:  normal     RESPIRATORY:   Symmetry and Respiratory effort    PSYCH:   normal mood and affect    SKIN:  warm and dry         IMPRESSION:   Encounter Diagnoses   Name Primary?     Hiatal hernia Yes     Chronic cough      Recommend referral to Kevin  DO Hitesh for work-up of hiatal hernia which is and reflux disease.  We will schedule x-ray esophagram because Dr. Clemons will want to review this before she is seen.  All questions were answered.  She is agreed with plan of care.  She has a contrast allergy to show an allergy to contrast dye (hives) so we will prescribe Benadryl to be taken before her esophagram         RECOMMENDATIONS:    No orders of the defined types were placed in this encounter.     Orders Placed This Encounter   Medications     diphenhydrAMINE (BENADRYL ALLERGY) 25 MG capsule     Sig: Take 2 capsules (50 mg) by mouth every 6 hours as needed for itching or allergies Administer 30 min - 2 hours pre - IV contrast injection     Dispense:  2 capsule     Refill:  0      Referral to Kevin Proctor DO for suspected hiatal hernia  XR esophagram  Return as needed      Again, thank you for allowing me to participate in the care of your patient.        Sincerely,        Randy Case MD

## 2021-09-08 NOTE — PATIENT INSTRUCTIONS
Stool for C Dif and bacterial/viral screen    Check CMP and CBC    Avoid dairy products    Ok for a probiotic    Set up and Upper Endoscopy with a dilation if needed.    If fever, increase pain then should be seen in the ER.    If any recurrent sxs or slow resolution of sxs then should follow up with Dr Mosqueda.    Follow up in a week if not continued resolution of sxs.

## 2021-09-13 ENCOUNTER — VIRTUAL VISIT (OUTPATIENT)
Dept: SURGERY | Facility: CLINIC | Age: 56
End: 2021-09-13
Payer: COMMERCIAL

## 2021-09-13 DIAGNOSIS — E66.9 OBESITY: Primary | ICD-10-CM

## 2021-09-13 PROCEDURE — 99207 PR MEDICAL WEIGHT MANAGEMENT PROGRAM ENROLLMENT: CPT

## 2021-09-13 NOTE — PROGRESS NOTES
Reason for Visit: 24-Week Healthy Lifestyle Plan Initial Visit - Health Coaching    Progress Notes:  New 24-Week Healthy Lifestyle Plan Weight Management Health Coaching Note  Keiry Mchugh   MRN: [id number]  : 1965  SHAAN: 2021    Initial Health  Visit #1 ~ Phone Visit    ASSESSMENT:  Initial Start Date:  2021  Graduation Date:  3/13/2022  [employer] (online resource) enrollment date(s):  2021  Physician:  Dr. Gómez  Initial Weight (lbs): 220 lbs.  Current Weight (lbs): 215 lbs. 1x/week  Average Daily Water (ounces): - did not report today  Weight Loss Medication: phentermine (helps with cravings but no extra energy)  Nutrition Plan: real whole foods     PATIENT INFORMATION PROVIDED via email sent by Health :  1.) 24 Week Healthy Lifestyle Plan Overview:  Explained the structure of the 24-week plan, reviewed scheduling information including the main scheduling phone number [phone], discussed all coaching sessions will be done via phone.  2.) [employer] - Online resource available to patient on the 1st day of the month following start date with Health .  Patient will receive a Welcome email from [employer] with their user name and password.  Patient will have full access to [employer] for a duration of 7 months.  This online resource will be continued if patient purchases the 24-week Extension which includes 3, 30-min. Health and Wellness Coaching appointments.  3.) Support Group monthly topics, dates/times - Flyer with more information provided by the Health  (see end of note for the current list)  4.) Explain the role of a Health  & the FOUR Pillars of Health (Nutrition, Exercise/Activity/Movement, Sleep and Stress Management)    INITIAL INTAKE:  The four pillars of well-being may impact your ability to manage weight.   Level of Satisfaction:  Rate your current level of satisfaction on a scale of 1-10 in each pillar.     Nutrition (1 -10):  7 - Celiac  Disease    Movement/Activity (1 -10):  5 - 8,000-12,000 steps/day at work (lots of fatigue)    Sleep (1 -10):  2 - fatigue but has a hard time falling asleep, every night:  Fall asleep, wakes back up right away in just a couple minutes (panick attacks)    Stress Management (1 -10): 5 - work is stressful, medical health situation - having stress is stressful      WELLNESS VISION: 5 minute Visioning Exercise (may be completed at visit #2/#3)    You may choose to close your eyes for this exercise. Start with three full breaths to bring your attention inward.    In your mind s eye, see yourself in the near future. You are your healthiest, happiest, and most true version of yourself. What do you see? What do you notice?     Invite patient to expand on this vision, and/or start  trying on  this vision this week.      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 Gómez (3months:  Schedule end of November)      Monthly Support Groups offered virtually via TEAMS.  Contact Mis Prince ([email or web address]) to be added to the invite list.  Friday, August 27, 12:30-1:30 pm:  Open Forum  Friday, September 24, 12:30-1:30 pm:   Sleep and the Seven Types of Rest  Friday, October 29, 12:30-1:30 pm:  Open Forum  Friday, November 19, 12:30-1:30 pm:  Gratitude  Friday, December 10, 12:30-1:30 pm:  Open Forum       SIGNATURE:  FATIMAH Bower, [facility]  National Board Certified Health &   24 Week Healthy Lifestyle Program Health   [facility]  [email or web address]

## 2021-09-13 NOTE — LETTER
2021         RE: Keiry Mchugh  [address] [place] [facility] [address]        Dear Colleague,    Thank you for referring your patient, Keiry Mchugh, to the [facility] [place]. Please see a copy of my visit note below.    Reason for Visit: 24-Week Healthy Lifestyle Plan Initial Visit - Health Coaching    Progress Notes:  New 24-Week Healthy Lifestyle Plan Weight Management Health Coaching Note  Keiry Mchugh   MRN: [id number]  : 1965  SHAAN: 2021    Initial Health  Visit #1 ~ Phone Visit    ASSESSMENT:  Initial Start Date:  2021  Graduation Date:  3/13/2022  [employer] (online resource) enrollment date(s):  2021  Physician:  Dr. Gómez  Initial Weight (lbs): 220 lbs.  Current Weight (lbs): 215 lbs. 1x/week  Average Daily Water (ounces): - did not report today  Weight Loss Medication: phentermine (helps with cravings but no extra energy)  Nutrition Plan: real whole foods     PATIENT INFORMATION PROVIDED via email sent by Health :  1.) 24 Week Healthy Lifestyle Plan Overview:  Explained the structure of the 24-week plan, reviewed scheduling information including the main scheduling phone number [phone], discussed all coaching sessions will be done via phone.  2.) [employer] - Online resource available to patient on the 1st day of the month following start date with Health .  Patient will receive a Welcome email from [employer] with their user name and password.  Patient will have full access to [employer] for a duration of 7 months.  This online resource will be continued if patient purchases the 24-week Extension which includes 3, 30-min. Health and Wellness Coaching appointments.  3.) Support Group monthly topics, dates/times - Flyer with more information provided by the Health  (see end of note for the current list)  4.) Explain the role of a Health  & the FOUR Pillars of Health (Nutrition,  Exercise/Activity/Movement, Sleep and Stress Management)    INITIAL INTAKE:  The four pillars of well-being may impact your ability to manage weight.   Level of Satisfaction:  Rate your current level of satisfaction on a scale of 1-10 in each pillar.     Nutrition (1 -10):  7 - Celiac Disease    Movement/Activity (1 -10):  5 - 8,000-12,000 steps/day at work (lots of fatigue)    Sleep (1 -10):  2 - fatigue but has a hard time falling asleep, every night:  Fall asleep, wakes back up right away in just a couple minutes (panick attacks)    Stress Management (1 -10): 5 - work is stressful, medical health situation - having stress is stressful      WELLNESS VISION: 5 minute Visioning Exercise (may be completed at visit #2/#3)    You may choose to close your eyes for this exercise. Start with three full breaths to bring your attention inward.    In your mind s eye, see yourself in the near future. You are your healthiest, happiest, and most true version of yourself. What do you see? What do you notice?     Invite patient to expand on this vision, and/or start  trying on  this vision this week.      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 Gómez (3months:  Schedule end of November)      Monthly Support Groups offered virtually via TEAMS.  Contact Mis Niki ([email or web address]) to be added to the invite list.  Friday, August 27, 12:30-1:30 pm:  Open Forum  Friday, September 24, 12:30-1:30 pm:  Sleep and the Seven Types of Rest  Friday, October 29, 12:30-1:30 pm:  Open Forum  Friday, November 19, 12:30-1:30 pm:  Gratitude  Friday, December 10, 12:30-1:30 pm:  Open Forum       SIGNATURE:  FATIMAH Bower, [facility]  National Board Certified Health &   24 Week Healthy Lifestyle Program Health   [facility] [facility]  [email or web address]        Again, thank you for allowing me to participate in the care of your patient.        Sincerely,        Diamond Dutta

## 2021-09-22 ENCOUNTER — TELEPHONE (OUTPATIENT)
Dept: FAMILY MEDICINE | Facility: CLINIC | Age: 56
End: 2021-09-22

## 2021-09-22 NOTE — TELEPHONE ENCOUNTER
We received a leave of absence form for this patient via fax today. Please fax completed form back to Evin Lazaro at [phone]. I put quinton in out guide and put on Patrica's desk.

## 2021-09-24 NOTE — TELEPHONE ENCOUNTER
LMTCB to go over FMLA forms with patient. Forms in my inbasket if patient calls back please review FMLA paperwork with patient. I assume this FMLA is for her diverticulitis hospitalization on 8/29/21.

## 2021-09-25 ENCOUNTER — OFFICE VISIT (OUTPATIENT)
Dept: FAMILY MEDICINE | Facility: CLINIC | Age: 56
End: 2021-09-25
Payer: COMMERCIAL

## 2021-09-25 VITALS
SYSTOLIC BLOOD PRESSURE: 135 MMHG | TEMPERATURE: 98.5 F | OXYGEN SATURATION: 98 % | RESPIRATION RATE: 18 BRPM | DIASTOLIC BLOOD PRESSURE: 86 MMHG | HEART RATE: 92 BPM

## 2021-09-25 DIAGNOSIS — J40 BRONCHITIS: Primary | ICD-10-CM

## 2021-09-25 DIAGNOSIS — R05.9 COUGH: ICD-10-CM

## 2021-09-25 PROCEDURE — 99214 OFFICE O/P EST MOD 30 MIN: CPT | Performed by: PHYSICIAN ASSISTANT

## 2021-09-25 PROCEDURE — U0003 INFECTIOUS AGENT DETECTION BY NUCLEIC ACID (DNA OR RNA); SEVERE ACUTE RESPIRATORY SYNDROME CORONAVIRUS 2 (SARS-COV-2) (CORONAVIRUS DISEASE [COVID-19]), AMPLIFIED PROBE TECHNIQUE, MAKING USE OF HIGH THROUGHPUT TECHNOLOGIES AS DESCRIBED BY CMS-2020-01-R: HCPCS | Mod: 90 | Performed by: PHYSICIAN ASSISTANT

## 2021-09-25 PROCEDURE — 99000 SPECIMEN HANDLING OFFICE-LAB: CPT | Performed by: PHYSICIAN ASSISTANT

## 2021-09-25 RX ORDER — BENZONATATE 100 MG/1
100 CAPSULE ORAL 3 TIMES DAILY PRN
Qty: 21 CAPSULE | Refills: 0 | Status: SHIPPED | OUTPATIENT
Start: 2021-09-25 | End: 2022-05-01

## 2021-09-25 RX ORDER — PREDNISONE 20 MG/1
40 TABLET ORAL DAILY
Qty: 10 TABLET | Refills: 0 | Status: SHIPPED | OUTPATIENT
Start: 2021-09-25 | End: 2021-09-30

## 2021-09-25 RX ORDER — ALBUTEROL SULFATE 90 UG/1
2 AEROSOL, METERED RESPIRATORY (INHALATION) EVERY 6 HOURS
Qty: 6.7 G | Refills: 0 | Status: SHIPPED | OUTPATIENT
Start: 2021-09-25 | End: 2022-09-19

## 2021-09-25 ASSESSMENT — ENCOUNTER SYMPTOMS
SINUS PAIN: 0
CHEST TIGHTNESS: 1
DIARRHEA: 0
SINUS PRESSURE: 0
WHEEZING: 0
COUGH: 1
RHINORRHEA: 1
SHORTNESS OF BREATH: 1
VOMITING: 0
FEVER: 0
SORE THROAT: 1
NAUSEA: 0

## 2021-09-25 NOTE — PROGRESS NOTES
Patient presents with:  Cough: COUGH FOR ABOUT A WEEK. GETTING WORST. **FULLY VACCINATED** WAS IN HOSPITAL ABOUT 3 WEEKS AGO DUE TO DIVERTICULITIS AT [facility].       Clinical Decision Making: Patient experiencing frequent productive cough.  Lungs are sounding clear to auscultation.  She does have significant pack-year history.  She may suffer from chronic bronchitis.  She has a past medical history of hospitalization on this time of year.  Recommend treating this as bronchitis including prednisone, albuterol, Tessalon Perles.  Patient is agreeable to this plan.  She will also start an oral antihistamine that she has at home.  Covid test was collected and is pending.      ICD-10-CM    1. Bronchitis  J40 predniSONE (DELTASONE) 20 MG tablet     albuterol (PROAIR HFA/PROVENTIL HFA/VENTOLIN HFA) 108 (90 Base) MCG/ACT inhaler     benzonatate (TESSALON) 100 MG capsule   2. Cough  R05 Symptomatic COVID-19 Virus (Coronavirus) by PCR Nose       Patient Instructions   There were no signs of pneumonia.    Your symptoms are most likely due to acute bronchitis.  This is inflammation of the tubes leading into the lungs, most often due to a viral infection and an antibiotic will not help this.    I have prescribed an albuterol inhaler to help with the cough/wheezing.    May take Tessalon Perles as needed for cough.    Begin taking Prednisone according to bottle instructions this medicine is taken with food and in the morning if possible.     Please monitor symptoms carefully.  If developing chest pain, shortness of breath, fever, coughing up blood, extreme fatigue, or any other new, concerning symptoms, come back to clinic or go to ER immediately.  Otherwise, if no improvement in symptoms in one week, follow-up with your primary care provider.        HPI:  Keiry Mchugh is a 56 year old female who presents today complaining of cough x 1 week. Cough has been worsening over the recent days. Patient is fully vaccinated against  COVID. Patient states that she gets sxs like these every October. She states once she was hospitalized for it, but she was not given a diagnosis. She may suffer from chronic bronchitis. Patient denies any known fevers. She denies any current GI upset. She reports tightness in the chest, but no wheezing. She has been taking vitamins, but no other OTC meds.     History obtained from the patient.    Problem List:  2021: Sigmoid diverticulitis  2021: HARDIN (nonalcoholic steatohepatitis)  2020: Chronic insomnia  2020: Suspected sleep apnea  Dyslipidemia  Metabolic syndrome  Hypertension  Celiac disease      Past Medical History:   Diagnosis Date     Anemia      Anxiety      Celiac disease      Celiac disease      Chronic kidney disease     donated 1 kidney     Cough      Depression     Seasonal     Diverticulitis      Dyslipidemia      Enlarged LV     wall     GERD (gastroesophageal reflux disease)      Hepatitis      Hiatal hernia      Hypertension      Metabolic syndrome      Migraines      HARDIN (nonalcoholic steatohepatitis)      Peripheral neuropathy      PONV (postoperative nausea and vomiting)      Ventral hernia        Social History     Tobacco Use     Smoking status: Former Smoker     Packs/day: 0.00     Quit date: 1999     Years since quittin.7     Smokeless tobacco: Never Used   Substance Use Topics     Alcohol use: No     Comment: rare       Review of Systems   Constitutional: Negative for fever.   HENT: Positive for congestion, rhinorrhea (mild ) and sore throat (last week, secondary to cough). Negative for sinus pressure and sinus pain.    Respiratory: Positive for cough (productive), chest tightness and shortness of breath. Negative for wheezing.    Gastrointestinal: Negative for diarrhea, nausea and vomiting.       Vitals:    21 1327   BP: 135/86   BP Location: Right arm   Patient Position: Sitting   Cuff Size: Adult Regular   Pulse: 92   Resp: 18   Temp: 98.5  F (36.9  C)    TempSrc: Oral   SpO2: 98%       Physical Exam  Vitals and nursing note reviewed.   Constitutional:       General: She is not in acute distress.     Appearance: She is not toxic-appearing or diaphoretic.   HENT:      Head: Normocephalic and atraumatic.      Right Ear: Tympanic membrane, ear canal and external ear normal.      Left Ear: Ear canal and external ear normal.      Nose: No congestion or rhinorrhea.      Mouth/Throat:      Mouth: Mucous membranes are moist.      Pharynx: Posterior oropharyngeal erythema present. No oropharyngeal exudate.   Eyes:      Conjunctiva/sclera: Conjunctivae normal.   Pulmonary:      Effort: Pulmonary effort is normal. No respiratory distress.      Breath sounds: No wheezing, rhonchi or rales.   Lymphadenopathy:      Cervical: No cervical adenopathy.   Neurological:      Mental Status: She is alert.   Psychiatric:         Mood and Affect: Mood normal.         Behavior: Behavior normal.         Thought Content: Thought content normal.         Judgment: Judgment normal.         At the end of the encounter, I discussed results, diagnosis, medications. Discussed red flags for immediate return to clinic/ER, as well as indications for follow up if no improvement. Patient understood and agreed to plan. Patient was stable for discharge.

## 2021-09-25 NOTE — PATIENT INSTRUCTIONS
There were no signs of pneumonia.    Your symptoms are most likely due to acute bronchitis.  This is inflammation of the tubes leading into the lungs, most often due to a viral infection and an antibiotic will not help this.    I have prescribed an albuterol inhaler to help with the cough/wheezing.    May take Tessalon Perles as needed for cough.    Begin taking Prednisone according to bottle instructions this medicine is taken with food and in the morning if possible.     Please monitor symptoms carefully.  If developing chest pain, shortness of breath, fever, coughing up blood, extreme fatigue, or any other new, concerning symptoms, come back to clinic or go to ER immediately.  Otherwise, if no improvement in symptoms in one week, follow-up with your primary care provider.

## 2021-09-27 ENCOUNTER — TELEPHONE (OUTPATIENT)
Dept: FAMILY MEDICINE | Facility: CLINIC | Age: 56
End: 2021-09-27

## 2021-09-27 LAB — SARS-COV-2 RNA RESP QL NAA+PROBE: NOT DETECTED

## 2021-09-27 NOTE — TELEPHONE ENCOUNTER
PLEASE SEE FORM IN MY IN-BOX.    There are a few addition questions I need answered (see yellow post its) before the form is complete.  Thanks.

## 2021-09-27 NOTE — TELEPHONE ENCOUNTER
Called and talked briefly with Samantha.  She is very sick with a bad cough so we decided to cancel today's health coaching appointment and plan to meet next week, Monday Oct. 4 at 1:30 pm.  She does not lose today's appointment.     Patient is enrolled in the 24-Week Healthy Lifestyle Plan thru the [facility].    FATIMAH Bower, [facility]  National Board Certified Health &   24 Week Healthy Lifestyle Program Health   [facility]  [phone]  email:  [email or web address]

## 2021-09-27 NOTE — TELEPHONE ENCOUNTER
Patient calling for COVID testing resutls from 9/25/21 -     Lab Results indicate In Process    Pt frustrated that they are not ready, and that writer can not provider timeframe when to expect.

## 2021-09-29 ENCOUNTER — DOCUMENTATION ONLY (OUTPATIENT)
Dept: SURGERY | Facility: CLINIC | Age: 56
End: 2021-09-29

## 2021-09-29 ENCOUNTER — OFFICE VISIT (OUTPATIENT)
Dept: SURGERY | Facility: CLINIC | Age: 56
End: 2021-09-29
Attending: OTOLARYNGOLOGY
Payer: COMMERCIAL

## 2021-09-29 VITALS — SYSTOLIC BLOOD PRESSURE: 118 MMHG | BODY MASS INDEX: 33 KG/M2 | WEIGHT: 210.7 LBS | DIASTOLIC BLOOD PRESSURE: 72 MMHG

## 2021-09-29 DIAGNOSIS — K57.32 DIVERTICULITIS OF COLON: ICD-10-CM

## 2021-09-29 DIAGNOSIS — K21.00 GASTROESOPHAGEAL REFLUX DISEASE WITH ESOPHAGITIS WITHOUT HEMORRHAGE: Primary | ICD-10-CM

## 2021-09-29 DIAGNOSIS — K44.9 HIATAL HERNIA: ICD-10-CM

## 2021-09-29 PROCEDURE — 99214 OFFICE O/P EST MOD 30 MIN: CPT | Performed by: SURGERY

## 2021-09-29 NOTE — LETTER
9/29/2021         RE: Keiry Mchugh  [address]  [place] [address]        Dear Colleague,    Thank you for referring your patient, Keiry Mchugh, to the [facility] [place]. Please see a copy of my visit note below.    GENERAL SURGICAL CONSULTATION    I was requested by Porfirio Linton to consult on this pt to evaluate them for gastroesophageal reflux disease and diverticulitis.    HPI:  This is a 56 year old female here today with 2 concerns.  First that she was recently in the emergency department with recurrent abdominal pain.  The patient has suffered with an attack of diverticulitis.  This patient had a perforated diverticulitis before and to address that she had a resection of part of her sigmoid colon.  That was done in March 2019.  This more recent episode the patient had abdominal pain that rapidly progressed to be quite severe and CT scan showed that she had diverticulitis in the segment of colon proximal to the anastomosis.  The patient was appropriately treated with antibiotics and is currently feeling well.    Patient also is having troubles with recurrent cough.  She believes this cough is secondary to her reflux disease and has recently been started on omeprazole at 40 mg twice daily.  Despite the omeprazole she continues to have a significant cough. She had an upper endoscopy within the last year at the [facility].  She does have a burning epigastric pain particularly after eating that sounds consistent with GERD.    Allergies:Gluten meal, Contrast dye, Ondansetron, Pcn [penicillins], Rocephin [ceftriaxone], and Zithromax [azithromycin]    Past Medical History:   Diagnosis Date     Anemia      Anxiety      Celiac disease      Celiac disease      Chronic kidney disease     donated 1 kidney     Cough      Depression     Seasonal     Diverticulitis      Dyslipidemia      Enlarged LV     wall     GERD (gastroesophageal reflux disease)       Hepatitis      Hiatal hernia      Hypertension      Metabolic syndrome      Migraines      HARDIN (nonalcoholic steatohepatitis)      Peripheral neuropathy      PONV (postoperative nausea and vomiting)      Ventral hernia        Past Surgical History:   Procedure Laterality Date     APPENDECTOMY       CHOLECYSTECTOMY       COLON SURGERY       COLONOSCOPY N/A 3/18/2019    Procedure: COLONOSCOPY;  Surgeon: Davis Mosqueda MD;  Location: [facility];  Service: General     CYSTOSCOPY N/A 7/16/2015    Procedure: CYSTOSCOPY;  Surgeon: Nate Horta MD;  Location: [facility];  Service:      HC CORRECT BUNION,METATARSAL OSTEOTOMY      Description: Bunion Correction With Metatarsal Osteotomy Herman Procedure;  Proc Date: 07/16/2010;     HYSTERECTOMY       HYSTERECTOMY, PAP NO LONGER INDICATED  02/2009     NEPHRECTOMY LIVING DONOR Left APRIL 2012     NV LAP,BILIARY TRACT,UNLISTED N/A 3/19/2019    Procedure: BIOPSY, LIVER, LAPAROSCOPIC;  Surgeon: Davis Mosqueda MD;  Location: [facility];  Service: General     NV LAP,SLING OPERATION      Description: Laparoscopic Sling Operation For Stress Incontinence;  Recorded: 02/17/2009;     NV LAP,SURG,COLECTOMY, PARTIAL, W/ANAST N/A 3/19/2019    Procedure: LAPAROSCOPIC SIGMOID COLON RESECTION;  Surgeon: Davis Mosqueda MD;  Location: [facility];  Service: General     SEPTOPLASTY, TURBINOPLASTY, COMBINED N/A 8/3/2021    Procedure: SEPTOPLASTY, NOSE, WITH TURBINOPLASTY RADIOFREQUENCY BALLON ASSISTED, CRYOBLATION OF NASAL TISSUE;  Surgeon: Randy Case MD;  Location: [facility]     SIGMOIDOSCOPY FLEXIBLE N/A 3/19/2019    Procedure: FLEXIBLE SIGMOIDOSCOPY;  Surgeon: Davis Mosqueda MD;  Location: [facility];  Service: General       CURRENT MEDS:  Current Outpatient Medications   Medication Sig Dispense Refill     albuterol (PROAIR HFA/PROVENTIL HFA/VENTOLIN HFA) 108 (90 Base) MCG/ACT inhaler Inhale 2 puffs into the lungs  every 6 hours 6.7 g 0     Ascorbic Acid (VITAMIN C) 500 MG CAPS Take 500 mg by mouth 2 times daily        atenolol (TENORMIN) 50 MG tablet Take 50 mg by mouth daily as needed (Migraines)        benzonatate (TESSALON) 100 MG capsule Take 1 capsule (100 mg) by mouth 3 times daily as needed for cough 21 capsule 0     Black Pepper-Turmeric (TURMERIC CURCUMIN) 5-1000 MG CAPS Take 1 tablet by mouth daily        Cholecalciferol (VITAMIN D3) 50 MCG (2000 UT) CAPS Take 2,000 Units by mouth 2 times daily        Coenzyme Q10 (COQ-10) 400 MG CAPS Take 1 capsule by mouth daily        diphenhydrAMINE (BENADRYL ALLERGY) 25 MG capsule Take 2 capsules (50 mg) by mouth every 6 hours as needed for itching or allergies Administer 30 min - 2 hours pre - IV contrast injection 2 capsule 0     FLUoxetine 20 MG tablet Take 20 mg by mouth daily       HEMP OIL OR EXTRACT OR OTHER CBD CANNABINOID, NOT MEDICAL CANNABIS, Take 1.5 drops by mouth        magnesium oxide (MAG-OX) 400 (240 Mg) MG tablet Take 400 mg by mouth daily        Melatonin 10 MG TABS tablet Take 10 mg by mouth nightly as needed for sleep        omeprazole (PRILOSEC) 40 MG DR capsule Take 1 capsule (40 mg) by mouth 2 times daily 60 capsule 1     phentermine (ADIPEX-P) 37.5 MG tablet Take 0.5-1 tablets (18.75-37.5 mg) by mouth every morning (before breakfast) 90 tablet 0     predniSONE (DELTASONE) 20 MG tablet Take 2 tablets (40 mg) by mouth daily for 5 days 10 tablet 0     propranolol (INDERAL) 10 MG tablet Take 1 tablet (10 mg) by mouth 2 times daily 180 tablet 3     SUMAtriptan (IMITREX) 100 MG tablet TAKE 1/2 TO 1 TABLET BY MOUTH AT ONSET OF HEADACHE       vitamin B complex with vitamin C (VITAMIN  B COMPLEX) tablet Take 1 tablet by mouth daily         Family History   Problem Relation Age of Onset     Hashimoto's thyroiditis Mother      Breast Cancer Mother 50.00     Graves' disease Sister      Hypothyroidism Maternal Aunt      Breast Cancer Maternal Aunt 50.00      Hashimoto's thyroiditis Maternal Grandfather      Hypothyroidism Sister      Hypothyroidism Maternal Aunt      Hashimoto's thyroiditis Maternal Aunt      Anesthesia Reaction No family hx of      Family history is not pertinent to this patients Chief Complaint.     reports that she quit smoking about 22 years ago. She smoked 0.00 packs per day. She has never used smokeless tobacco. She reports previous drug use. She reports that she does not drink alcohol.    Review of Systems -   10 point Review of systems is negative except for; as mentioned above in HPI and PMHx    /72 (BP Location: Right arm, Patient Position: Sitting, Cuff Size: Adult Regular)   Wt 95.6 kg (210 lb 11.2 oz)   BMI 33.00 kg/m    Body mass index is 33 kg/m .    EXAM:  GENERAL: Well developed female  HEENT: EOMI, Anicteric Sclera, Moist Mucous Membranes,  In Mouth the pt does not have redness or bleeding gums  CARDIOVASCULAR: RRR w/out murmur   CHEST/LUNG: Clear to Auscultation  ABDOMEN:  Non tender to palpation, +BS  MUSCULOSKELETAL:  No deformities with good range of motion in all extremities  NEURO: She is ambulatory with good strength in both legs.  HEME/LYMPH: No Cervical Adenopathy or tenderness.     IMAGES:  CT scan of the abdomen and pelvis shows an inflamed area of colon proximal to the rectosigmoid anastomosis.  This appears consistent with diverticulitis.    On that CT scan I do not appreciate an obvious hiatal hernia.    Assessment/Plan:  56-year-old woman who despite having had part of her sigmoid colon removed has now come down with yet another episode of diverticulitis.  This illness did respond nicely to antibiotics but we discussed that she clearly is prone to developing diverticulitis and reviewed some methods to avoid getting diverticulitis as well as the importance of getting in for early treatment if she has abdominal pain that feels consistent with his diverticulitis.    The other issue is gastroesophageal reflux disease  and the degree to which this is contributing to her cough.  I think the best way to assess the degree of reflux that she is experiencing would be to evaluate her with a manometry and pH probe testing.  I did present some antireflux surgical options which she is interested in pursuing but before we discussed this in any significant degree I would like to see the data on how her esophagus is working to this point.  A manometry and pH probe testing will be helpful to see if she had benefit from this type of intervention.    Manometry and pH probe testing with [facility]    Follow-up 2 weeks after her manometry and pH probe testing and have asked her to bring the information from the [facility] to that clinic visit as well to see if we need to repeat upper endoscopy or free of the information needed from that visit.      Davis Mosqueda MD  [facility]  [phone]      Again, thank you for allowing me to participate in the care of your patient.        Sincerely,        Davis Mosqueda MD

## 2021-09-29 NOTE — PROGRESS NOTES
PH probe (Bravo) and manometry orders placed on [facility] online portal for scheduling per Dr. Mosqueda.      [facility]     Mis Mane  RN, BSN  [facility]  General Surgery  [address] [place] [id number]  [email or web address]   Office:[phone]  Employed by [facility],

## 2021-09-29 NOTE — PROGRESS NOTES
GENERAL SURGICAL CONSULTATION    I was requested by Porfirio Linton to consult on this pt to evaluate them for gastroesophageal reflux disease and diverticulitis.    HPI:  This is a 56 year old female here today with 2 concerns.  First that she was recently in the emergency department with recurrent abdominal pain.  The patient has suffered with an attack of diverticulitis.  This patient had a perforated diverticulitis before and to address that she had a resection of part of her sigmoid colon.  That was done in March 2019.  This more recent episode the patient had abdominal pain that rapidly progressed to be quite severe and CT scan showed that she had diverticulitis in the segment of colon proximal to the anastomosis.  The patient was appropriately treated with antibiotics and is currently feeling well.    Patient also is having troubles with recurrent cough.  She believes this cough is secondary to her reflux disease and has recently been started on omeprazole at 40 mg twice daily.  Despite the omeprazole she continues to have a significant cough. She had an upper endoscopy within the last year at the [facility].  She does have a burning epigastric pain particularly after eating that sounds consistent with GERD.    Allergies:Gluten meal, Contrast dye, Ondansetron, Pcn [penicillins], Rocephin [ceftriaxone], and Zithromax [azithromycin]    Past Medical History:   Diagnosis Date     Anemia      Anxiety      Celiac disease      Celiac disease      Chronic kidney disease     donated 1 kidney     Cough      Depression     Seasonal     Diverticulitis      Dyslipidemia      Enlarged LV     wall     GERD (gastroesophageal reflux disease)      Hepatitis      Hiatal hernia      Hypertension      Metabolic syndrome      Migraines      HARDIN (nonalcoholic steatohepatitis)      Peripheral neuropathy      PONV (postoperative nausea and vomiting)      Ventral hernia        Past Surgical History:   Procedure Laterality Date      APPENDECTOMY       CHOLECYSTECTOMY       COLON SURGERY       COLONOSCOPY N/A 3/18/2019    Procedure: COLONOSCOPY;  Surgeon: Davis Mosqueda MD;  Location: [facility] OR;  Service: General     CYSTOSCOPY N/A 7/16/2015    Procedure: CYSTOSCOPY;  Surgeon: Nate Horta MD;  Location: [facility];  Service:      HC CORRECT BUNION,METATARSAL OSTEOTOMY      Description: Bunion Correction With Metatarsal Osteotomy Herman Procedure;  Proc Date: 07/16/2010;     HYSTERECTOMY       HYSTERECTOMY, PAP NO LONGER INDICATED  02/2009     NEPHRECTOMY LIVING DONOR Left APRIL 2012     MO LAP,BILIARY TRACT,UNLISTED N/A 3/19/2019    Procedure: BIOPSY, LIVER, LAPAROSCOPIC;  Surgeon: Davis Mosqueda MD;  Location: [facility] [place];  Service: General     MO LAP,SLING OPERATION      Description: Laparoscopic Sling Operation For Stress Incontinence;  Recorded: 02/17/2009;     MO LAP,SURG,COLECTOMY, PARTIAL, W/ANAST N/A 3/19/2019    Procedure: LAPAROSCOPIC SIGMOID COLON RESECTION;  Surgeon: Davis Mosqueda MD;  Location: [facility] [place];  Service: General     SEPTOPLASTY, TURBINOPLASTY, COMBINED N/A 8/3/2021    Procedure: SEPTOPLASTY, NOSE, WITH TURBINOPLASTY RADIOFREQUENCY BALLON ASSISTED, CRYOBLATION OF NASAL TISSUE;  Surgeon: Randy Case MD;  Location: [facility] [place]     SIGMOIDOSCOPY FLEXIBLE N/A 3/19/2019    Procedure: FLEXIBLE SIGMOIDOSCOPY;  Surgeon: Davis Mosqueda MD;  Location: [facility] [place];  Service: General       CURRENT MEDS:  Current Outpatient Medications   Medication Sig Dispense Refill     albuterol (PROAIR HFA/PROVENTIL HFA/VENTOLIN HFA) 108 (90 Base) MCG/ACT inhaler Inhale 2 puffs into the lungs every 6 hours 6.7 g 0     Ascorbic Acid (VITAMIN C) 500 MG CAPS Take 500 mg by mouth 2 times daily        atenolol (TENORMIN) 50 MG tablet Take 50 mg by mouth daily as needed (Migraines)        benzonatate (TESSALON) 100 MG capsule Take 1 capsule (100 mg) by mouth 3 times daily as  needed for cough 21 capsule 0     Black Pepper-Turmeric (TURMERIC CURCUMIN) 5-1000 MG CAPS Take 1 tablet by mouth daily        Cholecalciferol (VITAMIN D3) 50 MCG (2000 UT) CAPS Take 2,000 Units by mouth 2 times daily        Coenzyme Q10 (COQ-10) 400 MG CAPS Take 1 capsule by mouth daily        diphenhydrAMINE (BENADRYL ALLERGY) 25 MG capsule Take 2 capsules (50 mg) by mouth every 6 hours as needed for itching or allergies Administer 30 min - 2 hours pre - IV contrast injection 2 capsule 0     FLUoxetine 20 MG tablet Take 20 mg by mouth daily       HEMP OIL OR EXTRACT OR OTHER CBD CANNABINOID, NOT MEDICAL CANNABIS, Take 1.5 drops by mouth        magnesium oxide (MAG-OX) 400 (240 Mg) MG tablet Take 400 mg by mouth daily        Melatonin 10 MG TABS tablet Take 10 mg by mouth nightly as needed for sleep        omeprazole (PRILOSEC) 40 MG DR capsule Take 1 capsule (40 mg) by mouth 2 times daily 60 capsule 1     phentermine (ADIPEX-P) 37.5 MG tablet Take 0.5-1 tablets (18.75-37.5 mg) by mouth every morning (before breakfast) 90 tablet 0     predniSONE (DELTASONE) 20 MG tablet Take 2 tablets (40 mg) by mouth daily for 5 days 10 tablet 0     propranolol (INDERAL) 10 MG tablet Take 1 tablet (10 mg) by mouth 2 times daily 180 tablet 3     SUMAtriptan (IMITREX) 100 MG tablet TAKE 1/2 TO 1 TABLET BY MOUTH AT ONSET OF HEADACHE       vitamin B complex with vitamin C (VITAMIN  B COMPLEX) tablet Take 1 tablet by mouth daily         Family History   Problem Relation Age of Onset     Hashimoto's thyroiditis Mother      Breast Cancer Mother 50.00     Graves' disease Sister      Hypothyroidism Maternal Aunt      Breast Cancer Maternal Aunt 50.00     Hashimoto's thyroiditis Maternal Grandfather      Hypothyroidism Sister      Hypothyroidism Maternal Aunt      Hashimoto's thyroiditis Maternal Aunt      Anesthesia Reaction No family hx of      Family history is not pertinent to this patients Chief Complaint.     reports that she quit  smoking about 22 years ago. She smoked 0.00 packs per day. She has never used smokeless tobacco. She reports previous drug use. She reports that she does not drink alcohol.    Review of Systems -   10 point Review of systems is negative except for; as mentioned above in HPI and PMHx    /72 (BP Location: Right arm, Patient Position: Sitting, Cuff Size: Adult Regular)   Wt 95.6 kg (210 lb 11.2 oz)   BMI 33.00 kg/m    Body mass index is 33 kg/m .    EXAM:  GENERAL: Well developed female  HEENT: EOMI, Anicteric Sclera, Moist Mucous Membranes,  In Mouth the pt does not have redness or bleeding gums  CARDIOVASCULAR: RRR w/out murmur   CHEST/LUNG: Clear to Auscultation  ABDOMEN:  Non tender to palpation, +BS  MUSCULOSKELETAL:  No deformities with good range of motion in all extremities  NEURO: She is ambulatory with good strength in both legs.  HEME/LYMPH: No Cervical Adenopathy or tenderness.     IMAGES:  CT scan of the abdomen and pelvis shows an inflamed area of colon proximal to the rectosigmoid anastomosis.  This appears consistent with diverticulitis.    On that CT scan I do not appreciate an obvious hiatal hernia.    Assessment/Plan:  56-year-old woman who despite having had part of her sigmoid colon removed has now come down with yet another episode of diverticulitis.  This illness did respond nicely to antibiotics but we discussed that she clearly is prone to developing diverticulitis and reviewed some methods to avoid getting diverticulitis as well as the importance of getting in for early treatment if she has abdominal pain that feels consistent with his diverticulitis.    The other issue is gastroesophageal reflux disease and the degree to which this is contributing to her cough.  I think the best way to assess the degree of reflux that she is experiencing would be to evaluate her with a manometry and pH probe testing.  I did present some antireflux surgical options which she is interested in pursuing  but before we discussed this in any significant degree I would like to see the data on how her esophagus is working to this point.  A manometry and pH probe testing will be helpful to see if she had benefit from this type of intervention.    Manometry and pH probe testing with [facility]    Follow-up 2 weeks after her manometry and pH probe testing and have asked her to bring the information from the [facility] to that clinic visit as well to see if we need to repeat upper endoscopy or free of the information needed from that visit.      Davis Mosqueda MD  [facility]  [phone]

## 2021-10-11 ENCOUNTER — VIRTUAL VISIT (OUTPATIENT)
Dept: SURGERY | Facility: CLINIC | Age: 56
End: 2021-10-11
Payer: COMMERCIAL

## 2021-10-11 DIAGNOSIS — E66.9 OBESITY: Primary | ICD-10-CM

## 2021-10-11 PROCEDURE — 99207 PR MWM HEALTH COACH NO CHARGE: CPT

## 2021-10-11 NOTE — LETTER
"    10/11/2021         RE: Keiry Mchugh  [address] [place] [address]        Dear Colleague,    Thank you for referring your patient, Keiry Mchugh, to the [facility] SURGERY CL[facility]IC AND BARIATRICS CARE [place]. Please see a copy of my visit note below.    Reason for Visit: 24-Week Healthy Lifestyle Plan Follow up Visit - Health Coaching    Progress Notes:  Return 24-Week Healthy Lifestyle Plan Weight Management Health Coaching Note  Keiry Mchugh   MRN: [id number]  : 1965  SHAAN: 10/11/2021  Health  Follow-up Visit #:  2  Telephone Visit: yes    ASSESSMENT:  Initial Start Date:  2021  Graduation Date:  3/13/2022  [employer] (online resource) enrollment date(s):  2021  Physician:  Dr. Gómez  Initial Weight (lbs): 220 lbs.  Current Weight (lbs): 206.6 lbs. 1x/week  Average Daily Water (ounces): - did not report today  Weight Loss Medication: phentermine (helps with cravings but no extra energy)  Nutrition Plan: real whole foods       PILLAR(S) DISCUSSED IN THIS VISIT:  Nutrition: putting into practice what is a \"good carb\" and what is a \"bad carb\"  Exercise/Movement: 30 min. walks/  Sleep: Extreme fatigue but has a hard time falling asleep, every night:  Fall asleep, wakes back up right away in just a couple minutes (panick attacks) - Dr. Gómez prescribed a Beta Blocker but didn't seem to make any difference   Wonders if she has narcolepsy and will halucinnate about spiders  Other: what area does she want to focus on moving forward from today?:  Cooking and does not like to cook. Has celiac so very different for her to consider (cross contamination)      GOALS:   Exercise/Movement: 30 min walk (3-4x/week) M, T after work, W before work, ( & Fri)  Nutrition:  Main focus and is talking with Celia on Wed. 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  [place] about the 24 HLP, thru her sister who is experiencing success with the program  Lots of medical issues   [place] Maru - [place]  Bengal Cat - Rajeev (tiger) Alhaji (your majesty)  Lots of health concerns:  Celiac Disease, Hernia, Diverticulitis  Sleep patterns not good - wakes up within 2 minutes of falling asleep, heart racing - could be Panic Attacks  (beta blocker prescribed but isn't working to help with the heart racing)      FOLLOW-Up:  10/13 with Celia moved to 9:00 am, RD f/u, 10/25 with Diamond for HC #3 (scheduled  at 1:30 pm thru the end of the year)      Reminder:  Monthly Support Groups offered virtually via TEAMS.  Contact Mis Prince ([email or web address]) to be added to the invite list.  , 12:30-1:30 pm:  Open Forum  , 12:30-1:30 pm:  Gratitude  Friday, December 10, 12:30-1:30 pm:  Open Forum   dates coming soon...     SIGNATURE:  FATIMAH Bower, formerly [facility]  National Board-Certified Health &   24 Week Healthy Lifestyle Program Health   [place] [facility]  email:  [email or web address]  appointment [phone]      Again, thank you for allowing me to participate in the care of your patient.        Sincerely,        Diamond Dutta

## 2021-10-11 NOTE — PROGRESS NOTES
"Reason for Visit: 24-Week Healthy Lifestyle Plan Follow up Visit - Health Coaching    Progress Notes:  Return 24-Week Healthy Lifestyle Plan Weight Management Health Coaching Note  Keiry Mchugh   MRN: [id number]  : 1965  SHAAN: 10/11/2021  Health  Follow-up Visit #:  2  Telephone Visit: yes    ASSESSMENT:  Initial Start Date:  2021  Graduation Date:  3/13/2022  [employer] (online resource) enrollment date(s):  2021  Physician:  Dr. Gómez  Initial Weight (lbs): 220 lbs.  Current Weight (lbs): 206.6 lbs. 1x/week  Average Daily Water (ounces): - did not report today  Weight Loss Medication: phentermine (helps with cravings but no extra energy)  Nutrition Plan: real whole foods       PILLAR(S) DISCUSSED IN THIS VISIT:  Nutrition: putting into practice what is a \"good carb\" and what is a \"bad carb\"  Exercise/Movement: 30 min. walks/  Sleep: Extreme fatigue but has a hard time falling asleep, every night:  Fall asleep, wakes back up right away in just a couple minutes (panick attacks) - Dr. Gómez prescribed a Beta Blocker but didn't seem to make any difference   Wonders if she has narcolepsy and will halucinnate about spiders  Other: what area does she want to focus on moving forward from today?:  Cooking and does not like to cook. Has celiac so very different for her to consider (cross contamination)      GOALS:   Exercise/Movement: 30 min walk (3-4x/week) M, T after work, W before work, ( & Fri)  Nutrition:  Main focus and is talking with Celia on Wed. 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  [place] about the 24 " HLP, thru her sister who is experiencing success with the program  Lots of medical issues   [place] Maru - Flori Boo (tiger) Alhaji (your majesty)  Lots of health concerns:  Celiac Disease, Hernia, Diverticulitis  Sleep patterns not good - wakes up within 2 minutes of falling asleep, heart racing - could be Panic Attacks  (beta blocker prescribed but isn't working to help with the heart racing)      FOLLOW-Up:  10/13 with Celia moved to 9:00 am, RD f/u, 10/25 with Diamond for HC #3 (scheduled  at 1:30 pm thru the end of the year)      Reminder:  Monthly Support Groups offered virtually via TEAMS.  Contact Mis Prince ([email or web address]) to be added to the invite list.  , 12:30-1:30 pm:  Open Forum  , 12:30-1:30 pm:  Gratitude  Friday, December 10, 12:30-1:30 pm:  Open Forum   dates coming soon...     SIGNATURE:  FATIMAH Bower, [facility]  National Board-Certified Health &   24 Week Healthy Lifestyle Program Health   [place] [facility]  email:  [email or web address]  appointment [phone]

## 2021-10-13 ENCOUNTER — VIRTUAL VISIT (OUTPATIENT)
Dept: SURGERY | Facility: CLINIC | Age: 56
End: 2021-10-13
Payer: COMMERCIAL

## 2021-10-13 DIAGNOSIS — E66.9 OBESITY (BMI 30-39.9): Primary | ICD-10-CM

## 2021-10-13 PROCEDURE — 97803 MED NUTRITION INDIV SUBSEQ: CPT | Mod: 95 | Performed by: DIETITIAN, REGISTERED

## 2021-10-13 NOTE — LETTER
"    10/13/2021         RE: Keiry Mchugh  [address]  [place] [address]        Dear Colleague,    Thank you for referring your patient, Keiry Mchugh, to the [facility] SURGERY CLINIC AND BARIATRICS CARE [place]. Please see a copy of my visit note below.    Keiry Mchugh is a 56 year old who is being evaluated via a billable telephone visit.      How would you like to obtain your AVS? [facility]    Medical  Weight Loss Follow-Up Diet Evaluation  Assessment:  Keiry is presenting today for a follow up weight management nutrition consultation. Pt has had an initial appointment with Dr. Gómez  Weight loss medication: Phentermine.  Pt's weight is 206 lbs    No flowsheet data found.  BMI: There is no height or weight on file to calculate BMI.  Ideal body weight: 61.6 kg (135 lb 12.9 oz)  Adjusted ideal body weight: 75.2 kg (165 lb 12.2 oz)    Estimated RMR ([place]-St Jeor equation):  1623 kcals x 1.2 (sedentary) = 1948 kcals (for weight maintenance)  Patient Active Problem List:  Patient Active Problem List   Diagnosis     Chronic insomnia     Suspected sleep apnea     HARDIN (nonalcoholic steatohepatitis)     Sigmoid diverticulitis     Dyslipidemia     Metabolic syndrome     Hypertension     Celiac disease     Diabetes: No    Progress on goals from last visit: Patient is doing well with her nutritional intake. She has found a routine that is working for her. She was concerned about her rate of weight loss (12 lbs in the last ~5 weeks) that it might be too slow. We discussed weight loss expectations today.  1. Focus on maintaining her good eating habits: 3 meals per day, nutrient dense food intake - met  2. Talk to manager about work shift.    3. Improve her mindset - shift from the \"all or nothing\" mindset to a mindset of achieving balance - working on this    Dietary Intake:  Eating 3 meals and is doing bulk of her meal prep one day per week. She is sticking to primarily meat, fruit, and vegetables. " "Avoids wheat due to Celiac. She is thinking about adding in some other carbs such as potatoes and rice, but is worried these will inhibit weight loss.   Beverages:   Water: doing less of the sugar free flavoring, 60 oz or more throughout the day   Exercise:   Has been sick a lot lately, is back walking 3x per week outside  Nutrition Diagnosis:    Overweight/Obesity (NC 3.3) related to overeating and poor lifestyle habits as evidenced by BMI 34.24    Intervention:  1. Food and/or nutrient delivery: 3 meals per day, nutrient dense food intake  2. Nutrition education: Weight loss expectations  3. Nutrition counseling: Discussed effectiveness of her current plan and how she feels with her current plan.    Monitoring/Evaluation:    Goals:  1. Focus on maintaining her good eating habits: 3 meals per day, nutrient dense food intake  2. Talk to manager about work shift.    3. Improve her mindset - shift from the \"all or nothing\" mindset to a mindset of achieving balance      Patient to follow up with bariatrician and 1 month(s) with ZULEYKA    Phone call duration: 20 minutes    Celia Yi RD      Again, thank you for allowing me to participate in the care of your patient.        Sincerely,        Celia Yi RD    "

## 2021-10-13 NOTE — PROGRESS NOTES
"Keiry Mchugh is a 56 year old who is being evaluated via a billable telephone visit.      How would you like to obtain your AVS? [facility]    Medical  Weight Loss Follow-Up Diet Evaluation  Assessment:  Keiry is presenting today for a follow up weight management nutrition consultation. Pt has had an initial appointment with Dr. Gómez  Weight loss medication: Phentermine.  Pt's weight is 206 lbs    No flowsheet data found.  BMI: There is no height or weight on file to calculate BMI.  Ideal body weight: 61.6 kg (135 lb 12.9 oz)  Adjusted ideal body weight: 75.2 kg (165 lb 12.2 oz)    Estimated RMR ([place]-St Jeor equation):  1623 kcals x 1.2 (sedentary) = 1948 kcals (for weight maintenance)  Patient Active Problem List:  Patient Active Problem List   Diagnosis     Chronic insomnia     Suspected sleep apnea     HARDIN (nonalcoholic steatohepatitis)     Sigmoid diverticulitis     Dyslipidemia     Metabolic syndrome     Hypertension     Celiac disease     Diabetes: No    Progress on goals from last visit: Patient is doing well with her nutritional intake. She has found a routine that is working for her. She was concerned about her rate of weight loss (12 lbs in the last ~5 weeks) that it might be too slow. We discussed weight loss expectations today.  1. Focus on maintaining her good eating habits: 3 meals per day, nutrient dense food intake - met  2. Talk to manager about work shift.    3. Improve her mindset - shift from the \"all or nothing\" mindset to a mindset of achieving balance - working on this    Dietary Intake:  Eating 3 meals and is doing bulk of her meal prep one day per week. She is sticking to primarily meat, fruit, and vegetables. Avoids wheat due to Celiac. She is thinking about adding in some other carbs such as potatoes and rice, but is worried these will inhibit weight loss.   Beverages:   Water: doing less of the sugar free flavoring, 60 oz or more throughout the day   Exercise:   Has been sick a lot " "lately, is back walking 3x per week outside  Nutrition Diagnosis:    Overweight/Obesity (NC 3.3) related to overeating and poor lifestyle habits as evidenced by BMI 34.24    Intervention:  1. Food and/or nutrient delivery: 3 meals per day, nutrient dense food intake  2. Nutrition education: Weight loss expectations  3. Nutrition counseling: Discussed effectiveness of her current plan and how she feels with her current plan.    Monitoring/Evaluation:    Goals:  1. Focus on maintaining her good eating habits: 3 meals per day, nutrient dense food intake  2. Talk to manager about work shift.    3. Improve her mindset - shift from the \"all or nothing\" mindset to a mindset of achieving balance      Patient to follow up with bariatrician and 1 month(s) with ZULEYKA    Phone call duration: 20 minutes    Celia Yi RD  "

## 2021-10-25 ENCOUNTER — VIRTUAL VISIT (OUTPATIENT)
Dept: SURGERY | Facility: CLINIC | Age: 56
End: 2021-10-25
Payer: COMMERCIAL

## 2021-10-25 DIAGNOSIS — F32.9 MAJOR DEPRESSIVE DISORDER WITH SINGLE EPISODE, REMISSION STATUS UNSPECIFIED: Primary | ICD-10-CM

## 2021-10-25 DIAGNOSIS — G43.119 INTRACTABLE MIGRAINE WITH AURA WITHOUT STATUS MIGRAINOSUS: ICD-10-CM

## 2021-10-25 DIAGNOSIS — E66.9 OBESITY: Primary | ICD-10-CM

## 2021-10-25 PROCEDURE — 99207 PR MWM HEALTH COACH NO CHARGE: CPT

## 2021-10-25 NOTE — LETTER
10/25/2021         RE: Keiry Mchugh  [address] [place] [address]        Dear Colleague,    Thank you for referring your patient, Keiry Mchugh, to the [facility] AND BAR[facility]ATRICS CARE [place]. Please see a copy of my visit note below.    Reason for Visit: 24-Week Healthy Lifestyle Plan Follow up Visit - Health Coaching    Progress Notes:  Return 24-Week Healthy Lifestyle Plan Weight Management Health Coaching Note  Keiry Mchugh   MRN: [id number]  : 1965  SHAAN: 10/25/2021  Health  Follow-up Visit #:  3  Telephone Visit: yes      ASSESSMENT:  Initial Start Date:  2021  Graduation Date:  3/13/2022  Eleanor (online resource) enrollment date(s):  2021  Physician:  Dr. Gómez  Initial Weight (lbs): 220 lbs.  Current Weight (lbs): 204 lbs. 1x/week (down 16 lbs since starting with Dr. Gómez) (Samantha is down 16 lbs in 8 weeks - this is great! However she is feeling frustrated - talked about how she loses weight will be how she keeps the weight off)  Average Daily Water (ounces): - 50 oz/day - good each day (harder on days she works double shifts)  Weight Loss Medication: phentermine (helps with cravings but no extra energy)  Nutrition Plan: real whole foods       PILLAR(S) DISCUSSED IN THIS VISIT:  Nutrition: not eating junk food and starchy carbs, not eating sweets and feels ok. 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 10/13 with a few more  "specifics  Stress mgt:  Thinking about what she may \"need\" especially with work being hectic (another person quit which impacts the team)    Goals set with 10/13 with ZULEYKA Yang:  1. Focus on maintaining her good eating habits: 3 meals per day, nutrient dense food intake - difficult when she has been working a double shift all last week - eating at 7pm sometimes and then up back up at 5:30 am  2. Talk to manager about work shift.    3. Improve her mindset - shift from the \"all or nothing\" mindset to a mindset of achieving balance      GOALS:   Continue with food choices and find time to add in movement, even if not 30 min., (remind self, it doesn't have to be all or nothing)  Exercise/Movement: would like to increase this, even with her work schedule  Other: wants to stay on track with what she is doing  Nutrition: may talk more with Celia end of November on ideas to have 2 plans for nutrition:  What does she do on the days she works double shifts and has only about 10 min. Throughout that day for a break.  Wants to ensure she is getting bare minimum requirement for nourishment.      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  [place] about the 24 HLP, thru her sister who is experiencing success with the program  Lots of medical issues   [facility]  Flori Boo (mitch) Alhaji (your majesty)  Lots of health concerns:  Celiac Disease, Hernia, Diverticulitis  Sleep patterns not good - wakes up within 2 minutes of falling asleep, heart racing - could be Panic Attacks  (beta blocker prescribed but isn't working to help with the heart racing)      FOLLOW-Up:  11/8 with Diamond for HC #4, 11/24 with ZULEYKA Yang (scheduled Mondays at 1:30 pm to hold that spot moving forward, cancel as " "needed)    Reminder:  Monthly Support Groups offered virtually via TEAMS.  Contact Mis Prince ([email or web address]) to be added to the invite list.  , 12:30-1:30 pm:  Open Forum  , 12:30-1:30 pm:  Gratitude  Friday, December 10, 12:30-1:30 pm:  Open Forum  2022, 12:30-1:30 pm: Healthy Habits - based on the book \"Atomic Habits\" by Davis George, presented by Samantha Barreto MS, RN  2022, 12:30-1:30 pm:  Open Forum  2022, 12:30-1:30 pm:  topic TBD  2022, 12:30-1:30 pm:  topic TBD  Friday, May 20, 2022, 12:30-1:30 pm:  Open Forum       SIGNATURE:  FATIMAH Bower, [facility]  National Board-Certified Health &   24 Week Healthy Lifestyle Program Health   [place]  email:  [email or web address]  appointment [phone]      Again, thank you for allowing me to participate in the care of your patient.        Sincerely,        Diamond Dutta    "

## 2021-10-25 NOTE — PROGRESS NOTES
"Reason for Visit: 24-Week Healthy Lifestyle Plan Follow up Visit - Health Coaching    Progress Notes:  Return 24-Week Healthy Lifestyle Plan Weight Management Health Coaching Note  Keiry Mchugh   MRN: [id number]  : 1965  SHAAN: 10/25/2021  Health  Follow-up Visit #:  3  Telephone Visit: yes      ASSESSMENT:  Initial Start Date:  2021  Graduation Date:  3/13/2022  Eleanor (online resource) enrollment date(s):  2021  Physician:  Dr. Gómez  Initial Weight (lbs): 220 lbs.  Current Weight (lbs): 204 lbs. 1x/week (down 16 lbs since starting with Dr. Gómez) (Samantha is down 16 lbs in 8 weeks - this is great! However she is feeling frustrated - talked about how she loses weight will be how she keeps the weight off)  Average Daily Water (ounces): - 50 oz/day - good each day (harder on days she works double shifts)  Weight Loss Medication: phentermine (helps with cravings but no extra energy)  Nutrition Plan: real whole foods       PILLAR(S) DISCUSSED IN THIS VISIT:  Nutrition: not eating junk food and starchy carbs, not eating sweets and feels ok. 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 10/13 with a few more specifics  Stress mgt:  Thinking about what she may \"need\" especially with work being hectic (another person quit which impacts the team)    Goals set with 10/13 with Celia, RD:  1. Focus on maintaining her good eating habits: 3 meals per day, nutrient dense food intake - difficult when she " "has been working a double shift all last week - eating at 7pm sometimes and then up back up at 5:30 am  2. Talk to manager about work shift.    3. Improve her mindset - shift from the \"all or nothing\" mindset to a mindset of achieving balance      GOALS:   Continue with food choices and find time to add in movement, even if not 30 min., (remind self, it doesn't have to be all or nothing)  Exercise/Movement: would like to increase this, even with her work schedule  Other: wants to stay on track with what she is doing  Nutrition: may talk more with Celia end of November on ideas to have 2 plans for nutrition:  What does she do on the days she works double shifts and has only about 10 min. Throughout that day for a break.  Wants to ensure she is getting bare minimum requirement for nourishment.      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  [place] about the 24 HLP, thru her sister who is experiencing success with the program  Lots of medical issues   [facility] - Flori Boo (tiger) Alhaji (your majesty)  Lots of health concerns:  Celiac Disease, Hernia, Diverticulitis  Sleep patterns not good - wakes up within 2 minutes of falling asleep, heart racing - could be Panic Attacks  (beta blocker prescribed but isn't working to help with the heart racing)      FOLLOW-Up:  11/8 with Diamond for HC #4, 11/24 with ZULEYKA Yang (scheduled Mondays at 1:30 pm to hold that spot moving forward, cancel as needed)    Reminder:  Monthly Support Groups offered virtually via TEAMS.  Contact Mis Prince ([email or web address]) to be added to the invite list.  Friday, October 29, 12:30-1:30 pm:  Open Forum  Friday, November 19, 12:30-1:30 pm:  Gratitude  Friday, December 10, 12:30-1:30 pm:  Open Forum  Friday, January " "2022, 12:30-1:30 pm: Healthy Habits - based on the book \"Atomic Habits\" by Davis George, presented by Samantha Barreto MS, RN  2022, 12:30-1:30 pm:  Open Forum  2022, 12:30-1:30 pm:  topic TBD  2022, 12:30-1:30 pm:  topic TBD  Friday, May 20, 2022, 12:30-1:30 pm:  Open Forum       SIGNATURE:  FATIMAH Bower, [facility]  National Board-Certified Health &   24 Week Healthy Lifestyle Program Health   [facility]  email:  [email or web address]  appointment [phone]  "

## 2021-10-27 RX ORDER — SUMATRIPTAN 100 MG/1
TABLET, FILM COATED ORAL
Qty: 9 TABLET | Refills: 3 | Status: SHIPPED | OUTPATIENT
Start: 2021-10-27 | End: 2022-11-01

## 2021-10-27 NOTE — TELEPHONE ENCOUNTER
"  Disp Refills Start End DEBI    SUMAtriptan (IMITREX) 100 MG tablet 9 tablet 5 11/19/2020  No   Sig: TAKE 1/2 TO 1 TABLET BY MOUTH AT ONSET OF HEADACHE   Sent to pharmacy as: SUMAtriptan 100 mg tablet (IMITREX)   E-Prescribing Status: Receipt confirmed by pharmacy (11/19/2020  9:57 AM CST)       SUMAtriptan (IMITREX) 100 MG tablet [[id number]]    Electronically signed by: Kaye Case RN on 11/19/20 0957 Status: Active   Ordering user: Kaye Case RN 11/19/20 0957 Ordering provider: Porfirio Linton MD   Authorized by: Porfirio Linton MD   Frequency:  11/19/20 - Until Discontinued Released by: Kaye Case RN 11/19/20 0957   Diagnoses  Intractable migraine with aura without status migrainosus [G43.119]     Routing refill request to provider for review/approval because:  SA review    Last office visit provider:  9/8/21     Requested Prescriptions   Pending Prescriptions Disp Refills     SUMAtriptan (IMITREX) 100 MG tablet [Pharmacy Med Name: SUMATRIPTAN 100MG TABLETS] 9 tablet      Sig: TAKE 1/2 TO 1 TABLET BY MOUTH AT ONSET OF HEADACHE       Serotonin Agonists Failed - 10/25/2021  8:14 PM        Failed - Serotonin Agonist request needs review.     Please review patient's record. If patient has had 8 or more treatments in the past month, please forward to provider.          Passed - Blood pressure under 140/90 in past 12 months     BP Readings from Last 3 Encounters:   09/29/21 118/72   09/25/21 135/86   09/08/21 136/75                 Passed - Recent (12 mo) or future (30 days) visit within the authorizing provider's specialty     Patient has had an office visit with the authorizing provider or a provider within the authorizing providers department within the previous 12 mos or has a future within next 30 days. See \"Patient Info\" tab in inbasket, or \"Choose Columns\" in Meds & Orders section of the refill encounter.              Passed - Medication is active on med list        Passed - Patient " "is age 18 or older        Passed - No active pregnancy on record        Passed - No positive pregnancy test in past 12 months         Signed Prescriptions Disp Refills    FLUoxetine (PROZAC) 20 MG capsule 90 capsule 3     Sig: Take 1 capsule (20 mg) by mouth daily       SSRIs Protocol Passed - 10/25/2021  8:14 PM        Passed - Recent (12 mo) or future (30 days) visit within the authorizing provider's specialty     Patient has had an office visit with the authorizing provider or a provider within the authorizing providers department within the previous 12 mos or has a future within next 30 days. See \"Patient Info\" tab in inbasket, or \"Choose Columns\" in Meds & Orders section of the refill encounter.              Passed - Medication is active on med list        Passed - Patient is age 18 or older        Passed - No active pregnancy on record        Passed - No positive pregnancy test in last 12 months             Porfirio Bridges RN 10/27/21 10:56 AM  "

## 2021-10-27 NOTE — TELEPHONE ENCOUNTER
"   Disp Refills Start End DEBI   FLUoxetine (PROZAC) 20 MG capsule 90 capsule 1 2/28/2021  No   Sig: TAKE 1 CAPSULE(20 MG) BY MOUTH DAILY   Sent to pharmacy as: FLUoxetine 20 mg capsule (PROzac)   E-Prescribing Status: Receipt confirmed by pharmacy (2/28/2021  8:55 AM CST)        Last office visit provider:  9/8/21     Requested Prescriptions   Pending Prescriptions Disp Refills     SUMAtriptan (IMITREX) 100 MG tablet [Pharmacy Med Name: SUMATRIPTAN 100MG TABLETS] 9 tablet      Sig: TAKE 1/2 TO 1 TABLET BY MOUTH AT ONSET OF HEADACHE       Serotonin Agonists Failed - 10/25/2021  8:14 PM        Failed - Serotonin Agonist request needs review.     Please review patient's record. If patient has had 8 or more treatments in the past month, please forward to provider.          Passed - Blood pressure under 140/90 in past 12 months     BP Readings from Last 3 Encounters:   09/29/21 118/72   09/25/21 135/86   09/08/21 136/75                 Passed - Recent (12 mo) or future (30 days) visit within the authorizing provider's specialty     Patient has had an office visit with the authorizing provider or a provider within the authorizing providers department within the previous 12 mos or has a future within next 30 days. See \"Patient Info\" tab in inbasket, or \"Choose Columns\" in Meds & Orders section of the refill encounter.              Passed - Medication is active on med list        Passed - Patient is age 18 or older        Passed - No active pregnancy on record        Passed - No positive pregnancy test in past 12 months           FLUoxetine (PROZAC) 20 MG capsule [Pharmacy Med Name: FLUOXETINE 20MG CAPSULES] 90 capsule      Sig: TAKE 1 CAPSULE(20 MG) BY MOUTH DAILY       SSRIs Protocol Passed - 10/25/2021  8:14 PM        Passed - Recent (12 mo) or future (30 days) visit within the authorizing provider's specialty     Patient has had an office visit with the authorizing provider or a provider within the authorizing providers " "department within the previous 12 mos or has a future within next 30 days. See \"Patient Info\" tab in inbasket, or \"Choose Columns\" in Meds & Orders section of the refill encounter.              Passed - Medication is active on med list        Passed - Patient is age 18 or older        Passed - No active pregnancy on record        Passed - No positive pregnancy test in last 12 months             Porfirio Bridges RN 10/27/21 10:55 AM  "

## 2021-11-08 ENCOUNTER — VIRTUAL VISIT (OUTPATIENT)
Dept: SURGERY | Facility: CLINIC | Age: 56
End: 2021-11-08
Payer: COMMERCIAL

## 2021-11-08 DIAGNOSIS — E66.9 OBESITY: Primary | ICD-10-CM

## 2021-11-08 PROCEDURE — 99207 PR MWM HEALTH COACH NO CHARGE: CPT

## 2021-11-08 NOTE — LETTER
2021         RE: Keiry Mchugh  [address] [place] [address]        Dear Colleague,    Thank you for referring your patient, Keiry Mchugh, to the [facility] BARIATR[facility] [place]. Please see a copy of my visit note below.    Reason for Visit: 24-Week Healthy Lifestyle Plan Follow up Visit - Health Coaching    Progress Notes:  Return 24-Week Healthy Lifestyle Plan Weight Management Health Coaching Note  Keiry Mchugh   MRN: [id number]  : 1965  SHAAN: 2021  Health  Follow-up Visit #:    Telephone Visit: yes    ASSESSMENT:  Initial Start Date:  2021  Graduation Date:  3/13/2022  Eleanor (online resource) enrollment date(s):  2021  Physician:  Dr. Gómez  Initial Weight (lbs): 220 lbs.  Current Weight (llbs) 196 - 1x/week (down 16 lbs since starting with Dr. Gómez) (Samantha is down 16 lbs in 8 weeks - this is great! However she is feeling frustrated - talked about how she loses weight will be how she keeps the weight off)  Average Daily Water (ounces): - 50 oz/day - good each day (harder on days she works double shifts)  Weight Loss Medication: phentermine (helps with cravings but no extra energy)  Nutrition Plan: real whole foods        PILLAR(S) DISCUSSED IN THIS VISIT:  Nutrition: no breaks during work for drinking water or take bathroom break  Exercise/Movement/Activity: wants this to be her main focus even with having a bad cough that can interrupt   Other: nagging cough is holding on      PREVIOUS GOAL(S) REVIEW:  Continue with food choices and find time to add in movement, even if not 30 min., (remind self, it doesn't have to be all or nothing)  Exercise/Movement: would like to increase this, even with her work schedule  Other: wants to stay on track with what she is doing  Nutrition: may talk more with Celia end of November on ideas to have 2 plans for nutrition:  What does she do on the days she works double shifts and has only  "about 10 min. Throughout that day for a break.  Wants to ensure she is getting bare minimum requirement for nourishment.      GOALS:   Exercise/Movement: is active but ebbs and flows  Nutrition: really following what is rec. from RD  Talk more with Celia end of November on ideas to have 2 plans for nutrition:  What does she do on the days she works double shifts and has only about 10 min. Throughout that day for a break.  Wants to ensure she is getting bare minimum requirement for nourishment.  Other: stay on track every week, a few lbs down. Does not \"cheat\"  Call to schedule with Dr. 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  No scheduled breaks throughout her day, doesn't drink a lot of water   35-45 hours/week is what she is working  [place] about the 24 HLP, thru her sister who is experiencing success with the program  Lots of medical issues   [place] - Flori Boo (tiger) Alhaji (your majesty)  Lots of health concerns:  Celiac Disease, Hernia, Diverticulitis  Sleep patterns not good - wakes up within 2 minutes of falling asleep, heart racing - could be Panic Attacks  (beta blocker prescribed but isn't working to help with the heart racing)      FOLLOW-Up:  11/22 with Diamond for HC and 11/24 with Celia for RD f/u  *health coaching e/o week seems to be best for frequency    Reminder:  Monthly Support Groups offered virtually via TEAMS.  Contact Mis Prince ([email or web address]) to be added to the invite list.  Friday, November 19, 12:30-1:30 pm:  Gratitude  Friday, December 10, 12:30-1:30 pm:  Open Forum  Friday, January 21, 2022, 12:30-1:30 pm: Healthy Habits - based on the book \"Atomic Habits\" by Davis " Airel, presented by Samantha Barreto MS, RN  2022, 12:30-1:30 pm:  Open Forum  2022, 12:30-1:30 pm:  Setting Limits and Boundaries  2022, 12:30-1:30 pm:  Nutrition with Registered Dietitian Dariana Gilliam RD  Friday, May 20, 2022, 12:30-1:30 pm:  Open Forum       SIGNATURE:  FATIMAH Bower, [facility]  National Board-Certified Health &   24-Week Healthy Lifestyle Plan Health   [facility] Weight Management Program  email:  [email or web address]  appointment [phone]          Again, thank you for allowing me to participate in the care of your patient.        Sincerely,        Diamond Dutta

## 2021-11-08 NOTE — PROGRESS NOTES
Reason for Visit: 24-Week Healthy Lifestyle Plan Follow up Visit - Health Coaching    Progress Notes:  Return 24-Week Healthy Lifestyle Plan Weight Management Health Coaching Note  Keiry Mchugh   MRN: [id number]  : 1965  SHAAN: 2021  Health  Follow-up Visit #:  4  Telephone Visit: yes    ASSESSMENT:  Initial Start Date:  2021  Graduation Date:  3/13/2022  Eleanor (online resource) enrollment date(s):  2021  Physician:  Dr. Gómez  Initial Weight (lbs): 220 lbs.  Current Weight (llbs) 196 - 1x/week (down 16 lbs since starting with Dr. Gómez) (Samantha is down 16 lbs in 8 weeks - this is great! However she is feeling frustrated - talked about how she loses weight will be how she keeps the weight off)  Average Daily Water (ounces): - 50 oz/day - good each day (harder on days she works double shifts)  Weight Loss Medication: phentermine (helps with cravings but no extra energy)  Nutrition Plan: real whole foods        PILLAR(S) DISCUSSED IN THIS VISIT:  Nutrition: no breaks during work for drinking water or take bathroom break  Exercise/Movement/Activity: wants this to be her main focus even with having a bad cough that can interrupt   Other: nagging cough is holding on      PREVIOUS GOAL(S) REVIEW:  Continue with food choices and find time to add in movement, even if not 30 min., (remind self, it doesn't have to be all or nothing)  Exercise/Movement: would like to increase this, even with her work schedule  Other: wants to stay on track with what she is doing  Nutrition: may talk more with Celia end  on ideas to have 2 plans for nutrition:  What does she do on the days she works double shifts and has only about 10 min. Throughout that day for a break.  Wants to ensure she is getting bare minimum requirement for nourishment.      GOALS:   Exercise/Movement: is active but ebbs and flows  Nutrition: really following what is rec. from RD  Talk more with Celia end  on  "ideas to have 2 plans for nutrition:  What does she do on the days she works double shifts and has only about 10 min. Throughout that day for a break.  Wants to ensure she is getting bare minimum requirement for nourishment.  Other: stay on track every week, a few lbs down. Does not \"cheat\"  Call to schedule with Dr. 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  No scheduled breaks throughout her day, doesn't drink a lot of water   35-45 hours/week is what she is working  [place] about the 24 HLP, thru her sister who is experiencing success with the program  Lots of medical issues   Tanmay Mah[place] - Flori Boo (tiger) Alhaji (your majesty)  Lots of health concerns:  Celiac Disease, Hernia, Diverticulitis  Sleep patterns not good - wakes up within 2 minutes of falling asleep, heart racing - could be Panic Attacks  (beta blocker prescribed but isn't working to help with the heart racing)      FOLLOW-Up:  11/22 with Diamond for HC and 11/24 with Celia for RD f/u  *health coaching e/o week seems to be best for frequency    Reminder:  Monthly Support Groups offered virtually via TEAMS.  Contact Mis Prince ([email or web address]) to be added to the invite list.  Friday, November 19, 12:30-1:30 pm:  Gratitude  Friday, December 10, 12:30-1:30 pm:  Open Forum  Friday, January 21, 2022, 12:30-1:30 pm: Healthy Habits - based on the book \"Atomic Habits\" by Davis George, presented by Samantha Barreto MS, RN  Friday, February 25, 2022, 12:30-1:30 pm:  Open Forum  Friday, March 18, 2022, 12:30-1:30 pm:  Setting Limits and Boundaries  Friday, April 29, 2022, 12:30-1:30 pm:  Nutrition with Registered Dietitian Dariana Gilliam RD  Friday, May " 2022, 12:30-1:30 pm:  Open Forum       SIGNATURE:  FATIMAH Bower, [facility]  National Board-Certified Health &   24-Week Healthy Lifestyle Plan Health   [place] [facility]  email:  [email or web address]  appointment [phone]

## 2021-11-24 ENCOUNTER — VIRTUAL VISIT (OUTPATIENT)
Dept: SURGERY | Facility: CLINIC | Age: 56
End: 2021-11-24
Payer: COMMERCIAL

## 2021-11-24 DIAGNOSIS — E66.9 OBESITY (BMI 30-39.9): Primary | ICD-10-CM

## 2021-11-24 PROCEDURE — 97803 MED NUTRITION INDIV SUBSEQ: CPT | Mod: 95 | Performed by: DIETITIAN, REGISTERED

## 2021-11-24 NOTE — LETTER
"    11/24/2021         RE: Keiry Mchugh  [address]  [place] [address]        Dear Colleague,    Thank you for referring your patient, Keiry Mchugh, to the [facility] SURGERY CLINIC AND BARIATRICS CARE [place]. Please see a copy of my visit note below.    Keiry Mchugh is a 56 year old who is being evaluated via a billable telephone visit.      How would you like to obtain your AVS? [facility]      Medical  Weight Loss Follow-Up Diet Evaluation - 24 Week Program  Assessment:  Keiry is presenting today for a follow up weight management nutrition consultation. Pt has had an initial appointment with Dr. Gómez. This is dietitian visit #3  Weight loss medication: Phentermine.  Pt's weight is 199.4 lb  Initial weight: 220 lb  Weight change: down 21 lb    No flowsheet data found.  BMI: There is no height or weight on file to calculate BMI.  Ideal body weight: 61.6 kg (135 lb 12.9 oz)  Adjusted ideal body weight: 75.2 kg (165 lb 12.2 oz)    Estimated RMR ([place]-St Jeor equation):  1623 kcals x 1.2 (sedentary) = 1948 kcals (for weight maintenance)  Patient Active Problem List:  Patient Active Problem List   Diagnosis     Chronic insomnia     Suspected sleep apnea     HARDIN (nonalcoholic steatohepatitis)     Sigmoid diverticulitis     Dyslipidemia     Metabolic syndrome     Hypertension     Celiac disease     Diabetes: no    Progress on goals from last visit: Work is short staffed, she has been working a lot. She doesn't always get her half hour break, but she has maintained consistency with her food intake  1. Focus on maintaining her good eating habits: 3 meals per day, nutrient dense food intake - sometimes met  2. Talk to manager about work shift.  - not met  3. Improve her mindset - shift from the \"all or nothing\" mindset to a mindset of achieving balance    Beverages:   Water  Exercise:   Very active at work, but does not have time   Nutrition Diagnosis:    Overweight/Obesity (NC 3.3) related to " "overeating and poor lifestyle habits as evidenced by BMI 34.24    Intervention:  1. Food and/or nutrient delivery: 3 meals per day, nutrient dense food intake  2. Nutrition counseling: Discussed progress toward her goals    Monitoring/Evaluation:    Goals:  1. Focus on maintaining her good eating habits: 3 meals per day, nutrient dense food intake    2. Improve her mindset - shift from the \"all or nothing\" mindset to a mindset of achieving balance    Patient to follow up with [facility]ach and 1 month(s) with RD    Phone call duration: 18 minutes    Celia Yi RD      Again, thank you for allowing me to participate in the care of your patient.        Sincerely,        Celia Yi RD    "

## 2021-11-24 NOTE — PROGRESS NOTES
"Keiry Mchugh is a 56 year old who is being evaluated via a billable telephone visit.      How would you like to obtain your AVS? [facility]      Medical  Weight Loss Follow-Up Diet Evaluation - 24 Week Program  Assessment:  Keiry is presenting today for a follow up weight management nutrition consultation. Pt has had an initial appointment with Dr. Gómez. This is dietitian visit #3  Weight loss medication: Phentermine.  Pt's weight is 199.4 lb  Initial weight: 220 lb  Weight change: down 21 lb    No flowsheet data found.  BMI: There is no height or weight on file to calculate BMI.  Ideal body weight: 61.6 kg (135 lb 12.9 oz)  Adjusted ideal body weight: 75.2 kg (165 lb 12.2 oz)    Estimated RMR ([place]-St Jeor equation):  1623 kcals x 1.2 (sedentary) = 1948 kcals (for weight maintenance)  Patient Active Problem List:  Patient Active Problem List   Diagnosis     Chronic insomnia     Suspected sleep apnea     HARDIN (nonalcoholic steatohepatitis)     Sigmoid diverticulitis     Dyslipidemia     Metabolic syndrome     Hypertension     Celiac disease     Diabetes: no    Progress on goals from last visit: Work is short staffed, she has been working a lot. She doesn't always get her half hour break, but she has maintained consistency with her food intake  1. Focus on maintaining her good eating habits: 3 meals per day, nutrient dense food intake - sometimes met  2. Talk to manager about work shift.  - not met  3. Improve her mindset - shift from the \"all or nothing\" mindset to a mindset of achieving balance    Beverages:   Water  Exercise:   Very active at work, but does not have time   Nutrition Diagnosis:    Overweight/Obesity (NC 3.3) related to overeating and poor lifestyle habits as evidenced by BMI 34.24    Intervention:  1. Food and/or nutrient delivery: 3 meals per day, nutrient dense food intake  2. Nutrition counseling: Discussed progress toward her goals    Monitoring/Evaluation:    Goals:  1. Focus on " "maintaining her good eating habits: 3 meals per day, nutrient dense food intake    2. Improve her mindset - shift from the \"all or nothing\" mindset to a mindset of achieving balance    Patient to follow up with [facility]ach and 1 month(s) with ZULEYKA    Phone call duration: 18 minutes    Celia Yi RD  "

## 2021-11-30 DIAGNOSIS — E88.810 METABOLIC SYNDROME: ICD-10-CM

## 2021-11-30 RX ORDER — PHENTERMINE HYDROCHLORIDE 37.5 MG/1
18.75-37.5 TABLET ORAL
Qty: 90 TABLET | Refills: 1 | Status: SHIPPED | OUTPATIENT
Start: 2021-11-30 | End: 2022-03-01

## 2021-12-20 ENCOUNTER — TELEPHONE (OUTPATIENT)
Dept: SURGERY | Facility: CLINIC | Age: 56
End: 2021-12-20

## 2021-12-20 NOTE — TELEPHONE ENCOUNTER
"Pt Missed Scheduled HC Visit.    *Jatinder pt - No phone number specified in appointment notes so called pt's mobile [phone].    Left vm for pt regarding missed Health  visit today.     -Confirmed next hc visit & invited pt to reschedule by calling the Weight Management scheduling team if wishes to connect prior to visit on 1/24.  -\"Attentively\" renumbered her 1/24 visit: HC #6 - Call [phone]?     -Provided pt our clinic's scheduling phone number.     -GN    "

## 2021-12-25 ENCOUNTER — HEALTH MAINTENANCE LETTER (OUTPATIENT)
Age: 56
End: 2021-12-25

## 2022-01-21 ENCOUNTER — LAB (OUTPATIENT)
Dept: FAMILY MEDICINE | Facility: CLINIC | Age: 57
End: 2022-01-21
Attending: FAMILY MEDICINE
Payer: COMMERCIAL

## 2022-01-21 DIAGNOSIS — Z20.822 SUSPECTED 2019 NOVEL CORONAVIRUS INFECTION: ICD-10-CM

## 2022-01-21 PROCEDURE — U0005 INFEC AGEN DETEC AMPLI PROBE: HCPCS

## 2022-01-21 PROCEDURE — 99207 PR NO CHARGE LOS: CPT

## 2022-01-21 PROCEDURE — U0003 INFECTIOUS AGENT DETECTION BY NUCLEIC ACID (DNA OR RNA); SEVERE ACUTE RESPIRATORY SYNDROME CORONAVIRUS 2 (SARS-COV-2) (CORONAVIRUS DISEASE [COVID-19]), AMPLIFIED PROBE TECHNIQUE, MAKING USE OF HIGH THROUGHPUT TECHNOLOGIES AS DESCRIBED BY CMS-2020-01-R: HCPCS

## 2022-01-21 NOTE — LETTER
January 24, 2022      Keiry Mchugh  [address] [address]        Dear ,  We are writing to inform you of your test results.    Hi Samantha:  Your recent Covid test was NEGATIVE.     Resulted Orders   Symptomatic; Auto-generated order COVID-19 Virus (Coronavirus) by PCR Nose   Result Value Ref Range    SARS CoV2 PCR Negative Negative, Testing sent to reference lab. Results will be returned via unsolicited result      Comment:      NEGATIVE: SARS-CoV-2 (COVID-19) RNA not detected, presumed negative.    Narrative    Testing was performed using the AptYammer SARS-CoV-2 Assay on the  TeraView Instrument System. Additional information about this  Emergency Use Authorization (EUA) assay can be found via the Lab  Guide. This test should be ordered for the detection of SARS-CoV-2 in  individuals who meet SARS-CoV-2 clinical and/or epidemiological  criteria. Test performance is unknown in asymptomatic patients. This  test is for in vitro diagnostic use under the FDA EUA for  laboratories certified under CLIA to perform high complexity testing.  This test has not been FDA cleared or approved. A negative result  does not rule out the presence of PCR inhibitors in the specimen or  target RNA in concentration below the limit of detection for the  assay. The possibility of a false negative should be considered if  the patient's recent exposure or clinical presentation suggests  COVID-19. This test was validated by the [facility]. This laboratory is certified under  the Clinical Laboratory Improvement Amendments of 1988 (CLIA-88) as  qualified to perform high complexity laboratory testing.       If you have any questions or concerns, please call the clinic at the number listed above.       Sincerely,      Alexander Cortez MD

## 2022-01-23 LAB — SARS-COV-2 RNA RESP QL NAA+PROBE: NEGATIVE

## 2022-01-24 ENCOUNTER — TELEPHONE (OUTPATIENT)
Dept: SURGERY | Facility: CLINIC | Age: 57
End: 2022-01-24

## 2022-01-24 NOTE — TELEPHONE ENCOUNTER
Pt Samantha requested to be r/s onto her former hc Diamond's schedule.     - I haven't met with her yet. Just a phone call today.   -She requested to be r/s back on Diamond's schedule. Diamond didn't have any 1:30 pm on Monday's that she preferred, yet she thought she could successfully prep her grandson so that hs is entertained for a 10:30 am visit.   - I reserved 3 visits on fo pt; Diamond & her will see how this works out & adjust if needed:).   - Per charted visits, pt has completed four visits with Diamond, so I made appointment notes accordingly.   - Confirmed phone # with pt - Call [phone].  - Canceled upcoming visits with me.    - GDP

## 2022-02-14 ENCOUNTER — VIRTUAL VISIT (OUTPATIENT)
Dept: SURGERY | Facility: CLINIC | Age: 57
End: 2022-02-14
Payer: COMMERCIAL

## 2022-02-14 DIAGNOSIS — E66.9 OBESITY: Primary | ICD-10-CM

## 2022-02-14 PROCEDURE — 99207 PR MWM HEALTH COACH NO CHARGE: CPT

## 2022-02-14 NOTE — LETTER
2022         RE: Keiry Mchugh  [address]  [place] [address]        Dear Colleague,    Thank you for referring your patient, Keiry Mchugh, to the [facility] SURGERY CLINIC AND BARIATRICS CARE [place]. Please see a copy of my visit note below.    Reason for Visit: 24-Week Healthy Lifestyle Plan Follow up Visit - Health Coaching    Progress Notes:  Return 24-Week Healthy Lifestyle Plan Weight Management Health Coaching Note  Keiry Mchugh   MRN: [id number]  : 1965  SHAAN: 2022  Health  Follow-up Visit #:  6  Telephone Visit: yes    ASSESSMENT:  Initial Start Date:  2021  Graduation Date:  3/13/2022 - extending this date to 2022 due to HC provider JOYA Webster (online resource) enrollment date(s):  2021  Physician:  Dr. Gómez  Initial Weight (lbs): 220 lbs.  Current Weight (llbs) ---has not weighed in quite some time.  She plans to get on the scale tomorrow 2/15/22.   Previously was down to 196 - 1x/week (down 16 lbs since starting with Dr. Gómez) (Samantha is down 16 lbs in 8 weeks  Average Daily Water (ounces): - 50 oz/day - good each day (harder on days she works double shifts)  Weight Loss Medication: phentermine (helps with cravings but no extra energy)  Nutrition Plan: real whole foods       PILLAR(S) DISCUSSED IN THIS VISIT:  Sleep: been sleeping in spare bedroom so she can be near her mom who has cancer.   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  Samantha says she feels way off track so we are pulling back in and working day by day with small things to focus on herself, her energy      GOALS:    After today s discussion, what are you taking away with you? What do you want to put your energy towards this week(s)? Do you have everything you need?   Sleep: thinking about  "how she might help support herself in sleep in the spare bedroom:  Mattress topper removed, body pillow, evening/bedtime routine consistent, maybe a baby monitor for her mom's room  Take an afternoon \"pause\" during her grandson's naptime at 1:30 pm.  Hands on heart, listen to her heart and take some breaths, not forcing anything.  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  No scheduled breaks throughout her day, doesn't drink a lot of water   35-45 hours/week is what she is working  [place] about the 24 HLP, thru her sister who is experiencing success with the program  Lots of medical issues   [place] Maru  Flori Boo (tiger) Alhaji (your majesty)  Lots of health concerns:  Celiac Disease, Hernia, Diverticulitis  Sleep patterns not good - wakes up within 2 minutes of falling asleep, heart racing - could be Panic Attacks  (beta blocker prescribed but isn't working to help with the heart racing)      FOLLOW-Up:  2/28 with Diamond for HC #7    Reminder:  Monthly Support Groups offered virtually via TEAMS.  Contact Mis Prince ([email or web address]) to be added to the invite list.  Friday, February 25, 2022, 12:30-1:30 pm:  Open Forum  Friday, March 18, 2022, 12:30-1:30 pm:  Setting Limits and Boundaries  Friday, April 29, 2022, 12:30-1:30 pm:  Nutrition with Registered Dietitielisa Gilliam RD  Friday, May 20, 2022, 12:30-1:30 pm:  Open Forum  June, July & August TBD/coming soon.       SIGNATURE:  FATIMAH Bower, [facility]  National Board-Certified Health &   24-Week Healthy Lifestyle Plan Health   [place] [facility]  email:  " [email or web address]  appointment [phone]      Again, thank you for allowing me to participate in the care of your patient.        Sincerely,        Diamond Dutta

## 2022-02-14 NOTE — PROGRESS NOTES
"Reason for Visit: 24-Week Healthy Lifestyle Plan Follow up Visit - Health Coaching    Progress Notes:  Return 24-Week Healthy Lifestyle Plan Weight Management Health Coaching Note  Keiry Mchugh   MRN: [id number]  : 1965  SHAAN: 2022  Health  Follow-up Visit #:  6  Telephone Visit: yes    ASSESSMENT:  Initial Start Date:  2021  Graduation Date:  3/13/2022 - extending this date to 2022 due to HC provider JOYA Webster (online resource) enrollment date(s):  2021  Physician:  Dr. Gómez  Initial Weight (lbs): 220 lbs.  Current Weight (llbs) ---has not weighed in quite some time.  She plans to get on the scale tomorrow 2/15/22.   Previously was down to 196 - 1x/week (down 16 lbs since starting with Dr. Gómez) (Samantha is down 16 lbs in 8 weeks  Average Daily Water (ounces): - 50 oz/day - good each day (harder on days she works double shifts)  Weight Loss Medication: phentermine (helps with cravings but no extra energy)  Nutrition Plan: real whole foods       PILLAR(S) DISCUSSED IN THIS VISIT:  Sleep: been sleeping in spare bedroom so she can be near her mom who has cancer.   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  Samantha says she feels way off track so we are pulling back in and working day by day with small things to focus on herself, her energy      GOALS:    After today s discussion, what are you taking away with you? What do you want to put your energy towards this week(s)? Do you have everything you need?   Sleep: thinking about how she might help support herself in sleep in the spare bedroom:  Mattress topper removed, body pillow, evening/bedtime routine consistent, maybe a baby monitor for her mom's room  Take an afternoon \"pause\" during her grandson's naptime at 1:30 pm.  Hands on heart, listen to " "her heart and take some breaths, not forcing anything.  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  No scheduled breaks throughout her day, doesn't drink a lot of water   35-45 hours/week is what she is working  [place] about the 24 HLP, thru her sister who is experiencing success with the program  Lots of medical issues   [place] Maru - Flori Knapp Rajeev (tiger) Alhaji (your majesty)  Lots of health concerns:  Celiac Disease, Hernia, Diverticulitis  Sleep patterns not good - wakes up within 2 minutes of falling asleep, heart racing - could be Panic Attacks  (beta blocker prescribed but isn't working to help with the heart racing)      FOLLOW-Up:   with Diamond for HC #7    Reminder:  Monthly Support Groups offered virtually via TEAMS.  Contact Mis Prince ([email or web address]) to be added to the invite list.  2022, 12:30-1:30 pm:  Open Forum  2022, 12:30-1:30 pm:  Setting Limits and Boundaries  2022, 12:30-1:30 pm:  Nutrition with Registered Dietitian Dariana Gilliam RD  Friday, May 20, 2022, 12:30-1:30 pm:  Open Forum  , July & August TBD/coming soon.       SIGNATURE:  FATIMAH Bower, [facility]  National Board-Certified Health &   24-Week Healthy Lifestyle Plan Health   [place] [facility]  email:  [email or web address]  appointment [phone]  "

## 2022-02-28 ENCOUNTER — VIRTUAL VISIT (OUTPATIENT)
Dept: SURGERY | Facility: CLINIC | Age: 57
End: 2022-02-28
Payer: COMMERCIAL

## 2022-02-28 DIAGNOSIS — E66.9 OBESITY: Primary | ICD-10-CM

## 2022-02-28 PROCEDURE — 99207 PR MWM HEALTH COACH NO CHARGE: CPT

## 2022-02-28 NOTE — PROGRESS NOTES
"Reason for Visit: 24-Week Healthy Lifestyle Plan Follow up Visit - Health Coaching    Progress Notes:  Return 24-Week Healthy Lifestyle Plan Weight Management Health Coaching Note  Keiry Mchugh   MRN: [id number]  : 1965  SHAAN: 2022  Health  Follow-up Visit #:  7    ASSESSMENT:  Initial Start Date:  2021  Graduation Date:  3/13/2022 - extending this date to 2022 due to HC provider JOYA Webster (online resource) enrollment date(s):  2021  Physician:  Dr. Radha Rojas  Initial Weight (lbs): 220 lbs.  Current Weight (llbs) was feeling disappointed as she had gained 16 lbs, but feeling back on track.  Average Daily Water (ounces): - 50 oz/day - good each day (harder on days she works double shifts)  Weight Loss Medication: phentermine (helps with cravings but no extra energy)  Nutrition Plan: real whole foods    PILLAR(S) DISCUSSED IN THIS VISIT:  Wanting to focus on all the areas again  Nutrition: meal planning again, prepare the day before  Exercise/Movement/Activity:  If not at work, always at home. Figure out/brainstorm ideas on how to incorporate some movement (stretching, simple ideas)  Sleep: sleeping a little bit better recently as her mom seems to be a bit better and less   Stress Management: -  Other: -    PREVIOUS GOAL(S) REVIEW:   After today s discussion, what are you taking away with you? What do you want to put your energy towards this week(s)? Do you have everything you need?   Sleep: thinking about how she might help support herself in sleep in the spare bedroom:  Mattress topper removed, body pillow, evening/bedtime routine consistent, maybe a baby monitor for her mom's room  Take an afternoon \"pause\" during her grandson's naptime at 1:30 pm.  Hands on heart, listen to her heart and take some breaths, not forcing anything.  \"just be\"  Feb 15 - step on scale to weigh herself  Other: make a list of self-care      GOALS:  Exercise/Movement: start small " with intentional movements thru her day:  Stretch, lunges, push-ups, forward fold, etc.  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  No scheduled breaks throughout her day, doesn't drink a lot of water   35-45 hours/week is what she is working  [place] about the 24 HLP, thru her sister who is experiencing success with the program  Lots of medical issues   [place] Maru - Flori Avalos - Rajeev (tiger) Alhaji (your majesty)  Lots of health concerns:  Celiac Disease, Hernia, Diverticulitis  Sleep patterns not good - wakes up within 2 minutes of falling asleep, heart racing - could be Panic Attacks  (beta blocker prescribed but isn't working to help with the heart racing)    FOLLOW-Up:  Future Monday appointments scheduled for 1:30 pm:  ,  and into April.  Needed a refill for Phentermine today, message Mis Ellis for assistance/Dr. Gómez.  Patient also needs to schedule a f/u with Dr. Gómez, will do.    Reminder:  Monthly Support Groups offered virtually via TEAMS.  Contact Mis Prince ([email or web address]) to be added to the invite list.  2022, 12:30-1:30 pm:  Setting Limits and Boundaries  2022, 12:30-1:30 pm:  Nutrition with Registered Dietitian Dariana Gilliam RD  Friday, May 20, 2022, 12:30-1:30 pm:  Open Forum  , July & August TBD/coming soon.       SIGNATURE:  FATIMAH Bower, [facility]  National Board-Certified Health &   24-Week Healthy Lifestyle Plan Health   [place] [facility] Management [facility]  email:  [email or web address]  appointment [phone]

## 2022-02-28 NOTE — LETTER
2022         RE: Keiry Mchugh  [address]  [place] [address]        Dear Colleague,    Thank you for referring your patient, Keiry Mchugh, to the [facility] SURGERY CLINIC AND BARIATRICS CARE [place]. Please see a copy of my visit note below.    Reason for Visit: 24-Week Healthy Lifestyle Plan Follow up Visit - Health Coaching    Progress Notes:  Return 24-Week Healthy Lifestyle Plan Weight Management Health Coaching Note  Keiry Mchugh   MRN: [id number]  : 1965  SHAAN: 2022  Health  Follow-up Visit #:  7    ASSESSMENT:  Initial Start Date:  2021  Graduation Date:  3/13/2022 - extending this date to 2022 due to HC provider JOYA Webster (online resource) enrollment date(s):  2021  Physician:  Dr. Radha Rojas  Initial Weight (lbs): 220 lbs.  Current Weight (llbs) was feeling disappointed as she had gained 16 lbs, but feeling back on track.  Average Daily Water (ounces): - 50 oz/day - good each day (harder on days she works double shifts)  Weight Loss Medication: phentermine (helps with cravings but no extra energy)  Nutrition Plan: real whole foods    PILLAR(S) DISCUSSED IN THIS VISIT:  Wanting to focus on all the areas again  Nutrition: meal planning again, prepare the day before  Exercise/Movement/Activity:  If not at work, always at home. Figure out/brainstorm ideas on how to incorporate some movement (stretching, simple ideas)  Sleep: sleeping a little bit better recently as her mom seems to be a bit better and less   Stress Management: -  Other: -    PREVIOUS GOAL(S) REVIEW:   After today s discussion, what are you taking away with you? What do you want to put your energy towards this week(s)? Do you have everything you need?   Sleep: thinking about how she might help support herself in sleep in the spare bedroom:  Mattress topper removed, body pillow, evening/bedtime routine consistent, maybe a baby monitor for her mom's room  Take  "an afternoon \"pause\" during her grandson's naptime at 1:30 pm.  Hands on heart, listen to her heart and take some breaths, not forcing anything.  \"just be\"  Feb 15 - step on scale to weigh herself  Other: make a list of self-care      GOALS:  Exercise/Movement: start small with intentional movements thru her day:  Stretch, lunges, push-ups, forward fold, etc.  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  No scheduled breaks throughout her day, doesn't drink a lot of water   35-45 hours/week is what she is working  [place] about the 24 HLP, thru her sister who is experiencing success with the program  Lots of medical issues   [facility] Flroi Boo (tiger) Alhaji (your majesty)  Lots of health concerns:  Celiac Disease, Hernia, Diverticulitis  Sleep patterns not good - wakes up within 2 minutes of falling asleep, heart racing - could be Panic Attacks  (beta blocker prescribed but isn't working to help with the heart racing)    FOLLOW-Up:  Future Monday appointments scheduled for 1:30 pm:  March 7, March 28 and into April.  Needed a refill for Phentermine today, message Mis Ellis for assistance/Dr. Gómez.  Patient also needs to schedule a f/u with Dr. Gómez, will do.    Reminder:  Monthly Support Groups offered virtually via TEAMS.  Contact Mis Prince ([email or web address]) to be added to the invite list.  Friday, March 18, 2022, 12:30-1:30 pm:  Setting Limits and Boundaries  Friday, April 29, 2022, 12:30-1:30 pm:  Nutrition with Registered Dietitian Dariana Gilliam RD  Friday, May 20, 2022, 12:30-1:30 pm:  Open Forum  June, July & August TBD/coming soon.       SIGNATURE:  FATIMAH Bower, " [facility]  National Board-Certified Health &   24-Week Healthy Lifestyle Plan Health   [place] [facility] Weight Management [facility]  email:  [email or web address]  appointment [phone]      Again, thank you for allowing me to participate in the care of your patient.        Sincerely,        Diamond Dutta

## 2022-03-01 DIAGNOSIS — E88.810 METABOLIC SYNDROME: ICD-10-CM

## 2022-03-01 RX ORDER — PHENTERMINE HYDROCHLORIDE 37.5 MG/1
37.5 TABLET ORAL
Qty: 90 TABLET | Refills: 1 | Status: SHIPPED | OUTPATIENT
Start: 2022-03-01 | End: 2022-11-29

## 2022-03-01 NOTE — TELEPHONE ENCOUNTER
Patient has seen the RD and very consistent with the health  as well during her 24 week program.  Is scheduled for a follow-up with Dr. Gómez in April.  Mis Ellis RN

## 2022-03-28 ENCOUNTER — VIRTUAL VISIT (OUTPATIENT)
Dept: SURGERY | Facility: CLINIC | Age: 57
End: 2022-03-28
Payer: COMMERCIAL

## 2022-03-28 DIAGNOSIS — E66.9 OBESITY: Primary | ICD-10-CM

## 2022-03-28 PROCEDURE — 99207 PR MWM HEALTH COACH NO CHARGE: CPT

## 2022-03-28 NOTE — PROGRESS NOTES
Reason for Visit: 24-Week Healthy Lifestyle Plan Follow up Visit - Health Coaching    Progress Notes:  Return 24-Week Healthy Lifestyle Plan Weight Management Health Coaching Note  Keiry Mchugh   MRN: [id number]  : 1965  SHAAN: 2022  Health  Follow-up Visit #:  8    Initial Start Date:  2021  Graduation Date:  3/13/2022 - extending this date to 2022 due to HC provider JOYA Webster (online resource) enrollment date(s):  2021  Physician:  Dr. Radha Rojas  Initial Weight (lbs): 220 lbs.  Current Weight (lbs) 206 lbs.was feeling disappointed as she had gained 16 lbs, but feeling back on track.  Average Daily Water (ounces): - 50 oz/day - good each day (harder on days she works double shifts)  Weight Loss Medication: phentermine (helps with cravings but no extra energy)  Nutrition Plan: real whole foods      PILLAR(S) DISCUSSED IN THIS VISIT:  Nutrition: pre-planning ahead and tracking again  Exercise/Movement/Activity: been doing little things throughout the days to simply  Sleep: makes myself stay up until I am so tired then fall asleep  Stress Management: Doesn't feel balanced at all! 100% at work or at home taking care of mom and taking care of   Other: looking forward to summer and camping trip (end May trip with sisters at [facility] with 7 sisters) depending on Covid-19 and safety protocol, mask      PREVIOUS GOAL(S) REVIEW:  Exercise/Movement: start small with intentional movements thru her day:  Stretch, lunges, push-ups, forward fold, etc.  Nutrition: meal plan for the next day (leftovers used in new ways for the upcoming days)      GOALS:  Exercise/Movement: would like to incorporate an hour (with travelling to and from) for cardio  Look at each week, start adding in.  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  No scheduled breaks throughout her day, doesn't drink a lot of water   35-45 hours/week is what she is working  [place] about the 24 HLP, thru her sister who is experiencing success with the program  Lots of medical issues   Greek Maru - [place]  Bengal Cat - Juane (tiger) Alhaji (your majesty)  Lots of health concerns:  Celiac Disease, Hernia, Diverticulitis  Sleep patterns not good - wakes up within 2 minutes of falling asleep, heart racing - could be Panic Attacks  (beta blocker prescribed but isn't working to help with the heart racing)     FOLLOW-Up:  Future Monday appointments scheduled for 1:30 pm:  ,  and into April.  Needed a refill for Phentermine today, message Mis Ellis for assistance/Dr. Gómez.  Patient also needs to schedule a f/u with Dr. Gómez, will do.      FOLLOW-Up:   with Diamond LAWS #9,  with Dr. Gómez,     Reminder:  Monthly Support Groups offered virtually via TEAMS.  Contact Mis Prince ([email or web address]) to be added to the invite list.  2022, 12:30-1:30 pm:  Nutrition with Registered Dietitian Dariana Gilliam RD  Friday, May 20, 2022, 12:30-1:30 pm:  Open Forum  , July & August TBD/coming soon.       SIGNATURE:  FATIMAH Bower, [facility]  National Board-Certified Health &   24-Week Healthy Lifestyle Plan Health   [place] [facility]  email:  [email or web address]  appointment [phone]

## 2022-03-28 NOTE — LETTER
3/28/2022         RE: Keiry Mchugh  [address]  [place] [address]        Dear Colleague,    Thank you for referring your patient, Keiry Mchugh, to the [facility]ERY [facility] AND BARIATRICS CARE [place]. Please see a copy of my visit note below.    Reason for Visit: 24-Week Healthy Lifestyle Plan Follow up Visit - Health Coaching    Progress Notes:  Return 24-Week Healthy Lifestyle Plan Weight Management Health Coaching Note  Keiry Mchugh   MRN: [id number]  : 1965  SHAAN: 2022  Health  Follow-up Visit #:  8    Initial Start Date:  2021  Graduation Date:  3/13/2022 - extending this date to 2022 due to HC provider JOYA Webster (online resource) enrollment date(s):  2021  Physician:  Dr. Radha Rojas  Initial Weight (lbs): 220 lbs.  Current Weight (lbs) 206 lbs.was feeling disappointed as she had gained 16 lbs, but feeling back on track.  Average Daily Water (ounces): - 50 oz/day - good each day (harder on days she works double shifts)  Weight Loss Medication: phentermine (helps with cravings but no extra energy)  Nutrition Plan: real whole foods      PILLAR(S) DISCUSSED IN THIS VISIT:  Nutrition: pre-planning ahead and tracking again  Exercise/Movement/Activity: been doing little things throughout the days to simply  Sleep: makes myself stay up until I am so tired then fall asleep  Stress Management: Doesn't feel balanced at all! 100% at work or at home taking care of mom and taking care of   Other: looking forward to summer and camping trip (end May trip with sisters at [facility] with 7 sisters) depending on Covid-19 and safety protocol, mask      PREVIOUS GOAL(S) REVIEW:  Exercise/Movement: start small with intentional movements thru her day:  Stretch, lunges, push-ups, forward fold, etc.  Nutrition: meal plan for the next day (leftovers used in new ways for the upcoming days)      GOALS:  Exercise/Movement: would like to  incorporate an hour (with travelling to and from) for cardio  Look at each week, start adding in.  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  No scheduled breaks throughout her day, doesn't drink a lot of water   35-45 hours/week is what she is working  [place] about the 24 HLP, thru her sister who is experiencing success with the program  Lots of medical issues   Tanmay Boo (tiger) Alhaji (your majesty)  Lots of health concerns:  Celiac Disease, Hernia, Diverticulitis  Sleep patterns not good - wakes up within 2 minutes of falling asleep, heart racing - could be Panic Attacks  (beta blocker prescribed but isn't working to help with the heart racing)     FOLLOW-Up:  Future Monday appointments scheduled for 1:30 pm:  ,  and into April.  Needed a refill for Phentermine today, message Mis Ellis for assistance/Dr. Gómez.  Patient also needs to schedule a f/u with Dr. Gómez, will do.      FOLLOW-Up:   with [facility] LAWS #9,  with Dr. Gómez,     Reminder:  Monthly Support Groups offered virtually via TEAMS.  Contact Mis Prince ([email or web address]) to be added to the invite list.  2022, 12:30-1:30 pm:  Nutrition with Registered Dietitian Dariana Gilliam RD  Friday, May 20, 2022, 12:30-1:30 pm:  Open Forum  , July & August TBD/coming soon.       SIGNATURE:  FATIMAH Bower, [facility]  National Board-Certified Health &   24-Week Healthy Lifestyle Plan Health   [place] [facility]  email:  [email or web address]  appointment [phone]          Again,  thank you for allowing me to participate in the care of your patient.        Sincerely,        Diamond Dutta

## 2022-04-11 ENCOUNTER — VIRTUAL VISIT (OUTPATIENT)
Dept: SURGERY | Facility: CLINIC | Age: 57
End: 2022-04-11
Payer: COMMERCIAL

## 2022-04-11 DIAGNOSIS — E66.9 OBESITY: Primary | ICD-10-CM

## 2022-04-11 PROCEDURE — 99207 PR MWM HEALTH COACH NO CHARGE: CPT

## 2022-04-11 NOTE — LETTER
2022         RE: Keiry Mchugh  [address]  [place] [address]        Dear Colleague,    Thank you for referring your patient, Keiry Mchugh, to the [facility] SURGERY CLINIC AND BARIATRICS CARE [place]. Please see a copy of my visit note below.    Reason for Visit: 24-Week Healthy Lifestyle Plan Follow up Visit - Health Coaching    Progress Notes:  Return 24-Week Healthy Lifestyle Plan Weight Management Health Coaching Note  Keiry Mchugh   MRN: [id number]  : 1965  SHAAN: 2022  Health  Follow-up Visit #:  9    Initial Start Date:  2021  Graduation Date:  3/13/2022 - extending this date to 2022 due to HC provider JOYA Webster (online resource) enrollment date(s):  2021  Physician:  Dr. Radha Rojas  Initial Weight (lbs): 220 lbs.  Current Weight (lbs) 204 lbs.was feeling disappointed as she had gained 16 lbs, but feeling back on track. (22)  Average Daily Water (ounces): - 50 oz/day - good each day (harder on days she works double shifts)  Weight Loss Medication: phentermine (helps with cravings but no extra energy)  Nutrition Plan: real whole foods      PILLAR(S) DISCUSSED IN THIS VISIT:  Nutrition: tracking every day, pre-planning - no changes  Exercise/Movement/Activity: been walking more often with the spring-like weather  Other:  Possibly changing hours at work in the future (several months looking ahead)  Other: starting to go off medication for migraines as may be adding to her intense hot flashes Fluoxetine (started 3 days ago e/o day, 2nd week - every 3rd day, 3-4 week will be off totally)      PREVIOUS GOAL(S) REVIEW:  Exercise/Movement: would like to incorporate an hour (with travelling to and from) for cardio  Look at each week, start adding in.  This week, she will go exercise after work, before she goes home on Tuesday, 3/29/22 - continuing  Nutrition: tracking and pre-planning      GOALS:   Continue with above goals  along with weaning off the medication (how does that impact her hot flashes)  (looking forward to being back on track and checking in with Dr. 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  No scheduled breaks throughout her day, doesn't drink a lot of water   35-45 hours/week is what she is working  [place] about the 24 HLP, thru her sister who is experiencing success with the program  Lots of medical issues   English Maru - [place]  Justiceal Cat - Rajeev (tiger) Alhaji (your majesty)  Lots of health concerns:  Celiac Disease, Hernia, Diverticulitis  Sleep patterns not good - wakes up within 2 minutes of falling asleep, heart racing - could be Panic Attacks  (beta blocker prescribed but isn't working to help with the heart racing)     FOLLOW-Up:  Future Monday appointments scheduled for 1:30 pm: May  Patient also needs to schedule a f/u with Dr. Gómez, will do. - confirmed       FOLLOW-Up:   with Dr. Gómez,  with Diamond trejo HC #10    Reminder:  Monthly Support Groups offered virtually via TEAMS.  Contact Mis Prince ([email or web address]) to be added to the invite list.  2022, 12:30-1:30 pm:  Nutrition with Registered Dietitian Dariana Gilliam RD  Friday, May 20, 2022, 12:30-1:30 pm:  Open Forum  , July & August TBD/coming soon.       SIGNATURE:  FATIMAH Bower, [facility]  National Board-Certified Health &   24-Week Healthy Lifestyle Plan Health   [place] [facility]  email:  [email or web address]  appointment [phone]      Again, thank you for allowing me to participate in the care of your patient.        Sincerely,        Diamond Dutta

## 2022-04-11 NOTE — PROGRESS NOTES
Reason for Visit: 24-Week Healthy Lifestyle Plan Follow up Visit - Health Coaching    Progress Notes:  Return 24-Week Healthy Lifestyle Plan Weight Management Health Coaching Note  Keiry Mchugh   MRN: [id number]  : 1965  SHAAN: 2022  Health  Follow-up Visit #:  9    Initial Start Date:  2021  Graduation Date:  3/13/2022 - extending this date to 2022 due to HC provider JOYA Webster (online resource) enrollment date(s):  2021  Physician:  Dr. Radha Rojas  Initial Weight (lbs): 220 lbs.  Current Weight (lbs) 204 lbs.was feeling disappointed as she had gained 16 lbs, but feeling back on track. (22)  Average Daily Water (ounces): - 50 oz/day - good each day (harder on days she works double shifts)  Weight Loss Medication: phentermine (helps with cravings but no extra energy)  Nutrition Plan: real whole foods      PILLAR(S) DISCUSSED IN THIS VISIT:  Nutrition: tracking every day, pre-planning - no changes  Exercise/Movement/Activity: been walking more often with the spring-like weather  Other:  Possibly changing hours at work in the future (several months looking ahead)  Other: starting to go off medication for migraines as may be adding to her intense hot flashes Fluoxetine (started 3 days ago e/o day, 2nd week - every 3rd day, 3-4 week will be off totally)      PREVIOUS GOAL(S) REVIEW:  Exercise/Movement: would like to incorporate an hour (with travelling to and from) for cardio  Look at each week, start adding in.  This week, she will go exercise after work, before she goes home on Tuesday, 3/29/22 - continuing  Nutrition: tracking and pre-planning      GOALS:   Continue with above goals along with weaning off the medication (how does that impact her hot flashes)  (looking forward to being back on track and checking in with Dr. 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  No scheduled breaks throughout her day, doesn't drink a lot of water   35-45 hours/week is what she is working  [place] about the 24 HLP, thru her sister who is experiencing success with the program  Lots of medical issues   Chinese Maru - [place] Xavier Cat - Rajeev (tiger) Alhaji (your majesty)  Lots of health concerns:  Celiac Disease, Hernia, Diverticulitis  Sleep patterns not good - wakes up within 2 minutes of falling asleep, heart racing - could be Panic Attacks  (beta blocker prescribed but isn't working to help with the heart racing)     FOLLOW-Up:  Future Monday appointments scheduled for 1:30 pm: May  Patient also needs to schedule a f/u with Dr. Gómez, will do. - confirmed       FOLLOW-Up:   with Dr. Gómez,  with Diamond for HC #10    Reminder:  Monthly Support Groups offered virtually via TEAMS.  Contact Mis Prince ([email or web address]) to be added to the invite list.  2022, 12:30-1:30 pm:  Nutrition with Registered Dietitian Dariana Gilliam RD  Friday, May 20, 2022, 12:30-1:30 pm:  Open Forum  , July & August TBD/coming soon.       SIGNATURE:  FATIMAH Bower, [facility]  National Board-Certified Health &   24-Week Healthy Lifestyle Plan Health   [place] [facility]  email:  [email or web address]  appointment [phone]

## 2022-04-15 DIAGNOSIS — M62.838 MUSCLE SPASM: ICD-10-CM

## 2022-04-16 RX ORDER — BACLOFEN 10 MG/1
TABLET ORAL
Qty: 30 TABLET | Refills: 1 | Status: SHIPPED | OUTPATIENT
Start: 2022-04-16 | End: 2022-11-02

## 2022-04-20 ENCOUNTER — VIRTUAL VISIT (OUTPATIENT)
Dept: SURGERY | Facility: CLINIC | Age: 57
End: 2022-04-20
Payer: COMMERCIAL

## 2022-04-20 VITALS — WEIGHT: 204 LBS | HEIGHT: 67 IN | BODY MASS INDEX: 32.02 KG/M2

## 2022-04-20 DIAGNOSIS — F41.9 ANXIETY: Primary | ICD-10-CM

## 2022-04-20 PROCEDURE — 99214 OFFICE O/P EST MOD 30 MIN: CPT | Mod: 95 | Performed by: FAMILY MEDICINE

## 2022-04-20 RX ORDER — BUPROPION HYDROCHLORIDE 150 MG/1
150 TABLET ORAL EVERY MORNING
Qty: 90 TABLET | Refills: 3 | Status: SHIPPED | OUTPATIENT
Start: 2022-04-20 | End: 2022-05-23

## 2022-04-20 NOTE — LETTER
"    4/20/2022         RE: Keiry Mchugh  [address]  [place] [address]        Dear Colleague,    Thank you for referring your patient, Keiry Mchugh, to the [facility] AND [facility] [place]. Please see a copy of my visit note below.      The patient has been notified of following:     \"This telephone visit will be conducted via a call between you and your physician/provider. We have found that certain health care needs can be provided without the need for a physical exam.  This service lets us provide the care you need with a short phone conversation.  If a prescription is necessary we can send it directly to your pharmacy.  If lab work is needed we can place an order for that and you can then stop by our lab to have the test done at a later time.    Telephone visits are billed at different rates depending on your insurance coverage. During this emergency period, for some insurers they may be billed the same as an in-person visit.  Please reach out to your insurance provider with any questions.    If during the course of the call the physician/provider feels a telephone visit is not appropriate, you will not be charged for this service.\"    Patient has given verbal consent to a Telephone visit? Yes    What phone number would you like to be contacted at? [phone]    Patient would like to receive their AVS by Laurita    Are there any specific questions or needs that you would like addressed at your visit today?     Pt is weaning off of fluoxetine due to hot flashes. She continues to take Phentermine and doesn't need a refill at this time. She has been following up with Diamond more than the RD in her 24 week hlp.    Teddy Garcia  [facility]  Surgery Clinic [facility]  Weight Management Clinic [address]  [place] [address]  Office: [phone]  Fax: [phone]          Bariatric " Follow-up    57 year old  female BMI:Body mass index is 31.95 kg/m .    Weight:   Wt Readings from Last 1 Encounters:   04/20/22 92.5 kg (204 lb)    pounds    Comorbidities:  Patient Active Problem List   Diagnosis     Chronic insomnia     Suspected sleep apnea     HARDIN (nonalcoholic steatohepatitis)     Sigmoid diverticulitis     Dyslipidemia     Metabolic syndrome     Hypertension     Celiac disease         Interim: Participating in the 24 week program. Her mother was diagnosed with colon cancer and recurrent breast cancer. Regained 14# and lost 8 of that. Keeping 16# off. Her mother lives with her and has CHF, and neuropathy. Samantha's  outside activities are limited. Dealing with menopause and severe hot flashes for 5+ years. Found out Fluoxetine causes hot flashes as well as treating them. She is interested in Wellbutrin. Meal planning has worked well for her. Is more prepared. Work schedule makes it hard. COVID has made it hard. Short staffed!    Plan:  DIET  3 meals protein first. Do your best with water   EXERCISE explore well beats-youtube if not able to figure out wellbeats   PHARMACOTHERAPY Wellbutrin start. Can overlap with fluoxetine as weaning off. Continue B-12 daily.     congratulated on maintaining eat well, sleep well, exercise moderately during highly stressful period. Continue to rely on family and friend support for wellness.      -We reviewed her medications and whether associated with weight gain.    Current Outpatient Medications:      albuterol (PROAIR HFA/PROVENTIL HFA/VENTOLIN HFA) 108 (90 Base) MCG/ACT inhaler, Inhale 2 puffs into the lungs every 6 hours, Disp: 6.7 g, Rfl: 0     Ascorbic Acid (VITAMIN C) 500 MG CAPS, Take 500 mg by mouth 2 times daily , Disp: , Rfl:      atenolol (TENORMIN) 50 MG tablet, Take 50 mg by mouth daily as needed (Migraines) , Disp: , Rfl:      baclofen (LIORESAL) 10 MG tablet, TAKE 1 TABLET BY MOUTH TWICE DAILY TO THREE TIMES DAILY AS NEEDED FOR MUSCLE  SPASM., Disp: 30 tablet, Rfl: 1     benzonatate (TESSALON) 100 MG capsule, Take 1 capsule (100 mg) by mouth 3 times daily as needed for cough, Disp: 21 capsule, Rfl: 0     Black Pepper-Turmeric (TURMERIC CURCUMIN) 5-1000 MG CAPS, Take 1 tablet by mouth daily , Disp: , Rfl:      buPROPion (WELLBUTRIN XL) 150 MG 24 hr tablet, Take 1 tablet (150 mg) by mouth every morning, Disp: 90 tablet, Rfl: 3     Cholecalciferol (VITAMIN D3) 50 MCG (2000 UT) CAPS, Take 2,000 Units by mouth 2 times daily , Disp: , Rfl:      Coenzyme Q10 (COQ-10) 400 MG CAPS, Take 1 capsule by mouth daily , Disp: , Rfl:      diphenhydrAMINE (BENADRYL ALLERGY) 25 MG capsule, Take 2 capsules (50 mg) by mouth every 6 hours as needed for itching or allergies Administer 30 min - 2 hours pre - IV contrast injection, Disp: 2 capsule, Rfl: 0     FLUoxetine (PROZAC) 20 MG capsule, Take 1 capsule (20 mg) by mouth daily, Disp: 90 capsule, Rfl: 3     FLUoxetine 20 MG tablet, Take 20 mg by mouth daily, Disp: , Rfl:      HEMP OIL OR EXTRACT OR OTHER CBD CANNABINOID, NOT MEDICAL CANNABIS,, Take 1.5 drops by mouth , Disp: , Rfl:      magnesium oxide (MAG-OX) 400 (240 Mg) MG tablet, Take 400 mg by mouth daily , Disp: , Rfl:      Melatonin 10 MG TABS tablet, Take 10 mg by mouth nightly as needed for sleep , Disp: , Rfl:      omeprazole (PRILOSEC) 40 MG DR capsule, Take 1 capsule (40 mg) by mouth 2 times daily, Disp: 60 capsule, Rfl: 1     phentermine (ADIPEX-P) 37.5 MG tablet, Take 1 tablet (37.5 mg) by mouth every morning (before breakfast), Disp: 90 tablet, Rfl: 1     propranolol (INDERAL) 10 MG tablet, Take 1 tablet (10 mg) by mouth 2 times daily, Disp: 180 tablet, Rfl: 3     SUMAtriptan (IMITREX) 100 MG tablet, TAKE 1/2 TO 1 TABLET BY MOUTH AT ONSET OF HEADACHE, Disp: 9 tablet, Rfl: 3     vitamin B complex with vitamin C (STRESS TAB) tablet, Take 1 tablet by mouth daily, Disp: , Rfl:       We discussed [facility] Bariatric Basics including:  -eating 3 meals  daily  -eating protein first  -eating slowly, chewing food well  -avoiding/limiting calorie containing beverages  -choosing wheat, not white with breads, crackers, pastas, venu, bagels, tortillas, rice  -limiting restaurant or cafeteria eating to twice a week or less  -We discussed the importance of restorative sleep and stress management in maintaining a healthy weight.  -We discussed insulin resistance and glycemic index as it relates to appetite and weight control  -We discussed the National Weight Control Registry healthy weight maintenance strategies and ways to optimize metabolism.  -We discussed the importance of physical activity including cardiovascular and strength training in maintaining a healthier weight and explored viable options.    Most recent labs:  Lab Results   Component Value Date    WBC 4.1 09/08/2021    HGB 14.1 09/08/2021    HCT 42.0 09/08/2021    MCV 92 09/08/2021     09/08/2021     Lab Results   Component Value Date    CHOL 311 (H) 12/01/2020     Lab Results   Component Value Date    HDL 76 03/06/2020       Lab Results   Component Value Date    TRIG 143 03/06/2020     Lab Results   Component Value Date    ALT 56 (H) 09/08/2021    AST 25 09/08/2021    ALKPHOS 127 (H) 09/08/2021       Lab Results   Component Value Date    TSH 1.33 07/23/2021       DIETARY HISTORY  Meals Per Day: 2-3  Eating Protein First?: yes  Food Diary: B:hard boiled egg, turkey sausage and protein shake L:salad with protein D: ground turkey burgers, veggies  Snacks Per Day: fruit and veggies  Typical Snack: fruit and veggies  Fluid Intake: intentional but can't at work so quite a challenge  Portion Control: improved  Calorie Containing Beverages: no  Alcohol per week: no  Typical Protein Food Choices: variety  Choosing Whole Grains: celiac  Grocery Shopping is done by: herself  Food Preparation is done by: herself  Meals at Restaurant/Cafeteria/Take Out Per Week: <2  Eating at the Table:   TV is Off During Meals:  "    Positive Changes Since Last Visit: maintaining a 16# weight loss under extremely stressful season  Struggling With: stress, water intake    Knowledgeable in Reading Food Labels: yes  Getting Adequate Protein: yes  Sleeping 7-8 hours/day yes-6-8  Stress management one for 7 sisters so has BFFs, co-workers are close friends    PHYSICAL ACTIVITY PATTERNS:  Cardiovascular: taking walks is her favorite. On her feet as a Dental Assistant.   Strength Training: none    REVIEW OF SYSTEMS  GENERAL/CONSTITUTIONAL:  Fatigue: improved  CARDIOVASCULAR:  Chest Pain with Exertion: no  PULMONARY:  Dyspnea on exertion: no  CPAP Use: no  Tobacco Use: no  GASTROINTESTINAL:  GERD/Heartburn: no  UROLOGIC:  Urinary Symptoms:   NEUROLOGIC:  Headaches: yes  Paresthesias:   PSYCHIATRIC:  Moods: stressed but stable  MUSCULOSKELETAL/RHEUMATOLOGIC  Arthralgias: yes  Myalgias: yes  ENDOCRINE:  Monitoring Blood Sugars: no  Sugars Well Controlled: yes  DERMATOLOGIC:  Rashes: no    PHYSICAL EXAM: (most recent vitals and today's stated weight)  Vitals: Ht 1.702 m (5' 7\")   Wt 92.5 kg (204 lb)   BMI 31.95 kg/m        GEN: Pleasant and in no acute distress  PSYCH: A&OX3,     I have reviewed the note as documented above.  This accurately captures the substance of my conversation with the patient.  Thank you for the opportunity to participate in the care of your patient.    Radha Gómez MD, FAAFP  [facility]  Diplomate, American Board of Obesity Medicine    Total time spent on the date of this encounter doing: chart review, review of test results, patient visit, physical exam, education, counseling, developing plan of care, and documenting = 30 minutes.        Phone call duration: 30 minutes            Again, thank you for allowing me to participate in the care of your patient.        Sincerely,        Radha Gómez MD    "

## 2022-05-01 ENCOUNTER — OFFICE VISIT (OUTPATIENT)
Dept: FAMILY MEDICINE | Facility: CLINIC | Age: 57
End: 2022-05-01
Payer: COMMERCIAL

## 2022-05-01 VITALS
OXYGEN SATURATION: 97 % | DIASTOLIC BLOOD PRESSURE: 87 MMHG | HEART RATE: 92 BPM | TEMPERATURE: 97.2 F | SYSTOLIC BLOOD PRESSURE: 138 MMHG

## 2022-05-01 DIAGNOSIS — J01.90 ACUTE SINUSITIS, RECURRENCE NOT SPECIFIED, UNSPECIFIED LOCATION: Primary | ICD-10-CM

## 2022-05-01 DIAGNOSIS — R05.9 COUGH: ICD-10-CM

## 2022-05-01 DIAGNOSIS — J20.9 ACUTE BRONCHITIS, UNSPECIFIED ORGANISM: ICD-10-CM

## 2022-05-01 DIAGNOSIS — R50.9 FEVER, UNSPECIFIED FEVER CAUSE: ICD-10-CM

## 2022-05-01 LAB
FLUAV AG SPEC QL IA: NEGATIVE
FLUBV AG SPEC QL IA: NEGATIVE

## 2022-05-01 PROCEDURE — 87804 INFLUENZA ASSAY W/OPTIC: CPT | Performed by: FAMILY MEDICINE

## 2022-05-01 PROCEDURE — 99213 OFFICE O/P EST LOW 20 MIN: CPT | Performed by: FAMILY MEDICINE

## 2022-05-01 RX ORDER — DOXYCYCLINE 100 MG/1
100 CAPSULE ORAL 2 TIMES DAILY
Qty: 20 CAPSULE | Refills: 0 | Status: SHIPPED | OUTPATIENT
Start: 2022-05-01 | End: 2022-05-01

## 2022-05-01 RX ORDER — CODEINE PHOSPHATE AND GUAIFENESIN 10; 100 MG/5ML; MG/5ML
1-2 SOLUTION ORAL EVERY 4 HOURS PRN
Qty: 120 ML | Refills: 0 | Status: SHIPPED | OUTPATIENT
Start: 2022-05-01 | End: 2022-09-19

## 2022-05-01 RX ORDER — DOXYCYCLINE 100 MG/1
100 CAPSULE ORAL 2 TIMES DAILY
Qty: 20 CAPSULE | Refills: 0 | Status: SHIPPED | OUTPATIENT
Start: 2022-05-01 | End: 2022-09-19

## 2022-05-01 NOTE — PROGRESS NOTES
Assessment:       Acute sinusitis, recurrence not specified, unspecified location  - doxycycline hyclate (VIBRAMYCIN) 100 MG capsule  Dispense: 20 capsule; Refill: 0    Acute bronchitis, unspecified organism    Cough  - Influenza A & B Antigen - Clinic Collect  - guaiFENesin-codeine (ROBITUSSIN AC) 100-10 MG/5ML solution  Dispense: 120 mL; Refill: 0    Fever  - Influenza A & B Antigen - Clinic Collect         Plan:     Symptoms consistent with acute bacterial sinusitis.  Patient started on doxycycline..  Recommend decongestants and ibuprofen as well for symptoms.  Follow-up if symptoms are getting worse or not continuing to improve.    Patient does also seem to have acute bronchitis.  Suspect likely viral.  Lungs are otherwise clear.  Did give her a prescription for some Robitussin with codeine.  Influenza testing negative though suspect this likely is negative.  Notify her of the results we get it back.          MEDICATIONS:   Orders Placed This Encounter   Medications     doxycycline hyclate (VIBRAMYCIN) 100 MG capsule     Sig: Take 1 capsule (100 mg) by mouth 2 times daily for 10 days     Dispense:  20 capsule     Refill:  0     guaiFENesin-codeine (ROBITUSSIN AC) 100-10 MG/5ML solution     Sig: Take 5-10 mLs by mouth every 4 hours as needed for cough     Dispense:  120 mL     Refill:  0     Subjective:       57 year old female presents for evaluation of a week and a half history of nasal congestion and progressively worsening cough.  She states her chest hurts when she coughs and does have some mild shortness of breath with coughing.  Over the past several days she has been having worsening pain and pressure across her mid face and her teeth hurt.  For several days now she has had fevers over 1 100  F along with some chills.  She did take 2 COVID-19 antigen tests at home both of which were negative.  She denies any ear pain or sore throat.  She has tried Sudafed which is not helped significantly.    Patient  Active Problem List   Diagnosis     Chronic insomnia     Suspected sleep apnea     HARDIN (nonalcoholic steatohepatitis)     Sigmoid diverticulitis     Dyslipidemia     Metabolic syndrome     Hypertension     Celiac disease       Past Medical History:   Diagnosis Date     Anemia      Anxiety      Celiac disease      Celiac disease      Chronic kidney disease     donated 1 kidney     Cough      Depression     Seasonal     Diverticulitis      Dyslipidemia      Enlarged LV     wall     GERD (gastroesophageal reflux disease)      Hepatitis      Hiatal hernia      Hypertension      Metabolic syndrome      Migraines      HARDIN (nonalcoholic steatohepatitis)      Peripheral neuropathy      PONV (postoperative nausea and vomiting)      Ventral hernia        Past Surgical History:   Procedure Laterality Date     APPENDECTOMY       CHOLECYSTECTOMY       COLON SURGERY       COLONOSCOPY N/A 3/18/2019    Procedure: COLONOSCOPY;  Surgeon: Davis Mosqueda MD;  Location[facility];  Service: General     CYSTOSCOPY N/A 7/16/2015    Procedure: CYSTOSCOPY;  Surgeon: Nate Horta MD;  Location: [facility];  Service:      HC CORRECT BUNION,METATARSAL OSTEOTOMY      Description: Bunion Correction With Metatarsal Osteotomy Herman Procedure;  Proc Date: 07/16/2010;     HYSTERECTOMY       HYSTERECTOMY, PAP NO LONGER INDICATED  02/2009     NEPHRECTOMY LIVING DONOR Left APRIL 2012     ME LAP,BILIARY TRACT,UNLISTED N/A 3/19/2019    Procedure: BIOPSY, LIVER, LAPAROSCOPIC;  Surgeon: Davis Mosqueda MD;  Location: [facility];  Service: General     ME LAP,SLING OPERATION      Description: Laparoscopic Sling Operation For Stress Incontinence;  Recorded: 02/17/2009;     ME LAP,SURG,COLECTOMY, PARTIAL, W/ANAST N/A 3/19/2019    Procedure: LAPAROSCOPIC SIGMOID COLON RESECTION;  Surgeon: Davis Mosqueda MD;  Location: [facility];  Service: General     SEPTOPLASTY, TURBINOPLASTY, COMBINED N/A 8/3/2021     "Procedure: SEPTOPLASTY, NOSE, WITH TURBINOPLASTY RADIOFREQUENCY BALLON ASSISTED, CRYOBLATION OF NASAL TISSUE;  Surgeon: Randy Case MD;  Location: [facility]     SIGMOIDOSCOPY FLEXIBLE N/A 3/19/2019    Procedure: FLEXIBLE SIGMOIDOSCOPY;  Surgeon: Davis Mosqueda MD;  Location: [facility];  Service: General       Current Outpatient Medications   Medication     doxycycline hyclate (VIBRAMYCIN) 100 MG capsule     guaiFENesin-codeine (ROBITUSSIN AC) 100-10 MG/5ML solution     albuterol (PROAIR HFA/PROVENTIL HFA/VENTOLIN HFA) 108 (90 Base) MCG/ACT inhaler     Ascorbic Acid (VITAMIN C) 500 MG CAPS     atenolol (TENORMIN) 50 MG tablet     baclofen (LIORESAL) 10 MG tablet     Black Pepper-Turmeric (TURMERIC CURCUMIN) 5-1000 MG CAPS     buPROPion (WELLBUTRIN XL) 150 MG 24 hr tablet     Cholecalciferol (VITAMIN D3) 50 MCG (2000 UT) CAPS     Coenzyme Q10 (COQ-10) 400 MG CAPS     diphenhydrAMINE (BENADRYL ALLERGY) 25 MG capsule     FLUoxetine (PROZAC) 20 MG capsule     FLUoxetine 20 MG tablet     HEMP OIL OR EXTRACT OR OTHER CBD CANNABINOID, NOT MEDICAL CANNABIS,     magnesium oxide (MAG-OX) 400 (240 Mg) MG tablet     Melatonin 10 MG TABS tablet     omeprazole (PRILOSEC) 40 MG DR capsule     phentermine (ADIPEX-P) 37.5 MG tablet     propranolol (INDERAL) 10 MG tablet     SUMAtriptan (IMITREX) 100 MG tablet     vitamin B complex with vitamin C (STRESS TAB) tablet     No current facility-administered medications for this visit.       Allergies   Allergen Reactions     Gluten Meal Nausea and Vomiting     Contrast Dye      Ondansetron Headache     Pcn [Penicillins] Hives     As a child     Rocephin [Ceftriaxone] Hives     Patient states this started immediately     Tramadol Hives     Zithromax [Azithromycin] Tinnitus     \"ear ringing and hearing loss\"       Family History   Problem Relation Age of Onset     Hashimoto's thyroiditis Mother      Breast Cancer Mother 50.00     Graves' disease Sister      " Hypothyroidism Maternal Aunt      Breast Cancer Maternal Aunt 50.00     Hashimoto's thyroiditis Maternal Grandfather      Hypothyroidism Sister      Hypothyroidism Maternal Aunt      Hashimoto's thyroiditis Maternal Aunt      Anesthesia Reaction No family hx of        Social History     Socioeconomic History     Marital status: Single     Spouse name: None     Number of children: 2     Years of education: None     Highest education level: None   Tobacco Use     Smoking status: Former Smoker     Packs/day: 0.00     Quit date: 1999     Years since quittin.3     Smokeless tobacco: Never Used   Substance and Sexual Activity     Alcohol use: No     Comment: rare     Drug use: Not Currently     Sexual activity: Not Currently     Partners: Male   Social History Narrative    Single. 2 adult children living in the area. Daughter lives in [place]. Son lives with her as his girlfriend finishes nursing school. 3 grandchildren.     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          Review of Systems  Pertinent items are noted in HPI.      Objective:                   General Appearance:    /87 (BP Location: Right arm, Patient Position: Sitting, Cuff Size: Adult Regular)   Pulse 92   Temp 97.2  F (36.2  C) (Tympanic)   SpO2 97%         Alert, mildly ill-appearing but in no distress   Head:    Normocephalic, without obvious abnormality, atraumatic   Eyes:    Conjunctiva/corneas clear   Ears:    Normal TM's without erythema or bulging. Normal external ear canals, both ears   Nose:  Bilateral maxillary sinus tenderness is noted.   Throat:   Lips, mucosa, and tongue normal; teeth and gums normal.  No tonsilar hypertrophy or exudate.   Neck:   Supple, symmetrical, trachea midline, no adenopathy    Lungs:     Clear to auscultation bilaterally without wheezes, rales, or rhonchi, respirations unlabored    Heart:    Regular rate and rhythm, S1 and S2 normal, no murmur, rub or gallop       Extremities:   Extremities  normal, atraumatic, no cyanosis or edema   Skin:   Skin color, texture, turgor normal, no rashes or lesions       This note has been dictated using voice recognition software. Any grammatical or context distortions are unintentional and inherent to the software

## 2022-05-16 ENCOUNTER — VIRTUAL VISIT (OUTPATIENT)
Dept: SURGERY | Facility: CLINIC | Age: 57
End: 2022-05-16
Payer: COMMERCIAL

## 2022-05-16 DIAGNOSIS — E66.9 OBESITY: Primary | ICD-10-CM

## 2022-05-16 PROCEDURE — 99207 PR MWM HEALTH COACH NO CHARGE: CPT

## 2022-05-16 NOTE — PROGRESS NOTES
"Reason for Visit: 24-Week Healthy Lifestyle Plan Follow up Visit - Health Coaching    Progress Notes:  Return 24-Week Healthy Lifestyle Plan Weight Management Health Coaching Note  Keiry Mchugh   MRN: [id number]  : 1965  SHAAN: 2022  Health  Follow-up Visit #: 10    Initial Start Date:  2021  Graduation Date:  3/13/2022 - extending this date to 2022 due to HC provider JOYA Webster (online resource) enrollment date(s):  2021  Physician:  Dr. Radha Moreno-Rico  Initial Weight (lbs): 220 lbs.  Current Weight (lbs) 198.8 lbs. (weighs on Sundays - feeling so good to be back at this number and looking forward to next 10's = 180's)  Patient's personal goal weight = 160 lbs.  Average Daily Water (ounces): - 50 oz/day - good each day (harder on days she works double shifts)  Weight Loss Medication: phentermine (helps with cravings but no extra energy) ( has plenty of refills with the pharmacy, for 6 months)  Nutrition Plan: real whole foods      PILLAR(S) DISCUSSED IN THIS VISIT:  Other: when she is frustrated/angry and \"fell off the wagon\" she doesn't like how she feels.  Other:  All or Nothing = Moderation doesn't necessarily work well.  High self-awareness.  Sometimes wishes she could find a little bit in the middle ground.      GOALS:  Continue with goals and build consistency.  Exercise/Movement: Find time to fit this in.  Week by week. No excuses, view as opportunity.  Main focus:  Nutrition: tracking/food journal & and pre-planning (due to Celiac)  Other: Looking forward to time off from work & staying with the grandkids for a weekend at their home.      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  No scheduled breaks throughout her day, doesn't drink a lot of " water   35-45 hours/week is what she is working  [place] about the 24 HLP, thru her sister who is experiencing success with the program  Lots of medical issues   [place] - Flori Boo (tiger) Alhaji (your majesty)  Lots of health concerns:  Celiac Disease, Hernia, Diverticulitis  Sleep patterns not good - wakes up within 2 minutes of falling asleep, heart racing - could be Panic Attacks  (beta blocker prescribed but isn't working to help with the heart racing)     FOLLOW-Up:     for Final, HC #11 (1 month from today)    Reminder:  Monthly Healthy Lifestyle Support Groups offered virtually via TEAMS.  Contact Mis Prince ([email or web address]) to be added to the invite list.  Upcoming:  (, 12:30pm to 1:30pm):  May 20 ~ Open Forum   ~ Setting Limits and Boundaries, Mis National Board Certified Health    ~ Open Forum   ~ Special Guest Registered Dietician Claudette Florence   ~ Open Forum   ~ Gratitude Practices, Diamond National Board Certified Health    ~ Navigating How to Eat Around the Holidays with ZULEYKA Gilliam   ~ Changing your relationship with movement with  Rachel National Board Certified Health        SIGNATURE:  FATIMAH Bower, [facility]  National Board-Certified Health &   24-Week Healthy Lifestyle Plan Health   [facility] Comprehensive Weight Management Program  email:  [email or web address]  appointment [phone]

## 2022-05-16 NOTE — LETTER
"    2022         RE: Keiry Mchugh  [address] [facility] [address]        Dear Colleague,    Thank you for referring your patient, Keiry Mchugh, to the [facility]ERY [facility] AND [facility]IATRICS CARE [place]. Please see a copy of my visit note below.    Reason for Visit: 24-Week Healthy Lifestyle Plan Follow up Visit - Health Coaching    Progress Notes:  Return 24-Week Healthy Lifestyle Plan Weight Management Health Coaching Note  Keiry Mchugh   MRN: [id number]  : 1965  SHAAN: 2022  Health  Follow-up Visit #: 10    Initial Start Date:  2021  Graduation Date:  3/13/2022 - extending this date to 2022 due to HC provider JOYA Webster (online resource) enrollment date(s):  2021  Physician:  Dr. Radha Moreno-Rico  Initial Weight (lbs): 220 lbs.  Current Weight (lbs) 198.8 lbs. (weighs on Sundays - feeling so good to be back at this number and looking forward to next 10's = 180's)  Patient's personal goal weight = 160 lbs.  Average Daily Water (ounces): - 50 oz/day - good each day (harder on days she works double shifts)  Weight Loss Medication: phentermine (helps with cravings but no extra energy) ( has plenty of refills with the pharmacy, for 6 months)  Nutrition Plan: real whole foods      PILLAR(S) DISCUSSED IN THIS VISIT:  Other: when she is frustrated/angry and \"fell off the wagon\" she doesn't like how she feels.  Other:  All or Nothing = Moderation doesn't necessarily work well.  High self-awareness.  Sometimes wishes she could find a little bit in the middle ground.      GOALS:  Continue with goals and build consistency.  Exercise/Movement: Find time to fit this in.  Week by week. No excuses, view as opportunity.  Main focus:  Nutrition: tracking/food journal & and pre-planning (due to Celiac)  Other: Looking forward to time off from work & staying with the grandkids for a weekend at their home.      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  No scheduled breaks throughout her day, doesn't drink a lot of water   35-45 hours/week is what she is working  [place] about the 24 HLP, thru her sister who is experiencing success with the program  Lots of medical issues   [place] Maru - Flori Xavier Cat - Rajeev (tiger) Alhaji (your majesty)  Lots of health concerns:  Celiac Disease, Hernia, Diverticulitis  Sleep patterns not good - wakes up within 2 minutes of falling asleep, heart racing - could be Panic Attacks  (beta blocker prescribed but isn't working to help with the heart racing)     FOLLOW-Up:     for Final, HC #11 (1 month from today)    Reminder:  Monthly Healthy Lifestyle Support Groups offered virtually via TEAMS.  Contact Mis Prince ([email or web address]) to be added to the invite list.  Upcoming:  (, 12:30pm to 1:30pm):  May 20 ~ Open Forum   ~ Setting Limits and Boundaries, Mis National Board Certified Health    ~ Open Forum   ~ Special Guest Registered Dietician Claudette Florence   ~ Open Forum   ~ Gratitude Practices, Diamond National Board Certified Health    ~ Navigating How to Eat Around the Holidays with ZULEYKA Peresah Tawana   ~ Changing your relationship with movement with  Rachel National Board Certified Health        SIGNATURE:  FATIMAH Bower, [facility]  National Board-Certified Health &   24-Week Healthy Lifestyle Plan Health   [place] [facility]  email:  [email or web address]  appointment [phone]      Again, thank you for allowing me to participate in the care of your patient.        Sincerely,        Diamond Dutta

## 2022-05-23 DIAGNOSIS — F41.9 ANXIETY: ICD-10-CM

## 2022-05-23 RX ORDER — BUPROPION HYDROCHLORIDE 300 MG/1
300 TABLET ORAL EVERY MORNING
Qty: 90 TABLET | Refills: 3 | Status: SHIPPED | OUTPATIENT
Start: 2022-05-23 | End: 2022-09-19

## 2022-06-13 ENCOUNTER — VIRTUAL VISIT (OUTPATIENT)
Dept: SURGERY | Facility: CLINIC | Age: 57
End: 2022-06-13
Payer: COMMERCIAL

## 2022-06-13 DIAGNOSIS — E66.9 OBESITY: Primary | ICD-10-CM

## 2022-06-13 PROCEDURE — 99207 PR MWM HEALTH COACH NO CHARGE: CPT

## 2022-06-13 NOTE — LETTER
2022         RE: Keiry Mchugh  [address] [place] [address]        Dear Colleague,    Thank you for referring your patient, Keiry Mchugh, to the [facility] SURGERY CLINIC AND BARIATRICS CARE [place]. Please see a copy of my visit note below.    Reason for Visit: 24-Week Healthy Lifestyle Plan Follow up Visit - Health Coaching    Progress Notes:  Return 24-Week Healthy Lifestyle Plan Weight Management Health Coaching Note  Keiry Mchugh   MRN: [id number]  : 1965  SHAAN: 2022    Health Coaching Visit #11 - final visit    Initial Start Date:  2021  Graduation Date:  3/13/2022 - extending this date to 2022 due to HC provider JOYA Webster (online resource) enrollment date(s):  2021  Physician:  Dr. Radha Moreno-Rico  Initial Weight (lbs): 220 lbs.  Current Weight (lbs) 198.8 lbs. (weighs on Sundays - feeling so good to be back at this number and looking forward to next 10's = 180's)  Patient's personal goal weight = 160 lbs.  Average Daily Water (ounces): - 50 oz/day - good each day (harder on days she works double shifts)  Weight Loss Medication: phentermine (helps with cravings but no extra energy) ( has plenty of refills with the pharmacy, for 5 months)  Nutrition Plan: real whole foods      PILLAR(S) DISCUSSED IN THIS VISIT:  Nutrition: going well - writing every thing down, starting a new notebook  Exercise/Movement/Activity: busy working in her yard, etc. - gardens, cleaning, redoing, staining her deck - refreshing now again (didn't tend to it during Covid)  Walking a lot: mowing grass, walking with the grandsons, moving her body 4x/week  Other: haircut next week (trim)  Other: feeling a bit better now   Other: twisting pain/feeling in her abdomen - is it related to the mesh (hernia) that she has from a surgery about 2 years. Worried she may be re-hernia? Could it be from gardening? Using abs muscles? To manage it, she needs to take deep  breaths.       PREVIOUS GOAL(S) REVIEW:  Continue with goals and build consistency.  Exercise/Movement: Find time to fit this in.  Week by week. No excuses, view as opportunity.  Main focus:  Nutrition: tracking/food journal & and pre-planning (due to Celiac)  Other: Looking forward to time off from work & staying with the grandkids for a weekend at their home      GOALS:   Other:  Send a message to Dr. Mosqueda regarding mesh from hernia surgery 2 years ago.  Not a good time for this right now. But feeling like she will send a Code71 message to Dr. Mosqueda.      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 Will reach out via Code71 to Dr. Rico GARDINER or myself if she'd like to schedule an appointment    Reminder:  Monthly Healthy Lifestyle Support Groups offered virtually via TEAMS.  Contact Mis Prince ([email or web address]) to be added to the invite list.  Upcoming:  (Fridays, 12:30pm to 1:30pm):    June 24 ~ Setting Limits and Boundaries, Mis, National Board Certified Health   July 29 ~ Open Forum  August 26 ~ Special Guest Registered Dietician Claudette Florence  September 30 ~ Open Forum  October 28 ~  Gratitude Diamond Carballo National Board Certified Health    ~ Navigating How to Eat Around the Holidays with ZULEYKA Gilliam   ~ Changing your relationship with movement with  Rachel National Board Certified Health        SIGNATURE:  FATIMAH Bower, [facility]  National Board-Certified Health &   24-Week Healthy Lifestyle Plan Health   [place] [facility]  email:  [email or web address]  appointment [phone]      Again, thank you for allowing me to participate in the care of your patient.        Sincerely,        Diamond Dutta

## 2022-06-17 ENCOUNTER — NURSE TRIAGE (OUTPATIENT)
Dept: NURSING | Facility: CLINIC | Age: 57
End: 2022-06-17
Payer: COMMERCIAL

## 2022-06-17 ENCOUNTER — TELEPHONE (OUTPATIENT)
Dept: FAMILY MEDICINE | Facility: CLINIC | Age: 57
End: 2022-06-17
Payer: COMMERCIAL

## 2022-06-17 NOTE — TELEPHONE ENCOUNTER
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  Patient has one ulcer in her mouth.  Patient is wanting to know if it is alright to be at work and working on patients if she is in her incubation period.  FNA transferred caller through to [facility] direct.    COVID 19 Nurse Triage Plan/Patient Instructions    Please be aware that novel coronavirus (COVID-19) may be circulating in the community. If you develop symptoms such as fever, cough, or SOB or if you have concerns about the presence of another infection including coronavirus (COVID-19), please contact your health care provider or visit [email or web address].     Disposition/Instructions    Home care recommended. Follow home care protocol based instructions.    Thank you for taking steps to prevent the spread of this virus.  o Limit your contact with others.  o Wear a simple mask to cover your cough.  o Wash your hands well and often.    Resources    [facility]: About COVID-19: [email or web address]/    CDC: What to Do If You're Sick: www.cdc.gov/coronavirus/2019-ncov/about/steps-when-sick.html    CDC: Ending Home Isolation: www.cdc.gov/coronavirus/2019-ncov/hcp/disposition-in-home-patients.html     CDC: Caring for Someone: www.cdc.gov/coronavirus/2019-ncov/if-you-are-sick/care-for-someone.html     [facility]: Interim Guidance for Hospital Discharge to Home: www.health.[facility].[place]/diseases/coronavirus/hcp/hospdischarge.pdf    [facility] clinical trials (COVID-19 research studies): clinicalaffairs.[facility]/n-clinical-trials     Below are the COVID-19 hotlines at the [facility] of [facility] ([facility]). Interpreters are available.   o For health questions: Call 571-616-5412 or 1-102.492.5802 (7 a.m. to 7 p.m.)  o For questions about schools and childcare: Call 901-870-6845 or 1-925.822.8540 (7 a.m. to 7 p.m.)

## 2022-06-17 NOTE — TELEPHONE ENCOUNTER
Spoke with patient. She was exposed to hand, foot, and mouth on Monday 6/13 by her 2 year old grandson. 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 Patient noticed a painful lesion on her tongue this morning as well as some possible redness in her throat. I advised that since she may be experiencing some symptoms she should stay home from work today. Patient is currently off work until Tuesday. Patient plans to take the weekend to watch for possible development of more symptoms and will get evaluated if she is still questioning ability to work by Tuesday.    Georgie Antunez RN

## 2022-08-17 ENCOUNTER — TRANSFERRED RECORDS (OUTPATIENT)
Dept: HEALTH INFORMATION MANAGEMENT | Facility: CLINIC | Age: 57
End: 2022-08-17

## 2022-09-12 ENCOUNTER — LAB (OUTPATIENT)
Dept: FAMILY MEDICINE | Facility: CLINIC | Age: 57
End: 2022-09-12
Attending: FAMILY MEDICINE
Payer: COMMERCIAL

## 2022-09-12 DIAGNOSIS — Z20.822 SUSPECTED 2019 NOVEL CORONAVIRUS INFECTION: ICD-10-CM

## 2022-09-12 PROCEDURE — U0003 INFECTIOUS AGENT DETECTION BY NUCLEIC ACID (DNA OR RNA); SEVERE ACUTE RESPIRATORY SYNDROME CORONAVIRUS 2 (SARS-COV-2) (CORONAVIRUS DISEASE [COVID-19]), AMPLIFIED PROBE TECHNIQUE, MAKING USE OF HIGH THROUGHPUT TECHNOLOGIES AS DESCRIBED BY CMS-2020-01-R: HCPCS

## 2022-09-12 PROCEDURE — U0005 INFEC AGEN DETEC AMPLI PROBE: HCPCS

## 2022-09-13 LAB — SARS-COV-2 RNA RESP QL NAA+PROBE: NEGATIVE

## 2022-09-19 ENCOUNTER — OFFICE VISIT (OUTPATIENT)
Dept: FAMILY MEDICINE | Facility: CLINIC | Age: 57
End: 2022-09-19
Payer: COMMERCIAL

## 2022-09-19 VITALS
BODY MASS INDEX: 29.07 KG/M2 | HEART RATE: 87 BPM | SYSTOLIC BLOOD PRESSURE: 134 MMHG | TEMPERATURE: 98.8 F | RESPIRATION RATE: 22 BRPM | DIASTOLIC BLOOD PRESSURE: 87 MMHG | OXYGEN SATURATION: 100 % | WEIGHT: 185.6 LBS

## 2022-09-19 DIAGNOSIS — J40 BRONCHITIS: ICD-10-CM

## 2022-09-19 DIAGNOSIS — J01.90 ACUTE NON-RECURRENT SINUSITIS, UNSPECIFIED LOCATION: Primary | ICD-10-CM

## 2022-09-19 PROCEDURE — 99213 OFFICE O/P EST LOW 20 MIN: CPT | Performed by: FAMILY MEDICINE

## 2022-09-19 RX ORDER — ALBUTEROL SULFATE 90 UG/1
1-2 AEROSOL, METERED RESPIRATORY (INHALATION) EVERY 6 HOURS
Qty: 18 G | Refills: 0 | Status: SHIPPED | OUTPATIENT
Start: 2022-09-19 | End: 2022-11-29

## 2022-09-19 RX ORDER — DESONIDE 0.5 MG/G
CREAM TOPICAL
COMMUNITY
Start: 2022-04-26 | End: 2022-11-29

## 2022-09-19 RX ORDER — DOXYCYCLINE HYCLATE 100 MG
100 TABLET ORAL 2 TIMES DAILY
Qty: 14 TABLET | Refills: 0 | Status: SHIPPED | OUTPATIENT
Start: 2022-09-19 | End: 2022-09-26

## 2022-09-19 NOTE — PROGRESS NOTES
ICD-10-CM    1. Acute non-recurrent sinusitis, unspecified location  J01.90 doxycycline hyclate (VIBRA-TABS) 100 MG tablet   2. Bronchitis  J40 albuterol (PROAIR HFA/PROVENTIL HFA/VENTOLIN HFA) 108 (90 Base) MCG/ACT inhaler   possibly bacterial given the duration. Symptomatic therapy and follow up discussed.    -------------------------------  Keiry Mchugh with presents with 10 days symptoms including congestion, post nasal drip, productive cough, sinus pressure, initially with low grade fever. Some shortness of breath when coughing.     The patient has a history of asthma and allergies    Treatment measures tried include Decongestants.      Current Outpatient Medications   Medication Sig Dispense Refill     Ascorbic Acid (VITAMIN C) 500 MG CAPS Take 500 mg by mouth 2 times daily        atenolol (TENORMIN) 50 MG tablet Take 50 mg by mouth daily as needed (Migraines)        baclofen (LIORESAL) 10 MG tablet TAKE 1 TABLET BY MOUTH TWICE DAILY TO THREE TIMES DAILY AS NEEDED FOR MUSCLE SPASM. 30 tablet 1     Cholecalciferol (VITAMIN D3) 50 MCG (2000 UT) CAPS Take 2,000 Units by mouth 2 times daily        Coenzyme Q10 (COQ-10) 400 MG CAPS Take 1 capsule by mouth daily        desonide (DESOWEN) 0.05 % external cream APPLY TO AFFECTED AREA 1-2X DAILY FOR 2 WEEKS TAKE 1 WEEK BREAK REPEAT AS NEEDED FOR FLARES       diphenhydrAMINE (BENADRYL ALLERGY) 25 MG capsule Take 2 capsules (50 mg) by mouth every 6 hours as needed for itching or allergies Administer 30 min - 2 hours pre - IV contrast injection 2 capsule 0     magnesium oxide (MAG-OX) 400 (240 Mg) MG tablet Take 400 mg by mouth daily        phentermine (ADIPEX-P) 37.5 MG tablet Take 1 tablet (37.5 mg) by mouth every morning (before breakfast) 90 tablet 1     SUMAtriptan (IMITREX) 100 MG tablet TAKE 1/2 TO 1 TABLET BY MOUTH AT ONSET OF HEADACHE 9 tablet 3     vitamin B complex with vitamin C (STRESS TAB) tablet Take 1 tablet by mouth daily       albuterol (PROAIR  HFA/PROVENTIL HFA/VENTOLIN HFA) 108 (90 Base) MCG/ACT inhaler Inhale 2 puffs into the lungs every 6 hours (Patient not taking: No sig reported) 6.7 g 0       ROS otherwise negative for resp., ID,  HEENT symptoms.    Objective: /87 (BP Location: Right arm, Patient Position: Sitting, Cuff Size: Adult Regular)   Pulse 87   Temp 98.8  F (37.1  C) (Oral)   Resp 22   Wt 84.2 kg (185 lb 9.6 oz)   SpO2 100%   BMI 29.07 kg/m    Exam:  GENERAL APPEARANCE: healthy, alert and no distress  EYES: Eyes grossly normal to inspection on .rthe left has some mild redness without discharge.   HENT: ear canals and TM's normal and nose and mouth without ulcers or lesions  NECK: no adenopathy, no asymmetry, masses, or scars and thyroid normal to palpation  RESP: lungs clear to auscultation - no rales, rhonchi or wheezes  CV: regular rates and rhythm, no murmur

## 2022-10-16 ENCOUNTER — HEALTH MAINTENANCE LETTER (OUTPATIENT)
Age: 57
End: 2022-10-16

## 2022-11-16 ENCOUNTER — TRANSFERRED RECORDS (OUTPATIENT)
Dept: HEALTH INFORMATION MANAGEMENT | Facility: CLINIC | Age: 57
End: 2022-11-16

## 2022-11-28 ENCOUNTER — TELEPHONE (OUTPATIENT)
Dept: SURGERY | Facility: CLINIC | Age: 57
End: 2022-11-28

## 2022-11-28 NOTE — TELEPHONE ENCOUNTER
Patient called back and she will take the visit tomorrow as a telephone visit since she isn't great with technology.  Mis Ellis RN

## 2022-11-28 NOTE — TELEPHONE ENCOUNTER
Had to leave a message but let the patient know that Dr. Gómez was out of the office last week and apologized for the delay on the refill.  However, it has been over 6 months since she was seen by Dr. Gómez or an RD and will need to have a follow-up.  I was able to get her in tomorrow at 11:15 am and am waiting for her to call and confirm if that works for her.  Mis Ellis RN

## 2022-11-29 ENCOUNTER — VIRTUAL VISIT (OUTPATIENT)
Dept: SURGERY | Facility: CLINIC | Age: 57
End: 2022-11-29
Payer: COMMERCIAL

## 2022-11-29 VITALS — BODY MASS INDEX: 29.03 KG/M2 | WEIGHT: 185 LBS | HEIGHT: 67 IN

## 2022-11-29 DIAGNOSIS — R10.11 RUQ ABDOMINAL PAIN: Primary | ICD-10-CM

## 2022-11-29 DIAGNOSIS — K75.81 NASH (NONALCOHOLIC STEATOHEPATITIS): ICD-10-CM

## 2022-11-29 DIAGNOSIS — E66.3 OVERWEIGHT (BMI 25.0-29.9): Primary | ICD-10-CM

## 2022-11-29 DIAGNOSIS — E88.810 METABOLIC SYNDROME: ICD-10-CM

## 2022-11-29 PROCEDURE — 99213 OFFICE O/P EST LOW 20 MIN: CPT | Mod: 95 | Performed by: FAMILY MEDICINE

## 2022-11-29 RX ORDER — PHENTERMINE HYDROCHLORIDE 37.5 MG/1
37.5 TABLET ORAL
Qty: 90 TABLET | Refills: 1 | Status: SHIPPED | OUTPATIENT
Start: 2022-11-29 | End: 2023-06-01

## 2022-11-29 NOTE — PROGRESS NOTES
"  The patient has been notified of following:     \"This telephone visit will be conducted via a call between you and your physician/provider. We have found that certain health care needs can be provided without the need for a physical exam.  This service lets us provide the care you need with a short phone conversation.  If a prescription is necessary we can send it directly to your pharmacy.  If lab work is needed we can place an order for that and you can then stop by our lab to have the test done at a later time.    Telephone visits are billed at different rates depending on your insurance coverage. During this emergency period, for some insurers they may be billed the same as an in-person visit.  Please reach out to your insurance provider with any questions.    If during the course of the call the physician/provider feels a telephone visit is not appropriate, you will not be charged for this service.\"    Patient has given verbal consent to a Telephone visit? Yes    What phone number would you like to be contacted at? [phone]    Patient would like to receive their AVS by [email or web address]    Are there any specific questions or needs that you would like addressed at your visit today? Phentermine refill         Distant Location (provider location):  On-site    Additional provider notes:   Bariatric Follow-up    57 year old  female BMI:Body mass index is 28.98 kg/m .    Weight:   Wt Readings from Last 1 Encounters:   11/29/22 83.9 kg (185 lb)    pounds      Comorbidities:  Patient Active Problem List   Diagnosis     Chronic insomnia     Suspected sleep apnea     HARDIN (nonalcoholic steatohepatitis)     Sigmoid diverticulitis     Dyslipidemia     Metabolic syndrome     Hypertension     Celiac disease       Interim: Rotator cuff injury- Continues to lose weight. Maintaining a 35# (42# from her high) weight loss. Has HARDIN and having LFTs rechecked. Would still like to lose 20#.   Still not sleeping well. Has had chronic " insomnia for as long as she can remember. 5 hours is a good night. Continues her B-complex, D, C and C-Q 10. Started yogurt and probiotics which was a game changer for her.   Had COVID 8 weeks ago and started noticing pain in her RUQ. She is thirsty, feels dehydrated, sometimes nausea, and tired.      Plan:  DIET  Food diary sounds excellent. Continue fruit, veggies, lean proteins, nuts, with consistency. Protein first.    EXERCISE-Look at [employer] or Youtube for strength training exercises using your bands. Steps at work are great. Strength training will help optimize metabolism, improve insulin sensitivity, and benefit bone density   PHARMACOTHERAPY refill phentermine. Continue B-complex, D and C.     Congratulated on excellent 35# (16%)  (42# from her high (19%)) weight loss. Encouraged to continue excellent habits.      -We reviewed her medications and whether associated with weight gain.  Current Outpatient Medications   Medication     Ascorbic Acid (VITAMIN C) 500 MG CAPS     atenolol (TENORMIN) 50 MG tablet     baclofen (LIORESAL) 10 MG tablet     Cholecalciferol (VITAMIN D3) 50 MCG (2000 UT) CAPS     Coenzyme Q10 (COQ-10) 400 MG CAPS     diphenhydrAMINE (BENADRYL ALLERGY) 25 MG capsule     magnesium oxide (MAG-OX) 400 (240 Mg) MG tablet     phentermine (ADIPEX-P) 37.5 MG tablet     SUMAtriptan (IMITREX) 100 MG tablet     vitamin B complex with vitamin C (STRESS TAB) tablet     No current facility-administered medications for this visit.        We discussed [facility] Bariatric Basics including:  -eating 3 meals daily  -eating protein first  -eating slowly, chewing food well  -avoiding/limiting calorie containing beverages  -choosing wheat, not white with breads, crackers, pastas, venu, bagels, tortillas, rice  -limiting restaurant or cafeteria eating to twice a week or less  -We discussed the importance of restorative sleep and stress management in maintaining a healthy weight.  -We discussed insulin  "resistance and glycemic index as it relates to appetite and weight control  -We discussed the National Weight Control Registry healthy weight maintenance strategies and ways to optimize metabolism.  -We discussed the importance of physical activity including cardiovascular and strength training in maintaining a healthier weight and explored viable options.    Most recent labs:  Lab Results   Component Value Date    WBC 4.1 09/08/2021    HGB 14.1 09/08/2021    HCT 42.0 09/08/2021    MCV 92 09/08/2021     09/08/2021     Lab Results   Component Value Date    CHOL 311 (H) 12/01/2020     Lab Results   Component Value Date    HDL 76 03/06/2020       Lab Results   Component Value Date    TRIG 143 03/06/2020       Lab Results   Component Value Date    ALT 56 (H) 09/08/2021    AST 25 09/08/2021    ALKPHOS 127 (H) 09/08/2021       Lab Results   Component Value Date    TSH 1.33 07/23/2021     DIETARY HISTORY  Meals Per Day: 3  Eating Protein First?: yes  Food Diary: B:2 eggs with turkey sausage and coffee with protein L:yogurt with fruit D:chicken, pork, with salad with \"tons of veggies\" or cheese and gluten free crackers with fruit  Snacks Per Day: yes  Typical Snack: nuts occasionally but careful d/t diverticulitis history  Fluid Intake: intentional-32 oz water bottle  Portion Control: improved   Calorie Containing Beverages: no  Alcohol per week: none-has alcohol intolerance  Typical Protein Food Choices: variety  Choosing Whole Grains: CELIAC  Grocery Shopping is done by: herself  Food Preparation is done by: herself  Meals at Restaurant/Cafeteria/Take Out Per Week: none  Eating at the Table:   TV is Off During Meals:     Positive Changes Since Last Visit: meal planning,   Struggling With: exercise    Knowledgeable in Reading Food Labels: yes  Getting Adequate Protein: yes  Sleeping 7-8 hours/day no  Stress management so busy at work    PHYSICAL ACTIVITY PATTERNS:  Cardiovascular: tries to walk the malls a couple of " "days a week.   Strength Training: none    REVIEW OF SYSTEMS  GENERAL/CONSTITUTIONAL:  Fatigue: yes  CARDIOVASCULAR:  Chest Pain with Exertion: no  PULMONARY:  Dyspnea on exertion: no  CPAP Use: no  Tobacco Use: no  Asthma Controlled: NA  GASTROINTESTINAL:  GERD/Heartburn: known HH, no GERD with weight loss  Gallbladder:   UROLOGIC:  Urinary Symptoms:   NEUROLOGIC:  Headaches: yes lately  Paresthesias:   PSYCHIATRIC:  Moods: euthymic  MUSCULOSKELETAL/RHEUMATOLOGIC  Arthralgias: yes  Myalgias: yes  ENDOCRINE:  Monitoring Blood Sugars: no  Sugars Well Controlled: yes  DERMATOLOGIC:  Rashes:     PHYSICAL EXAM:  Vitals: Ht 1.702 m (5' 7\")   Wt 83.9 kg (185 lb)   BMI 28.98 kg/m    Pleasant and in no distress  Normal respiratory effort  Euthymic  Alert and Oriented        Total time spent on the date of this encounter doing: chart review, review of test results, patient visit, physical exam, education, counseling, developing plan of care, and documenting = 23minutes.            Phone call duration: 23 minutes    "

## 2022-11-29 NOTE — LETTER
"    11/29/2022         RE: Keiry Mchugh  [address] [facility] [address]        Dear Colleague,    Thank you for referring your patient, Keiry Mchugh, to the [facility] [place]. Please see a copy of my visit note below.      The patient has been notified of following:     \"This telephone visit will be conducted via a call between you and your physician/provider. We have found that certain health care needs can be provided without the need for a physical exam.  This service lets us provide the care you need with a short phone conversation.  If a prescription is necessary we can send it directly to your pharmacy.  If lab work is needed we can place an order for that and you can then stop by our lab to have the test done at a later time.    Telephone visits are billed at different rates depending on your insurance coverage. During this emergency period, for some insurers they may be billed the same as an in-person visit.  Please reach out to your insurance provider with any questions.    If during the course of the call the physician/provider feels a telephone visit is not appropriate, you will not be charged for this service.\"    Patient has given verbal consent to a Telephone visit? Yes    What phone number would you like to be contacted at? [phone]    Patient would like to receive their AVS by [employer]    Are there any specific questions or needs that you would like addressed at your visit today? Phentermine refill         Distant Location (provider location):  On-site    Additional provider notes:   Bariatric Follow-up    57 year old  female BMI:Body mass index is 28.98 kg/m .    Weight:   Wt Readings from Last 1 Encounters:   11/29/22 83.9 kg (185 lb)    pounds      Comorbidities:  Patient Active Problem List   Diagnosis     Chronic insomnia     Suspected sleep apnea     HARDIN (nonalcoholic steatohepatitis)     Sigmoid diverticulitis     Dyslipidemia     " Metabolic syndrome     Hypertension     Celiac disease       Interim: Rotator cuff injury- Continues to lose weight. Maintaining a 35# (42# from her high) weight loss. Has HARDIN and having LFTs rechecked. Would still like to lose 20#.   Still not sleeping well. Has had chronic insomnia for as long as she can remember. 5 hours is a good night. Continues her B-complex, D, C and C-Q 10. Started yogurt and probiotics which was a game changer for her.   Had COVID 8 weeks ago and started noticing pain in her RUQ. She is thirsty, feels dehydrated, sometimes nausea, and tired.      Plan:  DIET  Food diary sounds excellent. Continue fruit, veggies, lean proteins, nuts, with consistency. Protein first.    EXERCISE-Look at [employer] or Youtube for strength training exercises using your bands. Steps at work are great. Strength training will help optimize metabolism, improve insulin sensitivity, and benefit bone density   PHARMACOTHERAPY refill phentermine. Continue B-complex, D and C.     Congratulated on excellent 35# (16%)  (42# from her high (19%)) weight loss. Encouraged to continue excellent habits.      -We reviewed her medications and whether associated with weight gain.  Current Outpatient Medications   Medication     Ascorbic Acid (VITAMIN C) 500 MG CAPS     atenolol (TENORMIN) 50 MG tablet     baclofen (LIORESAL) 10 MG tablet     Cholecalciferol (VITAMIN D3) 50 MCG (2000 UT) CAPS     Coenzyme Q10 (COQ-10) 400 MG CAPS     diphenhydrAMINE (BENADRYL ALLERGY) 25 MG capsule     magnesium oxide (MAG-OX) 400 (240 Mg) MG tablet     phentermine (ADIPEX-P) 37.5 MG tablet     SUMAtriptan (IMITREX) 100 MG tablet     vitamin B complex with vitamin C (STRESS TAB) tablet     No current facility-administered medications for this visit.        We discussed [facility] Bariatric Basics including:  -eating 3 meals daily  -eating protein first  -eating slowly, chewing food well  -avoiding/limiting calorie containing  "beverages  -choosing wheat, not white with breads, crackers, pastas, venu, bagels, tortillas, rice  -limiting restaurant or cafeteria eating to twice a week or less  -We discussed the importance of restorative sleep and stress management in maintaining a healthy weight.  -We discussed insulin resistance and glycemic index as it relates to appetite and weight control  -We discussed the National Weight Control Registry healthy weight maintenance strategies and ways to optimize metabolism.  -We discussed the importance of physical activity including cardiovascular and strength training in maintaining a healthier weight and explored viable options.    Most recent labs:  Lab Results   Component Value Date    WBC 4.1 09/08/2021    HGB 14.1 09/08/2021    HCT 42.0 09/08/2021    MCV 92 09/08/2021     09/08/2021     Lab Results   Component Value Date    CHOL 311 (H) 12/01/2020     Lab Results   Component Value Date    HDL 76 03/06/2020       Lab Results   Component Value Date    TRIG 143 03/06/2020       Lab Results   Component Value Date    ALT 56 (H) 09/08/2021    AST 25 09/08/2021    ALKPHOS 127 (H) 09/08/2021       Lab Results   Component Value Date    TSH 1.33 07/23/2021     DIETARY HISTORY  Meals Per Day: 3  Eating Protein First?: yes  Food Diary: B:2 eggs with turkey sausage and coffee with protein L:yogurt with fruit D:chicken, pork, with salad with \"tons of veggies\" or cheese and gluten free crackers with fruit  Snacks Per Day: yes  Typical Snack: nuts occasionally but careful d/t diverticulitis history  Fluid Intake: intentional-32 oz water bottle  Portion Control: improved   Calorie Containing Beverages: no  Alcohol per week: none-has alcohol intolerance  Typical Protein Food Choices: variety  Choosing Whole Grains: CELIAC  Grocery Shopping is done by: herself  Food Preparation is done by: herself  Meals at Restaurant/Cafeteria/Take Out Per Week: none  Eating at the Table:   TV is Off During Meals: " "    Positive Changes Since Last Visit: meal planning,   Struggling With: exercise    Knowledgeable in Reading Food Labels: yes  Getting Adequate Protein: yes  Sleeping 7-8 hours/day no  Stress management so busy at work    PHYSICAL ACTIVITY PATTERNS:  Cardiovascular: tries to walk the malls a couple of days a week.   Strength Training: none    REVIEW OF SYSTEMS  GENERAL/CONSTITUTIONAL:  Fatigue: yes  CARDIOVASCULAR:  Chest Pain with Exertion: no  PULMONARY:  Dyspnea on exertion: no  CPAP Use: no  Tobacco Use: no  Asthma Controlled: NA  GASTROINTESTINAL:  GERD/Heartburn: known HH, no GERD with weight loss  Gallbladder:   UROLOGIC:  Urinary Symptoms:   NEUROLOGIC:  Headaches: yes lately  Paresthesias:   PSYCHIATRIC:  Moods: euthymic  MUSCULOSKELETAL/RHEUMATOLOGIC  Arthralgias: yes  Myalgias: yes  ENDOCRINE:  Monitoring Blood Sugars: no  Sugars Well Controlled: yes  DERMATOLOGIC:  Rashes:     PHYSICAL EXAM:  Vitals: Ht 1.702 m (5' 7\")   Wt 83.9 kg (185 lb)   BMI 28.98 kg/m    Pleasant and in no distress  Normal respiratory effort  Euthymic  Alert and Oriented        Total time spent on the date of this encounter doing: chart review, review of test results, patient visit, physical exam, education, counseling, developing plan of care, and documenting = 23minutes.            Phone call duration: 23 minutes        Again, thank you for allowing me to participate in the care of your patient.        Sincerely,        Radha Gómez MD    "

## 2022-11-30 ENCOUNTER — HOSPITAL ENCOUNTER (OUTPATIENT)
Dept: ULTRASOUND IMAGING | Facility: HOSPITAL | Age: 57
Discharge: HOME | End: 2022-11-30
Attending: FAMILY MEDICINE | Admitting: FAMILY MEDICINE
Payer: COMMERCIAL

## 2022-11-30 ENCOUNTER — LAB (OUTPATIENT)
Dept: LAB | Facility: CLINIC | Age: 57
End: 2022-11-30
Payer: COMMERCIAL

## 2022-11-30 DIAGNOSIS — R10.11 RUQ ABDOMINAL PAIN: ICD-10-CM

## 2022-11-30 LAB
ALBUMIN SERPL BCG-MCNC: 4.7 G/DL (ref 3.5–5.2)
ALP SERPL-CCNC: 69 U/L (ref 35–104)
ALT SERPL W P-5'-P-CCNC: 33 U/L (ref 10–35)
AST SERPL W P-5'-P-CCNC: 25 U/L (ref 10–35)
BILIRUB DIRECT SERPL-MCNC: <0.2 MG/DL (ref 0–0.3)
BILIRUB SERPL-MCNC: 0.4 MG/DL
PROT SERPL-MCNC: 7.5 G/DL (ref 6.4–8.3)

## 2022-11-30 PROCEDURE — 76705 ECHO EXAM OF ABDOMEN: CPT

## 2022-11-30 PROCEDURE — 80076 HEPATIC FUNCTION PANEL: CPT

## 2022-11-30 PROCEDURE — 36415 COLL VENOUS BLD VENIPUNCTURE: CPT

## 2022-12-04 ENCOUNTER — HEALTH MAINTENANCE LETTER (OUTPATIENT)
Age: 57
End: 2022-12-04

## 2023-01-13 NOTE — PROGRESS NOTES
"Keiry Mchugh is a 55 year old female who is being evaluated via a billable telephone visit.      The patient has been notified of following:     \"This telephone visit will be conducted via a call between you and your physician/provider. We have found that certain health care needs can be provided without the need for a physical exam.  This service lets us provide the care you need with a short phone conversation.  If a prescription is necessary we can send it directly to your pharmacy.  If lab work is needed we can place an order for that and you can then stop by our lab to have the test done at a later time.    Telephone visits are billed at different rates depending on your insurance coverage. During this emergency period, for some insurers they may be billed the same as an in-person visit.  Please reach out to your insurance provider with any questions.    If during the course of the call the physician/provider feels a telephone visit is not appropriate, you will not be charged for this service.\"    Patient has given verbal consent for Telephone visit?  Yes    What phone number would you like to be contacted at? [phone]    How would you like to obtain your AVS? Mail a copy    Phone call duration: 23 minutes    Sujit Hunter PsyD    SLEEP MEDICINE TELEPHONE VISIT  Sleep Psychology    Patient Name: Keiry Mchugh  MRN:  [id number]  Date of Service: Oct 27, 2020       Subjective Report     Keiry Mchugh  returns for a telephone visit to discuss progress in implementing behavioral strategies for the management of insomnia associated with excessive daytime sleepiness.  Consent for visit was obtained and documented. Keiry reports no significant improvement in sleep quality.  She reports continued frequent awakenings and early morning awakenings.  She continues to report waking with a sudden gasp sensation about 15-30 minutes after sleep onset.  She reports early morning awakening with a degree of anxiety " and concerned about returning back to sleep.    Of note, patient indicates that she has episodes in which she experienced a loss of muscle tone when she is experiencing intense emotions such as laughing.  She also reports excessive daytime sleepiness and has been concerned about being drowsy when driving home, including veering to the side of the road.    We discussed the imperative to avoid driving while drowsy.  Patient was provided strategies including taking a short nap before attempting to drive in the afternoon.  She also may need to also utilize caffeine about 15 minutes prior to getting in the car before going home.  She was advised to stop her car and pull to the side of the road if she experiences any sensation drowsiness when driving.  .     .     Sleep Data:       Total score - [place]: 21 .    CANDICE Total Score: 28       Interventions     Strategies and recommendations including follow-up with Dr. Dunlap around concern of excessive daytime sleepiness and loss of muscle tone with emotional response were discussed today.       Vital Signs     There were no vitals taken for this visit.     Mental Status     Speech/Language:  Normal  Thought Process: Intact  Associations:  Normal  Thought Content: Normal    Diagnostic Impressions and Plan       1. Chronic insomnia  No improvement in insomnia, additional symptoms include worsening of excessive daytime sleepiness and reported loss of muscle tone when experiencing intense emotion such as laughing.  Follow-up with Dr. Dunlap around the symptoms.        Follow-up: 4 weeks      Sujit Hunter PsyD, LP, DBSM  Diplomate, Behavioral Sleep Medicine  [facility] [place] [facility] [place]                           Closure 2 Information: This tab is for additional flaps and grafts, including complex repair and grafts and complex repair and flaps. You can also specify a different location for the additional defect, if the location is the same you do not need to select a new one. We will insert the automated text for the repair you select below just as we do for solitary flaps and grafts. Please note that at this time if you select a location with a different insurance zone you will need to override the ICD10 and CPT if appropriate.

## 2023-01-16 ENCOUNTER — OFFICE VISIT (OUTPATIENT)
Dept: FAMILY MEDICINE | Facility: CLINIC | Age: 58
End: 2023-01-16
Payer: COMMERCIAL

## 2023-01-16 VITALS
BODY MASS INDEX: 31.98 KG/M2 | HEART RATE: 85 BPM | WEIGHT: 199 LBS | SYSTOLIC BLOOD PRESSURE: 139 MMHG | DIASTOLIC BLOOD PRESSURE: 93 MMHG | HEIGHT: 66 IN | RESPIRATION RATE: 20 BRPM

## 2023-01-16 DIAGNOSIS — Z82.62 FAMILY HISTORY OF OSTEOPOROSIS: ICD-10-CM

## 2023-01-16 DIAGNOSIS — R35.0 URINE FREQUENCY: ICD-10-CM

## 2023-01-16 DIAGNOSIS — E66.09 CLASS 1 OBESITY DUE TO EXCESS CALORIES WITHOUT SERIOUS COMORBIDITY WITH BODY MASS INDEX (BMI) OF 31.0 TO 31.9 IN ADULT: ICD-10-CM

## 2023-01-16 DIAGNOSIS — R32 URINARY INCONTINENCE, UNSPECIFIED TYPE: ICD-10-CM

## 2023-01-16 DIAGNOSIS — Z11.4 SCREENING FOR HIV (HUMAN IMMUNODEFICIENCY VIRUS): ICD-10-CM

## 2023-01-16 DIAGNOSIS — Z00.00 HEALTHCARE MAINTENANCE: ICD-10-CM

## 2023-01-16 DIAGNOSIS — G43.809 OTHER MIGRAINE WITHOUT STATUS MIGRAINOSUS, NOT INTRACTABLE: ICD-10-CM

## 2023-01-16 DIAGNOSIS — M62.838 MUSCLE SPASM: ICD-10-CM

## 2023-01-16 DIAGNOSIS — E66.811 CLASS 1 OBESITY DUE TO EXCESS CALORIES WITHOUT SERIOUS COMORBIDITY WITH BODY MASS INDEX (BMI) OF 31.0 TO 31.9 IN ADULT: ICD-10-CM

## 2023-01-16 DIAGNOSIS — Z12.31 VISIT FOR SCREENING MAMMOGRAM: ICD-10-CM

## 2023-01-16 LAB
ALBUMIN SERPL BCG-MCNC: 4.8 G/DL (ref 3.5–5.2)
ALBUMIN UR-MCNC: NEGATIVE MG/DL
ALP SERPL-CCNC: 72 U/L (ref 35–104)
ALT SERPL W P-5'-P-CCNC: 35 U/L (ref 10–35)
ANION GAP SERPL CALCULATED.3IONS-SCNC: 14 MMOL/L (ref 7–15)
APPEARANCE UR: CLEAR
AST SERPL W P-5'-P-CCNC: 26 U/L (ref 10–35)
BILIRUB SERPL-MCNC: 0.3 MG/DL
BILIRUB UR QL STRIP: NEGATIVE
BUN SERPL-MCNC: 27.3 MG/DL (ref 6–20)
CALCIUM SERPL-MCNC: 9.9 MG/DL (ref 8.6–10)
CHLORIDE SERPL-SCNC: 106 MMOL/L (ref 98–107)
CHOLEST SERPL-MCNC: 260 MG/DL
CK SERPL-CCNC: 93 U/L (ref 26–192)
COLOR UR AUTO: YELLOW
CREAT SERPL-MCNC: 0.94 MG/DL (ref 0.51–0.95)
DEPRECATED HCO3 PLAS-SCNC: 22 MMOL/L (ref 22–29)
ERYTHROCYTE [DISTWIDTH] IN BLOOD BY AUTOMATED COUNT: 11.7 % (ref 10–15)
GFR SERPL CREATININE-BSD FRML MDRD: 71 ML/MIN/1.73M2
GLUCOSE SERPL-MCNC: 102 MG/DL (ref 70–99)
GLUCOSE UR STRIP-MCNC: NEGATIVE MG/DL
HBA1C MFR BLD: 5.4 % (ref 0–5.6)
HCT VFR BLD AUTO: 45.1 % (ref 35–47)
HDLC SERPL-MCNC: 88 MG/DL
HGB BLD-MCNC: 15 G/DL (ref 11.7–15.7)
HGB UR QL STRIP: NEGATIVE
KETONES UR STRIP-MCNC: NEGATIVE MG/DL
LDLC SERPL CALC-MCNC: 155 MG/DL
LEUKOCYTE ESTERASE UR QL STRIP: NEGATIVE
MAGNESIUM SERPL-MCNC: 2 MG/DL (ref 1.7–2.3)
MCH RBC QN AUTO: 30.5 PG (ref 26.5–33)
MCHC RBC AUTO-ENTMCNC: 33.3 G/DL (ref 31.5–36.5)
MCV RBC AUTO: 92 FL (ref 78–100)
NITRATE UR QL: NEGATIVE
NONHDLC SERPL-MCNC: 172 MG/DL
PH UR STRIP: 5.5 [PH] (ref 5–8)
PLATELET # BLD AUTO: 312 10E3/UL (ref 150–450)
POTASSIUM SERPL-SCNC: 4.7 MMOL/L (ref 3.4–5.3)
PROT SERPL-MCNC: 7.4 G/DL (ref 6.4–8.3)
RBC # BLD AUTO: 4.92 10E6/UL (ref 3.8–5.2)
SODIUM SERPL-SCNC: 142 MMOL/L (ref 136–145)
SP GR UR STRIP: 1.02 (ref 1–1.03)
TRIGL SERPL-MCNC: 84 MG/DL
TSH SERPL DL<=0.005 MIU/L-ACNC: 2.15 UIU/ML (ref 0.3–4.2)
UROBILINOGEN UR STRIP-ACNC: 0.2 E.U./DL
WBC # BLD AUTO: 4.5 10E3/UL (ref 4–11)

## 2023-01-16 PROCEDURE — 80061 LIPID PANEL: CPT | Performed by: FAMILY MEDICINE

## 2023-01-16 PROCEDURE — 80053 COMPREHEN METABOLIC PANEL: CPT | Performed by: FAMILY MEDICINE

## 2023-01-16 PROCEDURE — 99396 PREV VISIT EST AGE 40-64: CPT | Performed by: FAMILY MEDICINE

## 2023-01-16 PROCEDURE — 84443 ASSAY THYROID STIM HORMONE: CPT | Performed by: FAMILY MEDICINE

## 2023-01-16 PROCEDURE — 85027 COMPLETE CBC AUTOMATED: CPT | Performed by: FAMILY MEDICINE

## 2023-01-16 PROCEDURE — 36415 COLL VENOUS BLD VENIPUNCTURE: CPT | Performed by: FAMILY MEDICINE

## 2023-01-16 PROCEDURE — 81003 URINALYSIS AUTO W/O SCOPE: CPT | Performed by: FAMILY MEDICINE

## 2023-01-16 PROCEDURE — 83735 ASSAY OF MAGNESIUM: CPT | Performed by: FAMILY MEDICINE

## 2023-01-16 PROCEDURE — 87389 HIV-1 AG W/HIV-1&-2 AB AG IA: CPT | Performed by: FAMILY MEDICINE

## 2023-01-16 PROCEDURE — 83036 HEMOGLOBIN GLYCOSYLATED A1C: CPT | Performed by: FAMILY MEDICINE

## 2023-01-16 PROCEDURE — 82550 ASSAY OF CK (CPK): CPT | Performed by: FAMILY MEDICINE

## 2023-01-16 RX ORDER — BACLOFEN 10 MG/1
TABLET ORAL
Qty: 30 TABLET | Refills: 0 | Status: SHIPPED | OUTPATIENT
Start: 2023-01-16 | End: 2023-06-01

## 2023-01-16 RX ORDER — ATENOLOL 50 MG/1
50 TABLET ORAL DAILY PRN
Qty: 30 TABLET | Refills: 1 | Status: SHIPPED | OUTPATIENT
Start: 2023-01-16

## 2023-01-16 ASSESSMENT — ENCOUNTER SYMPTOMS
HEMATOCHEZIA: 0
PALPITATIONS: 0
PARESTHESIAS: 0
NAUSEA: 0
DIZZINESS: 0
MYALGIAS: 1
DYSURIA: 0
EYE PAIN: 1
WEAKNESS: 1
HEMATURIA: 0
SHORTNESS OF BREATH: 0
DIARRHEA: 0
ARTHRALGIAS: 0
CHILLS: 0
BREAST MASS: 0
CONSTIPATION: 0
FREQUENCY: 1
ABDOMINAL PAIN: 1
HEARTBURN: 0
FEVER: 0
COUGH: 0
JOINT SWELLING: 0
HEADACHES: 1
NERVOUS/ANXIOUS: 0
SORE THROAT: 0

## 2023-01-16 ASSESSMENT — PATIENT HEALTH QUESTIONNAIRE - PHQ9
10. IF YOU CHECKED OFF ANY PROBLEMS, HOW DIFFICULT HAVE THESE PROBLEMS MADE IT FOR YOU TO DO YOUR WORK, TAKE CARE OF THINGS AT HOME, OR GET ALONG WITH OTHER PEOPLE: EXTREMELY DIFFICULT
SUM OF ALL RESPONSES TO PHQ QUESTIONS 1-9: 10
SUM OF ALL RESPONSES TO PHQ QUESTIONS 1-9: 10

## 2023-01-16 NOTE — PATIENT INSTRUCTIONS
"2nd Shingrix due in late Feb 2023    Fasting labs    Bone density screen (due to FH and pt \"shrinking\")    Colonoscopy due in 2029    Mammmogram ordered    Pap smears no longer needed (had hysto for benign purposes)    Urology referral  "

## 2023-01-16 NOTE — LETTER
January 18, 2023      Samantha ELIZONDO  [facility] [address]        Dear ,  We are writing to inform you of your test results.    Danie Singh:  Universal screening for HIV was NEGATIVE.  No sign of diabetes or pre-diabetes.  As we communicated the slightly elevated BUN is very non-specific.  The kidney function is normal.  The cholesterol and LDL are mildly elevated; a diet low in saturated fats is recommended.  Your 10 year cardiovascular event risk is 2.66%  (statins considered when above 7.5%)  It was good to see your liver enzymes have remained normal.  The remaining labs including your thyroid function (TSH), muscle enzyme (CPK) and magnesium were all normal.        Resulted Orders   HIV Antigen Antibody Combo   Result Value Ref Range    HIV Antigen Antibody Combo Nonreactive Nonreactive      Comment:      HIV-1 p24 Ag & HIV-1/HIV-2 Ab Not Detected   Comprehensive metabolic panel (BMP + Alb, Alk Phos, ALT, AST, Total. Bili, TP)   Result Value Ref Range    Sodium 142 136 - 145 mmol/L    Potassium 4.7 3.4 - 5.3 mmol/L    Chloride 106 98 - 107 mmol/L    Carbon Dioxide (CO2) 22 22 - 29 mmol/L    Anion Gap 14 7 - 15 mmol/L    Urea Nitrogen 27.3 (H) 6.0 - 20.0 mg/dL    Creatinine 0.94 0.51 - 0.95 mg/dL    Calcium 9.9 8.6 - 10.0 mg/dL    Glucose 102 (H) 70 - 99 mg/dL    Alkaline Phosphatase 72 35 - 104 U/L    AST 26 10 - 35 U/L    ALT 35 10 - 35 U/L    Protein Total 7.4 6.4 - 8.3 g/dL    Albumin 4.8 3.5 - 5.2 g/dL    Bilirubin Total 0.3 <=1.2 mg/dL    GFR Estimate 71 >60 mL/min/1.73m2      Comment:      Effective December 21, 2021 eGFRcr in adults is calculated using the 2021 CKD-EPI creatinine equation which includes age and gender (Aurelio et al., NEJM, DOI: 10.1056/SDDPbg9938619)   CBC with platelets   Result Value Ref Range    WBC Count 4.5 4.0 - 11.0 10e3/uL    RBC Count 4.92 3.80 - 5.20 10e6/uL    Hemoglobin 15.0 11.7 - 15.7 g/dL    Hematocrit 45.1 35.0 - 47.0 %    MCV 92 78 - 100 fL    MCH 30.5  26.5 - 33.0 pg    MCHC 33.3 31.5 - 36.5 g/dL    RDW 11.7 10.0 - 15.0 %    Platelet Count 312 150 - 450 10e3/uL   Lipid panel reflex to direct LDL Fasting   Result Value Ref Range    Cholesterol 260 (H) <200 mg/dL    Triglycerides 84 <150 mg/dL    Direct Measure HDL 88 >=50 mg/dL    LDL Cholesterol Calculated 155 (H) <=100 mg/dL    Non HDL Cholesterol 172 (H) <130 mg/dL    Narrative    Cholesterol  Desirable:  <200 mg/dL    Triglycerides  Normal:  Less than 150 mg/dL  Borderline High:  150-199 mg/dL  High:  200-499 mg/dL  Very High:  Greater than or equal to 500 mg/dL    Direct Measure HDL  Female:  Greater than or equal to 50 mg/dL   Male:  Greater than or equal to 40 mg/dL    LDL Cholesterol  Desirable:  <100mg/dL  Above Desirable:  100-129 mg/dL   Borderline High:  130-159 mg/dL   High:  160-189 mg/dL   Very High:  >= 190 mg/dL    Non HDL Cholesterol  Desirable:  130 mg/dL  Above Desirable:  130-159 mg/dL  Borderline High:  160-189 mg/dL  High:  190-219 mg/dL  Very High:  Greater than or equal to 220 mg/dL   Magnesium   Result Value Ref Range    Magnesium 2.0 1.7 - 2.3 mg/dL   CK total   Result Value Ref Range    CK 93 26 - 192 U/L   TSH with free T4 reflex   Result Value Ref Range    TSH 2.15 0.30 - 4.20 uIU/mL   UA Macro with Reflex to Micro and Culture - lab collect   Result Value Ref Range    Color Urine Yellow Colorless, Straw, Light Yellow, Yellow    Appearance Urine Clear Clear    Glucose Urine Negative Negative mg/dL    Bilirubin Urine Negative Negative    Ketones Urine Negative Negative mg/dL    Specific Gravity Urine 1.020 1.005 - 1.030    Blood Urine Negative Negative    pH Urine 5.5 5.0 - 8.0    Protein Albumin Urine Negative Negative mg/dL    Urobilinogen Urine 0.2 0.2, 1.0 E.U./dL    Nitrite Urine Negative Negative    Leukocyte Esterase Urine Negative Negative    Narrative    Microscopic not indicated   Hemoglobin A1c   Result Value Ref Range    Hemoglobin A1C 5.4 0.0 - 5.6 %      Comment:       Normal <5.7%   Prediabetes 5.7-6.4%    Diabetes 6.5% or higher     Note: Adopted from ADA consensus guidelines.       If you have any questions or concerns, please call the clinic at the number listed above.       Sincerely,      Porfirio Linton MD

## 2023-01-16 NOTE — PROGRESS NOTES
SUBJECTIVE:   CC: Samantha is an 57 year old who presents for preventive health visit.         Healthy Habits:     Getting at least 3 servings of Calcium per day:  Yes    Bi-annual eye exam:  Yes    Dental care twice a year:  Yes    Sleep apnea or symptoms of sleep apnea:  Daytime drowsiness    Diet:  Gluten-free/reduced    Frequency of exercise:  2-3 days/week    Duration of exercise:  15-30 minutes    Taking medications regularly:  Yes    Medication side effects:  Not applicable and Other    PHQ-2 Total Score: 0    Additional concerns today:  Yes    Migraine     Since your last clinic visit, how have your headaches changed?  Worsened    How often are you getting headaches or migraines? Several a week    Are you able to do normal daily activities when you have a migraine? No    Are you taking rescue/relief medications? (Select all that apply) Excedrin and sumatriptan (Imitrex), Atenolol 50 mg PRN    How helpful is your rescue/relief medication?  I get total relief    Are you taking any medications to prevent migraines? (Select all that apply)  No    In the past 4 weeks, how often have you gone to urgent care or the emergency room because of your headaches?  0    Today's PHQ-2 Score:   PHQ-2 (  [employer]) 2023   Q1: Little interest or pleasure in doing things 0   Q2: Feeling down, depressed or hopeless 0   PHQ-2 Score 0   Q1: Little interest or pleasure in doing things Not at all   Q2: Feeling down, depressed or hopeless Not at all   PHQ-2 Score 0     Have you ever done Advance Care Planning? (For example, a Health Directive, POLST, or a discussion with a medical provider or your loved ones about your wishes): No, advance care planning information given to patient to review.  Patient declined advance care planning discussion at this time.    Social History     Tobacco Use     Smoking status: Former     Packs/day: 0.00     Types: Cigarettes     Quit date: 1999     Years since quittin.0     Smokeless  "tobacco: Never   Substance Use Topics     Alcohol use: No     Comment: rare       Alcohol Use 1/16/2023   Prescreen: >3 drinks/day or >7 drinks/week? Not Applicable         FHS-7:   Breast CA Risk Assessment (FHS-7) 1/16/2023   Did any of your first-degree relatives have breast or ovarian cancer? Yes   Did any of your relatives have bilateral breast cancer? Yes   Did any man in your family have breast cancer? No   Did any woman in your family have breast and ovarian cancer? No   Did any woman in your family have breast cancer before age 50 y? No   Do you have 2 or more relatives with breast and/or ovarian cancer? Yes   Do you have 2 or more relatives with breast and/or bowel cancer? No       Review of Systems   Constitutional: Negative for chills and fever.   HENT: Positive for hearing loss. Negative for congestion, ear pain and sore throat.    Eyes: Positive for pain and visual disturbance.   Respiratory: Negative for cough and shortness of breath.    Cardiovascular: Positive for peripheral edema. Negative for chest pain and palpitations.   Gastrointestinal: Positive for abdominal pain. Negative for constipation, diarrhea, heartburn, hematochezia and nausea.   Breasts:  Negative for tenderness, breast mass and discharge.   Genitourinary: Positive for frequency and urgency. Negative for dysuria, genital sores, hematuria, pelvic pain, vaginal bleeding and vaginal discharge.   Musculoskeletal: Positive for myalgias. Negative for arthralgias and joint swelling.   Skin: Negative for rash.   Neurological: Positive for weakness and headaches. Negative for dizziness and paresthesias.   Psychiatric/Behavioral: Negative for mood changes. The patient is not nervous/anxious.           OBJECTIVE:   BP (!) 147/89 (BP Location: Left arm, Patient Position: Sitting, Cuff Size: Adult Regular)   Pulse 85   Resp 20   Ht 1.683 m (5' 6.25\")   Wt 90.3 kg (199 lb)   BMI 31.88 kg/m       Wt Readings from Last 4 Encounters:   01/16/23 " 90.3 kg (199 lb)   11/29/22 83.9 kg (185 lb)   09/19/22 84.2 kg (185 lb 9.6 oz)   04/20/22 92.5 kg (204 lb)     Physical Exam  GENERAL: healthy, alert and no distress  EYES: Eyes grossly normal to inspection, PERRL and conjunctivae and sclerae normal  HENT: ear canals and TM's normal, nose and mouth without ulcers or lesions  NECK: no adenopathy, no asymmetry, masses, or scars and thyroid normal to palpation  RESP: lungs clear to auscultation - no rales, rhonchi or wheezes  BREAST: exam declined.  Will do mammogram and SBEs  CV: regular rate and rhythm, normal S1 S2, no S3 or S4, no murmur, click or rub, no peripheral edema and peripheral pulses strong  ABDOMEN: soft, nontender, no hepatosplenomegaly, no masses and bowel sounds normal  MS: no gross musculoskeletal defects noted, no edema  GYN:  No exam needed  SKIN: no suspicious lesions or rashes  NEURO: Normal strength and tone, mentation intact and speech normal  PSYCH: mentation appears normal, affect normal/bright        ASSESSMENT/PLAN:       ICD-10-CM    1. Healthcare maintenance  Z00.00 Comprehensive metabolic panel (BMP + Alb, Alk Phos, ALT, AST, Total. Bili, TP)     CBC with platelets     Lipid panel reflex to direct LDL Fasting     Hemoglobin A1c      2. Muscle spasm , STABLE M62.838 baclofen (LIORESAL) 10 MG tablet     Magnesium     CK total     TSH with free T4 reflex               3. Visit for screening mammogram, SCREENING MAMMOGRAM ORDERED Z12.31 MA SCREENING DIGITAL BILAT - Future  (s+30)      4. Family history of osteoporosis, SCREENING BONE DENSITY ORDERED Z82.62 DX Hip/Pelvis/Spine      5. Screening for HIV (human immunodeficiency virus), AGREES TO SCREENING Z11.4 HIV Antigen Antibody Combo      6. Other migraine without status migrainosus, not intractable, REFILLED FOR PRN USE G43.809 atenolol (TENORMIN) 50 MG tablet      7. Urine frequency, UA ORDERED AND REFERRAL TO UROLOGY R35.0 UA Macro with Reflex to Micro and Culture - lab collect     "    PLAN:   2nd Shingrix due in late Feb 2023    Fasting labs    Bone density screen (due to FH and pt \"shrinking\")    Colonoscopy due in 2029    Mammmogram ordered    Pap smears no longer needed (had hysto for benign purposes)    Urology referral     COUNSELING:  Reviewed preventive health counseling, as reflected in patient instructions       Regular exercise       Healthy diet/nutrition       Osteoporosis prevention/bone health       Colorectal Cancer Screening      BMI:   Estimated body mass index is 31.88 kg/m  as calculated from the following:    Height as of this encounter: 1.683 m (5' 6.25\").    Weight as of this encounter: 90.3 kg (199 lb).   Weight management plan: Discussed healthy diet and exercise guidelines      She reports that she quit smoking about 24 years ago. She has never used smokeless tobacco.           Porfirio Linton MD  [facility]  Answers for HPI/ROS submitted by the patient on 1/16/2023  If you checked off any problems, how difficult have these problems made it for you to do your work, take care of things at home, or get along with other people?: Extremely difficult  PHQ9 TOTAL SCORE: 10      "

## 2023-01-17 LAB — HIV 1+2 AB+HIV1 P24 AG SERPL QL IA: NONREACTIVE

## 2023-01-19 DIAGNOSIS — Z77.018 EXPOSURE TO MERCURY: Primary | ICD-10-CM

## 2023-01-27 ENCOUNTER — LAB (OUTPATIENT)
Dept: LAB | Facility: CLINIC | Age: 58
End: 2023-01-27
Payer: COMMERCIAL

## 2023-01-27 DIAGNOSIS — Z77.018 EXPOSURE TO MERCURY: ICD-10-CM

## 2023-01-27 PROCEDURE — 99000 SPECIMEN HANDLING OFFICE-LAB: CPT

## 2023-01-27 PROCEDURE — 36415 COLL VENOUS BLD VENIPUNCTURE: CPT

## 2023-01-27 PROCEDURE — 83825 ASSAY OF MERCURY: CPT | Mod: 90

## 2023-01-29 ENCOUNTER — MYC MEDICAL ADVICE (OUTPATIENT)
Dept: FAMILY MEDICINE | Facility: CLINIC | Age: 58
End: 2023-01-29
Payer: COMMERCIAL

## 2023-01-29 DIAGNOSIS — M79.10 GENERALIZED MUSCLE ACHE: Primary | ICD-10-CM

## 2023-01-29 LAB — MERCURY BLD-MCNC: 2.6 UG/L

## 2023-01-31 NOTE — TELEPHONE ENCOUNTER
I have placed a Neurology referral.  I would make it 1-2 months out to see if you get better with a little time.  They should be calling you.  Dr Linton

## 2023-02-03 ENCOUNTER — ANCILLARY PROCEDURE (OUTPATIENT)
Dept: MAMMOGRAPHY | Facility: HOSPITAL | Age: 58
End: 2023-02-03
Attending: FAMILY MEDICINE
Payer: COMMERCIAL

## 2023-02-03 ENCOUNTER — HOSPITAL ENCOUNTER (OUTPATIENT)
Dept: BONE DENSITY | Facility: HOSPITAL | Age: 58
Discharge: HOME | End: 2023-02-03
Attending: FAMILY MEDICINE
Payer: COMMERCIAL

## 2023-02-03 DIAGNOSIS — Z82.62 FAMILY HISTORY OF OSTEOPOROSIS: ICD-10-CM

## 2023-02-03 DIAGNOSIS — Z12.31 VISIT FOR SCREENING MAMMOGRAM: ICD-10-CM

## 2023-02-03 PROCEDURE — 77080 DXA BONE DENSITY AXIAL: CPT

## 2023-02-03 PROCEDURE — 77067 SCR MAMMO BI INCL CAD: CPT

## 2023-02-06 ENCOUNTER — TELEPHONE (OUTPATIENT)
Dept: FAMILY MEDICINE | Facility: CLINIC | Age: 58
End: 2023-02-06
Payer: COMMERCIAL

## 2023-02-06 NOTE — TELEPHONE ENCOUNTER
Porfirio Linton MD [facility] Care Team Pool  Please let pt know that Neurology has been trying to contact her to schedule an appt.         Sent patient a [employer] message regarding the above message.    Kenny San RN     [facility]

## 2023-02-20 ENCOUNTER — TRANSFERRED RECORDS (OUTPATIENT)
Dept: HEALTH INFORMATION MANAGEMENT | Facility: CLINIC | Age: 58
End: 2023-02-20

## 2023-03-13 ENCOUNTER — LAB (OUTPATIENT)
Dept: LAB | Facility: CLINIC | Age: 58
End: 2023-03-13
Payer: COMMERCIAL

## 2023-03-13 DIAGNOSIS — M85.80 MELORHEOSTOSIS: Primary | ICD-10-CM

## 2023-03-13 PROCEDURE — 36415 COLL VENOUS BLD VENIPUNCTURE: CPT

## 2023-03-27 DIAGNOSIS — F32.9 MAJOR DEPRESSIVE DISORDER WITH SINGLE EPISODE, REMISSION STATUS UNSPECIFIED: ICD-10-CM

## 2023-05-01 ENCOUNTER — OFFICE VISIT (OUTPATIENT)
Dept: PHYSICAL MEDICINE AND REHAB | Facility: CLINIC | Age: 58
End: 2023-05-01
Payer: COMMERCIAL

## 2023-05-01 VITALS
SYSTOLIC BLOOD PRESSURE: 163 MMHG | DIASTOLIC BLOOD PRESSURE: 96 MMHG | WEIGHT: 208.9 LBS | BODY MASS INDEX: 33.57 KG/M2 | HEART RATE: 86 BPM | HEIGHT: 66 IN

## 2023-05-01 DIAGNOSIS — G43.709 CHRONIC MIGRAINE WITHOUT AURA, NOT INTRACTABLE, WITHOUT STATUS MIGRAINOSUS: Primary | ICD-10-CM

## 2023-05-01 DIAGNOSIS — R20.2 PARESTHESIA: ICD-10-CM

## 2023-05-01 PROCEDURE — 99204 OFFICE O/P NEW MOD 45 MIN: CPT | Performed by: PHYSICIAN ASSISTANT

## 2023-05-01 ASSESSMENT — PAIN SCALES - GENERAL: PAINLEVEL: MODERATE PAIN (5)

## 2023-05-01 NOTE — PATIENT INSTRUCTIONS
We will call you to schedule your Botox injections after we receive prior authorization from your insurance.

## 2023-05-01 NOTE — LETTER
5/1/2023         RE: Keiry Mchugh  [address]  [place]  [facility] [address]        Dear Colleague,    Thank you for referring your patient, Keiry Mchugh, to the [facility]. Please see a copy of my visit note below.    ASSESSMENT: Keiry Mchugh is a 58 year old female with past medical history significant for Jarquin, celiac disease, dyslipidemia, metabolic syndrome, hypertension, chronic insomnia who presents today for new patient evaluation of 2 concerns.  1.  Primary concern is chronic migraine.  Patient has had chronic migraine for many years.  She had Botox injections for chronic migraine through our clinic most recently in 2016 which provided 70 to 80% relief of her pain.  She was unable to continue with the Botox treatment because of changes to her insurance coverage.  She now has new insurance and would like to resume her Botox treatments.  Patient has tried  extensive conservative treatment for her chronic migraine (see below).  2.  Secondary concern is chronic right neck pain with right upper extremity paresthesias involving fingers 1, 2, 3.  Patient had an MRI cervical spine in 2018 which showed multilevel facet arthropathy with foraminal stenosis most pronounced on the right at C6-7.  She has had a CT of her neck through the [facility] (evaluating her parathyroid) April 14, 2023 which also showed foraminal stenosis right C6-7.  - On exam today patient reported a sensory deficit fingers 1, 2, 3 on the right.        PLAN:  A shared decision making model was used.  The patient's values and choices were respected.  The following represents what was discussed and decided upon by the physician assistant and the patient.      1.  DIAGNOSTIC TESTS:    - Reviewed the MRI brain from May 2017.  - I reviewed the MRI cervical spine from September 2018.  - We had the patient sent a release of information so that I can review the CT neck parathyroid from [facility] dated April 14,  2023.  - I ordered an EMG right upper extremity to evaluate for cervical radiculopathy versus carpal tunnel syndrome.    2.  PHYSICAL THERAPY: Patient to physical therapy for her neck in 2018.  No additional physical therapy was ordered.    3.  MEDICATIONS: No changes are made to the patient's medications.  - Patient currently uses Excedrin and sumatriptan for migraine management.    4.  INTERVENTIONS: Patient would like to move forward with Botox injections for chronic migraine.  We will request prior authorization since she now has a new insurance.    5.  PATIENT EDUCATION: Patient is in agreement the above plan.  All questions were answered.    6.  FOLLOW-UP:     - I will send the patient a [employer] message with her EMG results.  If this shows active radiculopathy I would recommend obtaining an updated MRI cervical spine.  - We will contact the patient once we hear back from her insurance regarding prior authorization for Botox injections for chronic migraine and assist with scheduling.  - If she has questions or concerns in the meantime, she should not hesitate to call.        SUBJECTIVE:  Keiry Mchugh  Is a 58 year old female who presents today for new patient evaluation of 2 concerns.    Patient's primary concern is chronic migraine.  Patient has had chronic migraine for many years.  She was seen here at the spine center for Botox injections for chronic migraine 2015/2016.  These injections provided 70 to 80% relief of her migraines.  She states the migraines are less frequent and less severe when they occurred.  Unfortunately she was unable to continue with that treatment here at the spine center because of a change to her insurance coverage for chronic migraine.  She found a neurology provider in [place] to do Botox injections that worked with her insurance.  She had 1 treatment with that provider.  Unfortunate, she had bilateral lid ptosis and no improvement in pain so she did not return for additional  treatments.  She continues to have significant migraine.    Patient reports that she has migraine headaches affecting primarily the right side of her head approximately 4 times per week or greater than 15 times per month.  She states that she has to take sumatriptan to alleviate the migraine headache and if she did not take the triptan her headaches would last for many hours.  She has nausea and occasional vomiting associated with her migraine.  She has significant photophobia and phonophobia.  Migraines are triggered with baclofen, opiates, antinausea medications, alcohol, dehydration.  Headaches are alleviated with applying heat and cold.  She occasionally has aura associated with her migraine.  Interestingly, when she has the aura her migraine is typically less severe.  Once in a blue moon she gets a tension headache with frontal headache pain, but this is rare.    Patient is also concerned about chronic right neck pain.  Pain is okay in the right cervical region.  She has pain in bilateral upper trapezius muscles.  She denies significant pain down the arm but has numbness and tingling fingers 1, 2, 3.    Patient has tried the following treatments for migraine:  -Physical therapy for in 2018  -Botox injections for chronic migraine September 19, 2016  -Botox injections for chronic migraine June 8, 2016  - Botox injections for chronic migraine March 7, 2016  - Botox injections for chronic migraine November 16, 2015  - Botox injections for chronic migraine August 18, 2015  - Botox injections for chronic migraine April 13, 2015  - Botox injections for chronic migraine in January 12, 2015  -1 additional session for Botox injections through another provider in 2016 or 2017 which was not helpful and she had bilateral lid ptosis  - Chiropractic treatment was not helpful and she could not always get adjustments because of the arthritis in her neck.  She stopped going because it is where the chiropractor made her arthritis  "worse  - Massage was not helpful and caused more pain  - Acupuncture once, cannot recall response  - Gabapentin caused eye twitching  - Baclofen causes migraines  - Opiates cause migraines  - Unable to take Tylenol due to HARDIN  - Unable to take Advil due to HARDIN  - Sumatriptan 3-4 times per week is very helpful  - Excedrin every other day is somewhat helpful  - Atenolol was not helpful    Patient also had a right C6-7 transforaminal epidural steroid injection December 2015 which was helpful for cervical radicular pain at that time.    Current Outpatient Medications   Medication     Ascorbic Acid (VITAMIN C) 500 MG CAPS     atenolol (TENORMIN) 50 MG tablet     baclofen (LIORESAL) 10 MG tablet     Cholecalciferol (VITAMIN D3) 50 MCG (2000 UT) CAPS     Coenzyme Q10 (COQ-10) 400 MG CAPS     diphenhydrAMINE (BENADRYL ALLERGY) 25 MG capsule     FLUoxetine (PROZAC) 20 MG capsule     magnesium oxide (MAG-OX) 400 (240 Mg) MG tablet     phentermine (ADIPEX-P) 37.5 MG tablet     SUMAtriptan (IMITREX) 100 MG tablet     vitamin B complex with vitamin C (STRESS TAB) tablet     No current facility-administered medications for this visit.       Allergies   Allergen Reactions     Gluten Meal Nausea and Vomiting     Contrast Dye      Ondansetron Headache     Pcn [Penicillins] Hives     As a child     Rocephin [Ceftriaxone] Hives     Patient states this started immediately     Tramadol Hives     Zithromax [Azithromycin] Tinnitus     \"ear ringing and hearing loss\"       Past Medical History:   Diagnosis Date     Anemia      Anxiety      Celiac disease      Celiac disease      Chronic kidney disease     donated 1 kidney     Cough      Depression     Seasonal     Diverticulitis      Dyslipidemia      Enlarged LV     wall     GERD (gastroesophageal reflux disease)      Hepatitis      Hiatal hernia      Hypertension      Metabolic syndrome      Migraines      HARDIN (nonalcoholic steatohepatitis)      Peripheral neuropathy      PONV " (postoperative nausea and vomiting)      Ventral hernia         Patient Active Problem List   Diagnosis     Chronic insomnia     Suspected sleep apnea     HARDIN (nonalcoholic steatohepatitis)     Sigmoid diverticulitis     Dyslipidemia     Metabolic syndrome     Hypertension     Celiac disease       Past Surgical History:   Procedure Laterality Date     APPENDECTOMY       CHOLECYSTECTOMY       COLON SURGERY       COLONOSCOPY N/A 3/18/2019    Procedure: COLONOSCOPY;  Surgeon: Davis Mosqueda MD;  Location: [facility];  Service: General     CYSTOSCOPY N/A 7/16/2015    Procedure: CYSTOSCOPY;  Surgeon: Nate Horta MD;  Location: [facility];  Service:      HC CORRECT BUNION,METATARSAL OSTEOTOMY      Description: Bunion Correction With Metatarsal Osteotomy Herman Procedure;  Proc Date: 07/16/2010;     HYSTERECTOMY       HYSTERECTOMY, PAP NO LONGER INDICATED  02/2009     NEPHRECTOMY LIVING DONOR Left APRIL 2012     ID LAP,BILIARY TRACT,UNLISTED N/A 3/19/2019    Procedure: BIOPSY, LIVER, LAPAROSCOPIC;  Surgeon: Davis Mosqueda MD;  Location: [facility];  Service: General     ID LAP,SLING OPERATION      Description: Laparoscopic Sling Operation For Stress Incontinence;  Recorded: 02/17/2009;     ID LAP,SURG,COLECTOMY, PARTIAL, W/ANAST N/A 3/19/2019    Procedure: LAPAROSCOPIC SIGMOID COLON RESECTION;  Surgeon: Davis Mosqueda MD;  Location: [facility];  Service: General     SEPTOPLASTY, TURBINOPLASTY, COMBINED N/A 8/3/2021    Procedure: SEPTOPLASTY, NOSE, WITH TURBINOPLASTY RADIOFREQUENCY BALLON ASSISTED, CRYOBLATION OF NASAL TISSUE;  Surgeon: Randy Case MD;  Location: [facility] OR     SIGMOIDOSCOPY FLEXIBLE N/A 3/19/2019    Procedure: FLEXIBLE SIGMOIDOSCOPY;  Surgeon: Davis Mosqueda MD;  Location: [facility];  Service: General       Family History   Problem Relation Age of Onset     Hashimoto's thyroiditis Mother      Breast Cancer Mother 50.00      Graves' disease Sister      Hypothyroidism Maternal Aunt      Breast Cancer Maternal Aunt 50.00     Hashimoto's thyroiditis Maternal Grandfather      Hypothyroidism Sister      Hypothyroidism Maternal Aunt      Hashimoto's thyroiditis Maternal Aunt      Anesthesia Reaction No family hx of        Social history: Patient is single.  She works as a dental assistant.  She denies tobacco, alcohol, illicit drug use.    ROS: Positive for headache, hoarseness, ringing in ears, eye pain, palpitations, constipation, reflux, loss of bladder control, joint pain, muscle pain, muscle fatigue, insomnia, excessive tiredness, anxiety, depression.  Specifically negative for dysphagia, imbalance, fine motor skill difficulties, fevers,chills, appetite changes, unexplained weight loss.   Otherwise 13 systems reviewed are negative.  Please see the patient's intake questionnaire from today for details.      OBJECTIVE:  PHYSICAL EXAMINATION:  CONSTITUTIONAL:  Vital signs as above.  No acute distress.  The patient is well nourished and well groomed.  PSYCHIATRIC:  The patient is awake, alert, oriented to person, place, time and answering questions appropriately with clear speech.    HEENT:  Normocephalic, atraumatic.  Sclera clear.    SKIN:  Skin over the face, bilateral upper extremities, and neck is clean, dry, intact without rashes.  LYMPH NODES:  No palpable or tender anterior/posterior cervical, submandibular, or supraclavicular lymph nodes.    MUSCLE STRENGTH:  5/5 strength for the bilateral shoulder abductors, elbow flexors/extensors, wrist extensors, finger flexors/abductors.  NEURO:  CN III-XII are grossly intact.  2+ symmetric biceps, brachioradialis, triceps reflexes bilaterally.  Sensation to diminished tips of right fingers 1, 2, 3.  Negative Kaur's bilaterally.    VASCULAR:  2/4 radial pulses bilaterally.  Warm upper limbs bilaterally.    MUSCULOSKELETAL: Tender to palpation with hypertonicity right cervical paraspinous  muscles and bilateral upper trapezius muscles.  Cervical flexion and extension mildly restricted.  Lateral rotation moderately restricted bilaterally.  Significant restriction with lateral flexion cervical spine bilaterally.    RESULTS:     I reviewed the MRI cervical spine from [facility] dated September 14, 2018 .At C6-7 there is a diffuse annular bulge with moderate right and mild left foraminal stenosis.  At C2-3 there is mild left foraminal stenosis.  At C3-4 there is moderate right foraminal stenosis.  At C4-5 there is mild right foraminal stenosis.  Patient has facet arthropathy which is prominent on the left at C2-3.  There is also facet arthropathy at C3-4, C4-5, and C5-6.  Please report for further details.    [facility]  HEAD MRI WITHOUT IV CONTRAST  5/8/2017 10:13 AM     INDICATION: Migraine without aura, not intractable, without status migrainosus  TECHNIQUE: Head MRI without intravenous contrast.  COMPARISON:  None.     FINDINGS: No acute infarct/restricted diffusion. No mass lesion, hemorrhage, or extra-axial fluid collections. The ventricles and sulci are normal in size, shape, and position. The cerebellum is unremarkable. No signal abnormality in the brain   parenchyma. The pituitary gland is unremarkable. The major intracranial vascular flow voids are maintained. The orbits are unremarkable. The calvarium and skull base are unremarkable.  The paranasal sinuses are clear. The mastoid air cells are clear.     IMPRESSION:  CONCLUSION:  1.  Normal Head MRI.   2.  No acute infarcts, mass lesions or hemorrhage.        Again, thank you for allowing me to participate in the care of your patient.        Sincerely,        Damaris Leslie PA-C

## 2023-05-01 NOTE — PROGRESS NOTES
ASSESSMENT: Keiry Mchugh is a 58 year old female with past medical history significant for Jarquin, celiac disease, dyslipidemia, metabolic syndrome, hypertension, chronic insomnia who presents today for new patient evaluation of 2 concerns.  1.  Primary concern is chronic migraine.  Patient has had chronic migraine for many years.  She had Botox injections for chronic migraine through our clinic most recently in 2016 which provided 70 to 80% relief of her pain.  She was unable to continue with the Botox treatment because of changes to her insurance coverage.  She now has new insurance and would like to resume her Botox treatments.  Patient has tried  extensive conservative treatment for her chronic migraine (see below).  2.  Secondary concern is chronic right neck pain with right upper extremity paresthesias involving fingers 1, 2, 3.  Patient had an MRI cervical spine in 2018 which showed multilevel facet arthropathy with foraminal stenosis most pronounced on the right at C6-7.  She has had a CT of her neck through the [facility] (evaluating her parathyroid) April 14, 2023 which also showed foraminal stenosis right C6-7.  - On exam today patient reported a sensory deficit fingers 1, 2, 3 on the right.        PLAN:  A shared decision making model was used.  The patient's values and choices were respected.  The following represents what was discussed and decided upon by the physician assistant and the patient.      1.  DIAGNOSTIC TESTS:    - Reviewed the MRI brain from May 2017.  - I reviewed the MRI cervical spine from September 2018.  - We had the patient sent a release of information so that I can review the CT neck parathyroid from [facility] dated April 14, 2023.  - I ordered an EMG right upper extremity to evaluate for cervical radiculopathy versus carpal tunnel syndrome.    2.  PHYSICAL THERAPY: Patient to physical therapy for her neck in 2018.  No additional physical therapy was ordered.    3.   MEDICATIONS: No changes are made to the patient's medications.  - Patient currently uses Excedrin and sumatriptan for migraine management.    4.  INTERVENTIONS: Patient would like to move forward with Botox injections for chronic migraine.  We will request prior authorization since she now has a new insurance.    5.  PATIENT EDUCATION: Patient is in agreement the above plan.  All questions were answered.    6.  FOLLOW-UP:     - I will send the patient a [employer] message with her EMG results.  If this shows active radiculopathy I would recommend obtaining an updated MRI cervical spine.  - We will contact the patient once we hear back from her insurance regarding prior authorization for Botox injections for chronic migraine and assist with scheduling.  - If she has questions or concerns in the meantime, she should not hesitate to call.        SUBJECTIVE:  Keiry Mchugh  Is a 58 year old female who presents today for new patient evaluation of 2 concerns.    Patient's primary concern is chronic migraine.  Patient has had chronic migraine for many years.  She was seen here at the spine center for Botox injections for chronic migraine 2015/2016.  These injections provided 70 to 80% relief of her migraines.  She states the migraines are less frequent and less severe when they occurred.  Unfortunately she was unable to continue with that treatment here at the spine center because of a change to her insurance coverage for chronic migraine.  She found a neurology provider in [place] to do Botox injections that worked with her insurance.  She had 1 treatment with that provider.  Unfortunate, she had bilateral lid ptosis and no improvement in pain so she did not return for additional treatments.  She continues to have significant migraine.    Patient reports that she has migraine headaches affecting primarily the right side of her head approximately 4 times per week or greater than 15 times per month.  She states that she  has to take sumatriptan to alleviate the migraine headache and if she did not take the triptan her headaches would last for many hours.  She has nausea and occasional vomiting associated with her migraine.  She has significant photophobia and phonophobia.  Migraines are triggered with baclofen, opiates, antinausea medications, alcohol, dehydration.  Headaches are alleviated with applying heat and cold.  She occasionally has aura associated with her migraine.  Interestingly, when she has the aura her migraine is typically less severe.  Once in a blue moon she gets a tension headache with frontal headache pain, but this is rare.    Patient is also concerned about chronic right neck pain.  Pain is okay in the right cervical region.  She has pain in bilateral upper trapezius muscles.  She denies significant pain down the arm but has numbness and tingling fingers 1, 2, 3.    Patient has tried the following treatments for migraine:  -Physical therapy for in 2018  -Botox injections for chronic migraine September 19, 2016  -Botox injections for chronic migraine June 8, 2016  - Botox injections for chronic migraine March 7, 2016  - Botox injections for chronic migraine November 16, 2015  - Botox injections for chronic migraine August 18, 2015  - Botox injections for chronic migraine April 13, 2015  - Botox injections for chronic migraine in January 12, 2015  -1 additional session for Botox injections through another provider in 2016 or 2017 which was not helpful and she had bilateral lid ptosis  - Chiropractic treatment was not helpful and she could not always get adjustments because of the arthritis in her neck.  She stopped going because it is where the chiropractor made her arthritis worse  - Massage was not helpful and caused more pain  - Acupuncture once, cannot recall response  - Gabapentin caused eye twitching  - Baclofen causes migraines  - Opiates cause migraines  - Unable to take Tylenol due to HARDIN  - Unable to  "take Advil due to HARDIN  - Sumatriptan 3-4 times per week is very helpful  - Excedrin every other day is somewhat helpful  - Atenolol was not helpful    Patient also had a right C6-7 transforaminal epidural steroid injection December 2015 which was helpful for cervical radicular pain at that time.    Current Outpatient Medications   Medication     Ascorbic Acid (VITAMIN C) 500 MG CAPS     atenolol (TENORMIN) 50 MG tablet     baclofen (LIORESAL) 10 MG tablet     Cholecalciferol (VITAMIN D3) 50 MCG (2000 UT) CAPS     Coenzyme Q10 (COQ-10) 400 MG CAPS     diphenhydrAMINE (BENADRYL ALLERGY) 25 MG capsule     FLUoxetine (PROZAC) 20 MG capsule     magnesium oxide (MAG-OX) 400 (240 Mg) MG tablet     phentermine (ADIPEX-P) 37.5 MG tablet     SUMAtriptan (IMITREX) 100 MG tablet     vitamin B complex with vitamin C (STRESS TAB) tablet     No current facility-administered medications for this visit.       Allergies   Allergen Reactions     Gluten Meal Nausea and Vomiting     Contrast Dye      Ondansetron Headache     Pcn [Penicillins] Hives     As a child     Rocephin [Ceftriaxone] Hives     Patient states this started immediately     Tramadol Hives     Zithromax [Azithromycin] Tinnitus     \"ear ringing and hearing loss\"       Past Medical History:   Diagnosis Date     Anemia      Anxiety      Celiac disease      Celiac disease      Chronic kidney disease     donated 1 kidney     Cough      Depression     Seasonal     Diverticulitis      Dyslipidemia      Enlarged LV     wall     GERD (gastroesophageal reflux disease)      Hepatitis      Hiatal hernia      Hypertension      Metabolic syndrome      Migraines      HARDIN (nonalcoholic steatohepatitis)      Peripheral neuropathy      PONV (postoperative nausea and vomiting)      Ventral hernia         Patient Active Problem List   Diagnosis     Chronic insomnia     Suspected sleep apnea     HARDIN (nonalcoholic steatohepatitis)     Sigmoid diverticulitis     Dyslipidemia     Metabolic " syndrome     Hypertension     Celiac disease       Past Surgical History:   Procedure Laterality Date     APPENDECTOMY       CHOLECYSTECTOMY       COLON SURGERY       COLONOSCOPY N/A 3/18/2019    Procedure: COLONOSCOPY;  Surgeon: Davis Mosqueda MD;  Location: [facility];  Service: General     CYSTOSCOPY N/A 7/16/2015    Procedure: CYSTOSCOPY;  Surgeon: Nate Horta MD;  Location: [facility];  Service:      HC CORRECT BUNION,METATARSAL OSTEOTOMY      Description: Bunion Correction With Metatarsal Osteotomy Herman Procedure;  Proc Date: 07/16/2010;     HYSTERECTOMY       HYSTERECTOMY, PAP NO LONGER INDICATED  02/2009     NEPHRECTOMY LIVING DONOR Left APRIL 2012     WY LAP,BILIARY TRACT,UNLISTED N/A 3/19/2019    Procedure: BIOPSY, LIVER, LAPAROSCOPIC;  Surgeon: Davis Mosqueda MD;  Location: [facility];  Service: General     WY LAP,SLING OPERATION      Description: Laparoscopic Sling Operation For Stress Incontinence;  Recorded: 02/17/2009;     WY LAP,SURG,COLECTOMY, PARTIAL, W/ANAST N/A 3/19/2019    Procedure: LAPAROSCOPIC SIGMOID COLON RESECTION;  Surgeon: Davis Mosqueda MD;  Location: [facility];  Service: General     SEPTOPLASTY, TURBINOPLASTY, COMBINED N/A 8/3/2021    Procedure: SEPTOPLASTY, NOSE, WITH TURBINOPLASTY RADIOFREQUENCY BALLON ASSISTED, CRYOBLATION OF NASAL TISSUE;  Surgeon: Randy Case MD;  Location: [facility]     SIGMOIDOSCOPY FLEXIBLE N/A 3/19/2019    Procedure: FLEXIBLE SIGMOIDOSCOPY;  Surgeon: Davis Mosqueda MD;  Location: [facility];  Service: General       Family History   Problem Relation Age of Onset     Hashimoto's thyroiditis Mother      Breast Cancer Mother 50.00     Graves' disease Sister      Hypothyroidism Maternal Aunt      Breast Cancer Maternal Aunt 50.00     Hashimoto's thyroiditis Maternal Grandfather      Hypothyroidism Sister      Hypothyroidism Maternal Aunt      Hashimoto's thyroiditis Maternal Aunt       Anesthesia Reaction No family hx of        Social history: Patient is single.  She works as a dental assistant.  She denies tobacco, alcohol, illicit drug use.    ROS: Positive for headache, hoarseness, ringing in ears, eye pain, palpitations, constipation, reflux, loss of bladder control, joint pain, muscle pain, muscle fatigue, insomnia, excessive tiredness, anxiety, depression.  Specifically negative for dysphagia, imbalance, fine motor skill difficulties, fevers,chills, appetite changes, unexplained weight loss.   Otherwise 13 systems reviewed are negative.  Please see the patient's intake questionnaire from today for details.      OBJECTIVE:  PHYSICAL EXAMINATION:  CONSTITUTIONAL:  Vital signs as above.  No acute distress.  The patient is well nourished and well groomed.  PSYCHIATRIC:  The patient is awake, alert, oriented to person, place, time and answering questions appropriately with clear speech.    HEENT:  Normocephalic, atraumatic.  Sclera clear.    SKIN:  Skin over the face, bilateral upper extremities, and neck is clean, dry, intact without rashes.  LYMPH NODES:  No palpable or tender anterior/posterior cervical, submandibular, or supraclavicular lymph nodes.    MUSCLE STRENGTH:  5/5 strength for the bilateral shoulder abductors, elbow flexors/extensors, wrist extensors, finger flexors/abductors.  NEURO:  CN III-XII are grossly intact.  2+ symmetric biceps, brachioradialis, triceps reflexes bilaterally.  Sensation to diminished tips of right fingers 1, 2, 3.  Negative Kaur's bilaterally.    VASCULAR:  2/4 radial pulses bilaterally.  Warm upper limbs bilaterally.    MUSCULOSKELETAL: Tender to palpation with hypertonicity right cervical paraspinous muscles and bilateral upper trapezius muscles.  Cervical flexion and extension mildly restricted.  Lateral rotation moderately restricted bilaterally.  Significant restriction with lateral flexion cervical spine bilaterally.    RESULTS:     I reviewed the  MRI cervical spine from [facility] dated September 14, 2018 .At C6-7 there is a diffuse annular bulge with moderate right and mild left foraminal stenosis.  At C2-3 there is mild left foraminal stenosis.  At C3-4 there is moderate right foraminal stenosis.  At C4-5 there is mild right foraminal stenosis.  Patient has facet arthropathy which is prominent on the left at C2-3.  There is also facet arthropathy at C3-4, C4-5, and C5-6.  Please report for further details.    [facility]  HEAD MRI WITHOUT IV CONTRAST  5/8/2017 10:13 AM     INDICATION: Migraine without aura, not intractable, without status migrainosus  TECHNIQUE: Head MRI without intravenous contrast.  COMPARISON:  None.     FINDINGS: No acute infarct/restricted diffusion. No mass lesion, hemorrhage, or extra-axial fluid collections. The ventricles and sulci are normal in size, shape, and position. The cerebellum is unremarkable. No signal abnormality in the brain   parenchyma. The pituitary gland is unremarkable. The major intracranial vascular flow voids are maintained. The orbits are unremarkable. The calvarium and skull base are unremarkable.  The paranasal sinuses are clear. The mastoid air cells are clear.     IMPRESSION:  CONCLUSION:  1.  Normal Head MRI.   2.  No acute infarcts, mass lesions or hemorrhage.

## 2023-05-10 ENCOUNTER — TRANSFERRED RECORDS (OUTPATIENT)
Dept: HEALTH INFORMATION MANAGEMENT | Facility: CLINIC | Age: 58
End: 2023-05-10

## 2023-05-11 ENCOUNTER — TELEPHONE (OUTPATIENT)
Dept: PHYSICAL MEDICINE AND REHAB | Facility: CLINIC | Age: 58
End: 2023-05-11

## 2023-05-11 NOTE — TELEPHONE ENCOUNTER
[facility] Call Center    Phone Message    May a detailed message be left on voicemail: yes     Reason for Call: Other: Pt needs to r/s her appt on 05/15     Action Taken: Other: [facility]    Travel Screening: Not Applicable

## 2023-05-16 ENCOUNTER — TRANSFERRED RECORDS (OUTPATIENT)
Dept: HEALTH INFORMATION MANAGEMENT | Facility: CLINIC | Age: 58
End: 2023-05-16

## 2023-05-26 ENCOUNTER — HOSPITAL ENCOUNTER (EMERGENCY)
Facility: CLINIC | Age: 58
Discharge: HOME | End: 2023-05-27
Attending: STUDENT IN AN ORGANIZED HEALTH CARE EDUCATION/TRAINING PROGRAM | Admitting: STUDENT IN AN ORGANIZED HEALTH CARE EDUCATION/TRAINING PROGRAM
Payer: COMMERCIAL

## 2023-05-26 DIAGNOSIS — L08.9 NECK INFECTION: ICD-10-CM

## 2023-05-26 DIAGNOSIS — T81.40XA POSTOPERATIVE INFECTION, UNSPECIFIED TYPE, INITIAL ENCOUNTER: ICD-10-CM

## 2023-05-26 LAB
ANION GAP SERPL CALCULATED.3IONS-SCNC: 10 MMOL/L (ref 5–18)
BASOPHILS # BLD AUTO: 0.1 10E3/UL (ref 0–0.2)
BASOPHILS NFR BLD AUTO: 1 %
BUN SERPL-MCNC: 14 MG/DL (ref 8–22)
C REACTIVE PROTEIN LHE: 3.1 MG/DL (ref 0–?)
CALCIUM SERPL-MCNC: 9.9 MG/DL (ref 8.5–10.5)
CHLORIDE BLD-SCNC: 104 MMOL/L (ref 98–107)
CO2 SERPL-SCNC: 27 MMOL/L (ref 22–31)
CREAT SERPL-MCNC: 0.87 MG/DL (ref 0.6–1.1)
EOSINOPHIL # BLD AUTO: 0.4 10E3/UL (ref 0–0.7)
EOSINOPHIL NFR BLD AUTO: 5 %
ERYTHROCYTE [DISTWIDTH] IN BLOOD BY AUTOMATED COUNT: 12 % (ref 10–15)
ERYTHROCYTE [SEDIMENTATION RATE] IN BLOOD BY WESTERGREN METHOD: 29 MM/HR (ref 0–30)
GFR SERPL CREATININE-BSD FRML MDRD: 78 ML/MIN/1.73M2
GLUCOSE BLD-MCNC: 112 MG/DL (ref 70–125)
HCT VFR BLD AUTO: 42.9 % (ref 35–47)
HGB BLD-MCNC: 14.7 G/DL (ref 11.7–15.7)
IMM GRANULOCYTES # BLD: 0.1 10E3/UL
IMM GRANULOCYTES NFR BLD: 1 %
LACTATE SERPL-SCNC: 1.6 MMOL/L (ref 0.7–2)
LYMPHOCYTES # BLD AUTO: 2.6 10E3/UL (ref 0.8–5.3)
LYMPHOCYTES NFR BLD AUTO: 34 %
MCH RBC QN AUTO: 30.6 PG (ref 26.5–33)
MCHC RBC AUTO-ENTMCNC: 34.3 G/DL (ref 31.5–36.5)
MCV RBC AUTO: 89 FL (ref 78–100)
MONOCYTES # BLD AUTO: 0.5 10E3/UL (ref 0–1.3)
MONOCYTES NFR BLD AUTO: 7 %
NEUTROPHILS # BLD AUTO: 4.1 10E3/UL (ref 1.6–8.3)
NEUTROPHILS NFR BLD AUTO: 52 %
NRBC # BLD AUTO: 0 10E3/UL
NRBC BLD AUTO-RTO: 0 /100
PLATELET # BLD AUTO: 379 10E3/UL (ref 150–450)
POTASSIUM BLD-SCNC: 4.2 MMOL/L (ref 3.5–5)
RBC # BLD AUTO: 4.8 10E6/UL (ref 3.8–5.2)
SODIUM SERPL-SCNC: 141 MMOL/L (ref 136–145)
WBC # BLD AUTO: 7.7 10E3/UL (ref 4–11)

## 2023-05-26 PROCEDURE — 85652 RBC SED RATE AUTOMATED: CPT | Performed by: STUDENT IN AN ORGANIZED HEALTH CARE EDUCATION/TRAINING PROGRAM

## 2023-05-26 PROCEDURE — 85025 COMPLETE CBC W/AUTO DIFF WBC: CPT | Performed by: STUDENT IN AN ORGANIZED HEALTH CARE EDUCATION/TRAINING PROGRAM

## 2023-05-26 PROCEDURE — 80048 BASIC METABOLIC PNL TOTAL CA: CPT | Performed by: STUDENT IN AN ORGANIZED HEALTH CARE EDUCATION/TRAINING PROGRAM

## 2023-05-26 PROCEDURE — 96375 TX/PRO/DX INJ NEW DRUG ADDON: CPT

## 2023-05-26 PROCEDURE — 36415 COLL VENOUS BLD VENIPUNCTURE: CPT | Performed by: STUDENT IN AN ORGANIZED HEALTH CARE EDUCATION/TRAINING PROGRAM

## 2023-05-26 PROCEDURE — 250N000011 HC RX IP 250 OP 636: Performed by: STUDENT IN AN ORGANIZED HEALTH CARE EDUCATION/TRAINING PROGRAM

## 2023-05-26 PROCEDURE — 96365 THER/PROPH/DIAG IV INF INIT: CPT | Mod: 59

## 2023-05-26 PROCEDURE — 96366 THER/PROPH/DIAG IV INF ADDON: CPT

## 2023-05-26 PROCEDURE — 86140 C-REACTIVE PROTEIN: CPT | Performed by: STUDENT IN AN ORGANIZED HEALTH CARE EDUCATION/TRAINING PROGRAM

## 2023-05-26 PROCEDURE — 258N000003 HC RX IP 258 OP 636: Performed by: STUDENT IN AN ORGANIZED HEALTH CARE EDUCATION/TRAINING PROGRAM

## 2023-05-26 PROCEDURE — 83605 ASSAY OF LACTIC ACID: CPT | Performed by: STUDENT IN AN ORGANIZED HEALTH CARE EDUCATION/TRAINING PROGRAM

## 2023-05-26 PROCEDURE — 250N000013 HC RX MED GY IP 250 OP 250 PS 637: Performed by: STUDENT IN AN ORGANIZED HEALTH CARE EDUCATION/TRAINING PROGRAM

## 2023-05-26 PROCEDURE — 99285 EMERGENCY DEPT VISIT HI MDM: CPT | Mod: 25

## 2023-05-26 RX ORDER — DEXAMETHASONE SODIUM PHOSPHATE 10 MG/ML
10 INJECTION, SOLUTION INTRAMUSCULAR; INTRAVENOUS ONCE
Status: DISCONTINUED | OUTPATIENT
Start: 2023-05-26 | End: 2023-05-26

## 2023-05-26 RX ORDER — CALCIUM CARBONATE 500 MG/1
1000 TABLET, CHEWABLE ORAL 3 TIMES DAILY PRN
Status: DISCONTINUED | OUTPATIENT
Start: 2023-05-26 | End: 2023-05-27 | Stop reason: HOSPADM

## 2023-05-26 RX ORDER — MORPHINE SULFATE 4 MG/ML
4 INJECTION, SOLUTION INTRAMUSCULAR; INTRAVENOUS ONCE
Status: COMPLETED | OUTPATIENT
Start: 2023-05-26 | End: 2023-05-26

## 2023-05-26 RX ADMIN — SODIUM CHLORIDE 1000 ML: 9 INJECTION, SOLUTION INTRAVENOUS at 22:31

## 2023-05-26 RX ADMIN — CALCIUM CARBONATE (ANTACID) CHEW TAB 500 MG 1000 MG: 500 CHEW TAB at 23:06

## 2023-05-26 RX ADMIN — VANCOMYCIN HYDROCHLORIDE 1500 MG: 5 INJECTION, POWDER, LYOPHILIZED, FOR SOLUTION INTRAVENOUS at 22:55

## 2023-05-26 RX ADMIN — MORPHINE SULFATE 4 MG: 4 INJECTION, SOLUTION INTRAMUSCULAR; INTRAVENOUS at 22:38

## 2023-05-26 ASSESSMENT — ACTIVITIES OF DAILY LIVING (ADL)
ADLS_ACUITY_SCORE: 33
ADLS_ACUITY_SCORE: 35

## 2023-05-27 ENCOUNTER — APPOINTMENT (OUTPATIENT)
Dept: CT IMAGING | Facility: CLINIC | Age: 58
End: 2023-05-27
Attending: STUDENT IN AN ORGANIZED HEALTH CARE EDUCATION/TRAINING PROGRAM
Payer: COMMERCIAL

## 2023-05-27 VITALS
TEMPERATURE: 98.8 F | RESPIRATION RATE: 20 BRPM | OXYGEN SATURATION: 94 % | BODY MASS INDEX: 33.78 KG/M2 | WEIGHT: 210.2 LBS | DIASTOLIC BLOOD PRESSURE: 84 MMHG | HEIGHT: 66 IN | HEART RATE: 77 BPM | SYSTOLIC BLOOD PRESSURE: 147 MMHG

## 2023-05-27 LAB
ALBUMIN UR-MCNC: NEGATIVE MG/DL
APPEARANCE UR: CLEAR
BILIRUB UR QL STRIP: NEGATIVE
COLOR UR AUTO: NORMAL
GLUCOSE UR STRIP-MCNC: NEGATIVE MG/DL
HGB UR QL STRIP: NEGATIVE
KETONES UR STRIP-MCNC: NEGATIVE MG/DL
LEUKOCYTE ESTERASE UR QL STRIP: NEGATIVE
NITRATE UR QL: NEGATIVE
PH UR STRIP: 6.5 [PH] (ref 5–7)
RBC URINE: 1 /HPF
SP GR UR STRIP: <1.005 (ref 1–1.03)
SQUAMOUS EPITHELIAL: 1 /HPF
UROBILINOGEN UR STRIP-MCNC: <2 MG/DL
WBC URINE: <1 /HPF

## 2023-05-27 PROCEDURE — 96375 TX/PRO/DX INJ NEW DRUG ADDON: CPT

## 2023-05-27 PROCEDURE — 258N000003 HC RX IP 258 OP 636: Performed by: STUDENT IN AN ORGANIZED HEALTH CARE EDUCATION/TRAINING PROGRAM

## 2023-05-27 PROCEDURE — 250N000011 HC RX IP 250 OP 636: Performed by: EMERGENCY MEDICINE

## 2023-05-27 PROCEDURE — 81001 URINALYSIS AUTO W/SCOPE: CPT | Performed by: STUDENT IN AN ORGANIZED HEALTH CARE EDUCATION/TRAINING PROGRAM

## 2023-05-27 PROCEDURE — 250N000011 HC RX IP 250 OP 636: Performed by: STUDENT IN AN ORGANIZED HEALTH CARE EDUCATION/TRAINING PROGRAM

## 2023-05-27 PROCEDURE — 96367 TX/PROPH/DG ADDL SEQ IV INF: CPT

## 2023-05-27 PROCEDURE — 96376 TX/PRO/DX INJ SAME DRUG ADON: CPT | Mod: 59

## 2023-05-27 PROCEDURE — 70498 CT ANGIOGRAPHY NECK: CPT

## 2023-05-27 RX ORDER — CLINDAMYCIN HCL 300 MG
300 CAPSULE ORAL 3 TIMES DAILY
Qty: 21 CAPSULE | Refills: 0 | Status: SHIPPED | OUTPATIENT
Start: 2023-05-27 | End: 2023-06-03

## 2023-05-27 RX ORDER — IOPAMIDOL 755 MG/ML
100 INJECTION, SOLUTION INTRAVASCULAR ONCE
Status: COMPLETED | OUTPATIENT
Start: 2023-05-27 | End: 2023-05-27

## 2023-05-27 RX ORDER — MORPHINE SULFATE 4 MG/ML
4 INJECTION, SOLUTION INTRAMUSCULAR; INTRAVENOUS ONCE
Status: COMPLETED | OUTPATIENT
Start: 2023-05-27 | End: 2023-05-27

## 2023-05-27 RX ORDER — DIPHENHYDRAMINE HYDROCHLORIDE 50 MG/ML
50 INJECTION INTRAMUSCULAR; INTRAVENOUS ONCE
Status: COMPLETED | OUTPATIENT
Start: 2023-05-27 | End: 2023-05-27

## 2023-05-27 RX ORDER — OXYCODONE HYDROCHLORIDE 5 MG/1
5 TABLET ORAL EVERY 6 HOURS PRN
Qty: 6 TABLET | Refills: 0 | Status: SHIPPED | OUTPATIENT
Start: 2023-05-27 | End: 2023-05-30

## 2023-05-27 RX ORDER — LIDOCAINE 40 MG/G
CREAM TOPICAL
Status: DISCONTINUED | OUTPATIENT
Start: 2023-05-27 | End: 2023-05-27 | Stop reason: HOSPADM

## 2023-05-27 RX ORDER — METHYLPREDNISOLONE SODIUM SUCCINATE 40 MG/ML
40 INJECTION, POWDER, LYOPHILIZED, FOR SOLUTION INTRAMUSCULAR; INTRAVENOUS EVERY 4 HOURS
Status: COMPLETED | OUTPATIENT
Start: 2023-05-27 | End: 2023-05-27

## 2023-05-27 RX ADMIN — METHYLPREDNISOLONE SODIUM SUCCINATE 40 MG: 40 INJECTION, POWDER, FOR SOLUTION INTRAMUSCULAR; INTRAVENOUS at 00:47

## 2023-05-27 RX ADMIN — MEROPENEM 2 G: 1 INJECTION, POWDER, FOR SOLUTION INTRAVENOUS at 00:58

## 2023-05-27 RX ADMIN — MORPHINE SULFATE 4 MG: 4 INJECTION, SOLUTION INTRAMUSCULAR; INTRAVENOUS at 04:41

## 2023-05-27 RX ADMIN — METHYLPREDNISOLONE SODIUM SUCCINATE 40 MG: 40 INJECTION, POWDER, FOR SOLUTION INTRAMUSCULAR; INTRAVENOUS at 04:36

## 2023-05-27 RX ADMIN — IOPAMIDOL 100 ML: 755 INJECTION, SOLUTION INTRAVENOUS at 07:05

## 2023-05-27 RX ADMIN — MORPHINE SULFATE 4 MG: 4 INJECTION, SOLUTION INTRAMUSCULAR; INTRAVENOUS at 08:26

## 2023-05-27 RX ADMIN — DIPHENHYDRAMINE HYDROCHLORIDE 50 MG: 50 INJECTION, SOLUTION INTRAMUSCULAR; INTRAVENOUS at 05:44

## 2023-05-27 RX ADMIN — MORPHINE SULFATE 4 MG: 4 INJECTION, SOLUTION INTRAMUSCULAR; INTRAVENOUS at 00:57

## 2023-05-27 ASSESSMENT — ACTIVITIES OF DAILY LIVING (ADL)
ADLS_ACUITY_SCORE: 35

## 2023-05-27 NOTE — ED PROVIDER NOTES
EMERGENCY DEPARTMENT ENCOUNTER       ED Course & Medical Decision Making     9:37 PM I met with the patient for an initial encounter and evaluation.  10:15 PM I spoke with radiology Dr Inman regarding contrast, he recommended accelerated 6-hour pretreatment protocol and then obtaining CT with contrast instead of CT without    Final Impression  58 year old female presents for evaluation of possible postoperative infection after parathyroidectomy surgery on 5/23/2023 at [place].  Patient states that since surgery she has had increasing swelling, redness, tenderness near her surgical site.  States she has been taking ibuprofen about 800 mg 4 times daily despite having a solitary kidney as that is the only thing that helps with pain.  Was written #5 tabs of oxycodone, has taken 2 of them so far, but was afraid to take them anymore frequently because she only has a few of them.  Denies any difficulty with swallowing, but does report some anterior neck pain with swallowing.  Does have some very mild voice changes, sounds somewhat strained and hoarse.  On exam patient does have about 2 cm border of erythema around the surgical site, site itself is also raised about 1 cm in the center, though has no current drainage.  Is fairly tender to touch around the surgical site, though also off of the anterior neck into the submandibular space, has slight redness and bruising in this area as well despite being fairly remote from the surgical site.  Initial labs returned with a normal white count of 7.7, CRP mildly elevated at 3.1, lactic acid normal at 1.6.  Had planned on CT soft tissue neck with contrast immediately, however patient has a documented allergy to contrast.  When I reviewed this with patient's he states that the first 2 times she ever got IV contrast she had a reaction of hives, though both of them resolved with Benadryl alone after CT scan.  All subsequent scans have been done with a 12-hour pretreatment protocol and has  never had a subsequent reaction.  Discussed with radiology about whether we do a noncontrast study, do it emergently and give Benadryl and steroids right now (just before scan), or do the full 6-hour pretreatment, they recommended the 6-hour pretreatment protocol.  Patient will likely not get her CT scan until 4 AM.  No signs of respiratory distress.  Will start on empiric antibiotics of vancomycin and meropenem.  Patient will be signed out to oncoming provider at shift change, dispo pending CT soft tissue neck results.     Medical Decision Making    History:    Supplemental history from: Sister    External Record(s) reviewed as documented below;  o 5/23/23 surgical note from [facility] by Dr. Geovany Verdugo for parathyroidectomy and exploration of the central neck for hyperparathyroidism.  Was discharged with #5 tabs of oxycodone, senna, Tylenol, Tums    Work Up:    Chart documentation includes differential considered and any EKGs or imaging independently interpreted by provider, where specified.    DDx considered but not limited to: Postoperative soft tissue infection of the anterior neck, Koko's angina, deep space infection of the neck    IV antibiotics administered for suspected neck infection    External consultation:    Discussion of management with another provider: Radiology    Complicating factors:    Care impacted by chronic illness: hyperparathyroidism, hypercalcemia    Care affected by social determinants of health: N/A    Disposition considerations: Admission considered. Patient was signed out to the oncoming physician, disposition pending.      Medications   vancomycin (VANCOCIN) 1,500 mg in 0.9% NaCl 250 mL intermittent infusion (1,500 mg Intravenous $New Bag 5/26/23 0577)   meropenem (MERREM) 2 g in sodium chloride 0.9 % 100 mL intermittent infusion (has no administration in time range)   calcium carbonate (TUMS) chewable tablet 1,000 mg (has no administration in time range)   morphine (PF)  "injection 4 mg (4 mg Intravenous $Given 5/26/23 2238)   0.9% sodium chloride BOLUS (1,000 mLs Intravenous $New Bag 5/26/23 2231)       New Prescriptions    No medications on file       Final Impression     1. Postoperative infection, unspecified type, initial encounter    2. Neck infection        Chief Complaint     Chief Complaint   Patient presents with     Post-op Problem     Arrives to ED with c/o infection to incision of throat. Had parathyroidectomy performed at [place] on Tuesday. Redness and swelling noted. Able to tolerate secretions. No s/s of respiratory distress. Reports fever beginning yesterday. Fever increasing today. Has been taking ibuprofen for pain although has one kidney.     HPI     Keiry Mchugh is a 58 year old female who presents for evaluation of a post-op problem.    Patient reports she had a parathyroidectomy on 5/22. She notes seeing redness on 5/23.     Patient reports an onset of shortness of breath which occured on 5/25. She states she was \"gasping for air\". She called her nurse and was encouraged to \"elevate [her] head\". Patient reports she had a fever of 99. However, she had a fever of 100.2 today. She notes having difficulty \"swallowing\" due to the \"outside of [her] throat\". Patient report she hasn't had any antibiotics, but has been taking Advil and Ibuprofen. She last took ibuprofen at 3:00 PM today. Patient reports being prescribed with oxycodone but decided to wait until worse-case scenario.     Patient reports she has an appointment next week to receive a steroid injection shoulder procedure. No other medical concerns are expressed at this time.     I, Gloria Devi am serving as a scribe to document services personally performed by Dr. Sujit Clifton MD, based on my observation and the provider's statements to me. I, Dr. Sujit Clifton MD attest that Gloria Devi is acting in a scribe capacity, has observed my performance of the services and has documented them in " "accordance with my direction.    Physical Exam     BP (!) 193/97   Pulse 109   Temp 98.8  F (37.1  C) (Oral)   Resp 20   Ht 1.683 m (5' 6.25\")   Wt 95.3 kg (210 lb 3.2 oz)   SpO2 97%   BMI 33.67 kg/m    Constitutional: Awake, alert, in no acute distress.  Head: Normocephalic, atraumatic.  ENT: Mucous membranes moist.  Horizontal surgical incision along the anterior neck, 2 cm of surrounding erythema with some induration and swelling, raised about 1 cm at the midline of the surgical incision.  No purulence or drainage, though is tender to touch.  No palpable crepitus.  Has fullness, tenderness and some slight bruising/redness that tracks up the neck into the submandibular space, though again no crepitus.  Mild voice changes, sounds slightly strained or hoarse  Eyes: Conjunctiva normal.  Respiratory: Respirations even, unlabored, in no acute respiratory distress.  Cardiovascular: Sinus tachycardia. Good peripheral perfusion.  GI: Abdomen soft, non-tender.  Musculoskeletal: Moves all 4 extremities equally.  Integument: Warm, dry.  Neurologic: Alert & oriented x 3. Normal speech. Grossly normal motor and sensory function. No focal deficits noted.  Psychiatric: Normal mood    Labs & Imaging       Results for orders placed or performed during the hospital encounter of 05/26/23   Basic metabolic panel   Result Value Ref Range    Sodium 141 136 - 145 mmol/L    Potassium 4.2 3.5 - 5.0 mmol/L    Chloride 104 98 - 107 mmol/L    Carbon Dioxide (CO2) 27 22 - 31 mmol/L    Anion Gap 10 5 - 18 mmol/L    Urea Nitrogen 14 8 - 22 mg/dL    Creatinine 0.87 0.60 - 1.10 mg/dL    Calcium 9.9 8.5 - 10.5 mg/dL    Glucose 112 70 - 125 mg/dL    GFR Estimate 78 >60 mL/min/1.73m2   CRP inflammation   Result Value Ref Range    CRP 3.1 (H) 0.0 - <0.8 mg/dL   Erythrocyte sedimentation rate auto   Result Value Ref Range    Erythrocyte Sedimentation Rate 29 0 - 30 mm/hr   Lactic acid whole blood   Result Value Ref Range    Lactic Acid 1.6 0.7 " - 2.0 mmol/L   CBC with platelets and differential   Result Value Ref Range    WBC Count 7.7 4.0 - 11.0 10e3/uL    RBC Count 4.80 3.80 - 5.20 10e6/uL    Hemoglobin 14.7 11.7 - 15.7 g/dL    Hematocrit 42.9 35.0 - 47.0 %    MCV 89 78 - 100 fL    MCH 30.6 26.5 - 33.0 pg    MCHC 34.3 31.5 - 36.5 g/dL    RDW 12.0 10.0 - 15.0 %    Platelet Count 379 150 - 450 10e3/uL    % Neutrophils 52 %    % Lymphocytes 34 %    % Monocytes 7 %    % Eosinophils 5 %    % Basophils 1 %    % Immature Granulocytes 1 %    NRBCs per 100 WBC 0 <1 /100    Absolute Neutrophils 4.1 1.6 - 8.3 10e3/uL    Absolute Lymphocytes 2.6 0.8 - 5.3 10e3/uL    Absolute Monocytes 0.5 0.0 - 1.3 10e3/uL    Absolute Eosinophils 0.4 0.0 - 0.7 10e3/uL    Absolute Basophils 0.1 0.0 - 0.2 10e3/uL    Absolute Immature Granulocytes 0.1 <=0.4 10e3/uL    Absolute NRBCs 0.0 10e3/uL          Sujit Clifton MD  05/26/23 1708       Sujit Clifton MD  05/26/23 [id number]

## 2023-05-27 NOTE — ED TRIAGE NOTES
Arrives to ED with c/o infection to incision of throat. Had parathyroidectomy performed at [place] on Tuesday. Redness and swelling noted. Able to tolerate secretions. No s/s of respiratory distress. Reports fever beginning yesterday. Fever increasing today. Has been taking ibuprofen for pain although has one kidney.      Triage Assessment     Row Name 05/26/23 2056       Triage Assessment (Adult)    Airway WDL WDL       Respiratory WDL    Respiratory WDL WDL       Skin Circulation/Temperature WDL    Skin Circulation/Temperature WDL WDL       Cardiac WDL    Cardiac WDL X;rhythm  HTN    Pulse Rate & Regularity tachycardic       Peripheral/Neurovascular WDL    Peripheral Neurovascular WDL WDL       Cognitive/Neuro/Behavioral WDL    Cognitive/Neuro/Behavioral WDL WDL

## 2023-05-27 NOTE — DISCHARGE INSTRUCTIONS
Your labs overall reassuring.  The CT scan did not show any signs of infection or fluid collection.    I spoke with your surgery team.  They recommended going home with oral antibiotics.  Please keep in the loop, return for any worsening symptoms, and follow-up with your regularly scheduled postoperative appointments.    Please take the pain medication as prescribed.    I would stay away from ibuprofen and continue with 500 mg of Tylenol every 4 hours for pain.

## 2023-05-27 NOTE — PHARMACY-VANCOMYCIN DOSING SERVICE
Pharmacy Vancomycin Initial Note  Date of Service May 26, 2023  Patient's  1965  58 year old, female    Indication: Skin and Soft Tissue Infection    Current estimated CrCl = CrCl cannot be calculated (Patient's most recent lab result is older than the maximum 30 days allowed.).    Creatinine for last 3 days  No results found for requested labs within last 3 days.    Recent Vancomycin Level(s) for last 3 days  No results found for requested labs within last 3 days.      Vancomycin IV Administrations (past 72 hours)      No vancomycin orders with administrations in past 72 hours.                Nephrotoxins and other renal medications (From now, onward)    Start     Dose/Rate Route Frequency Ordered Stop    23 2230  vancomycin (VANCOCIN) 1,500 mg in 0.9% NaCl 250 mL intermittent infusion         1,500 mg  over 90 Minutes Intravenous ONCE 23 2227            Contrast Orders - past 72 hours (72h ago, onward)    None          Plan:  1. Start vancomycin  1500 mg IV x 1.    2. Consult pharmacy to dose if vancomycin to continue beyond initial dose.    Shameka Stoll, [facility]

## 2023-05-27 NOTE — ED NOTES
EMERGENCY DEPARTMENT SIGN OUT NOTE        ED COURSE AND MEDICAL DECISION MAKING  2326 Patient was signed out to me by Dr Sujit Clifton.    In brief, Keiry Mchugh is a 58 year old female who initially presented with post-op redness of her neck after a parathyroidectomy on 5/22 at [facility].  Vancomycin and meropenem were administered in the emergency department.  0500-patient undergoing pretreatment for contrast allergy.  No change in status in the ED  0600-signed out at change of shift with CT scan and disposition pending.    At time of sign out, disposition was pending CT soft tissue neck after pretreatment for a contrast allergy.     FINAL IMPRESSION    1. Postoperative infection, unspecified type, initial encounter    2. Neck infection        ED MEDS  Medications   lidocaine 1 % 0.1-1 mL (has no administration in time range)   lidocaine (LMX4) cream (has no administration in time range)   sodium chloride (PF) 0.9% PF flush 3 mL (has no administration in time range)   sodium chloride (PF) 0.9% PF flush 3 mL (has no administration in time range)   diphenhydrAMINE (BENADRYL) injection 50 mg (has no administration in time range)   calcium carbonate (TUMS) chewable tablet 1,000 mg (1,000 mg Oral $Given 5/26/23 2306)   morphine (PF) injection 4 mg (4 mg Intravenous $Given 5/26/23 2238)   0.9% sodium chloride BOLUS (0 mLs Intravenous Stopped 5/27/23 0000)   methylPREDNISolone sodium succinate (solu-MEDROL) injection 40 mg (40 mg Intravenous $Given 5/27/23 0436)   vancomycin (VANCOCIN) 1,500 mg in 0.9% NaCl 250 mL intermittent infusion (0 mg Intravenous Stopped 5/27/23 0047)   meropenem (MERREM) 2 g in sodium chloride 0.9 % 100 mL intermittent infusion (0 g Intravenous Stopped 5/27/23 0130)   morphine (PF) injection 4 mg (4 mg Intravenous $Given 5/27/23 0057)   morphine (PF) injection 4 mg (4 mg Intravenous $Given 5/27/23 0441)       LAB  Labs Ordered and Resulted from Time of ED Arrival to Time of ED  Departure   CRP INFLAMMATION - Abnormal       Result Value    CRP 3.1 (*)    BASIC METABOLIC PANEL - Normal    Sodium 141      Potassium 4.2      Chloride 104      Carbon Dioxide (CO2) 27      Anion Gap 10      Urea Nitrogen 14      Creatinine 0.87      Calcium 9.9      Glucose 112      GFR Estimate 78     ERYTHROCYTE SEDIMENTATION RATE AUTO - Normal    Erythrocyte Sedimentation Rate 29     LACTIC ACID WHOLE BLOOD - Normal    Lactic Acid 1.6     CBC WITH PLATELETS AND DIFFERENTIAL    WBC Count 7.7      RBC Count 4.80      Hemoglobin 14.7      Hematocrit 42.9      MCV 89      MCH 30.6      MCHC 34.3      RDW 12.0      Platelet Count 379      % Neutrophils 52      % Lymphocytes 34      % Monocytes 7      % Eosinophils 5      % Basophils 1      % Immature Granulocytes 1      NRBCs per 100 WBC 0      Absolute Neutrophils 4.1      Absolute Lymphocytes 2.6      Absolute Monocytes 0.5      Absolute Eosinophils 0.4      Absolute Basophils 0.1      Absolute Immature Granulocytes 0.1      Absolute NRBCs 0.0       RADIOLOGY    Soft tissue neck CT w contrast    (Results Pending)   CTA Neck with Contrast    (Results Pending)       Loree Johansen MD  [facility] EMERGENCY ROOM  [address] [address] [phone]     Loree Johansen MD  05/27/23 1235

## 2023-05-27 NOTE — ED NOTES
Pt up to the bathroom independently; ABCs intact, able to manage own secretions. Pt settled in for sleep with HOB around 30* per her request

## 2023-05-30 ENCOUNTER — TRANSFERRED RECORDS (OUTPATIENT)
Dept: HEALTH INFORMATION MANAGEMENT | Facility: CLINIC | Age: 58
End: 2023-05-30
Payer: COMMERCIAL

## 2023-05-31 ENCOUNTER — OFFICE VISIT (OUTPATIENT)
Dept: PHYSICAL MEDICINE AND REHAB | Facility: CLINIC | Age: 58
End: 2023-05-31
Attending: PHYSICIAN ASSISTANT
Payer: COMMERCIAL

## 2023-05-31 DIAGNOSIS — R20.2 PARESTHESIA: ICD-10-CM

## 2023-05-31 PROCEDURE — 95909 NRV CNDJ TST 5-6 STUDIES: CPT | Performed by: PHYSICAL MEDICINE & REHABILITATION

## 2023-05-31 PROCEDURE — 95886 MUSC TEST DONE W/N TEST COMP: CPT | Performed by: PHYSICAL MEDICINE & REHABILITATION

## 2023-05-31 NOTE — PROGRESS NOTES
[facility]  [address] [place] [address] [address]  [place] [address]  Office: [phone] Fax: [phone]    Electromyography and Nerve Conduction Study Report        Indication: Patient presents at the request of Damaris Leslie for right upper extremity EMG.  She has cervical spine pain.  She has right upper extremity paresthesias to digits 1 through 3.  She is right-handed.  She feels weakness of .  On exam she has slight reduced sensation to light touch digits 1 and 3 of the right upper extremity.  Normal reflexes throughout the upper extremities with negative Riley's, and normal muscle strength throughout the major muscle groups of the bilateral upper extremities.        Pt Exam Discussion (Communication Barriers):  Electromyography and nerve conduction testing, including associated discomfort, risks, benefits, and alternatives was discussed with the patient prior to the procedure.  No learning/ communication barriers; patient verbalized understanding of procedure.  Informed consent was obtained.           Pt Assessment:  Testing was successfully completed; patient tolerated testing well.       Blood Thinners: None Skin Temperature: Warmed 31.5                     EMG/NCS  results:     Nerve Conduction Studies  Motor Sites      Segment Distal Latency Neg. Amp CV F-Latency F-Estimate Comment   Site  (ms) mV m/s ms ms    Right Median (APB) Motor   Wrist Wrist-APB 2.8 9.6       Elbow Elbow-Wrist 6.7 9.1 62      Right Ulnar (ADM) Motor   Wrist  2.4 17.4       Bel Elbow Bel Elbow-Wrist 5.8 17.1 64      Ab Elbow Ab Elbow-Wrist 7.8 16.6 67        Sensory Sites      Onset Lat Peak Lat Amp CV Comment   Site (ms) (ms)  V m/s    Right Median Anti Sensory   Wrist-Dig II 2.2 2.8 54 59    Right Median-Ulnar Palmar Sensory        Median   Palm-Wrist 1.38 1.75 138 58         Ulnar   Palm-Wrist 1.13 1.60 43 71    Right Radial Sensory   Forearm-Wrist 1.53 2.0 55 65    Right Ulnar Anti Sensory    Wrist-Dig V 1.85 2.4 37 59        NCS Waveforms:    Motor         Sensory                  Electromyography     Side Muscle Nerve Root Ins Act Fibs Psw Amp Dur Poly Recrt Int Pat Comment   Right Deltoid Axillary C5-6 Nml Nml Nml Nml Nml 0 Nml Nml    Right Triceps Radial C6-7-8 Nml Nml Nml Nml Nml 0 Nml Nml    Right PronatorTeres Median C6-7 Nml Nml Nml Nml Nml 0 Nml Nml    Right 1stDorInt Ulnar C8-T1 Nml Nml Nml Nml Nml 0 Nml Nml    Right Abd Poll Brev Median C8-T1 Nml Nml Nml Nml Nml 0 Nml Nml          Comment NCS: Normal study  1.  Normal nerve conduction studies right upper extremity.  This includes normal right median ulnar and radial SNAPs, median and ulnar transcarpal studies, and median and ulnar CMAP's.    Comment EMG: Normal study  1.  Normal needle EMG right upper extremity.    Interpretation: Normal study    1. There is no electrodiagnostic evidence of cervical radiculopathy, brachial plexopathy, or focal neuropathy in the right upper extremity.  Specifically, there is no electrodiagnostic evidence of median neuropathy at the wrist in the right upper extremity or C6 radiculopathy in the right upper extremity.    The testing was completed in its entirety by the physician.       It was our pleasure caring for your patient today, if there any questions or concerns please do not hesitate to contact us.

## 2023-05-31 NOTE — LETTER
5/31/2023         RE: Keiry Mchugh  [address] [address]        Dear Colleague,    Thank you for referring your patient, Keiry Mchugh, to the [facility]. Please see a copy of my visit note below.    [facility]  [address] [place] [address] [address]  [place] [address]  Office: [phone] Fax: [phone]    Electromyography and Nerve Conduction Study Report        Indication: Patient presents at the request of Damaris Leslie for right upper extremity EMG.  She has cervical spine pain.  She has right upper extremity paresthesias to digits 1 through 3.  She is right-handed.  She feels weakness of .  On exam she has slight reduced sensation to light touch digits 1 and 3 of the right upper extremity.  Normal reflexes throughout the upper extremities with negative Riley's, and normal muscle strength throughout the major muscle groups of the bilateral upper extremities.        Pt Exam Discussion (Communication Barriers):  Electromyography and nerve conduction testing, including associated discomfort, risks, benefits, and alternatives was discussed with the patient prior to the procedure.  No learning/ communication barriers; patient verbalized understanding of procedure.  Informed consent was obtained.           Pt Assessment:  Testing was successfully completed; patient tolerated testing well.       Blood Thinners: None Skin Temperature: Warmed 31.5                     EMG/NCS  results:     Nerve Conduction Studies  Motor Sites      Segment Distal Latency Neg. Amp CV F-Latency F-Estimate Comment   Site  (ms) mV m/s ms ms    Right Median (APB) Motor   Wrist Wrist-APB 2.8 9.6       Elbow Elbow-Wrist 6.7 9.1 62      Right Ulnar (ADM) Motor   Wrist  2.4 17.4       Bel Elbow Bel Elbow-Wrist 5.8 17.1 64      Ab Elbow Ab Elbow-Wrist 7.8 16.6 67        Sensory Sites      Onset Lat Peak Lat Amp CV Comment   Site (ms) (ms)  V m/s    Right  Median Anti Sensory   Wrist-Dig II 2.2 2.8 54 59    Right Median-Ulnar Palmar Sensory        Median   Palm-Wrist 1.38 1.75 138 58         Ulnar   Palm-Wrist 1.13 1.60 43 71    Right Radial Sensory   Forearm-Wrist 1.53 2.0 55 65    Right Ulnar Anti Sensory   Wrist-Dig V 1.85 2.4 37 59        NCS Waveforms:    Motor         Sensory                  Electromyography     Side Muscle Nerve Root Ins Act Fibs Psw Amp Dur Poly Recrt Int Pat Comment   Right Deltoid Axillary C5-6 Nml Nml Nml Nml Nml 0 Nml Nml    Right Triceps Radial C6-7-8 Nml Nml Nml Nml Nml 0 Nml Nml    Right PronatorTeres Median C6-7 Nml Nml Nml Nml Nml 0 Nml Nml    Right 1stDorInt Ulnar C8-T1 Nml Nml Nml Nml Nml 0 Nml Nml    Right Abd Poll Brev Median C8-T1 Nml Nml Nml Nml Nml 0 Nml Nml          Comment NCS: Normal study  1.  Normal nerve conduction studies right upper extremity.  This includes normal right median ulnar and radial SNAPs, median and ulnar transcarpal studies, and median and ulnar CMAP's.    Comment EMG: Normal study  1.  Normal needle EMG right upper extremity.    Interpretation: Normal study    1. There is no electrodiagnostic evidence of cervical radiculopathy, brachial plexopathy, or focal neuropathy in the right upper extremity.  Specifically, there is no electrodiagnostic evidence of median neuropathy at the wrist in the right upper extremity or C6 radiculopathy in the right upper extremity.    The testing was completed in its entirety by the physician.       It was our pleasure caring for your patient today, if there any questions or concerns please do not hesitate to contact us.        Again, thank you for allowing me to participate in the care of your patient.        Sincerely,        Guevara Rosario, DO

## 2023-05-31 NOTE — PATIENT INSTRUCTIONS
Patient is calling to request a refill for Carvedilol 3.125mg , Please Advise.      Pharmacy Verified     [phone] Thank you for choosing the [facility] for your EMG testing.    The ordering provider will receive your final EMG results within the next few days.  Please follow up with your provider for the results and further treatment recommendations.

## 2023-06-01 ENCOUNTER — OFFICE VISIT (OUTPATIENT)
Dept: FAMILY MEDICINE | Facility: CLINIC | Age: 58
End: 2023-06-01
Payer: COMMERCIAL

## 2023-06-01 VITALS
TEMPERATURE: 98.7 F | WEIGHT: 207.4 LBS | OXYGEN SATURATION: 97 % | HEART RATE: 93 BPM | HEIGHT: 66 IN | BODY MASS INDEX: 33.33 KG/M2 | RESPIRATION RATE: 16 BRPM | SYSTOLIC BLOOD PRESSURE: 150 MMHG | DIASTOLIC BLOOD PRESSURE: 91 MMHG

## 2023-06-01 DIAGNOSIS — M25.473: ICD-10-CM

## 2023-06-01 DIAGNOSIS — Z90.89 S/P SUBTOTAL PARATHYROIDECTOMY: ICD-10-CM

## 2023-06-01 DIAGNOSIS — Z98.890 S/P SUBTOTAL PARATHYROIDECTOMY: ICD-10-CM

## 2023-06-01 DIAGNOSIS — M62.838 MUSCLE SPASM: ICD-10-CM

## 2023-06-01 DIAGNOSIS — E88.810 METABOLIC SYNDROME: ICD-10-CM

## 2023-06-01 PROCEDURE — 99213 OFFICE O/P EST LOW 20 MIN: CPT | Performed by: FAMILY MEDICINE

## 2023-06-01 RX ORDER — CALCIUM CARBONATE 750 MG/1
600 TABLET, CHEWABLE ORAL
COMMUNITY
Start: 2023-05-24 | End: 2023-07-13

## 2023-06-01 RX ORDER — PHENTERMINE HYDROCHLORIDE 37.5 MG/1
37.5 TABLET ORAL
Qty: 90 TABLET | Refills: 1 | Status: SHIPPED | OUTPATIENT
Start: 2023-06-01 | End: 2023-11-10

## 2023-06-01 RX ORDER — CHOLECALCIFEROL (VITAMIN D3) 125 MCG
CAPSULE ORAL
COMMUNITY
Start: 2022-03-01 | End: 2024-08-09

## 2023-06-01 RX ORDER — PYRIDOXINE HCL (VITAMIN B6) 50 MG
1 TABLET ORAL DAILY
COMMUNITY

## 2023-06-01 RX ORDER — BACLOFEN 10 MG/1
TABLET ORAL
Qty: 30 TABLET | Refills: 3 | Status: SHIPPED | OUTPATIENT
Start: 2023-06-01

## 2023-06-01 RX ORDER — POLYETHYLENE GLYCOL 3350 17 G/17G
17 POWDER, FOR SOLUTION ORAL DAILY PRN
COMMUNITY

## 2023-06-01 ASSESSMENT — PATIENT HEALTH QUESTIONNAIRE - PHQ9
SUM OF ALL RESPONSES TO PHQ QUESTIONS 1-9: 11
SUM OF ALL RESPONSES TO PHQ QUESTIONS 1-9: 11
10. IF YOU CHECKED OFF ANY PROBLEMS, HOW DIFFICULT HAVE THESE PROBLEMS MADE IT FOR YOU TO DO YOUR WORK, TAKE CARE OF THINGS AT HOME, OR GET ALONG WITH OTHER PEOPLE: VERY DIFFICULT

## 2023-06-01 NOTE — PROGRESS NOTES
"      Chapito Singh is a 58 year old, presenting for the following health issues:  Joint Pain (Joint pain and swelling.  Found to have ankle joint effusion, but ortho suggested she be seen by rheumatology.  ) and Bleeding/Bruising (Increased/excessive bruising.  New since having parathyroidectomy on 5-23-23. Not sure if it is her body adjusting?)    Took 3 1/2 parathyroid glands out.  Had a post op infection of the parathyroid surgery incision  Feels much better after course of antibiotics (sent home on Cleocin for a week).  Fatigue has really improved.  On Tums ES two tabs three times daily for 2 weeks.  Labs to be rechecked next week.   No fever or chills.         6/1/2023     5:06 PM   Additional Questions   Roomed by HERMAN Parekh CMA([facility])     History of Present Illness       Reason for visit:  Referral and med check  Symptom onset:  3-4 weeks ago  Symptoms include:  Ankle effusion  Symptom intensity:  Severe  Symptom progression:  Improving  Had these symptoms before:  No  What makes it worse:  Up/down movement  What makes it better:  Boot and rest    She eats 4 or more servings of fruits and vegetables daily.She consumes 0 sweetened beverage(s) daily.She exercises with enough effort to increase her heart rate 9 or less minutes per day.  She exercises with enough effort to increase her heart rate 3 or less days per week.   She is taking medications regularly.    Today's PHQ-9         PHQ-9 Total Score: 11    PHQ-9 Q9 Thoughts of better off dead/self-harm past 2 weeks :   Not at all    How difficult have these problems made it for you to do your work, take care of things at home, or get along with other people: Very difficult           Objective    BP (!) 150/91   Pulse 93   Temp 98.7  F (37.1  C) (Oral)   Resp 16   Ht 1.67 m (5' 5.75\")   Wt 94.1 kg (207 lb 6.4 oz)   SpO2 97%   BMI 33.73 kg/m    Body mass index is 33.73 kg/m .  Physical Exam   Well healed anterior neck incision with steri strips in " placed.  No red      Encounter Diagnoses   Name Primary?     S/P subtotal parathyroidectomy (H)      Muscle spasm, baclofen refill      Effusion of ankle joint, unspecified laterality, historically       PLAN:   Referral to rheumatology    Labs next week for Isabel

## 2023-06-02 ENCOUNTER — TELEPHONE (OUTPATIENT)
Dept: SURGERY | Facility: CLINIC | Age: 58
End: 2023-06-02
Payer: COMMERCIAL

## 2023-06-02 NOTE — TELEPHONE ENCOUNTER
Prior Authorization Retail Medication Request    Medication/Dose: Phentermine HCI 37.5 mg tablets    Key: [id number]    Pharmacy Information (if different than what is on RX)  Name:  [facility], [place]  Phone:  [phone]

## 2023-06-05 NOTE — TELEPHONE ENCOUNTER
PRIOR AUTHORIZATION DENIED    Medication: PHENTERMINE HCL 37.5 MG PO TABS  Insurance Company: Comment:  [employer]  Denial Date: 6/5/2023  Denial Rational: Plan Exclusion. Weight loss medications are not covered.  Appeal Information: N/A  Patient Notified: No    CMM instructed me to call [employer] to start PA. Called [employer] at [phone]. Representative stated that Phentermine and other weight loss medications are a strict plan exclusion and are not covered by insurance.           Thank you,     Nicholas Harrison [facility]  Pharmacy Clinic [facility]   [email or web address]   Phone: [phone]  Fax: [phone]

## 2023-06-21 PROBLEM — R29.818 SUSPECTED SLEEP APNEA: Status: RESOLVED | Noted: 2020-06-11 | Resolved: 2023-06-21

## 2023-06-27 NOTE — PROGRESS NOTES
Keiry Mchugh is a 58 year old female who presents today for Botox injections for chronic migraine.    Pre-procedure diagnosis: Migraine headaches  Post-procedure diagnosis: Same    PROCEDURE:  Botulinum toxin (Botox) injections.      The risks and benefits of the procedure were discussed with the patient which included muscle weakness (including facial droop, inability to swallow, and inability to hold one's head up), allergic reaction, pain, bruising, bleeding, swelling, and death.  She opted to proceed and gave written and verbal consent.    The Botox paradigm was followed. Each site was marked with indelible marking pen in the locations designated by the paradigm. The skin over the marks was then cleansed with alcohol. A 27 guage 5/8 inch needle was used for injection at all sites. Aspiration was negative at each site prior to injection of botox.     The bilateral  muscles were injected with a total of 10 units (5 units each side).   The Procerus muscle was injected with a total of 5 units.   The Frontalis muscle was injected with a total of 20 units, divided into 4 sites.  The Temporalis muscle was injected with 20 units divided into 4 sites and performed bilaterally (total of 8 sites for total of 40 units).  The occipitalis muscle was injected with 30 units divided into 6 sites.  The cervical paraspinal muscles were injected with 20 units divided into 4 sites.  The trapezius muscle was injected with 30 units divided into 6 sites.     TOTAL UNITS: 155  Wasted units: 45 units.    The patient tolerated the injection well and afterwards did not have any dizziness/lightheadedness or nausea. The patient was observed for a short period of time and then was discharged.  The patient was encouraged to call with any questions/concerns prior to the follow-up appointment.  The patient will follow-up in 4 weeks.

## 2023-06-30 ENCOUNTER — OFFICE VISIT (OUTPATIENT)
Dept: PHYSICAL MEDICINE AND REHAB | Facility: CLINIC | Age: 58
End: 2023-06-30
Payer: COMMERCIAL

## 2023-06-30 VITALS
DIASTOLIC BLOOD PRESSURE: 75 MMHG | WEIGHT: 207 LBS | HEART RATE: 95 BPM | HEIGHT: 66 IN | TEMPERATURE: 98.7 F | SYSTOLIC BLOOD PRESSURE: 139 MMHG | BODY MASS INDEX: 33.27 KG/M2

## 2023-06-30 DIAGNOSIS — G43.719 INTRACTABLE CHRONIC MIGRAINE WITHOUT AURA AND WITHOUT STATUS MIGRAINOSUS: Primary | ICD-10-CM

## 2023-06-30 PROCEDURE — 64615 CHEMODENERV MUSC MIGRAINE: CPT | Performed by: PHYSICIAN ASSISTANT

## 2023-06-30 ASSESSMENT — PAIN SCALES - GENERAL: PAINLEVEL: MILD PAIN (3)

## 2023-06-30 NOTE — LETTER
6/30/2023         RE: Keiry Mchugh  [address]  [place] [address]        Dear Colleague,    Thank you for referring your patient, Keiry Mchugh, to the [facility]. Please see a copy of my visit note below.    Keiry Mchugh is a 58 year old female who presents today for Botox injections for chronic migraine.    Pre-procedure diagnosis: Migraine headaches  Post-procedure diagnosis: Same    PROCEDURE:  Botulinum toxin (Botox) injections.      The risks and benefits of the procedure were discussed with the patient which included muscle weakness (including facial droop, inability to swallow, and inability to hold one's head up), allergic reaction, pain, bruising, bleeding, swelling, and death.  She opted to proceed and gave written and verbal consent.    The Botox paradigm was followed. Each site was marked with indelible marking pen in the locations designated by the paradigm. The skin over the marks was then cleansed with alcohol. A 27 guage 5/8 inch needle was used for injection at all sites. Aspiration was negative at each site prior to injection of botox.     The bilateral  muscles were injected with a total of 10 units (5 units each side).   The Procerus muscle was injected with a total of 5 units.   The Frontalis muscle was injected with a total of 20 units, divided into 4 sites.  The Temporalis muscle was injected with 20 units divided into 4 sites and performed bilaterally (total of 8 sites for total of 40 units).  The occipitalis muscle was injected with 30 units divided into 6 sites.  The cervical paraspinal muscles were injected with 20 units divided into 4 sites.  The trapezius muscle was injected with 30 units divided into 6 sites.     TOTAL UNITS: 155  Wasted units: 45 units.    The patient tolerated the injection well and afterwards did not have any dizziness/lightheadedness or nausea. The patient was observed for a short period of time and then  was discharged.  The patient was encouraged to call with any questions/concerns prior to the follow-up appointment.  The patient will follow-up in 4 weeks.            Again, thank you for allowing me to participate in the care of your patient.        Sincerely,        Damaris Leslei PA-C

## 2023-07-13 ENCOUNTER — MYC MEDICAL ADVICE (OUTPATIENT)
Dept: FAMILY MEDICINE | Facility: CLINIC | Age: 58
End: 2023-07-13
Payer: COMMERCIAL

## 2023-07-13 DIAGNOSIS — F32.9 MAJOR DEPRESSIVE DISORDER WITH SINGLE EPISODE, REMISSION STATUS UNSPECIFIED: ICD-10-CM

## 2023-07-13 DIAGNOSIS — G43.119 INTRACTABLE MIGRAINE WITH AURA WITHOUT STATUS MIGRAINOSUS: ICD-10-CM

## 2023-07-13 RX ORDER — SUMATRIPTAN 100 MG/1
TABLET, FILM COATED ORAL
Qty: 9 TABLET | Refills: 3 | Status: SHIPPED | OUTPATIENT
Start: 2023-07-13 | End: 2024-03-04

## 2023-07-13 NOTE — TELEPHONE ENCOUNTER
"Routing refill request to provider for review/approval because:  PHQ-9 score of 11 on 6/1/23    Last Written Prescription Date: 3/27/23  Last Fill Quantity: 90, # refills: 0  Last office visit provider: 6/1/23    Requested Prescriptions   Pending Prescriptions Disp Refills     FLUoxetine (PROZAC) 20 MG capsule 90 capsule 0     Sig: Take 1 capsule (20 mg) by mouth daily       SSRIs Protocol Failed - 7/13/2023 12:53 PM        Failed - PHQ-9 score less than 5 in past 6 months     Please review last PHQ-9 score.           Passed - Medication is active on med list        Passed - Patient is age 18 or older        Passed - No active pregnancy on record        Passed - No positive pregnancy test in last 12 months        Passed - Recent (6 mo) or future (30 days) visit within the authorizing provider's specialty     Patient had office visit in the last 6 months or has a visit in the next 30 days with authorizing provider or within the authorizing provider's specialty.  See \"Patient Info\" tab in inbasket, or \"Choose Columns\" in Meds & Orders section of the refill encounter.                 Ulises Rae RN 07/13/23 12:53 PM    "

## 2023-07-14 NOTE — TELEPHONE ENCOUNTER
"Routing refill request to provider for review/approval because:  No patient sig on request to verify.  Serotonin agonist needs review.    Last Written Prescription Date:  11/1/222  Last Fill Quantity: 9,  # refills: 3   Last office visit provider:  6/1/2023     Requested Prescriptions   Pending Prescriptions Disp Refills     SUMAtriptan (IMITREX) 100 MG tablet 9 tablet 3       Serotonin Agonists Failed - 7/13/2023  1:54 PM        Failed - Serotonin Agonist request needs review.     Please review patient's record. If patient has had 8 or more treatments in the past month, please forward to provider.          Passed - Blood pressure under 140/90 in past 12 months     BP Readings from Last 3 Encounters:   06/30/23 139/75   06/01/23 (!) 150/91   05/27/23 (!) 147/84                 Passed - Recent (12 mo) or future (30 days) visit within the authorizing provider's specialty     Patient has had an office visit with the authorizing provider or a provider within the authorizing providers department within the previous 12 mos or has a future within next 30 days. See \"Patient Info\" tab in inbasket, or \"Choose Columns\" in Meds & Orders section of the refill encounter.              Passed - Medication is active on med list        Passed - Patient is age 18 or older        Passed - No active pregnancy on record        Passed - No positive pregnancy test in past 12 months             Jeanette Tyler RN 07/13/23 7:55 PM  "

## 2023-07-21 ENCOUNTER — VIRTUAL VISIT (OUTPATIENT)
Dept: SURGERY | Facility: CLINIC | Age: 58
End: 2023-07-21
Payer: COMMERCIAL

## 2023-07-21 VITALS — WEIGHT: 197 LBS | HEIGHT: 66 IN | BODY MASS INDEX: 31.66 KG/M2

## 2023-07-21 DIAGNOSIS — M85.80 LOW BONE MASS: Primary | ICD-10-CM

## 2023-07-21 DIAGNOSIS — R53.83 PROFOUND FATIGUE: ICD-10-CM

## 2023-07-21 PROCEDURE — 99214 OFFICE O/P EST MOD 30 MIN: CPT | Mod: 95 | Performed by: FAMILY MEDICINE

## 2023-07-21 NOTE — LETTER
"    7/21/2023         RE: Keiry Mchugh  [address] [facility] [address]        Dear Colleague,    Thank you for referring your patient, Keiry Mchugh, to the [facility] SUR[facility] AND [facility] [place]. Please see a copy of my visit note below.      The patient has been notified of following:     \"This telephone visit will be conducted via a call between you and your physician/provider. We have found that certain health care needs can be provided without the need for a physical exam.  This service lets us provide the care you need with a short phone conversation.  If a prescription is necessary we can send it directly to your pharmacy.  If lab work is needed we can place an order for that and you can then stop by our lab to have the test done at a later time.    Telephone visits are billed at different rates depending on your insurance coverage. During this emergency period, for some insurers they may be billed the same as an in-person visit.  Please reach out to your insurance provider with any questions.    If during the course of the call the physician/provider feels a telephone visit is not appropriate, you will not be charged for this service.\"    Patient has given verbal consent to a Telephone visit? Yes    What phone number would you like to be contacted at? [phone]  Bariatric Follow-up    58 year old  female BMI:Body mass index is 32.04 kg/m .    Weight:   Wt Readings from Last 1 Encounters:   07/21/23 89.4 kg (197 lb)    pounds    Comorbidities:  Patient Active Problem List   Diagnosis     Chronic insomnia     HARDIN (nonalcoholic steatohepatitis)     Sigmoid diverticulitis     Dyslipidemia     Metabolic syndrome     Celiac disease     Low bone mass     Profound fatigue         Interim: Had parathyroidectomy and now sleeping better, less muscle cramps. Maintaining a 23# weight loss (>10%). Profound fatigue continues.Feels there is no way she can exercise given her 10 hour " work days and fatigue. Did purchase bands which she might be able to do at work between patients. Nodding off at work. Sleep study at the beginning of COVID was not revealing . DEXA recently showed low bone mass. Losing inches. Scheduled with Rheumatology in Feb. Changed from Prozac to Wellbutrin. Wellbutrin provoked not great thoughts. Stopped all together then had depression. Restarted Fluoxetine and is doing better.     Plan:  DIET  Be sure to get fruit daily, hydration. Do not add salt-discussed strategies   EXERCISE consider counting steps. Strength training would be helpful for bone density and metabolism.    PHARMACOTHERAPY continue phentermine    Encouraged to follow through with Rheum. And sleep expert.      -We reviewed her medications and whether associated with weight gain.    Current Outpatient Medications:      aspirin-acetaminophen-caffeine (EXCEDRIN MIGRAINE) 250-250-65 MG tablet, Take 1 tablet by mouth, Disp: , Rfl:      atenolol (TENORMIN) 50 MG tablet, Take 1 tablet (50 mg) by mouth daily as needed (Migraines), Disp: 30 tablet, Rfl: 1     B Complex-C (RA B-COMPLEX/VITAMIN C CR) TBCR, Take 1 tablet by mouth daily, Disp: , Rfl:      baclofen (LIORESAL) 10 MG tablet, TAKE 1 TABLET BY MOUTH TWICE DAILY TO THREE TIMES DAILY AS NEEDED FOR MUSCLE SPASMS., Disp: 30 tablet, Rfl: 3     Coenzyme Q10 (COQ-10) 400 MG CAPS, Take 1 capsule by mouth daily , Disp: , Rfl:      FLUoxetine (PROZAC) 20 MG capsule, Take 1 capsule (20 mg) by mouth daily, Disp: 90 capsule, Rfl: 1     Lactobacillus (PROBIOTIC ACIDOPHILUS) CAPS, , Disp: , Rfl:      magnesium oxide (MAG-OX) 400 (240 Mg) MG tablet, Take 400 mg by mouth daily , Disp: , Rfl:      phentermine (ADIPEX-P) 37.5 MG tablet, Take 1 tablet (37.5 mg) by mouth every morning (before breakfast), Disp: 90 tablet, Rfl: 1     polyethylene glycol (MIRALAX) 17 GM/Dose powder, Take 17 g by mouth daily as needed, Disp: , Rfl:      SUMAtriptan (IMITREX) 100 MG tablet, TAKE  1/2 TO 1 TABLET BY MOUTH AT ONSET OF HEADACHE, Disp: 9 tablet, Rfl: 3     vitamin B complex with vitamin C (STRESS TAB) tablet, Take 1 tablet by mouth daily, Disp: , Rfl:       We discussed [facility] Bariatric Basics including:  -eating 3 meals daily  -eating protein first  -eating slowly, chewing food well  -avoiding/limiting calorie containing beverages  -choosing wheat, not white with breads, crackers, pastas, venu, bagels, tortillas, rice  -limiting restaurant or cafeteria eating to twice a week or less  -We discussed the importance of restorative sleep and stress management in maintaining a healthy weight.  -We discussed insulin resistance and glycemic index as it relates to appetite and weight control  -We discussed the National Weight Control Registry healthy weight maintenance strategies and ways to optimize metabolism.  -We discussed the importance of physical activity including cardiovascular and strength training in maintaining a healthier weight and explored viable options.    Most recent labs:  Lab Results   Component Value Date    WBC 7.7 05/26/2023    HGB 14.7 05/26/2023    HCT 42.9 05/26/2023    MCV 89 05/26/2023     05/26/2023     Lab Results   Component Value Date    CHOL 260 (H) 01/16/2023     Lab Results   Component Value Date    HDL 88 01/16/2023       Lab Results   Component Value Date    TRIG 84 01/16/2023     Lab Results   Component Value Date    ALT 35 01/16/2023    AST 26 01/16/2023    ALKPHOS 72 01/16/2023       Lab Results   Component Value Date    TSH 2.15 01/16/2023         DIETARY HISTORY  Meals Per Day: 3  Eating Protein First?: yes  Food Diary: B:2 HB eggs and turkey sausage link, protein in coffee L:salad with protein and fruit or greek yogurt with fruit D:pepperjack cheese, fruit  Snacks Per Day: yes  Typical Snack: berries, beef stick, yogurt  Fluid Intake: water  Portion Control: improved  Calorie Containing Beverages: no  Alcohol per week: no  Typical Protein Food  "Choices: see above  Choosing Whole Grains: Celiac  Grocery Shopping is done by: herself  Food Preparation is done by: herself  Meals at Restaurant/Cafeteria/Take Out Per Week: rare  Eating at the Table:   TV is Off During Meals:     Positive Changes Since Last Visit:   Struggling With: exercise. Fatigue    Knowledgeable in Reading Food Labels: yes  Getting Adequate Protein: yes  Sleeping 7-8 hours/day now better after parathyroidectomy. Falling asleep right away, some muscle cramps. Sleeps until alarm goes off.  Stress management high at work. Planning fun activities in the summer. Enjoys her 2 grandsons ages 12 and 3 and 9 ( at 5 weeks trisomy 18)    PHYSICAL ACTIVITY PATTERNS:  Cardiovascular: ADLs  Strength Training: ADLs has bands    REVIEW OF SYSTEMS  GENERAL/CONSTITUTIONAL:  As above  PHYSICAL EXAM: (most recent vitals and today's stated weight)  Vitals: Ht 1.67 m (5' 5.75\")   Wt 89.4 kg (197 lb)   BMI 32.04 kg/m        GEN: Pleasant and in no acute distress  PSYCH: A&OX3,     I have reviewed the note as documented above.  This accurately captures the substance of my conversation with the patient.  Thank you for the opportunity to participate in the care of your patient.    Radha Gómez MD, FAAFP  [facility]  Diplomate, American Board of Obesity Medicine    Total time spent on the date of this encounter doing: chart review, review of test results, patient visit, physical exam, education, counseling, developing plan of care, and documenting = 30 minutes.        Patient would like to receive their AVS by [facility]    Are there any specific questions or needs that you would like addressed at your visit today?     Weight management. At the end of May pt had surgery- parathyroidectomy. A lot of her fatigue has improved after her surgery but shes only 8-9 weeks out.    Teddy Garcia  [facility]  Surgery Clinic - [facility] [place]  Weight Management " [facility] - [address] [address]  Office: [phone]  Fax: [phone]              Distant Location (provider location):  On-site      Phone call duration: 30 minutes          Again, thank you for allowing me to participate in the care of your patient.        Sincerely,        Radha Gómez MD

## 2023-07-21 NOTE — PROGRESS NOTES
"  The patient has been notified of following:     \"This telephone visit will be conducted via a call between you and your physician/provider. We have found that certain health care needs can be provided without the need for a physical exam.  This service lets us provide the care you need with a short phone conversation.  If a prescription is necessary we can send it directly to your pharmacy.  If lab work is needed we can place an order for that and you can then stop by our lab to have the test done at a later time.    Telephone visits are billed at different rates depending on your insurance coverage. During this emergency period, for some insurers they may be billed the same as an in-person visit.  Please reach out to your insurance provider with any questions.    If during the course of the call the physician/provider feels a telephone visit is not appropriate, you will not be charged for this service.\"    Patient has given verbal consent to a Telephone visit? Yes    What phone number would you like to be contacted at? [phone]  Bariatric Follow-up    58 year old  female BMI:Body mass index is 32.04 kg/m .    Weight:   Wt Readings from Last 1 Encounters:   07/21/23 89.4 kg (197 lb)    pounds    Comorbidities:  Patient Active Problem List   Diagnosis     Chronic insomnia     HARDIN (nonalcoholic steatohepatitis)     Sigmoid diverticulitis     Dyslipidemia     Metabolic syndrome     Celiac disease     Low bone mass     Profound fatigue         Interim: Had parathyroidectomy and now sleeping better, less muscle cramps. Maintaining a 23# weight loss (>10%). Profound fatigue continues.Feels there is no way she can exercise given her 10 hour work days and fatigue. Did purchase bands which she might be able to do at work between patients. Nodding off at work. Sleep study at the beginning of COVID was not revealing . DEXA recently showed low bone mass. Losing inches. Scheduled with Rheumatology in Feb. Changed from " Prozac to Wellbutrin. Wellbutrin provoked not great thoughts. Stopped all together then had depression. Restarted Fluoxetine and is doing better.     Plan:  DIET  Be sure to get fruit daily, hydration. Do not add salt-discussed strategies   EXERCISE consider counting steps. Strength training would be helpful for bone density and metabolism.    PHARMACOTHERAPY continue phentermine    Encouraged to follow through with Rheum. And sleep expert.      -We reviewed her medications and whether associated with weight gain.    Current Outpatient Medications:      aspirin-acetaminophen-caffeine (EXCEDRIN MIGRAINE) 250-250-65 MG tablet, Take 1 tablet by mouth, Disp: , Rfl:      atenolol (TENORMIN) 50 MG tablet, Take 1 tablet (50 mg) by mouth daily as needed (Migraines), Disp: 30 tablet, Rfl: 1     B Complex-C (RA B-COMPLEX/VITAMIN C CR) TBCR, Take 1 tablet by mouth daily, Disp: , Rfl:      baclofen (LIORESAL) 10 MG tablet, TAKE 1 TABLET BY MOUTH TWICE DAILY TO THREE TIMES DAILY AS NEEDED FOR MUSCLE SPASMS., Disp: 30 tablet, Rfl: 3     Coenzyme Q10 (COQ-10) 400 MG CAPS, Take 1 capsule by mouth daily , Disp: , Rfl:      FLUoxetine (PROZAC) 20 MG capsule, Take 1 capsule (20 mg) by mouth daily, Disp: 90 capsule, Rfl: 1     Lactobacillus (PROBIOTIC ACIDOPHILUS) CAPS, , Disp: , Rfl:      magnesium oxide (MAG-OX) 400 (240 Mg) MG tablet, Take 400 mg by mouth daily , Disp: , Rfl:      phentermine (ADIPEX-P) 37.5 MG tablet, Take 1 tablet (37.5 mg) by mouth every morning (before breakfast), Disp: 90 tablet, Rfl: 1     polyethylene glycol (MIRALAX) 17 GM/Dose powder, Take 17 g by mouth daily as needed, Disp: , Rfl:      SUMAtriptan (IMITREX) 100 MG tablet, TAKE 1/2 TO 1 TABLET BY MOUTH AT ONSET OF HEADACHE, Disp: 9 tablet, Rfl: 3     vitamin B complex with vitamin C (STRESS TAB) tablet, Take 1 tablet by mouth daily, Disp: , Rfl:       We discussed [facility] Bariatric Basics including:  -eating 3 meals daily  -eating protein  first  -eating slowly, chewing food well  -avoiding/limiting calorie containing beverages  -choosing wheat, not white with breads, crackers, pastas, venu, bagels, tortillas, rice  -limiting restaurant or cafeteria eating to twice a week or less  -We discussed the importance of restorative sleep and stress management in maintaining a healthy weight.  -We discussed insulin resistance and glycemic index as it relates to appetite and weight control  -We discussed the National Weight Control Registry healthy weight maintenance strategies and ways to optimize metabolism.  -We discussed the importance of physical activity including cardiovascular and strength training in maintaining a healthier weight and explored viable options.    Most recent labs:  Lab Results   Component Value Date    WBC 7.7 05/26/2023    HGB 14.7 05/26/2023    HCT 42.9 05/26/2023    MCV 89 05/26/2023     05/26/2023     Lab Results   Component Value Date    CHOL 260 (H) 01/16/2023     Lab Results   Component Value Date    HDL 88 01/16/2023       Lab Results   Component Value Date    TRIG 84 01/16/2023     Lab Results   Component Value Date    ALT 35 01/16/2023    AST 26 01/16/2023    ALKPHOS 72 01/16/2023       Lab Results   Component Value Date    TSH 2.15 01/16/2023         DIETARY HISTORY  Meals Per Day: 3  Eating Protein First?: yes  Food Diary: B:2 HB eggs and turkey sausage link, protein in coffee L:salad with protein and fruit or greek yogurt with fruit D:pepperjack cheese, fruit  Snacks Per Day: yes  Typical Snack: berries, beef stick, yogurt  Fluid Intake: water  Portion Control: improved  Calorie Containing Beverages: no  Alcohol per week: no  Typical Protein Food Choices: see above  Choosing Whole Grains: Celiac  Grocery Shopping is done by: herself  Food Preparation is done by: herself  Meals at Restaurant/Cafeteria/Take Out Per Week: rare  Eating at the Table:   TV is Off During Meals:     Positive Changes Since Last Visit:  "  Struggling With: exercise. Fatigue    Knowledgeable in Reading Food Labels: yes  Getting Adequate Protein: yes  Sleeping 7-8 hours/day now better after parathyroidectomy. Falling asleep right away, some muscle cramps. Sleeps until alarm goes off.  Stress management high at work. Planning fun activities in the summer. Enjoys her 2 grandsons ages 12 and 3 and 9 ( at 5 weeks trisomy 18)    PHYSICAL ACTIVITY PATTERNS:  Cardiovascular: ADLs  Strength Training: ADLs has bands    REVIEW OF SYSTEMS  GENERAL/CONSTITUTIONAL:  As above  PHYSICAL EXAM: (most recent vitals and today's stated weight)  Vitals: Ht 1.67 m (5' 5.75\")   Wt 89.4 kg (197 lb)   BMI 32.04 kg/m        GEN: Pleasant and in no acute distress  PSYCH: A&OX3,     I have reviewed the note as documented above.  This accurately captures the substance of my conversation with the patient.  Thank you for the opportunity to participate in the care of your patient.    Radha Gómez MD, FAAFP  [facility]  Diplomate, American Board of Obesity Medicine    Total time spent on the date of this encounter doing: chart review, review of test results, patient visit, physical exam, education, counseling, developing plan of care, and documenting = 30 minutes.        Patient would like to receive their AVS by [facility]    Are there any specific questions or needs that you would like addressed at your visit today?     Weight management. At the end of May pt had surgery- parathyroidectomy. A lot of her fatigue has improved after her surgery but shes only 8-9 weeks out.    Teddy Garcia  [facility]  Surgery Clinic [facility]  Weight Management Clinic [facility]  [address] [place] [id number]  Office: [phone]  Fax: [phone]              Distant Location (provider location):  On-site      Phone call duration: 30 minutes      "

## 2023-07-24 NOTE — PROGRESS NOTES
Keiry Mchugh is a 58 year old female who is being evaluated via a billable video visit.      How would you like to obtain your AVS? MyChart  Will anyone else be joining your video visit? No      Video Start Time:  8:09 AM  Assessment:   Keiry Mchugh is a 58 year old y.o. female with past medical history significant for  Jarquin, celiac disease, dyslipidemia, metabolic syndrome, hypertension, chronic insomnia  who presents today for follow-up regarding chronic migraine.  Patient has had chronic migraine for many years.    Patient is following up after Botox injections for chronic migraine June 30, 2023.  Patient reports 100% relief of her migraine.       Plan:     A shared decision making plan was used.  The patient's values and choices were respected.  The following represents what was discussed and decided upon by the physician assistant and the patient.      1.  DIAGNOSTIC TESTS:  - I reviewed the MRI cervical spine from September 2018.  - I reviewed the EMG right upper extremity.    2.  PHYSICAL THERAPY: Patient went to physical therapy for her neck in 2018.  No additional physical therapy was ordered.    3.  MEDICATIONS:  No changes are made to the patient's medications.  - Patient uses Excedrin and sumatriptan for migraine management.  She has not needed to take any pain relieving medications in the past 1 week since migraines have resolved.    4.  INTERVENTIONS: I offered repeat Botox injections on or after September 30, 2023 for chronic migraine.  Patient indicated she would like to proceed and an order was placed.    5.  PATIENT EDUCATION: Patient is in agreement the above plan.  All questions were answered.    6.  FOLLOW-UP: Patient will return to the clinic for repeat Botox injections for chronic migraine on or after September 30, 2023.  If she has questions or concerns in the meantime, she should not hesitate to call.    Subjective:     Keiry Mchugh is a 58 year old female who presents today for  follow-up regarding chronic migraine.  Patient is following up after Botox injections for chronic migraine June 30, 2023.  Patient reports that it took about 2 weeks for the Botox to kick in but now she has had 100% relief of her migraine.  She states that she has not had any headache at all within the past 1 week.  She says she cannot remember the last time she went a full week without a headache.  She has not needed to use any headache/migraine relief medication for the past 1 week.  Patient had some bruising and injection site soreness for 1 to 2 days after the procedure but otherwise has not had any side effect/adverse reactions.  Specifically, she has not had any drooping of her eyelids which has happened to her previously and she has not had any weakness of her jaw which has happened previously.  She rates her pain today as a 0 out of 10.  Denies any new symptoms.        Patient has tried the following treatments for migraine:  -Physical therapy for in 2018  - Botox injections for chronic migraine June 30, 2023 with 100% relief of migraine  -Botox injections for chronic migraine September 19, 2016  -Botox injections for chronic migraine June 8, 2016  - Botox injections for chronic migraine March 7, 2016  - Botox injections for chronic migraine November 16, 2015  - Botox injections for chronic migraine August 18, 2015  - Botox injections for chronic migraine April 13, 2015  - Botox injections for chronic migraine in January 12, 2015  -1 additional session for Botox injections through another provider in 2016 or 2017 which was not helpful and she had bilateral lid ptosis  - Chiropractic treatment was not helpful and she could not always get adjustments because of the arthritis in her neck.  She stopped going because it is where the chiropractor made her arthritis worse  - Massage was not helpful and caused more pain  - Acupuncture once, cannot recall response  - Gabapentin caused eye twitching  - Baclofen causes  migraines  - Opiates cause migraines  - Unable to take Tylenol due to HARDIN  - Unable to take Advil due to HARDIN  - Sumatriptan as needed very helpful  - Excedrin as needed somewhat helpful  - Atenolol was not helpful    Review of Systems:  Negative for numbness/tingling, weakness, loss of bowel/bladder control, inability to urinate, headache, pain much worse in neck, trip/stumble/falls, difficulty swallowing, difficulty with hand skills, fevers, unintentional weight loss.     Objective:   CONSTITUTIONAL:  Vital signs as above.  No acute distress.  The patient is well nourished and well groomed.    PSYCHIATRIC:  The patient is awake, alert, oriented to person, place and time.  The patient is answering questions appropriately with clear speech.  Normal affect.  HEENT: Normocephalic, atraumatic.  Sclera clear.    SKIN: Exposed skin is clean, dry, intact without rashes.  MUSCULOSKELETAL: Patient is observed ambulating without difficulty.      RESULTS:    EMG right upper extremity May 31, 2023:  Interpretation: Normal study     1. There is no electrodiagnostic evidence of cervical radiculopathy, brachial plexopathy, or focal neuropathy in the right upper extremity.  Specifically, there is no electrodiagnostic evidence of median neuropathy at the wrist in the right upper extremity or C6 radiculopathy in the right upper extremity.     I reviewed the MRI cervical spine from [facility] dated September 14, 2018 .At C6-7 there is a diffuse annular bulge with moderate right and mild left foraminal stenosis.  At C2-3 there is mild left foraminal stenosis.  At C3-4 there is moderate right foraminal stenosis.  At C4-5 there is mild right foraminal stenosis.  Patient has facet arthropathy which is prominent on the left at C2-3.  There is also facet arthropathy at C3-4, C4-5, and C5-6.  Please report for further details.     [facility]  HEAD MRI WITHOUT IV CONTRAST  5/8/2017 10:13 AM     INDICATION: Migraine without aura,  not intractable, without status migrainosus  TECHNIQUE: Head MRI without intravenous contrast.  COMPARISON:  None.     FINDINGS: No acute infarct/restricted diffusion. No mass lesion, hemorrhage, or extra-axial fluid collections. The ventricles and sulci are normal in size, shape, and position. The cerebellum is unremarkable. No signal abnormality in the brain   parenchyma. The pituitary gland is unremarkable. The major intracranial vascular flow voids are maintained. The orbits are unremarkable. The calvarium and skull base are unremarkable.  The paranasal sinuses are clear. The mastoid air cells are clear.     IMPRESSION:  CONCLUSION:  1.  Normal Head MRI.   2.  No acute infarcts, mass lesions or hemorrhage.  Video-Visit Details    Type of service:  Video Visit    Video End Time (time video stopped):  8:12 AM  Originating Location (pt. Location): [facility]  Provider location: [facility] [address] [place] [address] [address] [place] [address]    Distant Location (provider location):  [facility] [place]     Platform used for Video Visit: Graciela

## 2023-07-28 ENCOUNTER — VIRTUAL VISIT (OUTPATIENT)
Dept: PHYSICAL MEDICINE AND REHAB | Facility: CLINIC | Age: 58
End: 2023-07-28
Payer: COMMERCIAL

## 2023-07-28 DIAGNOSIS — G43.719 INTRACTABLE CHRONIC MIGRAINE WITHOUT AURA AND WITHOUT STATUS MIGRAINOSUS: Primary | ICD-10-CM

## 2023-07-28 PROCEDURE — 99213 OFFICE O/P EST LOW 20 MIN: CPT | Mod: VID | Performed by: PHYSICIAN ASSISTANT

## 2023-07-28 NOTE — LETTER
7/28/2023         RE: Keiry Mchugh  [address]  [facility] [address]        Dear Colleague,    Thank you for referring your patient, Keiry Mchugh, to the [facility]. Please see a copy of my visit note below.    Keiry Mchugh is a 58 year old female who is being evaluated via a billable video visit.      How would you like to obtain your AVS? MyChart  Will anyone else be joining your video visit? No      Video Start Time:  8:09 AM  Assessment:   Keiry Mchugh is a 58 year old y.o. female with past medical history significant for  Jarquin, celiac disease, dyslipidemia, metabolic syndrome, hypertension, chronic insomnia  who presents today for follow-up regarding chronic migraine.  Patient has had chronic migraine for many years.    Patient is following up after Botox injections for chronic migraine June 30, 2023.  Patient reports 100% relief of her migraine.       Plan:     A shared decision making plan was used.  The patient's values and choices were respected.  The following represents what was discussed and decided upon by the physician assistant and the patient.      1.  DIAGNOSTIC TESTS:  - I reviewed the MRI cervical spine from September 2018.  - I reviewed the EMG right upper extremity.    2.  PHYSICAL THERAPY: Patient went to physical therapy for her neck in 2018.  No additional physical therapy was ordered.    3.  MEDICATIONS:  No changes are made to the patient's medications.  - Patient uses Excedrin and sumatriptan for migraine management.  She has not needed to take any pain relieving medications in the past 1 week since migraines have resolved.    4.  INTERVENTIONS: I offered repeat Botox injections on or after September 30, 2023 for chronic migraine.  Patient indicated she would like to proceed and an order was placed.    5.  PATIENT EDUCATION: Patient is in agreement the above plan.  All questions were answered.    6.  FOLLOW-UP: Patient will return to the  clinic for repeat Botox injections for chronic migraine on or after September 30, 2023.  If she has questions or concerns in the meantime, she should not hesitate to call.    Subjective:     Keiry Mchugh is a 58 year old female who presents today for follow-up regarding chronic migraine.  Patient is following up after Botox injections for chronic migraine June 30, 2023.  Patient reports that it took about 2 weeks for the Botox to kick in but now she has had 100% relief of her migraine.  She states that she has not had any headache at all within the past 1 week.  She says she cannot remember the last time she went a full week without a headache.  She has not needed to use any headache/migraine relief medication for the past 1 week.  Patient had some bruising and injection site soreness for 1 to 2 days after the procedure but otherwise has not had any side effect/adverse reactions.  Specifically, she has not had any drooping of her eyelids which has happened to her previously and she has not had any weakness of her jaw which has happened previously.  She rates her pain today as a 0 out of 10.  Denies any new symptoms.        Patient has tried the following treatments for migraine:  -Physical therapy for in 2018  - Botox injections for chronic migraine June 30, 2023 with 100% relief of migraine  -Botox injections for chronic migraine September 19, 2016  -Botox injections for chronic migraine June 8, 2016  - Botox injections for chronic migraine March 7, 2016  - Botox injections for chronic migraine November 16, 2015  - Botox injections for chronic migraine August 18, 2015  - Botox injections for chronic migraine April 13, 2015  - Botox injections for chronic migraine in January 12, 2015  -1 additional session for Botox injections through another provider in 2016 or 2017 which was not helpful and she had bilateral lid ptosis  - Chiropractic treatment was not helpful and she could not always get adjustments because of  the arthritis in her neck.  She stopped going because it is where the chiropractor made her arthritis worse  - Massage was not helpful and caused more pain  - Acupuncture once, cannot recall response  - Gabapentin caused eye twitching  - Baclofen causes migraines  - Opiates cause migraines  - Unable to take Tylenol due to HARDIN  - Unable to take Advil due to HARDIN  - Sumatriptan as needed very helpful  - Excedrin as needed somewhat helpful  - Atenolol was not helpful    Review of Systems:  Negative for numbness/tingling, weakness, loss of bowel/bladder control, inability to urinate, headache, pain much worse in neck, trip/stumble/falls, difficulty swallowing, difficulty with hand skills, fevers, unintentional weight loss.     Objective:   CONSTITUTIONAL:  Vital signs as above.  No acute distress.  The patient is well nourished and well groomed.    PSYCHIATRIC:  The patient is awake, alert, oriented to person, place and time.  The patient is answering questions appropriately with clear speech.  Normal affect.  HEENT: Normocephalic, atraumatic.  Sclera clear.    SKIN: Exposed skin is clean, dry, intact without rashes.  MUSCULOSKELETAL: Patient is observed ambulating without difficulty.      RESULTS:    EMG right upper extremity May 31, 2023:  Interpretation: Normal study     1. There is no electrodiagnostic evidence of cervical radiculopathy, brachial plexopathy, or focal neuropathy in the right upper extremity.  Specifically, there is no electrodiagnostic evidence of median neuropathy at the wrist in the right upper extremity or C6 radiculopathy in the right upper extremity.     I reviewed the MRI cervical spine from [facility] dated September 14, 2018 .At C6-7 there is a diffuse annular bulge with moderate right and mild left foraminal stenosis.  At C2-3 there is mild left foraminal stenosis.  At C3-4 there is moderate right foraminal stenosis.  At C4-5 there is mild right foraminal stenosis.  Patient has facet  arthropathy which is prominent on the left at C2-3.  There is also facet arthropathy at C3-4, C4-5, and C5-6.  Please report for further details.     [facility]  HEAD MRI WITHOUT IV CONTRAST  5/8/2017 10:13 AM     INDICATION: Migraine without aura, not intractable, without status migrainosus  TECHNIQUE: Head MRI without intravenous contrast.  COMPARISON:  None.     FINDINGS: No acute infarct/restricted diffusion. No mass lesion, hemorrhage, or extra-axial fluid collections. The ventricles and sulci are normal in size, shape, and position. The cerebellum is unremarkable. No signal abnormality in the brain   parenchyma. The pituitary gland is unremarkable. The major intracranial vascular flow voids are maintained. The orbits are unremarkable. The calvarium and skull base are unremarkable.  The paranasal sinuses are clear. The mastoid air cells are clear.     IMPRESSION:  CONCLUSION:  1.  Normal Head MRI.   2.  No acute infarcts, mass lesions or hemorrhage.  Video-Visit Details    Type of service:  Video Visit    Video End Time (time video stopped):  8:12 AM  Originating Location (pt. Location): [facility]  Provider location: [facility] Neurosurgery [address] [place] [address]    Distant Location (provider location):  [facility] [place]     Platform used for Video Visit: Graciela      Again, thank you for allowing me to participate in the care of your patient.        Sincerely,        Damaris Leslie PA-C

## 2023-07-28 NOTE — PATIENT INSTRUCTIONS
I am so happy that you are feeling better!  You can have injections every 3 months.    My  will call you to schedule your next round of botox injections.

## 2023-09-01 ENCOUNTER — TRANSFERRED RECORDS (OUTPATIENT)
Dept: HEALTH INFORMATION MANAGEMENT | Facility: CLINIC | Age: 58
End: 2023-09-01
Payer: COMMERCIAL

## 2023-09-15 ENCOUNTER — HOSPITAL ENCOUNTER (OUTPATIENT)
Dept: GENERAL RADIOLOGY | Facility: HOSPITAL | Age: 58
Discharge: HOME | End: 2023-09-15
Attending: INTERNAL MEDICINE | Admitting: INTERNAL MEDICINE
Payer: COMMERCIAL

## 2023-09-15 ENCOUNTER — OFFICE VISIT (OUTPATIENT)
Dept: RHEUMATOLOGY | Facility: CLINIC | Age: 58
End: 2023-09-15
Payer: COMMERCIAL

## 2023-09-15 ENCOUNTER — LAB (OUTPATIENT)
Dept: LAB | Facility: CLINIC | Age: 58
End: 2023-09-15
Payer: COMMERCIAL

## 2023-09-15 VITALS
DIASTOLIC BLOOD PRESSURE: 70 MMHG | WEIGHT: 207.4 LBS | BODY MASS INDEX: 33.73 KG/M2 | SYSTOLIC BLOOD PRESSURE: 120 MMHG | HEART RATE: 80 BPM

## 2023-09-15 DIAGNOSIS — M25.473: ICD-10-CM

## 2023-09-15 DIAGNOSIS — M25.50 POLYARTHRALGIA: ICD-10-CM

## 2023-09-15 DIAGNOSIS — M25.50 POLYARTHRALGIA: Primary | ICD-10-CM

## 2023-09-15 LAB
ALBUMIN SERPL BCG-MCNC: 4.9 G/DL (ref 3.5–5.2)
ALT SERPL W P-5'-P-CCNC: 34 U/L (ref 0–50)
CREAT SERPL-MCNC: 0.9 MG/DL (ref 0.51–0.95)
CRP SERPL-MCNC: 8.44 MG/L
EGFRCR SERPLBLD CKD-EPI 2021: 75 ML/MIN/1.73M2
ERYTHROCYTE [SEDIMENTATION RATE] IN BLOOD BY WESTERGREN METHOD: 20 MM/HR (ref 0–30)
URATE SERPL-MCNC: 4.9 MG/DL (ref 2.4–5.7)

## 2023-09-15 PROCEDURE — 82565 ASSAY OF CREATININE: CPT

## 2023-09-15 PROCEDURE — 36415 COLL VENOUS BLD VENIPUNCTURE: CPT

## 2023-09-15 PROCEDURE — 85652 RBC SED RATE AUTOMATED: CPT

## 2023-09-15 PROCEDURE — 86200 CCP ANTIBODY: CPT

## 2023-09-15 PROCEDURE — 99204 OFFICE O/P NEW MOD 45 MIN: CPT | Performed by: INTERNAL MEDICINE

## 2023-09-15 PROCEDURE — 73130 X-RAY EXAM OF HAND: CPT | Mod: 50

## 2023-09-15 PROCEDURE — 84460 ALANINE AMINO (ALT) (SGPT): CPT

## 2023-09-15 PROCEDURE — 86140 C-REACTIVE PROTEIN: CPT

## 2023-09-15 PROCEDURE — 84550 ASSAY OF BLOOD/URIC ACID: CPT

## 2023-09-15 PROCEDURE — 82040 ASSAY OF SERUM ALBUMIN: CPT

## 2023-09-15 PROCEDURE — 86431 RHEUMATOID FACTOR QUANT: CPT

## 2023-09-15 PROCEDURE — 86803 HEPATITIS C AB TEST: CPT

## 2023-09-18 LAB
CCP AB SER IA-ACNC: 1 U/ML
HCV AB SERPL QL IA: NONREACTIVE
RHEUMATOID FACT SER NEPH-ACNC: 14 IU/ML

## 2023-10-11 ENCOUNTER — OFFICE VISIT (OUTPATIENT)
Dept: FAMILY MEDICINE | Facility: CLINIC | Age: 58
End: 2023-10-11
Payer: COMMERCIAL

## 2023-10-11 VITALS
HEART RATE: 78 BPM | BODY MASS INDEX: 33.75 KG/M2 | RESPIRATION RATE: 16 BRPM | HEIGHT: 66 IN | WEIGHT: 210 LBS | OXYGEN SATURATION: 95 % | DIASTOLIC BLOOD PRESSURE: 96 MMHG | SYSTOLIC BLOOD PRESSURE: 145 MMHG

## 2023-10-11 DIAGNOSIS — L81.9 HYPERPIGMENTATION OF SKIN: ICD-10-CM

## 2023-10-11 DIAGNOSIS — R53.83 OTHER FATIGUE: Primary | ICD-10-CM

## 2023-10-11 DIAGNOSIS — R73.9 BLOOD GLUCOSE ELEVATED: ICD-10-CM

## 2023-10-11 DIAGNOSIS — G47.19 EXCESSIVE DAYTIME SLEEPINESS: ICD-10-CM

## 2023-10-11 LAB
ALBUMIN SERPL BCG-MCNC: 4.8 G/DL (ref 3.5–5.2)
ALP SERPL-CCNC: 86 U/L (ref 35–104)
ALT SERPL W P-5'-P-CCNC: 40 U/L (ref 0–50)
ANION GAP SERPL CALCULATED.3IONS-SCNC: 10 MMOL/L (ref 7–15)
AST SERPL W P-5'-P-CCNC: 26 U/L (ref 0–45)
BILIRUB SERPL-MCNC: 0.4 MG/DL
BUN SERPL-MCNC: 25.3 MG/DL (ref 6–20)
CALCIUM SERPL-MCNC: 10 MG/DL (ref 8.6–10)
CHLORIDE SERPL-SCNC: 105 MMOL/L (ref 98–107)
CREAT SERPL-MCNC: 0.89 MG/DL (ref 0.51–0.95)
CRP SERPL-MCNC: 8.17 MG/L
DEPRECATED HCO3 PLAS-SCNC: 24 MMOL/L (ref 22–29)
EGFRCR SERPLBLD CKD-EPI 2021: 76 ML/MIN/1.73M2
ERYTHROCYTE [DISTWIDTH] IN BLOOD BY AUTOMATED COUNT: 11.6 % (ref 10–15)
GLUCOSE SERPL-MCNC: 107 MG/DL (ref 70–99)
HCT VFR BLD AUTO: 42.6 % (ref 35–47)
HGB BLD-MCNC: 14.5 G/DL (ref 11.7–15.7)
MCH RBC QN AUTO: 31.3 PG (ref 26.5–33)
MCHC RBC AUTO-ENTMCNC: 34 G/DL (ref 31.5–36.5)
MCV RBC AUTO: 92 FL (ref 78–100)
PLATELET # BLD AUTO: 298 10E3/UL (ref 150–450)
POTASSIUM SERPL-SCNC: 4.6 MMOL/L (ref 3.4–5.3)
PROT SERPL-MCNC: 7.7 G/DL (ref 6.4–8.3)
PTH-INTACT SERPL-MCNC: 55 PG/ML (ref 15–65)
RBC # BLD AUTO: 4.64 10E6/UL (ref 3.8–5.2)
SODIUM SERPL-SCNC: 139 MMOL/L (ref 135–145)
TSH SERPL DL<=0.005 MIU/L-ACNC: 1.78 UIU/ML (ref 0.3–4.2)
WBC # BLD AUTO: 4.1 10E3/UL (ref 4–11)

## 2023-10-11 PROCEDURE — 85027 COMPLETE CBC AUTOMATED: CPT | Performed by: FAMILY MEDICINE

## 2023-10-11 PROCEDURE — 86140 C-REACTIVE PROTEIN: CPT | Performed by: FAMILY MEDICINE

## 2023-10-11 PROCEDURE — 80053 COMPREHEN METABOLIC PANEL: CPT | Performed by: FAMILY MEDICINE

## 2023-10-11 PROCEDURE — 83970 ASSAY OF PARATHORMONE: CPT | Performed by: FAMILY MEDICINE

## 2023-10-11 PROCEDURE — 84443 ASSAY THYROID STIM HORMONE: CPT | Performed by: FAMILY MEDICINE

## 2023-10-11 PROCEDURE — 83036 HEMOGLOBIN GLYCOSYLATED A1C: CPT | Performed by: FAMILY MEDICINE

## 2023-10-11 PROCEDURE — 36415 COLL VENOUS BLD VENIPUNCTURE: CPT | Performed by: FAMILY MEDICINE

## 2023-10-11 PROCEDURE — 99214 OFFICE O/P EST MOD 30 MIN: CPT | Performed by: FAMILY MEDICINE

## 2023-10-11 ASSESSMENT — PATIENT HEALTH QUESTIONNAIRE - PHQ9
10. IF YOU CHECKED OFF ANY PROBLEMS, HOW DIFFICULT HAVE THESE PROBLEMS MADE IT FOR YOU TO DO YOUR WORK, TAKE CARE OF THINGS AT HOME, OR GET ALONG WITH OTHER PEOPLE: VERY DIFFICULT
SUM OF ALL RESPONSES TO PHQ QUESTIONS 1-9: 11
SUM OF ALL RESPONSES TO PHQ QUESTIONS 1-9: 11

## 2023-10-11 NOTE — PATIENT INSTRUCTIONS
Check fasting labs    See Dr Ireland in Nov 2023    Cymbalta contraindicated due to HARDIN.    Ultrasound of the ovaries    Repeat Sleep Study

## 2023-10-11 NOTE — PROGRESS NOTES
"      Subjective   Samantha is a 58 year old, presenting for the following health issues:  Recheck Medication (Med check, needs some rhuem arthritis medications to be looked at- as being on fluoxetine, was told to come see Dr. Linton. Pt also said she needs some lab work done- pt is fasting today. )    Joint pain \"forever and ever\"  Sees Dr Ireland again in Nov 2023  Frequent pain in the RUQ.  Intermittent. Can be dull.  Prior choly  Sleeps well. Falls asleep.  Has to pull over when driving.  Did have a sleep study times two, didn't sleep well for studies.   At [facility]  5-23-23 had 3.5 parathyroid glands removed  Complains of discoloration of the axilla.    Wrist BP at work 128-135/85-90        10/11/2023     7:02 AM   Additional Questions   Roomed by Kate Block CMA       History of Present Illness       Reason for visit:  Possible med change and blood labs.    She eats 2-3 servings of fruits and vegetables daily.She consumes 0 sweetened beverage(s) daily.She exercises with enough effort to increase her heart rate 20 to 29 minutes per day.  She exercises with enough effort to increase her heart rate 4 days per week.   She is taking medications regularly.       Review of Systems         Objective    BP (!) 145/96   Pulse 78   Resp 16   Ht 1.683 m (5' 6.25\")   Wt 95.3 kg (210 lb)   SpO2 95%   BMI 33.64 kg/m    Body mass index is 33.64 kg/m .  Physical Exam   GENERAL: healthy, alert and no distress  RESP: lungs clear to auscultation - no rales, rhonchi or wheezes  CV: regular rate and rhythm, normal S1 S2, no S3 or S4, no murmur, click or rub, no peripheral edema and peripheral pulses strong  ABDOMEN: soft, nontender, no hepatosplenomegaly, no masses and bowel sounds normal  MS: no gross musculoskeletal defects noted, no edema      CBC RESULTS:   Recent Labs   Lab Test 05/26/23  2214   WBC 7.7   RBC 4.80   HGB 14.7   HCT 42.9   MCV 89   MCH 30.6   MCHC 34.3   RDW 12.0           .pth    Lab Results "   Component Value Date    TSH 2.15 01/16/2023    TSH 1.33 07/23/2021        Last Comprehensive Metabolic Panel:  Sodium   Date Value Ref Range Status   05/26/2023 141 136 - 145 mmol/L Final     Potassium   Date Value Ref Range Status   05/26/2023 4.2 3.5 - 5.0 mmol/L Final     Chloride   Date Value Ref Range Status   05/26/2023 104 98 - 107 mmol/L Final     Carbon Dioxide (CO2)   Date Value Ref Range Status   05/26/2023 27 22 - 31 mmol/L Final     Anion Gap   Date Value Ref Range Status   05/26/2023 10 5 - 18 mmol/L Final     Glucose   Date Value Ref Range Status   05/26/2023 112 70 - 125 mg/dL Final     Urea Nitrogen   Date Value Ref Range Status   05/26/2023 14 8 - 22 mg/dL Final     Creatinine   Date Value Ref Range Status   09/15/2023 0.90 0.51 - 0.95 mg/dL Final     GFR Estimate   Date Value Ref Range Status   09/15/2023 75 >60 mL/min/1.73m2 Final   12/01/2020 61 >=60 mL/min/BSA Final   06/19/2020 >60 >60 mL/min/1.73m2 Final     Calcium   Date Value Ref Range Status   05/26/2023 9.9 8.5 - 10.5 mg/dL Final     Bilirubin Total   Date Value Ref Range Status   01/16/2023 0.3 <=1.2 mg/dL Final     Alkaline Phosphatase   Date Value Ref Range Status   01/16/2023 72 35 - 104 U/L Final     ALT   Date Value Ref Range Status   09/15/2023 34 0 - 50 U/L Final     Comment:     Reference intervals for this test were updated on 6/12/2023 to more accurately reflect our healthy population. There may be differences in the flagging of prior results with similar values performed with this method. Interpretation of those prior results can be made in the context of the updated reference intervals.       AST   Date Value Ref Range Status   01/16/2023 26 10 - 35 U/L Final     CRP Inflammation   Date Value Ref Range Status   09/15/2023 8.44 (H) <5.00 mg/L Final        .sed           EXAM: US ABDOMEN LIMITED  LOCATION: [facility]  DATE/TIME: 11/30/2022 8:06 PM     INDICATION: RUQ pain with h o elevated liver  enzymes. Cholecystectomy.  COMPARISON: 08/29/21 CT.  TECHNIQUE: Limited abdominal ultrasound.     FINDINGS:     GALLBLADDER: Cholecystectomy.     BILE DUCTS: No biliary dilatation. The common duct measures 4 mm.     LIVER: Echogenic parenchyma with smooth contour. No focal mass.     RIGHT KIDNEY: No hydronephrosis.     PANCREAS: The visualized portions are normal.     No ascites.                                                                      IMPRESSION:  1.  Mild hepatic steatosis.   2.  Cholecystectomy. No biliary dilatation.    Procedure Note    Grant Abbasi MD - 03/20/2023  Formatting of this note might be different from the original.  For Patients: As a result of the 21st Century Cures Act, medical imaging exams and procedure reports are released immediately into your electronic medical record. You may view this report before your referring provider. If you have questions, please contact your health care provider.    EXAM: CT ABDOMEN PELVIS STONE PROTOCOL WO  LOCATION: [facility]  DATE/TIME: 3/20/2023 5:22 PM    INDICATION: Abdominal pain. Right flank pain. History of diverticulitis and kidney stones. Left nephrectomy. Multiple abdominal surgeries.  COMPARISON: CT abdomen pelvis 08/29/2021  TECHNIQUE: CT scan of the abdomen and pelvis was performed without oral or IV contrast. Multiplanar reformats were obtained. Dose reduction techniques were used.  CONTRAST: None.    FINDINGS:  LOWER CHEST: Normal.    HEPATOBILIARY: Cholecystectomy. No bile duct dilatation. Noncontrast liver negative.    PANCREAS: Normal.    SPLEEN: Normal.    ADRENAL GLANDS: Normal.    KIDNEY/BLADDER: Left nephrectomy. No right urinary calculi or hydronephrosis. Smooth renal contours.    BOWEL: Partial sigmoidectomy. Colonic diverticulosis, no diverticulitis. Appendectomy. No diffuse colitis. No small bowel obstruction and nothing for enteritis.    LYMPH NODES: No abdominal pelvic  lymphadenopathy.    VASCULATURE: Normal caliber abdominal aorta.    PELVIC ORGANS: Hysterectomy. No pelvic mass.    MUSCULOSKELETAL: No suspicious osseous lesion. Postoperative change ventral anterior abdominal wall hernia repair, no recurrent hernia.    IMPRESSION:  1.  Left nephrectomy. No urinary calculi and no right hydronephrosis.  2.  No acute process in the abdomen or pelvis on noncontrast imaging.  3.  Cholecystectomy. Appendectomy. Sigmoidectomy.    Encounter Diagnoses   Name Primary?    Other fatigue Yes    Hyperpigmentation of skin     Excessive daytime sleepiness      PLAN:     Check fasting labs    See Dr Ireland in Nov 2023 (follow up on joint pain)    Cymbalta contraindicated due to HARDIN.    Ultrasound of the ovaries    Repeat Sleep Study     30 min spent in review of hx, exam, recent labs, and plan going forward

## 2023-10-11 NOTE — LETTER
October 12, 2023      Samantha Mchugh  [address]  [facility] [address]        Dear ,  We are writing to inform you of your test results.    Danei Singh:  Your A1C. is normal.  No suggestion for diabetes or pre-diabetes    Resulted Orders   Parathyroid Hormone Intact   Result Value Ref Range    Parathyroid Hormone Intact 55 15 - 65 pg/mL    Narrative    This result was obtained with the Roche Elecsys PTH STAT assay.   This reference range differs from PTH assays used in other [facility] laboratories.   Comprehensive metabolic panel (BMP + Alb, Alk Phos, ALT, AST, Total. Bili, TP)   Result Value Ref Range    Sodium 139 135 - 145 mmol/L      Comment:      Reference intervals for this test were updated on 09/26/2023 to more accurately reflect our healthy population. There may be differences in the flagging of prior results with similar values performed with this method. Interpretation of those prior results can be made in the context of the updated reference intervals.     Potassium 4.6 3.4 - 5.3 mmol/L    Carbon Dioxide (CO2) 24 22 - 29 mmol/L    Anion Gap 10 7 - 15 mmol/L    Urea Nitrogen 25.3 (H) 6.0 - 20.0 mg/dL    Creatinine 0.89 0.51 - 0.95 mg/dL    GFR Estimate 76 >60 mL/min/1.73m2    Calcium 10.0 8.6 - 10.0 mg/dL    Chloride 105 98 - 107 mmol/L    Glucose 107 (H) 70 - 99 mg/dL    Alkaline Phosphatase 86 35 - 104 U/L    AST 26 0 - 45 U/L      Comment:      Reference intervals for this test were updated on 6/12/2023 to more accurately reflect our healthy population. There may be differences in the flagging of prior results with similar values performed with this method. Interpretation of those prior results can be made in the context of the updated reference intervals.    ALT 40 0 - 50 U/L      Comment:      Reference intervals for this test were updated on 6/12/2023 to more accurately reflect our healthy population. There may be differences in the flagging of prior results with similar values  performed with this method. Interpretation of those prior results can be made in the context of the updated reference intervals.      Protein Total 7.7 6.4 - 8.3 g/dL    Albumin 4.8 3.5 - 5.2 g/dL    Bilirubin Total 0.4 <=1.2 mg/dL   TSH with free T4 reflex   Result Value Ref Range    TSH 1.78 0.30 - 4.20 uIU/mL   CBC with platelets   Result Value Ref Range    WBC Count 4.1 4.0 - 11.0 10e3/uL    RBC Count 4.64 3.80 - 5.20 10e6/uL    Hemoglobin 14.5 11.7 - 15.7 g/dL    Hematocrit 42.6 35.0 - 47.0 %    MCV 92 78 - 100 fL    MCH 31.3 26.5 - 33.0 pg    MCHC 34.0 31.5 - 36.5 g/dL    RDW 11.6 10.0 - 15.0 %    Platelet Count 298 150 - 450 10e3/uL   CRP inflammation   Result Value Ref Range    CRP Inflammation 8.17 (H) <5.00 mg/L   Hemoglobin A1c   Result Value Ref Range    Hemoglobin A1C 5.3 0.0 - 5.6 %      Comment:      Normal <5.7%   Prediabetes 5.7-6.4%    Diabetes 6.5% or higher     Note: Adopted from ADA consensus guidelines.       If you have any questions or concerns, please call the clinic at the number listed above.       Sincerely,      Porfirio Linton MD

## 2023-10-11 NOTE — LETTER
October 11, 2023      Samantha Mchugh  [address]  [facility] [address]        Dear ,  We are writing to inform you of your test results.    Danie Singh:  Your PTH is normal.  The  CRP remains slightly elevated.  The blood sugar was just a hair elevated.  I will add an A1C to check overall glucose control over the past 3 months.    Otherwise the hemoglobin, thyroid, kidney and liver function, as well as remaining labs, are normal. Proceed with Rheumatology and Sleep evaluations.      Resulted Orders   Parathyroid Hormone Intact   Result Value Ref Range    Parathyroid Hormone Intact 55 15 - 65 pg/mL    Narrative    This result was obtained with the Roche Elecsys PTH STAT assay.   This reference range differs from PTH assays used in other [facility] laboratories.   Comprehensive metabolic panel (BMP + Alb, Alk Phos, ALT, AST, Total. Bili, TP)   Result Value Ref Range    Sodium 139 135 - 145 mmol/L      Comment:      Reference intervals for this test were updated on 09/26/2023 to more accurately reflect our healthy population. There may be differences in the flagging of prior results with similar values performed with this method. Interpretation of those prior results can be made in the context of the updated reference intervals.     Potassium 4.6 3.4 - 5.3 mmol/L    Carbon Dioxide (CO2) 24 22 - 29 mmol/L    Anion Gap 10 7 - 15 mmol/L    Urea Nitrogen 25.3 (H) 6.0 - 20.0 mg/dL    Creatinine 0.89 0.51 - 0.95 mg/dL    GFR Estimate 76 >60 mL/min/1.73m2    Calcium 10.0 8.6 - 10.0 mg/dL    Chloride 105 98 - 107 mmol/L    Glucose 107 (H) 70 - 99 mg/dL    Alkaline Phosphatase 86 35 - 104 U/L    AST 26 0 - 45 U/L      Comment:      Reference intervals for this test were updated on 6/12/2023 to more accurately reflect our healthy population. There may be differences in the flagging of prior results with similar values performed with this method. Interpretation of those prior results can be made in the context of  the updated reference intervals.    ALT 40 0 - 50 U/L      Comment:      Reference intervals for this test were updated on 6/12/2023 to more accurately reflect our healthy population. There may be differences in the flagging of prior results with similar values performed with this method. Interpretation of those prior results can be made in the context of the updated reference intervals.      Protein Total 7.7 6.4 - 8.3 g/dL    Albumin 4.8 3.5 - 5.2 g/dL    Bilirubin Total 0.4 <=1.2 mg/dL   TSH with free T4 reflex   Result Value Ref Range    TSH 1.78 0.30 - 4.20 uIU/mL   CBC with platelets   Result Value Ref Range    WBC Count 4.1 4.0 - 11.0 10e3/uL    RBC Count 4.64 3.80 - 5.20 10e6/uL    Hemoglobin 14.5 11.7 - 15.7 g/dL    Hematocrit 42.6 35.0 - 47.0 %    MCV 92 78 - 100 fL    MCH 31.3 26.5 - 33.0 pg    MCHC 34.0 31.5 - 36.5 g/dL    RDW 11.6 10.0 - 15.0 %    Platelet Count 298 150 - 450 10e3/uL   CRP inflammation   Result Value Ref Range    CRP Inflammation 8.17 (H) <5.00 mg/L       If you have any questions or concerns, please call the clinic at the number listed above.       Sincerely,      Porfirio Linton MD

## 2023-10-12 LAB — HBA1C MFR BLD: 5.3 % (ref 0–5.6)

## 2023-10-13 ENCOUNTER — OFFICE VISIT (OUTPATIENT)
Dept: PHYSICAL MEDICINE AND REHAB | Facility: CLINIC | Age: 58
End: 2023-10-13
Payer: COMMERCIAL

## 2023-10-13 ENCOUNTER — THERAPY VISIT (OUTPATIENT)
Dept: PHYSICAL THERAPY | Facility: REHABILITATION | Age: 58
End: 2023-10-13
Attending: PHYSICIAN ASSISTANT
Payer: COMMERCIAL

## 2023-10-13 VITALS
DIASTOLIC BLOOD PRESSURE: 72 MMHG | WEIGHT: 210 LBS | HEART RATE: 80 BPM | OXYGEN SATURATION: 98 % | BODY MASS INDEX: 33.64 KG/M2 | TEMPERATURE: 98.9 F | SYSTOLIC BLOOD PRESSURE: 139 MMHG

## 2023-10-13 DIAGNOSIS — M47.812 CERVICAL SPONDYLOSIS WITHOUT MYELOPATHY: ICD-10-CM

## 2023-10-13 DIAGNOSIS — M54.12 CERVICAL RADICULITIS: ICD-10-CM

## 2023-10-13 DIAGNOSIS — M47.812 CERVICAL SPONDYLOSIS WITHOUT MYELOPATHY: Primary | ICD-10-CM

## 2023-10-13 DIAGNOSIS — G43.719 INTRACTABLE CHRONIC MIGRAINE WITHOUT AURA AND WITHOUT STATUS MIGRAINOSUS: ICD-10-CM

## 2023-10-13 DIAGNOSIS — G24.3 CERVICAL DYSTONIA: ICD-10-CM

## 2023-10-13 PROCEDURE — 97161 PT EVAL LOW COMPLEX 20 MIN: CPT | Mod: GP | Performed by: PHYSICAL THERAPIST

## 2023-10-13 PROCEDURE — 64615 CHEMODENERV MUSC MIGRAINE: CPT | Performed by: PHYSICIAN ASSISTANT

## 2023-10-13 PROCEDURE — 99214 OFFICE O/P EST MOD 30 MIN: CPT | Mod: 25 | Performed by: PHYSICIAN ASSISTANT

## 2023-10-13 PROCEDURE — 97110 THERAPEUTIC EXERCISES: CPT | Mod: GP | Performed by: PHYSICAL THERAPIST

## 2023-10-13 ASSESSMENT — PAIN SCALES - GENERAL: PAINLEVEL: MODERATE PAIN (4)

## 2023-10-13 NOTE — PATIENT INSTRUCTIONS
[facility] Scheduling    Please call [phone] to schedule your image(s) (select option#1). There are 2 different locations, see below. You can do walk-in visits for xray only images if you want.     [facility]  [address] [address]    [place] [id number] [facility] [address]     An order for physicaltherapy has been provided today.  Someone will call you to schedule physical therapy or you can call [phone] to schedule physical therapy.  It will be very important for you to do your physical therapy exercises on aregular basis to decrease your pain and prevent future flares of pain.      Please monitor for any redness/drainage/swelling over the injection sites.  If any of these things occur, please call the clinic immediately or go to the nearest emergency room.     Do not submerge the injection sites underwater for 48 hours after the injection.      The botox injections will begin working in 3 days but it may take 3 weeks before you notice relief of your pain.  The effects should last a minimum of 3 months.

## 2023-10-13 NOTE — PROGRESS NOTES
PHYSICAL THERAPY EVALUATION  Type of Visit: Evaluation    See electronic medical record for Abuse and Falls Screening details.    Subjective       Presenting condition or subjective complaint: neck, upper back, numb fingers R hand 1-3  Date of onset: 10/13/23    Relevant medical history: Arthritis; Bladder or bowel problems; Hearing problems; History of fractures; Incontinence; Kidney disease; Menopause; Migraines or headaches; Neck injury; Osteoporosis; Overweight; Pain at night or rest; Severe headaches; Significant weakness (in process of potential ruematoid arthritis diagnosis, along with parathyroid problems (removal)) , L shoulder tendonitis  Past Medical History:   Diagnosis Date    Anemia     Anxiety     Celiac disease     Celiac disease     Chronic kidney disease     donated 1 kidney    Cough     Depression     Seasonal    Diverticulitis     Dyslipidemia     Enlarged LV     wall    GERD (gastroesophageal reflux disease)     Hepatitis     Hiatal hernia     Hypertension     Low bone mass     Metabolic syndrome     Migraines     HARDIN (nonalcoholic steatohepatitis)     Peripheral neuropathy     PONV (postoperative nausea and vomiting)     Profound fatigue     Ventral hernia       Dates & types of surgery:    Past Surgical History:   Procedure Laterality Date    APPENDECTOMY      CHOLECYSTECTOMY      COLON SURGERY      COLONOSCOPY N/A 3/18/2019    Procedure: COLONOSCOPY;  Surgeon: Davis Mosqueda MD;  Location: [facility];  Service: General    CYSTOSCOPY N/A 7/16/2015    Procedure: CYSTOSCOPY;  Surgeon: Nate Horta MD;  Location: [facility];  Service:     HC CORRECT BUNION,METATARSAL OSTEOTOMY      Description: Bunion Correction With Metatarsal Osteotomy Herman Procedure;  Proc Date: 07/16/2010;    HYSTERECTOMY      HYSTERECTOMY, PAP NO LONGER INDICATED  02/2009    NEPHRECTOMY LIVING DONOR Left APRIL 2012    WI LAP,BILIARY TRACT,UNLISTED N/A 3/19/2019    Procedure: BIOPSY, LIVER,  LAPAROSCOPIC;  Surgeon: Davis Mosqueda MD;  Location: [facility] OR;  Service: General    OR LAP,SLING OPERATION      Description: Laparoscopic Sling Operation For Stress Incontinence;  Recorded: 02/17/2009;    OR LAP,SURG,COLECTOMY, PARTIAL, W/ANAST N/A 3/19/2019    Procedure: LAPAROSCOPIC SIGMOID COLON RESECTION;  Surgeon: Davis Mosqueda MD;  Location: [facility] OR;  Service: General    SEPTOPLASTY, TURBINOPLASTY, COMBINED N/A 8/3/2021    Procedure: SEPTOPLASTY, NOSE, WITH TURBINOPLASTY RADIOFREQUENCY BALLON ASSISTED, CRYOBLATION OF NASAL TISSUE;  Surgeon: Randy Case MD;  Location: [facility] OR    SIGMOIDOSCOPY FLEXIBLE N/A 3/19/2019    Procedure: FLEXIBLE SIGMOIDOSCOPY;  Surgeon: Davis Mosqueda MD;  Location: [facility] OR;  Service: General      Prior diagnostic imaging/testing results:     future MRI 11/07/2023 for neck  Prior therapy history for the same diagnosis, illness or injury: Yes PT several years ago    Prior Level of Function  Transfers: Independent  Ambulation: Independent  ADL: Independent  IADL: Driving, Finances, Housekeeping, Laundry, Meal preparation, Medication management, Work, Yard work    Living Environment  Social support: With family members   Type of home: House   Stairs to enter the home: Yes 3     Ramp: No   Stairs inside the home: Yes 15     Help at home: None  Equipment owned:       Employment: Yes Lic Dental Assistant  Hobbies/Interests: camping, hiking, gardening, amusement mireles    Patient goals for therapy: sleep, stop taking Advil    Pain assessment: See objective evaluation for additional pain details     Objective   CERVICAL SPINE EVALUATION  PAIN: Pain Level at Rest: 3/10  Pain Level with Use: 10/10  Pain Location: cervical spine and shoulder  Pain Quality: Numb, Sharp, and pressure  Pain Frequency: constant  Pain is Worst: dependent on the day, variable  Pain is Exacerbated By: exacerbated by positioning at work, repetitive UE movements,  sleeping,   Pain is Relieved By: cold, heat, and advil (knows she is not supposed to due to kidneys)  Pain Progression: Worsened  INTEGUMENTARY (edema, incisions): WFL  POSTURE: Standing Posture: Rounded shoulders, Forward head, Thoracic kyphosis increased  Sitting Posture: Rounded shoulders, Forward head, Thoracic kyphosis increased    ROM:   (Degrees) Left AROM Right AROM    Cervical Flexion 45 pain, tightness, pulling    Cervical Extension 30 sharp    Cervical Side bend 20 sharp 20 less sharp    Cervical Rotation 35, sharp on R  45 stiff    Cervical Protrusion WFL    Cervical Retraction WFL    Thoracic Flexion Mild limitation    Thoracic Extension Moderate limitation    Thoracic Rotation       Left AROM Left PROM Right AROM Right PROM   Shoulder Flexion WFL  WFL     Shoulder Extension WFL  WFL    Shoulder Abduction WFL  WFL    Shoulder Adduction       Shoulder IR Moderate limitation 15-20 degrees less than R  WFL    Shoulder ER Minimal limitation 10 degrees less than R  WFL    Shoulder Horiz Abduction       Shoulder Horiz Adduction       Pain:   End Feel:   STRENGTH: flex 5/5, abd 5/5 R, 4+/5 L, ext 5/5 B, IR 5/5 B, ER 5/5 B  NEURAL TENSION:  assess next visit  FLEXIBILITY:  decreased of upper trap, levator scap, SCM,       PALPATION:  TTP over upper trap, levator scap  SPINAL SEGMENTAL CONCLUSIONS:  assess further next visit      Assessment & Plan   CLINICAL IMPRESSIONS  Medical Diagnosis: M47.812 (ICD-10-CM) - Cervical spondylosis without myelopathy  M54.12 (ICD-10-CM) - Cervical radiculitis    Treatment Diagnosis: neck pain with referral into UE   Impression/Assessment: Patient is a 58 year old female with long standing history of migraine, headache, neck pain and L shoulder pain.  The following significant findings have been identified: Pain, Decreased ROM/flexibility, Decreased joint mobility, Decreased strength, Impaired muscle performance, Decreased activity tolerance, and Impaired posture. These  impairments interfere with their ability to perform self care tasks, work tasks, recreational activities, household chores, driving , household mobility, and community mobility as compared to previous level of function.     Clinical Decision Making (Complexity):  Clinical Presentation: Stable/Uncomplicated  Clinical Presentation Rationale: based on medical and personal factors listed in PT evaluation  Clinical Decision Making (Complexity): Low complexity    PLAN OF CARE  Treatment Interventions:  Modalities: cold pack, traction  Interventions: Manual Therapy, Neuromuscular Re-education, Therapeutic Activity, Therapeutic Exercise, Self-Care/Home Management    Long Term Goals     PT Goal 1  Goal Identifier: sleep  Goal Description: Patient will be able to get comfortable withing 10 min of lying down and wake 1 x or less during 6-8 hours of sleep.  Rationale: to maximize safety and independence with performance of ADLs and functional tasks;to maximize safety and independence within the home;to maximize safety and independence within the community  Goal Progress: initiated  Target Date: 01/05/24  PT Goal 2  Goal Identifier: driving  Goal Description: Patient will be able to turn and look to check blind spots and support head during 20-30 min drives around city  Rationale: to maximize safety and independence with performance of ADLs and functional tasks;to maximize safety and independence within the home;to maximize safety and independence within the community  Goal Progress: initiated  Target Date: 01/05/24  PT Goal 3  Goal Identifier: self cares  Goal Description: Patient will be able to wash dry hair without sx greater than 3/10.  Rationale: to maximize safety and independence with performance of ADLs and functional tasks;to maximize safety and independence within the home;to maximize safety and independence within the community;to maximize safety and independence with self cares  Goal Progress: initiated  Target  Date: 01/05/24  PT Goal 4  Goal Identifier: housework/yardwork  Goal Description: Patient will be able to complete yardwork for 60+ min without sx greater than 3/10.  Rationale: to maximize safety and independence with performance of ADLs and functional tasks;to maximize safety and independence within the home  Goal Progress: initiated  Target Date: 01/05/24  PT Goal 5  Goal Identifier: HEP  Goal Description: Patient will be able to complete 6 exercises for progression to independent management.  Rationale: to maximize safety and independence with performance of ADLs and functional tasks;to maximize safety and independence within the home;to maximize safety and independence within the community  Goal Progress: initiated  Target Date: 01/05/24      Frequency of Treatment: 1 x weekly  Duration of Treatment: 12 weeks    Recommended Referrals to Other Professionals:  none at this time, patient will benefit from mix of craniosacral PT and standard PT already scheduled at [facility]  Education Assessment:   Learner/Method: Patient;Listening;Demonstration;Pictures/Video    Risks and benefits of evaluation/treatment have been explained.   Patient/Family/caregiver agrees with Plan of Care.     Evaluation Time:     PT Eval, Low Complexity Minutes (68196): 35       Signing Clinician: Macarena Haney PT

## 2023-10-13 NOTE — PROGRESS NOTES
Assessment:   Keiry Mchugh is a 58 year old y.o. female with past medical history significant for Jarquin, celiac disease, dyslipidemia, metabolic syndrome, hypertension, chronic insomnia   who presents today for follow-up regarding 2 concerns.  1.  Primary concern is chronic migraine.  Patient has had chronic migraine for many years.  She had Botox injections for chronic migraine June 30, 2023 which provided 80% relief of headaches for the first 6 weeks.  Then she woke up with severe neck pain which triggered additional headache/migraine.  She request repeat Botox injections.  I told the patient that if she is having some cervicogenic headaches due to current flare of neck pain the Botox injections may not provide as much relief as she has had previously.  Patient voiced understanding to this and still wanted to proceed with the Botox injections.  2.  Secondary concern is chronic right neck pain with right upper extremity paresthesias involving fingers 1, 2, 3.  Pain became much more severe about 6 weeks ago.  She woke up with the increased pain.  Denies any trauma.  Patient had an MRI cervical spine in 2018 which showed multilevel facet arthropathy with foraminal stenosis most pronounced on the right at C6-7.  She has had a CT of her neck through the [facility] (evaluating her parathyroid) April 14, 2023 which also showed foraminal stenosis right C6-7.  - On exam today patient reported a sensory deficit fingers 1, 2, 3 on the right.       Plan:     A shared decision making plan was used.  The patient's values and choices were respected.  The following represents what was discussed and decided upon by the physician assistant and the patient.      1.  DIAGNOSTIC TESTS:  - I reviewed the MRI cervical spine from 2018.  - I ordered an updated MRI cervical spine for further evaluation.    2.  PHYSICAL THERAPY: Patient to physical therapy for neck pain in 2018.  I entered a new referral for the patient to return to  physical therapy.    3.  MEDICATIONS: No changes were made to the patient's medications.  - Patient takes baclofen as needed but minimizes her use of this because it is sedating and can cause some migraine  - Patient also uses Excedrin Migraine  - Patient takes sumatriptan    4.  INTERVENTIONS:  - Botox injections were performed today for chronic migraine.  Please see separate procedure note for details.  - I entered an order for repeat Botox injections on or after January 13, 2024.  - We could also consider interventional pain management for her cervical spine pain.  - For the radicular symptoms we could consider a right C6-7 transforaminal epidural steroid injection.  - For facet mediated pain we could also consider medial branch blocks as a work-up toward radiofrequency ablation.    5.  PATIENT EDUCATION: Patient is in agreement the above plan.  All questions were answered.    6.  FOLLOW-UP:  - I will send the patient a [employer] message with her MRI results.  At that time I may offer an epidural steroid injection versus medial branch blocks versus a follow-up with me to review the results.  - Patient will also return to the clinic for repeat Botox injections January 24, 2023.    Subjective:     Keiry Mchugh is a 58 year old female who presents today for follow-up regarding 2 concerns.    Primary reason for follow-up is chronic migraine.  Patient had Botox injections for chronic migraine June 30, 2023.  Patient reports these provided 80% relief of headaches for the first 6 weeks.  Unfortunately, then she woke up with neck pain which has triggered some migraines.  She feels that the headaches could have been even more severe if she had not had the Botox on board.  She requests repeat Botox injections today.    Secondary reason for follow-up is chronic and worsening neck pain with radiation into the right upper extremity with associated numbness and tingling.  Patient reports pain is worse on the right than left  side of the neck.  Pain is most severe in the mid cervical region.  She feels a very tight discomfort between both shoulder blades.  She has intermittent pain that radiates into the right upper extremity.  She has numbness and tingling in right fingers 1 through 3.  The other day she felt numbness and tingling in fingers 4 and 5 in the ventral hand but that has largely resolved.  She rates her pain today as a 4 out of 10 in her neck.  At its worst it is a 10 out of 10.  At its best it is a 0 out of 10.  Patient is unable to identify aggravating factors for her neck pain but she thinks work may make it worse.  She works in a dentist office.  Pain is alleviated with taking medications and applying ice and resting.    Patient has tried the following treatments for migraine:  -Physical therapy for in 2018, still does home exercises  - Botox injections for chronic migraine June 30, 2023  -Botox injections for chronic migraine September 19, 2016  -Botox injections for chronic migraine June 8, 2016  - Botox injections for chronic migraine March 7, 2016  - Botox injections for chronic migraine November 16, 2015  - Botox injections for chronic migraine August 18, 2015  - Botox injections for chronic migraine April 13, 2015  - Botox injections for chronic migraine in January 12, 2015  -1 additional session for Botox injections through another provider in 2016 or 2017 which was not helpful and she had bilateral lid ptosis  - Chiropractic treatment was not helpful and she could not always get adjustments because of the arthritis in her neck.  She stopped going because it is where the chiropractor made her arthritis worse  - Massage was not helpful and caused more pain  - Acupuncture once, cannot recall response  - Gabapentin caused eye twitching  - Baclofen causes migraines  - Opiates cause migraines  - Unable to take Tylenol due to HARDIN  - Unable to take Advil due to HARDIN  - Sumatriptan 3-4 times per week is very helpful  -  Excedrin every other day is somewhat helpful  - Atenolol was not helpful    Review of Systems:  Positive for numbness/tingling, weakness, loss of bladder control, headache, pain much worse in neck, difficulty with hand skills.  Negative for loss of bowel control, inability to urinate, trip/stumble/falls, difficulty swallowing, fevers, unintentional weight loss.     Objective:   CONSTITUTIONAL:  Vital signs as above.  No acute distress.  The patient is well nourished and well groomed.    PSYCHIATRIC:  The patient is awake, alert, oriented to person, place and time.  The patient is answering questions appropriately with clear speech.  Normal affect.  HEENT: Normocephalic, atraumatic.  Sclera clear.    LYMPH: No cervical lymphadenopathy  SKIN:  Skin over the face, neck bilateral upper extremities is clean, dry, intact without rashes.  MUSCULOSKELETAL: Hypertonicity right greater than left cervical paraspinous muscles and upper trapezius muscles.  The patient has 5/5 strength for the bilateral shoulder abductors, elbow flexors/extensors, wrist extensors, finger flexors/abductors.   NEUROLOGICAL:   Negative Kaur's bilaterally.  Sensation to light touch is diminished right fingers 1, 2, 3.    RESULTS:     I reviewed the MRI cervical spine from [facility] dated September 14, 2018 .At C6-7 there is a diffuse annular bulge with moderate right and mild left foraminal stenosis.  At C2-3 there is mild left foraminal stenosis.  At C3-4 there is moderate right foraminal stenosis.  At C4-5 there is mild right foraminal stenosis.  Patient has facet arthropathy which is prominent on the left at C2-3.  There is also facet arthropathy at C3-4, C4-5, and C5-6.  Please report for further details.     EMG right upper extremity May 31, 2023:  Interpretation: Normal study     1. There is no electrodiagnostic evidence of cervical radiculopathy, brachial plexopathy, or focal neuropathy in the right upper extremity.  Specifically, there is  no electrodiagnostic evidence of median neuropathy at the wrist in the right upper extremity or C6 radiculopathy in the right upper extremity.  Patient is in agreement the above plan.  All questions were answered.

## 2023-10-13 NOTE — LETTER
10/13/2023         RE: Keiry Mchugh  [address]  [place] [address]        Dear Colleague,    Thank you for referring your patient, Keiry Mchugh, to the [facility]. Please see a copy of my visit note below.    Keiry Mchugh is a 58 year old female who presents today for Botox injections for chronic migraine.        Pre-procedure diagnosis: Migraine headaches  Post-procedure diagnosis: Same    PROCEDURE:  Botulinum toxin (Botox) injections.      The risks and benefits of the procedure were discussed with the patient which included muscle weakness (including facial droop, inability to swallow, and inability to hold one's head up), allergic reaction, pain, bruising, bleeding, swelling, and death.  She opted to proceed and gave written and verbal consent.    The Botox paradigm was followed. Each site was marked with indelible marking pen in the locations designated by the paradigm. The skin over the marks was then cleansed with alcohol. A 27 guage 5/8 inch needle was used for injection at all sites. Aspiration was negative at each site prior to injection of botox.     The bilateral  muscles were injected with a total of 10 units (5 units each side).   The Procerus muscle was injected with a total of 5 units.   The Frontalis muscle was injected with a total of 20 units, divided into 4 sites.  The Temporalis muscle was injected with 20 units divided into 4 sites and performed bilaterally (total of 8 sites for total of 40 units).  The occipitalis muscle was injected with 30 units divided into 6 sites.  The cervical paraspinal muscles were injected with 20 units divided into 4 sites.  The trapezius muscle was injected with 30 units divided into 6 sites.     TOTAL UNITS: 155  Wasted units: 45 units.    The patient tolerated the injection well and afterwards did not have any dizziness/lightheadedness or nausea. The patient was observed for a short period of time and  then was discharged.  The patient was encouraged to call with any questions/concerns prior to the follow-up appointment.  The patient will follow-up in 4 weeks.          Assessment:   Keiry Mchugh is a 58 year old y.o. female with past medical history significant for Jarquin, celiac disease, dyslipidemia, metabolic syndrome, hypertension, chronic insomnia   who presents today for follow-up regarding 2 concerns.  1.  Primary concern is chronic migraine.  Patient has had chronic migraine for many years.  She had Botox injections for chronic migraine June 30, 2023 which provided 80% relief of headaches for the first 6 weeks.  Then she woke up with severe neck pain which triggered additional headache/migraine.  She request repeat Botox injections.  I told the patient that if she is having some cervicogenic headaches due to current flare of neck pain the Botox injections may not provide as much relief as she has had previously.  Patient voiced understanding to this and still wanted to proceed with the Botox injections.  2.  Secondary concern is chronic right neck pain with right upper extremity paresthesias involving fingers 1, 2, 3.  Pain became much more severe about 6 weeks ago.  She woke up with the increased pain.  Denies any trauma.  Patient had an MRI cervical spine in 2018 which showed multilevel facet arthropathy with foraminal stenosis most pronounced on the right at C6-7.  She has had a CT of her neck through the [facility] (evaluating her parathyroid) April 14, 2023 which also showed foraminal stenosis right C6-7.  - On exam today patient reported a sensory deficit fingers 1, 2, 3 on the right.       Plan:     A shared decision making plan was used.  The patient's values and choices were respected.  The following represents what was discussed and decided upon by the physician assistant and the patient.      1.  DIAGNOSTIC TESTS:  - I reviewed the MRI cervical spine from 2018.  - I ordered an updated MRI  cervical spine for further evaluation.    2.  PHYSICAL THERAPY: Patient to physical therapy for neck pain in 2018.  I entered a new referral for the patient to return to physical therapy.    3.  MEDICATIONS: No changes were made to the patient's medications.  - Patient takes baclofen as needed but minimizes her use of this because it is sedating and can cause some migraine  - Patient also uses Excedrin Migraine  - Patient takes sumatriptan    4.  INTERVENTIONS:  - Botox injections were performed today for chronic migraine.  Please see separate procedure note for details.  - I entered an order for repeat Botox injections on or after January 13, 2024.  - We could also consider interventional pain management for her cervical spine pain.  - For the radicular symptoms we could consider a right C6-7 transforaminal epidural steroid injection.  - For facet mediated pain we could also consider medial branch blocks as a work-up toward radiofrequency ablation.    5.  PATIENT EDUCATION: Patient is in agreement the above plan.  All questions were answered.    6.  FOLLOW-UP:  - I will send the patient a [place] message with her MRI results.  At that time I may offer an epidural steroid injection versus medial branch blocks versus a follow-up with me to review the results.  - Patient will also return to the clinic for repeat Botox injections January 24, 2023.    Subjective:     Keiry Mchugh is a 58 year old female who presents today for follow-up regarding 2 concerns.    Primary reason for follow-up is chronic migraine.  Patient had Botox injections for chronic migraine June 30, 2023.  Patient reports these provided 80% relief of headaches for the first 6 weeks.  Unfortunately, then she woke up with neck pain which has triggered some migraines.  She feels that the headaches could have been even more severe if she had not had the Botox on board.  She requests repeat Botox injections today.    Secondary reason for follow-up is  chronic and worsening neck pain with radiation into the right upper extremity with associated numbness and tingling.  Patient reports pain is worse on the right than left side of the neck.  Pain is most severe in the mid cervical region.  She feels a very tight discomfort between both shoulder blades.  She has intermittent pain that radiates into the right upper extremity.  She has numbness and tingling in right fingers 1 through 3.  The other day she felt numbness and tingling in fingers 4 and 5 in the ventral hand but that has largely resolved.  She rates her pain today as a 4 out of 10 in her neck.  At its worst it is a 10 out of 10.  At its best it is a 0 out of 10.  Patient is unable to identify aggravating factors for her neck pain but she thinks work may make it worse.  She works in a dentist office.  Pain is alleviated with taking medications and applying ice and resting.    Patient has tried the following treatments for migraine:  -Physical therapy for in 2018, still does home exercises  - Botox injections for chronic migraine June 30, 2023  -Botox injections for chronic migraine September 19, 2016  -Botox injections for chronic migraine June 8, 2016  - Botox injections for chronic migraine March 7, 2016  - Botox injections for chronic migraine November 16, 2015  - Botox injections for chronic migraine August 18, 2015  - Botox injections for chronic migraine April 13, 2015  - Botox injections for chronic migraine in January 12, 2015  -1 additional session for Botox injections through another provider in 2016 or 2017 which was not helpful and she had bilateral lid ptosis  - Chiropractic treatment was not helpful and she could not always get adjustments because of the arthritis in her neck.  She stopped going because it is where the chiropractor made her arthritis worse  - Massage was not helpful and caused more pain  - Acupuncture once, cannot recall response  - Gabapentin caused eye twitching  - Baclofen  causes migraines  - Opiates cause migraines  - Unable to take Tylenol due to HARDIN  - Unable to take Advil due to HARDIN  - Sumatriptan 3-4 times per week is very helpful  - Excedrin every other day is somewhat helpful  - Atenolol was not helpful    Review of Systems:  Positive for numbness/tingling, weakness, loss of bladder control, headache, pain much worse in neck, difficulty with hand skills.  Negative for loss of bowel control, inability to urinate, trip/stumble/falls, difficulty swallowing, fevers, unintentional weight loss.     Objective:   CONSTITUTIONAL:  Vital signs as above.  No acute distress.  The patient is well nourished and well groomed.    PSYCHIATRIC:  The patient is awake, alert, oriented to person, place and time.  The patient is answering questions appropriately with clear speech.  Normal affect.  HEENT: Normocephalic, atraumatic.  Sclera clear.    LYMPH: No cervical lymphadenopathy  SKIN:  Skin over the face, neck bilateral upper extremities is clean, dry, intact without rashes.  MUSCULOSKELETAL: Hypertonicity right greater than left cervical paraspinous muscles and upper trapezius muscles.  The patient has 5/5 strength for the bilateral shoulder abductors, elbow flexors/extensors, wrist extensors, finger flexors/abductors.   NEUROLOGICAL:   Negative Kaur's bilaterally.  Sensation to light touch is diminished right fingers 1, 2, 3.    RESULTS:     I reviewed the MRI cervical spine from [facility] dated September 14, 2018 .At C6-7 there is a diffuse annular bulge with moderate right and mild left foraminal stenosis.  At C2-3 there is mild left foraminal stenosis.  At C3-4 there is moderate right foraminal stenosis.  At C4-5 there is mild right foraminal stenosis.  Patient has facet arthropathy which is prominent on the left at C2-3.  There is also facet arthropathy at C3-4, C4-5, and C5-6.  Please report for further details.     EMG right upper extremity May 31, 2023:  Interpretation: Normal  study     1. There is no electrodiagnostic evidence of cervical radiculopathy, brachial plexopathy, or focal neuropathy in the right upper extremity.  Specifically, there is no electrodiagnostic evidence of median neuropathy at the wrist in the right upper extremity or C6 radiculopathy in the right upper extremity.  Patient is in agreement the above plan.  All questions were answered.      Again, thank you for allowing me to participate in the care of your patient.        Sincerely,        Damaris Leslie PA-C

## 2023-10-23 ENCOUNTER — THERAPY VISIT (OUTPATIENT)
Dept: PHYSICAL THERAPY | Facility: REHABILITATION | Age: 58
End: 2023-10-23
Payer: COMMERCIAL

## 2023-10-23 DIAGNOSIS — M54.12 CERVICAL RADICULITIS: ICD-10-CM

## 2023-10-23 DIAGNOSIS — M47.812 CERVICAL SPONDYLOSIS WITHOUT MYELOPATHY: Primary | ICD-10-CM

## 2023-10-23 PROCEDURE — 97140 MANUAL THERAPY 1/> REGIONS: CPT | Mod: GP | Performed by: PHYSICAL THERAPIST

## 2023-10-23 NOTE — PROGRESS NOTES
10/23/23 0500   Appointment Info   Signing clinician's name / credentials Jennifer Cavaanugh PT   Total/Authorized Visits 12   Visits Used 2/12   Medical Diagnosis M47.812 (ICD-10-CM) - Cervical spondylosis without myelopathy  M54.12 (ICD-10-CM) - Cervical radiculitis   PT Tx Diagnosis neck pain with referral into UE   Progress Note/Certification   Onset of illness/injury or Date of Surgery 10/13/23   Therapy Frequency 1 x weekly   Predicted Duration 12 weeks   Progress Note Due Date 01/05/24   PT Goal 1   Goal Identifier sleep   Goal Description Patient will be able to get comfortable withing 10 min of lying down and wake 1 x or less during 6-8 hours of sleep.   Rationale to maximize safety and independence with performance of ADLs and functional tasks;to maximize safety and independence within the home;to maximize safety and independence within the community   Goal Progress initiated   Target Date 01/05/24   PT Goal 2   Goal Identifier driving   Goal Description Patient will be able to turn and look to check blind spots and support head during 20-30 min drives around city   Rationale to maximize safety and independence with performance of ADLs and functional tasks;to maximize safety and independence within the home;to maximize safety and independence within the community   Goal Progress initiated   Target Date 01/05/24   PT Goal 3   Goal Identifier self cares   Goal Description Patient will be able to wash dry hair without sx greater than 3/10.   Rationale to maximize safety and independence with performance of ADLs and functional tasks;to maximize safety and independence within the home;to maximize safety and independence within the community;to maximize safety and independence with self cares   Goal Progress initiated   Target Date 01/05/24   PT Goal 4   Goal Identifier housework/yardwork   Goal Description Patient will be able to complete yardwork for 60+ min without sx greater than 3/10.   Rationale to maximize  safety and independence with performance of ADLs and functional tasks;to maximize safety and independence within the home   Goal Progress initiated   Target Date 01/05/24   PT Goal 5   Goal Identifier HEP   Goal Description Patient will be able to complete 6 exercises for progression to independent management.   Rationale to maximize safety and independence with performance of ADLs and functional tasks;to maximize safety and independence within the home;to maximize safety and independence within the community   Goal Progress initiated   Target Date 01/05/24   Subjective Report   Subjective Report Has a migraine starting today. Had botox injections last week. Chronic neck stiffness and soreness. Mild (R) hand sx, dig 1-3 constant, dig 4-5 intermittent. Started tumeric supplement for inflammation. Working on the exercises.   Objective Measure 1   Objective Measure ULTT   Details future visit as indicated   Objective Measure 2   Details Fascial restrictions and associated tenderness of (B) medial cervical paraspinals, lat cervical spine/mult levels. Initial cranial scan (+) for cervical ligaments, periosteals.   Objective Measure 3   Details Fascial restrictions of (B) post deltoid, cranial base, dural tube.   Therapeutic Procedure/Exercise   Therapeutic Procedures: strength, endurance, ROM, flexibillity minutes (58604) 0   PTRx Ther Proc 1 Cervical Retraction   PTRx Ther Proc 1 - Details 5 x required proper cue patient also completing sidebending flex and ext isometrics   PTRx Ther Proc 2 Shoulder Rolls   PTRx Ther Proc 2 - Details x 5 each direction   PTRx Ther Proc 3 Scapular Retraction/Depression   PTRx Ther Proc 3 - Details x5 required cue for proper form   PTRx Ther Proc 4 Levator Scapulae Stretch   PTRx Ther Proc 4 - Details 30 seconds levator scap each side some tingling into arms   PTRx Ther Proc 5 Upper Trapezius Stretch   PTRx Ther Proc 5 - Details 30 seconds each side some tingling into arms   PTRx Ther  Proc 6 [place] and Arrow Stretch   PTRx Ther Proc 6 - Details x 5 each arm   Manual Therapy   Manual Therapy: Mobilization, MFR, MLD, friction massage minutes (95439) 55   Manual Therapy 1 MFR, counterstrain   Manual Therapy 1 - Details cranial base release, (R) LFC6-7-MS, (R) ALLC5-6-MS, (B) C1-3E(P), dural tube pull,   Skilled Intervention MFR/counterstrain (pain, mobility)   Patient Response/Progress good. tolerated well.   Education   Learner/Method Patient;Listening;Demonstration;Pictures/Video   Plan   Home program PTRx   Plan for next session SOR, manual therapy, review of HEP, UBE, nerve glides, pec stretch   Total Session Time   Timed Code Treatment Minutes 55   Total Treatment Time (sum of timed and untimed services) 55

## 2023-10-30 ENCOUNTER — THERAPY VISIT (OUTPATIENT)
Dept: PHYSICAL THERAPY | Facility: REHABILITATION | Age: 58
End: 2023-10-30
Payer: COMMERCIAL

## 2023-10-30 DIAGNOSIS — M54.12 CERVICAL RADICULITIS: ICD-10-CM

## 2023-10-30 DIAGNOSIS — M47.812 CERVICAL SPONDYLOSIS WITHOUT MYELOPATHY: Primary | ICD-10-CM

## 2023-10-30 PROCEDURE — 97140 MANUAL THERAPY 1/> REGIONS: CPT | Mod: GP | Performed by: PHYSICAL THERAPIST

## 2023-10-30 NOTE — PROGRESS NOTES
10/30/23 0500   Appointment Info   Signing clinician's name / credentials Jennifer Cavanaugh PT   Total/Authorized Visits 12   Visits Used 3/12   Medical Diagnosis M47.812 (ICD-10-CM) - Cervical spondylosis without myelopathy  M54.12 (ICD-10-CM) - Cervical radiculitis   PT Tx Diagnosis neck pain with referral into UE   Progress Note/Certification   Onset of illness/injury or Date of Surgery 10/13/23   Therapy Frequency 1 x weekly   Predicted Duration 12 weeks   Progress Note Due Date 01/05/24   PT Goal 1   Goal Identifier sleep   Goal Description Patient will be able to get comfortable withing 10 min of lying down and wake 1 x or less during 6-8 hours of sleep.   Rationale to maximize safety and independence with performance of ADLs and functional tasks;to maximize safety and independence within the home;to maximize safety and independence within the community   Goal Progress initiated   Target Date 01/05/24   PT Goal 2   Goal Identifier driving   Goal Description Patient will be able to turn and look to check blind spots and support head during 20-30 min drives around city   Rationale to maximize safety and independence with performance of ADLs and functional tasks;to maximize safety and independence within the home;to maximize safety and independence within the community   Goal Progress initiated   Target Date 01/05/24   PT Goal 3   Goal Identifier self cares   Goal Description Patient will be able to wash dry hair without sx greater than 3/10.   Rationale to maximize safety and independence with performance of ADLs and functional tasks;to maximize safety and independence within the home;to maximize safety and independence within the community;to maximize safety and independence with self cares   Goal Progress initiated   Target Date 01/05/24   PT Goal 4   Goal Identifier housework/yardwork   Goal Description Patient will be able to complete yardwork for 60+ min without sx greater than 3/10.   Rationale to maximize  "safety and independence with performance of ADLs and functional tasks;to maximize safety and independence within the home   Goal Progress initiated   Target Date 01/05/24   PT Goal 5   Goal Identifier HEP   Goal Description Patient will be able to complete 6 exercises for progression to independent management.   Rationale to maximize safety and independence with performance of ADLs and functional tasks;to maximize safety and independence within the home;to maximize safety and independence within the community   Goal Progress initiated   Target Date 01/05/24   Subjective Report   Subjective Report \" all right\". Benefiting from botox injections; has had 1 mild migraine in 2 weeks. Tension in neck, top of head and back today.   Objective Measure 1   Objective Measure ULTT   Details future visit as indicated   Objective Measure 2   Details initial cranial scan (+) for cervical ligaments,   Objective Measure 3   Details significant fascial restrictions of cranial base, lat cervical spine, cervical musculature, UT, thoracic inlet and upper thoracic region.   Therapeutic Procedure/Exercise   Therapeutic Procedures: strength, endurance, ROM, flexibillity minutes (23761) 0   PTRx Ther Proc 1 Cervical Retraction   PTRx Ther Proc 1 - Details 5 x required proper cue patient also completing sidebending flex and ext isometrics   PTRx Ther Proc 2 Shoulder Rolls   PTRx Ther Proc 2 - Details x 5 each direction   PTRx Ther Proc 3 Scapular Retraction/Depression   PTRx Ther Proc 3 - Details x5 required cue for proper form   PTRx Ther Proc 4 Levator Scapulae Stretch   PTRx Ther Proc 4 - Details 30 seconds levator scap each side some tingling into arms   PTRx Ther Proc 5 Upper Trapezius Stretch   PTRx Ther Proc 5 - Details 30 seconds each side some tingling into arms   PTRx Ther Proc 6 [place] and Arrow Stretch   PTRx Ther Proc 6 - Details x 5 each arm   Manual Therapy   Manual Therapy: Mobilization, MFR, MLD, friction massage minutes " ([id number]) 55   Manual Therapy 1 MFR, counterstrain   Manual Therapy 1 - Details dural tube pull, cranial base release, (R) LFC6-7-MS, (B) PLLC4-6-MS, parietal lift, (B) UT release, (B) DCFA-MS, thoracic inlet, cranial base release,   Skilled Intervention MFR/counterstrain (pain, mobility)   Patient Response/Progress good. tolerated well. Fair releases noted with manual techniques.   Education   Learner/Method Patient;Listening;Demonstration;Pictures/Video   Plan   Home program PTRx   Plan for next session SOR, manual therapy, review of HEP, UBE, nerve glides, pec stretch   Total Session Time   Timed Code Treatment Minutes 55   Total Treatment Time (sum of timed and untimed services) 55

## 2023-11-07 ENCOUNTER — HOSPITAL ENCOUNTER (OUTPATIENT)
Dept: MRI IMAGING | Facility: HOSPITAL | Age: 58
Discharge: HOME | End: 2023-11-07
Attending: PHYSICIAN ASSISTANT | Admitting: PHYSICIAN ASSISTANT
Payer: COMMERCIAL

## 2023-11-07 DIAGNOSIS — M47.812 CERVICAL SPONDYLOSIS WITHOUT MYELOPATHY: ICD-10-CM

## 2023-11-07 DIAGNOSIS — M54.12 CERVICAL RADICULITIS: ICD-10-CM

## 2023-11-07 PROCEDURE — 72141 MRI NECK SPINE W/O DYE: CPT

## 2023-11-10 ENCOUNTER — VIRTUAL VISIT (OUTPATIENT)
Dept: SURGERY | Facility: CLINIC | Age: 58
End: 2023-11-10
Payer: COMMERCIAL

## 2023-11-10 VITALS — BODY MASS INDEX: 31.66 KG/M2 | HEIGHT: 66 IN | WEIGHT: 197 LBS

## 2023-11-10 DIAGNOSIS — E88.810 METABOLIC SYNDROME: ICD-10-CM

## 2023-11-10 DIAGNOSIS — E66.811 CLASS 1 OBESITY WITH BODY MASS INDEX (BMI) OF 30.0 TO 30.9 IN ADULT, UNSPECIFIED OBESITY TYPE, UNSPECIFIED WHETHER SERIOUS COMORBIDITY PRESENT: Primary | ICD-10-CM

## 2023-11-10 PROCEDURE — 99214 OFFICE O/P EST MOD 30 MIN: CPT | Mod: 93 | Performed by: FAMILY MEDICINE

## 2023-11-10 RX ORDER — PHENTERMINE HYDROCHLORIDE 37.5 MG/1
37.5 TABLET ORAL
Qty: 90 TABLET | Refills: 1 | Status: SHIPPED | OUTPATIENT
Start: 2023-11-10 | End: 2024-05-10

## 2023-11-10 RX ORDER — TURMERIC/TURMERIC EXT/PEPR EXT 900-100 MG
CAPSULE ORAL
COMMUNITY
End: 2024-08-09

## 2023-11-10 ASSESSMENT — PAIN SCALES - GENERAL: PAINLEVEL: MODERATE PAIN (5)

## 2023-11-10 NOTE — NURSING NOTE
Is the patient currently in the state of [place]? YES    Visit mode:TELEPHONE    If the visit is dropped, the patient can be reconnected by: TELEPHONE VISIT: Phone number:   Telephone Information:   Mobile [phone]       Will anyone else be joining the visit? NO  (If patient encounters technical issues they should call [phone])    How would you like to obtain your AVS? MyChart    Are changes needed to the allergy or medication list? No, Pt stated no changes to allergies, and Pt stated no med changes    Reason for visit: MARY SETEBAN

## 2023-11-10 NOTE — LETTER
11/10/2023         RE: Keiry Mchugh  [address] [facility] [address]        Dear Colleague,    Thank you for referring your patient, Keiry Mchugh, to the [facility] [place]. Please see a copy of my visit note below.    Virtual Visit Details    Type of service:  Telephone Visit   Phone call duration: 30 minutes     Bariatric Follow-up    58 year old  female BMI:Body mass index is 31.56 kg/m .    Weight:   Wt Readings from Last 1 Encounters:   11/10/23 89.4 kg (197 lb)    pounds    Comorbidities:  Patient Active Problem List   Diagnosis     Chronic insomnia     HARDIN (nonalcoholic steatohepatitis)     Sigmoid diverticulitis     Dyslipidemia     Metabolic syndrome     Celiac disease     Low bone mass     Profound fatigue     Cervical spondylosis without myelopathy     Cervical radiculitis       Interim: Maintaining a 23# (>10%) Started berberine 5 weeks ago. 15# weight loss since then. Eating strawberries. Bananas . No pasta or bread. Feels legs are heavy. When standing long feels her legs will collapse.  Used to count steps. Not currently counting. Was getting 8-13K steps in the past at work.    Plan:  DIET  whole foods, anti-inflammatory eating   EXERCISE PT-continue PT exercises,    PHARMACOTHERAPY continue phentermine    Discussed optimizing metabolism, working with RD toward continued and improved clean eating.     -We reviewed her medications and whether associated with weight gain.    Current Outpatient Medications:      aspirin-acetaminophen-caffeine (EXCEDRIN MIGRAINE) 250-250-65 MG tablet, Take 1 tablet by mouth, Disp: , Rfl:      atenolol (TENORMIN) 50 MG tablet, Take 1 tablet (50 mg) by mouth daily as needed (Migraines), Disp: 30 tablet, Rfl: 1     B Complex-C (RA B-COMPLEX/VITAMIN C CR) TBCR, Take 1 tablet by mouth daily, Disp: , Rfl:      baclofen (LIORESAL) 10 MG tablet, TAKE 1 TABLET BY MOUTH TWICE DAILY TO THREE TIMES DAILY AS NEEDED FOR  MUSCLE SPASMS., Disp: 30 tablet, Rfl: 3     Berberine Chloride 500 MG CAPS, , Disp: , Rfl:      Black Pepper-Turmeric (TURMERIC CURCUMIN) 5-1000 MG CAPS, , Disp: , Rfl:      Coenzyme Q10 (COQ-10) 400 MG CAPS, Take 1 capsule by mouth daily , Disp: , Rfl:      FLUoxetine (PROZAC) 20 MG capsule, Take 1 capsule (20 mg) by mouth daily, Disp: 90 capsule, Rfl: 1     Lactobacillus (PROBIOTIC ACIDOPHILUS) CAPS, , Disp: , Rfl:      magnesium oxide (MAG-OX) 400 (240 Mg) MG tablet, Take 400 mg by mouth daily , Disp: , Rfl:      phentermine (ADIPEX-P) 37.5 MG tablet, Take 1 tablet (37.5 mg) by mouth every morning (before breakfast), Disp: 90 tablet, Rfl: 1     polyethylene glycol (MIRALAX) 17 GM/Dose powder, Take 17 g by mouth daily as needed, Disp: , Rfl:      SUMAtriptan (IMITREX) 100 MG tablet, TAKE 1/2 TO 1 TABLET BY MOUTH AT ONSET OF HEADACHE, Disp: 9 tablet, Rfl: 3     vitamin B complex with vitamin C (STRESS TAB) tablet, Take 1 tablet by mouth daily, Disp: , Rfl:       We discussed [facility] Bariatric Basics including:  -eating 3 meals daily  -eating protein first  -eating slowly, chewing food well  -avoiding/limiting calorie containing beverages  -choosing wheat, not white with breads, crackers, pastas, venu, bagels, tortillas, rice  -limiting restaurant or cafeteria eating to twice a week or less  -We discussed the importance of restorative sleep and stress management in maintaining a healthy weight.  -We discussed insulin resistance and glycemic index as it relates to appetite and weight control  -We discussed the National Weight Control Registry healthy weight maintenance strategies and ways to optimize metabolism.  -We discussed the importance of physical activity including cardiovascular and strength training in maintaining a healthier weight and explored viable options.    Most recent labs:  Lab Results   Component Value Date    WBC 4.1 10/11/2023    HGB 14.5 10/11/2023    HCT 42.6 10/11/2023    MCV 92 10/11/2023  "    10/11/2023     Lab Results   Component Value Date    CHOL 260 (H) 01/16/2023     Lab Results   Component Value Date    HDL 88 01/16/2023       Lab Results   Component Value Date    TRIG 84 01/16/2023     Lab Results   Component Value Date    ALT 40 10/11/2023    AST 26 10/11/2023    ALKPHOS 86 10/11/2023       Lab Results   Component Value Date    TSH 1.78 10/11/2023         DIETARY HISTORY  Meals Per Day: 3  Eating Protein First?: yes  Food Diary: B:eggs and turkey sausage S: fruit L:leftovers D:chicken and brown rice and veggies  Snacks Per Day: rare/occ  Typical Snack: fruit or protein bar-gluten free-pure protein  Fluid Intake: water  Portion Control: improved  Calorie Containing Beverages: none  Alcohol per week: no  Typical Protein Food Choices: lean  Choosing Whole Grains: brown rice  Grocery Shopping is done by: herself  Food Preparation is done by: herself  Meals at Restaurant/Cafeteria/Take Out Per Week: <2  Eating at the Table:   TV is Off During Meals:     Positive Changes Since Last Visit: more whole foods  Struggling With: pain-    Knowledgeable in Reading Food Labels: yes  Getting Adequate Protein: yes  Sleeping 7-8 hours/day improved since parathyroid surgery now sever hypersomnia  Stress management OK    PHYSICAL ACTIVITY PATTERNS:  Cardiovascular: PT  Strength Training: PT    REVIEW OF SYSTEMS    PHYSICAL EXAM: (most recent vitals and today's stated weight)  Vitals: Ht 1.683 m (5' 6.25\")   Wt 89.4 kg (197 lb)   BMI 31.56 kg/m        GEN: Pleasant and in no acute distress  PSYCH: A&OX3,     I have reviewed the note as documented above.  This accurately captures the substance of my conversation with the patient.  Thank you for the opportunity to participate in the care of your patient.    Radha Gómez MD, FAAFP  [facility]  Diplomate, American Board of Obesity Medicine    Total time spent on the date of this encounter doing: chart review, review of test results, " patient visit, physical exam, education, counseling, developing plan of care, and documenting = 30 minutes.        Again, thank you for allowing me to participate in the care of your patient.        Sincerely,        Radha Gómez MD

## 2023-11-10 NOTE — PROGRESS NOTES
Virtual Visit Details    Type of service:  Telephone Visit   Phone call duration: 30 minutes     Bariatric Follow-up    58 year old  female BMI:Body mass index is 31.56 kg/m .    Weight:   Wt Readings from Last 1 Encounters:   11/10/23 89.4 kg (197 lb)    pounds    Comorbidities:  Patient Active Problem List   Diagnosis    Chronic insomnia    HARDIN (nonalcoholic steatohepatitis)    Sigmoid diverticulitis    Dyslipidemia    Metabolic syndrome    Celiac disease    Low bone mass    Profound fatigue    Cervical spondylosis without myelopathy    Cervical radiculitis       Interim: Maintaining a 23# (>10%) Started berberine 5 weeks ago. 15# weight loss since then. Eating strawberries. Bananas . No pasta or bread. Feels legs are heavy. When standing long feels her legs will collapse.  Used to count steps. Not currently counting. Was getting 8-13K steps in the past at work.    Plan:  DIET  whole foods, anti-inflammatory eating   EXERCISE PT-continue PT exercises,    PHARMACOTHERAPY continue phentermine    Discussed optimizing metabolism, working with RD toward continued and improved clean eating.     -We reviewed her medications and whether associated with weight gain.    Current Outpatient Medications:     aspirin-acetaminophen-caffeine (EXCEDRIN MIGRAINE) 250-250-65 MG tablet, Take 1 tablet by mouth, Disp: , Rfl:     atenolol (TENORMIN) 50 MG tablet, Take 1 tablet (50 mg) by mouth daily as needed (Migraines), Disp: 30 tablet, Rfl: 1    B Complex-C (RA B-COMPLEX/VITAMIN C CR) TBCR, Take 1 tablet by mouth daily, Disp: , Rfl:     baclofen (LIORESAL) 10 MG tablet, TAKE 1 TABLET BY MOUTH TWICE DAILY TO THREE TIMES DAILY AS NEEDED FOR MUSCLE SPASMS., Disp: 30 tablet, Rfl: 3    Berberine Chloride 500 MG CAPS, , Disp: , Rfl:     Black Pepper-Turmeric (TURMERIC CURCUMIN) 5-1000 MG CAPS, , Disp: , Rfl:     Coenzyme Q10 (COQ-10) 400 MG CAPS, Take 1 capsule by mouth daily , Disp: , Rfl:     FLUoxetine (PROZAC) 20 MG capsule,  Take 1 capsule (20 mg) by mouth daily, Disp: 90 capsule, Rfl: 1    Lactobacillus (PROBIOTIC ACIDOPHILUS) CAPS, , Disp: , Rfl:     magnesium oxide (MAG-OX) 400 (240 Mg) MG tablet, Take 400 mg by mouth daily , Disp: , Rfl:     phentermine (ADIPEX-P) 37.5 MG tablet, Take 1 tablet (37.5 mg) by mouth every morning (before breakfast), Disp: 90 tablet, Rfl: 1    polyethylene glycol (MIRALAX) 17 GM/Dose powder, Take 17 g by mouth daily as needed, Disp: , Rfl:     SUMAtriptan (IMITREX) 100 MG tablet, TAKE 1/2 TO 1 TABLET BY MOUTH AT ONSET OF HEADACHE, Disp: 9 tablet, Rfl: 3    vitamin B complex with vitamin C (STRESS TAB) tablet, Take 1 tablet by mouth daily, Disp: , Rfl:       We discussed [facility] Bariatric Basics including:  -eating 3 meals daily  -eating protein first  -eating slowly, chewing food well  -avoiding/limiting calorie containing beverages  -choosing wheat, not white with breads, crackers, pastas, venu, bagels, tortillas, rice  -limiting restaurant or cafeteria eating to twice a week or less  -We discussed the importance of restorative sleep and stress management in maintaining a healthy weight.  -We discussed insulin resistance and glycemic index as it relates to appetite and weight control  -We discussed the National Weight Control Registry healthy weight maintenance strategies and ways to optimize metabolism.  -We discussed the importance of physical activity including cardiovascular and strength training in maintaining a healthier weight and explored viable options.    Most recent labs:  Lab Results   Component Value Date    WBC 4.1 10/11/2023    HGB 14.5 10/11/2023    HCT 42.6 10/11/2023    MCV 92 10/11/2023     10/11/2023     Lab Results   Component Value Date    CHOL 260 (H) 01/16/2023     Lab Results   Component Value Date    HDL 88 01/16/2023       Lab Results   Component Value Date    TRIG 84 01/16/2023     Lab Results   Component Value Date    ALT 40 10/11/2023    AST 26 10/11/2023     "ALKPHOS 86 10/11/2023       Lab Results   Component Value Date    TSH 1.78 10/11/2023         DIETARY HISTORY  Meals Per Day: 3  Eating Protein First?: yes  Food Diary: B:eggs and turkey sausage S: fruit L:leftovers D:chicken and brown rice and veggies  Snacks Per Day: rare/occ  Typical Snack: fruit or protein bar-gluten free-pure protein  Fluid Intake: water  Portion Control: improved  Calorie Containing Beverages: none  Alcohol per week: no  Typical Protein Food Choices: lean  Choosing Whole Grains: brown rice  Grocery Shopping is done by: herself  Food Preparation is done by: herself  Meals at Restaurant/Cafeteria/Take Out Per Week: <2  Eating at the Table:   TV is Off During Meals:     Positive Changes Since Last Visit: more whole foods  Struggling With: pain-    Knowledgeable in Reading Food Labels: yes  Getting Adequate Protein: yes  Sleeping 7-8 hours/day improved since parathyroid surgery now sever hypersomnia  Stress management OK    PHYSICAL ACTIVITY PATTERNS:  Cardiovascular: PT  Strength Training: PT    REVIEW OF SYSTEMS    PHYSICAL EXAM: (most recent vitals and today's stated weight)  Vitals: Ht 1.683 m (5' 6.25\")   Wt 89.4 kg (197 lb)   BMI 31.56 kg/m        GEN: Pleasant and in no acute distress  PSYCH: A&OX3,     I have reviewed the note as documented above.  This accurately captures the substance of my conversation with the patient.  Thank you for the opportunity to participate in the care of your patient.    Radha Gómez MD, FAAFP  [facility]  Diplomate, American Board of Obesity Medicine    Total time spent on the date of this encounter doing: chart review, review of test results, patient visit, physical exam, education, counseling, developing plan of care, and documenting = 30 minutes.      "

## 2023-11-13 NOTE — PROGRESS NOTES
Assessment:   Keiry Mchugh is a 58 year old y.o. female with past medical history significant for Jarquin, celiac disease, dyslipidemia, metabolic syndrome, hypertension, chronic insomnia   who presents today for follow-up regarding chronic right greater than left neck pain with right upper extremity paresthesias involving fingers 1, 2, 3.  Pain is moderately severe.  My review of an MRI cervical spine shows multilevel cervical spondylosis.  At C6-7 there is severe right foraminal stenosis related to a circumferential disc bulge/spur.  She appears to be primarily symptomatic from cervical facet arthropathy particularly on the right at C4-5 and C5-6.  She has some pain radiating into the right parascapular region which may be referred from the facets versus a proximal radiculitis.  She does not have any pain down the arm but she has numbness and tingling right fingers 1, 2, 3.  Question radiculopathy versus carpal tunnel syndrome.  - I also see this patient for chronic migraine.  She had Botox injections for chronic migraine October 13, 2023 with significant improvement in pain.         Plan:     A shared decision making plan was used.  The patient's values and choices were respected.  The following represents what was discussed and decided upon by the physician assistant and the patient.      1.  DIAGNOSTIC TESTS:  - I reviewed the MRI cervical spine.  - I ordered and EMG right upper extremity for further evaluation.    2.  PHYSICAL THERAPY: Patient is currently physical therapy.  She has had 3 sessions so far.  Encouraged to continue with physical therapy and home exercises.    3.  MEDICATIONS:  - Methylprednisolone/Benadryl prep was prescribed because patient has a iodinated contrast dye allergy.  - Patient takes baclofen as needed but minimizes her use of this because it is sedating and can cause some migraine  - Patient also uses Excedrin Migraine  - Patient takes sumatriptan    4.  INTERVENTIONS:  - Patient is  scheduled for Botox injections for chronic migraine January 19, 2024.  - I offer the patient right C4, C5, C6 medial branch blocks as a work-up toward radiofrequency ablation.  I chose to target these joints because to my eye the right C3-4 facet joint appears ankylosed.  She has tenderness to palpation over the mid cervical facets.  She had a positive Kemps maneuver bilaterally.  Patient indicated she would like to proceed and an order was placed.  - If right side improves but she continues to have left-sided symptoms we could pursue medial branch blocks on the left.  - If she fails the medial branch blocks I would recommend a C7-T1 interlaminar epidural steroid injection.  However, we will need to be mindful of other injections that she is getting.  She gets a left shoulder injection every 12 weeks and she is anticipating getting a cortisone shot in her right wrist in the near future.  - If that does not help I would recommend right C6-7 transforaminal epidural steroid injection.      5.  PATIENT EDUCATION: Patient is in agreement the above plan.  All questions were answered.    6.  FOLLOW-UP: Patient will return to the clinic for her right C4, C5, C6 medial branch blocks.  If she has questions or concerns in the meantime, she should not hesitate to call.    Subjective:     Keiry Mchugh is a 58 year old female who presents today for follow-up regarding chronic migraine and neck pain with right upper extremity radicular symptoms.  Patient had Botox injections for chronic migraine October 13, 2023.  She reports significant improvement in pain.  She reports only 1 migraine per week since her injections.  She also returns today to review her MRI cervical spine results.  She continues have significant neck pain.    Patient complains of right greater than left neck pain.  Pain is all in the mid cervical region.  Pain radiates into the right shoulder blade.  She denies pain down the arm.  She has numbness and tingling  right fingers 1, 2, 3.  She also has significant pain and swelling in the joints in her hands.  She is scheduled to see rheumatology.  She rates her pain today as a 6 out of 10.  At its worst it is a 10 out of 10.  At best it is a 4 out of 10.  Pain is aggravated with work, chores, lying down too long.  Pain is alleviated with taking Advil.  She denies any new symptoms since she was last seen.    Patient has tried the following treatments for neck pain  - Current physical therapy, 3 sessions so far.  This is her third round of physical therapy for neck pain over the years.  - Medications listed below    Patient has tried the following treatments for migraine:  -Physical therapy for in 2018  - Botox injections for chronic migraine October 13, 2023  - Botox injections for chronic migraine June 30, 2023  -Botox injections for chronic migraine September 19, 2016  -Botox injections for chronic migraine June 8, 2016  - Botox injections for chronic migraine March 7, 2016  - Botox injections for chronic migraine November 16, 2015  - Botox injections for chronic migraine August 18, 2015  - Botox injections for chronic migraine April 13, 2015  - Botox injections for chronic migraine in January 12, 2015  -1 additional session for Botox injections through another provider in 2016 or 2017 which was not helpful and she had bilateral lid ptosis  - Chiropractic treatment was not helpful and she could not always get adjustments because of the arthritis in her neck.  She stopped going because it is where the chiropractor made her arthritis worse  - Massage was not helpful and caused more pain  - Acupuncture once, cannot recall response  - Gabapentin caused eye twitching  - Baclofen causes migraines  - Opiates cause migraines  - Unable to take Tylenol due to HARDIN  - Unable to take Advil due to HARDIN  - Sumatriptan 3-4 times per week is very helpful  - Excedrin every other day is somewhat helpful  - Atenolol was not helpful    Review of  Systems:  Positive for numbness/tingling, weakness, loss of bladder control, headache, pain much worse at night difficulty with hand skills.  Negative for loss of bowel control, inability to urinate, trip/stumble/falls, difficulty swallowing, fevers, unintentional weight loss.     Objective:   CONSTITUTIONAL:  Vital signs as above.  No acute distress.  The patient is well nourished and well groomed.    PSYCHIATRIC:  The patient is awake, alert, oriented to person, place and time.  The patient is answering questions appropriately with clear speech.  Normal affect.  HEENT: Normocephalic, atraumatic.  Sclera clear.    LYMPH: No cervical lymphadenopathy  SKIN:  Skin over the face, neck bilateral upper extremities is clean, dry, intact without rashes.  MUSCULOSKELETAL: Tender to palpation right greater than left mid cervical facets.  Cervical range of motion is significantly restricted with lateral rotation bilaterally.  Hypertonicity right greater than left cervical paraspinous muscles and upper trapezius muscles.  The patient has 5/5 strength for the right shoulder abductors (deferred on the left due to severe left shoulder pain), elbow flexors/extensors, wrist extensors, finger flexors/abductors.   NEUROLOGICAL:   Negative Kaur's bilaterally.  Sensation to light touch is diminished right fingers 1, 2, 3.    RESULTS:     I reviewed the MRI cervical spine from [facility] dated November 7, 2023.  This shows multilevel degenerative change.  At C3-4 there is mild right foraminal stenosis.  At C4-5 there is moderate right and minimal left foraminal stenosis.  At C5-6 there is mild bilateral foraminal stenosis.  At C6-7 there is severe right and mild left foraminal stenosis.  At C7-T1 there is mild to moderate right and minimal left foraminal stenosis.     EMG right upper extremity May 31, 2023:  Interpretation: Normal study     1. There is no electrodiagnostic evidence of cervical radiculopathy, brachial plexopathy, or  focal neuropathy in the right upper extremity.  Specifically, there is no electrodiagnostic evidence of median neuropathy at the wrist in the right upper extremity or C6 radiculopathy in the right upper extremity.  Patient is in agreement the above plan.  All questions were answered.

## 2023-11-17 ENCOUNTER — THERAPY VISIT (OUTPATIENT)
Dept: PHYSICAL THERAPY | Facility: REHABILITATION | Age: 58
End: 2023-11-17
Payer: COMMERCIAL

## 2023-11-17 ENCOUNTER — OFFICE VISIT (OUTPATIENT)
Dept: PHYSICAL MEDICINE AND REHAB | Facility: CLINIC | Age: 58
End: 2023-11-17
Payer: COMMERCIAL

## 2023-11-17 VITALS
DIASTOLIC BLOOD PRESSURE: 78 MMHG | WEIGHT: 201.2 LBS | BODY MASS INDEX: 32.33 KG/M2 | SYSTOLIC BLOOD PRESSURE: 139 MMHG | OXYGEN SATURATION: 98 % | HEIGHT: 66 IN | HEART RATE: 85 BPM

## 2023-11-17 DIAGNOSIS — M54.12 CERVICAL RADICULITIS: ICD-10-CM

## 2023-11-17 DIAGNOSIS — R20.2 PARESTHESIA: ICD-10-CM

## 2023-11-17 DIAGNOSIS — Z91.041 ALLERGY TO IODINATED CONTRAST: ICD-10-CM

## 2023-11-17 DIAGNOSIS — M47.812 FACET ARTHROPATHY, CERVICAL: Primary | ICD-10-CM

## 2023-11-17 DIAGNOSIS — M47.812 CERVICAL SPONDYLOSIS WITHOUT MYELOPATHY: Primary | ICD-10-CM

## 2023-11-17 PROCEDURE — 97110 THERAPEUTIC EXERCISES: CPT | Mod: GP | Performed by: PHYSICAL THERAPIST

## 2023-11-17 PROCEDURE — 97140 MANUAL THERAPY 1/> REGIONS: CPT | Mod: GP | Performed by: PHYSICAL THERAPIST

## 2023-11-17 PROCEDURE — 99214 OFFICE O/P EST MOD 30 MIN: CPT | Performed by: PHYSICIAN ASSISTANT

## 2023-11-17 RX ORDER — DIPHENHYDRAMINE HCL 50 MG
CAPSULE ORAL
Qty: 1 CAPSULE | Refills: 0 | Status: SHIPPED | OUTPATIENT
Start: 2023-11-17 | End: 2024-01-19

## 2023-11-17 RX ORDER — METHYLPREDNISOLONE 32 MG/1
TABLET ORAL
Qty: 2 TABLET | Refills: 0 | Status: SHIPPED | OUTPATIENT
Start: 2023-11-17 | End: 2024-01-19

## 2023-11-17 ASSESSMENT — PAIN SCALES - GENERAL: PAINLEVEL: SEVERE PAIN (6)

## 2023-11-17 NOTE — PATIENT INSTRUCTIONS
Medial Branch Block (MBB) TEST    This is a TEST injection to find out what is causing your pain.  Specifically, this test is trying to identify whichjoint in your back or neck is causing pain.   This injection will NOT provide any long term pain relief because it is just a TEST.  Steroid is NOT used for this injection.   Steroid is what gives longterm pain relief.  Since there is no steroid used, there is no long term pain relief.    You need a  for the procedure.    Because we want to determine what is causing your pain, we need you to do a few things on the day of your Medial Branch Block :     Do not take any pain medications on the day of your Medial Branch Block/Test.  Try to do activities that make our pain increase.  We want you tohave a high level of pain on the day of the test.  This makes it easier to know the results of the test.  Other information:    You may eatand drink on the day of the test.  You should take your other medications (just not pain medications) at the times you usually take them  You will be asked to fill out a pain diary for 6 hours after thetest.    AFTER THE TEST:    Please do not take any pain medications for 6 hours after the TEST.  After the pain diary is filled out, youmay resume taking your pain medications.  Please return the pain diary to the [facility] the next day or as soon as you are able.  You will be contacted with what will happen next after the physicianreviews your pain diary.  You may be asked to come in for a follow-up appointment to discuss other treatment options  It may be recommended that you have an injection to help treatyour pain.  You may need to come in for a second set of Medial Branch Blocks (TESTS).        Please call the spine center at [phone] if you have any questions.

## 2023-11-17 NOTE — LETTER
11/17/2023         RE: Keiry Mchugh  [address]  [facility] [address]        Dear Colleague,    Thank you for referring your patient, Keiry Mchugh, to the [facility]. Please see a copy of my visit note below.    Assessment:   Keiry Mchugh is a 58 year old y.o. female with past medical history significant for Jarquin, celiac disease, dyslipidemia, metabolic syndrome, hypertension, chronic insomnia   who presents today for follow-up regarding chronic right greater than left neck pain with right upper extremity paresthesias involving fingers 1, 2, 3.  Pain is moderately severe.  My review of an MRI cervical spine shows multilevel cervical spondylosis.  At C6-7 there is severe right foraminal stenosis related to a circumferential disc bulge/spur.  She appears to be primarily symptomatic from cervical facet arthropathy particularly on the right at C4-5 and C5-6.  She has some pain radiating into the right parascapular region which may be referred from the facets versus a proximal radiculitis.  She does not have any pain down the arm but she has numbness and tingling right fingers 1, 2, 3.  Question radiculopathy versus carpal tunnel syndrome.  - I also see this patient for chronic migraine.  She had Botox injections for chronic migraine October 13, 2023 with significant improvement in pain.         Plan:     A shared decision making plan was used.  The patient's values and choices were respected.  The following represents what was discussed and decided upon by the physician assistant and the patient.      1.  DIAGNOSTIC TESTS:  - I reviewed the MRI cervical spine.  - I ordered and EMG right upper extremity for further evaluation.    2.  PHYSICAL THERAPY: Patient is currently physical therapy.  She has had 3 sessions so far.  Encouraged to continue with physical therapy and home exercises.    3.  MEDICATIONS:  - Methylprednisolone/Benadryl prep was prescribed because patient  has a iodinated contrast dye allergy.  - Patient takes baclofen as needed but minimizes her use of this because it is sedating and can cause some migraine  - Patient also uses Excedrin Migraine  - Patient takes sumatriptan    4.  INTERVENTIONS:  - Patient is scheduled for Botox injections for chronic migraine January 19, 2024.  - I offer the patient right C4, C5, C6 medial branch blocks as a work-up toward radiofrequency ablation.  I chose to target these joints because to my eye the right C3-4 facet joint appears ankylosed.  She has tenderness to palpation over the mid cervical facets.  She had a positive Kemps maneuver bilaterally.  Patient indicated she would like to proceed and an order was placed.  - If right side improves but she continues to have left-sided symptoms we could pursue medial branch blocks on the left.  - If she fails the medial branch blocks I would recommend a C7-T1 interlaminar epidural steroid injection.  However, we will need to be mindful of other injections that she is getting.  She gets a left shoulder injection every 12 weeks and she is anticipating getting a cortisone shot in her right wrist in the near future.  - If that does not help I would recommend right C6-7 transforaminal epidural steroid injection.      5.  PATIENT EDUCATION: Patient is in agreement the above plan.  All questions were answered.    6.  FOLLOW-UP: Patient will return to the clinic for her right C4, C5, C6 medial branch blocks.  If she has questions or concerns in the meantime, she should not hesitate to call.    Subjective:     Keiry Mchugh is a 58 year old female who presents today for follow-up regarding chronic migraine and neck pain with right upper extremity radicular symptoms.  Patient had Botox injections for chronic migraine October 13, 2023.  She reports significant improvement in pain.  She reports only 1 migraine per week since her injections.  She also returns today to review her MRI cervical spine  results.  She continues have significant neck pain.    Patient complains of right greater than left neck pain.  Pain is all in the mid cervical region.  Pain radiates into the right shoulder blade.  She denies pain down the arm.  She has numbness and tingling right fingers 1, 2, 3.  She also has significant pain and swelling in the joints in her hands.  She is scheduled to see rheumatology.  She rates her pain today as a 6 out of 10.  At its worst it is a 10 out of 10.  At best it is a 4 out of 10.  Pain is aggravated with work, chores, lying down too long.  Pain is alleviated with taking Advil.  She denies any new symptoms since she was last seen.    Patient has tried the following treatments for neck pain  - Current physical therapy, 3 sessions so far.  This is her third round of physical therapy for neck pain over the years.  - Medications listed below    Patient has tried the following treatments for migraine:  -Physical therapy for in 2018  - Current physical therapy, 3 sessions so far***  - Botox injections for chronic migraine October 13, 2023***  - Botox injections for chronic migraine June 30, 2023  -Botox injections for chronic migraine September 19, 2016  -Botox injections for chronic migraine June 8, 2016  - Botox injections for chronic migraine March 7, 2016  - Botox injections for chronic migraine November 16, 2015  - Botox injections for chronic migraine August 18, 2015  - Botox injections for chronic migraine April 13, 2015  - Botox injections for chronic migraine in January 12, 2015  -1 additional session for Botox injections through another provider in 2016 or 2017 which was not helpful and she had bilateral lid ptosis  - Chiropractic treatment was not helpful and she could not always get adjustments because of the arthritis in her neck.  She stopped going because it is where the chiropractor made her arthritis worse  - Massage was not helpful and caused more pain  - Acupuncture once, cannot recall  response  - Gabapentin caused eye twitching  - Baclofen causes migraines  - Opiates cause migraines  - Unable to take Tylenol due to HARDIN  - Unable to take Advil due to HARDIN  - Sumatriptan 3-4 times per week is very helpful  - Excedrin every other day is somewhat helpful  - Atenolol was not helpful    Review of Systems:  Positive for numbness/tingling, weakness, loss of bladder control, headache, pain much worse at night difficulty with hand skills.  Negative for loss of bowel control, inability to urinate, trip/stumble/falls, difficulty swallowing, fevers, unintentional weight loss.     Objective:   CONSTITUTIONAL:  Vital signs as above.  No acute distress.  The patient is well nourished and well groomed.    PSYCHIATRIC:  The patient is awake, alert, oriented to person, place and time.  The patient is answering questions appropriately with clear speech.  Normal affect.  HEENT: Normocephalic, atraumatic.  Sclera clear.    LYMPH: No cervical lymphadenopathy  SKIN:  Skin over the face, neck bilateral upper extremities is clean, dry, intact without rashes.  MUSCULOSKELETAL: Tender to palpation right greater than left mid cervical facets.  Cervical range of motion is significantly restricted with lateral rotation bilaterally.  Hypertonicity right greater than left cervical paraspinous muscles and upper trapezius muscles.  The patient has 5/5 strength for the right shoulder abductors (deferred on the left due to severe left shoulder pain), elbow flexors/extensors, wrist extensors, finger flexors/abductors.   NEUROLOGICAL:   Negative Kaur's bilaterally.  Sensation to light touch is diminished right fingers 1, 2, 3.    RESULTS:     I reviewed the MRI cervical spine from [facility] dated November 7, 2023.  This shows multilevel degenerative change.  At C3-4 there is mild right foraminal stenosis.  At C4-5 there is moderate right and minimal left foraminal stenosis.  At C5-6 there is mild bilateral foraminal stenosis.  At  C6-7 there is severe right and mild left foraminal stenosis.  At C7-T1 there is mild to moderate right and minimal left foraminal stenosis.     EMG right upper extremity May 31, 2023:  Interpretation: Normal study     1. There is no electrodiagnostic evidence of cervical radiculopathy, brachial plexopathy, or focal neuropathy in the right upper extremity.  Specifically, there is no electrodiagnostic evidence of median neuropathy at the wrist in the right upper extremity or C6 radiculopathy in the right upper extremity.  Patient is in agreement the above plan.  All questions were answered.      Again, thank you for allowing me to participate in the care of your patient.        Sincerely,        Damaris Leslie PA-C

## 2023-11-21 ENCOUNTER — LAB (OUTPATIENT)
Dept: LAB | Facility: CLINIC | Age: 58
End: 2023-11-21
Payer: COMMERCIAL

## 2023-11-21 ENCOUNTER — OFFICE VISIT (OUTPATIENT)
Dept: RHEUMATOLOGY | Facility: CLINIC | Age: 58
End: 2023-11-21
Payer: COMMERCIAL

## 2023-11-21 VITALS
HEART RATE: 86 BPM | DIASTOLIC BLOOD PRESSURE: 80 MMHG | WEIGHT: 201.4 LBS | OXYGEN SATURATION: 97 % | SYSTOLIC BLOOD PRESSURE: 116 MMHG | BODY MASS INDEX: 32.26 KG/M2

## 2023-11-21 DIAGNOSIS — R76.8 RHEUMATOID FACTOR POSITIVE: ICD-10-CM

## 2023-11-21 DIAGNOSIS — M25.50 POLYARTHRALGIA: Primary | ICD-10-CM

## 2023-11-21 DIAGNOSIS — M15.0 PRIMARY OSTEOARTHRITIS INVOLVING MULTIPLE JOINTS: ICD-10-CM

## 2023-11-21 DIAGNOSIS — M25.50 POLYARTHRALGIA: ICD-10-CM

## 2023-11-21 DIAGNOSIS — K75.81 NASH (NONALCOHOLIC STEATOHEPATITIS): ICD-10-CM

## 2023-11-21 PROCEDURE — 86038 ANTINUCLEAR ANTIBODIES: CPT

## 2023-11-21 PROCEDURE — 36415 COLL VENOUS BLD VENIPUNCTURE: CPT

## 2023-11-21 PROCEDURE — 99214 OFFICE O/P EST MOD 30 MIN: CPT | Performed by: INTERNAL MEDICINE

## 2023-11-21 RX ORDER — PREDNISONE 10 MG/1
15 TABLET ORAL DAILY
Qty: 45 TABLET | Refills: 0 | Status: SHIPPED | OUTPATIENT
Start: 2023-11-21 | End: 2023-11-28

## 2023-11-21 NOTE — PROGRESS NOTES
Rheumatology follow-up visit note     Keiry is a 58 year old female presents today for follow-up.    Samantha was seen today for recheck.    Diagnoses and all orders for this visit:    Polyarthralgia  -     predniSONE (DELTASONE) 10 MG tablet; Take 1.5 tablets (15 mg) by mouth daily for 30 days  -     Anti Nuclear Earlene IgG by IFA with Reflex; Future    Rheumatoid factor positive  -     Anti Nuclear Earlene IgG by IFA with Reflex; Future    Primary osteoarthritis involving multiple joints    HARDIN (nonalcoholic steatohepatitis)        This patient demonstrates several signals which require careful evaluation for inflammatory joint disease in the background of degenerative arthropathy primary.  Family history of psoriasis.  Her acute phase response modestly elevated.  She has been to take prednisone for the next couple of weeks.  Will meet there after further course to be tolerated accordingly.       HPI    Keiry Mchugh is a 58 year old female is here for follow-up of Polyarthralgias which is widespread.  While she does have clear evidence of osteoarthritis which is axial and appendicular including cervical spondylosis, first [facility] involvement with the same however some of her symptoms appear to have more inflammatory root.  This is true for her wrist such as on the right side and some of the PIP of the right hand.  Meanwhile her workup has shown modest increase in C-reactive protein.  Her anti-CCP antibody was negative rheumatoid factor was just about positive.  Will check her TAHIRA.  Her sister has  has psoriasis and psoriatic arthritis.  She noted pain level is 7.5/10 interfering with some of the day-to-day activities.  She is also noted hip pain by which she means trochanteric area discomfort.  She is stiff in the morning about 2 hours.  She wonders if there is swelling on the wrist on the right side on the ulnar aspect.        Following is the excerpt from a previous note:    joint pains originally this appointment  "was made earlier this year when she had presented with left ankle pain that seem to have come on rather acutely as she got up from a couch was seen at orthopedics initial concern that she might have sustained a stress fracture was later found not to be the case and recalls having had an MRI and was found to have \"an effusion in the ankle\" and lost to come see in rheumatology.  She was given a boot.  And over time though symptoms have subsided currently she has no pain in the ankle or either side.  There is no history of trauma.  She has a however over time perhaps the past 2 years or so somewhat hard for her to pinpoint been hurting in her hands and the wrist.  This is worse on the right side.  She feels that her ring finger on the right especially the PIP area becomes \"joint normal\".  She has pain in her right wrist.  This tends to be worse after yard work or her work as a dental assistant first thing in the morning the wrist is the least painful.  She has had hip area pain pointing of the trochanteric regions worse with activity and also at times bothersome at night when she rolls over on 1 side or the other.  Several years ago she had an x-ray of the C-spine at which point, for reasons that she is does not quite recall perhaps some injury or numbness in the fingers and was told that she has \"arthritis\".  Her recent imaging is available for review including CT and the MRI of the C-spine indeed she has cervical spondylosis she follows up at the spine clinic for this.  She has in addition several comorbidities recently she underwent parathyroidectomy at [facility] and no adenoma was found it sounds from her description as if there was hyperplasia of multiple joints.  Her PTH dropped has began to rise up again.  She has noted propensity to bruise and bleed easily.  She is due to be seen for this sharply elsewhere.  The MRI which was done at [facility] is not available that showed trace tibialis posterior " tendon sheath fluid, minimal tenosynovitis, there was borderline minimal ankle joint effusion, synovitis.  Without associated intra-articular abnormality.  There was no articular cartilage defect or osteochondral lesion.  Her daughter is noted to have lupus no family history of rheumatoid arthritis or ankylosing spondylitis.  Sister has psoriasis.  She works as a dental assistant.  She neither smokes nor takes alcohol.  She has a scar from the recent parathyroid surgery.  DETAILED EXAMINATION  11/21/23  :    Vitals:    11/21/23 0811   BP: 116/80   Pulse: 86   SpO2: 97%   Weight: 91.4 kg (201 lb 6.4 oz)     Alert oriented. Head including the face is examined for malar rash, heliotropes, scarring, lupus pernio. Eyes examined for redness such as in episcleritis/scleritis, periorbital lesions.   Neck/ Face examined for parotid gland swelling, range of motion of neck.  Left upper and lower and right upper and lower extremities examined for tenderness, swelling, warmth of the appendicular joints, range of motion, edema, rash.  Some of the important findings included: she does not have evidence of synovitis in the palpable joints of the upper extremities with the exception of tenderness and subtle swelling on the right wrist on the ulnar aspect of the cervical warmth, scattered PIP tenderness of the multiple digits of the right hand MCP tenderness #2 and left #1, marked tenderness of the trochanteric areas worse on the right side.      Patient Active Problem List    Diagnosis Date Noted    Cervical spondylosis without myelopathy 10/13/2023     Priority: Medium    Cervical radiculitis 10/13/2023     Priority: Medium    Low bone mass 07/21/2023     Priority: Medium    Profound fatigue 07/21/2023     Priority: Medium    Dyslipidemia      Priority: Medium    Metabolic syndrome      Priority: Medium    Celiac disease      Priority: Medium    Sigmoid diverticulitis 08/29/2021     Priority: Medium    HARDIN (nonalcoholic  steatohepatitis) 07/23/2021     Priority: Medium    Chronic insomnia 06/11/2020     Priority: Medium     Insomnia associated with inadequate sleep hygiene and suspected sleep apnea.       Past Surgical History:   Procedure Laterality Date    APPENDECTOMY      CHOLECYSTECTOMY      COLON SURGERY      COLONOSCOPY N/A 3/18/2019    Procedure: COLONOSCOPY;  Surgeon: Davis Mosqueda MD;  Location: [facility];  Service: General    CYSTOSCOPY N/A 7/16/2015    Procedure: CYSTOSCOPY;  Surgeon: Nate Horta MD;  Location: [facility];  Service:     HC CORRECT BUNION,METATARSAL OSTEOTOMY      Description: Bunion Correction With Metatarsal Osteotomy Herman Procedure;  Proc Date: 07/16/2010;    HYSTERECTOMY      HYSTERECTOMY, PAP NO LONGER INDICATED  02/2009    NEPHRECTOMY LIVING DONOR Left APRIL 2012    AZ LAP,BILIARY TRACT,UNLISTED N/A 3/19/2019    Procedure: BIOPSY, LIVER, LAPAROSCOPIC;  Surgeon: Davis Mosqueda MD;  Location: [facility];  Service: General    AZ LAP,SLING OPERATION      Description: Laparoscopic Sling Operation For Stress Incontinence;  Recorded: 02/17/2009;    AZ LAP,SURG,COLECTOMY, PARTIAL, W/ANAST N/A 3/19/2019    Procedure: LAPAROSCOPIC SIGMOID COLON RESECTION;  Surgeon: Davis Mosqueda MD;  Location: [facility];  Service: General    SEPTOPLASTY, TURBINOPLASTY, COMBINED N/A 8/3/2021    Procedure: SEPTOPLASTY, NOSE, WITH TURBINOPLASTY RADIOFREQUENCY BALLON ASSISTED, CRYOBLATION OF NASAL TISSUE;  Surgeon: Randy Case MD;  Location: [facility]    SIGMOIDOSCOPY FLEXIBLE N/A 3/19/2019    Procedure: FLEXIBLE SIGMOIDOSCOPY;  Surgeon: Davis Mosqueda MD;  Location: [facility];  Service: General      Past Medical History:   Diagnosis Date    Anemia     Anxiety     Celiac disease     Celiac disease     Cervical spondylosis without myelopathy 10/13/2023    Chronic kidney disease     donated 1 kidney    Cough     Depression     Seasonal     "Diverticulitis     Dyslipidemia     Enlarged LV     wall    GERD (gastroesophageal reflux disease)     Hepatitis     Hiatal hernia     Hypertension     Low bone mass     Metabolic syndrome     Migraines     HARDIN (nonalcoholic steatohepatitis)     Peripheral neuropathy     PONV (postoperative nausea and vomiting)     Profound fatigue     Ventral hernia      Allergies   Allergen Reactions    Gluten Meal Nausea and Vomiting    Contrast Dye      Hives, resolved with benadryl alone in the past (two episode)    Nsaids Other (See Comments)     Avoid - one kidney    Ondansetron Headache    Pcn [Penicillins] Hives     As a child    Rocephin [Ceftriaxone] Hives     Patient states this started immediately    Tramadol Hives    Zithromax [Azithromycin] Tinnitus     \"ear ringing and hearing loss\"     Current Outpatient Medications   Medication Sig Dispense Refill    aspirin-acetaminophen-caffeine (EXCEDRIN MIGRAINE) 250-250-65 MG tablet Take 1 tablet by mouth      B Complex-C (RA B-COMPLEX/VITAMIN C CR) TBCR Take 1 tablet by mouth daily      Berberine Chloride 500 MG CAPS       Bioflavonoid Products (VITAMIN C) CHEW       Black Pepper-Turmeric (TURMERIC CURCUMIN) 5-1000 MG CAPS       Coenzyme Q10 (COQ-10) 400 MG CAPS Take 1 capsule by mouth daily       FLUoxetine (PROZAC) 20 MG capsule Take 1 capsule (20 mg) by mouth daily 90 capsule 1    Lactobacillus (PROBIOTIC ACIDOPHILUS) CAPS       OnabotulinumtoxinA (BOTOX IJ) For migraines - 155 units q 12 weeks.      phentermine (ADIPEX-P) 37.5 MG tablet Take 1 tablet (37.5 mg) by mouth every morning (before breakfast) 90 tablet 1    SUMAtriptan (IMITREX) 100 MG tablet TAKE 1/2 TO 1 TABLET BY MOUTH AT ONSET OF HEADACHE 9 tablet 3    vitamin B complex with vitamin C (STRESS TAB) tablet Take 1 tablet by mouth daily      atenolol (TENORMIN) 50 MG tablet Take 1 tablet (50 mg) by mouth daily as needed (Migraines) (Patient not taking: Reported on 11/21/2023) 30 tablet 1    baclofen (LIORESAL) " 10 MG tablet TAKE 1 TABLET BY MOUTH TWICE DAILY TO THREE TIMES DAILY AS NEEDED FOR MUSCLE SPASMS. (Patient not taking: Reported on 11/21/2023) 30 tablet 3    diphenhydrAMINE (BENADRYL) 50 MG capsule Take 1 capsule 1 hr before procedure (Patient not taking: Reported on 11/21/2023) 1 capsule 0    magnesium oxide (MAG-OX) 400 (240 Mg) MG tablet Take 400 mg by mouth daily  (Patient not taking: Reported on 11/21/2023)      methylPREDNISolone (MEDROL) 32 MG tablet Take one tab 12 hours before procedure and take 1 tab 2 hours prior to the procedure (Patient not taking: Reported on 11/21/2023) 2 tablet 0    polyethylene glycol (MIRALAX) 17 GM/Dose powder Take 17 g by mouth daily as needed (Patient not taking: Reported on 11/21/2023)       family history includes Breast Cancer (age of onset: 50.00) in her maternal aunt and mother; Graves' disease in her sister; Hashimoto's thyroiditis in her maternal aunt, maternal grandfather, and mother; Hypothyroidism in her maternal aunt, maternal aunt, and sister.  Social Connections: Not on file          WBC Count   Date Value Ref Range Status   10/11/2023 4.1 4.0 - 11.0 10e3/uL Final     RBC Count   Date Value Ref Range Status   10/11/2023 4.64 3.80 - 5.20 10e6/uL Final     Hemoglobin   Date Value Ref Range Status   10/11/2023 14.5 11.7 - 15.7 g/dL Final     Hematocrit   Date Value Ref Range Status   10/11/2023 42.6 35.0 - 47.0 % Final     MCV   Date Value Ref Range Status   10/11/2023 92 78 - 100 fL Final     MCH   Date Value Ref Range Status   10/11/2023 31.3 26.5 - 33.0 pg Final     Platelet Count   Date Value Ref Range Status   10/11/2023 298 150 - 450 10e3/uL Final     % Lymphocytes   Date Value Ref Range Status   05/26/2023 34 % Final     AST   Date Value Ref Range Status   10/11/2023 26 0 - 45 U/L Final     Comment:     Reference intervals for this test were updated on 6/12/2023 to more accurately reflect our healthy population. There may be differences in the flagging of prior  results with similar values performed with this method. Interpretation of those prior results can be made in the context of the updated reference intervals.     ALT   Date Value Ref Range Status   10/11/2023 40 0 - 50 U/L Final     Comment:     Reference intervals for this test were updated on 6/12/2023 to more accurately reflect our healthy population. There may be differences in the flagging of prior results with similar values performed with this method. Interpretation of those prior results can be made in the context of the updated reference intervals.       Albumin   Date Value Ref Range Status   10/11/2023 4.8 3.5 - 5.2 g/dL Final   09/08/2021 4.3 3.5 - 5.0 g/dL Final     Alkaline Phosphatase   Date Value Ref Range Status   10/11/2023 86 35 - 104 U/L Final     Creatinine   Date Value Ref Range Status   10/11/2023 0.89 0.51 - 0.95 mg/dL Final     GFR Estimate   Date Value Ref Range Status   10/11/2023 76 >60 mL/min/1.73m2 Final   12/01/2020 61 >=60 mL/min/BSA Final   06/19/2020 >60 >60 mL/min/1.73m2 Final     GFR Estimate If Black   Date Value Ref Range Status   06/19/2020 >60 >60 mL/min/1.73m2 Final     GFREstimate If Black   Date Value Ref Range Status   12/01/2020 71 >=60 mL/min/BSA Final     Erythrocyte Sedimentation Rate   Date Value Ref Range Status   09/15/2023 20 0 - 30 mm/hr Final     CRP   Date Value Ref Range Status   05/26/2023 3.1 (H) 0.0 - <0.8 mg/dL Final

## 2023-11-22 LAB — ANA SER QL IF: NEGATIVE

## 2023-11-24 ENCOUNTER — THERAPY VISIT (OUTPATIENT)
Dept: PHYSICAL THERAPY | Facility: REHABILITATION | Age: 58
End: 2023-11-24
Payer: COMMERCIAL

## 2023-11-24 DIAGNOSIS — M47.812 CERVICAL SPONDYLOSIS WITHOUT MYELOPATHY: Primary | ICD-10-CM

## 2023-11-24 DIAGNOSIS — M54.12 CERVICAL RADICULITIS: ICD-10-CM

## 2023-11-24 PROCEDURE — 97012 MECHANICAL TRACTION THERAPY: CPT | Mod: GP | Performed by: PHYSICAL THERAPIST

## 2023-11-24 PROCEDURE — 97140 MANUAL THERAPY 1/> REGIONS: CPT | Mod: GP | Performed by: PHYSICAL THERAPIST

## 2023-11-28 ENCOUNTER — OFFICE VISIT (OUTPATIENT)
Dept: RHEUMATOLOGY | Facility: CLINIC | Age: 58
End: 2023-11-28
Payer: COMMERCIAL

## 2023-11-28 VITALS
SYSTOLIC BLOOD PRESSURE: 126 MMHG | RESPIRATION RATE: 22 BRPM | HEIGHT: 67 IN | BODY MASS INDEX: 31.47 KG/M2 | TEMPERATURE: 98.5 F | OXYGEN SATURATION: 95 % | HEART RATE: 82 BPM | WEIGHT: 200.5 LBS | DIASTOLIC BLOOD PRESSURE: 86 MMHG

## 2023-11-28 DIAGNOSIS — K75.81 NASH (NONALCOHOLIC STEATOHEPATITIS): ICD-10-CM

## 2023-11-28 DIAGNOSIS — M05.79 SEROPOSITIVE RHEUMATOID ARTHRITIS OF MULTIPLE JOINTS (H): Primary | ICD-10-CM

## 2023-11-28 DIAGNOSIS — Z79.899 HIGH RISK MEDICATION USE: ICD-10-CM

## 2023-11-28 DIAGNOSIS — R76.8 RHEUMATOID FACTOR POSITIVE: ICD-10-CM

## 2023-11-28 PROCEDURE — 99214 OFFICE O/P EST MOD 30 MIN: CPT | Performed by: INTERNAL MEDICINE

## 2023-11-28 RX ORDER — FOLIC ACID 1 MG/1
2 TABLET ORAL DAILY
Qty: 180 TABLET | Refills: 0 | Status: SHIPPED | OUTPATIENT
Start: 2023-11-28 | End: 2024-01-25

## 2023-11-28 RX ORDER — METHOTREXATE 2.5 MG/1
10 TABLET ORAL
Qty: 16 TABLET | Refills: 1 | Status: SHIPPED | OUTPATIENT
Start: 2023-11-28 | End: 2024-01-17

## 2023-11-28 ASSESSMENT — PAIN SCALES - GENERAL: PAINLEVEL: MILD PAIN (3)

## 2023-11-28 NOTE — PROGRESS NOTES
Rheumatology follow-up visit note     Keiry is a 58 year old female presents today for follow-up.    Diagnoses and all orders for this visit:    Seropositive rheumatoid arthritis of multiple joints (H)  -     methotrexate 2.5 MG tablet; Take 4 tablets (10 mg) by mouth every 7 days for 30 days  -     ALT; Standing  -     Albumin level; Standing  -     Creatinine; Standing  -     CBC with platelets; Standing    Rheumatoid factor positive    HARDIN (nonalcoholic steatohepatitis)    High risk medication use  -     folic acid (FOLVITE) 1 MG tablet; Take 2 tablets (2 mg) by mouth daily for 90 days  -     ALT; Standing  -     Albumin level; Standing  -     Creatinine; Standing  -     CBC with platelets; Standing        This patient turns out to have new onset of seropositive rheumatoid arthritis, rheumatoid factor positive, starting her methotrexate, status post hysterectomy in her 30s, folic acid, she has HARDIN in the background.  Literature on methotrexate provided.  She will check labs every 4 week going forward x 2 months thereafter we will meet here again in the interim folic acid 2 mg daily along with once a week methotrexate.    Follow up in 2 months.    HPI    Keiry Mchugh is a 58 year old female is here for follow-up.  She was seen here recently with signals of inflammatory joint disease, rheumatoid factor positive anti-CCP negative TAHIRA negative.  She had a very substantial improvement with modest dose of prednisone.  She has noted pain level now at 2.5-3 versus seven 7-1/2 on her previous visit especially in her wrist and hands.      Following is the excerpt from a previous note:   While she does have clear evidence of osteoarthritis which is axial and appendicular including cervical spondylosis, first [facility] involvement with the same however some of her symptoms appear to have more inflammatory root.  This is true for her wrist such as on the right side and some of the PIP of the right hand.  Meanwhile her  "workup has shown modest increase in C-reactive protein.  Her anti-CCP antibody was negative rheumatoid factor was just about positive.  Will check her TAHIRA.  Her sister has  has psoriasis and psoriatic arthritis.  She noted pain level is 7.5/10 interfering with some of the day-to-day activities.  She is also noted hip pain by which she means trochanteric area discomfort.  She is stiff in the morning about 2 hours.  She wonders if there is swelling on the wrist on the right side on the ulnar aspect.   DETAILED EXAMINATION  11/28/23  :    Vitals:    11/28/23 0940   BP: 126/86   BP Location: Left arm   Patient Position: Sitting   Cuff Size: Adult Regular   Pulse: 82   Resp: 22   Temp: 98.5  F (36.9  C)   TempSrc: Oral   SpO2: 95%   Weight: 90.9 kg (200 lb 8 oz)   Height: 1.689 m (5' 6.5\")     Alert oriented. Head including the face is examined for malar rash, heliotropes, scarring, lupus pernio. Eyes examined for redness such as in episcleritis/scleritis, periorbital lesions.   Neck/ Face examined for parotid gland swelling, range of motion of neck.  Left upper and lower and right upper and lower extremities examined for tenderness, swelling, warmth of the appendicular joints, range of motion, edema, rash.  Some of the important findings included: she does not have evidence of synovitis in the palpable joints of the upper extremities.  No significant deformities of the digits.  no Heberden nodes.  Range of motion of the shoulders  show full abduction.  No JLT effusion or warmth of the knees.  she does not have dactylitis of the digits.     Patient Active Problem List    Diagnosis Date Noted    Cervical spondylosis without myelopathy 10/13/2023     Priority: Medium    Cervical radiculitis 10/13/2023     Priority: Medium    Low bone mass 07/21/2023     Priority: Medium    Profound fatigue 07/21/2023     Priority: Medium    Dyslipidemia      Priority: Medium    Metabolic syndrome      Priority: Medium    Celiac disease     "  Priority: Medium    Sigmoid diverticulitis 08/29/2021     Priority: Medium    HARDIN (nonalcoholic steatohepatitis) 07/23/2021     Priority: Medium    Chronic insomnia 06/11/2020     Priority: Medium     Insomnia associated with inadequate sleep hygiene and suspected sleep apnea.       Past Surgical History:   Procedure Laterality Date    APPENDECTOMY      CHOLECYSTECTOMY      COLON SURGERY      COLONOSCOPY N/A 3/18/2019    Procedure: COLONOSCOPY;  Surgeon: Davis Mosqueda MD;  Location: [facility];  Service: General    CYSTOSCOPY N/A 7/16/2015    Procedure: CYSTOSCOPY;  Surgeon: Nate Horta MD;  Location: [facility];  Service:     HC CORRECT BUNION,METATARSAL OSTEOTOMY      Description: Bunion Correction With Metatarsal Osteotomy Herman Procedure;  Proc Date: 07/16/2010;    HYSTERECTOMY      HYSTERECTOMY, PAP NO LONGER INDICATED  02/2009    NEPHRECTOMY LIVING DONOR Left APRIL 2012    MD LAP,BILIARY TRACT,UNLISTED N/A 3/19/2019    Procedure: BIOPSY, LIVER, LAPAROSCOPIC;  Surgeon: Davis Mosqueda MD;  Location: [facility];  Service: General    MD LAP,SLING OPERATION      Description: Laparoscopic Sling Operation For Stress Incontinence;  Recorded: 02/17/2009;    MD LAP,SURG,COLECTOMY, PARTIAL, W/ANAST N/A 3/19/2019    Procedure: LAPAROSCOPIC SIGMOID COLON RESECTION;  Surgeon: Davis Mosqueda MD;  Location: [facility];  Service: General    SEPTOPLASTY, TURBINOPLASTY, COMBINED N/A 8/3/2021    Procedure: SEPTOPLASTY, NOSE, WITH TURBINOPLASTY RADIOFREQUENCY BALLON ASSISTED, CRYOBLATION OF NASAL TISSUE;  Surgeon: Randy Case MD;  Location: [facility]    SIGMOIDOSCOPY FLEXIBLE N/A 3/19/2019    Procedure: FLEXIBLE SIGMOIDOSCOPY;  Surgeon: Davis Mosqueda MD;  Location: [facility];  Service: General      Past Medical History:   Diagnosis Date    Anemia     Anxiety     Celiac disease     Celiac disease     Cervical spondylosis without myelopathy 10/13/2023  "   Chronic kidney disease     donated 1 kidney    Cough     Depression     Seasonal    Diverticulitis     Dyslipidemia     Enlarged LV     wall    GERD (gastroesophageal reflux disease)     Hepatitis     Hiatal hernia     Hypertension     Low bone mass     Metabolic syndrome     Migraines     HARDIN (nonalcoholic steatohepatitis)     Peripheral neuropathy     PONV (postoperative nausea and vomiting)     Profound fatigue     Ventral hernia      Allergies   Allergen Reactions    Gluten Meal Nausea and Vomiting    Contrast Dye      Hives, resolved with benadryl alone in the past (two episode)    Nsaids Other (See Comments)     Avoid - one kidney    Ondansetron Headache    Pcn [Penicillins] Hives     As a child    Rocephin [Ceftriaxone] Hives     Patient states this started immediately    Tramadol Hives    Zithromax [Azithromycin] Tinnitus     \"ear ringing and hearing loss\"     Current Outpatient Medications   Medication Sig Dispense Refill    aspirin-acetaminophen-caffeine (EXCEDRIN MIGRAINE) 250-250-65 MG tablet Take 1 tablet by mouth      atenolol (TENORMIN) 50 MG tablet Take 1 tablet (50 mg) by mouth daily as needed (Migraines) (Patient not taking: Reported on 11/21/2023) 30 tablet 1    B Complex-C (RA B-COMPLEX/VITAMIN C CR) TBCR Take 1 tablet by mouth daily      baclofen (LIORESAL) 10 MG tablet TAKE 1 TABLET BY MOUTH TWICE DAILY TO THREE TIMES DAILY AS NEEDED FOR MUSCLE SPASMS. (Patient not taking: Reported on 11/21/2023) 30 tablet 3    Berberine Chloride 500 MG CAPS       Bioflavonoid Products (VITAMIN C) CHEW       Black Pepper-Turmeric (TURMERIC CURCUMIN) 5-1000 MG CAPS       Coenzyme Q10 (COQ-10) 400 MG CAPS Take 1 capsule by mouth daily       diphenhydrAMINE (BENADRYL) 50 MG capsule Take 1 capsule 1 hr before procedure (Patient not taking: Reported on 11/21/2023) 1 capsule 0    FLUoxetine (PROZAC) 20 MG capsule Take 1 capsule (20 mg) by mouth daily 90 capsule 1    Lactobacillus (PROBIOTIC ACIDOPHILUS) CAPS    "    magnesium oxide (MAG-OX) 400 (240 Mg) MG tablet Take 400 mg by mouth daily  (Patient not taking: Reported on 11/21/2023)      methylPREDNISolone (MEDROL) 32 MG tablet Take one tab 12 hours before procedure and take 1 tab 2 hours prior to the procedure (Patient not taking: Reported on 11/21/2023) 2 tablet 0    OnabotulinumtoxinA (BOTOX IJ) For migraines - 155 units q 12 weeks.      phentermine (ADIPEX-P) 37.5 MG tablet Take 1 tablet (37.5 mg) by mouth every morning (before breakfast) 90 tablet 1    polyethylene glycol (MIRALAX) 17 GM/Dose powder Take 17 g by mouth daily as needed (Patient not taking: Reported on 11/21/2023)      predniSONE (DELTASONE) 10 MG tablet Take 1.5 tablets (15 mg) by mouth daily for 30 days 45 tablet 0    SUMAtriptan (IMITREX) 100 MG tablet TAKE 1/2 TO 1 TABLET BY MOUTH AT ONSET OF HEADACHE 9 tablet 3    vitamin B complex with vitamin C (STRESS TAB) tablet Take 1 tablet by mouth daily       family history includes Breast Cancer (age of onset: 50.00) in her maternal aunt and mother; Graves' disease in her sister; Hashimoto's thyroiditis in her maternal aunt, maternal grandfather, and mother; Hypothyroidism in her maternal aunt, maternal aunt, and sister.  Social Connections: Not on file          WBC Count   Date Value Ref Range Status   10/11/2023 4.1 4.0 - 11.0 10e3/uL Final     RBC Count   Date Value Ref Range Status   10/11/2023 4.64 3.80 - 5.20 10e6/uL Final     Hemoglobin   Date Value Ref Range Status   10/11/2023 14.5 11.7 - 15.7 g/dL Final     Hematocrit   Date Value Ref Range Status   10/11/2023 42.6 35.0 - 47.0 % Final     MCV   Date Value Ref Range Status   10/11/2023 92 78 - 100 fL Final     MCH   Date Value Ref Range Status   10/11/2023 31.3 26.5 - 33.0 pg Final     Platelet Count   Date Value Ref Range Status   10/11/2023 298 150 - 450 10e3/uL Final     % Lymphocytes   Date Value Ref Range Status   05/26/2023 34 % Final     AST   Date Value Ref Range Status   10/11/2023 26 0 -  45 U/L Final     Comment:     Reference intervals for this test were updated on 6/12/2023 to more accurately reflect our healthy population. There may be differences in the flagging of prior results with similar values performed with this method. Interpretation of those prior results can be made in the context of the updated reference intervals.     ALT   Date Value Ref Range Status   10/11/2023 40 0 - 50 U/L Final     Comment:     Reference intervals for this test were updated on 6/12/2023 to more accurately reflect our healthy population. There may be differences in the flagging of prior results with similar values performed with this method. Interpretation of those prior results can be made in the context of the updated reference intervals.       Albumin   Date Value Ref Range Status   10/11/2023 4.8 3.5 - 5.2 g/dL Final   09/08/2021 4.3 3.5 - 5.0 g/dL Final     Alkaline Phosphatase   Date Value Ref Range Status   10/11/2023 86 35 - 104 U/L Final     Creatinine   Date Value Ref Range Status   10/11/2023 0.89 0.51 - 0.95 mg/dL Final     GFR Estimate   Date Value Ref Range Status   10/11/2023 76 >60 mL/min/1.73m2 Final   12/01/2020 61 >=60 mL/min/BSA Final   06/19/2020 >60 >60 mL/min/1.73m2 Final     GFR Estimate If Black   Date Value Ref Range Status   06/19/2020 >60 >60 mL/min/1.73m2 Final     GFREstimate If Black   Date Value Ref Range Status   12/01/2020 71 >=60 mL/min/BSA Final     Erythrocyte Sedimentation Rate   Date Value Ref Range Status   09/15/2023 20 0 - 30 mm/hr Final     CRP   Date Value Ref Range Status   05/26/2023 3.1 (H) 0.0 - <0.8 mg/dL Final

## 2023-12-01 ENCOUNTER — THERAPY VISIT (OUTPATIENT)
Dept: PHYSICAL THERAPY | Facility: REHABILITATION | Age: 58
End: 2023-12-01
Payer: COMMERCIAL

## 2023-12-01 DIAGNOSIS — M47.812 CERVICAL SPONDYLOSIS WITHOUT MYELOPATHY: Primary | ICD-10-CM

## 2023-12-01 DIAGNOSIS — M54.12 CERVICAL RADICULITIS: ICD-10-CM

## 2023-12-01 PROCEDURE — 97140 MANUAL THERAPY 1/> REGIONS: CPT | Mod: GP | Performed by: PHYSICAL THERAPIST

## 2023-12-01 PROCEDURE — 97110 THERAPEUTIC EXERCISES: CPT | Mod: GP | Performed by: PHYSICAL THERAPIST

## 2023-12-08 ENCOUNTER — TRANSFERRED RECORDS (OUTPATIENT)
Dept: HEALTH INFORMATION MANAGEMENT | Facility: CLINIC | Age: 58
End: 2023-12-08

## 2023-12-12 ENCOUNTER — TELEPHONE (OUTPATIENT)
Dept: PHYSICAL MEDICINE AND REHAB | Facility: CLINIC | Age: 58
End: 2023-12-12
Payer: COMMERCIAL

## 2023-12-12 NOTE — TELEPHONE ENCOUNTER
[facility] Call Center    Phone Message    May a detailed message be left on voicemail: yes     Reason for Call: Other: Keiry Null is calling because she has a procedure on Friday but was told that she has to have a pain level of 3 whe she comes in for it but she started a new medication for her RA and currently she has no in the neck, Should she come or not on Friday? Please call her. The new medication she might be on for life and not really sure if she should continue treatment for her?     Action Taken: Other: [facility]    Travel Screening: Not Applicable

## 2023-12-13 NOTE — TELEPHONE ENCOUNTER
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 Message taken off of line at 115 PM. Will try calling her tomorrow between 12-1 PM.

## 2023-12-14 NOTE — TELEPHONE ENCOUNTER
"Pt called back. Pt reports she was put on Methotrexate for her RA and ever since she started this medication her pain/symptoms are now gone. \"I feel great\". Pt wanting to cancel EMG and inj appt. She will proceed with Botox injections with Damaris in January as planned. She will call in the meantime with any questions/concerns or worsening symptoms.   "

## 2023-12-15 ENCOUNTER — THERAPY VISIT (OUTPATIENT)
Dept: PHYSICAL THERAPY | Facility: REHABILITATION | Age: 58
End: 2023-12-15
Payer: COMMERCIAL

## 2023-12-15 DIAGNOSIS — M47.812 CERVICAL SPONDYLOSIS WITHOUT MYELOPATHY: Primary | ICD-10-CM

## 2023-12-15 DIAGNOSIS — M54.12 CERVICAL RADICULITIS: ICD-10-CM

## 2023-12-15 PROCEDURE — 97140 MANUAL THERAPY 1/> REGIONS: CPT | Mod: GP | Performed by: PHYSICAL THERAPIST

## 2023-12-15 PROCEDURE — 97110 THERAPEUTIC EXERCISES: CPT | Mod: GP | Performed by: PHYSICAL THERAPIST

## 2023-12-29 ENCOUNTER — THERAPY VISIT (OUTPATIENT)
Dept: PHYSICAL THERAPY | Facility: REHABILITATION | Age: 58
End: 2023-12-29
Payer: COMMERCIAL

## 2023-12-29 ENCOUNTER — LAB (OUTPATIENT)
Dept: LAB | Facility: CLINIC | Age: 58
End: 2023-12-29
Payer: COMMERCIAL

## 2023-12-29 DIAGNOSIS — Z79.899 HIGH RISK MEDICATION USE: ICD-10-CM

## 2023-12-29 DIAGNOSIS — M47.812 CERVICAL SPONDYLOSIS WITHOUT MYELOPATHY: Primary | ICD-10-CM

## 2023-12-29 DIAGNOSIS — M05.79 SEROPOSITIVE RHEUMATOID ARTHRITIS OF MULTIPLE JOINTS (H): ICD-10-CM

## 2023-12-29 DIAGNOSIS — M54.12 CERVICAL RADICULITIS: ICD-10-CM

## 2023-12-29 LAB
ALBUMIN SERPL BCG-MCNC: 4.6 G/DL (ref 3.5–5.2)
ALT SERPL W P-5'-P-CCNC: 51 U/L (ref 0–50)
CREAT SERPL-MCNC: 0.86 MG/DL (ref 0.51–0.95)
EGFRCR SERPLBLD CKD-EPI 2021: 79 ML/MIN/1.73M2
ERYTHROCYTE [DISTWIDTH] IN BLOOD BY AUTOMATED COUNT: 12.1 % (ref 10–15)
HCT VFR BLD AUTO: 43.7 % (ref 35–47)
HGB BLD-MCNC: 14.7 G/DL (ref 11.7–15.7)
MCH RBC QN AUTO: 31.2 PG (ref 26.5–33)
MCHC RBC AUTO-ENTMCNC: 33.6 G/DL (ref 31.5–36.5)
MCV RBC AUTO: 93 FL (ref 78–100)
PLATELET # BLD AUTO: 342 10E3/UL (ref 150–450)
RBC # BLD AUTO: 4.71 10E6/UL (ref 3.8–5.2)
WBC # BLD AUTO: 5.6 10E3/UL (ref 4–11)

## 2023-12-29 PROCEDURE — 97110 THERAPEUTIC EXERCISES: CPT | Mod: GP | Performed by: PHYSICAL THERAPIST

## 2023-12-29 PROCEDURE — 82565 ASSAY OF CREATININE: CPT

## 2023-12-29 PROCEDURE — 36415 COLL VENOUS BLD VENIPUNCTURE: CPT

## 2023-12-29 PROCEDURE — 85027 COMPLETE CBC AUTOMATED: CPT

## 2023-12-29 PROCEDURE — 97140 MANUAL THERAPY 1/> REGIONS: CPT | Mod: GP | Performed by: PHYSICAL THERAPIST

## 2023-12-29 PROCEDURE — 84460 ALANINE AMINO (ALT) (SGPT): CPT

## 2023-12-29 PROCEDURE — 82040 ASSAY OF SERUM ALBUMIN: CPT

## 2023-12-29 NOTE — PROGRESS NOTES
DISCHARGE  Reason for Discharge: Patient has met all goals or is progressing.    Equipment Issued:     Discharge Plan: Patient to continue home program.    Referring Provider:  Damaris Leslie        12/29/23 0500   Appointment Info   Signing clinician's name / credentials Corrie Hernandez, PT, DPT   Total/Authorized Visits 12   Visits Used 8/12   Medical Diagnosis M47.812 (ICD-10-CM) - Cervical spondylosis without myelopathy  M54.12 (ICD-10-CM) - Cervical radiculitis   PT Tx Diagnosis neck pain with referral into UE   Precautions/Limitations neck, upper back, numb fingers R hand 1-3  L shoulder    Patient is a 58 year old female with long standing history of migraine, headache, neck pain and L shoulder pain.  IMPRESSION:  1.  At C6-C7, severe right and mild left neural foraminal stenosis. Correlate with right C7 radicular symptoms.  2.  At C4-C5, moderate right neural foraminal stenosis.  3.  At C5-C6, mild bilateral neural foraminal stenosis.  4.  At C7-T1, mild to moderate right neural foraminal stenosis.   Progress Note/Certification   Onset of illness/injury or Date of Surgery 10/13/23   Therapy Frequency 1 x weekly   Predicted Duration 12 weeks   Progress Note Due Date 01/05/24   PT Goal 1   Goal Identifier sleep   Goal Description Patient will be able to get comfortable withing 10 min of lying down and wake 1 x or less during 6-8 hours of sleep.   Rationale to maximize safety and independence with performance of ADLs and functional tasks;to maximize safety and independence within the home;to maximize safety and independence within the community   Goal Progress improving   Target Date 01/05/24   PT Goal 2   Goal Identifier driving   Goal Description Patient will be able to turn and look to check blind spots and support head during 20-30 min drives around city   Rationale to maximize safety and independence with performance of ADLs and functional tasks;to maximize safety and independence within the home;to  maximize safety and independence within the community   Goal Progress improving - with better cervical rotation   Target Date 01/05/24   PT Goal 3   Goal Identifier self cares   Goal Description Patient will be able to wash dry hair without sx greater than 3/10.   Rationale to maximize safety and independence with performance of ADLs and functional tasks;to maximize safety and independence within the home;to maximize safety and independence within the community;to maximize safety and independence with self cares   Goal Progress imrproving   Target Date 01/05/24   PT Goal 4   Goal Identifier housework/yardwork   Goal Description Patient will be able to complete yardwork for 60+ min without sx greater than 3/10.   Rationale to maximize safety and independence with performance of ADLs and functional tasks;to maximize safety and independence within the home   Goal Progress improving   Target Date 01/05/24   PT Goal 5   Goal Identifier HEP   Goal Description Patient will be able to complete 6 exercises for progression to independent management.   Rationale to maximize safety and independence with performance of ADLs and functional tasks;to maximize safety and independence within the home;to maximize safety and independence within the community   Goal Progress MET   Target Date 01/05/24   Date Met 12/29/23   Subjective Report   Subjective Report Overall has been doing better.  Pain is so much better in her neck.  Joint has been feeling better.  Botox injections every 12 weeks. Has a Migraine today.   Objective Measures   Objective Measures Objective Measure 1;Objective Measure 2;Objective Measure 3;Objective Measure 4   Objective Measure 4   Objective Measure AROM cervical rotation: 32 to R / 33 to L   PT Modalities   PT Modalities Mechanical Traction   Therapeutic Procedure/Exercise   Therapeutic Procedures: strength, endurance, ROM, flexibillity minutes (23975) 8   Ther Proc 1 added in SNAGS and cervical extension with  towel to HEP today- demonstrated and reviewed today.  Also added PTRX to pt's phone   Ther Proc 1 - Details NOT BELOW   PTRx Ther Proc 1 Cervical Retraction   PTRx Ther Proc 1 - Details verbal review completion with resisted neck ext yellow band level 1   PTRx Ther Proc 2 Shoulder Rolls   PTRx Ther Proc 2 - Details verbal review   PTRx Ther Proc 3 Scapular Retraction/Depression   PTRx Ther Proc 3 - Details verbal review   PTRx Ther Proc 4 Levator Scapulae Stretch   PTRx Ther Proc 4 - Details verbal review   PTRx Ther Proc 5 Upper Trapezius Stretch   PTRx Ther Proc 5 - Details verbal review   PTRx Ther Proc 6 Gilbert and Arrow Stretch   PTRx Ther Proc 6 - Details MET   PTRx Ther Proc 7 Cervical Isometric Rotation   PTRx Ther Proc 7 - Details verbal review   PTRx Ther Proc 8 Pectoral Stretch on Wall   PTRx Ther Proc 8 - Details verbal review   PTRx Ther Proc 9 All 4s Stretch   PTRx Ther Proc 9 - Details verbal review   PTRx Ther Proc 10 Cervical Isometric Extension   PTRx Ther Proc 10 - Details x 8 with yellow band level 1 chin retraction   PTRx Ther Proc 11 Cervical Isometric Side Bending   PTRx Ther Proc 11 - Details x 5 each with yellow band   Skilled Intervention improved stability, mobility and decreased pain   Manual Therapy   Manual Therapy: Mobilization, MFR, MLD, friction massage minutes (64178) 35   Manual Therapy 1 MFR, counterstrain   Manual Therapy 1 - Details LF C stacked, UJUG on R, PROM stretching: UT, levator, cervical rotation, STM/MFR to upper trap, suboccipitals, occipital release   Skilled Intervention improve mobility, ROM   Patient Response/Progress decreased HA   Education   Learner/Method Patient;Listening;Demonstration;Pictures/Video   Plan   Home program PTRx   Plan for next session D/C today   Comments   Comments Pt has been overall feeling better.  Able to do HEP with less modifications with new medication.  Ready to discharge   Total Session Time   Timed Code Treatment Minutes 43   Total  Treatment Time (sum of timed and untimed services) 43

## 2024-01-15 NOTE — PROGRESS NOTES
Keiry Mchugh is a 58 year old female who presents today for Botox injections for chronic migraine.    Patient had Botox injections for chronic migraine October 13, 2023.  Patient reports the Botox injections provided 90% relief of migraine/headache pain for over 2 months.  She began to experience increased frequency of migraines over the past 3 weeks.      Patient also reports improvement in her neck pain since she was last seen.  She started methotrexate November 2023.  This has provided significant relief of joint pain throughout her body.  She canceled her cervical medial branch blocks since her neck pain no longer reaches 5 out of 10 in severity.    Pre-procedure diagnosis: Migraine headaches  Post-procedure diagnosis: Same    PROCEDURE:  Botulinum toxin (Botox) injections.      The risks and benefits of the procedure were discussed with the patient which included muscle weakness (including facial droop, inability to swallow, and inability to hold one's head up), allergic reaction, pain, bruising, bleeding, swelling, and death.  She opted to proceed and gave written and verbal consent.    The Botox paradigm was followed. Each site was marked with indelible marking pen in the locations designated by the paradigm. The skin over the marks was then cleansed with alcohol. A 27 guage 5/8 inch needle was used for injection at all sites. Aspiration was negative at each site prior to injection of botox.     The bilateral  muscles were injected with a total of 10 units (5 units each side).   The Procerus muscle was injected with a total of 5 units.   The Frontalis muscle was injected with a total of 20 units, divided into 4 sites.  The Temporalis muscle was injected with 20 units divided into 4 sites and performed bilaterally (total of 8 sites for total of 40 units).  The occipitalis muscle was injected with 30 units divided into 6 sites.  The cervical paraspinal muscles were injected with 20 units divided  into 4 sites.  The trapezius muscle was injected with 30 units divided into 6 sites.     TOTAL UNITS: 155  Wasted units: 45 units.    The patient tolerated the injection well and afterwards did not have any dizziness/lightheadedness or nausea. The patient was observed for a short period of time and then was discharged.  The patient was encouraged to call with any questions/concerns prior to the follow-up appointment.

## 2024-01-16 DIAGNOSIS — M05.79 SEROPOSITIVE RHEUMATOID ARTHRITIS OF MULTIPLE JOINTS (H): ICD-10-CM

## 2024-01-17 RX ORDER — METHOTREXATE 2.5 MG/1
TABLET ORAL
Qty: 16 TABLET | Refills: 0 | Status: SHIPPED | OUTPATIENT
Start: 2024-01-17 | End: 2024-01-25

## 2024-01-19 ENCOUNTER — OFFICE VISIT (OUTPATIENT)
Dept: PHYSICAL MEDICINE AND REHAB | Facility: CLINIC | Age: 59
End: 2024-01-19
Payer: COMMERCIAL

## 2024-01-19 VITALS
HEART RATE: 82 BPM | TEMPERATURE: 98.9 F | BODY MASS INDEX: 31.88 KG/M2 | DIASTOLIC BLOOD PRESSURE: 75 MMHG | HEIGHT: 67 IN | SYSTOLIC BLOOD PRESSURE: 129 MMHG

## 2024-01-19 DIAGNOSIS — G43.709 CHRONIC MIGRAINE WITHOUT AURA, NOT INTRACTABLE, WITHOUT STATUS MIGRAINOSUS: Primary | ICD-10-CM

## 2024-01-19 PROCEDURE — 64615 CHEMODENERV MUSC MIGRAINE: CPT | Performed by: PHYSICIAN ASSISTANT

## 2024-01-19 ASSESSMENT — PAIN SCALES - GENERAL: PAINLEVEL: NO PAIN (0)

## 2024-01-19 NOTE — LETTER
1/19/2024         RE: Keiry Mchugh  [address] [place]  [facility] [address]        Dear Colleague,    Thank you for referring your patient, Keiry Mchugh, to the [facility]. Please see a copy of my visit note below.    Keiry Mchugh is a 58 year old female who presents today for Botox injections for chronic migraine.    Patient had Botox injections for chronic migraine October 13, 2023.  Patient reports the Botox injections provided 90% relief of migraine/headache pain for over 2 months.  She began to experience increased frequency of migraines over the past 3 weeks.      Patient also reports improvement in her neck pain since she was last seen.  She started methotrexate November 2023.  This has provided significant relief of joint pain throughout her body.  She canceled her cervical medial branch blocks since her neck pain no longer reaches 5 out of 10 in severity.    Pre-procedure diagnosis: Migraine headaches  Post-procedure diagnosis: Same    PROCEDURE:  Botulinum toxin (Botox) injections.      The risks and benefits of the procedure were discussed with the patient which included muscle weakness (including facial droop, inability to swallow, and inability to hold one's head up), allergic reaction, pain, bruising, bleeding, swelling, and death.  She opted to proceed and gave written and verbal consent.    The Botox paradigm was followed. Each site was marked with indelible marking pen in the locations designated by the paradigm. The skin over the marks was then cleansed with alcohol. A 27 guage 5/8 inch needle was used for injection at all sites. Aspiration was negative at each site prior to injection of botox.     The bilateral  muscles were injected with a total of 10 units (5 units each side).   The Procerus muscle was injected with a total of 5 units.   The Frontalis muscle was injected with a total of 20 units, divided into 4 sites.  The Temporalis muscle was  injected with 20 units divided into 4 sites and performed bilaterally (total of 8 sites for total of 40 units).  The occipitalis muscle was injected with 30 units divided into 6 sites.  The cervical paraspinal muscles were injected with 20 units divided into 4 sites.  The trapezius muscle was injected with 30 units divided into 6 sites.     TOTAL UNITS: 155  Wasted units: 45 units.    The patient tolerated the injection well and afterwards did not have any dizziness/lightheadedness or nausea. The patient was observed for a short period of time and then was discharged.  The patient was encouraged to call with any questions/concerns prior to the follow-up appointment.           Again, thank you for allowing me to participate in the care of your patient.        Sincerely,        Damaris Leslie PA-C

## 2024-01-24 ENCOUNTER — LAB (OUTPATIENT)
Dept: LAB | Facility: CLINIC | Age: 59
End: 2024-01-24
Payer: COMMERCIAL

## 2024-01-24 DIAGNOSIS — Z79.899 HIGH RISK MEDICATION USE: ICD-10-CM

## 2024-01-24 DIAGNOSIS — M05.79 SEROPOSITIVE RHEUMATOID ARTHRITIS OF MULTIPLE JOINTS (H): ICD-10-CM

## 2024-01-24 LAB
ALBUMIN SERPL BCG-MCNC: 4.7 G/DL (ref 3.5–5.2)
ALT SERPL W P-5'-P-CCNC: 69 U/L (ref 0–50)
CREAT SERPL-MCNC: 0.88 MG/DL (ref 0.51–0.95)
EGFRCR SERPLBLD CKD-EPI 2021: 77 ML/MIN/1.73M2
ERYTHROCYTE [DISTWIDTH] IN BLOOD BY AUTOMATED COUNT: 12.7 % (ref 10–15)
HCT VFR BLD AUTO: 44.8 % (ref 35–47)
HGB BLD-MCNC: 14.5 G/DL (ref 11.7–15.7)
MCH RBC QN AUTO: 30.2 PG (ref 26.5–33)
MCHC RBC AUTO-ENTMCNC: 32.4 G/DL (ref 31.5–36.5)
MCV RBC AUTO: 93 FL (ref 78–100)
PLATELET # BLD AUTO: 365 10E3/UL (ref 150–450)
RBC # BLD AUTO: 4.8 10E6/UL (ref 3.8–5.2)
WBC # BLD AUTO: 5.3 10E3/UL (ref 4–11)

## 2024-01-24 PROCEDURE — 82565 ASSAY OF CREATININE: CPT

## 2024-01-24 PROCEDURE — 85027 COMPLETE CBC AUTOMATED: CPT

## 2024-01-24 PROCEDURE — 82040 ASSAY OF SERUM ALBUMIN: CPT

## 2024-01-24 PROCEDURE — 36415 COLL VENOUS BLD VENIPUNCTURE: CPT

## 2024-01-24 PROCEDURE — 84460 ALANINE AMINO (ALT) (SGPT): CPT

## 2024-01-25 ENCOUNTER — OFFICE VISIT (OUTPATIENT)
Dept: RHEUMATOLOGY | Facility: CLINIC | Age: 59
End: 2024-01-25
Payer: COMMERCIAL

## 2024-01-25 VITALS
OXYGEN SATURATION: 99 % | WEIGHT: 193 LBS | SYSTOLIC BLOOD PRESSURE: 112 MMHG | HEART RATE: 85 BPM | DIASTOLIC BLOOD PRESSURE: 78 MMHG | BODY MASS INDEX: 30.68 KG/M2

## 2024-01-25 DIAGNOSIS — K75.81 NASH (NONALCOHOLIC STEATOHEPATITIS): ICD-10-CM

## 2024-01-25 DIAGNOSIS — Z79.899 HIGH RISK MEDICATION USE: ICD-10-CM

## 2024-01-25 DIAGNOSIS — M25.50 POLYARTHRALGIA: ICD-10-CM

## 2024-01-25 DIAGNOSIS — R76.8 RHEUMATOID FACTOR POSITIVE: ICD-10-CM

## 2024-01-25 DIAGNOSIS — M05.79 SEROPOSITIVE RHEUMATOID ARTHRITIS OF MULTIPLE JOINTS (H): Primary | ICD-10-CM

## 2024-01-25 PROCEDURE — 99214 OFFICE O/P EST MOD 30 MIN: CPT | Performed by: INTERNAL MEDICINE

## 2024-01-25 RX ORDER — FOLIC ACID 1 MG/1
2 TABLET ORAL DAILY
Qty: 180 TABLET | Refills: 0 | Status: SHIPPED | OUTPATIENT
Start: 2024-01-25 | End: 2024-03-26

## 2024-01-25 RX ORDER — METHOTREXATE 2.5 MG/1
TABLET ORAL
Qty: 48 TABLET | Refills: 0 | Status: SHIPPED | OUTPATIENT
Start: 2024-01-25 | End: 2024-03-26

## 2024-01-25 RX ORDER — HYDROXYCHLOROQUINE SULFATE 200 MG/1
200 TABLET, FILM COATED ORAL 2 TIMES DAILY
Qty: 60 TABLET | Refills: 3 | Status: SHIPPED | OUTPATIENT
Start: 2024-01-25 | End: 2024-02-24

## 2024-01-25 ASSESSMENT — PAIN SCALES - GENERAL: PAINLEVEL: NO PAIN (0)

## 2024-01-25 NOTE — PROGRESS NOTES
"      Rheumatology follow-up visit note     Keiry is a 58 year old female presents today for follow-up.    Samantha was seen today for recheck.    Diagnoses and all orders for this visit:    Seropositive rheumatoid arthritis of multiple joints (H)  -     hydroxychloroquine (PLAQUENIL) 200 MG tablet; Take 1 tablet (200 mg) by mouth 2 times daily for 30 days  -     methotrexate 2.5 MG tablet; TAKE 4 TABLETS(10 MG) BY MOUTH EVERY 7 DAYS    Rheumatoid factor positive    HARDIN (nonalcoholic steatohepatitis)    High risk medication use  -     folic acid (FOLVITE) 1 MG tablet; Take 2 tablets (2 mg) by mouth daily    Polyarthralgia        While she has made such an improvement in her seropositive rheumatoid arthritis symptoms with the low-dose methotrexate she does not have modest elevation of ALT, she is going to add hydroxychloroquine instead of increasing methotrexate.  Continue folic acid.  Will meet here in 2 months.  Labs every 4 weeks for now.    Follow up in 2 months.    HPI    Keiry Mchugh is a 58 year old female is here for follow-up is for rheumatoid arthritis seropositive.  She was started on methotrexate on her previous visit.  She feels so much better.  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  She has residual pain in the left shoulder which she gets periodic corticosteroid injections at orthopedics where she has been told that she has rotator cuff tear and should undergo surgery.. This she gets intermittent liver function abnormalities when she is \"sick\".  With methotrexate there is been slight increase in ALT.  She has had extensive workup for her liver assessment in the past and the conclusion was that she might have HARDIN.  She had quite significant hair loss she thought her ponytail is one third the size.  Now it seems that has " stabilized.    DETAILED EXAMINATION  01/25/24  :    Vitals:    01/25/24 0836   BP: 112/78   BP Location: Right arm   Patient Position: Sitting   Cuff Size: Adult Regular   Pulse: 85   SpO2: 99%   Weight: 87.5 kg (193 lb)     Alert oriented. Head including the face is examined for malar rash, heliotropes, scarring, lupus pernio. Eyes examined for redness such as in episcleritis/scleritis, periorbital lesions.   Neck/ Face examined for parotid gland swelling, range of motion of neck.  Left upper and lower and right upper and lower extremities examined for tenderness, swelling, warmth of the appendicular joints, range of motion, edema, rash.  Some of the important findings included: she does not have evidence of synovitis in the palpable joints of the upper extremities.  No significant deformities of the digits. no Heberden nodes.  Range of motion of the shoulders  show full abduction.  No JLT effusion or warmth of the knees.  she does not have dactylitis of the digits.     Patient Active Problem List    Diagnosis Date Noted    Cervical spondylosis without myelopathy 10/13/2023     Priority: Medium    Cervical radiculitis 10/13/2023     Priority: Medium    Low bone mass 07/21/2023     Priority: Medium    Profound fatigue 07/21/2023     Priority: Medium    Dyslipidemia      Priority: Medium    Metabolic syndrome      Priority: Medium    Celiac disease      Priority: Medium    Sigmoid diverticulitis 08/29/2021     Priority: Medium    HARDIN (nonalcoholic steatohepatitis) 07/23/2021     Priority: Medium    Chronic insomnia 06/11/2020     Priority: Medium     Insomnia associated with inadequate sleep hygiene and suspected sleep apnea.       Past Surgical History:   Procedure Laterality Date    APPENDECTOMY      CHOLECYSTECTOMY      COLON SURGERY      COLONOSCOPY N/A 3/18/2019    Procedure: COLONOSCOPY;  Surgeon: Davis Mosqueda MD;  Location: [facility];  Service: General    CYSTOSCOPY N/A 7/16/2015    Procedure:  CYSTOSCOPY;  Surgeon: Nate Horta MD;  Location: [facility];  Service:     HC CORRECT BUNION,METATARSAL OSTEOTOMY      Description: Bunion Correction With Metatarsal Osteotomy Herman Procedure;  Proc Date: 07/16/2010;    HYSTERECTOMY      HYSTERECTOMY, PAP NO LONGER INDICATED  02/2009    NEPHRECTOMY LIVING DONOR Left APRIL 2012    HI LAP,BILIARY TRACT,UNLISTED N/A 3/19/2019    Procedure: BIOPSY, LIVER, LAPAROSCOPIC;  Surgeon: Davis Mosqueda MD;  [place] [facility]: [place]    HI LAP,SLING OPERATION      Description: Laparoscopic Sling Operation For Stress Incontinence;  Recorded: 02/17/2009;    HI LAP,SURG,COLECTOMY, PARTIAL, W/ANAST N/A 3/19/2019    Procedure: LAPAROSCOPIC SIGMOID COLON RESECTION;  Surgeon: Davis Mosqueda MD;  [place] [facility]  Service: General    SEPTOPLASTY, TURBINOPLASTY, COMBINED N/A 8/3/2021    Procedure: SEPTOPLASTY, NOSE, WITH TURBINOPLASTY RADIOFREQUENCY BALLON ASSISTED, CRYOBLATION OF NASAL TISSUE;  Surgeon: Randy Case MD;  Location: [facility]    SIGMOIDOSCOPY FLEXIBLE N/A 3/19/2019    Procedure: FLEXIBLE SIGMOIDOSCOPY;  Surgeon: Davis Mosqueda MD;  [place] [facility]  Service: General      Past Medical History:   Diagnosis Date    Anemia     Anxiety     Celiac disease     Celiac disease     Cervical spondylosis without myelopathy 10/13/2023    Chronic kidney disease     donated 1 kidney    Cough     Depression     Seasonal    Diverticulitis     Dyslipidemia     Enlarged LV     wall    GERD (gastroesophageal reflux disease)     Hepatitis     Hiatal hernia     Hypertension     Low bone mass     Metabolic syndrome     Migraines     HARDIN (nonalcoholic steatohepatitis)     Peripheral neuropathy     PONV (postoperative nausea and vomiting)     Profound fatigue     Ventral hernia      Allergies   Allergen Reactions    Gluten Meal Nausea and Vomiting    Contrast Dye      Hives, resolved with benadryl alone in  "the past (two episode)    Nsaids Other (See Comments)     Avoid - one kidney    Ondansetron Headache    Pcn [Penicillins] Hives     As a child    Rocephin [Ceftriaxone] Hives     Patient states this started immediately    Tramadol Hives    Zithromax [Azithromycin] Tinnitus     \"ear ringing and hearing loss\"     Current Outpatient Medications   Medication Sig Dispense Refill    aspirin-acetaminophen-caffeine (EXCEDRIN MIGRAINE) 250-250-65 MG tablet Take 1 tablet by mouth      atenolol (TENORMIN) 50 MG tablet Take 1 tablet (50 mg) by mouth daily as needed (Migraines) 30 tablet 1    B Complex-C (RA B-COMPLEX/VITAMIN C CR) TBCR Take 1 tablet by mouth daily      baclofen (LIORESAL) 10 MG tablet TAKE 1 TABLET BY MOUTH TWICE DAILY TO THREE TIMES DAILY AS NEEDED FOR MUSCLE SPASMS. (Patient not taking: Reported on 1/19/2024) 30 tablet 3    Berberine Chloride 500 MG CAPS       Bioflavonoid Products (VITAMIN C) CHEW       Black Pepper-Turmeric (TURMERIC CURCUMIN) 5-1000 MG CAPS       Coenzyme Q10 (COQ-10) 400 MG CAPS Take 1 capsule by mouth daily       FLUoxetine (PROZAC) 20 MG capsule Take 1 capsule (20 mg) by mouth daily 90 capsule 1    folic acid (FOLVITE) 1 MG tablet Take 2 tablets (2 mg) by mouth daily for 90 days 180 tablet 0    Lactobacillus (PROBIOTIC ACIDOPHILUS) CAPS       magnesium oxide (MAG-OX) 400 (240 Mg) MG tablet Take 400 mg by mouth daily      methotrexate 2.5 MG tablet TAKE 4 TABLETS(10 MG) BY MOUTH EVERY 7 DAYS 16 tablet 0    OnabotulinumtoxinA (BOTOX IJ) For migraines - 155 units q 12 weeks.      phentermine (ADIPEX-P) 37.5 MG tablet Take 1 tablet (37.5 mg) by mouth every morning (before breakfast) 90 tablet 1    polyethylene glycol (MIRALAX) 17 GM/Dose powder Take 17 g by mouth daily as needed      SUMAtriptan (IMITREX) 100 MG tablet TAKE 1/2 TO 1 TABLET BY MOUTH AT ONSET OF HEADACHE 9 tablet 3    vitamin B complex with vitamin C (STRESS TAB) tablet Take 1 tablet by mouth daily       family history " includes Breast Cancer (age of onset: 50.00) in her maternal aunt and mother; Graves' disease in her sister; Hashimoto's thyroiditis in her maternal aunt, maternal grandfather, and mother; Hypothyroidism in her maternal aunt, maternal aunt, and sister.  Social Connections: Not on file          WBC Count   Date Value Ref Range Status   01/24/2024 5.3 4.0 - 11.0 10e3/uL Final     RBC Count   Date Value Ref Range Status   01/24/2024 4.80 3.80 - 5.20 10e6/uL Final     Hemoglobin   Date Value Ref Range Status   01/24/2024 14.5 11.7 - 15.7 g/dL Final     Hematocrit   Date Value Ref Range Status   01/24/2024 44.8 35.0 - 47.0 % Final     MCV   Date Value Ref Range Status   01/24/2024 93 78 - 100 fL Final     MCH   Date Value Ref Range Status   01/24/2024 30.2 26.5 - 33.0 pg Final     Platelet Count   Date Value Ref Range Status   01/24/2024 365 150 - 450 10e3/uL Final     % Lymphocytes   Date Value Ref Range Status   05/26/2023 34 % Final     AST   Date Value Ref Range Status   10/11/2023 26 0 - 45 U/L Final     Comment:     Reference intervals for this test were updated on 6/12/2023 to more accurately reflect our healthy population. There may be differences in the flagging of prior results with similar values performed with this method. Interpretation of those prior results can be made in the context of the updated reference intervals.     ALT   Date Value Ref Range Status   01/24/2024 69 (H) 0 - 50 U/L Final     Albumin   Date Value Ref Range Status   01/24/2024 4.7 3.5 - 5.2 g/dL Final   09/08/2021 4.3 3.5 - 5.0 g/dL Final     Alkaline Phosphatase   Date Value Ref Range Status   10/11/2023 86 35 - 104 U/L Final     Creatinine   Date Value Ref Range Status   01/24/2024 0.88 0.51 - 0.95 mg/dL Final     GFR Estimate   Date Value Ref Range Status   01/24/2024 77 >60 mL/min/1.73m2 Final   12/01/2020 61 >=60 mL/min/BSA Final   06/19/2020 >60 >60 mL/min/1.73m2 Final     GFR Estimate If Black   Date Value Ref Range Status    06/19/2020 >60 >60 mL/min/1.73m2 Final     GFREstimate If Black   Date Value Ref Range Status   12/01/2020 71 >=60 mL/min/BSA Final     Erythrocyte Sedimentation Rate   Date Value Ref Range Status   09/15/2023 20 0 - 30 mm/hr Final     CRP   Date Value Ref Range Status   05/26/2023 3.1 (H) 0.0 - <0.8 mg/dL Final

## 2024-02-07 DIAGNOSIS — F32.9 MAJOR DEPRESSIVE DISORDER WITH SINGLE EPISODE, REMISSION STATUS UNSPECIFIED: ICD-10-CM

## 2024-02-07 NOTE — TELEPHONE ENCOUNTER
1/16/2023     6:55 AM 6/1/2023     4:50 PM 10/11/2023     6:53 AM   PHQ   PHQ-9 Total Score 10 11 11   Q9: Thoughts of better off dead/self-harm past 2 weeks Not at all Not at all Not at all           3/6/2020    11:00 AM 5/28/2020     9:02 AM   ATIF-7 SCORE   Total Score 5 1       PCP has left the system.  Patient is due for physical.  Recommend scheduling physical plus med check/establish care visit with new PCP.  I will send in a 90-day hosea refill to the pharmacy.  Please help schedule with somebody.    1. Major depressive disorder with single episode, remission status unspecified  - FLUoxetine (PROZAC) 20 MG capsule; Take 1 capsule (20 mg) by mouth daily  Dispense: 90 capsule; Refill: 0     Yissel Schmidt DO

## 2024-02-22 ENCOUNTER — LAB (OUTPATIENT)
Dept: LAB | Facility: CLINIC | Age: 59
End: 2024-02-22
Payer: COMMERCIAL

## 2024-02-22 DIAGNOSIS — Z79.899 HIGH RISK MEDICATION USE: ICD-10-CM

## 2024-02-22 DIAGNOSIS — M05.79 SEROPOSITIVE RHEUMATOID ARTHRITIS OF MULTIPLE JOINTS (H): ICD-10-CM

## 2024-02-22 LAB
ALBUMIN SERPL BCG-MCNC: 4.9 G/DL (ref 3.5–5.2)
ALT SERPL W P-5'-P-CCNC: 67 U/L (ref 0–50)
CREAT SERPL-MCNC: 0.97 MG/DL (ref 0.51–0.95)
EGFRCR SERPLBLD CKD-EPI 2021: 68 ML/MIN/1.73M2
ERYTHROCYTE [DISTWIDTH] IN BLOOD BY AUTOMATED COUNT: 12.8 % (ref 10–15)
HCT VFR BLD AUTO: 46.9 % (ref 35–47)
HGB BLD-MCNC: 15.3 G/DL (ref 11.7–15.7)
MCH RBC QN AUTO: 30.8 PG (ref 26.5–33)
MCHC RBC AUTO-ENTMCNC: 32.6 G/DL (ref 31.5–36.5)
MCV RBC AUTO: 95 FL (ref 78–100)
PLATELET # BLD AUTO: 343 10E3/UL (ref 150–450)
RBC # BLD AUTO: 4.96 10E6/UL (ref 3.8–5.2)
WBC # BLD AUTO: 6.6 10E3/UL (ref 4–11)

## 2024-02-22 PROCEDURE — 84460 ALANINE AMINO (ALT) (SGPT): CPT

## 2024-02-22 PROCEDURE — 82040 ASSAY OF SERUM ALBUMIN: CPT

## 2024-02-22 PROCEDURE — 82565 ASSAY OF CREATININE: CPT

## 2024-02-22 PROCEDURE — 36415 COLL VENOUS BLD VENIPUNCTURE: CPT

## 2024-02-22 PROCEDURE — 85027 COMPLETE CBC AUTOMATED: CPT

## 2024-02-23 ENCOUNTER — VIRTUAL VISIT (OUTPATIENT)
Dept: SURGERY | Facility: CLINIC | Age: 59
End: 2024-02-23
Payer: COMMERCIAL

## 2024-02-23 DIAGNOSIS — E66.9 OBESITY (BMI 30-39.9): Primary | ICD-10-CM

## 2024-02-23 PROCEDURE — 97803 MED NUTRITION INDIV SUBSEQ: CPT | Mod: 95 | Performed by: DIETITIAN, REGISTERED

## 2024-02-23 NOTE — PROGRESS NOTES
"Keiry Mchugh is a 58 year old who is being evaluated via a billable video visit.      Medical Weight Loss Follow Up Diet Evaluation  Assessment:  This patient was referred by Dr. Gómez for MNT as treatment for Obesity.  Keiry is presenting today for a new weight management nutrition consultation. Pt has had an initial appointment with Dr. Gómez.    Weight loss medication: Phentermine.    Anthropometrics:    Pt's weight is 193 lb  Initial weight: 206 lb  BMI: There is no height or weight on file to calculate BMI.   Ideal body weight: 60.5 kg (133 lb 4.3 oz)  Adjusted ideal body weight: 71.3 kg (157 lb 2.6 oz)  Estimated RMR ([place]-St Jeor equation):  1623 kcals x 1.2 (sedentary) = 1948 kcals (for weight maintenance)     Medical History:  Patient Active Problem List   Diagnosis    Chronic insomnia    HARDIN (nonalcoholic steatohepatitis)    Sigmoid diverticulitis    Dyslipidemia    Metabolic syndrome    Celiac disease    Low bone mass    Profound fatigue    Cervical spondylosis without myelopathy    Cervical radiculitis        Nutrition History:   Food allergies/intolerances/cultural or religous food customs: Celiac  She has been diagnosed with RA and is interested in learning more about an \"anti inflammatory diet\"    Dietary Recall:  Breakfast: two hardboiled eggs and one turkey sausage,   Fruit a little later  Lunch: A big salad with chicken OR yogurt and fruit  Dinner: Protein and veggies    Beverages:   Green Tea  Water  Occasionally body armor    Exercise:   Walking 9-13k steps per day  Long walk on the weekends for 30 minutes    Nutrition Diagnosis (PES statement):     Obesity related to excessive energy intake as evidence by BMI of 31.88     Nutrition Intervention  Food and/or Nutrient Delivery   Placed emphasis on importance of developing a healthy meal routine, aiming for 3 meals a day  Nutrition Counseling   Discussed high antioxidant foods    Handouts provided:  Anti oxidant " foods    Assessment/Plan:    Pt will follow up in 3 month(s) with bariatrician and with dietitian as needed.     Video-Visit Details    Type of service:  Video Visit    Video Start Time (time video started): 3:30 PM    Video End Time (time video stopped): 3:48 pm    Originating Location (pt. Location): Home      Distant Location (provider location):  Off-site    Mode of Communication:  Video Conference via [facility]    Physician has received verbal consent for a Video Visit from the patient? Yes      Celia Yi RD

## 2024-02-23 NOTE — PATIENT INSTRUCTIONS
Omega-3 fatty acids are found in cold-water fish such as salmon, tuna, and mackerel. They are also found in other seafood such as oysters and krill oil supplements.    Green tea  Flax seeds    NUTS/Seeds  Walnuts   Almonds  Pumpkin seed  Sesame Seeds  Flaxseeds    Dark Chocolate - 60-70% or higher    FRUITS AND VEGETABLES  All green, red, yellow and orange vegetables are rich in antioxidants    OTHERS  Cherries  Pomegranates  Blueberries  Eggplant  Red peppers  Sweet potatoes  Garlic  Beetroot  Broccoli  Cauliflower  Green peas  Tomatoes  Carrots  Pumpkin  Spinach    ANTI-INFLAMMATORY OILS  Extra virgin olive oil  Flaxseed oil  Fish oil  Avocado oil    ANTI-INFLAMMATORY HERBS AND SPICES  Turmeric  Glory  Cinnamon  Oregano  Chio  Parsley  Thyme   Cayenne Pepper

## 2024-02-27 ENCOUNTER — NURSE TRIAGE (OUTPATIENT)
Dept: FAMILY MEDICINE | Facility: CLINIC | Age: 59
End: 2024-02-27
Payer: COMMERCIAL

## 2024-02-27 ENCOUNTER — TELEPHONE (OUTPATIENT)
Dept: FAMILY MEDICINE | Facility: CLINIC | Age: 59
End: 2024-02-27
Payer: COMMERCIAL

## 2024-02-27 NOTE — TELEPHONE ENCOUNTER
"Patient calling in to report that the itching has decreased after starting Zyrtec earlier today for a reaction to doxycycline; however, the hives have \"increased significantly\". 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 She denies any difficulty breathing at this time. Writer advised that she be evaluated emergently as the allergic reaction does not seem to be resolving. Patient agrees, and will seek care at [facility].  "

## 2024-02-27 NOTE — TELEPHONE ENCOUNTER
Spoke with covering provider clinic Ciara SEYMOUR.  Recommendations are to stop doxycycline immediately followed with dosing antihistamine for hives.  Patient to call 911/seek emergent treatment if she develops symptoms of anaphylaxis.    Called patient and left a detailed message with all the instructions above.  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  She requested a detailed message.    When we last spoke she understood the instructions to take Benadryl or other antihistamine and seek immediate care if she develops symptoms consistent with anaphylaxis.    Also placed a note in her allergy list regarding hives with doxycycline.

## 2024-02-27 NOTE — TELEPHONE ENCOUNTER
"Nurse Triage SBAR    Is this a 2nd Level Triage? YES, LICENSED PRACTITIONER REVIEW IS REQUIRED    Situation: Patient calling in with potential drug reaction. Patient has broken out in hives following 4 doses of doxycycline    Background: Patient was prescribed a 7 day course of doxycycline monohydrate following virtual visit with [facility] provider on Sunday for possible sinus infection.    She has taken 2 doses Sunday and Monday.    Assessment: Patient describes the emergency of hives across her chest, back, and legs this morning. Hives are moderately itchy. No airway concerns.     Protocol Recommended Disposition:   See in Office Today    Recommendation: Advised that patient stop doxycycline, take Benadryl or other antihistamine and await instructions from our office. If concerns for anaphylaxis 911 to be called.    Routed to provider    Does the patient meet one of the following criteria for ADS visit consideration? 16+ years old, with an [facility] PCP     TIP  Providers, please consider if this condition is appropriate for management at one of our Acute and Diagnostic Services sites.     If patient is a good candidate, please use dotphrase <dot>triageresponse and select Refer to ADS to document.      Reason for Disposition   Hives or itching    Answer Assessment - Initial Assessment Questions  1. APPEARANCE of RASH: \"Describe the rash.\" (e.g., spots, blisters, raised areas, skin peeling, scaly)      Hives and itching  2. SIZE: \"How big are the spots?\" (e.g., tip of pen, eraser, coin; inches, centimeters)      Small dots everywhere, raised  3. LOCATION: \"Where is the rash located?\"      Back, chest, legs, stomach  4. COLOR: \"What color is the rash?\" (Note: It is difficult to assess rash color in people with darker-colored skin. When this situation occurs, simply ask the caller to describe what they see.)      Red  5. ONSET: \"When did the rash begin?\"      Woke up this AM  6. FEVER: \"Do you have a fever?\" If Yes, ask: \"What " "is your temperature, how was it measured, and when did it start?\"      Yes mild  7. ITCHING: \"Does the rash itch?\" If Yes, ask: \"How bad is the itch?\" (Scale 1-10; or mild, moderate, severe)      Itchy-3-4/10  8. CAUSE: \"What do you think is causing the rash?\"      Doxycycline 200 mg day, 400 taken thus far  9. NEW MEDICINES: \"What new medicines are you taking?\" (e.g., name of antibiotic) \"When did you start taking this medication?\".      See above  10. OTHER SYMPTOMS: \"Do you have any other symptoms?\" (e.g., sore throat, fever, joint pain)        No  11. PREGNANCY: \"Is there any chance you are pregnant?\" \"When was your last menstrual period?\"        No    Protocols used: Rash - Widespread On Drugs-A-OH    "

## 2024-03-04 DIAGNOSIS — G43.119 INTRACTABLE MIGRAINE WITH AURA WITHOUT STATUS MIGRAINOSUS: ICD-10-CM

## 2024-03-04 RX ORDER — SUMATRIPTAN 100 MG/1
TABLET, FILM COATED ORAL
Qty: 9 TABLET | Refills: 0 | Status: SHIPPED | OUTPATIENT
Start: 2024-03-04 | End: 2024-05-03

## 2024-03-22 ENCOUNTER — TRANSFERRED RECORDS (OUTPATIENT)
Dept: HEALTH INFORMATION MANAGEMENT | Facility: CLINIC | Age: 59
End: 2024-03-22

## 2024-03-22 ENCOUNTER — LAB (OUTPATIENT)
Dept: LAB | Facility: CLINIC | Age: 59
End: 2024-03-22
Payer: COMMERCIAL

## 2024-03-22 DIAGNOSIS — M05.79 SEROPOSITIVE RHEUMATOID ARTHRITIS OF MULTIPLE JOINTS (H): ICD-10-CM

## 2024-03-22 DIAGNOSIS — Z79.899 HIGH RISK MEDICATION USE: ICD-10-CM

## 2024-03-22 LAB
ALBUMIN SERPL BCG-MCNC: 4.6 G/DL (ref 3.5–5.2)
ALT SERPL W P-5'-P-CCNC: 33 U/L (ref 0–50)
CREAT SERPL-MCNC: 0.82 MG/DL (ref 0.51–0.95)
EGFRCR SERPLBLD CKD-EPI 2021: 83 ML/MIN/1.73M2
ERYTHROCYTE [DISTWIDTH] IN BLOOD BY AUTOMATED COUNT: 12.2 % (ref 10–15)
HCT VFR BLD AUTO: 41.7 % (ref 35–47)
HGB BLD-MCNC: 13.9 G/DL (ref 11.7–15.7)
MCH RBC QN AUTO: 31.3 PG (ref 26.5–33)
MCHC RBC AUTO-ENTMCNC: 33.3 G/DL (ref 31.5–36.5)
MCV RBC AUTO: 94 FL (ref 78–100)
PLATELET # BLD AUTO: 320 10E3/UL (ref 150–450)
RBC # BLD AUTO: 4.44 10E6/UL (ref 3.8–5.2)
WBC # BLD AUTO: 4.1 10E3/UL (ref 4–11)

## 2024-03-22 PROCEDURE — 36415 COLL VENOUS BLD VENIPUNCTURE: CPT

## 2024-03-22 PROCEDURE — 85027 COMPLETE CBC AUTOMATED: CPT

## 2024-03-22 PROCEDURE — 82040 ASSAY OF SERUM ALBUMIN: CPT

## 2024-03-22 PROCEDURE — 82565 ASSAY OF CREATININE: CPT

## 2024-03-22 PROCEDURE — 84460 ALANINE AMINO (ALT) (SGPT): CPT

## 2024-03-23 ENCOUNTER — HEALTH MAINTENANCE LETTER (OUTPATIENT)
Age: 59
End: 2024-03-23

## 2024-03-26 ENCOUNTER — OFFICE VISIT (OUTPATIENT)
Dept: RHEUMATOLOGY | Facility: CLINIC | Age: 59
End: 2024-03-26
Payer: COMMERCIAL

## 2024-03-26 VITALS
BODY MASS INDEX: 32.67 KG/M2 | SYSTOLIC BLOOD PRESSURE: 122 MMHG | HEART RATE: 87 BPM | WEIGHT: 205.5 LBS | DIASTOLIC BLOOD PRESSURE: 82 MMHG | OXYGEN SATURATION: 97 %

## 2024-03-26 DIAGNOSIS — K75.81 NASH (NONALCOHOLIC STEATOHEPATITIS): ICD-10-CM

## 2024-03-26 DIAGNOSIS — R76.8 RHEUMATOID FACTOR POSITIVE: ICD-10-CM

## 2024-03-26 DIAGNOSIS — Z79.899 HIGH RISK MEDICATION USE: ICD-10-CM

## 2024-03-26 DIAGNOSIS — M15.0 PRIMARY OSTEOARTHRITIS INVOLVING MULTIPLE JOINTS: ICD-10-CM

## 2024-03-26 DIAGNOSIS — M05.79 SEROPOSITIVE RHEUMATOID ARTHRITIS OF MULTIPLE JOINTS (H): Primary | ICD-10-CM

## 2024-03-26 PROCEDURE — 99214 OFFICE O/P EST MOD 30 MIN: CPT | Performed by: INTERNAL MEDICINE

## 2024-03-26 RX ORDER — METHOTREXATE 2.5 MG/1
15 TABLET ORAL
Qty: 72 TABLET | Refills: 0 | Status: SHIPPED | OUTPATIENT
Start: 2024-03-26 | End: 2024-06-26

## 2024-03-26 RX ORDER — FOLIC ACID 1 MG/1
3 TABLET ORAL DAILY
Qty: 270 TABLET | Refills: 0 | Status: SHIPPED | OUTPATIENT
Start: 2024-03-26 | End: 2024-06-24

## 2024-03-26 RX ORDER — HYDROXYCHLOROQUINE SULFATE 200 MG/1
200 TABLET, FILM COATED ORAL 2 TIMES DAILY
COMMUNITY
Start: 2024-03-03 | End: 2024-06-26

## 2024-03-26 RX ORDER — PREDNISOLONE ACETATE 10 MG/ML
1 SUSPENSION/ DROPS OPHTHALMIC DAILY
COMMUNITY
Start: 2024-02-19 | End: 2024-05-03

## 2024-03-26 NOTE — PROGRESS NOTES
"      Rheumatology follow-up visit note     Keiry is a 58 year old female presents today for follow-up.    Samantha was seen today for recheck.    Diagnoses and all orders for this visit:    Seropositive rheumatoid arthritis of multiple joints (H)  -     methotrexate 2.5 MG tablet; Take 6 tablets (15 mg) by mouth every 7 days for 90 days TAKE 4 TABLETS(10 MG) BY MOUTH EVERY 7 DAYS    High risk medication use  -     folic acid (FOLVITE) 1 MG tablet; Take 3 tablets (3 mg) by mouth daily for 90 days    Rheumatoid factor positive    Primary osteoarthritis involving multiple joints    HARDIN (nonalcoholic steatohepatitis)        Seropositive rheumatoid arthritis residual symptoms of \"tightness\" the pain having subsided, liver studies are now normal, will increase methotrexate to 15 mg splitting 3 tablets in the morning and 3 in the evening on Mondays, folic acid increased to 3 mg daily, continue hydroxychloroquine.  Check labs every 6 weeks.  The longitudinal plan of care for the diagnosis(es)/condition(s) as documented were addressed during this visit. Due to the added complexity in care, I will continue to support Samantha in the subsequent management and with ongoing continuity of care.     Follow up in 3 months.    HPI    Keiry Mchugh is a 58 year old female is here for follow-up of rheumatoid arthritis seropositive.  She is on methotrexate low-dose at 10 mg Mondays, folic acid 2 mg daily, hydroxychloroquine.  While her pain has improved very significantly multiple joints especially the hands she has this feeling of \"stiffness\".  She is noticing some other symptoms including \"a buffalo hump, pigmentation of her armpits for the hair loss that she not looking back feels started before methotrexate or hydroxychloroquine was started and she is going to follow-up with her endocrine.  She has not been getting the corticosteroid injections anymore at this stage they are on the hold while this current issue of first is clarified " by endocrinology.  Her liver function studies are back to normal.    .  She has had extensive workup for her liver assessment in the past and the conclusion was that she might have HARDIN.  She had quite significant hair loss she thought her ponytail is one third the size.  Now it seems that has stabilized.     DETAILED EXAMINATION  03/26/24  :    Vitals:    03/26/24 0805   BP: 122/82   BP Location: Right arm   Patient Position: Sitting   Cuff Size: Adult Regular   Pulse: 87   SpO2: 97%   Weight: 93.2 kg (205 lb 8 oz)     Alert oriented. Head including the face is examined for malar rash, heliotropes, scarring, lupus pernio. Eyes examined for redness such as in episcleritis/scleritis, periorbital lesions.   Neck/ Face examined for parotid gland swelling, range of motion of neck.  Left upper and lower and right upper and lower extremities examined for tenderness, swelling, warmth of the appendicular joints, range of motion, edema, rash.  Some of the important findings included: she does not have evidence of synovitis in the palpable joints of the upper extremities.  No significant deformities of the digits.  no Heberden nodes.  Range of motion of the shoulders  show full abduction.  No JLT effusion or warmth of the knees.  she does not have dactylitis of the digits.     Patient Active Problem List    Diagnosis Date Noted    Cervical spondylosis without myelopathy 10/13/2023     Priority: Medium    Cervical radiculitis 10/13/2023     Priority: Medium    Low bone mass 07/21/2023     Priority: Medium    Profound fatigue 07/21/2023     Priority: Medium    Dyslipidemia      Priority: Medium    Metabolic syndrome      Priority: Medium    Celiac disease      Priority: Medium    Sigmoid diverticulitis 08/29/2021     Priority: Medium    HARDIN (nonalcoholic steatohepatitis) 07/23/2021     Priority: Medium    Chronic insomnia 06/11/2020     Priority: Medium     Insomnia associated with inadequate sleep hygiene and suspected sleep  apnea.       Past Surgical History:   Procedure Laterality Date    APPENDECTOMY      CHOLECYSTECTOMY      COLON SURGERY      COLONOSCOPY N/A 3/18/2019    Procedure: COLONOSCOPY;  Surgeon: Davis Mosqueda MD;  Location: [facility];  Service: General    CYSTOSCOPY N/A 7/16/2015    Procedure: CYSTOSCOPY;  Surgeon: Nate Horta MD;  Location: [facility];  Service:     HC CORRECT BUNION,METATARSAL OSTEOTOMY      Description: Bunion Correction With Metatarsal Osteotomy Herman Procedure;  Proc Date: 07/16/2010;    HYSTERECTOMY      HYSTERECTOMY, PAP NO LONGER INDICATED  02/2009    NEPHRECTOMY LIVING DONOR Left APRIL 2012    [facility] LAP,BILIARY TRACT,UNLISTED N/A 3/19/2019    Procedure: BIOPSY, LIVER, LAPAROSCOPIC;  Surgeon: Davis Mosqueda MD;  Location: [facility];  Service: General    VA LAP,SLING OPERATION      Description: Laparoscopic Sling Operation For Stress Incontinence;  Recorded: 02/17/2009;    [facility] LAP,SURG,COLECTOMY, PARTIAL, W/ANAST N/A 3/19/2019    Procedure: LAPAROSCOPIC SIGMOID COLON RESECTION;  Surgeon: Davis Mosqueda MD;  Location: [facility];  Service: General    SEPTOPLASTY, TURBINOPLASTY, COMBINED N/A 8/3/2021    Procedure: SEPTOPLASTY, NOSE, WITH TURBINOPLASTY RADIOFREQUENCY BALLON ASSISTED, CRYOBLATION OF NASAL TISSUE;  Surgeon: Randy Case MD;  Location: [facility]    SIGMOIDOSCOPY FLEXIBLE N/A 3/19/2019    Procedure: FLEXIBLE SIGMOIDOSCOPY;  Surgeon: Davis Mosqueda MD;  Location: [facility];  Service: General      Past Medical History:   Diagnosis Date    Anemia     Anxiety     Celiac disease     Celiac disease     Cervical spondylosis without myelopathy 10/13/2023    Chronic kidney disease     donated 1 kidney    Cough     Depression     Seasonal    Diverticulitis     Dyslipidemia     Enlarged LV     wall    GERD (gastroesophageal reflux disease)     Hepatitis     Hiatal hernia     Hypertension     Low bone mass     Metabolic  "syndrome     Migraines     HARDIN (nonalcoholic steatohepatitis)     Peripheral neuropathy     PONV (postoperative nausea and vomiting)     Profound fatigue     Ventral hernia      Allergies   Allergen Reactions    Gluten Meal Nausea and Vomiting    Doxycycline Hives    Contrast Dye      Hives, resolved with benadryl alone in the past (two episode)    Nsaids Other (See Comments)     Avoid - one kidney    Ondansetron Headache    Pcn [Penicillins] Hives     As a child    Rocephin [Ceftriaxone] Hives     Patient states this started immediately    Tramadol Hives    Zithromax [Azithromycin] Tinnitus     \"ear ringing and hearing loss\"     Current Outpatient Medications   Medication Sig Dispense Refill    aspirin-acetaminophen-caffeine (EXCEDRIN MIGRAINE) 250-250-65 MG tablet Take 1 tablet by mouth (Patient not taking: Reported on 1/25/2024)      atenolol (TENORMIN) 50 MG tablet Take 1 tablet (50 mg) by mouth daily as needed (Migraines) (Patient not taking: Reported on 1/25/2024) 30 tablet 1    B Complex-C (RA B-COMPLEX/VITAMIN C CR) TBCR Take 1 tablet by mouth daily      baclofen (LIORESAL) 10 MG tablet TAKE 1 TABLET BY MOUTH TWICE DAILY TO THREE TIMES DAILY AS NEEDED FOR MUSCLE SPASMS. (Patient not taking: Reported on 1/19/2024) 30 tablet 3    Berberine Chloride 500 MG CAPS       Bioflavonoid Products (VITAMIN C) CHEW       Black Pepper-Turmeric (TURMERIC CURCUMIN) 5-1000 MG CAPS       Coenzyme Q10 (COQ-10) 400 MG CAPS Take 1 capsule by mouth daily       FLUoxetine (PROZAC) 20 MG capsule Take 1 capsule (20 mg) by mouth daily 90 capsule 0    folic acid (FOLVITE) 1 MG tablet Take 2 tablets (2 mg) by mouth daily 180 tablet 0    Lactobacillus (PROBIOTIC ACIDOPHILUS) CAPS       magnesium oxide (MAG-OX) 400 (240 Mg) MG tablet Take 400 mg by mouth daily      methotrexate 2.5 MG tablet TAKE 4 TABLETS(10 MG) BY MOUTH EVERY 7 DAYS 48 tablet 0    OnabotulinumtoxinA (BOTOX IJ) For migraines - 155 units q 12 weeks.      phentermine " (ADIPEX-P) 37.5 MG tablet Take 1 tablet (37.5 mg) by mouth every morning (before breakfast) 90 tablet 1    polyethylene glycol (MIRALAX) 17 GM/Dose powder Take 17 g by mouth daily as needed (Patient not taking: Reported on 1/25/2024)      SUMAtriptan (IMITREX) 100 MG tablet TAKE 1/2 TO 1 TABLET BY MOUTH AT ONSET OF HEADACHE 9 tablet 0     family history includes Breast Cancer (age of onset: 50.00) in her maternal aunt and mother; Graves' disease in her sister; Hashimoto's thyroiditis in her maternal aunt, maternal grandfather, and mother; Hypothyroidism in her maternal aunt, maternal aunt, and sister.  Social Connections: Not on file          WBC Count   Date Value Ref Range Status   03/22/2024 4.1 4.0 - 11.0 10e3/uL Final     RBC Count   Date Value Ref Range Status   03/22/2024 4.44 3.80 - 5.20 10e6/uL Final     Hemoglobin   Date Value Ref Range Status   03/22/2024 13.9 11.7 - 15.7 g/dL Final     Hematocrit   Date Value Ref Range Status   03/22/2024 41.7 35.0 - 47.0 % Final     MCV   Date Value Ref Range Status   03/22/2024 94 78 - 100 fL Final     MCH   Date Value Ref Range Status   03/22/2024 31.3 26.5 - 33.0 pg Final     Platelet Count   Date Value Ref Range Status   03/22/2024 320 150 - 450 10e3/uL Final     % Lymphocytes   Date Value Ref Range Status   05/26/2023 34 % Final     AST   Date Value Ref Range Status   10/11/2023 26 0 - 45 U/L Final     Comment:     Reference intervals for this test were updated on 6/12/2023 to more accurately reflect our healthy population. There may be differences in the flagging of prior results with similar values performed with this method. Interpretation of those prior results can be made in the context of the updated reference intervals.     ALT   Date Value Ref Range Status   03/22/2024 33 0 - 50 U/L Final     Albumin   Date Value Ref Range Status   03/22/2024 4.6 3.5 - 5.2 g/dL Final   09/08/2021 4.3 3.5 - 5.0 g/dL Final     Alkaline Phosphatase   Date Value Ref Range  Status   10/11/2023 86 35 - 104 U/L Final     Creatinine   Date Value Ref Range Status   03/22/2024 0.82 0.51 - 0.95 mg/dL Final     GFR Estimate   Date Value Ref Range Status   03/22/2024 83 >60 mL/min/1.73m2 Final   12/01/2020 61 >=60 mL/min/BSA Final   06/19/2020 >60 >60 mL/min/1.73m2 Final     GFR Estimate If Black   Date Value Ref Range Status   06/19/2020 >60 >60 mL/min/1.73m2 Final     GFREstimate If Black   Date Value Ref Range Status   12/01/2020 71 >=60 mL/min/BSA Final     Erythrocyte Sedimentation Rate   Date Value Ref Range Status   09/15/2023 20 0 - 30 mm/hr Final     CRP   Date Value Ref Range Status   05/26/2023 3.1 (H) 0.0 - <0.8 mg/dL Final

## 2024-03-28 ENCOUNTER — OFFICE VISIT (OUTPATIENT)
Dept: PHYSICAL MEDICINE AND REHAB | Facility: CLINIC | Age: 59
End: 2024-03-28
Payer: COMMERCIAL

## 2024-03-28 VITALS — HEART RATE: 98 BPM | SYSTOLIC BLOOD PRESSURE: 151 MMHG | DIASTOLIC BLOOD PRESSURE: 90 MMHG

## 2024-03-28 DIAGNOSIS — M54.12 CERVICAL RADICULITIS: Primary | ICD-10-CM

## 2024-03-28 PROCEDURE — 99203 OFFICE O/P NEW LOW 30 MIN: CPT | Performed by: NURSE PRACTITIONER

## 2024-03-28 RX ORDER — CYCLOBENZAPRINE HCL 5 MG
5 TABLET ORAL 3 TIMES DAILY PRN
Qty: 45 TABLET | Refills: 0 | Status: SHIPPED | OUTPATIENT
Start: 2024-03-28 | End: 2024-04-26

## 2024-03-28 RX ORDER — METHYLPREDNISOLONE 4 MG
TABLET, DOSE PACK ORAL
Qty: 21 TABLET | Refills: 0 | Status: SHIPPED | OUTPATIENT
Start: 2024-03-28 | End: 2024-04-06

## 2024-03-28 ASSESSMENT — PAIN SCALES - GENERAL: PAINLEVEL: MODERATE PAIN (5)

## 2024-03-28 NOTE — LETTER
3/28/2024         RE: Keiry Mchugh  [address]  [facility] [address]        Dear Colleague,    Thank you for referring your patient, Keiry Mchugh, to the [facility]. Please see a copy of my visit note below.    Assessment:   Keiry Mchugh is a 58 year old y.o. female with past medical history significant for Jarquin, celiac disease, dyslipidemia, metabolic syndrome, hypertension, chronic insomnia   who presents today for follow-up regarding acute on chronic neck pain into the right arm, with right upper extremity paresthesias involving fingers 2, 3.      I suspect Samantha's neck/R arm pain is a flareup of radicular pain related to C5 and C7 following her fall injury. Shoulder tender but has full ROM without significant exacerbation of pain. Pain is so far not responding to po NSAIDs, baclofen. Recommend medrol dose pack and switching to flexeril 5mg from baclofen briefly. Discussed immunosuppression of steroids. She will resume her online PT portal exercises on a daily basis. She will keep her follow up appt with Damaris in 1 mo to check in.  Discussed the role of lyrica and perhaps C7-T1 IL SUSAN if progressive symptoms.         Plan:     A shared decision making plan was used.  The patient's values and choices were respected.  The following represents what was discussed and decided upon by the physician assistant and the patient.      1.  DIAGNOSTIC TESTS:  - I reviewed the MRI cervical spine.  --no new imaging ordered    2.  PHYSICAL THERAPY:   Patient just finished PT 3 mos ago for cspine. Will resume her online library of exercises      3.  MEDICATIONS:  - Medrol dose pack ordered  -start flexeril 5mg TID PRN  -Hold baclofen for now. Can resume if feels greater relief achieved vs flexeril.  -previous side effects with gabapentin, could consider lyrica. Patient would like to avoid for now.    4.  INTERVENTIONS:  - Discussed the role of a C7-T1 interlaminar epidural steroid  injection. We will hold off for now.   we will need to be mindful of other injections that she is getting. She has not had a shoulder injection in a few mos. She recently held off of her chronic L shoulder injections last week with ortho    5.  PATIENT EDUCATION: Patient is in agreement the above plan.  All questions were answered.    6.  FOLLOW-UP: Patient will follow up with Damaris in 4 wks    Subjective:     Keiry Mchugh is a 58 year old female who presents today for follow-up appt.   Primary Damaris Leslie patient seen for urgent symptoms.    12 days ago, Samantha experienced sudden onset sharp neck pain while turning her head quickly while in the crowd at a [employer] game. The pain resolved after 1min, but then the next morning was much worse.  severe pain lasted for 4 days, she was using heat, ice, Advil, baclofen.  On the fifth day, she was at work quickly going around a corner, and tripped and went flying over some boxes in the hallway, landing hard on her right elbow and experienced acute worsening right posterior neck pain into her right shoulder and down the arm stopping at the elbow, as well as worsening numbness and tingling in her second and third digits on the right hand. Denies arm weakness, imbalance, leg symptoms.      She has increased pain with reaching, lifting, sleeping, neck movement.  The pain improves somewhat with Advil and baclofen.  She is taking 800 mg ibuprofen over-the-counter 2-3 times a day and baclofen. Has been taking this on a chronic basis but during this flare has noticed she has to take 2 tabs for her to feel helpful. She tried a massage yesterday without relief. She has tried voltaren cream and epsom salt without relief    Did see her orthopedist for anticipated left shoulder injection the day after her symptoms initially began. Decided to defer left shoulder injection for time being given that she has had some cortisol concerns. She reports darkening of the armpits. She has an appt  later this year to check on these concerns      Patient has tried the following treatments for neck pain  - Completed physical therapy 3 mos ago.  This is her third round of physical therapy for neck pain over the years.      - Current Medications listed below  Ibuprofen otc (800mg twice daily dosing)  Baclofen 10mg     Meds that have not helped in past:  - Gabapentin caused eye twitching, tried it twice in past  - Opiates cause migraines  - Unable to take Tylenol due to HARDIN  - Unable to take Advil due to HARDIN         Objective:   CONSTITUTIONAL:  Vital signs as above.  No acute distress.  The patient is well nourished and well groomed.  Appears uncomfortable.  PSYCHIATRIC:  The patient is awake, alert, oriented to person, place and time.  The patient is answering questions appropriately with clear speech.  Normal affect.  HEENT: Normocephalic, atraumatic.  Sclera clear.      SKIN:  Skin over the face, neck bilateral upper extremities is clean, dry, intact . Scattered maculopapular erythematous rash centered around upper thoracic area and throughout upper back which appears like contact dermatitis, does not appear infectious with any swelling or drainage.    MUSCULOSKELETAL: No cervical spinal point tenderness. Exquisitely tender R trap, upper Scap. Cervical range of motion is moderately restricted with flexion and extension. Mildly limited with lateral rotation.      The patient had 5/5 strength bilateral deltoids, triceps, biceps, handgrips, intrinsics, and extensors. 5/5 strength both hip flexors, quads, hamstrings, dorsiflexion, plantarflexion, and EHLs     Full range of motion of R shoulder.  Increased pain in shoulder with reaching behind her body and overhead. Negative empty can. Moderate R AC joint tenderness. No significant swelling noted.    NEUROLOGICAL: Reflexes 2/4 klaus upper and lower extremities.  Negative Kaur's and babinski, clonus bilaterally.      Sensation to light touch is diminished right  fingers 2, 3, otherwise preserved throughout upper and lower extremities.      RESULTS:     I reviewed the MRI cervical spine per below. I showed her the results and we discussed them    EXAM: MR CERVICAL SPINE W/O CONTRAST  LOCATION: [facility]  DATE: 11/7/2023     INDICATION: Chronic neck pain, right arm pain numbness  COMPARISON: Neck CTA 05/27/2023  TECHNIQUE: MRI Cervical Spine without IV contrast.     FINDINGS:   Straightened cervical lordosis. 2 mm degenerative type anterolisthesis at C4-C5 and C5-C6. Minimal retrolisthesis at C6-C7. Preserved vertebral body heights. Mild facet complex edema on the left at C5-C6. No abnormal cord signal. No extraspinal   abnormality.     Craniovertebral junction and C1-C2: Normal.     C2-C3: Preserved disc height. No herniation. Mild to moderate bilateral facet arthropathy and facet complex ankylosis. No spinal canal or neural foraminal stenosis.      C3-C4: Mild loss of disc height. Shallow posterior disc bulge and mild right uncovertebral spurring. No spinal canal stenosis. Mild right neural foraminal stenosis. No left neural foraminal stenosis.      C4-C5: Minor loss of disc height. Slight annular bulge. Right greater than left uncovertebral spurring. Moderate bilateral facet arthropathy. No spinal canal stenosis. Moderate right neural foraminal stenosis. Minimal left neural foraminal stenosis.      C5-C6: Preserved disc height. Slight annular bulge without focal disc herniation. Mild to moderate right and moderate left facet arthropathy. No spinal canal stenosis. Mild bilateral neural foraminal stenosis.      C6-C7: Moderate to advanced loss of disc height, greater on the right. Circumferential disc bulge and endplate spurring with right greater than left uncovertebral spurring. No facet arthropathy. No spinal canal stenosis. Severe right neural foraminal   stenosis. Mild left neural foraminal stenosis.      C7-T1: Preserved disc height. No  herniation. Mild to moderate right facet arthropathy. No spinal canal stenosis. Mild to moderate right neural foraminal stenosis. Minimal left neural foraminal stenosis.                                                                      IMPRESSION:  1.  At C6-C7, severe right and mild left neural foraminal stenosis. Correlate with right C7 radicular symptoms.  2.  At C4-C5, moderate right neural foraminal stenosis.  3.  At C5-C6, mild bilateral neural foraminal stenosis.  4.  At C7-T1, mild to moderate right neural foraminal stenosis.      Amy Umanzor FNP-C  [facility]  O. [phone]        Again, thank you for allowing me to participate in the care of your patient.        Sincerely,        MING Celaya CNP

## 2024-03-28 NOTE — PROGRESS NOTES
Assessment:   Keiry Mchugh is a 58 year old y.o. female with past medical history significant for Jarquin, celiac disease, dyslipidemia, metabolic syndrome, hypertension, chronic insomnia   who presents today for follow-up regarding acute on chronic neck pain into the right arm, with right upper extremity paresthesias involving fingers 2, 3.      I suspect Samantha's neck/R arm pain is a flareup of radicular pain related to C5 and C7 following her fall injury. Shoulder tender but has full ROM without significant exacerbation of pain. Pain is so far not responding to po NSAIDs, baclofen. Recommend medrol dose pack and switching to flexeril 5mg from baclofen briefly. Discussed immunosuppression of steroids. She will resume her online PT portal exercises on a daily basis. She will keep her follow up appt with Damaris in 1 mo to check in.  Discussed the role of lyrica and perhaps C7-T1 IL SUSAN if progressive symptoms.         Plan:     A shared decision making plan was used.  The patient's values and choices were respected.  The following represents what was discussed and decided upon by the physician assistant and the patient.      1.  DIAGNOSTIC TESTS:  - I reviewed the MRI cervical spine.  --no new imaging ordered    2.  PHYSICAL THERAPY:   Patient just finished PT 3 mos ago for cspine. Will resume her online library of exercises      3.  MEDICATIONS:  - Medrol dose pack ordered  -start flexeril 5mg TID PRN  -Hold baclofen for now. Can resume if feels greater relief achieved vs flexeril.  -previous side effects with gabapentin, could consider lyrica. Patient would like to avoid for now.    4.  INTERVENTIONS:  - Discussed the role of a C7-T1 interlaminar epidural steroid injection. We will hold off for now.   we will need to be mindful of other injections that she is getting. She has not had a shoulder injection in a few mos. She recently held off of her chronic L shoulder injections last week with ortho    5.  PATIENT  EDUCATION: Patient is in agreement the above plan.  All questions were answered.    6.  FOLLOW-UP: Patient will follow up with Damaris in 4 wks    Subjective:     Keiry Mchugh is a 58 year old female who presents today for follow-up appt.   Primary Damaris Leslie patient seen for urgent symptoms.    12 days ago, Samantha experienced sudden onset sharp neck pain while turning her head quickly while in the crowd at a [employer] game. The pain resolved after 1min, but then the next morning was much worse.  severe pain lasted for 4 days, she was using heat, ice, Advil, baclofen.  On the fifth day, she was at work quickly going around a corner, and tripped and went flying over some boxes in the hallway, landing hard on her right elbow and experienced acute worsening right posterior neck pain into her right shoulder and down the arm stopping at the elbow, as well as worsening numbness and tingling in her second and third digits on the right hand. Denies arm weakness, imbalance, leg symptoms.      She has increased pain with reaching, lifting, sleeping, neck movement.  The pain improves somewhat with Advil and baclofen.  She is taking 800 mg ibuprofen over-the-counter 2-3 times a day and baclofen. Has been taking this on a chronic basis but during this flare has noticed she has to take 2 tabs for her to feel helpful. She tried a massage yesterday without relief. She has tried voltaren cream and epsom salt without relief    Did see her orthopedist for anticipated left shoulder injection the day after her symptoms initially began. Decided to defer left shoulder injection for time being given that she has had some cortisol concerns. She reports darkening of the armpits. She has an appt later this year to check on these concerns      Patient has tried the following treatments for neck pain  - Completed physical therapy 3 mos ago.  This is her third round of physical therapy for neck pain over the years.      - Current Medications listed  below  Ibuprofen otc (800mg twice daily dosing)  Baclofen 10mg     Meds that have not helped in past:  - Gabapentin caused eye twitching, tried it twice in past  - Opiates cause migraines  - Unable to take Tylenol due to HARDIN  - Unable to take Advil due to HARDIN         Objective:   CONSTITUTIONAL:  Vital signs as above.  No acute distress.  The patient is well nourished and well groomed.  Appears uncomfortable.  PSYCHIATRIC:  The patient is awake, alert, oriented to person, place and time.  The patient is answering questions appropriately with clear speech.  Normal affect.  HEENT: Normocephalic, atraumatic.  Sclera clear.      SKIN:  Skin over the face, neck bilateral upper extremities is clean, dry, intact . Scattered maculopapular erythematous rash centered around upper thoracic area and throughout upper back which appears like contact dermatitis, does not appear infectious with any swelling or drainage.    MUSCULOSKELETAL: No cervical spinal point tenderness. Exquisitely tender R trap, upper Scap. Cervical range of motion is moderately restricted with flexion and extension. Mildly limited with lateral rotation.      The patient had 5/5 strength bilateral deltoids, triceps, biceps, handgrips, intrinsics, and extensors. 5/5 strength both hip flexors, quads, hamstrings, dorsiflexion, plantarflexion, and EHLs     Full range of motion of R shoulder.  Increased pain in shoulder with reaching behind her body and overhead. Negative empty can. Moderate R AC joint tenderness. No significant swelling noted.    NEUROLOGICAL: Reflexes 2/4 klaus upper and lower extremities.  Negative Kaur's and babinski, clonus bilaterally.      Sensation to light touch is diminished right fingers 2, 3, otherwise preserved throughout upper and lower extremities.      RESULTS:     I reviewed the MRI cervical spine per below. I showed her the results and we discussed them    EXAM: MR CERVICAL SPINE W/O CONTRAST  LOCATION: [facility]  DATE: 11/7/2023     INDICATION: Chronic neck pain, right arm pain numbness  COMPARISON: Neck CTA 05/27/2023  TECHNIQUE: MRI Cervical Spine without IV contrast.     FINDINGS:   Straightened cervical lordosis. 2 mm degenerative type anterolisthesis at C4-C5 and C5-C6. Minimal retrolisthesis at C6-C7. Preserved vertebral body heights. Mild facet complex edema on the left at C5-C6. No abnormal cord signal. No extraspinal   abnormality.     Craniovertebral junction and C1-C2: Normal.     C2-C3: Preserved disc height. No herniation. Mild to moderate bilateral facet arthropathy and facet complex ankylosis. No spinal canal or neural foraminal stenosis.      C3-C4: Mild loss of disc height. Shallow posterior disc bulge and mild right uncovertebral spurring. No spinal canal stenosis. Mild right neural foraminal stenosis. No left neural foraminal stenosis.      C4-C5: Minor loss of disc height. Slight annular bulge. Right greater than left uncovertebral spurring. Moderate bilateral facet arthropathy. No spinal canal stenosis. Moderate right neural foraminal stenosis. Minimal left neural foraminal stenosis.      C5-C6: Preserved disc height. Slight annular bulge without focal disc herniation. Mild to moderate right and moderate left facet arthropathy. No spinal canal stenosis. Mild bilateral neural foraminal stenosis.      C6-C7: Moderate to advanced loss of disc height, greater on the right. Circumferential disc bulge and endplate spurring with right greater than left uncovertebral spurring. No facet arthropathy. No spinal canal stenosis. Severe right neural foraminal   stenosis. Mild left neural foraminal stenosis.      C7-T1: Preserved disc height. No herniation. Mild to moderate right facet arthropathy. No spinal canal stenosis. Mild to moderate right neural foraminal stenosis. Minimal left neural foraminal stenosis.                                                                      IMPRESSION:  1.  At  C6-C7, severe right and mild left neural foraminal stenosis. Correlate with right C7 radicular symptoms.  2.  At C4-C5, moderate right neural foraminal stenosis.  3.  At C5-C6, mild bilateral neural foraminal stenosis.  4.  At C7-T1, mild to moderate right neural foraminal stenosis.      Amy Umanzor FNP-C  [facility]  O. [phone]

## 2024-04-03 ASSESSMENT — PATIENT HEALTH QUESTIONNAIRE - PHQ9
SUM OF ALL RESPONSES TO PHQ QUESTIONS 1-9: 12
SUM OF ALL RESPONSES TO PHQ QUESTIONS 1-9: 12
10. IF YOU CHECKED OFF ANY PROBLEMS, HOW DIFFICULT HAVE THESE PROBLEMS MADE IT FOR YOU TO DO YOUR WORK, TAKE CARE OF THINGS AT HOME, OR GET ALONG WITH OTHER PEOPLE: VERY DIFFICULT

## 2024-04-04 ENCOUNTER — OFFICE VISIT (OUTPATIENT)
Dept: FAMILY MEDICINE | Facility: CLINIC | Age: 59
End: 2024-04-04
Payer: COMMERCIAL

## 2024-04-04 VITALS
RESPIRATION RATE: 16 BRPM | TEMPERATURE: 99 F | HEIGHT: 66 IN | SYSTOLIC BLOOD PRESSURE: 133 MMHG | DIASTOLIC BLOOD PRESSURE: 88 MMHG | HEART RATE: 94 BPM | WEIGHT: 204.7 LBS | OXYGEN SATURATION: 98 % | BODY MASS INDEX: 32.9 KG/M2

## 2024-04-04 DIAGNOSIS — R53.83 OTHER FATIGUE: Primary | ICD-10-CM

## 2024-04-04 DIAGNOSIS — L68.0 HIRSUTISM: ICD-10-CM

## 2024-04-04 DIAGNOSIS — Z98.890 S/P REMOVAL OF PARATHYROID GLAND: ICD-10-CM

## 2024-04-04 DIAGNOSIS — Z90.89 S/P REMOVAL OF PARATHYROID GLAND: ICD-10-CM

## 2024-04-04 DIAGNOSIS — L65.9 HAIR LOSS: ICD-10-CM

## 2024-04-04 LAB
CALCIUM SERPL-MCNC: 9.6 MG/DL (ref 8.6–10)
FERRITIN SERPL-MCNC: 178 NG/ML (ref 11–328)
IRON BINDING CAPACITY (ROCHE): 323 UG/DL (ref 240–430)
IRON SATN MFR SERPL: 34 % (ref 15–46)
IRON SERPL-MCNC: 111 UG/DL (ref 37–145)
PTH-INTACT SERPL-MCNC: 66 PG/ML (ref 15–65)
VIT D+METAB SERPL-MCNC: 36 NG/ML (ref 20–50)

## 2024-04-04 PROCEDURE — 82310 ASSAY OF CALCIUM: CPT | Performed by: NURSE PRACTITIONER

## 2024-04-04 PROCEDURE — 83540 ASSAY OF IRON: CPT | Performed by: NURSE PRACTITIONER

## 2024-04-04 PROCEDURE — 82306 VITAMIN D 25 HYDROXY: CPT | Performed by: NURSE PRACTITIONER

## 2024-04-04 PROCEDURE — 83550 IRON BINDING TEST: CPT | Performed by: NURSE PRACTITIONER

## 2024-04-04 PROCEDURE — 36415 COLL VENOUS BLD VENIPUNCTURE: CPT | Performed by: NURSE PRACTITIONER

## 2024-04-04 PROCEDURE — 99214 OFFICE O/P EST MOD 30 MIN: CPT | Performed by: NURSE PRACTITIONER

## 2024-04-04 PROCEDURE — 83970 ASSAY OF PARATHORMONE: CPT | Performed by: NURSE PRACTITIONER

## 2024-04-04 PROCEDURE — 82728 ASSAY OF FERRITIN: CPT | Performed by: NURSE PRACTITIONER

## 2024-04-04 RX ORDER — DEXAMETHASONE 1 MG
1 TABLET ORAL ONCE
Qty: 1 TABLET | Refills: 0 | Status: SHIPPED | OUTPATIENT
Start: 2024-04-04 | End: 2024-05-03

## 2024-04-04 NOTE — PROGRESS NOTES
Assessment & Plan     Other fatigue  Reviewed recommendations regarding testing for Cushing's disease.  The patient that recommendation was 24-hour urine for dexamethasone challenge.  Discussed with patient that we need to separate test to confirm the diagnosis.  We discussed just doing 1 and if positive pursuing a second test however patient would prefer to do both test right away.  I have ordered 24-hour cortisol urine collection as well as dexamethasone challenge with instructions to take dexamethasone between 11 PM and the night before blood draw and schedule blood draw for 8 AM cortisol level.  Discussed with patient positive result this would be something that was managed by endocrinology.    - dexAMETHasone (DECADRON) 1 MG tablet; Take 1 tablet (1 mg) by mouth once for 1 dose Take between 11PM and 12 A before 8 am blood draw.  - Cortisol Cortisone Free 24 Hour Urine; Future  - Cortisol; Future  - Cortisol Cortisone Free 24 Hour Urine    Hair loss  Patient has not yet done the cortisol testing.  Other lab results are normal.  Ferritin and iron binding capacity are normal.  - dexAMETHasone (DECADRON) 1 MG tablet; Take 1 tablet (1 mg) by mouth once for 1 dose Take between 11PM and 12 A before 8 am blood draw.  - Cortisol Cortisone Free 24 Hour Urine; Future  - Ferritin; Future  - Iron and iron binding capacity; Future  - Cortisol; Future  - Cortisol Cortisone Free 24 Hour Urine  - Ferritin  - Iron and iron binding capacity    Hirsutism  Checking for Cushing's as above.  - dexAMETHasone (DECADRON) 1 MG tablet; Take 1 tablet (1 mg) by mouth once for 1 dose Take between 11PM and 12 A before 8 am blood draw.  - Cortisol Cortisone Free 24 Hour Urine; Future  - Cortisol; Future  - Cortisol Cortisone Free 24 Hour Urine    S/P removal of parathyroid gland  Parathyroid hormone very minimally elevated.  I do not think this is a significant concern.  Patient is being followed by [place] [facility] regarding her  "parathyroid.  I did discuss letting them know about the result.  Vitamin D and calcium are within normal range.  - Parathyroid Hormone Intact; Future  - Calcium; Future  - Vitamin D Deficiency; Future  - Parathyroid Hormone Intact  - Calcium  - Vitamin D Deficiency            BMI  Estimated body mass index is 32.55 kg/m  as calculated from the following:    Height as of this encounter: 1.689 m (5' 6.5\").    Weight as of this encounter: 92.9 kg (204 lb 11.2 oz).       Depression Screening Follow Up        Follow Up Actions Taken  Crisis resource information provided in After Visit Summary           Chapito Singh is a 59 year old, presenting for the following health issues:  Other (Concerns of cortisol, buffalo hump, hair loss, ect.. Dark armpits, belly fat, hot flashes, buffalo hump fat pad, severe hair loss, facial hair. Concerns for Cushings- symptoms for the last couple of years.) and Abdominal Pain (Right side low stomach pain, right side stomach pain when going to the bathroom,  pressure in my rectum, constipation yet sometimes diarrhea. Known liver problems, but was ok. One kidney, ultrasound was good. Still has ovaries.)        4/4/2024     9:14 AM   Additional Questions   Roomed by Lee Ann     History of Present Illness       Reason for visit:  Cortisol concerns and right side stomach pain.  Symptom onset:  More than a month  Symptoms include:  Dark armpits, belly fat, hot flashes, buffalo hump fat pad, severe hair loss, facial hair. -  right side stomach pain, right side stomach pain when going to the bathroom,  pressure in my rectum, constipation yet sometimes diarrhea  Symptom intensity:  Moderate  Symptom progression:  Worsening  Had these symptoms before:  No  What makes it worse:  No  What makes it better:  No    She eats 2-3 servings of fruits and vegetables daily.She consumes 0 sweetened beverage(s) daily.She exercises with enough effort to increase her heart rate 20 to 29 minutes per day.  She " "exercises with enough effort to increase her heart rate 3 or less days per week.   She is taking medications regularly.     Has been having RUQ pain. Started over a year ago on right side, but worsening today. Did liver ultrasound and was fine. Cat scan done in     Pressure in the rectum. Sometimes even with urination will get squeezing pain on right hand side. Doesn't feel like UTI or diverticulitis. When patient has had diverticulitis in the past     Nausea with vomiting-improving.     Patient has seen endocrine for hyperparathyroidism at [place]. Has fraction or parathyroid left. Patient has not had PTH check in over a year. Has not had calcium or vitamin d checked recently either.                    Objective    /88 (BP Location: Right arm, Patient Position: Sitting, Cuff Size: Adult Regular)   Pulse 94   Temp 99  F (37.2  C) (Oral)   Resp 16   Ht 1.689 m (5' 6.5\")   Wt 92.9 kg (204 lb 11.2 oz)   SpO2 98%   BMI 32.55 kg/m    Body mass index is 32.55 kg/m .  Physical Exam  Constitutional:       Appearance: Normal appearance.   Cardiovascular:      Rate and Rhythm: Normal rate and regular rhythm.   Pulmonary:      Effort: Pulmonary effort is normal.      Breath sounds: Normal breath sounds.   Musculoskeletal:      Comments: Slightly increased fatty area on upper back near neck   Neurological:      General: No focal deficit present.      Mental Status: She is alert and oriented to person, place, and time.   Psychiatric:         Mood and Affect: Mood normal.         Behavior: Behavior normal.            Results for orders placed or performed in visit on 04/04/24   Parathyroid Hormone Intact     Status: Abnormal   Result Value Ref Range    Parathyroid Hormone Intact 66 (H) 15 - 65 pg/mL    Narrative    This result was obtained with the Roche Elecsys PTH STAT assay.   This reference range differs from PTH assays used in other [facility] laboratories.   Calcium     Status: Normal   Result Value Ref " Range    Calcium 9.6 8.6 - 10.0 mg/dL   Vitamin D Deficiency     Status: Normal   Result Value Ref Range    Vitamin D, Total (25-Hydroxy) 36 20 - 50 ng/mL    Narrative    Season, race, dietary intake, and treatment affect the concentration of 25-hydroxy-Vitamin D. Values may decrease during winter months and increase during summer months.    Vitamin D determination is routinely performed by an immunoassay specific for 25 hydroxyvitamin D3.  If an individual is on vitamin D2(ergocalciferol) supplementation, please specify 25 OH vitamin D2 and D3 level determination by LCMSMS test VITD23.     Ferritin     Status: Normal   Result Value Ref Range    Ferritin 178 11 - 328 ng/mL   Iron and iron binding capacity     Status: Normal   Result Value Ref Range    Iron 111 37 - 145 ug/dL    Iron Binding Capacity 323 240 - 430 ug/dL    Iron Sat Index 34 15 - 46 %           Signed Electronically by: MING BETH CNP

## 2024-04-09 ENCOUNTER — LAB (OUTPATIENT)
Dept: LAB | Facility: CLINIC | Age: 59
End: 2024-04-09
Payer: COMMERCIAL

## 2024-04-09 DIAGNOSIS — L68.0 HIRSUTISM: ICD-10-CM

## 2024-04-09 DIAGNOSIS — L65.9 HAIR LOSS: ICD-10-CM

## 2024-04-09 DIAGNOSIS — R53.83 OTHER FATIGUE: ICD-10-CM

## 2024-04-09 LAB — CORTIS SERPL-MCNC: 1 UG/DL

## 2024-04-09 PROCEDURE — 82533 TOTAL CORTISOL: CPT

## 2024-04-09 PROCEDURE — 36415 COLL VENOUS BLD VENIPUNCTURE: CPT

## 2024-04-19 ENCOUNTER — ANCILLARY PROCEDURE (OUTPATIENT)
Dept: MAMMOGRAPHY | Facility: CLINIC | Age: 59
End: 2024-04-19
Payer: COMMERCIAL

## 2024-04-19 DIAGNOSIS — Z12.31 VISIT FOR SCREENING MAMMOGRAM: ICD-10-CM

## 2024-04-19 PROCEDURE — 77063 BREAST TOMOSYNTHESIS BI: CPT | Mod: TC | Performed by: STUDENT IN AN ORGANIZED HEALTH CARE EDUCATION/TRAINING PROGRAM

## 2024-04-19 PROCEDURE — 77067 SCR MAMMO BI INCL CAD: CPT | Mod: TC | Performed by: STUDENT IN AN ORGANIZED HEALTH CARE EDUCATION/TRAINING PROGRAM

## 2024-04-22 NOTE — PROGRESS NOTES
Keiry Mchugh is a 59 year old female who presents today for Botox injections for chronic migraine.    Patient had Botox injections for chronic migraine January 19, 2024.  Patient reports the Botox injections provided 90% relief of symptoms.  She has only had 3 migraines in the past 3 months.  She feels that the Botox started to wear off last week.    Patient was seen in our clinic March 28, 2024 by my Amy Umanzor for an acute flare of neck pain with radiation into the right upper extremity with associated numbness and tingling.  Please refer to her note for details of the pain.  Amy prescribed a Medrol Dosepak and Flexeril.   She continues to feel pain between the shoulder blades.  She is no longer having pain down the right arm.  Numbness and tingling has improved.  Massage has been helping.  She rates her pain today as a 6 out of 10.  At its worst it is a 9 out of 10.  At its best it is a 6 out of 10.  She is unable to identify aggravating factors to the pain.  Taking medicines helped to alleviate the pain.  She will continue to monitor this pain for now.  If it persists/worsens she will let me know.      Pre-procedure diagnosis: Migraine headaches  Post-procedure diagnosis: Same    PROCEDURE:  Botulinum toxin (Botox) injections.      The risks and benefits of the procedure were discussed with the patient which included muscle weakness (including facial droop, inability to swallow, and inability to hold one's head up), allergic reaction, pain, bruising, bleeding, swelling, and death.  She opted to proceed and gave written and verbal consent.    The Botox paradigm was followed. Each site was marked with indelible marking pen in the locations designated by the paradigm. The skin over the marks was then cleansed with alcohol. A 27 guage 5/8 inch needle was used for injection at all sites. Aspiration was negative at each site prior to injection of botox.     The bilateral  muscles were injected with a  total of 10 units (5 units each side).   The Procerus muscle was injected with a total of 5 units.   The Frontalis muscle was injected with a total of 20 units, divided into 4 sites.  The Temporalis muscle was injected with 20 units divided into 4 sites and performed bilaterally (total of 8 sites for total of 40 units).  The occipitalis muscle was injected with 30 units divided into 6 sites.  The cervical paraspinal muscles were injected with 20 units divided into 4 sites.  The trapezius muscle was injected with 30 units divided into 6 sites.     TOTAL UNITS: 155  Wasted units: 45 units.    The patient tolerated the injection well and afterwards did not have any dizziness/lightheadedness or nausea. The patient was observed for a short period of time and then was discharged.  The patient was encouraged to call with any questions/concerns prior to the follow-up appointment.

## 2024-04-26 ENCOUNTER — OFFICE VISIT (OUTPATIENT)
Dept: PHYSICAL MEDICINE AND REHAB | Facility: CLINIC | Age: 59
End: 2024-04-26
Payer: COMMERCIAL

## 2024-04-26 VITALS
SYSTOLIC BLOOD PRESSURE: 159 MMHG | DIASTOLIC BLOOD PRESSURE: 85 MMHG | HEIGHT: 67 IN | HEART RATE: 79 BPM | WEIGHT: 204 LBS | BODY MASS INDEX: 32.02 KG/M2

## 2024-04-26 DIAGNOSIS — G43.709 CHRONIC MIGRAINE WITHOUT AURA, NOT INTRACTABLE, WITHOUT STATUS MIGRAINOSUS: Primary | ICD-10-CM

## 2024-04-26 PROCEDURE — 64615 CHEMODENERV MUSC MIGRAINE: CPT | Performed by: PHYSICIAN ASSISTANT

## 2024-04-26 ASSESSMENT — PAIN SCALES - GENERAL: PAINLEVEL: NO PAIN (0)

## 2024-04-26 NOTE — LETTER
4/26/2024         RE: Keiry Mchugh  [address]  [place]  [facility] [address]        Dear Colleague,    Thank you for referring your patient, Keiry Mchugh, to the [facility]. Please see a copy of my visit note below.    Keiry Mchugh is a 59 year old female who presents today for Botox injections for chronic migraine.    Patient had Botox injections for chronic migraine January 19, 2024.  Patient reports the Botox injections provided 90% relief of symptoms.  She has only had 3 migraines in the past 3 months.  She feels that the Botox started to wear off last week.    Patient was seen in our clinic March 28, 2024 by my Amy Umanzor for an acute flare of neck pain with radiation into the right upper extremity with associated numbness and tingling.  Please refer to her note for details of the pain.  Amy prescribed a Medrol Dosepak and Flexeril.   She continues to feel pain between the shoulder blades.  She is no longer having pain down the right arm.  Numbness and tingling has improved.  Massage has been helping.  She rates her pain today as a 6 out of 10.  At its worst it is a 9 out of 10.  At its best it is a 6 out of 10.  She is unable to identify aggravating factors to the pain.  Taking medicines helped to alleviate the pain.  She will continue to monitor this pain for now.  If it persists/worsens she will let me know.      Pre-procedure diagnosis: Migraine headaches  Post-procedure diagnosis: Same    PROCEDURE:  Botulinum toxin (Botox) injections.      The risks and benefits of the procedure were discussed with the patient which included muscle weakness (including facial droop, inability to swallow, and inability to hold one's head up), allergic reaction, pain, bruising, bleeding, swelling, and death.  She opted to proceed and gave written and verbal consent.    The Botox paradigm was followed. Each site was marked with indelible marking pen in the locations designated by  the paradigm. The skin over the marks was then cleansed with alcohol. A 27 guage 5/8 inch needle was used for injection at all sites. Aspiration was negative at each site prior to injection of botox.     The bilateral  muscles were injected with a total of 10 units (5 units each side).   The Procerus muscle was injected with a total of 5 units.   The Frontalis muscle was injected with a total of 20 units, divided into 4 sites.  The Temporalis muscle was injected with 20 units divided into 4 sites and performed bilaterally (total of 8 sites for total of 40 units).  The occipitalis muscle was injected with 30 units divided into 6 sites.  The cervical paraspinal muscles were injected with 20 units divided into 4 sites.  The trapezius muscle was injected with 30 units divided into 6 sites.     TOTAL UNITS: 155  Wasted units: 45 units.    The patient tolerated the injection well and afterwards did not have any dizziness/lightheadedness or nausea. The patient was observed for a short period of time and then was discharged.  The patient was encouraged to call with any questions/concerns prior to the follow-up appointment.           Again, thank you for allowing me to participate in the care of your patient.        Sincerely,        Damaris Leslie PA-C

## 2024-05-03 ENCOUNTER — OFFICE VISIT (OUTPATIENT)
Dept: FAMILY MEDICINE | Facility: CLINIC | Age: 59
End: 2024-05-03
Payer: COMMERCIAL

## 2024-05-03 VITALS
BODY MASS INDEX: 32.33 KG/M2 | RESPIRATION RATE: 16 BRPM | HEART RATE: 86 BPM | OXYGEN SATURATION: 100 % | WEIGHT: 206 LBS | HEIGHT: 67 IN | TEMPERATURE: 98.5 F | DIASTOLIC BLOOD PRESSURE: 90 MMHG | SYSTOLIC BLOOD PRESSURE: 139 MMHG

## 2024-05-03 DIAGNOSIS — G43.119 INTRACTABLE MIGRAINE WITH AURA WITHOUT STATUS MIGRAINOSUS: ICD-10-CM

## 2024-05-03 DIAGNOSIS — R10.11 RUQ ABDOMINAL PAIN: ICD-10-CM

## 2024-05-03 DIAGNOSIS — R61 NIGHT SWEATS: Primary | ICD-10-CM

## 2024-05-03 DIAGNOSIS — F32.9 MAJOR DEPRESSIVE DISORDER WITH SINGLE EPISODE, REMISSION STATUS UNSPECIFIED: ICD-10-CM

## 2024-05-03 DIAGNOSIS — R53.83 FATIGUE, UNSPECIFIED TYPE: ICD-10-CM

## 2024-05-03 DIAGNOSIS — R79.82 CRP ELEVATED: ICD-10-CM

## 2024-05-03 LAB
ALBUMIN UR-MCNC: NEGATIVE MG/DL
APPEARANCE UR: CLEAR
BACTERIA #/AREA URNS HPF: ABNORMAL /HPF
BASOPHILS # BLD AUTO: 0 10E3/UL (ref 0–0.2)
BASOPHILS NFR BLD AUTO: 1 %
BILIRUB UR QL STRIP: NEGATIVE
COLOR UR AUTO: YELLOW
EOSINOPHIL # BLD AUTO: 0.1 10E3/UL (ref 0–0.7)
EOSINOPHIL NFR BLD AUTO: 2 %
ERYTHROCYTE [DISTWIDTH] IN BLOOD BY AUTOMATED COUNT: 11.9 % (ref 10–15)
ERYTHROCYTE [SEDIMENTATION RATE] IN BLOOD BY WESTERGREN METHOD: 13 MM/HR (ref 0–30)
GLUCOSE UR STRIP-MCNC: NEGATIVE MG/DL
HCT VFR BLD AUTO: 46.1 % (ref 35–47)
HGB BLD-MCNC: 15.4 G/DL (ref 11.7–15.7)
HGB UR QL STRIP: ABNORMAL
IMM GRANULOCYTES # BLD: 0 10E3/UL
IMM GRANULOCYTES NFR BLD: 0 %
KETONES UR STRIP-MCNC: ABNORMAL MG/DL
LEUKOCYTE ESTERASE UR QL STRIP: NEGATIVE
LYMPHOCYTES # BLD AUTO: 1.3 10E3/UL (ref 0.8–5.3)
LYMPHOCYTES NFR BLD AUTO: 29 %
MCH RBC QN AUTO: 30.9 PG (ref 26.5–33)
MCHC RBC AUTO-ENTMCNC: 33.4 G/DL (ref 31.5–36.5)
MCV RBC AUTO: 93 FL (ref 78–100)
MONOCYTES # BLD AUTO: 0.2 10E3/UL (ref 0–1.3)
MONOCYTES NFR BLD AUTO: 5 %
NEUTROPHILS # BLD AUTO: 2.9 10E3/UL (ref 1.6–8.3)
NEUTROPHILS NFR BLD AUTO: 63 %
NITRATE UR QL: NEGATIVE
PH UR STRIP: 5 [PH] (ref 5–8)
PLATELET # BLD AUTO: 310 10E3/UL (ref 150–450)
RBC # BLD AUTO: 4.98 10E6/UL (ref 3.8–5.2)
RBC #/AREA URNS AUTO: ABNORMAL /HPF
SP GR UR STRIP: 1.02 (ref 1–1.03)
UROBILINOGEN UR STRIP-ACNC: 0.2 E.U./DL
WBC # BLD AUTO: 4.6 10E3/UL (ref 4–11)
WBC #/AREA URNS AUTO: ABNORMAL /HPF

## 2024-05-03 PROCEDURE — 36415 COLL VENOUS BLD VENIPUNCTURE: CPT | Performed by: FAMILY MEDICINE

## 2024-05-03 PROCEDURE — 86140 C-REACTIVE PROTEIN: CPT | Performed by: FAMILY MEDICINE

## 2024-05-03 PROCEDURE — 84443 ASSAY THYROID STIM HORMONE: CPT | Performed by: FAMILY MEDICINE

## 2024-05-03 PROCEDURE — 81001 URINALYSIS AUTO W/SCOPE: CPT | Performed by: FAMILY MEDICINE

## 2024-05-03 PROCEDURE — 87040 BLOOD CULTURE FOR BACTERIA: CPT | Performed by: FAMILY MEDICINE

## 2024-05-03 PROCEDURE — 85652 RBC SED RATE AUTOMATED: CPT | Performed by: FAMILY MEDICINE

## 2024-05-03 PROCEDURE — 99214 OFFICE O/P EST MOD 30 MIN: CPT | Performed by: FAMILY MEDICINE

## 2024-05-03 PROCEDURE — 85025 COMPLETE CBC W/AUTO DIFF WBC: CPT | Performed by: FAMILY MEDICINE

## 2024-05-03 RX ORDER — EVE PRIMROSE/LINOLEIC/G-LENIC 1000 MG
CAPSULE ORAL
COMMUNITY
End: 2024-08-09

## 2024-05-03 RX ORDER — GLUCOSAMINE/D3/BOSWELLIA SERRA 1500MG-400
TABLET ORAL
COMMUNITY
End: 2024-08-09

## 2024-05-03 RX ORDER — LEVOMEFOLATE CALCIUM 15 MG
TABLET ORAL
COMMUNITY
End: 2024-09-13

## 2024-05-03 RX ORDER — SUMATRIPTAN 100 MG/1
TABLET, FILM COATED ORAL
Qty: 9 TABLET | Refills: 5 | Status: SHIPPED | OUTPATIENT
Start: 2024-05-03

## 2024-05-03 ASSESSMENT — ANXIETY QUESTIONNAIRES
4. TROUBLE RELAXING: NOT AT ALL
6. BECOMING EASILY ANNOYED OR IRRITABLE: SEVERAL DAYS
3. WORRYING TOO MUCH ABOUT DIFFERENT THINGS: NOT AT ALL
GAD7 TOTAL SCORE: 1
GAD7 TOTAL SCORE: 1
IF YOU CHECKED OFF ANY PROBLEMS ON THIS QUESTIONNAIRE, HOW DIFFICULT HAVE THESE PROBLEMS MADE IT FOR YOU TO DO YOUR WORK, TAKE CARE OF THINGS AT HOME, OR GET ALONG WITH OTHER PEOPLE: NOT DIFFICULT AT ALL
7. FEELING AFRAID AS IF SOMETHING AWFUL MIGHT HAPPEN: NOT AT ALL
1. FEELING NERVOUS, ANXIOUS, OR ON EDGE: NOT AT ALL
GAD7 TOTAL SCORE: 1
7. FEELING AFRAID AS IF SOMETHING AWFUL MIGHT HAPPEN: NOT AT ALL
8. IF YOU CHECKED OFF ANY PROBLEMS, HOW DIFFICULT HAVE THESE MADE IT FOR YOU TO DO YOUR WORK, TAKE CARE OF THINGS AT HOME, OR GET ALONG WITH OTHER PEOPLE?: NOT DIFFICULT AT ALL
2. NOT BEING ABLE TO STOP OR CONTROL WORRYING: NOT AT ALL
5. BEING SO RESTLESS THAT IT IS HARD TO SIT STILL: NOT AT ALL

## 2024-05-03 ASSESSMENT — PATIENT HEALTH QUESTIONNAIRE - PHQ9
SUM OF ALL RESPONSES TO PHQ QUESTIONS 1-9: 9
SUM OF ALL RESPONSES TO PHQ QUESTIONS 1-9: 9
10. IF YOU CHECKED OFF ANY PROBLEMS, HOW DIFFICULT HAVE THESE PROBLEMS MADE IT FOR YOU TO DO YOUR WORK, TAKE CARE OF THINGS AT HOME, OR GET ALONG WITH OTHER PEOPLE: EXTREMELY DIFFICULT

## 2024-05-03 ASSESSMENT — ENCOUNTER SYMPTOMS: FATIGUE: 1

## 2024-05-03 NOTE — PROGRESS NOTES
Assessment & Plan     1. Night sweats  - ESR: Erythrocyte sedimentation rate  - CBC with platelets and differential  - Blood Culture Peripheral Blood  - CRP inflammation  - TSH with free T4 reflex  - UA Microscopic with Reflex to Culture    Samantha is quite frustrated with persistent symptoms of night sweats, fatigue, difficulty losing weight.  I wonder if this is related to her underlying autoimmune disease process, managed with rheumatology.  As symptoms worsening, we will broaden our workup.  She has had recent metabolic testing.  Will evaluate for inflammatory markers, CBC, blood culture, thyroid, and urine.  If labs completely normal, recommend next step be abdominal CT scan.  Of note, she does also have intermittent right lower quadrant and right upper quadrant pain.    Symptoms could also be perimenopausal.  She is already on an SSRI.  Previously on HRT and did not tolerate well.    2. Major depressive disorder with single episode, remission status unspecified  - FLUoxetine (PROZAC) 20 MG capsule; Take 1 capsule (20 mg) by mouth daily  Dispense: 90 capsule; Refill: 3    Content with current mood control outside of medical stressors and desires to continue current dose of Prozac.    3. Intractable migraine with aura without status migrainosus  - SUMAtriptan (IMITREX) 100 MG tablet; TAKE 1/2 TO 1 TABLET BY MOUTH AT ONSET OF HEADACHE  Dispense: 9 tablet; Refill: 5      Headaches under better control, continue as needed Imitrex.  Of note, blood pressure little borderline today, will continue to monitor.  She states she checks outside of the clinic and pressures are under good control.    Subjective   Samantha is a 59 year old, presenting for the following health issues:  Hair Loss, Menopausal Sx (/), Weight Problem, and Fatigue      5/3/2024     1:35 PM   Additional Questions   Roomed by Sameera PULLIAM CMA   Accompanied by Self     Fatigue  Associated symptoms include fatigue.   History of Present Illness       Reason  for visit:  Hot flashes/sweating, Medications renewal, New MD (Shaila retired), tired of feeling exhausted, can't loose weight.    She eats 4 or more servings of fruits and vegetables daily.She consumes 0 sweetened beverage(s) daily.She exercises with enough effort to increase her heart rate 30 to 60 minutes per day.  She exercises with enough effort to increase her heart rate 3 or less days per week.   She is taking medications regularly.     Hot flashes/sweats/fatigue  Dr. Ireland managing RA, managing with methotrexate, plaquenil, folic acid.  Recent increase in medications, close follow up. Needs liver follow up.  Hysterectomy 20 years ago, ovaries remain. Started to experience hot flash symptoms 48-49. Symptoms worsening. Experiences terrible hot flashes perfusely sweating. Feels hard to get body temperature to regulate.     No more pap, cervix removed.   Colon before resection, 2019.   Mammogram up to date.     Experiencing abdominal pain last 1.5 years. Sometimes liver area, sometimes lower quadrant. Pain worse in RLQ with urination or bowel movement.    Can't lose weight. No unexplained weight loss.   Night sweats aren't so bad.   Symptoms worsening over the last 2 years.     When hot flashes started 10 years ago, was treated with HRT. Constant yeast infection.       Major depressive disorder with single episode, remission status unspecified      10/11/2023     6:53 AM 4/3/2024    12:36 PM 5/3/2024     9:06 AM   PHQ   PHQ-9 Total Score 11 12 9   Q9: Thoughts of better off dead/self-harm past 2 weeks Not at all Not at all Not at all         3/6/2020    11:00 AM 5/28/2020     9:02 AM 5/3/2024     9:07 AM   ATIF-7 SCORE   Total Score   1 (minimal anxiety)   Total Score 5 1 1     Taking Prozac 20mg daily. Started years ago, thought may help migraines. Didn't help.   Weaned off, but mood restarted as felt better with it.    Feels situational. Not feeling well.     Intractable migraine with aura without status  "migrainosus  Takes imitrex as needed.   Much improved with botox again.   Curently using the Imitrex about 5 times a month.      Elevated blood pressure  BP Readings from Last 6 Encounters:   05/03/24 (!) 153/86   04/26/24 (!) 159/85   04/04/24 133/88   03/28/24 (!) 151/90   03/26/24 122/82   01/25/24 112/78     Last Comprehensive Metabolic Panel:  Lab Results   Component Value Date     10/11/2023    POTASSIUM 4.6 10/11/2023    CHLORIDE 105 10/11/2023    CO2 24 10/11/2023    ANIONGAP 10 10/11/2023     (H) 10/11/2023    BUN 25.3 (H) 10/11/2023    CR 0.82 03/22/2024    GFRESTIMATED 83 03/22/2024    MORENO 9.6 04/04/2024       Checks BP at work daily.   Readings at work typically highest 130/85 - typically 110's-120's / 70's.       Review of Systems  See HPI above.         Objective    BP (!) 139/90   Pulse 86   Temp 98.5  F (36.9  C) (Oral)   Resp 16   Ht 1.689 m (5' 6.5\")   Wt 93.4 kg (206 lb)   SpO2 100%   BMI 32.75 kg/m    Body mass index is 32.75 kg/m .  Physical Exam   GENERAL: alert and no distress  NECK: no adenopathy, no asymmetry, masses, or scars  RESP: lungs clear to auscultation - no rales, rhonchi or wheezes  CV: regular rate and rhythm, normal S1 S2, no S3 or S4, no murmur, click or rub, no peripheral edema  ABDOMEN: soft, nontender, no hepatosplenomegaly, no masses and bowel sounds normal  MS: no gross musculoskeletal defects noted, no edema            Signed Electronically by: Yissel Schmidt, DO    "

## 2024-05-04 LAB
CRP SERPL-MCNC: 5.71 MG/L
TSH SERPL DL<=0.005 MIU/L-ACNC: 2.32 UIU/ML (ref 0.3–4.2)

## 2024-05-06 ENCOUNTER — MYC MEDICAL ADVICE (OUTPATIENT)
Dept: FAMILY MEDICINE | Facility: CLINIC | Age: 59
End: 2024-05-06
Payer: COMMERCIAL

## 2024-05-06 DIAGNOSIS — R61 NIGHT SWEATS: ICD-10-CM

## 2024-05-06 DIAGNOSIS — R53.83 FATIGUE, UNSPECIFIED TYPE: ICD-10-CM

## 2024-05-06 DIAGNOSIS — R79.82 CRP ELEVATED: ICD-10-CM

## 2024-05-06 DIAGNOSIS — Z91.041 HISTORY OF ALLERGY TO RADIOGRAPHIC CONTRAST MEDIA: Primary | ICD-10-CM

## 2024-05-06 PROCEDURE — 82542 COL CHROMOTOGRAPHY QUAL/QUAN: CPT | Mod: 90 | Performed by: NURSE PRACTITIONER

## 2024-05-06 PROCEDURE — 82530 CORTISOL FREE: CPT | Mod: 90 | Performed by: NURSE PRACTITIONER

## 2024-05-06 PROCEDURE — 99000 SPECIMEN HANDLING OFFICE-LAB: CPT | Performed by: NURSE PRACTITIONER

## 2024-05-07 ENCOUNTER — LAB (OUTPATIENT)
Dept: LAB | Facility: CLINIC | Age: 59
End: 2024-05-07
Payer: COMMERCIAL

## 2024-05-07 DIAGNOSIS — Z79.899 HIGH RISK MEDICATION USE: ICD-10-CM

## 2024-05-07 DIAGNOSIS — M05.79 SEROPOSITIVE RHEUMATOID ARTHRITIS OF MULTIPLE JOINTS (H): ICD-10-CM

## 2024-05-07 LAB
ERYTHROCYTE [DISTWIDTH] IN BLOOD BY AUTOMATED COUNT: 12.2 % (ref 10–15)
HCT VFR BLD AUTO: 39.8 % (ref 35–47)
HGB BLD-MCNC: 13.4 G/DL (ref 11.7–15.7)
MCH RBC QN AUTO: 30.9 PG (ref 26.5–33)
MCHC RBC AUTO-ENTMCNC: 33.7 G/DL (ref 31.5–36.5)
MCV RBC AUTO: 92 FL (ref 78–100)
PLATELET # BLD AUTO: 293 10E3/UL (ref 150–450)
RBC # BLD AUTO: 4.33 10E6/UL (ref 3.8–5.2)
WBC # BLD AUTO: 3.7 10E3/UL (ref 4–11)

## 2024-05-07 PROCEDURE — 84460 ALANINE AMINO (ALT) (SGPT): CPT

## 2024-05-07 PROCEDURE — 36415 COLL VENOUS BLD VENIPUNCTURE: CPT

## 2024-05-07 PROCEDURE — 82565 ASSAY OF CREATININE: CPT

## 2024-05-07 PROCEDURE — 85027 COMPLETE CBC AUTOMATED: CPT

## 2024-05-07 PROCEDURE — 82040 ASSAY OF SERUM ALBUMIN: CPT

## 2024-05-07 NOTE — RESULT ENCOUNTER NOTE
Patient updated by [employer] message with lab results.       Sanjaytressa Singh,  To date, all labs have returned reassuring. I am still waiting on the final reading from the blood culture.  I will order the CT scan to further evaluate your ongoing symptoms.  Scheduling should reach out to help coordinate an appointment.  Yissel Schmidt, DO

## 2024-05-08 LAB
ALBUMIN SERPL BCG-MCNC: 4.8 G/DL (ref 3.5–5.2)
ALT SERPL W P-5'-P-CCNC: 58 U/L (ref 0–50)
BACTERIA BLD CULT: NO GROWTH
CREAT SERPL-MCNC: 0.82 MG/DL (ref 0.51–0.95)
EGFRCR SERPLBLD CKD-EPI 2021: 83 ML/MIN/1.73M2

## 2024-05-09 LAB
COLLECT DURATION TIME UR: 24 H
CORTIS 24H UR-MRATE: 54 MCG/24 H (ref 3.5–45)
CORTISONE 24H UR-MRATE: 59 MCG/24 H (ref 17–129)
SPECIMEN VOL 24H UR: 1800 ML

## 2024-05-09 NOTE — RESULT ENCOUNTER NOTE
Patient updated by [employer] message with lab results.       Blood culture finalized, no infection. Proceed with CT as discussed.  Yissel Schmidt, DO

## 2024-05-10 ENCOUNTER — VIRTUAL VISIT (OUTPATIENT)
Dept: SURGERY | Facility: CLINIC | Age: 59
End: 2024-05-10
Payer: COMMERCIAL

## 2024-05-10 ENCOUNTER — TELEPHONE (OUTPATIENT)
Dept: SURGERY | Facility: CLINIC | Age: 59
End: 2024-05-10

## 2024-05-10 VITALS — WEIGHT: 200 LBS | HEIGHT: 66 IN | BODY MASS INDEX: 32.14 KG/M2

## 2024-05-10 DIAGNOSIS — E88.810 METABOLIC SYNDROME: ICD-10-CM

## 2024-05-10 DIAGNOSIS — E66.9 OBESITY (BMI 30-39.9): Primary | ICD-10-CM

## 2024-05-10 PROCEDURE — 99214 OFFICE O/P EST MOD 30 MIN: CPT | Mod: 93 | Performed by: FAMILY MEDICINE

## 2024-05-10 RX ORDER — SEMAGLUTIDE 2.4 MG/.75ML
2.4 INJECTION, SOLUTION SUBCUTANEOUS WEEKLY
Qty: 9 ML | Refills: 3 | Status: SHIPPED | OUTPATIENT
Start: 2024-05-10 | End: 2024-06-26

## 2024-05-10 RX ORDER — SEMAGLUTIDE 1 MG/.5ML
1 INJECTION, SOLUTION SUBCUTANEOUS WEEKLY
Qty: 2 ML | Refills: 0 | Status: SHIPPED | OUTPATIENT
Start: 2024-05-10 | End: 2024-06-07

## 2024-05-10 RX ORDER — PHENTERMINE HYDROCHLORIDE 37.5 MG/1
37.5 TABLET ORAL
Qty: 90 TABLET | Refills: 1 | Status: SHIPPED | OUTPATIENT
Start: 2024-05-10

## 2024-05-10 RX ORDER — SEMAGLUTIDE 0.25 MG/.5ML
0.25 INJECTION, SOLUTION SUBCUTANEOUS WEEKLY
Qty: 2 ML | Refills: 0 | Status: SHIPPED | OUTPATIENT
Start: 2024-05-10 | End: 2024-06-07

## 2024-05-10 RX ORDER — SEMAGLUTIDE 0.5 MG/.5ML
0.5 INJECTION, SOLUTION SUBCUTANEOUS WEEKLY
Qty: 2 ML | Refills: 0 | Status: SHIPPED | OUTPATIENT
Start: 2024-05-10 | End: 2024-06-07

## 2024-05-10 RX ORDER — SEMAGLUTIDE 1.7 MG/.75ML
1.7 INJECTION, SOLUTION SUBCUTANEOUS WEEKLY
Qty: 3 ML | Refills: 0 | Status: SHIPPED | OUTPATIENT
Start: 2024-05-10 | End: 2024-06-07

## 2024-05-10 ASSESSMENT — PAIN SCALES - GENERAL: PAINLEVEL: MODERATE PAIN (4)

## 2024-05-10 NOTE — TELEPHONE ENCOUNTER
Prior Authorization Retail Medication Request    Medication/Dose: Wegovy 0.25mg/0.5ml Auto-injectors.   New/renewal/insurance change PA/secondary ins. PA:  Previously Tried and Failed:  phentermine    Key: [id number]    Pharmacy Information (if different than what is on RX)  Name:  [facility]ier[facility]  Phone:  [phone]  Fax:  [phone]

## 2024-05-10 NOTE — PROGRESS NOTES
Virtual Visit Details    Type of service:  Telephone Visit   Phone call duration: 30 minutes   Originating Location (pt. Location): Home    Distant Location (provider location):  On-site  Bariatric Follow-up    59 year old  female BMI:Body mass index is 32.53 kg/m .    Weight:   Wt Readings from Last 1 Encounters:   05/10/24 90.7 kg (200 lb)    pounds    Comorbidities:  Patient Active Problem List   Diagnosis    Chronic insomnia    HARDIN (nonalcoholic steatohepatitis)    Sigmoid diverticulitis    Dyslipidemia    Metabolic syndrome    Celiac disease    Low bone mass    Profound fatigue    Cervical spondylosis without myelopathy    Cervical radiculitis         Current Outpatient Medications:     phentermine (ADIPEX-P) 37.5 MG tablet, Take 1 tablet (37.5 mg) by mouth every morning (before breakfast), Disp: 90 tablet, Rfl: 1    Semaglutide-Weight Management (WEGOVY) 0.25 MG/0.5ML pen, Inject 0.25 mg Subcutaneous once a week for 28 days Please notify patient when available. Thank you, Disp: 2 mL, Rfl: 0    Semaglutide-Weight Management (WEGOVY) 0.5 MG/0.5ML pen, Inject 0.5 mg Subcutaneous once a week for 28 days Please notify patient when available. Thank you, Disp: 2 mL, Rfl: 0    Semaglutide-Weight Management (WEGOVY) 1 MG/0.5ML pen, Inject 1 mg Subcutaneous once a week for 28 days Please notify patient when available. Thank you, Disp: 2 mL, Rfl: 0    Semaglutide-Weight Management (WEGOVY) 1.7 MG/0.75ML pen, Inject 1.7 mg Subcutaneous once a week for 28 days Please notify patient when available. Thank you, Disp: 3 mL, Rfl: 0    Semaglutide-Weight Management (WEGOVY) 2.4 MG/0.75ML pen, Inject 2.4 mg Subcutaneous once a week Please notify patient when available. Thank you, Disp: 9 mL, Rfl: 3    atenolol (TENORMIN) 50 MG tablet, Take 1 tablet (50 mg) by mouth daily as needed (Migraines) (Patient not taking: Reported on 5/3/2024), Disp: 30 tablet, Rfl: 1    B Complex-C (RA B-COMPLEX/VITAMIN C CR) TBCR, Take 1 tablet by  "mouth daily, Disp: , Rfl:     baclofen (LIORESAL) 10 MG tablet, TAKE 1 TABLET BY MOUTH TWICE DAILY TO THREE TIMES DAILY AS NEEDED FOR MUSCLE SPASMS. (Patient not taking: Reported on 5/3/2024), Disp: 30 tablet, Rfl: 3    Barberry-Oreg Grape-Goldenseal (BERBERINE COMPLEX PO), , Disp: , Rfl:     Bioflavonoid Products (VITAMIN C) CHEW, , Disp: , Rfl:     Biotin 07188 MCG TABS, , Disp: , Rfl:     Black Pepper-Turmeric (TURMERIC CURCUMIN) 5-1000 MG CAPS, , Disp: , Rfl:     Coenzyme Q10 (COQ-10) 400 MG CAPS, Take 1 capsule by mouth daily , Disp: , Rfl:     Evening Primrose Oil 1000 MG CAPS, , Disp: , Rfl:     FLUoxetine (PROZAC) 20 MG capsule, Take 1 capsule (20 mg) by mouth daily, Disp: 90 capsule, Rfl: 3    folic acid (FOLVITE) 1 MG tablet, Take 3 tablets (3 mg) by mouth daily for 90 days, Disp: 270 tablet, Rfl: 0    hydroxychloroquine (PLAQUENIL) 200 MG tablet, Take 200 mg by mouth 2 times daily, Disp: , Rfl:     Lactobacillus (PROBIOTIC ACIDOPHILUS) CAPS, , Disp: , Rfl:     magnesium oxide (MAG-OX) 400 (240 Mg) MG tablet, Take 400 mg by mouth daily, Disp: , Rfl:     methotrexate 2.5 MG tablet, Take 6 tablets (15 mg) by mouth every 7 days for 90 days TAKE 4 TABLETS(10 MG) BY MOUTH EVERY 7 DAYS, Disp: 72 tablet, Rfl: 0    methylfolate (DEPLIN) 15 MG TABS tablet, , Disp: , Rfl:     OnabotulinumtoxinA (BOTOX IJ), For migraines - 155 units q 12 weeks., Disp: , Rfl:     polyethylene glycol (MIRALAX) 17 GM/Dose powder, Take 17 g by mouth daily as needed (Patient not taking: Reported on 5/3/2024), Disp: , Rfl:     SUMAtriptan (IMITREX) 100 MG tablet, TAKE 1/2 TO 1 TABLET BY MOUTH AT ONSET OF HEADACHE, Disp: 9 tablet, Rfl: 5       Interim: Cortisol was mildly elevated. BP has been high but at work when she checks it is 120/70. Believes she has \"white coat HTN.\"  Maintaining a 20# weight loss. \"I don't feel health.\" \"I'm so fatigued.\" After her parathyroid surgery sleeps better. Wakes up at 3am every night. Believes it is d/t " "cortisol spike.      Cholesterol   Date Value Ref Range Status   01/16/2023 260 (H) <200 mg/dL Final      Hemoglobin A1C   Date Value Ref Range Status   10/11/2023 5.3 0.0 - 5.6 % Final     Comment:     Normal <5.7%   Prediabetes 5.7-6.4%    Diabetes 6.5% or higher     Note: Adopted from ADA consensus guidelines.      BP Readings from Last 3 Encounters:   05/03/24 (!) 139/90   04/26/24 (!) 159/85   04/04/24 133/88        Plan:  DIET  Continue 3 meals with lean protein, Add berries which are low glycemic fruits   EXERCISE continue counting steps f   PHARMACOTHERAPY continue phentermine. Wegovy. If no coverage will discuss Insulin Outlet    Try meditation, RA support group for support, insight,      -We reviewed her medications and whether associated with weight gain.        We discussed [facility] Bariatric Basics including:  -eating 3 meals daily  -eating protein first  -eating slowly, chewing food well  -avoiding/limiting calorie containing beverages  -choosing wheat, not white with breads, crackers, pastas, venu, bagels, tortillas, rice  -limiting restaurant or cafeteria eating to twice a week or less  -We discussed the importance of restorative sleep and stress management in maintaining a healthy weight.  -We discussed insulin resistance and glycemic index as it relates to appetite and weight control  -We discussed the National Weight Control Registry healthy weight maintenance strategies and ways to optimize metabolism.  -We discussed the importance of physical activity including cardiovascular and strength training in maintaining a healthier weight and explored viable options.      DIETARY HISTORY  Meals Per Day: 3  Eating Protein First?: yes  Food Diary: B:Hbeggs and turkey sausage L:meat raw veggies cottage cheese D:Cottage cheese with chives and greek yogurt  Snacks Per Day: gluten free cookie with protein  Typical Snack: see above \"I can't eat fruit because it packs on the weight.\"  Fluid Intake: " "intentional  Portion Control: stable  Calorie Containing Beverages: no  Alcohol per week: no  Typical Protein Food Choices: lean. Beef once in a blue moon  Choosing Whole Grains: no grains gluten intolerant  Grocery Shopping is done by: herself  Food Preparation is done by: herself  Meals at Restaurant/Cafeteria/Take Out Per Week: none  Eating at the Table:   TV is Off During Meals:     Positive Changes Since Last Visit: maintaining 20# weight loss  Struggling With: exhaustion    Knowledgeable in Reading Food Labels:   Getting Adequate Protein: likely  Sleeping 7-8 hours/day interrupted-  Stress management \"I've tried nothing\"     PHYSICAL ACTIVITY PATTERNS:  Cardiovascular: getting steps at work  Strength Training: NA    REVIEW OF SYSTEMS    PHYSICAL EXAM: (most recent vitals and today's stated weight)  Vitals: Ht 1.67 m (5' 5.75\")   Wt 90.7 kg (200 lb)   BMI 32.53 kg/m        GEN: Pleasant and in no acute distress  PSYCH: A&OX3,     I have reviewed the note as documented above.  This accurately captures the substance of my conversation with the patient.  Thank you for the opportunity to participate in the care of your patient.    Radha Gómez MD, FAAFP  [facility]  Diplomate, American Board of Obesity Medicine    Total time spent on the date of this encounter doing: chart review, review of test results, patient visit, physical exam, education, counseling, developing plan of care, and documenting = 30 minutes.      "

## 2024-05-10 NOTE — NURSING NOTE
Is the patient currently in the state of [place]? YES    Visit mode: telephone    If the visit is dropped, the patient can be reconnected by: Call  Telephone Information:   Mobile [phone]       Will anyone else be joining the visit? NO  (If patient encounters technical issues they should call [phone])    How would you like to obtain your AVS? MyChart    Are changes needed to the allergy or medication list? Pt stated no changes to allergies and Pt stated no med changes    Are refills needed on medications prescribed by this physician? YES     Reason for visit: RECHECK    Wt other than 24 hrs:  last week  Pain more than one location:  no  Rachelle ESTEBAN

## 2024-05-10 NOTE — LETTER
5/10/2024         RE: Keiry Mchugh  [address]  [place] [address]        Dear Colleague,    Thank you for referring your patient, Keiry Mchugh, to the [facility] [place]. Please see a copy of my visit note below.    Virtual Visit Details    Type of service:  Telephone Visit   Phone call duration: 30 minutes   Originating Location (pt. Location): Home    Distant Location (provider location):  On-site  Bariatric Follow-up    59 year old  female BMI:Body mass index is 32.53 kg/m .    Weight:   Wt Readings from Last 1 Encounters:   05/10/24 90.7 kg (200 lb)    pounds    Comorbidities:  Patient Active Problem List   Diagnosis     Chronic insomnia     HARDIN (nonalcoholic steatohepatitis)     Sigmoid diverticulitis     Dyslipidemia     Metabolic syndrome     Celiac disease     Low bone mass     Profound fatigue     Cervical spondylosis without myelopathy     Cervical radiculitis         Current Outpatient Medications:      phentermine (ADIPEX-P) 37.5 MG tablet, Take 1 tablet (37.5 mg) by mouth every morning (before breakfast), Disp: 90 tablet, Rfl: 1     Semaglutide-Weight Management (WEGOVY) 0.25 MG/0.5ML pen, Inject 0.25 mg Subcutaneous once a week for 28 days Please notify patient when available. Thank you, Disp: 2 mL, Rfl: 0     Semaglutide-Weight Management (WEGOVY) 0.5 MG/0.5ML pen, Inject 0.5 mg Subcutaneous once a week for 28 days Please notify patient when available. Thank you, Disp: 2 mL, Rfl: 0     Semaglutide-Weight Management (WEGOVY) 1 MG/0.5ML pen, Inject 1 mg Subcutaneous once a week for 28 days Please notify patient when available. Thank you, Disp: 2 mL, Rfl: 0     Semaglutide-Weight Management (WEGOVY) 1.7 MG/0.75ML pen, Inject 1.7 mg Subcutaneous once a week for 28 days Please notify patient when available. Thank you, Disp: 3 mL, Rfl: 0     Semaglutide-Weight Management (WEGOVY) 2.4 MG/0.75ML pen, Inject 2.4 mg Subcutaneous once a week Please  notify patient when available. Thank you, Disp: 9 mL, Rfl: 3     atenolol (TENORMIN) 50 MG tablet, Take 1 tablet (50 mg) by mouth daily as needed (Migraines) (Patient not taking: Reported on 5/3/2024), Disp: 30 tablet, Rfl: 1     B Complex-C (RA B-COMPLEX/VITAMIN C CR) TBCR, Take 1 tablet by mouth daily, Disp: , Rfl:      baclofen (LIORESAL) 10 MG tablet, TAKE 1 TABLET BY MOUTH TWICE DAILY TO THREE TIMES DAILY AS NEEDED FOR MUSCLE SPASMS. (Patient not taking: Reported on 5/3/2024), Disp: 30 tablet, Rfl: 3     Barberry-Oreg Grape-Goldenseal (BERBERINE COMPLEX PO), , Disp: , Rfl:      Bioflavonoid Products (VITAMIN C) CHEW, , Disp: , Rfl:      Biotin 50337 MCG TABS, , Disp: , Rfl:      Black Pepper-Turmeric (TURMERIC CURCUMIN) 5-1000 MG CAPS, , Disp: , Rfl:      Coenzyme Q10 (COQ-10) 400 MG CAPS, Take 1 capsule by mouth daily , Disp: , Rfl:      Evening Primrose Oil 1000 MG CAPS, , Disp: , Rfl:      FLUoxetine (PROZAC) 20 MG capsule, Take 1 capsule (20 mg) by mouth daily, Disp: 90 capsule, Rfl: 3     folic acid (FOLVITE) 1 MG tablet, Take 3 tablets (3 mg) by mouth daily for 90 days, Disp: 270 tablet, Rfl: 0     hydroxychloroquine (PLAQUENIL) 200 MG tablet, Take 200 mg by mouth 2 times daily, Disp: , Rfl:      Lactobacillus (PROBIOTIC ACIDOPHILUS) CAPS, , Disp: , Rfl:      magnesium oxide (MAG-OX) 400 (240 Mg) MG tablet, Take 400 mg by mouth daily, Disp: , Rfl:      methotrexate 2.5 MG tablet, Take 6 tablets (15 mg) by mouth every 7 days for 90 days TAKE 4 TABLETS(10 MG) BY MOUTH EVERY 7 DAYS, Disp: 72 tablet, Rfl: 0     methylfolate (DEPLIN) 15 MG TABS tablet, , Disp: , Rfl:      OnabotulinumtoxinA (BOTOX IJ), For migraines - 155 units q 12 weeks., Disp: , Rfl:      polyethylene glycol (MIRALAX) 17 GM/Dose powder, Take 17 g by mouth daily as needed (Patient not taking: Reported on 5/3/2024), Disp: , Rfl:      SUMAtriptan (IMITREX) 100 MG tablet, TAKE 1/2 TO 1 TABLET BY MOUTH AT ONSET OF HEADACHE, Disp: 9 tablet, Rfl:  "5       Interim: Cortisol was mildly elevated. BP has been high but at work when she checks it is 120/70. Believes she has \"white coat HTN.\"  Maintaining a 20# weight loss. \"I don't feel health.\" \"I'm so fatigued.\" After her parathyroid surgery sleeps better. Wakes up at 3am every night. Believes it is d/t cortisol spike.      Cholesterol   Date Value Ref Range Status   01/16/2023 260 (H) <200 mg/dL Final      Hemoglobin A1C   Date Value Ref Range Status   10/11/2023 5.3 0.0 - 5.6 % Final     Comment:     Normal <5.7%   Prediabetes 5.7-6.4%    Diabetes 6.5% or higher     Note: Adopted from ADA consensus guidelines.      BP Readings from Last 3 Encounters:   05/03/24 (!) 139/90   04/26/24 (!) 159/85   04/04/24 133/88        Plan:  DIET  Continue 3 meals with lean protein, Add berries which are low glycemic fruits   EXERCISE continue counting steps f   PHARMACOTHERAPY continue phentermine. Wegovy. If no coverage will discuss Insulin Outlet    Try meditation, RA support group for support, insight,      -We reviewed her medications and whether associated with weight gain.        We discussed [facility] Bariatric Basics including:  -eating 3 meals daily  -eating protein first  -eating slowly, chewing food well  -avoiding/limiting calorie containing beverages  -choosing wheat, not white with breads, crackers, pastas, venu, bagels, tortillas, rice  -limiting restaurant or cafeteria eating to twice a week or less  -We discussed the importance of restorative sleep and stress management in maintaining a healthy weight.  -We discussed insulin resistance and glycemic index as it relates to appetite and weight control  -We discussed the National Weight Control Registry healthy weight maintenance strategies and ways to optimize metabolism.  -We discussed the importance of physical activity including cardiovascular and strength training in maintaining a healthier weight and explored viable options.      DIETARY " "HISTORY  Meals Per Day: 3  Eating Protein First?: yes  Food Diary: B:Hbeggs and turkey sausage L:meat raw veggies cottage cheese D:Cottage cheese with chives and greek yogurt  Snacks Per Day: gluten free cookie with protein  Typical Snack: see above \"I can't eat fruit because it packs on the weight.\"  Fluid Intake: intentional  Portion Control: stable  Calorie Containing Beverages: no  Alcohol per week: no  Typical Protein Food Choices: lean. Beef once in a blue moon  Choosing Whole Grains: no grains gluten intolerant  Grocery Shopping is done by: herself  Food Preparation is done by: herself  Meals at Restaurant/Cafeteria/Take Out Per Week: none  Eating at the Table:   TV is Off During Meals:     Positive Changes Since Last Visit: maintaining 20# weight loss  Struggling With: exhaustion    Knowledgeable in Reading Food Labels:   Getting Adequate Protein: likely  Sleeping 7-8 hours/day interrupted-  Stress management \"I've tried nothing\"     PHYSICAL ACTIVITY PATTERNS:  Cardiovascular: getting steps at work  Strength Training: NA    REVIEW OF SYSTEMS    PHYSICAL EXAM: (most recent vitals and today's stated weight)  Vitals: Ht 1.67 m (5' 5.75\")   Wt 90.7 kg (200 lb)   BMI 32.53 kg/m        GEN: Pleasant and in no acute distress  PSYCH: A&OX3,     I have reviewed the note as documented above.  This accurately captures the substance of my conversation with the patient.  Thank you for the opportunity to participate in the care of your patient.    Radha Gómez MD, FAAFP  [facility]  Diplomate, American Board of Obesity Medicine    Total time spent on the date of this encounter doing: chart review, review of test results, patient visit, physical exam, education, counseling, developing plan of care, and documenting = 30 minutes.        Again, thank you for allowing me to participate in the care of your patient.        Sincerely,        Radha Gómez MD  "

## 2024-05-14 RX ORDER — DIPHENHYDRAMINE HCL 50 MG
CAPSULE ORAL
Qty: 1 CAPSULE | Refills: 0 | Status: SHIPPED | OUTPATIENT
Start: 2024-05-14 | End: 2024-06-26

## 2024-05-14 RX ORDER — METHYLPREDNISOLONE 32 MG/1
TABLET ORAL
Qty: 2 TABLET | Refills: 0 | Status: SHIPPED | OUTPATIENT
Start: 2024-05-14 | End: 2024-06-26

## 2024-05-14 NOTE — TELEPHONE ENCOUNTER
1. History of allergy to radiographic contrast media  - methylPREDNISolone (MEDROL) 32 MG tablet; Take methylprednisolone 32 mg by mouth 12 hours prior to CT scan.  Repeat second dose 2 hours prior to CT scan.  Dispense: 2 tablet; Refill: 0  - diphenhydrAMINE (BENADRYL) 50 MG capsule; Take diphenhydramine 50 mg by mouth 1 hour prior to CT scan.  Dispense: 1 capsule; Refill: 0  - CT Chest/Abdomen/Pelvis w Contrast; Future    2. Night sweats  3. CRP elevated  4. Fatigue, unspecified type  - CT Chest/Abdomen/Pelvis w Contrast; Future     Appreciate clarification that she has tolerated CT scans with contrast in the past with premedication.  Orders placed for premedication.     Yissel Schmidt, DO

## 2024-05-19 NOTE — TELEPHONE ENCOUNTER
PRIOR AUTHORIZATION DENIED    Medication: WEGOVY 0.25 MG/0.5ML SC SOAJ  Insurance Company: [employer]-Specialty [employer]'s - Phone [phone] Fax [phone]  Denial Date: 5/19/2024  Denial Reason(s): Plan exclusion. Weight loss medications not covered under pharmacy insurance.   Appeal Information: N/A - cannot appeal for weight loss  Patient Notified: No          Thank you,     Nicholas Harrison [facility]  Pharmacy Clinic [facility]  [email or web address]   Phone: [phone]  Fax: [phone]

## 2024-05-19 NOTE — TELEPHONE ENCOUNTER
PA Initiation    Medication: WEGOVY 0.25 MG/0.5ML SC SOAJ  Insurance Company: [employer] Non-Specialty PA's - Phone [phone] Fax [phone]  Pharmacy Filling the Rx: [facility] [place] - [address] [facility]  Filling Pharmacy Phone: [phone]  Filling Pharmacy Fax: [phone]  Start Date: 5/19/2024       Thank you,     Nicholas Harrison [facility]  Pharmacy Clinic [facility]  [email or web address]   Phone: [phone]  Fax: [phone]

## 2024-05-20 ENCOUNTER — APPOINTMENT (OUTPATIENT)
Dept: CT IMAGING | Facility: CLINIC | Age: 59
End: 2024-05-20
Attending: PHYSICIAN ASSISTANT
Payer: COMMERCIAL

## 2024-05-20 ENCOUNTER — APPOINTMENT (OUTPATIENT)
Dept: ULTRASOUND IMAGING | Facility: CLINIC | Age: 59
End: 2024-05-20
Attending: PHYSICIAN ASSISTANT
Payer: COMMERCIAL

## 2024-05-20 ENCOUNTER — NURSE TRIAGE (OUTPATIENT)
Dept: NURSING | Facility: CLINIC | Age: 59
End: 2024-05-20
Payer: COMMERCIAL

## 2024-05-20 ENCOUNTER — HOSPITAL ENCOUNTER (EMERGENCY)
Facility: CLINIC | Age: 59
Discharge: HOME | End: 2024-05-20
Attending: EMERGENCY MEDICINE | Admitting: EMERGENCY MEDICINE
Payer: COMMERCIAL

## 2024-05-20 VITALS
BODY MASS INDEX: 32.53 KG/M2 | RESPIRATION RATE: 18 BRPM | HEART RATE: 81 BPM | OXYGEN SATURATION: 97 % | SYSTOLIC BLOOD PRESSURE: 140 MMHG | DIASTOLIC BLOOD PRESSURE: 82 MMHG | TEMPERATURE: 97.4 F | WEIGHT: 200 LBS

## 2024-05-20 DIAGNOSIS — K57.92 DIVERTICULITIS: ICD-10-CM

## 2024-05-20 LAB
ALBUMIN SERPL BCG-MCNC: 5 G/DL (ref 3.5–5.2)
ALBUMIN UR-MCNC: 10 MG/DL
ALP SERPL-CCNC: 80 U/L (ref 40–150)
ALT SERPL W P-5'-P-CCNC: 20 U/L (ref 0–50)
ANION GAP SERPL CALCULATED.3IONS-SCNC: 13 MMOL/L (ref 7–15)
APPEARANCE UR: CLEAR
AST SERPL W P-5'-P-CCNC: 20 U/L (ref 0–45)
BASOPHILS # BLD AUTO: 0 10E3/UL (ref 0–0.2)
BASOPHILS NFR BLD AUTO: 1 %
BILIRUB DIRECT SERPL-MCNC: <0.2 MG/DL (ref 0–0.3)
BILIRUB SERPL-MCNC: 0.5 MG/DL
BILIRUB UR QL STRIP: NEGATIVE
BUN SERPL-MCNC: 18.8 MG/DL (ref 6–20)
CALCIUM SERPL-MCNC: 9.8 MG/DL (ref 8.6–10)
CHLORIDE SERPL-SCNC: 103 MMOL/L (ref 98–107)
COLOR UR AUTO: ABNORMAL
CREAT SERPL-MCNC: 0.82 MG/DL (ref 0.51–0.95)
CRP SERPL-MCNC: 68.2 MG/L
DEPRECATED HCO3 PLAS-SCNC: 22 MMOL/L (ref 22–29)
EGFRCR SERPLBLD CKD-EPI 2021: 83 ML/MIN/1.73M2
EOSINOPHIL # BLD AUTO: 0.1 10E3/UL (ref 0–0.7)
EOSINOPHIL NFR BLD AUTO: 2 %
ERYTHROCYTE [DISTWIDTH] IN BLOOD BY AUTOMATED COUNT: 12.5 % (ref 10–15)
GLUCOSE SERPL-MCNC: 91 MG/DL (ref 70–99)
GLUCOSE UR STRIP-MCNC: NEGATIVE MG/DL
HCT VFR BLD AUTO: 42.9 % (ref 35–47)
HGB BLD-MCNC: 14.4 G/DL (ref 11.7–15.7)
HGB UR QL STRIP: NEGATIVE
IMM GRANULOCYTES # BLD: 0 10E3/UL
IMM GRANULOCYTES NFR BLD: 0 %
KETONES UR STRIP-MCNC: 20 MG/DL
LEUKOCYTE ESTERASE UR QL STRIP: NEGATIVE
LIPASE SERPL-CCNC: 32 U/L (ref 13–60)
LYMPHOCYTES # BLD AUTO: 1.6 10E3/UL (ref 0.8–5.3)
LYMPHOCYTES NFR BLD AUTO: 27 %
MCH RBC QN AUTO: 31 PG (ref 26.5–33)
MCHC RBC AUTO-ENTMCNC: 33.6 G/DL (ref 31.5–36.5)
MCV RBC AUTO: 92 FL (ref 78–100)
MONOCYTES # BLD AUTO: 0.5 10E3/UL (ref 0–1.3)
MONOCYTES NFR BLD AUTO: 9 %
MUCOUS THREADS #/AREA URNS LPF: PRESENT /LPF
NEUTROPHILS # BLD AUTO: 3.8 10E3/UL (ref 1.6–8.3)
NEUTROPHILS NFR BLD AUTO: 62 %
NITRATE UR QL: NEGATIVE
NRBC # BLD AUTO: 0 10E3/UL
NRBC BLD AUTO-RTO: 0 /100
PH UR STRIP: 5.5 [PH] (ref 5–7)
PLATELET # BLD AUTO: 301 10E3/UL (ref 150–450)
POTASSIUM SERPL-SCNC: 4 MMOL/L (ref 3.4–5.3)
PROT SERPL-MCNC: 7.9 G/DL (ref 6.4–8.3)
RBC # BLD AUTO: 4.65 10E6/UL (ref 3.8–5.2)
RBC URINE: 0 /HPF
SODIUM SERPL-SCNC: 138 MMOL/L (ref 135–145)
SP GR UR STRIP: 1.02 (ref 1–1.03)
SQUAMOUS EPITHELIAL: <1 /HPF
UROBILINOGEN UR STRIP-MCNC: <2 MG/DL
WBC # BLD AUTO: 6.1 10E3/UL (ref 4–11)
WBC URINE: <1 /HPF

## 2024-05-20 PROCEDURE — 99285 EMERGENCY DEPT VISIT HI MDM: CPT | Mod: 25

## 2024-05-20 PROCEDURE — 250N000013 HC RX MED GY IP 250 OP 250 PS 637: Performed by: PHYSICIAN ASSISTANT

## 2024-05-20 PROCEDURE — 83690 ASSAY OF LIPASE: CPT | Performed by: PHYSICIAN ASSISTANT

## 2024-05-20 PROCEDURE — 76856 US EXAM PELVIC COMPLETE: CPT

## 2024-05-20 PROCEDURE — 36415 COLL VENOUS BLD VENIPUNCTURE: CPT | Performed by: PHYSICIAN ASSISTANT

## 2024-05-20 PROCEDURE — 96361 HYDRATE IV INFUSION ADD-ON: CPT

## 2024-05-20 PROCEDURE — 85025 COMPLETE CBC W/AUTO DIFF WBC: CPT | Performed by: PHYSICIAN ASSISTANT

## 2024-05-20 PROCEDURE — 80053 COMPREHEN METABOLIC PANEL: CPT | Performed by: PHYSICIAN ASSISTANT

## 2024-05-20 PROCEDURE — 96375 TX/PRO/DX INJ NEW DRUG ADDON: CPT

## 2024-05-20 PROCEDURE — 250N000011 HC RX IP 250 OP 636: Performed by: EMERGENCY MEDICINE

## 2024-05-20 PROCEDURE — 258N000003 HC RX IP 258 OP 636: Performed by: PHYSICIAN ASSISTANT

## 2024-05-20 PROCEDURE — 86140 C-REACTIVE PROTEIN: CPT | Performed by: PHYSICIAN ASSISTANT

## 2024-05-20 PROCEDURE — 250N000011 HC RX IP 250 OP 636: Performed by: PHYSICIAN ASSISTANT

## 2024-05-20 PROCEDURE — 76830 TRANSVAGINAL US NON-OB: CPT

## 2024-05-20 PROCEDURE — 81003 URINALYSIS AUTO W/O SCOPE: CPT | Performed by: PHYSICIAN ASSISTANT

## 2024-05-20 PROCEDURE — 96374 THER/PROPH/DIAG INJ IV PUSH: CPT | Mod: 59

## 2024-05-20 PROCEDURE — 74177 CT ABD & PELVIS W/CONTRAST: CPT

## 2024-05-20 RX ORDER — METRONIDAZOLE 500 MG/1
500 TABLET ORAL 2 TIMES DAILY
Qty: 10 TABLET | Refills: 0 | Status: SHIPPED | OUTPATIENT
Start: 2024-05-20 | End: 2024-05-25

## 2024-05-20 RX ORDER — HYDROMORPHONE HYDROCHLORIDE 1 MG/ML
0.5 INJECTION, SOLUTION INTRAMUSCULAR; INTRAVENOUS; SUBCUTANEOUS ONCE
Status: COMPLETED | OUTPATIENT
Start: 2024-05-20 | End: 2024-05-20

## 2024-05-20 RX ORDER — PROCHLORPERAZINE MALEATE 10 MG
10 TABLET ORAL ONCE
Status: COMPLETED | OUTPATIENT
Start: 2024-05-20 | End: 2024-05-20

## 2024-05-20 RX ORDER — OXYCODONE HYDROCHLORIDE 5 MG/1
5 TABLET ORAL ONCE
Status: COMPLETED | OUTPATIENT
Start: 2024-05-20 | End: 2024-05-20

## 2024-05-20 RX ORDER — IOPAMIDOL 755 MG/ML
90 INJECTION, SOLUTION INTRAVASCULAR ONCE
Status: COMPLETED | OUTPATIENT
Start: 2024-05-20 | End: 2024-05-20

## 2024-05-20 RX ORDER — CIPROFLOXACIN 500 MG/1
500 TABLET, FILM COATED ORAL 2 TIMES DAILY
Qty: 10 TABLET | Refills: 0 | Status: SHIPPED | OUTPATIENT
Start: 2024-05-20 | End: 2024-05-25

## 2024-05-20 RX ORDER — DIPHENHYDRAMINE HYDROCHLORIDE 50 MG/ML
50 INJECTION INTRAMUSCULAR; INTRAVENOUS ONCE
Status: COMPLETED | OUTPATIENT
Start: 2024-05-20 | End: 2024-05-20

## 2024-05-20 RX ORDER — METHYLPREDNISOLONE SODIUM SUCCINATE 125 MG/2ML
125 INJECTION, POWDER, LYOPHILIZED, FOR SOLUTION INTRAMUSCULAR; INTRAVENOUS ONCE
Status: COMPLETED | OUTPATIENT
Start: 2024-05-20 | End: 2024-05-20

## 2024-05-20 RX ORDER — DIPHENHYDRAMINE HYDROCHLORIDE 50 MG/ML
25 INJECTION INTRAMUSCULAR; INTRAVENOUS ONCE
Status: DISCONTINUED | OUTPATIENT
Start: 2024-05-20 | End: 2024-05-20

## 2024-05-20 RX ORDER — OXYCODONE HYDROCHLORIDE 5 MG/1
5 TABLET ORAL EVERY 6 HOURS PRN
Qty: 8 TABLET | Refills: 0 | Status: SHIPPED | OUTPATIENT
Start: 2024-05-20 | End: 2024-05-23

## 2024-05-20 RX ORDER — PROCHLORPERAZINE MALEATE 10 MG
10 TABLET ORAL EVERY 6 HOURS PRN
Qty: 10 TABLET | Refills: 0 | Status: SHIPPED | OUTPATIENT
Start: 2024-05-20 | End: 2024-06-26

## 2024-05-20 RX ADMIN — IOPAMIDOL 90 ML: 755 INJECTION, SOLUTION INTRAVENOUS at 18:55

## 2024-05-20 RX ADMIN — PROCHLORPERAZINE MALEATE 10 MG: 10 TABLET ORAL at 14:59

## 2024-05-20 RX ADMIN — OXYCODONE 5 MG: 5 TABLET ORAL at 14:56

## 2024-05-20 RX ADMIN — DIPHENHYDRAMINE HYDROCHLORIDE 50 MG: 50 INJECTION INTRAMUSCULAR; INTRAVENOUS at 17:54

## 2024-05-20 RX ADMIN — SODIUM CHLORIDE 1000 ML: 9 INJECTION, SOLUTION INTRAVENOUS at 14:53

## 2024-05-20 RX ADMIN — HYDROMORPHONE HYDROCHLORIDE 0.5 MG: 1 INJECTION, SOLUTION INTRAMUSCULAR; INTRAVENOUS; SUBCUTANEOUS at 16:33

## 2024-05-20 RX ADMIN — METHYLPREDNISOLONE SODIUM SUCCINATE 125 MG: 125 INJECTION, POWDER, FOR SOLUTION INTRAMUSCULAR; INTRAVENOUS at 14:54

## 2024-05-20 ASSESSMENT — COLUMBIA-SUICIDE SEVERITY RATING SCALE - C-SSRS
1. IN THE PAST MONTH, HAVE YOU WISHED YOU WERE DEAD OR WISHED YOU COULD GO TO SLEEP AND NOT WAKE UP?: NO
6. HAVE YOU EVER DONE ANYTHING, STARTED TO DO ANYTHING, OR PREPARED TO DO ANYTHING TO END YOUR LIFE?: NO
2. HAVE YOU ACTUALLY HAD ANY THOUGHTS OF KILLING YOURSELF IN THE PAST MONTH?: NO

## 2024-05-20 ASSESSMENT — ACTIVITIES OF DAILY LIVING (ADL)
ADLS_ACUITY_SCORE: 37

## 2024-05-20 NOTE — TELEPHONE ENCOUNTER
S-(situation): right sided abdominal pain    B-(background):   Pain started 1.5 years ago, then saw PCP on 5/3. Abdomen/pelvic CT scheduled on 5/29.    Hx of hernia surgery, appendectomy, gallbladder surgery and bowel resection.      A-(assessment):   Severe right sided abdominal pain worse on Thu 5/16. Constant severe sharp pain now, grunts when walking. Hard to take a deep breath.    Had bad diarrhea for a couple of days    Skin appears pale    R-(recommendations):   Advised ED. Pt will go to [facility].    Yanet Layton RN/[place] Nurse Advisor        Reason for Disposition   SEVERE abdominal pain (e.g., excruciating)    Additional Information   Negative: Passed out (i.e., fainted, collapsed and was not responding)   Negative: Shock suspected (e.g., cold/pale/clammy skin, too weak to stand, low BP, rapid pulse)   Negative: Sounds like a life-threatening emergency to the triager   Negative: Followed an abdomen (stomach) injury   Negative: Chest pain   Negative: Abdominal pain and pregnant < 20 weeks   Negative: Abdominal pain and pregnant 20 or more weeks   Negative: Pain is mainly in upper abdomen (if needed ask: 'is it mainly above the belly button?')   Negative: Abdomen bloating or swelling are main symptoms    Protocols used: Abdominal Pain - Female-A-[place]

## 2024-05-20 NOTE — ED PROVIDER NOTES
EMERGENCY DEPARTMENT ENCOUNTER   NAME: Keiry Mchugh ; AGE: 59 year old female ; YOB: 1965 ; MRN: [id number] ; PCP: Yissel Schmidt     Evaluation Date & Time: No admission date for patient encounter.    ED Provider: Georgie Espinal PA-C    CHIEF COMPLAINT     Abdominal Pain      FINAL ASSESSMENT     No diagnosis found.    ED COURSE, MEDICAL DECISION MAKING, PLAN     ED course     2:30 PM: Evaluated patient. Performed physical exam. Plan for line, labs, meds, imaging. Requires pre-contrast medication for allergy. Will start with US while waiting for CT.   4:06 PM: Rechecked and updated patient. Requesting further pain medication. Will order now.   4:21 PM: Signed pt out to Dr. Zavala who will follow up with CT results.   ______________________________________________________________________    Keiry Mchugh is a 59 year old female with pertinent medical history of celiac disease, rheumatoid arthritis, HLD, diverticulosis with diverticulitis presenting for severe right lower abdomen/pelvic pain.  Has been intermittent over the last 1.5 years, but in the last 3 to 4 days has worsened substantially which prompted her to come in today.  Feels some rectal fullness/pressure.  Pain increases with urination.    Exam reveals tenderness in the right lower quadrant of the abdomen.  The rest of the exam is unremarkable.  Blood pressure elevated 157/93, otherwise vitally normal.    Considered diverticulitis, intra-abdominal abscess, TOA, ovarian cyst, intra-abdominal adhesions, constipation/SBO, colonic mass.    Laboratory workup remarkable for CRP of 68.20.  These have been in the single digits previously over the last 1 year.  The rest of the labs are unremarkable including a CBC, BMP, hepatic panel, and lipase.  UA pending at time of sign out.     Since patient requires medication prior to CT with contrast, she was given Solu-Medrol.  Will need to wait 4 hours before scanning.  She will also need to be  "given 50 mg of IV Benadryl 1 hour prior to imaging.    Elected to obtain a pelvic ultrasound while waiting for CT imaging since she is complaining of low right pain that radiates into the pelvis.  These images were unremarkable.    Pt signed out to Dr. Zavala. He will follow up on CT results and dispo accordingly.      ______________________________________________________________________    *All pertinent lab & imaging studies independently reviewed. (See chart for details)   *Discussed the results of all the tests and plan with patient and family/guardians.   *The patient and/or family/guardian acknowledged understanding and was agreeable with the care plan.      HISTORY OF PRESENT ILLNESS   Patient information was obtained from: Patient    Use of Intrepreter: N/A     Keiry Mchugh is a 59 year old female with a pertinent history of celiac disease, rheumatoid arthritis, HLD, diverticulosis with diverticulitis who presents to the ED by means of walk-in for evaluation of severe right lower abdomen/pelvic pain.    Patient has reportedly been dealing with right-sided abdominal pain over the last year and a half, but more recently it has become significantly worse.  States she started to notice worsening about 3 to 4 days ago.  States the pain starts in the middle right abdomen and radiates down into the pelvis.  She states that she also feels some fullness/pressure in the rectum.    She notes that her bowel movements have been \"weird\" over the last couple of months.  She says she gets bouts of constipation, diarrhea, and very thin soft stools.    She has had nausea, but no vomiting.  Felt a little warm a couple days ago, but no registered fevers.  No changes in urinary habits.    Patient tells me that she has had an appendectomy, cholecystectomy, 2 right-sided hernia repairs with mesh placement, hysterectomy, and bladder surgeries.  She also reports that she has had a colectomy as a result of " diverticulitis.    Patient tells me that she has followed up with her primary care provider regarding the chronic pain and she was scheduled to get CT imaging done next week, but now that the pain worsened she decided to come to the ER because she is no longer able to function at home and work.      MEDICAL HISTORY     Past Medical History:   Diagnosis Date    Anemia     Anxiety     Celiac disease     Celiac disease     Cervical spondylosis without myelopathy 10/13/2023    Chronic kidney disease     Cough     Depression     Diverticulitis     Dyslipidemia     Enlarged LV     GERD (gastroesophageal reflux disease)     Hepatitis     Hiatal hernia     Hypertension     Low bone mass     Metabolic syndrome     Migraines     HARDIN (nonalcoholic steatohepatitis)     Peripheral neuropathy     PONV (postoperative nausea and vomiting)     Profound fatigue     Ventral hernia        Past Surgical History:   Procedure Laterality Date    APPENDECTOMY      CHOLECYSTECTOMY      COLON SURGERY      COLONOSCOPY N/A 3/18/2019    Procedure: COLONOSCOPY;  Surgeon: Davis Mosqueda MD;  Location: [facility];  Service: General    CYSTOSCOPY N/A 7/16/2015    Procedure: CYSTOSCOPY;  Surgeon: Nate Horta MD;  Location: [facility];  Service:      CORRECT BUNION,METATARSAL OSTEOTOMY      Description: Bunion Correction With Metatarsal Osteotomy Herman Procedure;  Proc Date: 07/16/2010;    HYSTERECTOMY      HYSTERECTOMY, PAP NO LONGER INDICATED  02/2009    NEPHRECTOMY LIVING DONOR Left APRIL 2012    UT LAP,BILIARY TRACT,UNLISTED N/A 3/19/2019    Procedure: BIOPSY, LIVER, LAPAROSCOPIC;  Surgeon: Davis Mosqueda MD;  Location: [facility];  Service: General    UT LAP,SLING OPERATION      Description: Laparoscopic Sling Operation For Stress Incontinence;  Recorded: 02/17/2009;    UT LAP,SURG,COLECTOMY, PARTIAL, W/ANAST N/A 3/19/2019    Procedure: LAPAROSCOPIC SIGMOID COLON RESECTION;  Surgeon: Davis Mosqueda  MD CRYSTAL;  Location: [facility] OR;  Service: General    SEPTOPLASTY, TURBINOPLASTY, COMBINED N/A 8/3/2021    Procedure: SEPTOPLASTY, NOSE, WITH TURBINOPLASTY RADIOFREQUENCY BALLON ASSISTED, CRYOBLATION OF NASAL TISSUE;  Surgeon: Randy Case MD;  Location: [facility]    SIGMOIDOSCOPY FLEXIBLE N/A 3/19/2019    Procedure: FLEXIBLE SIGMOIDOSCOPY;  Surgeon: Davis Mosqueda MD;  Location: [facility];  Service: General       Family History   Problem Relation Age of Onset    Hashimoto's thyroiditis Mother     Breast Cancer Mother 50        currently patient has two different types of Br Ca 84y    Graves' disease Sister     Hypothyroidism Sister     Hashimoto's thyroiditis Maternal Grandfather     Hypothyroidism Maternal Aunt     Breast Cancer Maternal Aunt 50    Hypothyroidism Maternal Aunt     Hashimoto's thyroiditis Maternal Aunt     Anesthesia Reaction No family hx of        Social History     Tobacco Use    Smoking status: Former     Current packs/day: 0.00     Types: Cigarettes     Quit date: 1999     Years since quittin.4    Smokeless tobacco: Never   Vaping Use    Vaping status: Never Used   Substance Use Topics    Alcohol use: No     Comment: rare    Drug use: Not Currently       atenolol (TENORMIN) 50 MG tablet  B Complex-C (RA B-COMPLEX/VITAMIN C CR) TBCR  baclofen (LIORESAL) 10 MG tablet  Barberry-Oreg Grape-Goldenseal (BERBERINE COMPLEX PO)  Bioflavonoid Products (VITAMIN C) CHEW  Biotin 91124 MCG TABS  Black Pepper-Turmeric (TURMERIC CURCUMIN) 5-1000 MG CAPS  Coenzyme Q10 (COQ-10) 400 MG CAPS  diphenhydrAMINE (BENADRYL) 50 MG capsule  Evening Primrose Oil 1000 MG CAPS  FLUoxetine (PROZAC) 20 MG capsule  folic acid (FOLVITE) 1 MG tablet  hydroxychloroquine (PLAQUENIL) 200 MG tablet  Lactobacillus (PROBIOTIC ACIDOPHILUS) CAPS  magnesium oxide (MAG-OX) 400 (240 Mg) MG tablet  methotrexate 2.5 MG tablet  methylfolate (DEPLIN) 15 MG TABS tablet  methylPREDNISolone (MEDROL) 32 MG  tablet  OnabotulinumtoxinA (BOTOX IJ)  phentermine (ADIPEX-P) 37.5 MG tablet  polyethylene glycol (MIRALAX) 17 GM/Dose powder  Semaglutide-Weight Management (WEGOVY) 0.25 MG/0.5ML pen  Semaglutide-Weight Management (WEGOVY) 0.5 MG/0.5ML pen  Semaglutide-Weight Management (WEGOVY) 1 MG/0.5ML pen  Semaglutide-Weight Management (WEGOVY) 1.7 MG/0.75ML pen  Semaglutide-Weight Management (WEGOVY) 2.4 MG/0.75ML pen  SUMAtriptan (IMITREX) 100 MG tablet          PHYSICAL EXAM     First Vitals:  Patient Vitals for the past 24 hrs:   BP Temp Temp src Pulse Resp SpO2 Weight   05/20/24 1351 (!) 157/93 97.4  F (36.3  C) Temporal 97 18 99 % 90.7 kg (200 lb)         PHYSICAL EXAM:   Constitutional: Uncomfortable appearing, but no acute distress.   Neuro: Awake and alert. No focal deficits.  Psych: Calm and cooperative.  Eyes: PERRL. EOMI. Conjunctivae clear.   Mouth: Pink and moist.   Cardio: Regular rate. Adequate perfusion to extremities. Regular rhythm. No murmurs.  Pulmonary: Oxygenating well on RA. No labored breathing. CTA b/l.  Abdomen: Right lower quadrant abdominal pain. Soft and non-distended.   Skin: Natural color, warm, dry, intact.       RESULTS     LAB:  All pertinent labs reviewed and interpreted  Labs Ordered and Resulted from Time of ED Arrival to Time of ED Departure   CRP INFLAMMATION - Abnormal       Result Value    CRP Inflammation 68.20 (*)    BASIC METABOLIC PANEL - Normal    Sodium 138      Potassium 4.0      Chloride 103      Carbon Dioxide (CO2) 22      Anion Gap 13      Urea Nitrogen 18.8      Creatinine 0.82      GFR Estimate 83      Calcium 9.8      Glucose 91     HEPATIC FUNCTION PANEL - Normal    Protein Total 7.9      Albumin 5.0      Bilirubin Total 0.5      Alkaline Phosphatase 80      AST 20      ALT 20      Bilirubin Direct <0.20     LIPASE - Normal    Lipase 32     CBC WITH PLATELETS AND DIFFERENTIAL    WBC Count 6.1      RBC Count 4.65      Hemoglobin 14.4      Hematocrit 42.9      MCV 92       MCH 31.0      MCHC 33.6      RDW 12.5      Platelet Count 301      % Neutrophils 62      % Lymphocytes 27      % Monocytes 9      % Eosinophils 2      % Basophils 1      % Immature Granulocytes 0      NRBCs per 100 WBC 0      Absolute Neutrophils 3.8      Absolute Lymphocytes 1.6      Absolute Monocytes 0.5      Absolute Eosinophils 0.1      Absolute Basophils 0.0      Absolute Immature Granulocytes 0.0      Absolute NRBCs 0.0     ROUTINE UA WITH MICROSCOPIC REFLEX TO CULTURE       RADIOLOGY:  US Pelvic Complete with Transvaginal   Final Result   IMPRESSION:   1.  Previous hysterectomy.      2.  No abnormal findings.               CT Abdomen Pelvis w Contrast    (Results Pending)       ECG:    N/A    PROCEDURES     None           MEDICAL DECISION MAKING:  Obtained supplemental history:Supplemental history obtained?: No  Reviewed external records: External records reviewed?: No  Care impacted by chronic illness:Other: RA  Care significantly affected by social determinants of health:N/A  Did you consider but not order tests?: Work up considered but not performed and documented in chart, if applicable  Did you interpret images independently?: Independent interpretation of ECG and images noted in documentation, when applicable.  Consultation discussion with other provider:Did you involve another provider (consultant, , pharmacy, etc.)?: No  Admission considered. Patient was signed out to the oncoming physician, disposition pending.      FINAL IMPRESSION:  No diagnosis found.      MEDICATIONS GIVEN IN THE EMERGENCY DEPARTMENT:  Medications   diphenhydrAMINE (BENADRYL) injection 50 mg (has no administration in time range)   HYDROmorphone (PF) (DILAUDID) injection 0.5 mg (has no administration in time range)   sodium chloride 0.9% BOLUS 1,000 mL (1,000 mLs Intravenous $New Bag 5/20/24 2507)   oxyCODONE (ROXICODONE) tablet 5 mg (5 mg Oral $Given 5/20/24 3894)   prochlorperazine (COMPAZINE) tablet 10 mg (10 mg Oral $Given  5/20/24 0002)   methylPREDNISolone sodium succinate (solu-MEDROL) injection 125 mg (125 mg Intravenous $Given 5/20/24 1157)         NEW PRESCRIPTIONS STARTED AT TODAY'S ED VISIT:  New Prescriptions    No medications on file              Some or all of this documentation has been completed using dictation software and mild grammatical errors may be present. Please contact me with any concerns regarding this.       Georgie Epsinal PA-C  Emergency Medicine   [facility] EMERGENCY ROOM       Georgie Espinal PA-C  05/20/24 6633

## 2024-05-20 NOTE — ED NOTES
EMERGENCY DEPARTMENT SIGN OUT NOTE        ED COURSE AND MEDICAL DECISION MAKING  Patient was signed out to me by Georgie Espinal PA-C at 4:24 PM  7:45 PM on my exam patient appears quite well and comfortable.  Discussed findings and discharge close follow-up.      In brief, Keiry Mchugh is a 59 year old female who initially presented to the ED for evaluation of abdominal pain.      Patient stated she has been dealing with right-sided abdominal pain over the last year and a half, but recently it has worsened. She noticed the pain to be worsening about 3 to 4 days ago and says that the pain starts in the middle right abdomen and radiates down into the pelvis. Along with this, it feels like some fullness/pressure in the rectum.     At time of sign out, disposition was pending CT imaging.  CT imaging returned and reported uncomplicated diverticulitis.  Again I did review the laboratory studies which did show elevated CRP, otherwise no other acute concerning findings.  Patient appears quite well and comfortable on my exam.  Did note patient allergy list.  Will discharge with a prescription for Flagyl, metronidazole, short course for the diverticulitis, discharged with Compazine for nausea and short course of oxycodone for pain.  Will recommend close follow-up with primary care in the next 5 to 7 days for continued outpatient management evaluation.  Discussed all these findings and recommendations with patient and family and they felt reassured and comfortable discharge.  Return precautions provided.    FINAL IMPRESSION    1. Diverticulitis        ED MEDS  Medications   sodium chloride 0.9% BOLUS 1,000 mL (0 mLs Intravenous Stopped 5/20/24 0061)   oxyCODONE (ROXICODONE) tablet 5 mg (5 mg Oral $Given 5/20/24 3686)   prochlorperazine (COMPAZINE) tablet 10 mg (10 mg Oral $Given 5/20/24 1459)   methylPREDNISolone sodium succinate (solu-MEDROL) injection 125 mg (125 mg Intravenous $Given 5/20/24 1453)   diphenhydrAMINE  (BENADRYL) injection 50 mg (50 mg Intravenous $Given 5/20/24 9752)   HYDROmorphone (PF) (DILAUDID) injection 0.5 mg (0.5 mg Intravenous $Given 5/20/24 1633)   iopamidol (ISOVUE-370) solution 90 mL (90 mLs Intravenous $Given 5/20/24 5512)       LAB  Labs Ordered and Resulted from Time of ED Arrival to Time of ED Departure   ROUTINE UA WITH MICROSCOPIC REFLEX TO CULTURE - Abnormal       Result Value    Color Urine Light Yellow      Appearance Urine Clear      Glucose Urine Negative      Bilirubin Urine Negative      Ketones Urine 20 (*)     Specific Gravity Urine 1.018      Blood Urine Negative      pH Urine 5.5      Protein Albumin Urine 10 (*)     Urobilinogen Urine <2.0      Nitrite Urine Negative      Leukocyte Esterase Urine Negative      Mucus Urine Present (*)     RBC Urine 0      WBC Urine <1      Squamous Epithelials Urine <1     CRP INFLAMMATION - Abnormal    CRP Inflammation 68.20 (*)    BASIC METABOLIC PANEL - Normal    Sodium 138      Potassium 4.0      Chloride 103      Carbon Dioxide (CO2) 22      Anion Gap 13      Urea Nitrogen 18.8      Creatinine 0.82      GFR Estimate 83      Calcium 9.8      Glucose 91     HEPATIC FUNCTION PANEL - Normal    Protein Total 7.9      Albumin 5.0      Bilirubin Total 0.5      Alkaline Phosphatase 80      AST 20      ALT 20      Bilirubin Direct <0.20     LIPASE - Normal    Lipase 32     CBC WITH PLATELETS AND DIFFERENTIAL    WBC Count 6.1      RBC Count 4.65      Hemoglobin 14.4      Hematocrit 42.9      MCV 92      MCH 31.0      MCHC 33.6      RDW 12.5      Platelet Count 301      % Neutrophils 62      % Lymphocytes 27      % Monocytes 9      % Eosinophils 2      % Basophils 1      % Immature Granulocytes 0      NRBCs per 100 WBC 0      Absolute Neutrophils 3.8      Absolute Lymphocytes 1.6      Absolute Monocytes 0.5      Absolute Eosinophils 0.1      Absolute Basophils 0.0      Absolute Immature Granulocytes 0.0      Absolute NRBCs 0.0         EKG      RADIOLOGY     CT Abdomen Pelvis w Contrast   Final Result   IMPRESSION:    1.  Patient has an uncomplicated diverticulitis of the mid sigmoid colon, distal to the staple line.   2.  Moderate diverticulosis proximal to the staple line.   3.  Postsurgical changes from prior left nephrectomy, hysterectomy, cholecystectomy and appendectomy.   4.  Other noncritical findings as noted above.      US Pelvic Complete with Transvaginal   Final Result   IMPRESSION:   1.  Previous hysterectomy.      2.  No abnormal findings.                   DISCHARGE MEDS  New Prescriptions    CIPROFLOXACIN (CIPRO) 500 MG TABLET    Take 1 tablet (500 mg) by mouth 2 times daily for 5 days    METRONIDAZOLE (FLAGYL) 500 MG TABLET    Take 1 tablet (500 mg) by mouth 2 times daily for 5 days    OXYCODONE (ROXICODONE) 5 MG TABLET    Take 1 tablet (5 mg) by mouth every 6 hours as needed for pain    PROCHLORPERAZINE (COMPAZINE) 10 MG TABLET    Take 1 tablet (10 mg) by mouth every 6 hours as needed for nausea or vomiting       I, Medina Bauer, am serving as a scribe to document services personally performed by Javier Zavala MD, based on my observations and the provider's statements to me.  I, Javier Zavala MD, attest that Medina Bauer is acting in a scribe capacity, has observed my performance of the services and has documented them in accordance with my direction.       Javier Zavala MD  [facility] EMERGENCY ROOM  [address] [facility] [id number] [phone]         Javier Zavala MD  05/20/24 2002

## 2024-05-20 NOTE — ED TRIAGE NOTES
Pt presents to the ED with c/o abdominal pain. Pt states she has been having R sided abd pain for over a year, started getting worse a week ago. Has CT scheduled for next week. Has had gallbladder and appendix removed. Hx of colon resection, diverticulitis, hernia repair with mesh. Pt endorse to nausea, no vomiting. Reports diarrhea currently. No fevers at home. Reports contrast allergy, gets premedicated with steroid and benadryl.      Triage Assessment (Adult)       Row Name 05/20/24 1272          Triage Assessment    Airway WDL WDL        Respiratory WDL    Respiratory WDL WDL        Skin Circulation/Temperature WDL    Skin Circulation/Temperature WDL WDL        Cardiac WDL    Cardiac WDL WDL        Peripheral/Neurovascular WDL    Peripheral Neurovascular WDL WDL        Cognitive/Neuro/Behavioral WDL    Cognitive/Neuro/Behavioral WDL WDL

## 2024-05-21 NOTE — ED NOTES
Discharge instructions reviewed with patient and daughter, verbalized understanding of instructions.

## 2024-05-29 ENCOUNTER — HOSPITAL ENCOUNTER (OUTPATIENT)
Dept: CT IMAGING | Facility: HOSPITAL | Age: 59
Discharge: HOME | End: 2024-05-29
Attending: FAMILY MEDICINE | Admitting: FAMILY MEDICINE
Payer: COMMERCIAL

## 2024-05-29 DIAGNOSIS — Z91.041 HISTORY OF ALLERGY TO RADIOGRAPHIC CONTRAST MEDIA: ICD-10-CM

## 2024-05-29 DIAGNOSIS — R61 NIGHT SWEATS: ICD-10-CM

## 2024-05-29 DIAGNOSIS — R79.82 CRP ELEVATED: ICD-10-CM

## 2024-05-29 DIAGNOSIS — R53.83 FATIGUE, UNSPECIFIED TYPE: ICD-10-CM

## 2024-05-29 PROCEDURE — 71260 CT THORAX DX C+: CPT

## 2024-05-29 PROCEDURE — 250N000011 HC RX IP 250 OP 636: Performed by: FAMILY MEDICINE

## 2024-05-29 RX ORDER — IOPAMIDOL 755 MG/ML
90 INJECTION, SOLUTION INTRAVASCULAR ONCE
Status: COMPLETED | OUTPATIENT
Start: 2024-05-29 | End: 2024-05-29

## 2024-05-29 RX ADMIN — IOPAMIDOL 90 ML: 755 INJECTION, SOLUTION INTRAVENOUS at 15:10

## 2024-05-30 NOTE — RESULT ENCOUNTER NOTE
Patient updated by amiandot message with lab results.       Sanjaytressa Singh,  Your CT scan shows improving diverticulitis, but nothing to explain your long term symptoms.  With reassuring CT scan and normal labs, I would recommend you follow up with your rheumatologist to see if symptoms related to your underlying autoimmune disease.  Yissel Schmidt, DO

## 2024-06-24 ENCOUNTER — LAB (OUTPATIENT)
Dept: LAB | Facility: CLINIC | Age: 59
End: 2024-06-24
Payer: COMMERCIAL

## 2024-06-24 DIAGNOSIS — Z79.899 HIGH RISK MEDICATION USE: ICD-10-CM

## 2024-06-24 DIAGNOSIS — M05.79 SEROPOSITIVE RHEUMATOID ARTHRITIS OF MULTIPLE JOINTS (H): ICD-10-CM

## 2024-06-24 LAB
ALBUMIN SERPL BCG-MCNC: 4.6 G/DL (ref 3.5–5.2)
ALT SERPL W P-5'-P-CCNC: 80 U/L (ref 0–50)
CREAT SERPL-MCNC: 0.96 MG/DL (ref 0.51–0.95)
EGFRCR SERPLBLD CKD-EPI 2021: 69 ML/MIN/1.73M2
ERYTHROCYTE [DISTWIDTH] IN BLOOD BY AUTOMATED COUNT: 12.2 % (ref 10–15)
HCT VFR BLD AUTO: 40 % (ref 35–47)
HGB BLD-MCNC: 13.1 G/DL (ref 11.7–15.7)
MCH RBC QN AUTO: 30.3 PG (ref 26.5–33)
MCHC RBC AUTO-ENTMCNC: 32.8 G/DL (ref 31.5–36.5)
MCV RBC AUTO: 93 FL (ref 78–100)
PLATELET # BLD AUTO: 265 10E3/UL (ref 150–450)
RBC # BLD AUTO: 4.32 10E6/UL (ref 3.8–5.2)
WBC # BLD AUTO: 4.7 10E3/UL (ref 4–11)

## 2024-06-24 PROCEDURE — 85027 COMPLETE CBC AUTOMATED: CPT

## 2024-06-24 PROCEDURE — 82040 ASSAY OF SERUM ALBUMIN: CPT

## 2024-06-24 PROCEDURE — 84460 ALANINE AMINO (ALT) (SGPT): CPT

## 2024-06-24 PROCEDURE — 82565 ASSAY OF CREATININE: CPT

## 2024-06-24 PROCEDURE — 36415 COLL VENOUS BLD VENIPUNCTURE: CPT

## 2024-06-26 ENCOUNTER — TELEPHONE (OUTPATIENT)
Dept: RHEUMATOLOGY | Facility: CLINIC | Age: 59
End: 2024-06-26

## 2024-06-26 ENCOUNTER — OFFICE VISIT (OUTPATIENT)
Dept: RHEUMATOLOGY | Facility: CLINIC | Age: 59
End: 2024-06-26
Payer: COMMERCIAL

## 2024-06-26 ENCOUNTER — LAB (OUTPATIENT)
Dept: LAB | Facility: CLINIC | Age: 59
End: 2024-06-26
Payer: COMMERCIAL

## 2024-06-26 VITALS
DIASTOLIC BLOOD PRESSURE: 78 MMHG | OXYGEN SATURATION: 97 % | SYSTOLIC BLOOD PRESSURE: 130 MMHG | WEIGHT: 210.5 LBS | HEART RATE: 90 BPM | BODY MASS INDEX: 34.23 KG/M2

## 2024-06-26 DIAGNOSIS — M05.79 SEROPOSITIVE RHEUMATOID ARTHRITIS OF MULTIPLE JOINTS (H): ICD-10-CM

## 2024-06-26 DIAGNOSIS — Z79.899 HIGH RISK MEDICATION USE: ICD-10-CM

## 2024-06-26 DIAGNOSIS — M05.79 SEROPOSITIVE RHEUMATOID ARTHRITIS OF MULTIPLE JOINTS (H): Primary | ICD-10-CM

## 2024-06-26 DIAGNOSIS — R76.8 RHEUMATOID FACTOR POSITIVE: ICD-10-CM

## 2024-06-26 DIAGNOSIS — M15.0 PRIMARY OSTEOARTHRITIS INVOLVING MULTIPLE JOINTS: ICD-10-CM

## 2024-06-26 PROCEDURE — 36415 COLL VENOUS BLD VENIPUNCTURE: CPT

## 2024-06-26 PROCEDURE — 86481 TB AG RESPONSE T-CELL SUSP: CPT

## 2024-06-26 PROCEDURE — 99214 OFFICE O/P EST MOD 30 MIN: CPT | Performed by: INTERNAL MEDICINE

## 2024-06-26 PROCEDURE — G2211 COMPLEX E/M VISIT ADD ON: HCPCS | Performed by: INTERNAL MEDICINE

## 2024-06-26 RX ORDER — HYDROXYCHLOROQUINE SULFATE 200 MG/1
200 TABLET, FILM COATED ORAL 2 TIMES DAILY
Qty: 180 TABLET | Refills: 0 | Status: SHIPPED | OUTPATIENT
Start: 2024-06-26 | End: 2024-09-24

## 2024-06-26 RX ORDER — METHOTREXATE 2.5 MG/1
10 TABLET ORAL
Qty: 16 TABLET | Refills: 0 | Status: SHIPPED | OUTPATIENT
Start: 2024-06-26 | End: 2024-08-21

## 2024-06-26 NOTE — PROGRESS NOTES
Rheumatology follow-up visit note     Keiry is a 59 year old female presents today for follow-up.    Samantha was seen today for recheck.    Diagnoses and all orders for this visit:    Seropositive rheumatoid arthritis of multiple joints (H)  -     Quantiferon-TB Gold Plus; Future  -     adalimumab (HUMIRA *CF*) 40 MG/0.4ML pen kit; Inject 0.4 mLs (40 mg) Subcutaneous every 14 days for 30 days Hold for signs of infection, then seek medical attention.  -     methotrexate 2.5 MG tablet; Take 4 tablets (10 mg) by mouth every 7 days for 30 days  -     hydroxychloroquine (PLAQUENIL) 200 MG tablet; Take 1 tablet (200 mg) by mouth 2 times daily for 90 days    Rheumatoid factor positive    High risk medication use  -     Quantiferon-TB Gold Plus; Future    Primary osteoarthritis involving multiple joints    This patient with seropositive rheumatoid arthritis continues to be in the pain.  Likely her symptoms especially in the hands are a Composite of rheumatoid arthritis and early osteoarthritis.  This is outlined.  In addition her ALT has risen since the increase in dose of methotrexate.  Continue hydroxychloroquine as now.  She is under workup for hypoadrenalism/Cushing syndrome.  Her primary physicians are reluctant to let go the Prozac and instead of giving her duloxetine as discussed previously.  She will discuss this again.  Meanwhile we will have to reduce the dose of methotrexate.  Would have given her prednisone had she not been under workup for the [id number] and almost all her proximal interphalangeal joints worse on the right side.  She does not have tenderness or swelling of the metacarpophalangeal joints.    Follow up in 2 months.    HPI    Keiry Mchugh is a 59 year old female is here for follow-up of   rheumatoid arthritis seropositive.  She is on methotrexate increased on her previous visit to 15 mg per week, folic acid 3 mg daily.  She is not doing well.  She noted pain level at 8.5/10.  Not  only she has stiffness in her fingers in the morning where she has difficulty making a full fist during the day when she is done with the yard work pain in the hands is significant.  She points to the PIPs as the primary site of pain and swelling.  She is working with her primary physician and will be going to [facility] for evaluation for Cushing syndrome.  She is having difficulty performing some of the day-to-day activities as noted.  Morning stiffness of 2 hours.  Recent labs have shown that the ALT has risen again after having been normal.  She is known to have fatty liver.       DETAILED EXAMINATION  06/26/24  :    Vitals:    06/26/24 0801   BP: 130/78   BP Location: Right arm   Pulse: 90   SpO2: 97%   Weight: 95.5 kg (210 lb 8 oz)     Alert oriented. Head including the face is examined for malar rash, heliotropes, scarring, lupus pernio. Eyes examined for redness such as in episcleritis/scleritis, periorbital lesions.   Neck/ Face examined for parotid gland swelling, range of motion of neck.  Left upper and lower and right upper and lower extremities examined for tenderness, swelling, warmth of the appendicular joints, range of motion, edema, rash.  Some of the important findings included: She has swelling and tenderness of multiple PIPs especially in the right hand, both wrists are tender, worse on the right side.  Patient Active Problem List    Diagnosis Date Noted    Cervical spondylosis without myelopathy 10/13/2023     Priority: Medium    Cervical radiculitis 10/13/2023     Priority: Medium    Low bone mass 07/21/2023     Priority: Medium    Profound fatigue 07/21/2023     Priority: Medium    Dyslipidemia      Priority: Medium    Metabolic syndrome      Priority: Medium    Celiac disease      Priority: Medium    Sigmoid diverticulitis 08/29/2021     Priority: Medium    HARDIN (nonalcoholic steatohepatitis) 07/23/2021     Priority: Medium    Chronic insomnia 06/11/2020     Priority: Medium     Insomnia  associated with inadequate sleep hygiene and suspected sleep apnea.       Past Surgical History:   Procedure Laterality Date    APPENDECTOMY      CHOLECYSTECTOMY      COLON SURGERY      COLONOSCOPY N/A 3/18/2019    Procedure: COLONOSCOPY;  Surgeon: Davis Mosqueda MD;  Location: [facility];  Service: General    CYSTOSCOPY N/A 7/16/2015    Procedure: CYSTOSCOPY;  Surgeon: Nate Horta MD;  Location: [facility];  Service:     HC CORRECT BUNION,METATARSAL OSTEOTOMY      Description: Bunion Correction With Metatarsal Osteotomy Herman Procedure;  Proc Date: 07/16/2010;    HYSTERECTOMY      HYSTERECTOMY, PAP NO LONGER INDICATED  02/2009    NEPHRECTOMY LIVING DONOR Left APRIL 2012    RI LAP,BILIARY TRACT,UNLISTED N/A 3/19/2019    Procedure: BIOPSY, LIVER, LAPAROSCOPIC;  Surgeon: Davis Mosqueda MD;  Location: [facility];  Service: General    RI LAP,SLING OPERATION      Description: Laparoscopic Sling Operation For Stress Incontinence;  Recorded: 02/17/2009;    RI LAP,SURG,COLECTOMY, PARTIAL, W/ANAST N/A 3/19/2019    Procedure: LAPAROSCOPIC SIGMOID COLON RESECTION;  Surgeon: Davis Mosqueda MD;  Location: [facility];  Service: General    SEPTOPLASTY, TURBINOPLASTY, COMBINED N/A 8/3/2021    Procedure: SEPTOPLASTY, NOSE, WITH TURBINOPLASTY RADIOFREQUENCY BALLON ASSISTED, CRYOBLATION OF NASAL TISSUE;  Surgeon: Randy Case MD;  Location: [facility]    SIGMOIDOSCOPY FLEXIBLE N/A 3/19/2019    Procedure: FLEXIBLE SIGMOIDOSCOPY;  Surgeon: Davis Mosqueda MD;  Location: [facility];  Service: General      Past Medical History:   Diagnosis Date    Anemia     Anxiety     Celiac disease     Celiac disease     Cervical spondylosis without myelopathy 10/13/2023    Chronic kidney disease     donated 1 kidney    Cough     Depression     Seasonal    Diverticulitis     Dyslipidemia     Enlarged LV     wall    GERD (gastroesophageal reflux disease)     Hepatitis     Hiatal  "hernia     Hypertension     Low bone mass     Metabolic syndrome     Migraines     HARDIN (nonalcoholic steatohepatitis)     Peripheral neuropathy     PONV (postoperative nausea and vomiting)     Profound fatigue     Ventral hernia      Allergies   Allergen Reactions    Gluten Meal Nausea and Vomiting    Doxycycline Hives    Contrast Dye      Hives, resolved with benadryl alone in the past (two episode)    Nsaids Other (See Comments)     Avoid - one kidney    Ondansetron Headache    Pcn [Penicillins] Hives     As a child    Rocephin [Ceftriaxone] Hives     Patient states this started immediately    Tramadol Hives    Zithromax [Azithromycin] Tinnitus     \"ear ringing and hearing loss\"     Current Outpatient Medications   Medication Sig Dispense Refill    atenolol (TENORMIN) 50 MG tablet Take 1 tablet (50 mg) by mouth daily as needed (Migraines) (Patient not taking: Reported on 5/3/2024) 30 tablet 1    B Complex-C (RA B-COMPLEX/VITAMIN C CR) TBCR Take 1 tablet by mouth daily      baclofen (LIORESAL) 10 MG tablet TAKE 1 TABLET BY MOUTH TWICE DAILY TO THREE TIMES DAILY AS NEEDED FOR MUSCLE SPASMS. (Patient not taking: Reported on 5/3/2024) 30 tablet 3    Barberry-Oreg Grape-Goldenseal (BERBERINE COMPLEX PO)       Bioflavonoid Products (VITAMIN C) CHEW       Biotin 64893 MCG TABS       Black Pepper-Turmeric (TURMERIC CURCUMIN) 5-1000 MG CAPS       Coenzyme Q10 (COQ-10) 400 MG CAPS Take 1 capsule by mouth daily       diphenhydrAMINE (BENADRYL) 50 MG capsule Take diphenhydramine 50 mg by mouth 1 hour prior to CT scan. 1 capsule 0    Evening Primrose Oil 1000 MG CAPS       FLUoxetine (PROZAC) 20 MG capsule Take 1 capsule (20 mg) by mouth daily 90 capsule 3    hydroxychloroquine (PLAQUENIL) 200 MG tablet Take 200 mg by mouth 2 times daily      Lactobacillus (PROBIOTIC ACIDOPHILUS) CAPS       magnesium oxide (MAG-OX) 400 (240 Mg) MG tablet Take 400 mg by mouth daily      methotrexate 2.5 MG tablet Take 6 tablets (15 mg) by " mouth every 7 days for 90 days TAKE 4 TABLETS(10 MG) BY MOUTH EVERY 7 DAYS 72 tablet 0    methylfolate (DEPLIN) 15 MG TABS tablet       methylPREDNISolone (MEDROL) 32 MG tablet Take methylprednisolone 32 mg by mouth 12 hours prior to CT scan.  Repeat second dose 2 hours prior to CT scan. 2 tablet 0    OnabotulinumtoxinA (BOTOX IJ) For migraines - 155 units q 12 weeks.      phentermine (ADIPEX-P) 37.5 MG tablet Take 1 tablet (37.5 mg) by mouth every morning (before breakfast) 90 tablet 1    polyethylene glycol (MIRALAX) 17 GM/Dose powder Take 17 g by mouth daily as needed (Patient not taking: Reported on 5/3/2024)      prochlorperazine (COMPAZINE) 10 MG tablet Take 1 tablet (10 mg) by mouth every 6 hours as needed for nausea or vomiting 10 tablet 0    Semaglutide-Weight Management (WEGOVY) 2.4 MG/0.75ML pen Inject 2.4 mg Subcutaneous once a week Please notify patient when available. Thank you 9 mL 3    SUMAtriptan (IMITREX) 100 MG tablet TAKE 1/2 TO 1 TABLET BY MOUTH AT ONSET OF HEADACHE 9 tablet 5     family history includes Breast Cancer (age of onset: 50) in her maternal aunt and mother; Graves' disease in her sister; Hashimoto's thyroiditis in her maternal aunt, maternal grandfather, and mother; Hypothyroidism in her maternal aunt, maternal aunt, and sister.  Social Connections: Moderately Isolated (2/27/2023)    [facility]    Social Connection and Isolation Panel [NHANES]     Frequency of Communication with Friends and Family: Twice a week     Frequency of Social Gatherings with Friends and Family: Once a week     Attends Orthodoxy Services: 1 to 4 times per year     Active Member of Clubs or Organizations: No     Attends Club or Organization Meetings: Not on file     Marital Status: Never           WBC Count   Date Value Ref Range Status   06/24/2024 4.7 4.0 - 11.0 10e3/uL Final     RBC Count   Date Value Ref Range Status   06/24/2024 4.32 3.80 - 5.20 10e6/uL Final      Hemoglobin   Date Value Ref Range Status   06/24/2024 13.1 11.7 - 15.7 g/dL Final     Hematocrit   Date Value Ref Range Status   06/24/2024 40.0 35.0 - 47.0 % Final     MCV   Date Value Ref Range Status   06/24/2024 93 78 - 100 fL Final     MCH   Date Value Ref Range Status   06/24/2024 30.3 26.5 - 33.0 pg Final     Platelet Count   Date Value Ref Range Status   06/24/2024 265 150 - 450 10e3/uL Final     % Lymphocytes   Date Value Ref Range Status   05/20/2024 27 % Final     AST   Date Value Ref Range Status   05/20/2024 20 0 - 45 U/L Final     Comment:     Reference intervals for this test were updated on 6/12/2023 to more accurately reflect our healthy population. There may be differences in the flagging of prior results with similar values performed with this method. Interpretation of those prior results can be made in the context of the updated reference intervals.     ALT   Date Value Ref Range Status   06/24/2024 80 (H) 0 - 50 U/L Final     Albumin   Date Value Ref Range Status   06/24/2024 4.6 3.5 - 5.2 g/dL Final   09/08/2021 4.3 3.5 - 5.0 g/dL Final     Alkaline Phosphatase   Date Value Ref Range Status   05/20/2024 80 40 - 150 U/L Final     Creatinine   Date Value Ref Range Status   06/24/2024 0.96 (H) 0.51 - 0.95 mg/dL Final     GFR Estimate   Date Value Ref Range Status   06/24/2024 69 >60 mL/min/1.73m2 Final     Comment:     eGFR calculated using 2021 CKD-EPI equation.   12/01/2020 61 >=60 mL/min/BSA Final   06/19/2020 >60 >60 mL/min/1.73m2 Final     GFR Estimate If Black   Date Value Ref Range Status   06/19/2020 >60 >60 mL/min/1.73m2 Final     GFREstimate If Black   Date Value Ref Range Status   12/01/2020 71 >=60 mL/min/BSA Final     Erythrocyte Sedimentation Rate   Date Value Ref Range Status   05/03/2024 13 0 - 30 mm/hr Final     CRP   Date Value Ref Range Status   05/26/2023 3.1 (H) 0.0 - <0.8 mg/dL Final

## 2024-06-26 NOTE — TELEPHONE ENCOUNTER
PA Initiation    Medication: HUMIRA *CF* PEN 40 MG/0.4ML SC PNKT  Insurance Company:    Pharmacy Filling the Rx:    Filling Pharmacy Phone:    Filling Pharmacy Fax:    Start Date: 6/26/2024  Spoke to plan-- [facility] does not handle the PA' Patient only has coverage for Mail order drugs not specialty        LEONARDO Barajas, [facility]  [facility]    [email or web address]     Phone: [phone]  Fax: [phone]

## 2024-06-27 LAB
QUANTIFERON MITOGEN: 10 IU/ML
QUANTIFERON NIL TUBE: 0 IU/ML
QUANTIFERON TB1 TUBE: 0.02 IU/ML
QUANTIFERON TB2 TUBE: 0.01

## 2024-06-27 NOTE — TELEPHONE ENCOUNTER
Spoke with ins plan: [email or web address]  They only accept faxed Rx's -- can fax to # [phone]      They will handle the RX and getting patient set up    Will message RN about this one       LEONARDO Barajas, [facility]  Specialty Pharmacy Clinic Liaison     [facility] Specialty    [email or web address]     Phone: [phone]  Fax: [phone]

## 2024-06-28 LAB
GAMMA INTERFERON BACKGROUND BLD IA-ACNC: 0 IU/ML
M TB IFN-G BLD-IMP: NEGATIVE
M TB IFN-G CD4+ BCKGRND COR BLD-ACNC: 10 IU/ML
MITOGEN IGNF BCKGRD COR BLD-ACNC: 0.01 IU/ML
MITOGEN IGNF BCKGRD COR BLD-ACNC: 0.02 IU/ML

## 2024-07-07 ENCOUNTER — TELEPHONE (OUTPATIENT)
Dept: RHEUMATOLOGY | Facility: CLINIC | Age: 59
End: 2024-07-07
Payer: COMMERCIAL

## 2024-07-07 DIAGNOSIS — Z79.899 HIGH RISK MEDICATION USE: ICD-10-CM

## 2024-07-15 ENCOUNTER — TELEPHONE (OUTPATIENT)
Dept: RHEUMATOLOGY | Facility: CLINIC | Age: 59
End: 2024-07-15
Payer: COMMERCIAL

## 2024-07-15 DIAGNOSIS — M05.79 SEROPOSITIVE RHEUMATOID ARTHRITIS OF MULTIPLE JOINTS (H): ICD-10-CM

## 2024-07-15 DIAGNOSIS — Z79.899 HIGH RISK MEDICATION USE: Primary | ICD-10-CM

## 2024-07-15 RX ORDER — FOLIC ACID 1 MG/1
3 TABLET ORAL DAILY
Qty: 270 TABLET | Refills: 0 | Status: SHIPPED | OUTPATIENT
Start: 2024-07-15 | End: 2024-10-13

## 2024-07-15 NOTE — TELEPHONE ENCOUNTER
Ishaan rx refil [facility] fax [phone]  Phone [phone]    Folic acid tablets 1 mg    Take 3 tabs (3mg) po daily.

## 2024-07-25 RX ORDER — FOLIC ACID 1 MG/1
TABLET ORAL
Qty: 270 TABLET | Refills: 0 | OUTPATIENT
Start: 2024-07-25

## 2024-08-02 NOTE — PROGRESS NOTES
Keiry Mchugh is a 59 year old female who presents today for Botox injections for chronic migraine.    Patient had Botox injections for chronic migraine April 26, 2024.  Patient reports the injections provided 85% relief of her pain for 3 months.  Over the past 2 weeks, migraines have started to return.    Pre-procedure diagnosis: Migraine headaches  Post-procedure diagnosis: Same    PROCEDURE:  Botulinum toxin (Botox) injections.      The risks and benefits of the procedure were discussed with the patient which included muscle weakness (including facial droop, inability to swallow, and inability to hold one's head up), allergic reaction, pain, bruising, bleeding, swelling, and death.  She opted to proceed and gave written and verbal consent.    The Botox paradigm was followed. Each site was marked with indelible marking pen in the locations designated by the paradigm. The skin over the marks was then cleansed with alcohol. A 27 guage 5/8 inch needle was used for injection at all sites. Aspiration was negative at each site prior to injection of botox.     The bilateral  muscles were injected with a total of 10 units (5 units each side).   The Procerus muscle was injected with a total of 5 units.   The Frontalis muscle was injected with a total of 20 units, divided into 4 sites.  The Temporalis muscle was injected with 20 units divided into 4 sites and performed bilaterally (total of 8 sites for total of 40 units).  The occipitalis muscle was injected with 30 units divided into 6 sites.  The cervical paraspinal muscles were injected with 20 units divided into 4 sites.  The trapezius muscle was injected with 30 units divided into 6 sites.     TOTAL UNITS: 155  Wasted units: 45 units.    The patient tolerated the injection well and afterwards did not have any dizziness/lightheadedness or nausea. The patient was observed for a short period of time and then was discharged.  The patient was encouraged to call  with any questions/concerns prior to the follow-up appointment.

## 2024-08-09 ENCOUNTER — OFFICE VISIT (OUTPATIENT)
Dept: PHYSICAL MEDICINE AND REHAB | Facility: CLINIC | Age: 59
End: 2024-08-09
Payer: COMMERCIAL

## 2024-08-09 VITALS
BODY MASS INDEX: 33.75 KG/M2 | HEART RATE: 89 BPM | SYSTOLIC BLOOD PRESSURE: 148 MMHG | DIASTOLIC BLOOD PRESSURE: 85 MMHG | HEIGHT: 66 IN | TEMPERATURE: 98.9 F | WEIGHT: 210 LBS

## 2024-08-09 DIAGNOSIS — G43.709 CHRONIC MIGRAINE WITHOUT AURA, NOT INTRACTABLE, WITHOUT STATUS MIGRAINOSUS: Primary | ICD-10-CM

## 2024-08-09 PROCEDURE — 64615 CHEMODENERV MUSC MIGRAINE: CPT | Performed by: PHYSICIAN ASSISTANT

## 2024-08-09 ASSESSMENT — PAIN SCALES - GENERAL: PAINLEVEL: SEVERE PAIN (7)

## 2024-08-09 NOTE — LETTER
8/9/2024      Keiry Mchugh  [address] [address]      Dear Colleague,    Thank you for referring your patient, Keiry Mchugh, to the [facility]. Please see a copy of my visit note below.    Keiry Mchugh is a 59 year old female who presents today for Botox injections for chronic migraine.    Patient had Botox injections for chronic migraine April 26, 2024.  Patient reports the injections provided 85% relief of her pain for 3 months.  Over the past 2 weeks, migraines have started to return.    Pre-procedure diagnosis: Migraine headaches  Post-procedure diagnosis: Same    PROCEDURE:  Botulinum toxin (Botox) injections.      The risks and benefits of the procedure were discussed with the patient which included muscle weakness (including facial droop, inability to swallow, and inability to hold one's head up), allergic reaction, pain, bruising, bleeding, swelling, and death.  She opted to proceed and gave written and verbal consent.    The Botox paradigm was followed. Each site was marked with indelible marking pen in the locations designated by the paradigm. The skin over the marks was then cleansed with alcohol. A 27 guage 5/8 inch needle was used for injection at all sites. Aspiration was negative at each site prior to injection of botox.     The bilateral  muscles were injected with a total of 10 units (5 units each side).   The Procerus muscle was injected with a total of 5 units.   The Frontalis muscle was injected with a total of 20 units, divided into 4 sites.  The Temporalis muscle was injected with 20 units divided into 4 sites and performed bilaterally (total of 8 sites for total of 40 units).  The occipitalis muscle was injected with 30 units divided into 6 sites.  The cervical paraspinal muscles were injected with 20 units divided into 4 sites.  The trapezius muscle was injected with 30 units divided into 6 sites.     TOTAL UNITS: 155  Wasted  units: 45 units.    The patient tolerated the injection well and afterwards did not have any dizziness/lightheadedness or nausea. The patient was observed for a short period of time and then was discharged.  The patient was encouraged to call with any questions/concerns prior to the follow-up appointment.           Again, thank you for allowing me to participate in the care of your patient.        Sincerely,        Damaris Leslie PA-C

## 2024-08-19 ENCOUNTER — LAB (OUTPATIENT)
Dept: LAB | Facility: CLINIC | Age: 59
End: 2024-08-19
Payer: COMMERCIAL

## 2024-08-19 DIAGNOSIS — Z79.899 HIGH RISK MEDICATION USE: ICD-10-CM

## 2024-08-19 DIAGNOSIS — M05.79 SEROPOSITIVE RHEUMATOID ARTHRITIS OF MULTIPLE JOINTS (H): ICD-10-CM

## 2024-08-19 LAB
ALBUMIN SERPL BCG-MCNC: 4.6 G/DL (ref 3.5–5.2)
ALT SERPL W P-5'-P-CCNC: 70 U/L (ref 0–50)
CREAT SERPL-MCNC: 1.03 MG/DL (ref 0.51–0.95)
EGFRCR SERPLBLD CKD-EPI 2021: 63 ML/MIN/1.73M2
ERYTHROCYTE [DISTWIDTH] IN BLOOD BY AUTOMATED COUNT: 12 % (ref 10–15)
HCT VFR BLD AUTO: 42.7 % (ref 35–47)
HGB BLD-MCNC: 13.9 G/DL (ref 11.7–15.7)
MCH RBC QN AUTO: 30.2 PG (ref 26.5–33)
MCHC RBC AUTO-ENTMCNC: 32.6 G/DL (ref 31.5–36.5)
MCV RBC AUTO: 93 FL (ref 78–100)
PLATELET # BLD AUTO: 284 10E3/UL (ref 150–450)
RBC # BLD AUTO: 4.61 10E6/UL (ref 3.8–5.2)
WBC # BLD AUTO: 4.8 10E3/UL (ref 4–11)

## 2024-08-19 PROCEDURE — 84460 ALANINE AMINO (ALT) (SGPT): CPT

## 2024-08-19 PROCEDURE — 82040 ASSAY OF SERUM ALBUMIN: CPT

## 2024-08-19 PROCEDURE — 85027 COMPLETE CBC AUTOMATED: CPT

## 2024-08-19 PROCEDURE — 36415 COLL VENOUS BLD VENIPUNCTURE: CPT

## 2024-08-19 PROCEDURE — 82565 ASSAY OF CREATININE: CPT

## 2024-08-21 ENCOUNTER — OFFICE VISIT (OUTPATIENT)
Dept: RHEUMATOLOGY | Facility: CLINIC | Age: 59
End: 2024-08-21
Payer: COMMERCIAL

## 2024-08-21 ENCOUNTER — OFFICE VISIT (OUTPATIENT)
Dept: FAMILY MEDICINE | Facility: CLINIC | Age: 59
End: 2024-08-21
Payer: COMMERCIAL

## 2024-08-21 ENCOUNTER — HOSPITAL ENCOUNTER (OUTPATIENT)
Dept: GENERAL RADIOLOGY | Facility: HOSPITAL | Age: 59
Discharge: HOME | End: 2024-08-21
Payer: COMMERCIAL

## 2024-08-21 VITALS
OXYGEN SATURATION: 99 % | SYSTOLIC BLOOD PRESSURE: 132 MMHG | DIASTOLIC BLOOD PRESSURE: 88 MMHG | WEIGHT: 222 LBS | HEART RATE: 92 BPM | BODY MASS INDEX: 36.1 KG/M2

## 2024-08-21 VITALS
TEMPERATURE: 97.2 F | DIASTOLIC BLOOD PRESSURE: 88 MMHG | OXYGEN SATURATION: 100 % | SYSTOLIC BLOOD PRESSURE: 133 MMHG | RESPIRATION RATE: 24 BRPM | HEART RATE: 88 BPM

## 2024-08-21 DIAGNOSIS — R74.01 ALT (SGPT) LEVEL RAISED: ICD-10-CM

## 2024-08-21 DIAGNOSIS — Z79.899 HIGH RISK MEDICATION USE: ICD-10-CM

## 2024-08-21 DIAGNOSIS — M15.0 PRIMARY OSTEOARTHRITIS INVOLVING MULTIPLE JOINTS: ICD-10-CM

## 2024-08-21 DIAGNOSIS — R05.1 ACUTE COUGH: Primary | ICD-10-CM

## 2024-08-21 DIAGNOSIS — M05.79 SEROPOSITIVE RHEUMATOID ARTHRITIS OF MULTIPLE JOINTS (H): Primary | ICD-10-CM

## 2024-08-21 DIAGNOSIS — R05.1 ACUTE COUGH: ICD-10-CM

## 2024-08-21 DIAGNOSIS — K75.81 NASH (NONALCOHOLIC STEATOHEPATITIS): ICD-10-CM

## 2024-08-21 LAB
BASOPHILS # BLD AUTO: 0 10E3/UL (ref 0–0.2)
BASOPHILS NFR BLD AUTO: 1 %
EOSINOPHIL # BLD AUTO: 0.2 10E3/UL (ref 0–0.7)
EOSINOPHIL NFR BLD AUTO: 3 %
ERYTHROCYTE [DISTWIDTH] IN BLOOD BY AUTOMATED COUNT: 11.8 % (ref 10–15)
HCT VFR BLD AUTO: 39.1 % (ref 35–47)
HGB BLD-MCNC: 13.1 G/DL (ref 11.7–15.7)
IMM GRANULOCYTES # BLD: 0 10E3/UL
IMM GRANULOCYTES NFR BLD: 0 %
LYMPHOCYTES # BLD AUTO: 3 10E3/UL (ref 0.8–5.3)
LYMPHOCYTES NFR BLD AUTO: 53 %
MCH RBC QN AUTO: 30.8 PG (ref 26.5–33)
MCHC RBC AUTO-ENTMCNC: 33.5 G/DL (ref 31.5–36.5)
MCV RBC AUTO: 92 FL (ref 78–100)
MONOCYTES # BLD AUTO: 0.5 10E3/UL (ref 0–1.3)
MONOCYTES NFR BLD AUTO: 9 %
NEUTROPHILS # BLD AUTO: 1.9 10E3/UL (ref 1.6–8.3)
NEUTROPHILS NFR BLD AUTO: 34 %
PLATELET # BLD AUTO: 301 10E3/UL (ref 150–450)
RBC # BLD AUTO: 4.26 10E6/UL (ref 3.8–5.2)
WBC # BLD AUTO: 5.7 10E3/UL (ref 4–11)

## 2024-08-21 PROCEDURE — 85025 COMPLETE CBC W/AUTO DIFF WBC: CPT

## 2024-08-21 PROCEDURE — 71046 X-RAY EXAM CHEST 2 VIEWS: CPT

## 2024-08-21 PROCEDURE — 99214 OFFICE O/P EST MOD 30 MIN: CPT

## 2024-08-21 PROCEDURE — 99214 OFFICE O/P EST MOD 30 MIN: CPT | Performed by: INTERNAL MEDICINE

## 2024-08-21 PROCEDURE — 36415 COLL VENOUS BLD VENIPUNCTURE: CPT

## 2024-08-21 PROCEDURE — G2211 COMPLEX E/M VISIT ADD ON: HCPCS | Performed by: INTERNAL MEDICINE

## 2024-08-21 RX ORDER — PREDNISONE 20 MG/1
20 TABLET ORAL DAILY
Qty: 5 TABLET | Refills: 0 | Status: SHIPPED | OUTPATIENT
Start: 2024-08-21 | End: 2024-08-26

## 2024-08-21 RX ORDER — ALBUTEROL SULFATE 90 UG/1
2 AEROSOL, METERED RESPIRATORY (INHALATION) EVERY 6 HOURS PRN
Qty: 18 G | Refills: 0 | Status: SHIPPED | OUTPATIENT
Start: 2024-08-21 | End: 2024-09-13

## 2024-08-21 RX ORDER — METHOTREXATE 2.5 MG/1
7.5 TABLET ORAL
Qty: 36 TABLET | Refills: 0 | Status: SHIPPED | OUTPATIENT
Start: 2024-08-21 | End: 2024-11-19

## 2024-08-21 NOTE — PROGRESS NOTES
"URGENT CARE  Assessment & Plan   Assessment:   Keiry Mchugh is a 59 year old female who's clinical presentation today is consistent with:   1. Acute cough  - XR Chest 2 Views; Future  - CBC with platelets and differential;  - albuterol (PROAIR HFA/PROVENTIL HFA/VENTOLIN HFA) 108 (90 Base) MCG/ACT inhaler;  - predniSONE (DELTASONE) 20 MG tablet;   Plan:  Will treat patient today for acute post-viral cough supportively and symptomatically. Patient's chest xray and labs (specifically cbc) were reassuring, no suspicion (low likelihood) for bacterial infectious lung etiology, patient does not appear to need antibiotics. Will attempt to alleviate patient's symptoms today w/ oral steroids and an inhaler. Also encouraged patient to continue with OTC cold/flu medications and encouraged fluids and rest.   Additionally we discussed if symptoms do not improve after starting today's treatment to follow up in 7-10 days, sooner if symptoms worsen, return precautions given    No alarm signs or symptoms present   Differential Diagnoses for this patient's chief complaint that I considered include:  Bacterial vs viral etiology of URI, Covid, influenza,} pharyngitis/tonsillitis, pneumonia, bronchitis, Common cold, allergic rhinitis, seasonal allergies, ABRS, viral sinusitis, cough, pertussis    Patient is} agreeable to treatment plan and state they will follow-up if symptoms do not improve and/or if symptoms worsen   see patient's AVS 'monitor for' section for specific patient instructions given and discussed regarding what to watch for and when to follow up    [facility]      ______________________________________________________________________      Subjective     HPI: Keiry Mchugh \"Samantha\"} is a 59 year old  female who presents today for evaluation the following concerns:   Patient  endorses a cough today which they state they have had for 12 days   Patient reports she was originially " sick with a viral illness/cold, most of those symptoms resolved but now she has  a cough that is irritative and wont go away   Patient denies any current fevers sweats, chills, myalgias,   She does endorse some mild wheezing, and feelings of pain when she takes a big deep breath   additionally patient denies a history of asthma/copd or any other past medical history of breathing conditions     Review of Systems:  Pertinent review of systems as reflected in HPI, otherwise negative.     Objective    Physical Exam:  Vitals:    08/21/24 1735   BP: 133/88   Pulse: 88   Resp: 24   Temp: 97.2  F (36.2  C)   TempSrc: Tympanic   SpO2: 100%      General: Alert and oriented, no acute distress, Vital signs reviewed: afebrile,  normotensive  Psy/mental status: Cooperative, nonanxious  SKIN: Intact, no rashes  EYES: EOMs intact, PERRLA bilaterally   Conjunctiva: Clear bilaterally, no injection or erythema present  EARS: TMs intact, translucent gray in color with normal landmarks present no erythema  or bulging tympanic membrane   Canals are without swelling, however have a mild amount of cerumen, no impaction  NOSE:  mucosa moist               No frontal or maxillary sinus tenderness present bilaterally  MOUTH/THROAT: lips, tongue, & oral mucosa appear normal upon inspection                Posterior oropharynx is erythematous but without exudate, lesions or tonsillar  Edema, no dysphonia, no unilateral tonsillar edema, no uvular deviation,   no signs of peritonsillar abscess  NECK: supple, has full range of motion with no meningeal signs              No lymphadenopathy present  LUNG: normal work of breathing, good respiratory effort without retractions, good air  movement, non labored, inspection reveals normal chest expansion w/  inspiration            Lung sounds are clear to auscultation bilaterally,            No rales/rhonic/crackles wheezing noted           cough noted as harsh and irritative, non productive     LABS:    Results for orders placed or performed during the hospital encounter of 08/21/24   XR Chest 2 Views     Status: None    Narrative    EXAM: XR CHEST 2 VIEWS  LOCATION: [facility]  DATE: 8/21/2024    INDICATION: rule out pneumonia; acute cough  COMPARISON: Chest CT dated 5/29/2024      Impression    IMPRESSION: Heart size is normal. Lungs are clear bilaterally. Mediastinum and visualized bony structures are unremarkable.   Results for orders placed or performed in visit on 08/21/24   CBC with platelets and differential     Status: None   Result Value Ref Range    WBC Count 5.7 4.0 - 11.0 10e3/uL    RBC Count 4.26 3.80 - 5.20 10e6/uL    Hemoglobin 13.1 11.7 - 15.7 g/dL    Hematocrit 39.1 35.0 - 47.0 %    MCV 92 78 - 100 fL    MCH 30.8 26.5 - 33.0 pg    MCHC 33.5 31.5 - 36.5 g/dL    RDW 11.8 10.0 - 15.0 %    Platelet Count 301 150 - 450 10e3/uL    % Neutrophils 34 %    % Lymphocytes 53 %    % Monocytes 9 %    % Eosinophils 3 %    % Basophils 1 %    % Immature Granulocytes 0 %    Absolute Neutrophils 1.9 1.6 - 8.3 10e3/uL    Absolute Lymphocytes 3.0 0.8 - 5.3 10e3/uL    Absolute Monocytes 0.5 0.0 - 1.3 10e3/uL    Absolute Eosinophils 0.2 0.0 - 0.7 10e3/uL    Absolute Basophils 0.0 0.0 - 0.2 10e3/uL    Absolute Immature Granulocytes 0.0 <=0.4 10e3/uL   CBC with platelets and differential     Status: None    Narrative    The following orders were created for panel order CBC with platelets and differential.  Procedure                               Abnormality         Status                     ---------                               -----------         ------                     CBC with platelets and d...[[id number]]                      Final result                 Please view results for these tests on the individual orders.       Imaging:   All images were personally read by this provider (myself).   Per my independent interpretation the xray shows no signs of consolidation or infiltrates  suggesting pneumonia      ______________________________________________________________________    I explained my diagnostic considerations and recommendations to the patient, who voiced understanding and agreement with the treatment plan.   All questions were answered.   We discussed potential side effects, risks and benefits of any prescribed or recommended therapies, as well as expectations for response to treatments.  Please see AVS for any patient instructions & handouts given.   Patient was advised to contact the Nurse Care Line, their Primary Care provider, Urgent Care, or the Emergency Department if there are new or worsening symptoms, or call 911 for emergencies.

## 2024-08-21 NOTE — PROGRESS NOTES
Rheumatology follow-up visit note     Keiry is a 59 year old female presents today for follow-up.    Samantha was seen today for recheck.    Diagnoses and all orders for this visit:    Seropositive rheumatoid arthritis of multiple joints (H)  -     methotrexate 2.5 MG tablet; Take 3 tablets (7.5 mg) by mouth every 7 days.    HARDIN (nonalcoholic steatohepatitis)    Primary osteoarthritis involving multiple joints    High risk medication use    ALT (SGPT) level raised      T since the addition of Humira there is beginning of improvement.  Will stay the course however reduce the dose of methotrexate 1 more notch down to 7.5 check labs every 4 weeks yet.  Will meet in 3 months.  He longitudinal plan of care for the diagnosis(es)/condition(s) as documented were addressed during this visit. Due to the added complexity in care, I will continue to support Samantha in the subsequent management and with ongoing continuity of care.      Follow up in 3 months.    HPI    Keiry Mchugh is a 59 year old female is here for follow-up of   rheumatoid arthritis seropositive.  On her previous visit Humira was started in view of ongoing rheumatoid activity, meanwhile her liver function studiesBecome abnormal she does have a history of HARDIN.  She is following up at [place] for question of Cushing's/hyperparathyroidism.  Meanwhile her methotrexate dose was reduced.  She had her eyes examined she recalls earlier this year in March for hydroxychloroquine toxicity monitoring.  Those data will be requested.  With Humira on board of which she has had 3 injections so far she has beginning to notice improvement in multiple joints both in terms of the pain, stiffness, even swelling.  She has 10 mg of methotrexate on board per week along with folic acid 3 mg a day.  This is in addition to hydroxychloroquine.  We had had conversation around duloxetine in the past and the reader is referred to that.     DETAILED EXAMINATION  08/21/24  :    Vitals:     08/21/24 0812   BP: 132/88   BP Location: Left arm   Pulse: 92   SpO2: 99%   Weight: 100.7 kg (222 lb)     Alert oriented. Head including the face is examined for malar rash, heliotropes, scarring, lupus pernio. Eyes examined for redness such as in episcleritis/scleritis, periorbital lesions.   Neck/ Face examined for parotid gland swelling, range of motion of neck.  Left upper and lower and right upper and lower extremities examined for tenderness, swelling, warmth of the appendicular joints, range of motion, edema, rash.  Some of the important findings included: she does not have evidence of synovitis in the palpable joints of the upper extremities.  No significant deformities of the digits.  no Heberden nodes.  Range of motion of the shoulders   show full abduction.  No JLT effusion or warmth of the knees.  she does not have dactylitis of the digits.     Patient Active Problem List    Diagnosis Date Noted    Cervical spondylosis without myelopathy 10/13/2023     Priority: Medium    Cervical radiculitis 10/13/2023     Priority: Medium    Low bone mass 07/21/2023     Priority: Medium    Profound fatigue 07/21/2023     Priority: Medium    Dyslipidemia      Priority: Medium    Metabolic syndrome      Priority: Medium    Celiac disease      Priority: Medium    Sigmoid diverticulitis 08/29/2021     Priority: Medium    HARDIN (nonalcoholic steatohepatitis) 07/23/2021     Priority: Medium    Chronic insomnia 06/11/2020     Priority: Medium     Insomnia associated with inadequate sleep hygiene and suspected sleep apnea.       Past Surgical History:   Procedure Laterality Date    APPENDECTOMY      CHOLECYSTECTOMY      COLON SURGERY      COLONOSCOPY N/A 3/18/2019    Procedure: COLONOSCOPY;  Surgeon: Davis Mosqueda MD;  Location: [facility];  Service: General    CYSTOSCOPY N/A 7/16/2015    Procedure: CYSTOSCOPY;  Surgeon: Nate Horta MD;  Location: [facility];  Service:      CORRECT  BUNION,METATARSAL OSTEOTOMY      Description: Bunion Correction With Metatarsal Osteotomy Herman Procedure;  Proc Date: 07/16/2010;    HYSTERECTOMY      HYSTERECTOMY, PAP NO LONGER INDICATED  02/2009    NEPHRECTOMY LIVING DONOR Left APRIL 2012    NE LAP,BILIARY TRACT,UNLISTED N/A 3/19/2019    Procedure: BIOPSY, LIVER, LAPAROSCOPIC;  Surgeon: Davis Mosqueda MD;  Location: [facility];  Service: General    NE LAP,SLING OPERATION      Description: Laparoscopic Sling Operation For Stress Incontinence;  Recorded: 02/17/2009;    NE LAP,SURG,COLECTOMY, PARTIAL, W/ANAST N/A 3/19/2019    Procedure: LAPAROSCOPIC SIGMOID COLON RESECTION;  Surgeon: Davis Mosqueda MD;  Location: [facility];  Service: General    SEPTOPLASTY, TURBINOPLASTY, COMBINED N/A 8/3/2021    Procedure: SEPTOPLASTY, NOSE, WITH TURBINOPLASTY RADIOFREQUENCY BALLON ASSISTED, CRYOBLATION OF NASAL TISSUE;  Surgeon: Randy Case MD;  Location: [facility]    SIGMOIDOSCOPY FLEXIBLE N/A 3/19/2019    Procedure: FLEXIBLE SIGMOIDOSCOPY;  Surgeon: Davis Mosqueda MD;  Location: [facility];  Service: General      Past Medical History:   Diagnosis Date    Anemia     Anxiety     Celiac disease     Celiac disease     Cervical spondylosis without myelopathy 10/13/2023    Chronic kidney disease     donated 1 kidney    Cough     Depression     Seasonal    Diverticulitis     Dyslipidemia     Enlarged LV     wall    GERD (gastroesophageal reflux disease)     Hepatitis     Hiatal hernia     Hypertension     Low bone mass     Metabolic syndrome     Migraines     HARDIN (nonalcoholic steatohepatitis)     Peripheral neuropathy     PONV (postoperative nausea and vomiting)     Profound fatigue     Ventral hernia      Allergies   Allergen Reactions    Gluten Meal Nausea and Vomiting    Doxycycline Hives    Contrast Dye      Hives, resolved with benadryl alone in the past (two episode)    Nsaids Other (See Comments)     Avoid - one kidney     "Ondansetron Headache    Pcn [Penicillins] Hives     As a child    Rocephin [Ceftriaxone] Hives     Patient states this started immediately    Tramadol Hives    Zithromax [Azithromycin] Tinnitus     \"ear ringing and hearing loss\"     Current Outpatient Medications   Medication Sig Dispense Refill    adalimumab (HUMIRA *CF*) 40 MG/0.4ML pen kit Inject 0.4 mLs (40 mg) Subcutaneous every 14 days for 30 days Hold for signs of infection, then seek medical attention. 0.8 mL 5    atenolol (TENORMIN) 50 MG tablet Take 1 tablet (50 mg) by mouth daily as needed (Migraines) 30 tablet 1    B Complex-C (RA B-COMPLEX/VITAMIN C CR) TBCR Take 1 tablet by mouth daily      baclofen (LIORESAL) 10 MG tablet TAKE 1 TABLET BY MOUTH TWICE DAILY TO THREE TIMES DAILY AS NEEDED FOR MUSCLE SPASMS. 30 tablet 3    Coenzyme Q10 (COQ-10) 400 MG CAPS Take 1 capsule by mouth daily       FLUoxetine (PROZAC) 20 MG capsule Take 1 capsule (20 mg) by mouth daily 90 capsule 3    folic acid (FOLVITE) 1 MG tablet Take 3 tablets (3 mg) by mouth daily for 90 days 270 tablet 0    hydroxychloroquine (PLAQUENIL) 200 MG tablet Take 1 tablet (200 mg) by mouth 2 times daily for 90 days 180 tablet 0    methotrexate 2.5 MG tablet Take 4 tablets (10 mg) by mouth every 7 days for 30 days 16 tablet 0    methylfolate (DEPLIN) 15 MG TABS tablet       OnabotulinumtoxinA (BOTOX IJ) For migraines - 155 units q 12 weeks.      phentermine (ADIPEX-P) 37.5 MG tablet Take 1 tablet (37.5 mg) by mouth every morning (before breakfast) 90 tablet 1    polyethylene glycol (MIRALAX) 17 GM/Dose powder Take 17 g by mouth daily as needed      SUMAtriptan (IMITREX) 100 MG tablet TAKE 1/2 TO 1 TABLET BY MOUTH AT ONSET OF HEADACHE 9 tablet 5     family history includes Breast Cancer (age of onset: 50) in her maternal aunt and mother; Graves' disease in her sister; Hashimoto's thyroiditis in her maternal aunt, maternal grandfather, and mother; Hypothyroidism in her maternal aunt, maternal " aunt, and sister.  Social Connections: Moderately Isolated (2/27/2023)    [facility], [facility]    Social Connection and Isolation Panel [NHANES]     Frequency of Communication with Friends and Family: Twice a week     Frequency of Social Gatherings with Friends and Family: Once a week     Attends L[employer] day Services: 1 to 4 times per year     Active Member of Clubs or Organizations: No     Attends Club or Organization Meetings: Not on file     Marital Status: Never           WBC Count   Date Value Ref Range Status   08/19/2024 4.8 4.0 - 11.0 10e3/uL Final     RBC Count   Date Value Ref Range Status   08/19/2024 4.61 3.80 - 5.20 10e6/uL Final     Hemoglobin   Date Value Ref Range Status   08/19/2024 13.9 11.7 - 15.7 g/dL Final     Hematocrit   Date Value Ref Range Status   08/19/2024 42.7 35.0 - 47.0 % Final     MCV   Date Value Ref Range Status   08/19/2024 93 78 - 100 fL Final     MCH   Date Value Ref Range Status   08/19/2024 30.2 26.5 - 33.0 pg Final     Platelet Count   Date Value Ref Range Status   08/19/2024 284 150 - 450 10e3/uL Final     % Lymphocytes   Date Value Ref Range Status   05/20/2024 27 % Final     AST   Date Value Ref Range Status   05/20/2024 20 0 - 45 U/L Final     Comment:     Reference intervals for this test were updated on 6/12/2023 to more accurately reflect our healthy population. There may be differences in the flagging of prior results with similar values performed with this method. Interpretation of those prior results can be made in the context of the updated reference intervals.     ALT   Date Value Ref Range Status   08/19/2024 70 (H) 0 - 50 U/L Final     Albumin   Date Value Ref Range Status   08/19/2024 4.6 3.5 - 5.2 g/dL Final   09/08/2021 4.3 3.5 - 5.0 g/dL Final     Alkaline Phosphatase   Date Value Ref Range Status   05/20/2024 80 40 - 150 U/L Final     Creatinine   Date Value Ref Range Status   08/19/2024 1.03 (H) 0.51 - 0.95 mg/dL Final     GFR  Estimate   Date Value Ref Range Status   08/19/2024 63 >60 mL/min/1.73m2 Final     Comment:     eGFR calculated using 2021 CKD-EPI equation.   12/01/2020 61 >=60 mL/min/BSA Final   06/19/2020 >60 >60 mL/min/1.73m2 Final     GFR Estimate If Black   Date Value Ref Range Status   06/19/2020 >60 >60 mL/min/1.73m2 Final     GFREstimate If Black   Date Value Ref Range Status   12/01/2020 71 >=60 mL/min/BSA Final     Erythrocyte Sedimentation Rate   Date Value Ref Range Status   05/03/2024 13 0 - 30 mm/hr Final     CRP   Date Value Ref Range Status   05/26/2023 3.1 (H) 0.0 - <0.8 mg/dL Final

## 2024-08-21 NOTE — PATIENT INSTRUCTIONS
Diagnosis: acute cough from viral illness   Chest xray- no signs of pneumonia   Lab work - no elevated wbc count    No signs of a bacterial infection     Plan:   Viral illnesses, with post viral cough   Normal for a cough to last a few weeks post vial illness, nothing dangerous noted today    Start inhaler, steroids   OTC cold medicines  Robitussin +D; day-Quil, nght-quil   Decongestants -Sudafed    REST   Drinking plenty of fluids,   such as water, juice, or warm soup.   Fluids thin mucus so that you can cough it up.   This helps you breathe more easily. Fluids also prevent dehydration.  Using a humidifier. This can help reduce coughing.  If you smoke, stop smoking! This include vaping     Monitor for:   Fever of 100.4 F ( 38.0 C) or higher, or as directed by your healthcare provider  Symptoms that get worse, or new symptoms  Symptoms that don't start to get better in 1 week  Trouble breathing that doesn't get better with treatment  Skin, lips, or nails that look blue, gray, or pale

## 2024-08-28 ENCOUNTER — TRANSFERRED RECORDS (OUTPATIENT)
Dept: HEALTH INFORMATION MANAGEMENT | Facility: CLINIC | Age: 59
End: 2024-08-28
Payer: COMMERCIAL

## 2024-09-03 ENCOUNTER — TRANSFERRED RECORDS (OUTPATIENT)
Dept: HEALTH INFORMATION MANAGEMENT | Facility: CLINIC | Age: 59
End: 2024-09-03
Payer: COMMERCIAL

## 2024-09-13 ENCOUNTER — OFFICE VISIT (OUTPATIENT)
Dept: FAMILY MEDICINE | Facility: CLINIC | Age: 59
End: 2024-09-13
Payer: COMMERCIAL

## 2024-09-13 VITALS
TEMPERATURE: 98.3 F | OXYGEN SATURATION: 98 % | HEART RATE: 92 BPM | SYSTOLIC BLOOD PRESSURE: 138 MMHG | DIASTOLIC BLOOD PRESSURE: 80 MMHG | BODY MASS INDEX: 35.9 KG/M2 | WEIGHT: 223.4 LBS | HEIGHT: 66 IN

## 2024-09-13 DIAGNOSIS — E66.812 CLASS 2 SEVERE OBESITY DUE TO EXCESS CALORIES WITH SERIOUS COMORBIDITY AND BODY MASS INDEX (BMI) OF 36.0 TO 36.9 IN ADULT (H): ICD-10-CM

## 2024-09-13 DIAGNOSIS — J04.0 LARYNGITIS: ICD-10-CM

## 2024-09-13 DIAGNOSIS — E66.01 CLASS 2 SEVERE OBESITY DUE TO EXCESS CALORIES WITH SERIOUS COMORBIDITY AND BODY MASS INDEX (BMI) OF 36.0 TO 36.9 IN ADULT (H): ICD-10-CM

## 2024-09-13 DIAGNOSIS — Z01.818 PREOP GENERAL PHYSICAL EXAM: Primary | ICD-10-CM

## 2024-09-13 DIAGNOSIS — R05.1 ACUTE COUGH: ICD-10-CM

## 2024-09-13 PROCEDURE — 99214 OFFICE O/P EST MOD 30 MIN: CPT | Performed by: NURSE PRACTITIONER

## 2024-09-13 ASSESSMENT — PATIENT HEALTH QUESTIONNAIRE - PHQ9
10. IF YOU CHECKED OFF ANY PROBLEMS, HOW DIFFICULT HAVE THESE PROBLEMS MADE IT FOR YOU TO DO YOUR WORK, TAKE CARE OF THINGS AT HOME, OR GET ALONG WITH OTHER PEOPLE: SOMEWHAT DIFFICULT
SUM OF ALL RESPONSES TO PHQ QUESTIONS 1-9: 10
SUM OF ALL RESPONSES TO PHQ QUESTIONS 1-9: 10

## 2024-09-13 NOTE — PATIENT INSTRUCTIONS

## 2024-09-13 NOTE — PROGRESS NOTES
Preoperative Evaluation  [facility]  [address] [address]  Phone: [phone]  Fax: [phone]  Primary Provider: Yissel Schmidt, DO  Pre-op Performing Provider: Damaris Gil NP  Sep 13, 2024             9/13/2024   Surgical Information   What procedure is being done? ESOPHAGOGASTRODUODENOSCOPY    Facility or Hospital where procedure/surgery will be performed: [facility]    Who is doing the procedure / surgery? Dr Guevara Livingston   Date of surgery / procedure: 9/16/24   Time of surgery / procedure: TBD   Where do you plan to recover after surgery? at home alone        Fax number for surgical facility: [facility]    Assessment & Plan     The proposed surgical procedure is considered LOW risk.    (Z01.818) Preop general physical exam  (primary encounter diagnosis)  Comment:   Plan:     (J04.0) Laryngitis  Comment:   Plan:     (E66.01,  Z68.36) Class 2 severe obesity due to excess calories with serious comorbidity and body mass index (BMI) of 36.0 to 36.9 in adult (H)  Comment:   Plan:     (R05.1) Acute cough  Comment:   Plan:       Depression Screening Follow Up        9/13/2024     1:10 PM   PHQ   PHQ-9 Total Score 10   Q9: Thoughts of better off dead/self-harm past 2 weeks Not at all           Follow Up Actions Taken  Crisis resource information provided in After Visit Summary     Possible Sleep Apnea: she does snore, has daytime drowsiness, this is being evaluated           - No identified additional risk factors other than previously addressed    Preoperative Medication Instructions  Antiplatelet or Anticoagulation Medication Instructions   - Patient is on no antiplatelet or anticoagulation medications.    Additional Medication Instructions  Hold all morning meds    Recommendation  Approval given to proceed with proposed procedure, without further diagnostic evaluation.    Chapito Singh is a 59 year old, presenting for the  following: here for pre-op, told she needs pre-op due to illness    Having EGD for cough and Laryngitis ongoing for 1 month, went to have it this morning and Anesthesiologist prefers she does it in the hospital so she needs a pre op now. She is worsening. Vomiting daily now with the cough. No fever.  Pre-Op Exam    Answers submitted by the patient for this visit:  Patient Health Questionnaire (Submitted on 9/13/2024)  If you checked off any problems, how difficult have these problems made it for you to do your work, take care of things at home, or get along with other people?: Somewhat difficult  PHQ9 TOTAL SCORE: 10          9/13/2024     1:09 PM   Additional Questions   Roomed by erick juarez   Accompanied by self         9/13/2024   Forms   Any forms needing to be completed Yes        HPI related to upcoming procedure: as above        9/13/2024   Pre-Op Questionnaire   Have you ever had a heart attack or stroke? No   Have you ever had surgery on your heart or blood vessels, such as a stent placement, a coronary artery bypass, or surgery on an artery in your head, neck, heart, or legs? No   Do you have chest pain with activity? No   Do you have a history of heart failure? No   Do you currently have a cold, bronchitis or symptoms of other infection? (!) UNKNOWN coughing for 1 month   Do you have a cough, shortness of breath, or wheezing? (!) YES    Do you or anyone in your family have previous history of blood clots? (!) YES mother with   Do you or does anyone in your family have a serious bleeding problem such as prolonged bleeding following surgeries or cuts? No   Have you ever had problems with anemia or been told to take iron pills? No   Have you had any abnormal blood loss such as black, tarry or bloody stools, or abnormal vaginal bleeding? No   Have you ever had a blood transfusion? No   Are you willing to have a blood transfusion if it is medically needed before, during, or after your surgery? Yes   Have you or  any of your relatives ever had problems with anesthesia? No   Do you have sleep apnea, excessive snoring or daytime drowsiness? (!) YES--daytime drowsiness   Do you have a CPAP machine? (!) NO    Do you have any artifical heart valves or other implanted medical devices like a pacemaker, defibrillator, or continuous glucose monitor? No   Do you have artificial joints? No   Are you allergic to latex? (!) YES        Health Care Directive  Patient does not have a Health Care Directive or Living Will: Discussed advance care planning with patient; however, patient declined at this time.    Preoperative Review of    reviewed - controlled substances reflected in medication list.      Status of Chronic Conditions:  Denies heart problems, no diabetes    Patient Active Problem List    Diagnosis Date Noted    Class 2 severe obesity due to excess calories with serious comorbidity in adult (H) 09/13/2024     Priority: Medium    Cervical spondylosis without myelopathy 10/13/2023     Priority: Medium    Cervical radiculitis 10/13/2023     Priority: Medium    Low bone mass 07/21/2023     Priority: Medium    Profound fatigue 07/21/2023     Priority: Medium    Dyslipidemia      Priority: Medium    Metabolic syndrome      Priority: Medium    Celiac disease      Priority: Medium    Sigmoid diverticulitis 08/29/2021     Priority: Medium    HARDIN (nonalcoholic steatohepatitis) 07/23/2021     Priority: Medium    Chronic insomnia 06/11/2020     Priority: Medium     Insomnia associated with inadequate sleep hygiene and suspected sleep apnea.        Past Medical History:   Diagnosis Date    Anemia     Anxiety     Celiac disease     Celiac disease     Cervical spondylosis without myelopathy 10/13/2023    Chronic kidney disease     donated 1 kidney    Class 2 severe obesity due to excess calories with serious comorbidity in adult (H) 9/13/2024    Cough     Depression     Seasonal    Diverticulitis     Dyslipidemia     Enlarged LV     wall     GERD (gastroesophageal reflux disease)     Hepatitis     Hiatal hernia     Hypertension     Low bone mass     Metabolic syndrome     Migraines     HARDIN (nonalcoholic steatohepatitis)     Peripheral neuropathy     PONV (postoperative nausea and vomiting)     Profound fatigue     Ventral hernia      Past Surgical History:   Procedure Laterality Date    APPENDECTOMY      CHOLECYSTECTOMY      COLON SURGERY      COLONOSCOPY N/A 3/18/2019    Procedure: COLONOSCOPY;  Surgeon: Davis Mosqueda MD;  Location: [facility];  Service: General    CYSTOSCOPY N/A 7/16/2015    Procedure: CYSTOSCOPY;  Surgeon: Nate Horta MD;  Location: [facility];  Service:     HC CORRECT BUNION,METATARSAL OSTEOTOMY      Description: Bunion Correction With Metatarsal Osteotomy Herman Procedure;  Proc Date: 07/16/2010;    HYSTERECTOMY      HYSTERECTOMY, PAP NO LONGER INDICATED  02/2009    NEPHRECTOMY LIVING DONOR Left APRIL 2012    ID LAP,BILIARY TRACT,UNLISTED N/A 3/19/2019    Procedure: BIOPSY, LIVER, LAPAROSCOPIC;  Surgeon: Davis Mosqueda MD;  Location: [facility];  Service: General    ID LAP,SLING OPERATION      Description: Laparoscopic Sling Operation For Stress Incontinence;  Recorded: 02/17/2009;    ID LAP,SURG,COLECTOMY, PARTIAL, W/ANAST N/A 3/19/2019    Procedure: LAPAROSCOPIC SIGMOID COLON RESECTION;  Surgeon: Davis Mosqueda MD;  Location: [facility];  Service: General    SEPTOPLASTY, TURBINOPLASTY, COMBINED N/A 8/3/2021    Procedure: SEPTOPLASTY, NOSE, WITH TURBINOPLASTY RADIOFREQUENCY BALLON ASSISTED, CRYOBLATION OF NASAL TISSUE;  Surgeon: Randy Case MD;  Location: [facility]    SIGMOIDOSCOPY FLEXIBLE N/A 3/19/2019    Procedure: FLEXIBLE SIGMOIDOSCOPY;  Surgeon: Davis Mosqueda MD;  Location: [facility];  Service: General     Current Outpatient Medications   Medication Sig Dispense Refill    atenolol (TENORMIN) 50 MG tablet Take 1 tablet (50 mg) by mouth daily  "as needed (Migraines) 30 tablet 1    B Complex-C (RA B-COMPLEX/VITAMIN C CR) TBCR Take 1 tablet by mouth daily      baclofen (LIORESAL) 10 MG tablet TAKE 1 TABLET BY MOUTH TWICE DAILY TO THREE TIMES DAILY AS NEEDED FOR MUSCLE SPASMS. 30 tablet 3    Coenzyme Q10 (COQ-10) 400 MG CAPS Take 1 capsule by mouth daily       FLUoxetine (PROZAC) 20 MG capsule Take 1 capsule (20 mg) by mouth daily 90 capsule 3    folic acid (FOLVITE) 1 MG tablet Take 3 tablets (3 mg) by mouth daily for 90 days 270 tablet 0    hydroxychloroquine (PLAQUENIL) 200 MG tablet Take 1 tablet (200 mg) by mouth 2 times daily for 90 days 180 tablet 0    methotrexate 2.5 MG tablet Take 3 tablets (7.5 mg) by mouth every 7 days. 36 tablet 0    phentermine (ADIPEX-P) 37.5 MG tablet Take 1 tablet (37.5 mg) by mouth every morning (before breakfast) 90 tablet 1    polyethylene glycol (MIRALAX) 17 GM/Dose powder Take 17 g by mouth daily as needed      SUMAtriptan (IMITREX) 100 MG tablet TAKE 1/2 TO 1 TABLET BY MOUTH AT ONSET OF HEADACHE 9 tablet 5    adalimumab (HUMIRA *CF*) 40 MG/0.4ML pen kit Inject 0.4 mLs (40 mg) Subcutaneous every 14 days for 30 days Hold for signs of infection, then seek medical attention. 0.8 mL 5    albuterol (PROAIR HFA/PROVENTIL HFA/VENTOLIN HFA) 108 (90 Base) MCG/ACT inhaler Inhale 2 puffs into the lungs every 6 hours as needed for shortness of breath, wheezing or cough. (Patient not taking: Reported on 9/13/2024) 18 g 0    OnabotulinumtoxinA (BOTOX IJ) For migraines - 155 units q 12 weeks.         Allergies   Allergen Reactions    Gluten Meal Nausea and Vomiting    Doxycycline Hives    Contrast Dye      Hives, resolved with benadryl alone in the past (two episode)    Nsaids Other (See Comments)     Avoid - one kidney    Ondansetron Headache    Pcn [Penicillins] Hives     As a child    Rocephin [Ceftriaxone] Hives     Patient states this started immediately    Tramadol Hives    Zithromax [Azithromycin] Tinnitus     \"ear ringing and " "hearing loss\"        Social History     Tobacco Use    Smoking status: Former     Current packs/day: 0.00     Types: Cigarettes     Quit date: 1999     Years since quittin.7    Smokeless tobacco: Never   Substance Use Topics    Alcohol use: No     Comment: rare       History   Drug Use Unknown               Objective    /80 (BP Location: Right arm, Patient Position: Sitting)   Pulse 92   Temp 98.3  F (36.8  C)   Ht 1.67 m (5' 5.75\")   Wt 101.3 kg (223 lb 6.4 oz)   LMP  (LMP Unknown)   SpO2 98%   Breastfeeding No   BMI 36.33 kg/m     Estimated body mass index is 36.33 kg/m  as calculated from the following:    Height as of this encounter: 1.67 m (5' 5.75\").    Weight as of this encounter: 101.3 kg (223 lb 6.4 oz).  Physical Exam  GENERAL: alert and no distress  EYES: Eyes grossly normal to inspection, PERRL and conjunctivae and sclerae normal  HENT: ear canals and TM's normal, nose and mouth without ulcers or lesions  NECK: no adenopathy, no asymmetry, masses, or scars  RESP: lungs clear to auscultation - no rales, rhonchi or wheezes  CV: regular rate and rhythm, normal S1 S2, no S3 or S4, no murmur, click or rub, no peripheral edema  ABDOMEN: soft, nontender, no hepatosplenomegaly, no masses and bowel sounds normal  MS: no gross musculoskeletal defects noted, no edema  SKIN: no suspicious lesions or rashes  NEURO: Normal strength and tone, mentation intact and speech normal  PSYCH: mentation appears normal, affect normal/bright    Recent Labs   Lab Test 24  1811 24  0739 24  1546 24  1452 23  1001 10/11/23  0744   HGB 13.1 13.9 13.1 14.4   < > 14.5    284 265 301   < > 298   NA  --   --   --  138  --  139   POTASSIUM  --   --   --  4.0  --  4.6   CR  --  1.03* 0.96* 0.82   < > 0.89   A1C  --   --   --   --   --  5.3    < > = values in this interval not displayed.        Diagnostics  No labs were ordered during this visit.   No EKG required for low risk " surgery (cataract, skin procedure, breast biopsy, etc).    Revised Cardiac Risk Index (RCRI)  The patient has the following serious cardiovascular risks for perioperative complications:   - No serious cardiac risks = 0 points     RCRI Interpretation: 0 points: Class I (very low risk - 0.4% complication rate)         Signed Electronically by: Damaris Gil NP  A copy of this evaluation report is provided to the requesting physician.

## 2024-09-15 NOTE — OR NURSING
Pt updated on new arrival time for surgery tomorrow due to cancellation, new arrival time 0730 am.

## 2024-09-16 ENCOUNTER — ANESTHESIA EVENT (OUTPATIENT)
Dept: SURGERY | Facility: CLINIC | Age: 59
End: 2024-09-16
Payer: COMMERCIAL

## 2024-09-16 ENCOUNTER — ANESTHESIA (OUTPATIENT)
Dept: SURGERY | Facility: CLINIC | Age: 59
End: 2024-09-16
Payer: COMMERCIAL

## 2024-09-16 ENCOUNTER — HOSPITAL ENCOUNTER (OUTPATIENT)
Facility: CLINIC | Age: 59
Discharge: HOME | End: 2024-09-16
Attending: INTERNAL MEDICINE | Admitting: INTERNAL MEDICINE
Payer: COMMERCIAL

## 2024-09-16 VITALS
WEIGHT: 222 LBS | DIASTOLIC BLOOD PRESSURE: 68 MMHG | OXYGEN SATURATION: 96 % | BODY MASS INDEX: 35.68 KG/M2 | SYSTOLIC BLOOD PRESSURE: 136 MMHG | TEMPERATURE: 97.6 F | RESPIRATION RATE: 16 BRPM | HEIGHT: 66 IN | HEART RATE: 83 BPM

## 2024-09-16 LAB — UPPER GI ENDOSCOPY: NORMAL

## 2024-09-16 PROCEDURE — 250N000009 HC RX 250: Performed by: INTERNAL MEDICINE

## 2024-09-16 PROCEDURE — 250N000025 HC SEVOFLURANE, PER MIN: Performed by: INTERNAL MEDICINE

## 2024-09-16 PROCEDURE — 88305 TISSUE EXAM BY PATHOLOGIST: CPT | Mod: 26 | Performed by: PATHOLOGY

## 2024-09-16 PROCEDURE — 258N000003 HC RX IP 258 OP 636: Performed by: NURSE ANESTHETIST, CERTIFIED REGISTERED

## 2024-09-16 PROCEDURE — 370N000017 HC ANESTHESIA TECHNICAL FEE, PER MIN: Performed by: INTERNAL MEDICINE

## 2024-09-16 PROCEDURE — 272N000001 HC OR GENERAL SUPPLY STERILE: Performed by: INTERNAL MEDICINE

## 2024-09-16 PROCEDURE — 88305 TISSUE EXAM BY PATHOLOGIST: CPT | Mod: TC | Performed by: INTERNAL MEDICINE

## 2024-09-16 PROCEDURE — 360N000075 HC SURGERY LEVEL 2, PER MIN: Performed by: INTERNAL MEDICINE

## 2024-09-16 PROCEDURE — 999N000141 HC STATISTIC PRE-PROCEDURE NURSING ASSESSMENT: Performed by: INTERNAL MEDICINE

## 2024-09-16 PROCEDURE — 710N000012 HC RECOVERY PHASE 2, PER MINUTE: Performed by: INTERNAL MEDICINE

## 2024-09-16 PROCEDURE — 710N000009 HC RECOVERY PHASE 1, LEVEL 1, PER MIN: Performed by: INTERNAL MEDICINE

## 2024-09-16 PROCEDURE — 250N000011 HC RX IP 250 OP 636: Performed by: NURSE ANESTHETIST, CERTIFIED REGISTERED

## 2024-09-16 RX ORDER — OXYCODONE HYDROCHLORIDE 5 MG/1
5 TABLET ORAL
Status: DISCONTINUED | OUTPATIENT
Start: 2024-09-16 | End: 2024-09-16 | Stop reason: HOSPADM

## 2024-09-16 RX ORDER — ACETAMINOPHEN 325 MG/1
975 TABLET ORAL ONCE
Status: DISCONTINUED | OUTPATIENT
Start: 2024-09-16 | End: 2024-09-16 | Stop reason: HOSPADM

## 2024-09-16 RX ORDER — FENTANYL CITRATE 50 UG/ML
25 INJECTION, SOLUTION INTRAMUSCULAR; INTRAVENOUS EVERY 5 MIN PRN
Status: DISCONTINUED | OUTPATIENT
Start: 2024-09-16 | End: 2024-09-16 | Stop reason: HOSPADM

## 2024-09-16 RX ORDER — OXYCODONE HYDROCHLORIDE 5 MG/1
10 TABLET ORAL
Status: DISCONTINUED | OUTPATIENT
Start: 2024-09-16 | End: 2024-09-16 | Stop reason: HOSPADM

## 2024-09-16 RX ORDER — LIDOCAINE 40 MG/G
CREAM TOPICAL
Status: DISCONTINUED | OUTPATIENT
Start: 2024-09-16 | End: 2024-09-16 | Stop reason: HOSPADM

## 2024-09-16 RX ORDER — NALOXONE HYDROCHLORIDE 0.4 MG/ML
0.2 INJECTION, SOLUTION INTRAMUSCULAR; INTRAVENOUS; SUBCUTANEOUS
Status: DISCONTINUED | OUTPATIENT
Start: 2024-09-16 | End: 2024-09-16 | Stop reason: HOSPADM

## 2024-09-16 RX ORDER — DEXAMETHASONE SODIUM PHOSPHATE 4 MG/ML
4 INJECTION, SOLUTION INTRA-ARTICULAR; INTRALESIONAL; INTRAMUSCULAR; INTRAVENOUS; SOFT TISSUE
Status: DISCONTINUED | OUTPATIENT
Start: 2024-09-16 | End: 2024-09-16 | Stop reason: HOSPADM

## 2024-09-16 RX ORDER — NALOXONE HYDROCHLORIDE 0.4 MG/ML
0.1 INJECTION, SOLUTION INTRAMUSCULAR; INTRAVENOUS; SUBCUTANEOUS
Status: DISCONTINUED | OUTPATIENT
Start: 2024-09-16 | End: 2024-09-16 | Stop reason: HOSPADM

## 2024-09-16 RX ORDER — SODIUM CHLORIDE, SODIUM LACTATE, POTASSIUM CHLORIDE, CALCIUM CHLORIDE 600; 310; 30; 20 MG/100ML; MG/100ML; MG/100ML; MG/100ML
INJECTION, SOLUTION INTRAVENOUS CONTINUOUS PRN
Status: DISCONTINUED | OUTPATIENT
Start: 2024-09-16 | End: 2024-09-16

## 2024-09-16 RX ORDER — NALOXONE HYDROCHLORIDE 0.4 MG/ML
0.4 INJECTION, SOLUTION INTRAMUSCULAR; INTRAVENOUS; SUBCUTANEOUS
Status: DISCONTINUED | OUTPATIENT
Start: 2024-09-16 | End: 2024-09-16 | Stop reason: HOSPADM

## 2024-09-16 RX ORDER — PROCHLORPERAZINE MALEATE 10 MG
10 TABLET ORAL EVERY 6 HOURS PRN
Status: DISCONTINUED | OUTPATIENT
Start: 2024-09-16 | End: 2024-09-16 | Stop reason: HOSPADM

## 2024-09-16 RX ORDER — ONDANSETRON 4 MG/1
4 TABLET, ORALLY DISINTEGRATING ORAL EVERY 30 MIN PRN
Status: DISCONTINUED | OUTPATIENT
Start: 2024-09-16 | End: 2024-09-16 | Stop reason: HOSPADM

## 2024-09-16 RX ORDER — FLUMAZENIL 0.1 MG/ML
0.2 INJECTION, SOLUTION INTRAVENOUS
Status: DISCONTINUED | OUTPATIENT
Start: 2024-09-16 | End: 2024-09-16 | Stop reason: HOSPADM

## 2024-09-16 RX ORDER — ONDANSETRON 2 MG/ML
4 INJECTION INTRAMUSCULAR; INTRAVENOUS EVERY 30 MIN PRN
Status: DISCONTINUED | OUTPATIENT
Start: 2024-09-16 | End: 2024-09-16 | Stop reason: HOSPADM

## 2024-09-16 RX ORDER — PROPOFOL 10 MG/ML
INJECTION, EMULSION INTRAVENOUS PRN
Status: DISCONTINUED | OUTPATIENT
Start: 2024-09-16 | End: 2024-09-16

## 2024-09-16 RX ORDER — DEXAMETHASONE SODIUM PHOSPHATE 4 MG/ML
INJECTION, SOLUTION INTRA-ARTICULAR; INTRALESIONAL; INTRAMUSCULAR; INTRAVENOUS; SOFT TISSUE PRN
Status: DISCONTINUED | OUTPATIENT
Start: 2024-09-16 | End: 2024-09-16

## 2024-09-16 RX ORDER — FENTANYL CITRATE 50 UG/ML
INJECTION, SOLUTION INTRAMUSCULAR; INTRAVENOUS PRN
Status: DISCONTINUED | OUTPATIENT
Start: 2024-09-16 | End: 2024-09-16

## 2024-09-16 RX ORDER — FENTANYL CITRATE 50 UG/ML
50 INJECTION, SOLUTION INTRAMUSCULAR; INTRAVENOUS EVERY 5 MIN PRN
Status: DISCONTINUED | OUTPATIENT
Start: 2024-09-16 | End: 2024-09-16 | Stop reason: HOSPADM

## 2024-09-16 RX ORDER — SODIUM CHLORIDE, SODIUM LACTATE, POTASSIUM CHLORIDE, CALCIUM CHLORIDE 600; 310; 30; 20 MG/100ML; MG/100ML; MG/100ML; MG/100ML
INJECTION, SOLUTION INTRAVENOUS CONTINUOUS
Status: DISCONTINUED | OUTPATIENT
Start: 2024-09-16 | End: 2024-09-16 | Stop reason: HOSPADM

## 2024-09-16 RX ORDER — LABETALOL HYDROCHLORIDE 5 MG/ML
10 INJECTION, SOLUTION INTRAVENOUS EVERY 5 MIN PRN
Status: DISCONTINUED | OUTPATIENT
Start: 2024-09-16 | End: 2024-09-16 | Stop reason: HOSPADM

## 2024-09-16 RX ADMIN — MIDAZOLAM 2 MG: 1 INJECTION INTRAMUSCULAR; INTRAVENOUS at 09:38

## 2024-09-16 RX ADMIN — SODIUM CHLORIDE, POTASSIUM CHLORIDE, SODIUM LACTATE AND CALCIUM CHLORIDE: 600; 310; 30; 20 INJECTION, SOLUTION INTRAVENOUS at 09:32

## 2024-09-16 RX ADMIN — Medication 100 MG: at 09:42

## 2024-09-16 RX ADMIN — DEXAMETHASONE SODIUM PHOSPHATE 8 MG: 4 INJECTION, SOLUTION INTRA-ARTICULAR; INTRALESIONAL; INTRAMUSCULAR; INTRAVENOUS; SOFT TISSUE at 09:47

## 2024-09-16 RX ADMIN — FENTANYL CITRATE 50 MCG: 50 INJECTION INTRAMUSCULAR; INTRAVENOUS at 09:42

## 2024-09-16 RX ADMIN — PROPOFOL 200 MG: 10 INJECTION, EMULSION INTRAVENOUS at 09:42

## 2024-09-16 ASSESSMENT — ACTIVITIES OF DAILY LIVING (ADL)
ADLS_ACUITY_SCORE: 33

## 2024-09-16 NOTE — ANESTHESIA POSTPROCEDURE EVALUATION
Patient: Keiry Mchugh    Procedure: Procedure(s):  ESOPHAGOGASTRODUODENOSCOPY WITH BIOPSIES       Anesthesia Type:  General    Note:  Disposition: Outpatient   Postop Pain Control: Uneventful            Sign Out: Well controlled pain   PONV: No   Neuro/Psych: Uneventful            Sign Out: Acceptable/Baseline neuro status   Airway/Respiratory: Uneventful            Sign Out: Acceptable/Baseline resp. status   CV/Hemodynamics: Uneventful            Sign Out: Acceptable CV status; No obvious hypovolemia; No obvious fluid overload   Other NRE: NONE   DID A NON-ROUTINE EVENT OCCUR? No           Last vitals:  Vitals Value Taken Time   /63 09/16/24 1105   Temp 97.4  F (36.3  C) 09/16/24 1100   Pulse 83 09/16/24 1112   Resp 17 09/16/24 1112   SpO2 98 % 09/16/24 1112   Vitals shown include unfiled device data.    Electronically Signed By: Caridad Leblanc MD  September 16, 2024  11:25 AM

## 2024-09-16 NOTE — PROVIDER NOTIFICATION
Notified KARLA Leblanc patient has persistent uncontrollable dry cough. Dr. Leblanc gave verbal order for throat lozenge PRN. Patient refused lozenge due to fear of aspirating. Dr. Leblanc stated there is no other medications that will help. No further orders.    Claudette Walsh RN on 9/16/2024 at 10:33 AM

## 2024-09-16 NOTE — ANESTHESIA PREPROCEDURE EVALUATION
Anesthesia Pre-Procedure Evaluation    Patient: Keiry Mchugh   MRN: [id number] : 1965        Procedure : Procedure(s):  ESOPHAGOGASTRODUODENOSCOPY          Past Medical History:   Diagnosis Date    Anemia     Anxiety     Celiac disease     Celiac disease     Cervical spondylosis without myelopathy 10/13/2023    Chronic kidney disease     donated 1 kidney    Class 2 severe obesity due to excess calories with serious comorbidity in adult (H) 2024    Cough     Depression     Seasonal    Diverticulitis     Dyslipidemia     Enlarged LV     wall    GERD (gastroesophageal reflux disease)     Hepatitis     Hiatal hernia     Hypertension     Low bone mass     Metabolic syndrome     Migraines     HARDIN (nonalcoholic steatohepatitis)     Peripheral neuropathy     PONV (postoperative nausea and vomiting)     Profound fatigue     Ventral hernia       Past Surgical History:   Procedure Laterality Date    APPENDECTOMY      CHOLECYSTECTOMY      COLON SURGERY      COLONOSCOPY N/A 3/18/2019    Procedure: COLONOSCOPY;  Surgeon: Davis Mosqueda MD;  Location: [facility];  Service: General    CYSTOSCOPY N/A 2015    Procedure: CYSTOSCOPY;  Surgeon: Nate Horta MD;  Location: [facility];  Service:      CORRECT BUNION,METATARSAL OSTEOTOMY      Description: Bunion Correction With Metatarsal Osteotomy Herman Procedure;  Proc Date: 2010;    HYSTERECTOMY      HYSTERECTOMY, PAP NO LONGER INDICATED  2009    NEPHRECTOMY LIVING DONOR Left 2012    MO LAP,BILIARY TRACT,UNLISTED N/A 3/19/2019    Procedure: BIOPSY, LIVER, LAPAROSCOPIC;  Surgeon: Davis Mosqueda MD;  Location: [facility];  Service: General    MO LAP,SLING OPERATION      Description: Laparoscopic Sling Operation For Stress Incontinence;  Recorded: 2009;    MO LAP,SURG,COLECTOMY, PARTIAL, W/ANAST N/A 3/19/2019    Procedure: LAPAROSCOPIC SIGMOID COLON RESECTION;  Surgeon: Davis Mosqueda MD;  Location:  "[facility] OR;  Service: General    SEPTOPLASTY, TURBINOPLASTY, COMBINED N/A 8/3/2021    Procedure: SEPTOPLASTY, NOSE, WITH TURBINOPLASTY RADIOFREQUENCY BALLON ASSISTED, CRYOBLATION OF NASAL TISSUE;  Surgeon: Randy Case MD;  Location: [facility] OR    SIGMOIDOSCOPY FLEXIBLE N/A 3/19/2019    Procedure: FLEXIBLE SIGMOIDOSCOPY;  Surgeon: Davis Mosqueda MD;  Location: [facility] OR;  Service: General      Allergies   Allergen Reactions    Gluten Meal Nausea and Vomiting    Doxycycline Hives    Contrast Dye      Hives, resolved with benadryl alone in the past (two episode)    Nsaids Other (See Comments)     Avoid - one kidney    Ondansetron Headache    Pcn [Penicillins] Hives     As a child    Rocephin [Ceftriaxone] Hives     Patient states this started immediately    Tramadol Hives    Zithromax [Azithromycin] Tinnitus     \"ear ringing and hearing loss\"      Social History     Tobacco Use    Smoking status: Former     Current packs/day: 0.00     Types: Cigarettes     Quit date: 1999     Years since quittin.7    Smokeless tobacco: Never   Substance Use Topics    Alcohol use: No     Comment: rare      Wt Readings from Last 1 Encounters:   24 100.7 kg (222 lb)        Anesthesia Evaluation            ROS/MED HX  ENT/Pulmonary: Comment: Laryngitis and chronic cough    (+)     CYNTHIA risk factors, snores loudly,  obese,                                Neurologic:  - neg neurologic ROS     Cardiovascular:     (+)  hypertension- -   -  - -                                      METS/Exercise Tolerance:     Hematologic:  - neg hematologic  ROS     Musculoskeletal:  - neg musculoskeletal ROS     GI/Hepatic:     (+) GERD, Asymptomatic on medication,    hiatal hernia,              Renal/Genitourinary:  - neg Renal ROS     Endo:     (+)               Obesity,       Psychiatric/Substance Use:  - neg psychiatric ROS     Infectious Disease:  - neg infectious disease ROS     Malignancy:  - neg malignancy " "ROS     Other:  - neg other ROS          Physical Exam    Airway        Mallampati: II   TM distance: > 3 FB   Neck ROM: full   Mouth opening: > 3 cm    Respiratory Devices and Support         Dental       (+) Completely normal teeth      Cardiovascular   cardiovascular exam normal       Rhythm and rate: regular and normal     Pulmonary   pulmonary exam normal        breath sounds clear to auscultation           OUTSIDE LABS:  CBC:   Lab Results   Component Value Date    WBC 5.7 08/21/2024    WBC 4.8 08/19/2024    HGB 13.1 08/21/2024    HGB 13.9 08/19/2024    HCT 39.1 08/21/2024    HCT 42.7 08/19/2024     08/21/2024     08/19/2024     BMP:   Lab Results   Component Value Date     05/20/2024     10/11/2023    POTASSIUM 4.0 05/20/2024    POTASSIUM 4.6 10/11/2023    CHLORIDE 103 05/20/2024    CHLORIDE 105 10/11/2023    CO2 22 05/20/2024    CO2 24 10/11/2023    BUN 18.8 05/20/2024    BUN 25.3 (H) 10/11/2023    CR 1.03 (H) 08/19/2024    CR 0.96 (H) 06/24/2024    GLC 91 05/20/2024     (H) 10/11/2023     COAGS: No results found for: \"PTT\", \"INR\", \"FIBR\"  POC:   Lab Results   Component Value Date    HCGS Negative 01/28/2019     HEPATIC:   Lab Results   Component Value Date    ALBUMIN 4.6 08/19/2024    PROTTOTAL 7.9 05/20/2024    ALT 70 (H) 08/19/2024    AST 20 05/20/2024    ALKPHOS 80 05/20/2024    BILITOTAL 0.5 05/20/2024     OTHER:   Lab Results   Component Value Date    LACT 1.6 05/26/2023    A1C 5.3 10/11/2023    MORENO 9.8 05/20/2024    MAG 2.0 01/16/2023    LIPASE 32 05/20/2024    TSH 2.32 05/03/2024    CRP 3.1 (H) 05/26/2023    SED 13 05/03/2024       Anesthesia Plan    ASA Status:  3    NPO Status:  ELEVATED Aspiration Risk/Unknown    Anesthesia Type: General.     - Airway: ETT   Induction: Intravenous, RSI.   Maintenance: Inhalation.        Consents    Anesthesia Plan(s) and associated risks, benefits, and realistic alternatives discussed. Questions answered and " "patient/representative(s) expressed understanding.     - Discussed: Risks, Benefits and Alternatives for the PROCEDURE were discussed     - Discussed with:  Patient       Use of blood products discussed: Yes.     - Discussed with: Patient.     - Consented: consented to blood products            Reason for refusal: other.     Postoperative Care    Pain management: IV analgesics, Oral pain medications.   PONV prophylaxis: Ondansetron (or other 5HT-3), Dexamethasone or Solumedrol     Comments:    Other Comments: EGD for Laryngitis and chronic cough. Emesis yesterday.     Plan: GETA with VL, RSI with sux.           Caridad Leblanc MD    I have reviewed the pertinent notes and labs in the chart from the past 30 days and (re)examined the patient.  Any updates or changes from those notes are reflected in this note.              # Obesity: Estimated body mass index is 36.1 kg/m  as calculated from the following:    Height as of this encounter: 1.67 m (5' 5.75\").    Weight as of this encounter: 100.7 kg (222 lb).      "

## 2024-09-16 NOTE — ANESTHESIA CARE TRANSFER NOTE
Patient: Keiry Mchugh    Procedure: Procedure(s):  ESOPHAGOGASTRODUODENOSCOPY WITH BIOPSIES       Diagnosis: Gastroesophageal reflux disease [K21.9]  Hiatal hernia [K44.9]  Diagnosis Additional Information: No value filed.    Anesthesia Type:   General     Note:    Oropharynx: spontaneously breathing  Level of Consciousness: awake  Oxygen Supplementation: face mask    Independent Airway: airway patency satisfactory and stable  Dentition: dentition unchanged  Vital Signs Stable: post-procedure vital signs reviewed and stable  Report to RN Given: handoff report given  Patient transferred to: PACU  Comments: To PACU, report to RN, oxygen per face mask.        Vitals:  Vitals Value Taken Time   /85 09/16/24 1015   Temp     Pulse 90 09/16/24 1018   Resp 25 09/16/24 1020   SpO2 98 % 09/16/24 1020   Vitals shown include unfiled device data.    Electronically Signed By: MING Zavaleta CRNA  September 16, 2024  10:21 AM

## 2024-09-16 NOTE — ANESTHESIA PROCEDURE NOTES
Airway       Patient location during procedure: OR       Procedure Start/Stop Times: 9/16/2024 9:43 AM  Staff -        CRNA: Randy Casillas APRN CRNA       Performed By: CRNA  Consent for Airway        Urgency: elective  Indications and Patient Condition       Indications for airway management: vanita-procedural and airway protection       Induction type:intravenous       Mask difficulty assessment: 1 - vent by mask    Final Airway Details       Final airway type: endotracheal airway       Successful airway: ETT - single  Endotracheal Airway Details        ETT size (mm): 7.0       Cuffed: yes       Successful intubation technique: video laryngoscopy       VL Blade Size: Glidescope 3       Grade View of Cords: 1       Adjucts: stylet       Position: Right       Measured from: lips       Secured at (cm): 22       Bite block used: Oral Airway    Post intubation assessment        Placement verified by: capnometry, equal breath sounds and chest rise        Number of attempts at approach: 1       Secured with: tape       Ease of procedure: easy       Dentition: Intact    Medication(s) Administered   Medication Administration Time: 9/16/2024 9:43 AM

## 2024-09-16 NOTE — OR NURSING
KARLA Leblanc notified of new onset right calf pain postop. No orders at this time. Patient educated that if calf becomes red, swollen, or pain worsens/does not resolve please seek medical care.

## 2024-09-17 LAB
PATH REPORT.COMMENTS IMP SPEC: NORMAL
PATH REPORT.COMMENTS IMP SPEC: NORMAL
PATH REPORT.FINAL DX SPEC: NORMAL
PATH REPORT.GROSS SPEC: NORMAL
PATH REPORT.MICROSCOPIC SPEC OTHER STN: NORMAL
PATH REPORT.RELEVANT HX SPEC: NORMAL
PHOTO IMAGE: NORMAL

## 2024-09-18 ENCOUNTER — LAB (OUTPATIENT)
Dept: LAB | Facility: CLINIC | Age: 59
End: 2024-09-18
Payer: COMMERCIAL

## 2024-09-18 DIAGNOSIS — M05.79 SEROPOSITIVE RHEUMATOID ARTHRITIS OF MULTIPLE JOINTS (H): ICD-10-CM

## 2024-09-18 DIAGNOSIS — Z79.899 HIGH RISK MEDICATION USE: ICD-10-CM

## 2024-09-18 LAB
ALBUMIN SERPL BCG-MCNC: 4.4 G/DL (ref 3.5–5.2)
ALT SERPL W P-5'-P-CCNC: 40 U/L (ref 0–50)
CREAT SERPL-MCNC: 1.01 MG/DL (ref 0.51–0.95)
EGFRCR SERPLBLD CKD-EPI 2021: 65 ML/MIN/1.73M2
ERYTHROCYTE [DISTWIDTH] IN BLOOD BY AUTOMATED COUNT: 12.2 % (ref 10–15)
HCT VFR BLD AUTO: 42.7 % (ref 35–47)
HGB BLD-MCNC: 13.8 G/DL (ref 11.7–15.7)
MCH RBC QN AUTO: 30.4 PG (ref 26.5–33)
MCHC RBC AUTO-ENTMCNC: 32.3 G/DL (ref 31.5–36.5)
MCV RBC AUTO: 94 FL (ref 78–100)
PLATELET # BLD AUTO: 324 10E3/UL (ref 150–450)
RBC # BLD AUTO: 4.54 10E6/UL (ref 3.8–5.2)
WBC # BLD AUTO: 7.5 10E3/UL (ref 4–11)

## 2024-09-18 PROCEDURE — 85027 COMPLETE CBC AUTOMATED: CPT

## 2024-09-18 PROCEDURE — 84460 ALANINE AMINO (ALT) (SGPT): CPT

## 2024-09-18 PROCEDURE — 82565 ASSAY OF CREATININE: CPT

## 2024-09-18 PROCEDURE — 36415 COLL VENOUS BLD VENIPUNCTURE: CPT

## 2024-09-18 PROCEDURE — 82040 ASSAY OF SERUM ALBUMIN: CPT

## 2024-09-20 ENCOUNTER — HOSPITAL ENCOUNTER (OUTPATIENT)
Dept: GENERAL RADIOLOGY | Facility: HOSPITAL | Age: 59
Discharge: HOME | End: 2024-09-20
Attending: PHYSICIAN ASSISTANT | Admitting: PHYSICIAN ASSISTANT
Payer: COMMERCIAL

## 2024-09-20 ENCOUNTER — OFFICE VISIT (OUTPATIENT)
Dept: FAMILY MEDICINE | Facility: CLINIC | Age: 59
End: 2024-09-20
Payer: COMMERCIAL

## 2024-09-20 VITALS
DIASTOLIC BLOOD PRESSURE: 89 MMHG | OXYGEN SATURATION: 100 % | SYSTOLIC BLOOD PRESSURE: 142 MMHG | RESPIRATION RATE: 18 BRPM | HEART RATE: 91 BPM | TEMPERATURE: 98.8 F

## 2024-09-20 DIAGNOSIS — R06.02 SOB (SHORTNESS OF BREATH): ICD-10-CM

## 2024-09-20 DIAGNOSIS — R05.1 ACUTE COUGH: ICD-10-CM

## 2024-09-20 DIAGNOSIS — R07.89 CHEST WALL PAIN: ICD-10-CM

## 2024-09-20 DIAGNOSIS — J22 LOWER RESPIRATORY INFECTION: Primary | ICD-10-CM

## 2024-09-20 LAB — D DIMER PPP FEU-MCNC: 0.33 UG/ML FEU (ref 0–0.5)

## 2024-09-20 PROCEDURE — 85379 FIBRIN DEGRADATION QUANT: CPT | Performed by: PHYSICIAN ASSISTANT

## 2024-09-20 PROCEDURE — 99214 OFFICE O/P EST MOD 30 MIN: CPT | Performed by: PHYSICIAN ASSISTANT

## 2024-09-20 PROCEDURE — 36415 COLL VENOUS BLD VENIPUNCTURE: CPT | Performed by: PHYSICIAN ASSISTANT

## 2024-09-20 PROCEDURE — 71046 X-RAY EXAM CHEST 2 VIEWS: CPT

## 2024-09-20 RX ORDER — BENZONATATE 200 MG/1
200 CAPSULE ORAL 3 TIMES DAILY PRN
Qty: 30 CAPSULE | Refills: 0 | Status: SHIPPED | OUTPATIENT
Start: 2024-09-20

## 2024-09-20 RX ORDER — BUDESONIDE 180 UG/1
1 AEROSOL, POWDER RESPIRATORY (INHALATION) 2 TIMES DAILY
Qty: 1 EACH | Refills: 0 | Status: SHIPPED | OUTPATIENT
Start: 2024-09-20

## 2024-09-20 RX ORDER — LEVOFLOXACIN 750 MG/1
750 TABLET, FILM COATED ORAL DAILY
Qty: 7 TABLET | Refills: 0 | Status: SHIPPED | OUTPATIENT
Start: 2024-09-20 | End: 2024-09-27

## 2024-09-20 RX ORDER — OXYCODONE HYDROCHLORIDE 5 MG/1
5 TABLET ORAL EVERY 6 HOURS PRN
Qty: 20 TABLET | Refills: 0 | Status: SHIPPED | OUTPATIENT
Start: 2024-09-20

## 2024-09-20 NOTE — PROGRESS NOTES
Assessment & Plan     MDM:  pt is a 59 year old female with hx of immunosuppressant therapy for RA (plaquenil, methotrexate and Humera) , was seen for persistent cough x 1 month    She has had progressive worsening of her sx, with new L chest pain with deep breathing, coughing, and mild SOB.  Her exam reveals clear lungs, reproducible L chest wall pain and normal vitally.  She has no tachypnea, normal O2 sat and not tachycardic.  D dimer done which is normal thus not likely PE.  Repeat CXR obtained and compared to last CXR there are vague/subtle opacities in the B lower lungfields that could represent infectious cause.  Unfortunately pt is allergic to pcn, doxycycline, zithromax, cephalosporins.  Will cover with levaquin as ordered.  Discussed black box warnings. Pt relays understanding and has tolerated this medication in the past.  Will have her also use her albuterol and start ICS until improving to manage reactive airway.  Discussed finially a possiblity of pertussis as there has  been heightened cases in [facility] recently.  She has not had known exposure. No indication for  any testing or treatment given duration of sx greater than 3 weeks.       I have asked she follow-up with her rheumatologist and let them know she has been ill, wonder about holding her medication during this time.  Also, low likelihood, but possible that her sx may be due to methotrexate related lung toxicity.  She is on a very low dose and not a new medication but review of literature indicates it may occur in the first few years and even on lower doses.   If she is not improving as indicated may need to consider pulmonology involvement.        Lower respiratory infection    - levofloxacin (LEVAQUIN) 750 MG tablet  Dispense: 7 tablet; Refill: 0  - budesonide (PULMICORT FLEXHALER) 180 MCG/ACT inhaler  Dispense: 1 each; Refill: 0  - benzonatate (TESSALON) 200 MG capsule  Dispense: 30 capsule; Refill: 0  Acute cough    - XR Chest 2  Views  - D dimer, quantitative  - D dimer, quantitative    SOB (shortness of breath)    - XR Chest 2 Views  - D dimer, quantitative  - D dimer, quantitative    Chest wall pain  Given severity of pain and no improvement with tylenol/ibuprofen will give small supply of oxycodone as ordered.  Pt has solitary kidney and mild renal impairment, is to avoid NSAIDS if possible.  Ice, rest, deep breathing encouraged.  Pt has reproducible pain but is worse with deep  breathing, more likely pleurisy related or possible muscular strain or occult rib fx not seen on CXR.    Pdmp reviewed and no concerns. Discussed refills would not be given in urgent care and would need to discuss further pain control with pcp,    - oxyCODONE (ROXICODONE) 5 MG tablet  Dispense: 20 tablet; Refill: 0     30 minutes spent by me on the date of the encounter doing chart review, review of test results, interpretation of tests, patient visit, and documentation         Karina Espinoza PA-C  [facility]     Samantha is a 59 year old female who presents to clinic today for the following health issues:  Chief Complaint   Patient presents with    Cough     Wet cough last 2 week. Breathing and cough cause Lt upper back pain started 5 day. OTC medications and inhaler not helping.         9/20/2024     1:02 PM   Additional Questions   Roomed by Hernando Blanchard MA   Accompanied by Self     HPI: pt is a 59 year old female who is on immunosupressants for who presents with concerns re: 1 month hx of cough with progressively worsening L chest discomfort with deep breathing and coughing as well as mild SOB.   Productive cough.    Low energy. No leg swelling or edema.    Fatigue.    Pain in the chest new on the L.    Burning sensation at rest but increased shapr pain with cough and deep breath.    SOB with activity.  No sob at rest.   Fevers off and on.  100 off and on maximum.    Upper Endoscopy earlier in the week, nothing found that  seems contributing to sx per pt report, not due to her hiatal hernia..    Treating GERD with PPI which has not been helpful for her cough.   Advil/tylenol : not helpful.    Cough syrup: not helpful.    Mucinex : not helpful.    Humera started 2 months ago prior to onset of sx.  On methotrexate for months.   No hx of blood clots.    R calf pain on Monday 5 days ago and lasted 2 days, now resolved.    No swelling in legs.    Doing cushing testing at [place],   Methotrexate is not a new medication, low dose has been on 1 year and decreasing due to transaminitis.    Has had negative TB screening test 3 months ago.    Taking tylenol and ibuprofen for pain without results, has 1 kidney, mild renal insuffiencey, is to avoid NSAIDS.        Review of Systems  Constitutional, HEENT, cardiovascular, pulmonary, gi and gu systems are negative, except as otherwise noted.      Objective    BP (!) 142/89   Pulse 91   Temp 98.8  F (37.1  C) (Oral)   Resp 18   LMP  (LMP Unknown)   SpO2 100%   Physical Exam   Pt is in no acute distress and appears well  Ears patent B:  TM s intact, non-injected. All land marks easily visibile    Nasal mucosa is non-edematous, no discharge.    Pharynx: non erythematous, tonsils non hypertrophied, No exudate   Neck supple: no adenopathy  Lungs: CTA, TTP L lower chest wall mid axillary to posterior axillary line, mild   Heart: RRR, no murmur, no thrills or heaves   Ext: no edema  Skin: no rashes          Results for orders placed or performed during the hospital encounter of 09/20/24   XR Chest 2 Views     Status: None    Narrative    EXAM: XR CHEST 2 VIEWS  LOCATION: [facility]  DATE: 9/20/2024    INDICATION: cough, sob  COMPARISON: 8/21/2024      Impression    IMPRESSION: No pleural fluid or pneumothorax. No confluent airspace opacities. Subtle lower lung opacities could be within normal limits or could be atypical infection. Normal size of the heart.   Results for orders  placed or performed in visit on 09/20/24   D dimer, quantitative     Status: Normal   Result Value Ref Range    D-Dimer Quantitative 0.33 0.00 - 0.50 ug/mL FEU    Narrative    This D-dimer assay is intended for use in conjunction with a clinical pretest probability assessment model to exclude pulmonary embolism (PE) and deep venous thrombosis (DVT) in outpatients suspected of PE or DVT. The cut-off value is 0.50 ug/mL FEU.    For patients 50 years of age or older, the application of age-adjusted cut-off values for D-Dimer may increase the specificity without significant effect on sensitivity. The literature suggested calculation age adjusted cut-off in ug/L = age in years x 10 ug/L. The results in this laboratory are reported as ug/mL rather than ug/L. The calculation for age adjusted cut off in ug/mL= age in years x 0.01 ug/mL. For example, the cut off for a 76 year old male is 76 x 0.01 ug/mL = 0.76 ug/mL (760 ug/L).    M Elias et al. Age adjusted D-dimer cut-off levels to rule out pulmonary embolism: The ADJUST-PE Study. JONO 2014;311:5290-4292.; LÁZARO Butcher et al. Diagnostic accuracy of conventional or age adjusted D-dimer cutoff values in older patients with suspected venous thromboembolism. Systemic review and meta-analysis. BMJ 2013:346:f2492.

## 2024-09-21 PROBLEM — K21.9 HIATAL HERNIA WITH GASTROESOPHAGEAL REFLUX: Status: ACTIVE | Noted: 2024-09-21

## 2024-09-21 PROBLEM — K44.9 HIATAL HERNIA WITH GASTROESOPHAGEAL REFLUX: Status: ACTIVE | Noted: 2024-09-21

## 2024-09-21 PROBLEM — K44.9 DIAPHRAGMATIC HERNIA: Status: ACTIVE | Noted: 2024-08-28

## 2024-10-01 ENCOUNTER — TELEPHONE (OUTPATIENT)
Dept: FAMILY MEDICINE | Facility: CLINIC | Age: 59
End: 2024-10-01

## 2024-10-01 ENCOUNTER — OFFICE VISIT (OUTPATIENT)
Dept: FAMILY MEDICINE | Facility: CLINIC | Age: 59
End: 2024-10-01
Payer: COMMERCIAL

## 2024-10-01 VITALS
HEART RATE: 95 BPM | SYSTOLIC BLOOD PRESSURE: 130 MMHG | RESPIRATION RATE: 22 BRPM | WEIGHT: 224 LBS | DIASTOLIC BLOOD PRESSURE: 84 MMHG | HEIGHT: 66 IN | TEMPERATURE: 97.5 F | BODY MASS INDEX: 36 KG/M2 | OXYGEN SATURATION: 98 %

## 2024-10-01 DIAGNOSIS — R05.3 PERSISTENT COUGH FOR 3 WEEKS OR LONGER: Primary | ICD-10-CM

## 2024-10-01 PROCEDURE — 99214 OFFICE O/P EST MOD 30 MIN: CPT | Performed by: PHYSICIAN ASSISTANT

## 2024-10-01 RX ORDER — FLUTICASONE PROPIONATE AND SALMETEROL 250; 50 UG/1; UG/1
1 POWDER RESPIRATORY (INHALATION) EVERY 12 HOURS
Qty: 1 EACH | Refills: 0 | Status: SHIPPED | OUTPATIENT
Start: 2024-10-01

## 2024-10-01 RX ORDER — ALBUTEROL SULFATE 90 UG/1
2 AEROSOL, METERED RESPIRATORY (INHALATION) EVERY 6 HOURS PRN
Qty: 18 G | Refills: 0 | Status: SHIPPED | OUTPATIENT
Start: 2024-10-01

## 2024-10-01 NOTE — PROGRESS NOTES
"  Assessment & Plan     Persistent cough for 3 weeks or longer  -suspect large element of reactive airway  -will start ADVAIR BID, discontinue budesonide for now  -albuterol q6h  -offered oral steroid taper but she declines this  -discussed alarm signs and symptoms to monitor for and discussed when to be reevaluated in the UC or ED   - fluticasone-salmeterol (ADVAIR) 250-50 MCG/ACT inhaler; Inhale 1 puff into the lungs every 12 hours.  - albuterol (PROAIR HFA/PROVENTIL HFA/VENTOLIN HFA) 108 (90 Base) MCG/ACT inhaler; Inhale 2 puffs into the lungs every 6 hours as needed for shortness of breath, wheezing or cough.    BMI  Estimated body mass index is 36.43 kg/m  as calculated from the following:    Height as of this encounter: 1.67 m (5' 5.75\").    Weight as of this encounter: 101.6 kg (224 lb).             Chapito Singh is a 59 year old, presenting for the following health issues:  Forms (Pt is not feeling well, pt has been treated for pneumonia. Pt is wanting paperwork filled out.)      10/1/2024     1:11 PM   Additional Questions   Roomed by Pauline   Accompanied by self     -initially coughing since early August, CXR was normal  -started on inhaler (pulmocort BID, uses albuterol a few times daily)    -thought it was GI related, had EGD which was normal    -cough has progressively worsened  -now having pain on the left side of the chest, along the lower ribs  -was seen again in , started on Levaquin  -cough continues  -rib pain has improved  -is resting A LOT  -feels SOB with activity, this is better when she is resting  -no known exposures    -had TB testing recently abad started on Humira, this was negative    -has not worked in 10 days, works as a dental assistant    -no hx of asthma, smoked but quit 25 years ago      History of Present Illness       Reason for visit:  Contined cough and bck to work plan    She eats 2-3 servings of fruits and vegetables daily.She consumes 0 sweetened beverage(s) daily.She " "exercises with enough effort to increase her heart rate 9 or less minutes per day.  She exercises with enough effort to increase her heart rate 3 or less days per week.   She is taking medications regularly.         Objective    BP (!) 136/91 (BP Location: Left arm, Patient Position: Sitting, Cuff Size: Adult Regular)   Pulse 95   Temp 97.5  F (36.4  C) (Temporal)   Resp 22   Ht 1.67 m (5' 5.75\")   Wt 101.6 kg (224 lb)   LMP  (LMP Unknown)   SpO2 98%   BMI 36.43 kg/m    Body mass index is 36.43 kg/m .  Physical Exam   GENERAL: alert and no distress  NECK: no adenopathy, no asymmetry, masses, or scars  RESP: lungs clear to auscultation - no rales, rhonchi or wheezes  CV: regular rate and rhythm, normal S1 S2, no S3 or S4, no murmur, click or rub, no peripheral edema  ABDOMEN: soft, nontender, no hepatosplenomegaly, no masses and bowel sounds normal  MS: no gross musculoskeletal defects noted, no edema          Signed Electronically by: Alycia White PA-C  Prior to immunization administration, verified patients identity using patient s name and date of birth. Please see Immunization Activity for additional information.     Screening Questionnaire for Adult Immunization    Are you sick today?   Yes   Do you have allergies to medications, food, a vaccine component or latex?   Yes   Have you ever had a serious reaction after receiving a vaccination?   No   Do you have a long-term health problem with heart, lung, kidney, or metabolic disease (e.g., diabetes), asthma, a blood disorder, no spleen, complement component deficiency, a cochlear implant, or a spinal fluid leak?  Are you on long-term aspirin therapy?   No   Do you have cancer, leukemia, HIV/AIDS, or any other immune system problem?   Yes   Do you have a parent, brother, or sister with an immune system problem?   No   In the past 3 months, have you taken medications that affect  your immune system, such as prednisone, other steroids, or anticancer " drugs; drugs for the treatment of rheumatoid arthritis, Crohn s disease, or psoriasis; or have you had radiation treatments?   Yes   Have you had a seizure, or a brain or other nervous system problem?   No   During the past year, have you received a transfusion of blood or blood    products, or been given immune (gamma) globulin or antiviral drug?   No   For women: Are you pregnant or is there a chance you could become       pregnant during the next month?   No   Have you received any vaccinations in the past 4 weeks?   No     Immunization questionnaire 4 were answered yes.      Patient instructed to remain in clinic for 15 minutes afterwards, and to report any adverse reactions.     Screening performed by Pauline Wadsworth MA on 10/1/2024 at 1:16 PM.

## 2024-10-16 DIAGNOSIS — M05.79 SEROPOSITIVE RHEUMATOID ARTHRITIS OF MULTIPLE JOINTS (H): ICD-10-CM

## 2024-10-16 RX ORDER — HYDROXYCHLOROQUINE SULFATE 200 MG/1
200 TABLET, FILM COATED ORAL 2 TIMES DAILY
Qty: 180 TABLET | Refills: 0 | Status: SHIPPED | OUTPATIENT
Start: 2024-10-16

## 2024-10-22 ENCOUNTER — LAB (OUTPATIENT)
Dept: LAB | Facility: CLINIC | Age: 59
End: 2024-10-22
Payer: COMMERCIAL

## 2024-10-22 DIAGNOSIS — M05.79 SEROPOSITIVE RHEUMATOID ARTHRITIS OF MULTIPLE JOINTS (H): ICD-10-CM

## 2024-10-22 DIAGNOSIS — Z79.899 HIGH RISK MEDICATION USE: ICD-10-CM

## 2024-10-22 LAB
ALBUMIN SERPL BCG-MCNC: 4.4 G/DL (ref 3.5–5.2)
ALT SERPL W P-5'-P-CCNC: 37 U/L (ref 0–50)
CREAT SERPL-MCNC: 1.19 MG/DL (ref 0.51–0.95)
EGFRCR SERPLBLD CKD-EPI 2021: 53 ML/MIN/1.73M2
ERYTHROCYTE [DISTWIDTH] IN BLOOD BY AUTOMATED COUNT: 12 % (ref 10–15)
HCT VFR BLD AUTO: 41.5 % (ref 35–47)
HGB BLD-MCNC: 13.9 G/DL (ref 11.7–15.7)
MCH RBC QN AUTO: 30.9 PG (ref 26.5–33)
MCHC RBC AUTO-ENTMCNC: 33.5 G/DL (ref 31.5–36.5)
MCV RBC AUTO: 92 FL (ref 78–100)
PLATELET # BLD AUTO: 302 10E3/UL (ref 150–450)
RBC # BLD AUTO: 4.5 10E6/UL (ref 3.8–5.2)
WBC # BLD AUTO: 4.5 10E3/UL (ref 4–11)

## 2024-10-22 PROCEDURE — 36415 COLL VENOUS BLD VENIPUNCTURE: CPT

## 2024-10-22 PROCEDURE — 83735 ASSAY OF MAGNESIUM: CPT | Performed by: FAMILY MEDICINE

## 2024-10-22 PROCEDURE — 84443 ASSAY THYROID STIM HORMONE: CPT | Performed by: FAMILY MEDICINE

## 2024-10-22 PROCEDURE — 85027 COMPLETE CBC AUTOMATED: CPT

## 2024-10-22 PROCEDURE — 82565 ASSAY OF CREATININE: CPT

## 2024-10-22 PROCEDURE — 84132 ASSAY OF SERUM POTASSIUM: CPT | Performed by: FAMILY MEDICINE

## 2024-10-22 PROCEDURE — 82310 ASSAY OF CALCIUM: CPT | Performed by: FAMILY MEDICINE

## 2024-10-22 PROCEDURE — 82306 VITAMIN D 25 HYDROXY: CPT | Performed by: FAMILY MEDICINE

## 2024-10-22 PROCEDURE — 82040 ASSAY OF SERUM ALBUMIN: CPT

## 2024-10-22 PROCEDURE — 84460 ALANINE AMINO (ALT) (SGPT): CPT

## 2024-10-23 ENCOUNTER — MYC MEDICAL ADVICE (OUTPATIENT)
Dept: FAMILY MEDICINE | Facility: CLINIC | Age: 59
End: 2024-10-23
Payer: COMMERCIAL

## 2024-10-23 ENCOUNTER — ORDERS ONLY (AUTO-RELEASED) (OUTPATIENT)
Dept: FAMILY MEDICINE | Facility: CLINIC | Age: 59
End: 2024-10-23
Payer: COMMERCIAL

## 2024-10-23 ENCOUNTER — OFFICE VISIT (OUTPATIENT)
Dept: FAMILY MEDICINE | Facility: CLINIC | Age: 59
End: 2024-10-23
Payer: COMMERCIAL

## 2024-10-23 VITALS
BODY MASS INDEX: 35.97 KG/M2 | DIASTOLIC BLOOD PRESSURE: 84 MMHG | OXYGEN SATURATION: 97 % | SYSTOLIC BLOOD PRESSURE: 133 MMHG | HEIGHT: 66 IN | RESPIRATION RATE: 16 BRPM | WEIGHT: 223.8 LBS | HEART RATE: 96 BPM | TEMPERATURE: 98.8 F

## 2024-10-23 DIAGNOSIS — R79.89 ELEVATED PARATHYROID HORMONE: ICD-10-CM

## 2024-10-23 DIAGNOSIS — R00.2 PALPITATIONS: ICD-10-CM

## 2024-10-23 DIAGNOSIS — R05.3 CHRONIC COUGH: Primary | ICD-10-CM

## 2024-10-23 DIAGNOSIS — Z90.5 HISTORY OF NEPHRECTOMY: ICD-10-CM

## 2024-10-23 DIAGNOSIS — Z23 IMMUNIZATION DUE: ICD-10-CM

## 2024-10-23 DIAGNOSIS — R94.4 DECREASED GFR: Primary | ICD-10-CM

## 2024-10-23 LAB
CALCIUM SERPL-MCNC: 10.2 MG/DL (ref 8.8–10.4)
MAGNESIUM SERPL-MCNC: 1.9 MG/DL (ref 1.7–2.3)
POTASSIUM SERPL-SCNC: 4.7 MMOL/L (ref 3.4–5.3)
TSH SERPL DL<=0.005 MIU/L-ACNC: 1.83 UIU/ML (ref 0.3–4.2)
VIT D+METAB SERPL-MCNC: 34 NG/ML (ref 20–50)

## 2024-10-23 PROCEDURE — 99214 OFFICE O/P EST MOD 30 MIN: CPT | Mod: 25 | Performed by: FAMILY MEDICINE

## 2024-10-23 PROCEDURE — 90472 IMMUNIZATION ADMIN EACH ADD: CPT | Performed by: FAMILY MEDICINE

## 2024-10-23 PROCEDURE — 90677 PCV20 VACCINE IM: CPT | Performed by: FAMILY MEDICINE

## 2024-10-23 PROCEDURE — 90471 IMMUNIZATION ADMIN: CPT | Performed by: FAMILY MEDICINE

## 2024-10-23 PROCEDURE — 90480 ADMN SARSCOV2 VAC 1/ONLY CMP: CPT | Performed by: FAMILY MEDICINE

## 2024-10-23 PROCEDURE — 90673 RIV3 VACCINE NO PRESERV IM: CPT | Performed by: FAMILY MEDICINE

## 2024-10-23 PROCEDURE — 91320 SARSCV2 VAC 30MCG TRS-SUC IM: CPT | Performed by: FAMILY MEDICINE

## 2024-10-23 ASSESSMENT — ANXIETY QUESTIONNAIRES
1. FEELING NERVOUS, ANXIOUS, OR ON EDGE: NOT AT ALL
2. NOT BEING ABLE TO STOP OR CONTROL WORRYING: NOT AT ALL
3. WORRYING TOO MUCH ABOUT DIFFERENT THINGS: NOT AT ALL
5. BEING SO RESTLESS THAT IT IS HARD TO SIT STILL: NOT AT ALL
GAD7 TOTAL SCORE: 0
7. FEELING AFRAID AS IF SOMETHING AWFUL MIGHT HAPPEN: NOT AT ALL
GAD7 TOTAL SCORE: 0
6. BECOMING EASILY ANNOYED OR IRRITABLE: NOT AT ALL

## 2024-10-23 ASSESSMENT — PATIENT HEALTH QUESTIONNAIRE - PHQ9
SUM OF ALL RESPONSES TO PHQ QUESTIONS 1-9: 8
5. POOR APPETITE OR OVEREATING: NOT AT ALL

## 2024-10-23 NOTE — RESULT ENCOUNTER NOTE
Patient updated by [employer] message with lab results.      Tennille Singh,  Additional blood work looks okay.  Calcium and vitamin D in the normal range, though vitamin D is borderline low.  Are you actively taking a vitamin D supplement?  It may be helpful to bump up the dose if yes.  If no, I would recommend initiation of 1000 international units daily of vitamin D3.  Potassium, magnesium, and thyroid all normal.  We will be in touch when additional results return.  Yissel Schmidt, DO

## 2024-10-23 NOTE — PROGRESS NOTES
Assessment & Plan     1. Chronic cough (Primary)  - CT Chest w/o Contrast; Future  - General PFT Lab (Please always keep checked); Future  - Pulmonary Function Test; Future  - Adult Pulmonary Medicine  Referral; Future    R presents today for evaluation of persistent cough.  Borderline subacute/chronic.  She has already had 2 x-rays which have not revealed focal process.  She is already trying Advair and albuterol without adequate control of symptoms.  Respirations are unlabored.  Lungs clear on exam.  Oxygen saturation normal.  Symptoms correlate with starting third rheumatologic agent as she is currently on methotrexate, Plaquenil, and Humira.  She has follow-up with pulmonary scheduled.  QuantiFERON gold negative in June.    Recommend we proceed with pulmonary function testing to better evaluate for restrictive versus obstructive process.    Recommend we proceed with chest CT scan for more detailed imaging of lung parenchyma to evaluate for signs of interstitial lung disease or bronchiectasis.    Continue with Advair and albuterol.  Did consider adding in steroid burst, though deferred she is not wheezing, not short of breath, oxygen saturation normal.    She is already on omeprazole for reflux and symptoms adequately controlled.    Continue trial of allergy medications like antihistamine and nasal Flonase.    If symptoms not improving, recommend formal pulmonary consultation after completion of CT scan and PFTs.    Defer additional antibiotics if CBC normal, afebrile, and no focal lung findings on physical exam.      2. Palpitations  - TSH with free T4 reflex  - Magnesium  - Potassium  - ZIO PATCH MAIL OUT; Future    Experiencing heart palpitations which are new since coughing.  Will evaluate for underlying arrhythmia with Zio patch.  Recommend we check potassium, magnesium, and TSH as well.  Able to add onto recent sampling.      3. Elevated parathyroid hormone  - Calcium  - Vitamin D deficiency  screening    Patient is concerned that parathyroid hormone is mildly elevated.  Further evaluate with calcium and vitamin D.  She is status post parathyroidectomy x 3.5 glands.      4. Immunization due  - INFLUENZA VACCINE TRIVALENT(FLUBLOK)  - COVID-19 12+ (PFIZER)  - Pneumococcal 20 Valent Conjugate (Prevnar 20)     The longitudinal plan of care for the diagnosis(es)/condition(s) as documented were addressed during this visit. Due to the added complexity in care, I will continue to support Samantha in the subsequent management and with ongoing continuity of care.     Subjective   Samantha is a 59 year old, presenting for the following health issues:  Sick (Been sick for a couple months, cough won't go away. Feeling a flutter in her chest before coughs. Sometimes dry sometimes with phlegm/) and Blood Draw (Wants to get her vitamin D and Calcium checked)      10/23/2024     2:59 PM   Additional Questions   Roomed by Jeff ROJAS MA     History of Present Illness       Reason for visit:  A cough that has now been going on for more than 2 months.    She eats 2-3 servings of fruits and vegetables daily.She consumes 0 sweetened beverage(s) daily.She exercises with enough effort to increase her heart rate 20 to 29 minutes per day.  She exercises with enough effort to increase her heart rate 4 days per week.   She is taking medications regularly.     COUGH:   Samantha had CXR 8/21/24   IMPRESSION: Heart size is normal. Lungs are clear bilaterally. Mediastinum and visualized bony structures are unremarkable.     Subsequent CXR 9/20/24  IMPRESSION: No pleural fluid or pneumothorax. No confluent airspace opacities. Subtle lower lung opacities could be within normal limits or could be atypical infection. Normal size of the heart.     Allergies: doxycycline, PCV, Rocephin, Zithromax     Methotrexate and Plaquenil for about a year, started Humira end of July.  Started feeling sick in August. Symptoms have not resolved.     No longer  "experiencing severe fatigue. Previously more pain in chest and shortness of breath.    Has tried 3 different inhalers, Levaquin.   Fatigue improved, cough has not resolved.     Sometimes dry, sometimes productive. Yellow discoloration.  No hemoptysis.   No fevers.   Shortness of breath improved.   Using inhalers for crackly sensation, this has improved.   No chest tightness.     No prior PFTs.     Using albuterol PRN  Advair 1 puff BID.  Tessalon perles not effective.     Reflux - treating with omeprazole. Symptoms are adequately controlled.     Not treating any allergy symptoms right now.       FLUTTER: Experiencing flutter in chest, seems to make her cough when happens.      CALCIUM/VITAMIN D: concerned about PTH       HEALTH MAINTENANCE:   - flu: agreeable to do today    - covid: agreeable to do today    - PCV: agreeable to do today       Review of Systems  See HPI above.       Objective    /84   Pulse 96   Temp 98.8  F (37.1  C) (Oral)   Resp 16   Ht 1.67 m (5' 5.75\")   Wt 101.5 kg (223 lb 12.8 oz)   LMP  (LMP Unknown)   SpO2 97%   BMI 36.40 kg/m    Body mass index is 36.4 kg/m .  Physical Exam   GENERAL: alert and no distress  NECK: no adenopathy, no asymmetry, masses, or scars  RESP: Intermittent coarse cough.  Respirations unlabored.  Lungs clear throughout.  No crackles, no wheezes.  CV: regular rate and rhythm, no murmurs   ABDOMEN: soft, nontender, no hepatosplenomegaly, no masses and bowel sounds normal  MS: no gross musculoskeletal defects noted, no edema            Signed Electronically by: Yissel Schmidt,     "

## 2024-10-28 NOTE — TELEPHONE ENCOUNTER
Decreased GFR (Primary)  History of nephrectomy  - Basic metabolic panel  (Ca, Cl, CO2, Creat, Gluc, K, Na, BUN); Future     Yissel Schmidt, DO

## 2024-10-31 ENCOUNTER — NURSE TRIAGE (OUTPATIENT)
Dept: FAMILY MEDICINE | Facility: CLINIC | Age: 59
End: 2024-10-31
Payer: COMMERCIAL

## 2024-10-31 NOTE — TELEPHONE ENCOUNTER
Nurse Triage SBAR    Is this a 2nd Level Triage? NO    Situation: Patient is on new rheumatologic medication which she believes has caused her to have back to back infections    Background:   Past Medical History:   Diagnosis Date    Anemia     Anxiety     Celiac disease     Celiac disease     Cervical spondylosis without myelopathy 10/13/2023    Chronic kidney disease     donated 1 kidney    Class 2 severe obesity due to excess calories with serious comorbidity in adult (H) 9/13/2024    Cough     Depression     Seasonal    Diverticulitis     Dyslipidemia     Enlarged LV     wall    GERD (gastroesophageal reflux disease)     Hepatitis     Hiatal hernia     Hypertension     Low bone mass     Metabolic syndrome     Migraines     HARDIN (nonalcoholic steatohepatitis)     Peripheral neuropathy     PONV (postoperative nausea and vomiting)     Profound fatigue     Ventral hernia         Assessment: Sinus pain and pressure starting last night and cannot smell anything. No fevers. Mild cough    Protocol Recommended Disposition:   See Today or Tomorrow in Office    Recommendation: Patient plans to go to  tomorrow.          Does the patient meet one of the following criteria for ADS visit consideration? 16+ years old, with an [facility] PCP     TIP  Providers, please consider if this condition is appropriate for management at one of our Acute and Diagnostic Services sites.     If patient is a good candidate, please use dotphrase <dot>triageresponse and select Refer to ADS to document.     Additional Information   Sinus congestion (pressure, fullness) present > 10 days    Protocols used: Sinus Pain and Congestion-A-[place]

## 2024-11-01 ENCOUNTER — HOSPITAL ENCOUNTER (OUTPATIENT)
Dept: CT IMAGING | Facility: HOSPITAL | Age: 59
Discharge: HOME | End: 2024-11-01
Attending: FAMILY MEDICINE | Admitting: FAMILY MEDICINE
Payer: COMMERCIAL

## 2024-11-01 ENCOUNTER — OFFICE VISIT (OUTPATIENT)
Dept: URGENT CARE | Facility: URGENT CARE | Age: 59
End: 2024-11-01
Payer: COMMERCIAL

## 2024-11-01 VITALS
RESPIRATION RATE: 20 BRPM | DIASTOLIC BLOOD PRESSURE: 88 MMHG | OXYGEN SATURATION: 96 % | TEMPERATURE: 98.3 F | HEART RATE: 88 BPM | SYSTOLIC BLOOD PRESSURE: 160 MMHG

## 2024-11-01 DIAGNOSIS — R05.3 CHRONIC COUGH: ICD-10-CM

## 2024-11-01 DIAGNOSIS — J01.10 ACUTE NON-RECURRENT FRONTAL SINUSITIS: Primary | ICD-10-CM

## 2024-11-01 PROCEDURE — 71250 CT THORAX DX C-: CPT

## 2024-11-01 PROCEDURE — 99213 OFFICE O/P EST LOW 20 MIN: CPT | Performed by: PHYSICIAN ASSISTANT

## 2024-11-01 RX ORDER — LEVOFLOXACIN 750 MG/1
750 TABLET, FILM COATED ORAL DAILY
Qty: 7 TABLET | Refills: 0 | Status: SHIPPED | OUTPATIENT
Start: 2024-11-01 | End: 2024-11-08

## 2024-11-01 NOTE — PROGRESS NOTES
Assessment & Plan     Acute non-recurrent frontal sinusitis  Pt is immune suppressed with thick discolored nasal secretions, facial pain, pressure HA, PND And cough.  Was previously treated 1 month ago for uri with cough, CXR suggestive of possible infiltrate, treated for CAP with levaquin given allergies.  Partially improved but had persistent ongoing cough which is thought to be more RAD related. Had CT chest today.  However, does also have ongoing nasal secretions, facial pain and pressure worsening over the last week.  Exam is suggestive of sinusitis, lungs clear, vitally normal.  Pt was given levaquin once again due to multiple allergies.  Black box warnings previously discussed by myself.   Push fluids, rest and ibuprofen or tylenol for comfort.    Maximize allergy control with zyrtec and flonase  Continue with her inhalers.    Follow-up with pcp and rheumatology for her sx.  At the end of the encounter, I discussed results, diagnosis, medications. Discussed red flags for immediate return to clinic/ER, as well as indications for follow up if no improvement. Patient understood and agreed to plan. Patient was stable for discharge      - levofloxacin (LEVAQUIN) 750 MG tablet  Dispense: 7 tablet; Refill: 0         Karina Espinoza PA-C  [facility] ANDIE Singh is a 59 year old female who presents to clinic today for the following health issues:  Chief Complaint   Patient presents with    Cough     Cough/fever/headache NEG COVID          HPI    Progressive cough over the last several months. On ICS/LABA/TRINA Work up of the cough with CT today.  Immune suppressed, had previous CXR with possible infiltrate BLL, treated with levaquin.    After Levaquin nose was clear initially.  Cough never resolved.    5 days ago, facial pain, discharge, low grade fever, change of smell.  Sinuses ar congested.  Facial pain and pressure. Worsening sinus sx.   Seudafed, advil for pain control.     Avoiding oral steroids due to previous exposures with adrenal insufficiency given frequency of use.     Breathing stable. No sob, chest pain.   Does have allergies, off H2 blocker and nasal steroid currently, felt to be well controlled.     Review of Systems  Constitutional, HEENT, cardiovascular, pulmonary, gi and gu systems are negative, except as otherwise noted.      Objective    BP (!) 160/88   Pulse 88   Temp 98.3  F (36.8  C)   Resp 20   LMP  (LMP Unknown)   SpO2 96%   Physical Exam   Pt is in no acute distress and appears well  Ears patent B:  TM s intact, non-injected. All land marks easily visibile    Nasal mucosa is edematous, mucoid discharge, TTP maxillary and frontal sinuses    Pharynx: posterior pharynx erythematous, tonsils non hypertrophied, No exudate but moderate PND   Neck supple: no adenopathy  Lungs: CTA  Heart: RRR, no murmur, no thrills or heaves   Ext: no edema  Skin: no rashes    Results for orders placed or performed during the hospital encounter of 11/01/24   CT Chest w/o Contrast     Status: None    Narrative    EXAM: CT CHEST W/O CONTRAST  LOCATION: [facility]  DATE: 11/1/2024    INDICATION:  Chronic cough  COMPARISON: 5/29/2024  TECHNIQUE: CT chest without IV contrast. Multiplanar reformats were obtained. Dose reduction techniques were used.  CONTRAST: None.    FINDINGS:   LUNGS AND PLEURA: Mild bronchial wall thickening and bronchiectasis may be related to acute and/or chronic bronchitis. No consolidation or effusion . Benign granulomatous change.    MEDIASTINUM/AXILLAE: No adenopathy demonstrated in the absence of contrast. No aneurysm.    CORONARY ARTERY CALCIFICATION: None.    UPPER ABDOMEN: Hepatic steatosis.    MUSCULOSKELETAL: No frankly destructive bony lesions.      Impression    IMPRESSION:   1.  Mild bronchial wall thickening and bronchiectasis, question acute and/or chronic bronchitis     CT returned with above findings, reassuring of no  pulmonary pathology.                 Yes

## 2024-11-03 DIAGNOSIS — M05.79 SEROPOSITIVE RHEUMATOID ARTHRITIS OF MULTIPLE JOINTS (H): ICD-10-CM

## 2024-11-03 DIAGNOSIS — Z79.899 HIGH RISK MEDICATION USE: ICD-10-CM

## 2024-11-03 NOTE — RESULT ENCOUNTER NOTE
Patient updated by SMS Assistt message with imaging results.       Tennille Singh,  Thank you for completing the CT scan. It does show question of bronchiectasis as well as acute vs chronic bronchitis. I was going to recommend antibiotics, though it looks like you were recently restarted on Levaquin. Please complete antibiotics as prescribed. If not improving, please move forward with pulmonary referral as placed. I would also recommend the breathing tests (PFTs) as referred.  Yissel Schmidt, DO

## 2024-11-04 NOTE — PROGRESS NOTES
Keiry Mchugh is a 59 year old female who presents today for Botox injections for chronic migraine.    Patient had Botox injections for chronic migraine August 9, 2024.  Patient reports 90% improvement for over 2 months.  Over the past 2 weeks, she has begun to have migraines again.  However, she has also been dealing with sinus issues which could be contributing to headaches.  Patient would like to proceed with Botox injections today.    Pre-procedure diagnosis: Migraine headaches  Post-procedure diagnosis: Same    PROCEDURE:  Botulinum toxin (Botox) injections.      The risks and benefits of the procedure were discussed with the patient which included muscle weakness (including facial droop, inability to swallow, and inability to hold one's head up), allergic reaction, pain, bruising, bleeding, swelling, and death.  She opted to proceed and gave written and verbal consent.    The Botox paradigm was followed. Each site was marked with indelible marking pen in the locations designated by the paradigm. The skin over the marks was then cleansed with alcohol. A 27 guage 5/8 inch needle was used for injection at all sites. Aspiration was negative at each site prior to injection of botox.     The bilateral  muscles were injected with a total of 10 units (5 units each side).   The Procerus muscle was injected with a total of 5 units.   The Frontalis muscle was injected with a total of 20 units, divided into 4 sites.  The Temporalis muscle was injected with 20 units divided into 4 sites and performed bilaterally (total of 8 sites for total of 40 units).  The occipitalis muscle was injected with 30 units divided into 6 sites.  The cervical paraspinal muscles were injected with 20 units divided into 4 sites.  The trapezius muscle was injected with 30 units divided into 6 sites.     TOTAL UNITS: 155  Wasted units: 45 units.    The patient tolerated the injection well and afterwards did not have any  dizziness/lightheadedness or nausea. The patient was observed for a short period of time and then was discharged.  The patient was encouraged to call with any questions/concerns prior to the follow-up appointment.

## 2024-11-05 RX ORDER — FOLIC ACID 1 MG/1
TABLET ORAL
Qty: 270 TABLET | Refills: 0 | Status: SHIPPED | OUTPATIENT
Start: 2024-11-05

## 2024-11-08 ENCOUNTER — OFFICE VISIT (OUTPATIENT)
Dept: PHYSICAL MEDICINE AND REHAB | Facility: CLINIC | Age: 59
End: 2024-11-08
Payer: COMMERCIAL

## 2024-11-08 ENCOUNTER — VIRTUAL VISIT (OUTPATIENT)
Dept: SURGERY | Facility: CLINIC | Age: 59
End: 2024-11-08
Payer: COMMERCIAL

## 2024-11-08 VITALS — WEIGHT: 215 LBS | BODY MASS INDEX: 35.82 KG/M2 | HEIGHT: 65 IN

## 2024-11-08 VITALS — TEMPERATURE: 98.7 F | DIASTOLIC BLOOD PRESSURE: 90 MMHG | HEART RATE: 87 BPM | SYSTOLIC BLOOD PRESSURE: 165 MMHG

## 2024-11-08 DIAGNOSIS — G43.709 CHRONIC MIGRAINE WITHOUT AURA, NOT INTRACTABLE, WITHOUT STATUS MIGRAINOSUS: Primary | ICD-10-CM

## 2024-11-08 DIAGNOSIS — E66.01 SEVERE OBESITY (H): Primary | ICD-10-CM

## 2024-11-08 DIAGNOSIS — K75.81 METABOLIC DYSFUNCTION-ASSOCIATED STEATOHEPATITIS (MASH): ICD-10-CM

## 2024-11-08 DIAGNOSIS — E88.810 METABOLIC SYNDROME: ICD-10-CM

## 2024-11-08 PROCEDURE — 99443 PR PHYSICIAN TELEPHONE EVALUATION 21-30 MIN: CPT | Mod: 93 | Performed by: FAMILY MEDICINE

## 2024-11-08 RX ORDER — PHENTERMINE HYDROCHLORIDE 37.5 MG/1
37.5 TABLET ORAL
Qty: 90 TABLET | Refills: 1 | Status: SHIPPED | OUTPATIENT
Start: 2024-11-08

## 2024-11-08 ASSESSMENT — PAIN SCALES - GENERAL
PAINLEVEL_OUTOF10: EXTREME PAIN (9)
PAINLEVEL_OUTOF10: NO PAIN (0)

## 2024-11-08 NOTE — LETTER
11/8/2024      Keiry Mchugh  [address]  [place] [address]      Dear Colleague,    Thank you for referring your patient, Keiry Mchugh, to the [facility]. Please see a copy of my visit note below.    Keiry Mchugh is a 59 year old female who presents today for Botox injections for chronic migraine.    Patient had Botox injections for chronic migraine August 9, 2024.  Patient reports 90% improvement for over 2 months.  Over the past 2 weeks, she has begun to have migraines again.  However, she has also been dealing with sinus issues which could be contributing to headaches.  Patient would like to proceed with Botox injections today.    Pre-procedure diagnosis: Migraine headaches  Post-procedure diagnosis: Same    PROCEDURE:  Botulinum toxin (Botox) injections.      The risks and benefits of the procedure were discussed with the patient which included muscle weakness (including facial droop, inability to swallow, and inability to hold one's head up), allergic reaction, pain, bruising, bleeding, swelling, and death.  She opted to proceed and gave written and verbal consent.    The Botox paradigm was followed. Each site was marked with indelible marking pen in the locations designated by the paradigm. The skin over the marks was then cleansed with alcohol. A 27 guage 5/8 inch needle was used for injection at all sites. Aspiration was negative at each site prior to injection of botox.     The bilateral  muscles were injected with a total of 10 units (5 units each side).   The Procerus muscle was injected with a total of 5 units.   The Frontalis muscle was injected with a total of 20 units, divided into 4 sites.  The Temporalis muscle was injected with 20 units divided into 4 sites and performed bilaterally (total of 8 sites for total of 40 units).  The occipitalis muscle was injected with 30 units divided into 6 sites.  The cervical paraspinal muscles were injected with  20 units divided into 4 sites.  The trapezius muscle was injected with 30 units divided into 6 sites.     TOTAL UNITS: 155  Wasted units: 45 units.    The patient tolerated the injection well and afterwards did not have any dizziness/lightheadedness or nausea. The patient was observed for a short period of time and then was discharged.  The patient was encouraged to call with any questions/concerns prior to the follow-up appointment.           Again, thank you for allowing me to participate in the care of your patient.        Sincerely,        Damaris Leslie PA-C

## 2024-11-08 NOTE — PATIENT INSTRUCTIONS
Thinking about bariatric surgery or simply want to learn more about it?  Go to [email or web address] yoon more.     To get the link to support group which is the 3rd Tuesday of the month from 6:30pm to 8pm vitrual e-mail Dr. [email or web address]    Your lab orders are in the computer and can be drawn at any [facility] lab. Hospitals do not require appointments but clinics like you to call ahead to make an appointment.    Bariatric Task List    Fax:  Please fax all paperwork to: [phone] -     Status:  Is patient a candidate for bariatric surgery?:  patient is a candidate for bariatric surgery -     Cleared to schedule surgeon consult?:  not cleared to schedule surgeon consult -     Status:  surgery evaluation in process -     Surgeon: Jaylin -        Insurance: Insurance:  [employer] -      Contact insurance to discuss coverage: Needed -       [employer]: PCP Recommendation and Medical Clearance:  Needed -     Other:  Call Mee at [phone] to discuss insurance requirements. -        Patient Info: Initial Weight:  215 lb -     Date of Initial Weight/Height:  11/8/2024 -     Goal Weight (lbs):  215 -     Required Weight Loss:  No gain from initial weight. -     Surgery Type:  undecided -        Dietician Visits:    Psychological Evaluation: Psych eval:  Needed Aria Erickson      Lab Work: Complete Blood Count:  Completed -     Comprehensive Metabolic Panel:  Completed -     Vitamin D:  Completed -     PTH:  Needed -     Hgb A1c:  Needed -      TSH ([facility], [facility], [place] MA): Completed -       Ferritin: Needed -       Folate: Needed -     Thiamine: Needed -     Vitamin A: Needed -     Vitamin B12: Needed -     Zinc: Needed -     Other:  Needed - Let us know if you need help getting labs done.      Consults/ Clearance Sleep Medicine:  Completed - Sleep study done--does not have CYNTHIA.      Testing: Sleep Study: Completed -        Health Maintenance: Colonoscopy(> 50 yrs or family hx):  Completed - 3/18/2019--next  "due 2029   Mammogram (> 40 yrs or family hx):  Completed - 4/19/2024   Pap Smear (women): NOT Needed - s/p Hyst      Stopping Smoking/ Alcohol Use/Cannabis Use: Quit tobacco use (3 months smoke free)?:  Completed - quit 1999      Patient Education:  Information Session:  Needed - Must watch videos.   Given \"Making your decision\" handout?:  Yes -     Given \"A Roadmap to you Weight Loss Surgery\" handout?: Yes -     Attended support group?:  Needed - Must attend at least once!!   Support plan in place?:  Needed -        Additional Surgery Requirements: Review Coag plan:  Completed - Standard   Birth control plan:  Completed - s/p hyst   Gallstone prevention plan (Actigall for 6 months postop): Completed - s/p saritha      Final Tasks:  Before surgery online preop class:  Needed - Prior to surgery date   After surgery online class:  Needed - 1 week after surgery w/dietitian   History and Physical: Primary Care Provider -   Needed - within 30 days of surgery   Final labs per clinic: Needed - within 30 days of surgery   See MTM Pharmacist for medication review and plan for after surgery (if DM, transplant hx, greater than 10 meds): Needed - For timing of medications prior to surgery.   Chest xray per clinic: Needed - within 90 days of surgery   Electrocardiogram (ECG) per clinic: Needed - within 90 days of surgery   Schedule postop appointments: Needed - Mee to set up when scheduling surgery      Notes: She would like to have her RYGB within 3 months after her shoulder surgery so to not interrupt her STD or LTD -            "

## 2024-11-08 NOTE — LETTER
11/8/2024      Keiry Mchugh  [address]  [place] [address]      Dear Colleague,    Thank you for referring your patient, Keiry Mchugh, to the [facility] [place]. Please see a copy of my visit note below.    Virtual Visit Details    Type of service:  Telephone Visit   Phone call duration: 30 minutes   Originating Location (pt. Location): Home    Distant Location (provider location):  On-site      Bariatric Follow-up    59 year old  female BMI:Body mass index is 35.78 kg/m .    Weight:   Wt Readings from Last 1 Encounters:   11/08/24 97.5 kg (215 lb)    pounds    Comorbidities:  Patient Active Problem List   Diagnosis     Chronic insomnia     HARDIN (nonalcoholic steatohepatitis)     Sigmoid diverticulitis     Dyslipidemia     Metabolic syndrome     Celiac disease     Low bone mass     Profound fatigue     Cervical spondylosis without myelopathy     Cervical radiculitis     Class 2 severe obesity due to excess calories with serious comorbidity in adult (H)     Hiatal hernia with gastroesophageal reflux     Diaphragmatic hernia         Current Outpatient Medications:      phentermine (ADIPEX-P) 37.5 MG tablet, Take 1 tablet (37.5 mg) by mouth every morning (before breakfast)., Disp: 90 tablet, Rfl: 1     adalimumab (HUMIRA *CF*) 40 MG/0.4ML pen kit, Inject 0.4 mLs (40 mg) Subcutaneous every 14 days for 30 days Hold for signs of infection, then seek medical attention., Disp: 0.8 mL, Rfl: 5     albuterol (PROAIR HFA/PROVENTIL HFA/VENTOLIN HFA) 108 (90 Base) MCG/ACT inhaler, Inhale 2 puffs into the lungs every 6 hours as needed for shortness of breath, wheezing or cough., Disp: 18 g, Rfl: 0     atenolol (TENORMIN) 50 MG tablet, Take 1 tablet (50 mg) by mouth daily as needed (Migraines), Disp: 30 tablet, Rfl: 1     B Complex-C (RA B-COMPLEX/VITAMIN C CR) TBCR, Take 1 tablet by mouth daily, Disp: , Rfl:      baclofen (LIORESAL) 10 MG tablet, TAKE 1 TABLET BY MOUTH TWICE  DAILY TO THREE TIMES DAILY AS NEEDED FOR MUSCLE SPASMS., Disp: 30 tablet, Rfl: 3     Coenzyme Q10 (COQ-10) 400 MG CAPS, Take 1 capsule by mouth daily , Disp: , Rfl:      FLUoxetine (PROZAC) 20 MG capsule, Take 1 capsule (20 mg) by mouth daily, Disp: 90 capsule, Rfl: 3     fluticasone-salmeterol (ADVAIR) 250-50 MCG/ACT inhaler, Inhale 1 puff into the lungs every 12 hours., Disp: 1 each, Rfl: 0     folic acid (FOLVITE) 1 MG tablet, TAKE 3 TABLETS(3 MG) BY MOUTH DAILY, Disp: 270 tablet, Rfl: 0     hydroxychloroquine (PLAQUENIL) 200 MG tablet, TAKE 1 TABLET(200 MG) BY MOUTH TWICE DAILY, Disp: 180 tablet, Rfl: 0     levofloxacin (LEVAQUIN) 750 MG tablet, Take 1 tablet (750 mg) by mouth daily for 7 days., Disp: 7 tablet, Rfl: 0     methotrexate 2.5 MG tablet, Take 3 tablets (7.5 mg) by mouth every 7 days., Disp: 36 tablet, Rfl: 0     OnabotulinumtoxinA (BOTOX IJ), For migraines - 155 units q 12 weeks., Disp: , Rfl:      polyethylene glycol (MIRALAX) 17 GM/Dose powder, Take 17 g by mouth daily as needed, Disp: , Rfl:      SUMAtriptan (IMITREX) 100 MG tablet, TAKE 1/2 TO 1 TABLET BY MOUTH AT ONSET OF HEADACHE, Disp: 9 tablet, Rfl: 5  No current facility-administered medications for this visit.       Interim: Has been sick since August. Immunocompromised due to RA and medications. Still working at the dental clinic. Has a bad shoulder and had been getting steroid injections q12 weeks. Cortisol level was high so cannot get more. Now doing PT. Still working.   Has to have her shoulder surgeries. No coverage for GLP-1 RA. Would like to pursue bariatric surgery.    Cholesterol   Date Value Ref Range Status   01/16/2023 260 (H) <200 mg/dL Final      Hemoglobin A1C   Date Value Ref Range Status   10/11/2023 5.3 0.0 - 5.6 % Final     Comment:     Normal <5.7%   Prediabetes 5.7-6.4%    Diabetes 6.5% or higher     Note: Adopted from ADA consensus guidelines.      BP Readings from Last 3 Encounters:   11/08/24 (!) 165/90   11/01/24  (!) 160/88   10/23/24 133/84        Plan:  DIET  continue protein drink and hydration through the day.    EXERCISE PT for her shoulders   PHARMACOTHERAPY GLP-1 RA not covered.     protein drink and hydration, Will move forward with pursuit of bariatric surgery.    In summary, Keiry Mchugh has liver steatosis, HTN, hyperlipidemia, metabolic syndrome, multiple joint pain,  in the setting of morbid obesity.Her Body mass index is 35.78 kg/m . This satisfies NIH criteria for bariatric surgery. Once Samantha has been cleared by our bariatric psychologist and our bariatric dietitian, completed initial lab work, attended one support group, and satisfied insurance mandated visits if applicable, she  will be scheduled with Dr. Mosqueda for Bariatric Surgery Consultation. She is interested in the  RYGB or sleeve.  Samantha understands that routine health care maintenance will need to be up to date prior to surgery. She verbalizes understanding of the process to surgery and expectations for the postoperative period including the need for lifelong lifestyle changes, vitamin supplementation, and laboratory monitoring.       Weight will need to be 215# prior to submitting for insurance approval.  Standard DVT protocol  Sleep study has been done. She does not have CYNTHIA.  Additional consults: [facility] pharmacist for timing of medications prior to surgery.  She would like to have her RYGB within 3 months after her shoulder surgery so to not interrupt her STD or LTD  Health Care Maintenance UTD  HP phone calls          Sincerely,          Radha Gómez MD     Total time spent on the date of this encounter doing: chart review, review of test results, patient visit, physical exam, education, counseling, developing plan of care, and documenting = 45 minutes.            -We reviewed her medications and whether associated with weight gain.        We discussed [facility] Bariatric Basics including:  -eating 3 meals daily  -eating protein  "first  -eating slowly, chewing food well  -avoiding/limiting calorie containing beverages  -choosing wheat, not white with breads, crackers, pastas, venu, bagels, tortillas, rice  -limiting restaurant or cafeteria eating to twice a week or less  -We discussed the importance of restorative sleep and stress management in maintaining a healthy weight.  -We discussed insulin resistance and glycemic index as it relates to appetite and weight control  -We discussed the National Weight Control Registry healthy weight maintenance strategies and ways to optimize metabolism.  -We discussed the importance of physical activity including cardiovascular and strength training in maintaining a healthier weight and explored viable options.      DIETARY HISTORY  Meals Per Day: 3  Eating Protein First?: yes  Food Diary: B:eggs and turkey sausage or protein drink or yogurt L:gluten free crackers and cottage cheese D:fast foods or heat and eat  Snacks Per Day: occ  Typical Snack: crackers and cheese  Fluid Intake: intentional  Portion Control: difficult  Calorie Containing Beverages: no  Alcohol per week: none  Typical Protein Food Choices: lean  Choosing Whole Grains:   Grocery Shopping is done by: herself  Food Preparation is done by: herself  Meals at Restaurant/Cafeteria/Take Out Per Week: daily  Eating at the Table:   TV is Off During Meals:     Positive Changes Since Last Visit: Weight is stable despite prolonged illness  Struggling With: fatigue, illness    Knowledgeable in Reading Food Labels:   Getting Adequate Protein: YES  Sleeping 7-8 hours/day poorly due to pain in shoulders.   Stress management Kids and grandchildren    PHYSICAL ACTIVITY PATTERNS:  Cardiovascular: working FT which provides steps most days  Strength Training: not currently d/t shoulders    REVIEW OF SYSTEMS    PHYSICAL EXAM: (most recent vitals and today's stated weight)  Vitals: Ht 1.651 m (5' 5\")   Wt 97.5 kg (215 lb)   LMP  (LMP Unknown)   BMI 35.78 " kg/m        GEN: Pleasant and in no acute distress  PSYCH: A&OX3,     I have reviewed the note as documented above.  This accurately captures the substance of my conversation with the patient.  Thank you for the opportunity to participate in the care of your patient.    Radha Gómez MD, FAAFP  [facility]  Diplomate, American Board of Obesity Medicine    Total time spent on the date of this encounter doing: chart review, review of test results, patient visit, physical exam, education, counseling, developing plan of care, and documenting = 45 minutes.          Again, thank you for allowing me to participate in the care of your patient.        Sincerely,        Radha Gómez MD

## 2024-11-08 NOTE — PROGRESS NOTES
Virtual Visit Details    Type of service:  Telephone Visit   Phone call duration: 30 minutes   Originating Location (pt. Location): Home    Distant Location (provider location):  On-site      Bariatric Follow-up    59 year old  female BMI:Body mass index is 35.78 kg/m .    Weight:   Wt Readings from Last 1 Encounters:   11/08/24 97.5 kg (215 lb)    pounds    Comorbidities:  Patient Active Problem List   Diagnosis    Chronic insomnia    HARDIN (nonalcoholic steatohepatitis)    Sigmoid diverticulitis    Dyslipidemia    Metabolic syndrome    Celiac disease    Low bone mass    Profound fatigue    Cervical spondylosis without myelopathy    Cervical radiculitis    Class 2 severe obesity due to excess calories with serious comorbidity in adult (H)    Hiatal hernia with gastroesophageal reflux    Diaphragmatic hernia         Current Outpatient Medications:     phentermine (ADIPEX-P) 37.5 MG tablet, Take 1 tablet (37.5 mg) by mouth every morning (before breakfast)., Disp: 90 tablet, Rfl: 1    adalimumab (HUMIRA *CF*) 40 MG/0.4ML pen kit, Inject 0.4 mLs (40 mg) Subcutaneous every 14 days for 30 days Hold for signs of infection, then seek medical attention., Disp: 0.8 mL, Rfl: 5    albuterol (PROAIR HFA/PROVENTIL HFA/VENTOLIN HFA) 108 (90 Base) MCG/ACT inhaler, Inhale 2 puffs into the lungs every 6 hours as needed for shortness of breath, wheezing or cough., Disp: 18 g, Rfl: 0    atenolol (TENORMIN) 50 MG tablet, Take 1 tablet (50 mg) by mouth daily as needed (Migraines), Disp: 30 tablet, Rfl: 1    B Complex-C (RA B-COMPLEX/VITAMIN C CR) TBCR, Take 1 tablet by mouth daily, Disp: , Rfl:     baclofen (LIORESAL) 10 MG tablet, TAKE 1 TABLET BY MOUTH TWICE DAILY TO THREE TIMES DAILY AS NEEDED FOR MUSCLE SPASMS., Disp: 30 tablet, Rfl: 3    Coenzyme Q10 (COQ-10) 400 MG CAPS, Take 1 capsule by mouth daily , Disp: , Rfl:     FLUoxetine (PROZAC) 20 MG capsule, Take 1 capsule (20 mg) by mouth daily, Disp: 90 capsule, Rfl: 3     fluticasone-salmeterol (ADVAIR) 250-50 MCG/ACT inhaler, Inhale 1 puff into the lungs every 12 hours., Disp: 1 each, Rfl: 0    folic acid (FOLVITE) 1 MG tablet, TAKE 3 TABLETS(3 MG) BY MOUTH DAILY, Disp: 270 tablet, Rfl: 0    hydroxychloroquine (PLAQUENIL) 200 MG tablet, TAKE 1 TABLET(200 MG) BY MOUTH TWICE DAILY, Disp: 180 tablet, Rfl: 0    levofloxacin (LEVAQUIN) 750 MG tablet, Take 1 tablet (750 mg) by mouth daily for 7 days., Disp: 7 tablet, Rfl: 0    methotrexate 2.5 MG tablet, Take 3 tablets (7.5 mg) by mouth every 7 days., Disp: 36 tablet, Rfl: 0    OnabotulinumtoxinA (BOTOX IJ), For migraines - 155 units q 12 weeks., Disp: , Rfl:     polyethylene glycol (MIRALAX) 17 GM/Dose powder, Take 17 g by mouth daily as needed, Disp: , Rfl:     SUMAtriptan (IMITREX) 100 MG tablet, TAKE 1/2 TO 1 TABLET BY MOUTH AT ONSET OF HEADACHE, Disp: 9 tablet, Rfl: 5  No current facility-administered medications for this visit.       Interim: Has been sick since August. Immunocompromised due to RA and medications. Still working at the dental clinic. Has a bad shoulder and had been getting steroid injections q12 weeks. Cortisol level was high so cannot get more. Now doing PT. Still working.   Has to have her shoulder surgeries. No coverage for GLP-1 RA. Would like to pursue bariatric surgery.    Cholesterol   Date Value Ref Range Status   01/16/2023 260 (H) <200 mg/dL Final      Hemoglobin A1C   Date Value Ref Range Status   10/11/2023 5.3 0.0 - 5.6 % Final     Comment:     Normal <5.7%   Prediabetes 5.7-6.4%    Diabetes 6.5% or higher     Note: Adopted from ADA consensus guidelines.      BP Readings from Last 3 Encounters:   11/08/24 (!) 165/90   11/01/24 (!) 160/88   10/23/24 133/84        Plan:  DIET  continue protein drink and hydration through the day.    EXERCISE PT for her shoulders   PHARMACOTHERAPY GLP-1 RA not covered.     protein drink and hydration, Will move forward with pursuit of bariatric surgery.    In  summary, Keiry Mchugh has liver steatosis, HTN, hyperlipidemia, metabolic syndrome, multiple joint pain,  in the setting of morbid obesity.Her Body mass index is 35.78 kg/m . This satisfies NIH criteria for bariatric surgery. Once Samantha has been cleared by our bariatric psychologist and our bariatric dietitian, completed initial lab work, attended one support group, and satisfied insurance mandated visits if applicable, she  will be scheduled with Dr. Mosqueda for Bariatric Surgery Consultation. She is interested in the  RYGB or sleeve.  Samantha understands that routine health care maintenance will need to be up to date prior to surgery. She verbalizes understanding of the process to surgery and expectations for the postoperative period including the need for lifelong lifestyle changes, vitamin supplementation, and laboratory monitoring.       Weight will need to be 215# prior to submitting for insurance approval.  Standard DVT protocol  Sleep study has been done. She does not have CYNTHIA.  Additional consults: [facility] pharmacist for timing of medications prior to surgery.  She would like to have her RYGB within 3 months after her shoulder surgery so to not interrupt her STD or [facility]  Health Care Maintenance UTD  HP phone calls          Sincerely,          Radha Gómez MD     Total time spent on the date of this encounter doing: chart review, review of test results, patient visit, physical exam, education, counseling, developing plan of care, and documenting = 45 minutes.            -We reviewed her medications and whether associated with weight gain.        We discussed [facility] Bariatric Basics including:  -eating 3 meals daily  -eating protein first  -eating slowly, chewing food well  -avoiding/limiting calorie containing beverages  -choosing wheat, not white with breads, crackers, pastas, venu, bagels, tortillas, rice  -limiting restaurant or cafeteria eating to twice a week or less  -We discussed the importance of  "restorative sleep and stress management in maintaining a healthy weight.  -We discussed insulin resistance and glycemic index as it relates to appetite and weight control  -We discussed the National Weight Control Registry healthy weight maintenance strategies and ways to optimize metabolism.  -We discussed the importance of physical activity including cardiovascular and strength training in maintaining a healthier weight and explored viable options.      DIETARY HISTORY  Meals Per Day: 3  Eating Protein First?: yes  Food Diary: B:eggs and turkey sausage or protein drink or yogurt L:gluten free crackers and cottage cheese D:fast foods or heat and eat  Snacks Per Day: occ  Typical Snack: crackers and cheese  Fluid Intake: intentional  Portion Control: difficult  Calorie Containing Beverages: no  Alcohol per week: none  Typical Protein Food Choices: lean  Choosing Whole Grains:   Grocery Shopping is done by: herself  Food Preparation is done by: herself  Meals at Restaurant/Cafeteria/Take Out Per Week: daily  Eating at the Table:   TV is Off During Meals:     Positive Changes Since Last Visit: Weight is stable despite prolonged illness  Struggling With: fatigue, illness    Knowledgeable in Reading Food Labels:   Getting Adequate Protein: YES  Sleeping 7-8 hours/day poorly due to pain in shoulders.   Stress management Kids and grandchildren    PHYSICAL ACTIVITY PATTERNS:  Cardiovascular: working FT which provides steps most days  Strength Training: not currently d/t shoulders    REVIEW OF SYSTEMS    PHYSICAL EXAM: (most recent vitals and today's stated weight)  Vitals: Ht 1.651 m (5' 5\")   Wt 97.5 kg (215 lb)   LMP  (LMP Unknown)   BMI 35.78 kg/m        GEN: Pleasant and in no acute distress  PSYCH: A&OX3,     I have reviewed the note as documented above.  This accurately captures the substance of my conversation with the patient.  Thank you for the opportunity to participate in the care of your patient.    Radha " NOE Gómez MD, FAAFP  [facility]  Diplomate, American Board of Obesity Medicine    Total time spent on the date of this encounter doing: chart review, review of test results, patient visit, physical exam, education, counseling, developing plan of care, and documenting = 45 minutes.

## 2024-11-08 NOTE — NURSING NOTE
Current patient location: [address] [place] [address]    Is the patient currently in the state of [place]? YES    Visit mode:TELEPHONE    If the visit is dropped, the patient can be reconnected by: TELEPHONE VISIT: Phone number: [phone]    Will anyone else be joining the visit? NO  (If patient encounters technical issues they should call [phone])    Are changes needed to the allergy or medication list? Pt stated no changes to allergies and Pt stated no med changes    Are refills needed on medications prescribed by this physician? YES phentermine maybe increase dose    Reason for visit: RECHECK    Fiorella Ochoa VVF

## 2024-11-14 ENCOUNTER — LAB (OUTPATIENT)
Dept: LAB | Facility: CLINIC | Age: 59
End: 2024-11-14
Payer: COMMERCIAL

## 2024-11-14 DIAGNOSIS — E66.01 SEVERE OBESITY (H): ICD-10-CM

## 2024-11-14 DIAGNOSIS — M05.79 SEROPOSITIVE RHEUMATOID ARTHRITIS OF MULTIPLE JOINTS (H): ICD-10-CM

## 2024-11-14 DIAGNOSIS — Z79.899 HIGH RISK MEDICATION USE: ICD-10-CM

## 2024-11-14 DIAGNOSIS — E88.810 METABOLIC SYNDROME: ICD-10-CM

## 2024-11-14 DIAGNOSIS — R94.4 DECREASED GFR: ICD-10-CM

## 2024-11-14 DIAGNOSIS — Z90.5 HISTORY OF NEPHRECTOMY: ICD-10-CM

## 2024-11-14 DIAGNOSIS — K75.81 METABOLIC DYSFUNCTION-ASSOCIATED STEATOHEPATITIS (MASH): ICD-10-CM

## 2024-11-14 LAB
ALBUMIN SERPL BCG-MCNC: 4.7 G/DL (ref 3.5–5.2)
ALT SERPL W P-5'-P-CCNC: 57 U/L (ref 0–50)
ANION GAP SERPL CALCULATED.3IONS-SCNC: 12 MMOL/L (ref 7–15)
BUN SERPL-MCNC: 20 MG/DL (ref 6–20)
CALCIUM SERPL-MCNC: 9.9 MG/DL (ref 8.8–10.4)
CHLORIDE SERPL-SCNC: 101 MMOL/L (ref 98–107)
CREAT SERPL-MCNC: 1.18 MG/DL (ref 0.51–0.95)
EGFRCR SERPLBLD CKD-EPI 2021: 54 ML/MIN/1.73M2
ERYTHROCYTE [DISTWIDTH] IN BLOOD BY AUTOMATED COUNT: 11.9 % (ref 10–15)
EST. AVERAGE GLUCOSE BLD GHB EST-MCNC: 111 MG/DL
FERRITIN SERPL-MCNC: 144 NG/ML (ref 11–328)
FOLATE SERPL-MCNC: >40 NG/ML (ref 4.6–34.8)
GLUCOSE SERPL-MCNC: 100 MG/DL (ref 70–99)
HBA1C MFR BLD: 5.5 % (ref 0–5.6)
HCO3 SERPL-SCNC: 26 MMOL/L (ref 22–29)
HCT VFR BLD AUTO: 41.1 % (ref 35–47)
HGB BLD-MCNC: 13.6 G/DL (ref 11.7–15.7)
MCH RBC QN AUTO: 30 PG (ref 26.5–33)
MCHC RBC AUTO-ENTMCNC: 33.1 G/DL (ref 31.5–36.5)
MCV RBC AUTO: 91 FL (ref 78–100)
PLATELET # BLD AUTO: 366 10E3/UL (ref 150–450)
POTASSIUM SERPL-SCNC: 4.5 MMOL/L (ref 3.4–5.3)
PTH-INTACT SERPL-MCNC: 64 PG/ML (ref 15–65)
RBC # BLD AUTO: 4.54 10E6/UL (ref 3.8–5.2)
SODIUM SERPL-SCNC: 139 MMOL/L (ref 135–145)
VIT B12 SERPL-MCNC: 978 PG/ML (ref 232–1245)
WBC # BLD AUTO: 6.4 10E3/UL (ref 4–11)

## 2024-11-14 PROCEDURE — 83970 ASSAY OF PARATHORMONE: CPT

## 2024-11-14 PROCEDURE — 82746 ASSAY OF FOLIC ACID SERUM: CPT

## 2024-11-14 PROCEDURE — 82040 ASSAY OF SERUM ALBUMIN: CPT

## 2024-11-14 PROCEDURE — 82728 ASSAY OF FERRITIN: CPT

## 2024-11-14 PROCEDURE — 84425 ASSAY OF VITAMIN B-1: CPT | Mod: 90

## 2024-11-14 PROCEDURE — 84630 ASSAY OF ZINC: CPT | Mod: 90

## 2024-11-14 PROCEDURE — 84460 ALANINE AMINO (ALT) (SGPT): CPT

## 2024-11-14 PROCEDURE — 85027 COMPLETE CBC AUTOMATED: CPT

## 2024-11-14 PROCEDURE — 99000 SPECIMEN HANDLING OFFICE-LAB: CPT

## 2024-11-14 PROCEDURE — 84590 ASSAY OF VITAMIN A: CPT | Mod: 90

## 2024-11-14 PROCEDURE — 83036 HEMOGLOBIN GLYCOSYLATED A1C: CPT

## 2024-11-14 PROCEDURE — 80048 BASIC METABOLIC PNL TOTAL CA: CPT

## 2024-11-14 PROCEDURE — 36415 COLL VENOUS BLD VENIPUNCTURE: CPT

## 2024-11-14 PROCEDURE — 82607 VITAMIN B-12: CPT

## 2024-11-15 ENCOUNTER — TELEPHONE (OUTPATIENT)
Dept: SURGERY | Facility: CLINIC | Age: 59
End: 2024-11-15
Payer: COMMERCIAL

## 2024-11-15 NOTE — RESULT ENCOUNTER NOTE
Patient updated by [employer] message with lab results.      Tennille Singh,  Kidney function is stable from last check.  If you would be more comfortable, I am happy to place referral for nephrology.  Otherwise, lets continue to keep an eye on labs.  Yissel Schmidt, DO

## 2024-11-15 NOTE — TELEPHONE ENCOUNTER
I was finally able to reach Keiry this morning after a lengthy game of phone tag.  Her insurance is [employer] and she will need to be cleared by psych and nutrition.  She also needs a letter of support from her PCP and states that she will send her a message to have that completed today.  I have provided her with Dre's email to request a support group invite.  Some of her labs need to be updated in her task list as completed.                I have left a message for Samantha to call back to discuss insurance and program requirements.  I reached out to her plan and she does have coverage for surgery.  She needs to get a letter of support from her PCP prior to sending the auth request.  She will need to be cleared by psych and nutrition and complete her needs list.  She does not have any of these appointments made at this time.  I will get her on the schedule once she calls back

## 2024-11-17 LAB — ZINC SERPL-MCNC: 81.5 UG/DL

## 2024-11-18 LAB
ANNOTATION COMMENT IMP: NORMAL
RETINYL PALMITATE SERPL-MCNC: 0.03 MG/L
VIT A SERPL-MCNC: 0.79 MG/L

## 2024-11-19 ENCOUNTER — OFFICE VISIT (OUTPATIENT)
Dept: RHEUMATOLOGY | Facility: CLINIC | Age: 59
End: 2024-11-19
Payer: COMMERCIAL

## 2024-11-19 VITALS
OXYGEN SATURATION: 100 % | SYSTOLIC BLOOD PRESSURE: 135 MMHG | DIASTOLIC BLOOD PRESSURE: 85 MMHG | BODY MASS INDEX: 37.66 KG/M2 | WEIGHT: 226.3 LBS | HEART RATE: 93 BPM

## 2024-11-19 DIAGNOSIS — M15.0 PRIMARY OSTEOARTHRITIS INVOLVING MULTIPLE JOINTS: ICD-10-CM

## 2024-11-19 DIAGNOSIS — M05.79 SEROPOSITIVE RHEUMATOID ARTHRITIS OF MULTIPLE JOINTS (H): Primary | ICD-10-CM

## 2024-11-19 DIAGNOSIS — R76.8 RHEUMATOID FACTOR POSITIVE: ICD-10-CM

## 2024-11-19 DIAGNOSIS — Z79.899 HIGH RISK MEDICATION USE: ICD-10-CM

## 2024-11-19 DIAGNOSIS — K75.81 NASH (NONALCOHOLIC STEATOHEPATITIS): ICD-10-CM

## 2024-11-19 LAB — VIT B1 PYROPHOSHATE BLD-SCNC: 85 NMOL/L

## 2024-11-19 PROCEDURE — 99214 OFFICE O/P EST MOD 30 MIN: CPT | Performed by: INTERNAL MEDICINE

## 2024-11-19 PROCEDURE — G2211 COMPLEX E/M VISIT ADD ON: HCPCS | Performed by: INTERNAL MEDICINE

## 2024-11-19 NOTE — PROGRESS NOTES
Rheumatology follow-up visit note     Keiry is a 59 year old female presents today for follow-up.    Samantha was seen today for recheck.    Diagnoses and all orders for this visit:    Seropositive rheumatoid arthritis of multiple joints (H)    High risk medication use    HARDIN (nonalcoholic steatohepatitis)    Rheumatoid factor positive    Primary osteoarthritis involving multiple joints        The longitudinal plan of care for the diagnosis(es)/condition(s) as documented were addressed during this visit. Due to the added complexity in care, I will continue to support Samantha in the subsequent management and with ongoing continuity of care.      Follow up in 3 months.    HPI    Keiry Mchugh is a 59 year old female is here for follow-up of   rheumatoid arthritis seropositive.  On her previous visit Humira was started in view of ongoing rheumatoid activity, meanwhile her liver function studiesBecome abnormal she does have a history of HARDIN.  She is following up at [facility] for question of Cushing's/hyperparathyroidism.  Meanwhile her methotrexate dose was reduced.  She had her eyes examined she recalls earlier this year in March for hydroxychloroquine toxicity monitoring.  Those data will be requested.  With Humira on board of which she has had 3 injections so far she has beginning to notice improvement in multiple joints both in terms of the pain, stiffness, even swelling.  She has 10 mg of methotrexate on board per week along with folic acid 3 mg a day.  This is in addition to hydroxychloroquine.  We had had conversation around duloxetine in the past and the reader is referred to that.         DETAILED EXAMINATION  11/19/24  :    Vitals:    11/19/24 0831   BP: 135/85   BP Location: Right arm   Pulse: 93   SpO2: 100%   Weight: 102.6 kg (226 lb 4.8 oz)     Alert oriented. Head including the face is examined for malar rash, heliotropes, scarring, lupus pernio. Eyes examined for redness such as in  episcleritis/scleritis, periorbital lesions.   Neck/ Face examined for parotid gland swelling, range of motion of neck.  Left upper and lower and right upper and lower extremities examined for tenderness, swelling, warmth of the appendicular joints, range of motion, edema, rash.  Some of the important findings included: she does not have evidence of synovitis in the palpable joints of the upper extremities.  No significant deformities of the digits.  no Heberden nodes.  Range of motion of the shoulders  show full abduction.  No JLT effusion or warmth of the knees.  she does not have dactylitis of the digits.     Patient Active Problem List    Diagnosis Date Noted    Hiatal hernia with gastroesophageal reflux 09/21/2024     Priority: Medium    Class 2 severe obesity due to excess calories with serious comorbidity in adult (H) 09/13/2024     Priority: Medium    Diaphragmatic hernia 08/28/2024     Priority: Medium    Cervical spondylosis without myelopathy 10/13/2023     Priority: Medium    Cervical radiculitis 10/13/2023     Priority: Medium    Low bone mass 07/21/2023     Priority: Medium    Profound fatigue 07/21/2023     Priority: Medium    Dyslipidemia      Priority: Medium    Metabolic syndrome      Priority: Medium    Celiac disease      Priority: Medium    Sigmoid diverticulitis 08/29/2021     Priority: Medium    HARDIN (nonalcoholic steatohepatitis) 07/23/2021     Priority: Medium    Chronic insomnia 06/11/2020     Priority: Medium     Insomnia associated with inadequate sleep hygiene and suspected sleep apnea.       Past Surgical History:   Procedure Laterality Date    APPENDECTOMY      CHOLECYSTECTOMY      COLON SURGERY      COLONOSCOPY N/A 3/18/2019    Procedure: COLONOSCOPY;  Surgeon: Davis Mosqueda MD;  Location: [facility];  Service: General    CYSTOSCOPY N/A 7/16/2015    Procedure: CYSTOSCOPY;  Surgeon: Nate Horta MD;  Location: [facility] OR;  Service:     ESOPHAGOSCOPY,  GASTROSCOPY, DUODENOSCOPY (EGD), COMBINED N/A 9/16/2024    Procedure: Esophagogastroduodenoscopy with biopsies;  Surgeon: Guevara Livingston MD;  Location: [facility] OR    HC CORRECT BUNION,METATARSAL OSTEOTOMY      Description: Bunion Correction With Metatarsal Osteotomy Herman Procedure;  Proc Date: 07/16/2010;    HYSTERECTOMY      HYSTERECTOMY, PAP NO LONGER INDICATED  02/2009    NEPHRECTOMY LIVING DONOR Left APRIL 2012    ID LAP,BILIARY TRACT,UNLISTED N/A 3/19/2019    Procedure: BIOPSY, LIVER, LAPAROSCOPIC;  Surgeon: Davis Mosqueda MD;  Location: [facility];  Service: General    ID LAP,SLING OPERATION      Description: Laparoscopic Sling Operation For Stress Incontinence;  Recorded: 02/17/2009;    ID LAP,SURG,COLECTOMY, PARTIAL, W/ANAST N/A 3/19/2019    Procedure: LAPAROSCOPIC SIGMOID COLON RESECTION;  Surgeon: Davis Mosqueda MD;  Location: [facility];  Service: General    SEPTOPLASTY, TURBINOPLASTY, COMBINED N/A 8/3/2021    Procedure: SEPTOPLASTY, NOSE, WITH TURBINOPLASTY RADIOFREQUENCY BALLON ASSISTED, CRYOBLATION OF NASAL TISSUE;  Surgeon: Randy Case MD;  Location: [facility] OR    SIGMOIDOSCOPY FLEXIBLE N/A 3/19/2019    Procedure: FLEXIBLE SIGMOIDOSCOPY;  Surgeon: Davis Mosqueda MD;  Location: [facility];  Service: General      Past Medical History:   Diagnosis Date    Anemia     Anxiety     Celiac disease     Celiac disease     Cervical spondylosis without myelopathy 10/13/2023    Chronic kidney disease     donated 1 kidney    Class 2 severe obesity due to excess calories with serious comorbidity in adult (H) 9/13/2024    Cough     Depression     Seasonal    Diverticulitis     Dyslipidemia     Enlarged LV     wall    GERD (gastroesophageal reflux disease)     Hepatitis     Hiatal hernia     Hypertension     Low bone mass     Metabolic syndrome     Migraines     HARDIN (nonalcoholic steatohepatitis)     Peripheral neuropathy     PONV (postoperative nausea and vomiting)  "    Profound fatigue     Ventral hernia      Allergies   Allergen Reactions    Gluten Meal Nausea and Vomiting    Doxycycline Hives    Contrast Dye      Hives, resolved with benadryl alone in the past (two episode)    Nsaids Other (See Comments)     Avoid - one kidney    Ondansetron Headache    Pcn [Penicillins] Hives     As a child    Rocephin [Ceftriaxone] Hives     Patient states this started immediately    Tramadol Hives    Zithromax [Azithromycin] Tinnitus     \"ear ringing and hearing loss\"     Current Outpatient Medications   Medication Sig Dispense Refill    adalimumab (HUMIRA *CF*) 40 MG/0.4ML pen kit Inject 0.4 mLs (40 mg) Subcutaneous every 14 days for 30 days Hold for signs of infection, then seek medical attention. 0.8 mL 5    albuterol (PROAIR HFA/PROVENTIL HFA/VENTOLIN HFA) 108 (90 Base) MCG/ACT inhaler Inhale 2 puffs into the lungs every 6 hours as needed for shortness of breath, wheezing or cough. 18 g 0    atenolol (TENORMIN) 50 MG tablet Take 1 tablet (50 mg) by mouth daily as needed (Migraines) 30 tablet 1    B Complex-C (RA B-COMPLEX/VITAMIN C CR) TBCR Take 1 tablet by mouth daily      baclofen (LIORESAL) 10 MG tablet TAKE 1 TABLET BY MOUTH TWICE DAILY TO THREE TIMES DAILY AS NEEDED FOR MUSCLE SPASMS. 30 tablet 3    Coenzyme Q10 (COQ-10) 400 MG CAPS Take 1 capsule by mouth daily       FLUoxetine (PROZAC) 20 MG capsule Take 1 capsule (20 mg) by mouth daily 90 capsule 3    fluticasone-salmeterol (ADVAIR) 250-50 MCG/ACT inhaler Inhale 1 puff into the lungs every 12 hours. 1 each 0    folic acid (FOLVITE) 1 MG tablet TAKE 3 TABLETS(3 MG) BY MOUTH DAILY 270 tablet 0    hydroxychloroquine (PLAQUENIL) 200 MG tablet TAKE 1 TABLET(200 MG) BY MOUTH TWICE DAILY 180 tablet 0    methotrexate 2.5 MG tablet Take 3 tablets (7.5 mg) by mouth every 7 days. 36 tablet 0    OnabotulinumtoxinA (BOTOX IJ) For migraines - 155 units q 12 weeks.      phentermine (ADIPEX-P) 37.5 MG tablet Take 1 tablet (37.5 mg) by mouth " every morning (before breakfast). 90 tablet 1    polyethylene glycol (MIRALAX) 17 GM/Dose powder Take 17 g by mouth daily as needed      SUMAtriptan (IMITREX) 100 MG tablet TAKE 1/2 TO 1 TABLET BY MOUTH AT ONSET OF HEADACHE 9 tablet 5     family history includes Breast Cancer (age of onset: 50) in her maternal aunt and mother; Graves' disease in her sister; Hashimoto's thyroiditis in her maternal aunt, maternal grandfather, and mother; Hypothyroidism in her maternal aunt, maternal aunt, and sister.  Social Connections: Moderately Isolated (2/27/2023)    [facility]    Social Connection and Isolation Panel [NHANES]     Frequency of Communication with Friends and Family: Twice a week     Frequency of Social Gatherings with Friends and Family: Once a week     Attends [employer] Services: 1 to 4 times per year     Active Member of Clubs or Organizations: No     Attends Club or Organization Meetings: Not on file     Marital Status: Never           WBC Count   Date Value Ref Range Status   11/14/2024 6.4 4.0 - 11.0 10e3/uL Final     RBC Count   Date Value Ref Range Status   11/14/2024 4.54 3.80 - 5.20 10e6/uL Final     Hemoglobin   Date Value Ref Range Status   11/14/2024 13.6 11.7 - 15.7 g/dL Final     Hematocrit   Date Value Ref Range Status   11/14/2024 41.1 35.0 - 47.0 % Final     MCV   Date Value Ref Range Status   11/14/2024 91 78 - 100 fL Final     MCH   Date Value Ref Range Status   11/14/2024 30.0 26.5 - 33.0 pg Final     Platelet Count   Date Value Ref Range Status   11/14/2024 366 150 - 450 10e3/uL Final     % Lymphocytes   Date Value Ref Range Status   08/21/2024 53 % Final     AST   Date Value Ref Range Status   05/20/2024 20 0 - 45 U/L Final     Comment:     Reference intervals for this test were updated on 6/12/2023 to more accurately reflect our healthy population. There may be differences in the flagging of prior results with similar values performed with this method.  Interpretation of those prior results can be made in the context of the updated reference intervals.     ALT   Date Value Ref Range Status   11/14/2024 57 (H) 0 - 50 U/L Final     Albumin   Date Value Ref Range Status   11/14/2024 4.7 3.5 - 5.2 g/dL Final   09/08/2021 4.3 3.5 - 5.0 g/dL Final     Alkaline Phosphatase   Date Value Ref Range Status   05/20/2024 80 40 - 150 U/L Final     Creatinine   Date Value Ref Range Status   11/14/2024 1.18 (H) 0.51 - 0.95 mg/dL Final     GFR Estimate   Date Value Ref Range Status   11/14/2024 54 (L) >60 mL/min/1.73m2 Final     Comment:     eGFR calculated using 2021 CKD-EPI equation.   12/01/2020 61 >=60 mL/min/BSA Final   06/19/2020 >60 >60 mL/min/1.73m2 Final     GFR Estimate If Black   Date Value Ref Range Status   06/19/2020 >60 >60 mL/min/1.73m2 Final     GFREstimate If Black   Date Value Ref Range Status   12/01/2020 71 >=60 mL/min/BSA Final     Erythrocyte Sedimentation Rate   Date Value Ref Range Status   05/03/2024 13 0 - 30 mm/hr Final     CRP   Date Value Ref Range Status   05/26/2023 3.1 (H) 0.0 - <0.8 mg/dL Final

## 2024-11-25 LAB — CV ZIO PRELIM RESULTS: NORMAL

## 2024-12-02 DIAGNOSIS — M05.79 SEROPOSITIVE RHEUMATOID ARTHRITIS OF MULTIPLE JOINTS (H): ICD-10-CM

## 2024-12-03 RX ORDER — METHOTREXATE 2.5 MG/1
TABLET ORAL
Qty: 36 TABLET | Refills: 0 | Status: SHIPPED | OUTPATIENT
Start: 2024-12-03

## 2024-12-05 NOTE — TELEPHONE ENCOUNTER
Reason contacted:  Return to work letter  Information relayed:  Pt notified that return to work letter has been sent via [employer]. Pt voiced understanding.  Pt wanted to let Dr. Linton know that she has appointments scheduled with the liver specialist, and Dr. Mosqueda.  Additional questions:  No  Further follow-up needed:  No      
Who is calling:  Patient  Reason for Call:  Return to work letter  Needs letter for employer, clearing her to return to work 2/14/19.   Please send letter to her through [email or web address].  Date of last appointment with primary care: 2/7/19  Has the patient been recently seen:  Yes  Okay to leave a detailed message: Yes          
noted  
Initial (On Arrival)

## 2024-12-05 NOTE — TELEPHONE ENCOUNTER
Tasklist updated.     Meghan Avery RN, CBN  [facility]  P [phone]  F [phone]

## 2024-12-12 ENCOUNTER — TELEPHONE (OUTPATIENT)
Dept: RHEUMATOLOGY | Facility: CLINIC | Age: 59
End: 2024-12-12
Payer: COMMERCIAL

## 2024-12-16 DIAGNOSIS — M62.838 MUSCLE SPASM: ICD-10-CM

## 2024-12-17 NOTE — TELEPHONE ENCOUNTER
Attempted to call patient, left message for return call to clinic. Medication last prescribed 6/2023 by Dr. Linton, please assist in scheduling a visit with PCP for discussion regarding refills. Thanks!    Georgie Antunez RN

## 2024-12-23 RX ORDER — BACLOFEN 10 MG/1
TABLET ORAL
Qty: 30 TABLET | Refills: 3 | OUTPATIENT
Start: 2024-12-23

## 2024-12-23 NOTE — TELEPHONE ENCOUNTER
I have not discussed this prescription with patient. I would request e-visit to review her clinical concerns to discuss treatment plan.  Yissel Schmidt, DO

## 2024-12-28 ENCOUNTER — APPOINTMENT (OUTPATIENT)
Dept: CT IMAGING | Facility: HOSPITAL | Age: 59
End: 2024-12-28
Attending: EMERGENCY MEDICINE
Payer: COMMERCIAL

## 2024-12-28 ENCOUNTER — HOSPITAL ENCOUNTER (EMERGENCY)
Facility: HOSPITAL | Age: 59
Discharge: HOME | End: 2024-12-28
Attending: EMERGENCY MEDICINE
Payer: COMMERCIAL

## 2024-12-28 VITALS
TEMPERATURE: 97.1 F | BODY MASS INDEX: 37.61 KG/M2 | SYSTOLIC BLOOD PRESSURE: 128 MMHG | RESPIRATION RATE: 25 BRPM | OXYGEN SATURATION: 95 % | DIASTOLIC BLOOD PRESSURE: 76 MMHG | HEART RATE: 80 BPM | WEIGHT: 233 LBS

## 2024-12-28 DIAGNOSIS — K21.9 GASTROESOPHAGEAL REFLUX DISEASE WITHOUT ESOPHAGITIS: ICD-10-CM

## 2024-12-28 LAB
ALBUMIN SERPL BCG-MCNC: 4.4 G/DL (ref 3.5–5.2)
ALBUMIN UR-MCNC: 10 MG/DL
ALP SERPL-CCNC: 94 U/L (ref 40–150)
ALT SERPL W P-5'-P-CCNC: 38 U/L (ref 0–50)
ANION GAP SERPL CALCULATED.3IONS-SCNC: 8 MMOL/L (ref 7–15)
APPEARANCE UR: CLEAR
AST SERPL W P-5'-P-CCNC: 28 U/L (ref 0–45)
BACTERIA #/AREA URNS HPF: ABNORMAL /HPF
BASOPHILS # BLD AUTO: 0 10E3/UL (ref 0–0.2)
BASOPHILS NFR BLD AUTO: 1 %
BILIRUB SERPL-MCNC: 0.2 MG/DL
BILIRUB UR QL STRIP: NEGATIVE
BUN SERPL-MCNC: 11.3 MG/DL (ref 6–20)
CALCIUM SERPL-MCNC: 9.5 MG/DL (ref 8.8–10.4)
CHLORIDE SERPL-SCNC: 105 MMOL/L (ref 98–107)
COLOR UR AUTO: ABNORMAL
CREAT SERPL-MCNC: 0.96 MG/DL (ref 0.51–0.95)
EGFRCR SERPLBLD CKD-EPI 2021: 69 ML/MIN/1.73M2
EOSINOPHIL # BLD AUTO: 0.1 10E3/UL (ref 0–0.7)
EOSINOPHIL NFR BLD AUTO: 2 %
ERYTHROCYTE [DISTWIDTH] IN BLOOD BY AUTOMATED COUNT: 12.7 % (ref 10–15)
GLUCOSE SERPL-MCNC: 107 MG/DL (ref 70–99)
GLUCOSE UR STRIP-MCNC: NEGATIVE MG/DL
HCO3 SERPL-SCNC: 27 MMOL/L (ref 22–29)
HCT VFR BLD AUTO: 43.1 % (ref 35–47)
HGB BLD-MCNC: 14.4 G/DL (ref 11.7–15.7)
HGB UR QL STRIP: NEGATIVE
IMM GRANULOCYTES # BLD: 0 10E3/UL
IMM GRANULOCYTES NFR BLD: 0 %
KETONES UR STRIP-MCNC: NEGATIVE MG/DL
LEUKOCYTE ESTERASE UR QL STRIP: NEGATIVE
LIPASE SERPL-CCNC: 29 U/L (ref 13–60)
LYMPHOCYTES # BLD AUTO: 2.2 10E3/UL (ref 0.8–5.3)
LYMPHOCYTES NFR BLD AUTO: 43 %
MCH RBC QN AUTO: 30.6 PG (ref 26.5–33)
MCHC RBC AUTO-ENTMCNC: 33.4 G/DL (ref 31.5–36.5)
MCV RBC AUTO: 92 FL (ref 78–100)
MONOCYTES # BLD AUTO: 0.3 10E3/UL (ref 0–1.3)
MONOCYTES NFR BLD AUTO: 6 %
MUCOUS THREADS #/AREA URNS LPF: PRESENT /LPF
NEUTROPHILS # BLD AUTO: 2.5 10E3/UL (ref 1.6–8.3)
NEUTROPHILS NFR BLD AUTO: 49 %
NITRATE UR QL: NEGATIVE
NRBC # BLD AUTO: 0 10E3/UL
NRBC BLD AUTO-RTO: 0 /100
PH UR STRIP: 7.5 [PH] (ref 5–7)
PLATELET # BLD AUTO: 351 10E3/UL (ref 150–450)
POTASSIUM SERPL-SCNC: 4.8 MMOL/L (ref 3.4–5.3)
PROT SERPL-MCNC: 7.5 G/DL (ref 6.4–8.3)
RBC # BLD AUTO: 4.71 10E6/UL (ref 3.8–5.2)
RBC URINE: <1 /HPF
SODIUM SERPL-SCNC: 140 MMOL/L (ref 135–145)
SP GR UR STRIP: 1.02 (ref 1–1.03)
SQUAMOUS EPITHELIAL: 7 /HPF
TROPONIN T SERPL HS-MCNC: 7 NG/L
UROBILINOGEN UR STRIP-MCNC: <2 MG/DL
WBC # BLD AUTO: 5.2 10E3/UL (ref 4–11)
WBC URINE: 4 /HPF

## 2024-12-28 PROCEDURE — 96375 TX/PRO/DX INJ NEW DRUG ADDON: CPT

## 2024-12-28 PROCEDURE — 250N000011 HC RX IP 250 OP 636: Performed by: EMERGENCY MEDICINE

## 2024-12-28 PROCEDURE — 81003 URINALYSIS AUTO W/O SCOPE: CPT | Performed by: EMERGENCY MEDICINE

## 2024-12-28 PROCEDURE — 85025 COMPLETE CBC W/AUTO DIFF WBC: CPT | Performed by: EMERGENCY MEDICINE

## 2024-12-28 PROCEDURE — 82040 ASSAY OF SERUM ALBUMIN: CPT | Performed by: EMERGENCY MEDICINE

## 2024-12-28 PROCEDURE — 36415 COLL VENOUS BLD VENIPUNCTURE: CPT | Performed by: EMERGENCY MEDICINE

## 2024-12-28 PROCEDURE — 74176 CT ABD & PELVIS W/O CONTRAST: CPT

## 2024-12-28 PROCEDURE — 93005 ELECTROCARDIOGRAM TRACING: CPT | Performed by: EMERGENCY MEDICINE

## 2024-12-28 PROCEDURE — 82310 ASSAY OF CALCIUM: CPT | Performed by: EMERGENCY MEDICINE

## 2024-12-28 PROCEDURE — 84484 ASSAY OF TROPONIN QUANT: CPT | Performed by: EMERGENCY MEDICINE

## 2024-12-28 PROCEDURE — 96374 THER/PROPH/DIAG INJ IV PUSH: CPT

## 2024-12-28 PROCEDURE — 83690 ASSAY OF LIPASE: CPT | Performed by: EMERGENCY MEDICINE

## 2024-12-28 PROCEDURE — 99285 EMERGENCY DEPT VISIT HI MDM: CPT | Mod: 25

## 2024-12-28 RX ORDER — FAMOTIDINE 40 MG/1
40 TABLET, FILM COATED ORAL DAILY
Qty: 30 TABLET | Refills: 0 | Status: SHIPPED | OUTPATIENT
Start: 2024-12-28 | End: 2025-01-27

## 2024-12-28 RX ORDER — MORPHINE SULFATE 4 MG/ML
4 INJECTION, SOLUTION INTRAMUSCULAR; INTRAVENOUS
Status: COMPLETED | OUTPATIENT
Start: 2024-12-28 | End: 2024-12-28

## 2024-12-28 RX ORDER — DIPHENHYDRAMINE HYDROCHLORIDE 50 MG/ML
25 INJECTION INTRAMUSCULAR; INTRAVENOUS ONCE
Status: COMPLETED | OUTPATIENT
Start: 2024-12-28 | End: 2024-12-28

## 2024-12-28 RX ORDER — METOCLOPRAMIDE 10 MG/1
10 TABLET ORAL 3 TIMES DAILY PRN
Qty: 12 TABLET | Refills: 0 | Status: SHIPPED | OUTPATIENT
Start: 2024-12-28

## 2024-12-28 RX ORDER — METOCLOPRAMIDE HYDROCHLORIDE 5 MG/ML
10 INJECTION INTRAMUSCULAR; INTRAVENOUS ONCE
Status: COMPLETED | OUTPATIENT
Start: 2024-12-28 | End: 2024-12-28

## 2024-12-28 RX ORDER — SUCRALFATE 1 G/1
1 TABLET ORAL 4 TIMES DAILY
Qty: 30 TABLET | Refills: 0 | Status: SHIPPED | OUTPATIENT
Start: 2024-12-28

## 2024-12-28 RX ORDER — OMEPRAZOLE 40 MG/1
40 CAPSULE, DELAYED RELEASE ORAL DAILY
Qty: 30 CAPSULE | Refills: 0 | Status: SHIPPED | OUTPATIENT
Start: 2024-12-28 | End: 2025-01-27

## 2024-12-28 RX ADMIN — METOCLOPRAMIDE 10 MG: 5 INJECTION, SOLUTION INTRAMUSCULAR; INTRAVENOUS at 11:12

## 2024-12-28 RX ADMIN — MORPHINE SULFATE 4 MG: 4 INJECTION, SOLUTION INTRAMUSCULAR; INTRAVENOUS at 11:12

## 2024-12-28 RX ADMIN — DIPHENHYDRAMINE HYDROCHLORIDE 25 MG: 50 INJECTION INTRAMUSCULAR; INTRAVENOUS at 11:12

## 2024-12-28 ASSESSMENT — COLUMBIA-SUICIDE SEVERITY RATING SCALE - C-SSRS
6. HAVE YOU EVER DONE ANYTHING, STARTED TO DO ANYTHING, OR PREPARED TO DO ANYTHING TO END YOUR LIFE?: NO
1. IN THE PAST MONTH, HAVE YOU WISHED YOU WERE DEAD OR WISHED YOU COULD GO TO SLEEP AND NOT WAKE UP?: NO
2. HAVE YOU ACTUALLY HAD ANY THOUGHTS OF KILLING YOURSELF IN THE PAST MONTH?: NO

## 2024-12-28 ASSESSMENT — ACTIVITIES OF DAILY LIVING (ADL)
ADLS_ACUITY_SCORE: 52

## 2024-12-28 NOTE — ED NOTES
Bed: JNEDG  Expected date:   Expected time:   Means of arrival:   Comments:  Acuity 4 or vertical 3's

## 2024-12-28 NOTE — ED PROVIDER NOTES
EMERGENCY DEPARTMENT ENCOUNTER      NAME: Keiry Mchugh  AGE: 59 year old female  YOB: 1965  MRN: [id number]  EVALUATION DATE & TIME: No admission date for patient encounter.    PCP: Yissel Schmidt    ED PROVIDER: Siria Tracy MD    Chief Complaint   Patient presents with    Abdominal Pain         FINAL IMPRESSION:  1. Gastroesophageal reflux disease without esophagitis          ED COURSE & MEDICAL DECISION MAKING:    Pertinent Labs & Imaging studies reviewed. (See chart for details)  59 year old female with history of HLD, HARDIN, obesity, depression/anxiety, celiac disease and recurrent diverticulitis status post partial colectomy for same who presents to the Emergency Department for evaluation of epigastric abdominal pain x 1 day, may radiate slightly through to the back.  Reproducible epigastric tenderness palpation on examination.  Strongly suspect GERD, gastritis, pancreatitis.  Less likely hepatobiliary pathology, recurrent diverticulitis.  My concern for AAA, dissection, ACS is low.    Patient placed on monitor, IV established and blood obtained.  Given morphine, Reglan, Benadryl.  Twelve-lead EKG unremarkable.,  Troponin CBC, CMP, lipase, urinalysis unremarkable.  CT of the abdomen pelvis unremarkable.  Patient felt relief with the above.  Will be discharged to home with prescriptions for increasing her Angelica breast resolved, addition of Pepcid and Carafate as needed for symptoms as well as Reglan as needed for any nausea or vomiting.      ED Course as of 12/28/24 1400   Sat Dec 28, 2024   1006 I met with the patient, obtained history, performed an initial exam, and discussed options and plan for diagnostics and treatment here in the ED.    1207 I decided that the patient can be discharged.       Medical Decision Making  Obtained supplemental history:Supplemental history obtained?:  Sister  Reviewed external records: External records reviewed?: Outpatient Record: 12/20/2024 clinic  note  Care impacted by chronic illness:Hyperlipidemia  Did you consider but not order tests?: Work up considered but not performed and documented in chart, if applicable  Did you interpret images independently?: Independent interpretation of ECG and images noted in documentation, when applicable.  Consultation discussion with other provider:Did you involve another provider (consultant, , pharmacy, etc.)?: No  Discharge. I prescribed additional prescription strength medication(s) as charted. See documentation for any additional details.    MIPS: Not Applicable      At the conclusion of the encounter I discussed the results of all of the tests and the disposition. The questions were answered. The patient or family acknowledged understanding and was agreeable with the care plan.      MEDICATIONS GIVEN IN THE EMERGENCY:  Medications   morphine (PF) injection 4 mg (4 mg Intravenous $Given 12/28/24 1112)   metoclopramide (REGLAN) injection 10 mg (10 mg Intravenous $Given 12/28/24 1112)   diphenhydrAMINE (BENADRYL) injection 25 mg (25 mg Intravenous $Given 12/28/24 1112)       NEW PRESCRIPTIONS STARTED AT TODAY'S ER VISIT  Discharge Medication List as of 12/28/2024 12:14 PM        START taking these medications    Details   famotidine (PEPCID) 40 MG tablet Take 1 tablet (40 mg) by mouth daily., Disp-30 tablet, R-0, Local Print      metoclopramide (REGLAN) 10 MG tablet Take 1 tablet (10 mg) by mouth 3 times daily as needed for nausea., Disp-12 tablet, R-0, Local Print      omeprazole (PRILOSEC) 40 MG DR capsule Take 1 capsule (40 mg) by mouth daily., Disp-30 capsule, R-0, Local Print      sucralfate (CARAFATE) 1 GM tablet Take 1 tablet (1 g) by mouth 4 times daily., Disp-30 tablet, R-0, Local Print                =================================================================    HPI    Patient information was obtained from: patient and patient's family member    Use of Intrepreter: N/A       Keiry Mchugh is a 59 year  "old female with pertinent medical history of hyperlipidemia who presents with abdominal pain.    Per patient, on Thursday (12/26/2024) she started to not feel well at work. Since then she has had abdominal pain in her upper abdomen, nausea, pressure/pain in the top of her shoulders, and a little constipation. Earlier this week she noticed she had \"mucousy stool.\" It was noted that she has a history of diverticulitis and thus had her colon was resected for it. Also she has donated a kidney and had her gallbladder/appendix removed. Her gallbladder was removed 15+ years ago. She did note that she is on Humira and stopped hydroxychloroquine a month ago.    She has had no urinary symptoms or vomiting. In the past she has never had issues with her pancreas. She did not mention taking anything for her symptoms today (12/28/2024). She has not started any new medications.       PAST MEDICAL HISTORY:  Past Medical History:   Diagnosis Date    Anemia     Anxiety     Celiac disease     Celiac disease     Cervical spondylosis without myelopathy 10/13/2023    Chronic kidney disease     donated 1 kidney    Class 2 severe obesity due to excess calories with serious comorbidity in adult (H) 9/13/2024    Cough     Depression     Seasonal    Diverticulitis     Dyslipidemia     Enlarged LV     wall    GERD (gastroesophageal reflux disease)     Hepatitis     Hiatal hernia     Hypertension     Low bone mass     Metabolic syndrome     Migraines     HARDIN (nonalcoholic steatohepatitis)     Peripheral neuropathy     PONV (postoperative nausea and vomiting)     Profound fatigue     Ventral hernia        PAST SURGICAL HISTORY:  Past Surgical History:   Procedure Laterality Date    APPENDECTOMY      CHOLECYSTECTOMY      COLON SURGERY      COLONOSCOPY N/A 3/18/2019    Procedure: COLONOSCOPY;  Surgeon: Davis Mosqueda MD;  Location: [facility];  Service: General    CYSTOSCOPY N/A 7/16/2015    Procedure: CYSTOSCOPY;  Surgeon: Nate" Jone Horta MD;  Location: [facility] OR;  Service:     ESOPHAGOSCOPY, GASTROSCOPY, DUODENOSCOPY (EGD), COMBINED N/A 9/16/2024    Procedure: Esophagogastroduodenoscopy with biopsies;  Surgeon: Guevara Livingston MD;  Location: [facility] OR    HC CORRECT BUNION,METATARSAL OSTEOTOMY      Description: Bunion Correction With Metatarsal Osteotomy Herman Procedure;  Proc Date: 07/16/2010;    HYSTERECTOMY      HYSTERECTOMY, PAP NO LONGER INDICATED  02/2009    NEPHRECTOMY LIVING DONOR Left APRIL 2012    IA LAP,BILIARY TRACT,UNLISTED N/A 3/19/2019    Procedure: BIOPSY, LIVER, LAPAROSCOPIC;  Surgeon: Davis Mosqueda MD;  Location: [facility];  Service: General    IA LAP,SLING OPERATION      Description: Laparoscopic Sling Operation For Stress Incontinence;  Recorded: 02/17/2009;    IA LAP,SURG,COLECTOMY, PARTIAL, W/ANAST N/A 3/19/2019    Procedure: LAPAROSCOPIC SIGMOID COLON RESECTION;  Surgeon: Davis Mosqueda MD;  Location: [facility];  Service: General    SEPTOPLASTY, TURBINOPLASTY, COMBINED N/A 8/3/2021    Procedure: SEPTOPLASTY, NOSE, WITH TURBINOPLASTY RADIOFREQUENCY BALLON ASSISTED, CRYOBLATION OF NASAL TISSUE;  Surgeon: Randy Case MD;  Location: [facility] ED    SIGMOIDOSCOPY FLEXIBLE N/A 3/19/2019    Procedure: FLEXIBLE SIGMOIDOSCOPY;  Surgeon: Davis Mosqueda MD;  Location: [facility];  Service: General       CURRENT MEDICATIONS:    Prior to Admission Medications   Prescriptions Last Dose Informant Patient Reported? Taking?   B Complex-C (RA B-COMPLEX/VITAMIN C CR) TBCR   Yes No   Sig: Take 1 tablet by mouth daily   Coenzyme Q10 (COQ-10) 400 MG CAPS   Yes No   Sig: Take 1 capsule by mouth daily    FLUoxetine (PROZAC) 20 MG capsule   No No   Sig: Take 1 capsule (20 mg) by mouth daily   OnabotulinumtoxinA (BOTOX IJ)   Yes No   Sig: For migraines - 155 units q 12 weeks.   SUMAtriptan (IMITREX) 100 MG tablet   No No   Sig: TAKE 1/2 TO 1 TABLET BY MOUTH AT ONSET OF HEADACHE    adalimumab (HUMIRA *CF*) 40 MG/0.4ML pen kit   No No   Sig: Inject 0.4 mLs (40 mg) Subcutaneous every 14 days for 30 days Hold for signs of infection, then seek medical attention.   albuterol (PROAIR HFA/PROVENTIL HFA/VENTOLIN HFA) 108 (90 Base) MCG/ACT inhaler   No No   Sig: Inhale 2 puffs into the lungs every 6 hours as needed for shortness of breath, wheezing or cough.   Patient not taking: Reported on 12/6/2024   albuterol (PROVENTIL) (2.5 MG/3ML) 0.083% neb solution   No No   Sig: Take 1 vial (2.5 mg) by nebulization every 6 hours as needed for shortness of breath, wheezing or cough.   atenolol (TENORMIN) 50 MG tablet   No No   Sig: Take 1 tablet (50 mg) by mouth daily as needed (Migraines)   baclofen (LIORESAL) 10 MG tablet   No No   Sig: TAKE 1 TABLET BY MOUTH TWICE DAILY TO THREE TIMES DAILY AS NEEDED FOR MUSCLE SPASMS.   Patient not taking: Reported on 12/6/2024   fluticasone-salmeterol (ADVAIR) 250-50 MCG/ACT inhaler   No No   Sig: Inhale 1 puff into the lungs every 12 hours.   Patient not taking: Reported on 12/6/2024   folic acid (FOLVITE) 1 MG tablet   No No   Sig: TAKE 3 TABLETS(3 MG) BY MOUTH DAILY   hydroxychloroquine (PLAQUENIL) 200 MG tablet   No No   Sig: TAKE 1 TABLET(200 MG) BY MOUTH TWICE DAILY   methotrexate 2.5 MG tablet   No No   Sig: TAKE 3 TABLETS(7.5 MG) BY MOUTH EVERY 7 DAYS   phentermine (ADIPEX-P) 37.5 MG tablet   No No   Sig: Take 1 tablet (37.5 mg) by mouth every morning (before breakfast).   polyethylene glycol (MIRALAX) 17 GM/Dose powder   Yes No   Sig: Take 17 g by mouth daily as needed   Patient not taking: Reported on 12/6/2024      Facility-Administered Medications: None       ALLERGIES:  Allergies   Allergen Reactions    Gluten Meal Nausea and Vomiting    Doxycycline Hives    Contrast Dye      Hives, resolved with benadryl alone in the past (two episode)    Nsaids Other (See Comments)     Avoid - one kidney    Ondansetron Headache    Pcn [Penicillins] Hives     As a child     "Rocephin [Ceftriaxone] Hives     Patient states this started immediately    Tramadol Hives    Zithromax [Azithromycin] Tinnitus     \"ear ringing and hearing loss\"       FAMILY HISTORY:  Family History   Problem Relation Age of Onset    Hashimoto's thyroiditis Mother     Breast Cancer Mother 50        currently patient has two different types of Br Ca 84y    Graves' disease Sister     Hypothyroidism Sister     Hashimoto's thyroiditis Maternal Grandfather     Hypothyroidism Maternal Aunt     Breast Cancer Maternal Aunt 50    Hypothyroidism Maternal Aunt     Hashimoto's thyroiditis Maternal Aunt     Anesthesia Reaction No family hx of        SOCIAL HISTORY:  Social History     Tobacco Use    Smoking status: Former     Current packs/day: 0.00     Types: Cigarettes     Quit date: 1999     Years since quittin.0    Smokeless tobacco: Never   Vaping Use    Vaping status: Never Used   Substance Use Topics    Alcohol use: No     Comment: rare    Drug use: Not Currently        VITALS:  Patient Vitals for the past 24 hrs:   BP Temp Temp src Pulse Resp SpO2 Weight   24 1215 128/76 -- -- 80 25 95 % --   24 0955 (!) 180/87 97.1  F (36.2  C) Temporal 101 20 98 % 105.7 kg (233 lb)       PHYSICAL EXAM    General Appearance: Well-appearing, well-nourished, no acute distress   Cardio:  Regular rate and rhythm, hypertensive normalized on recheck, no murmurs  Pulm:  No respiratory distress, clear to auscultation bilaterally  Back:  No CVA tenderness, normal ROM  Abdomen:  Soft, non distended, no rebound or guarding. Epigastric abdominal tenderness, abdomen obese.  Extremities: Normal gait  Skin:  Skin warm, dry, no rashes  Neuro:  Alert and oriented ×3     RADIOLOGY/LABS:  Reviewed all pertinent imaging. Please see official radiology report. All pertinent labs reviewed and interpreted.    Results for orders placed or performed during the hospital encounter of 24   CT Abdomen Pelvis w/o Contrast    Impression    " IMPRESSION:   1.  No acute abnormality in the abdomen or pelvis.  2.  Hepatic steatosis.  3.  Additional noncritical details in the findings.     Comprehensive metabolic panel   Result Value Ref Range    Sodium 140 135 - 145 mmol/L    Potassium 4.8 3.4 - 5.3 mmol/L    Carbon Dioxide (CO2) 27 22 - 29 mmol/L    Anion Gap 8 7 - 15 mmol/L    Urea Nitrogen 11.3 6.0 - 20.0 mg/dL    Creatinine 0.96 (H) 0.51 - 0.95 mg/dL    GFR Estimate 69 >60 mL/min/1.73m2    Calcium 9.5 8.8 - 10.4 mg/dL    Chloride 105 98 - 107 mmol/L    Glucose 107 (H) 70 - 99 mg/dL    Alkaline Phosphatase 94 40 - 150 U/L    AST 28 0 - 45 U/L    ALT 38 0 - 50 U/L    Protein Total 7.5 6.4 - 8.3 g/dL    Albumin 4.4 3.5 - 5.2 g/dL    Bilirubin Total 0.2 <=1.2 mg/dL   Result Value Ref Range    Lipase 29 13 - 60 U/L   UA with Microscopic reflex to Culture    Specimen: Urine, Clean Catch   Result Value Ref Range    Color Urine Light Yellow Colorless, Straw, Light Yellow, Yellow    Appearance Urine Clear Clear    Glucose Urine Negative Negative mg/dL    Bilirubin Urine Negative Negative    Ketones Urine Negative Negative mg/dL    Specific Gravity Urine 1.022 1.001 - 1.030    Blood Urine Negative Negative    pH Urine 7.5 (H) 5.0 - 7.0    Protein Albumin Urine 10 (A) Negative mg/dL    Urobilinogen Urine <2.0 <2.0 mg/dL    Nitrite Urine Negative Negative    Leukocyte Esterase Urine Negative Negative    Bacteria Urine Few (A) None Seen /HPF    Mucus Urine Present (A) None Seen /LPF    RBC Urine <1 <=2 /HPF    WBC Urine 4 <=5 /HPF    Squamous Epithelials Urine 7 (H) <=1 /HPF   CBC with platelets and differential   Result Value Ref Range    WBC Count 5.2 4.0 - 11.0 10e3/uL    RBC Count 4.71 3.80 - 5.20 10e6/uL    Hemoglobin 14.4 11.7 - 15.7 g/dL    Hematocrit 43.1 35.0 - 47.0 %    MCV 92 78 - 100 fL    MCH 30.6 26.5 - 33.0 pg    MCHC 33.4 31.5 - 36.5 g/dL    RDW 12.7 10.0 - 15.0 %    Platelet Count 351 150 - 450 10e3/uL    % Neutrophils 49 %    % Lymphocytes 43 %     % Monocytes 6 %    % Eosinophils 2 %    % Basophils 1 %    % Immature Granulocytes 0 %    NRBCs per 100 WBC 0 <1 /100    Absolute Neutrophils 2.5 1.6 - 8.3 10e3/uL    Absolute Lymphocytes 2.2 0.8 - 5.3 10e3/uL    Absolute Monocytes 0.3 0.0 - 1.3 10e3/uL    Absolute Eosinophils 0.1 0.0 - 0.7 10e3/uL    Absolute Basophils 0.0 0.0 - 0.2 10e3/uL    Absolute Immature Granulocytes 0.0 <=0.4 10e3/uL    Absolute NRBCs 0.0 10e3/uL   Result Value Ref Range    Troponin T, High Sensitivity 7 <=14 ng/L       EKG:  Performed at: 12/28/2024 at 11:47 AM    Impression: Sinus rhythm, normal ECG    Rate: 77 BPM  Rhythm: Sinus  MN Interval: 160 ms  QRS Interval: 70 ms  QTc Interval: 434 ms  ST Changes: None  Comparison: When compared to Ecg from 07/23/2021 at 8:38 AM no significant changes were found  I have independently reviewed and interpreted the EKG(s) documented above.    The creation of this record is based on the scribe s observations of the work being performed by Siria Tracy MD and the provider s statements to them. It was created on her behalf by Misha Acosta, a trained medical scribe. This document has been checked and approved by the attending provider.    Siria Tracy MD  Emergency Medicine  [facility] EMERGENCY DEPARTMENT  [facility] [facility] [id number] [phone]  Dept: [phone]      Siria Tracy MD  12/28/24 2298

## 2024-12-28 NOTE — DISCHARGE INSTRUCTIONS
Increase home omeprazole from 20 to 40 mg daily.  You may add Carafate or Pepcid as needed for symptoms.  Reglan as needed for any nausea or vomiting.

## 2024-12-28 NOTE — ED TRIAGE NOTES
She has had abdominal pain for a day. Denies vomiting and diarrhea but does have nausea. History of diverticulitis.

## 2025-01-07 LAB
ATRIAL RATE - MUSE: 77 BPM
DIASTOLIC BLOOD PRESSURE - MUSE: 66 MMHG
INTERPRETATION ECG - MUSE: NORMAL
P AXIS - MUSE: 64 DEGREES
PR INTERVAL - MUSE: 160 MS
QRS DURATION - MUSE: 70 MS
QT - MUSE: 384 MS
QTC - MUSE: 434 MS
R AXIS - MUSE: 32 DEGREES
SYSTOLIC BLOOD PRESSURE - MUSE: 127 MMHG
T AXIS - MUSE: 48 DEGREES
VENTRICULAR RATE- MUSE: 77 BPM

## 2025-01-09 ENCOUNTER — TELEPHONE (OUTPATIENT)
Dept: RHEUMATOLOGY | Facility: CLINIC | Age: 60
End: 2025-01-09
Payer: COMMERCIAL

## 2025-01-09 DIAGNOSIS — M05.79 SEROPOSITIVE RHEUMATOID ARTHRITIS OF MULTIPLE JOINTS (H): ICD-10-CM

## 2025-01-09 NOTE — TELEPHONE ENCOUNTER
[facility] Call Center    Phone Message    May a detailed message be left on voicemail: no     Reason for Call: Medication Refill Request    Has the patient contacted the pharmacy for the refill? Yes   Name of medication being requested: adalimumab (HUMIRA *CF*) 40 MG/0.4ML pen kit   Provider who prescribed the medication: Beka  Pharmacy: [facility] fax [phone] or call Armida Back at [phone]   Date medication is needed: 01/09/25     Action Taken: Message routed to:  Other: rheum    Travel Screening: Not Applicable     Date of Service: 01/09/25

## 2025-01-13 ENCOUNTER — VIRTUAL VISIT (OUTPATIENT)
Dept: SURGERY | Facility: CLINIC | Age: 60
End: 2025-01-13
Payer: COMMERCIAL

## 2025-01-13 DIAGNOSIS — Z71.3 PRE-BARIATRIC SURGERY NUTRITION EVALUATION: ICD-10-CM

## 2025-01-13 DIAGNOSIS — E66.9 OBESITY (BMI 30-39.9): Primary | ICD-10-CM

## 2025-01-13 DIAGNOSIS — E66.01 MORBID OBESITY (H): Primary | ICD-10-CM

## 2025-01-13 PROCEDURE — 97802 MEDICAL NUTRITION INDIV IN: CPT | Mod: 95

## 2025-01-13 NOTE — PROGRESS NOTES
Virtual Visit Details    Type of service:  Video Visit     Originating Location (pt. Location): Home    Distant Location (provider location):  Off-site  Platform used for Video Visit: Graciela    Start Time: 11:30 AM  End Time: 12:22 PM    Samantha would like to follow through with bariatric surgery for health reasons. She has struggled with her weight since pregnancy and now is concerned about various comorbidities. She has a history of depression and ADHD. She is also a boredom eater. She has good knowledge of surgery and good support. She will follow up and complete psychological testing. F32.9; F90.9; E66.01

## 2025-01-13 NOTE — LETTER
1/13/2025      Keiry Mchugh  [address]  [place] [address]      Dear Colleague,    Thank you for referring your patient, Keiry Mchugh, to the [facility] [place]. Please see a copy of my visit note below.    Keiry Mchugh is a 59 year old who is being evaluated via a billable video visit.      How would you like to obtain your AVS? MyChart  If the video visit is dropped, the invitation should be resent by: Text to cell phone: [phone]  Will anyone else be joining your video visit? No        Initial Structured Weight Loss Supervised Diet Evaluation     Assessment:  Keiry is being seen today for initial RD nutritional evaluation. Patient has been unsuccessful with non-surgical weight loss methods and is interested in bariatric surgery. Today we reviewed current eating habits and level of physical activity, and instructed on the changes that are required for successful bariatric outcomes.      Surgery of interest per pt:  Undecided .    Workflow review:  Support Group: Not completed.  Psychology:Not completed.  Lab work:Completed.  SWL:No       Weight goal: At or below initial.    Anthropometrics:  Pt's weight is 233  Initial weight: 215 - noted this was a guess and did not have her accurate weight.  Weight change: patient reports her true weight when she weighed herself after her video visit with Dr. Gómez was 230 lbs  BMI: There is no height or weight on file to calculate BMI.   Ideal body weight: 59.3 kg (130 lb 11.7 oz)  Adjusted ideal body weight: 77.9 kg (171 lb 10.2 oz)    Medical History:  Patient Active Problem List   Diagnosis     Chronic insomnia     HARDIN (nonalcoholic steatohepatitis)     Sigmoid diverticulitis     Dyslipidemia     Metabolic syndrome     Celiac disease     Low bone mass     Profound fatigue     Cervical spondylosis without myelopathy     Cervical radiculitis     Class 2 severe obesity due to excess calories with serious  "comorbidity in adult (H)     Hiatal hernia with gastroesophageal reflux     Diaphragmatic hernia      Diabetes: none  HbA1c:  No results found for: \"HGBA1C\"      Nutrition History  Food allergies/intolerances/cultural or religous food customs: Yes CELIAC DISEASE -NO GLUTEN    Diet history: Patient noted she tries to eat 3 meals per day but on work days she tends to skips meals. 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 Was dx with Celiac disease ~5 years ago. Has hx with dieting and is aware of what balanced meals consistent of   -Is taking phentermine   - does not drink soda   - water intake can be very poor on busy work days. Can range from 8oz to 64oz daily.       Vitamins/Mineral currently taking: B-Complex, coQ-10,     Socioeconomic Status  Who does the grocery shopping for your household? Self  Where do you grocery shop?: [employer], [employer]  Utilizing food bank, food stamps, and/or meal delivery program(s)?: No   Who prepares your meals at home? Self    Diet Recall/Time  Breakfast: Hard boiled eggs with turkey sausage Or yogurt with fruit   Lunch: skips   Dinner: chicken wings with ranch   Typical Snacks: protein bar, fruit, chips, candy     Overnight eating: No - there was a hx of doing this up to a few weeks ago     Eating out: limited     Beverages  Water     Exercise  Tracks her steps at work 10k - 13k    Nutrition Diagnosis (PES statement)      Obesity related to excessive calorie intake as evidenced by Intake of high caloric density foods/beverages (juice, soda, alcohol) at meals and/or snacks; large portions; frequent grazing; Estimated intake that exceeds estimated daily energy intake; Binge eating patterns; Frequent excessive fast food or restaurant intake; and BMI 36.8       Disordered Eating Pattern (NB 1.5) related to intake of food exceeding RMR as evidenced by frequent grazing, skipping meals     Intervention    Nutrition Education: "   Provided general overview of diet and lifestyle modifications needed to be a deemed a safe candidate for bariatric surgery.   Educated patient on how to read a food label: choosing foods with than 10 grams fat and 10 grams sugar per serving to avoid dumping syndrome.  Dumping Syndrome: Described the mechanisms of syndrome, symptoms, and prevention tools from a dietary perspective.   Vitamins: Educated on post-op vitamin regimen including MVI+ 18 mg Fe two times a day, calcium citrate 400-600 mg two times a day, 9389-8947 mcg sublingual B12 daily, 5000 IU vitamin D3 daily.     Food/Nutrient Delivery:  Educated patient on eating three meals, with cutting out snacking.  Bariatric Plate: Patient and I discussed the importance of including a lean protein source (20-30 grams/meal), vegetables (included at lunch and dinner), one serving (15g) of carbohydrate, and limited added fat (1 tb/day) at each meal.   Educated patient on how to complete a food journal and benefits of meal planning.   Educated patient on using a protein powder drink as a meal replacement and/or supplement after bariatric surgery.   Discussed importance of adequate hydration after surgery, with goal of at least 64 oz of fluids/day.  Addressed avoiding all carbonated, caffeinated and sweetened drinks to prepare for bariatric surgery.     Nutrition Counseling:  Mindful eating techniques: Encouraged slow meal pace, chewing foods to applesauce consistency for 20-30 minutes/meal.   Discussed  fluids 30 minutes before, during, and after meal to prevent dumping syndrome and discomfort post bariatric surgery.   Discussed pre/post operative diet progression, post op vitamin regimen, gave review of surgery process.     Instructions/Goals:     Start implementing bariatric surgery lifestyle modifications.      Handouts Provided:   [facility] Bariatric Surgery Nutrition Info      Monitor/Evaluation:  Pt. s target weight: no gain from initial  visit, patient verbalized understanding.     Plan for next visit:     (Final Supervised Diet visit with RD) pre/post-op  diet progression, give review of surgery process.        Video-Visit Details    Type of service:  Video Visit    Video Start Time (time video started): 10:08 AM    Video End Time (time video stopped): 10:54 AM    Originating Location (pt. Location): Home      Distant Location (provider location):  Off-site    Mode of Communication:  Video Conference via [facility]    Physician has received verbal consent for a Video Visit from the patient? Yes      Genie Butler RD         Again, thank you for allowing me to participate in the care of your patient.        Sincerely,        Genie Butler RD    Electronically signed

## 2025-01-13 NOTE — Clinical Note
Please move her 2nd appointment on Monday, January 27 from 10:30 AM to 1:30 PM (1 hour appointment).

## 2025-01-13 NOTE — PATIENT INSTRUCTIONS
PREPARING FOR WEIGHT-LOSS SURGERY    Lifestyle Changes Before and After Weight Loss Surgery: Keys for Success     Diet Guidelines after Weight Loss Surgery     My Plate    Sources of Protein    Food Label: The 10-10 Rule    Supplements after Sleeve Gastrectomy, Gastric Bypass or Single Anastomosis Duodenal Switch

## 2025-01-13 NOTE — PROGRESS NOTES
"Keiry Mchugh is a 59 year old who is being evaluated via a billable video visit.      How would you like to obtain your AVS? MyChart  If the video visit is dropped, the invitation should be resent by: Text to cell phone: [phone]  Will anyone else be joining your video visit? No        Initial Structured Weight Loss Supervised Diet Evaluation     Assessment:  Keiry is being seen today for initial RD nutritional evaluation. Patient has been unsuccessful with non-surgical weight loss methods and is interested in bariatric surgery. Today we reviewed current eating habits and level of physical activity, and instructed on the changes that are required for successful bariatric outcomes.      Surgery of interest per pt:  Undecided .    Workflow review:  Support Group: Not completed.  Psychology:Not completed.  Lab work:Completed.  SWL:No       Weight goal: At or below initial.    Anthropometrics:  Pt's weight is 233  Initial weight: 215 - noted this was a guess and did not have her accurate weight.  Weight change: patient reports her true weight when she weighed herself after her video visit with Dr. Gómez was 230 lbs  BMI: There is no height or weight on file to calculate BMI.   Ideal body weight: 59.3 kg (130 lb 11.7 oz)  Adjusted ideal body weight: 77.9 kg (171 lb 10.2 oz)    Medical History:  Patient Active Problem List   Diagnosis    Chronic insomnia    HARDIN (nonalcoholic steatohepatitis)    Sigmoid diverticulitis    Dyslipidemia    Metabolic syndrome    Celiac disease    Low bone mass    Profound fatigue    Cervical spondylosis without myelopathy    Cervical radiculitis    Class 2 severe obesity due to excess calories with serious comorbidity in adult (H)    Hiatal hernia with gastroesophageal reflux    Diaphragmatic hernia      Diabetes: none  HbA1c:  No results found for: \"HGBA1C\"      Nutrition History  Food allergies/intolerances/cultural or religous food customs: Yes CELIAC DISEASE -NO GLUTEN    Diet " history: Patient noted she tries to eat 3 meals per day but on work days she tends to skips meals. 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 Was dx with Celiac disease ~5 years ago. Has hx with dieting and is aware of what balanced meals consistent of   -Is taking phentermine   - does not drink soda   - water intake can be very poor on busy work days. Can range from 8oz to 64oz daily.       Vitamins/Mineral currently taking: B-Complex, coQ-10,     Socioeconomic Status  Who does the grocery shopping for your household? Self  Where do you grocery shop?: [place], [employer]  Utilizing food bank, food stamps, and/or meal delivery program(s)?: No   Who prepares your meals at home? Self    Diet Recall/Time  Breakfast: Hard boiled eggs with turkey sausage Or yogurt with fruit   Lunch: skips   Dinner: chicken wings with ranch   Typical Snacks: protein bar, fruit, chips, candy     Overnight eating: No - there was a hx of doing this up to a few weeks ago     Eating out: limited     Beverages  Water     Exercise  Tracks her steps at work 10k - 13k    Nutrition Diagnosis (PES statement)      Obesity related to excessive calorie intake as evidenced by Intake of high caloric density foods/beverages (juice, soda, alcohol) at meals and/or snacks; large portions; frequent grazing; Estimated intake that exceeds estimated daily energy intake; Binge eating patterns; Frequent excessive fast food or restaurant intake; and BMI 36.8       Disordered Eating Pattern (NB 1.5) related to intake of food exceeding RMR as evidenced by frequent grazing, skipping meals     Intervention    Nutrition Education:   Provided general overview of diet and lifestyle modifications needed to be a deemed a safe candidate for bariatric surgery.   Educated patient on how to read a food label: choosing foods with than 10 grams fat and 10 grams sugar per serving to avoid dumping syndrome.  Dumping  Syndrome: Described the mechanisms of syndrome, symptoms, and prevention tools from a dietary perspective.   Vitamins: Educated on post-op vitamin regimen including MVI+ 18 mg Fe two times a day, calcium citrate 400-600 mg two times a day, 6580-3442 mcg sublingual B12 daily, 5000 IU vitamin D3 daily.     Food/Nutrient Delivery:  Educated patient on eating three meals, with cutting out snacking.  Bariatric Plate: Patient and I discussed the importance of including a lean protein source (20-30 grams/meal), vegetables (included at lunch and dinner), one serving (15g) of carbohydrate, and limited added fat (1 tb/day) at each meal.   Educated patient on how to complete a food journal and benefits of meal planning.   Educated patient on using a protein powder drink as a meal replacement and/or supplement after bariatric surgery.   Discussed importance of adequate hydration after surgery, with goal of at least 64 oz of fluids/day.  Addressed avoiding all carbonated, caffeinated and sweetened drinks to prepare for bariatric surgery.     Nutrition Counseling:  Mindful eating techniques: Encouraged slow meal pace, chewing foods to applesauce consistency for 20-30 minutes/meal.   Discussed  fluids 30 minutes before, during, and after meal to prevent dumping syndrome and discomfort post bariatric surgery.   Discussed pre/post operative diet progression, post op vitamin regimen, gave review of surgery process.     Instructions/Goals:     Start implementing bariatric surgery lifestyle modifications.      Handouts Provided:   [facility] Bariatric Surgery Nutrition Info      Monitor/Evaluation:  Pt. s target weight: no gain from initial visit, patient verbalized understanding.     Plan for next visit:     (Final Supervised Diet visit with RD) pre/post-op  diet progression, give review of surgery process.        Video-Visit Details    Type of service:  Video Visit    Video Start Time (time video started): 10:08  AM    Video End Time (time video stopped): 10:54 AM    Originating Location (pt. Location): Home      Distant Location (provider location):  Off-site    Mode of Communication:  Video Conference via [facility]    Physician has received verbal consent for a Video Visit from the patient? Yes      Genie Butler RD

## 2025-01-20 ENCOUNTER — HOSPITAL ENCOUNTER (OUTPATIENT)
Facility: CLINIC | Age: 60
End: 2025-01-20
Attending: ORTHOPAEDIC SURGERY | Admitting: ORTHOPAEDIC SURGERY
Payer: COMMERCIAL

## 2025-01-20 ENCOUNTER — TRANSFERRED RECORDS (OUTPATIENT)
Dept: HEALTH INFORMATION MANAGEMENT | Facility: CLINIC | Age: 60
End: 2025-01-20
Payer: COMMERCIAL

## 2025-01-27 ENCOUNTER — VIRTUAL VISIT (OUTPATIENT)
Dept: SURGERY | Facility: CLINIC | Age: 60
End: 2025-01-27
Payer: COMMERCIAL

## 2025-01-27 DIAGNOSIS — E66.01 MORBID OBESITY (H): Primary | ICD-10-CM

## 2025-01-27 NOTE — LETTER
1/27/2025      Keiry Mchugh  [address]  [place] [address]      Dear Colleague,    Thank you for referring your patient, Keiry Mchugh, to the [facility]. Please see a copy of my visit note below.    Virtual Visit Details    Type of service:  Video Visit     Originating Location (pt. Location): Home    Distant Location (provider location):  Off-site  Platform used for Video Visit: [employer]    Start Time: 1:30 PM  End Time: 2:10 PM    Samantha has made some changes in her eating and lifestyle, but still needs to be more mindful about her eating. She will follow up with a list of activities and mindful eating strategies. She also needs to complete all of the questionnaires. F32.9; F90.9; E66.01    Again, thank you for allowing me to participate in the care of your patient.        Sincerely,        Dre Marin, PhD    Electronically signed

## 2025-01-27 NOTE — PROGRESS NOTES
Virtual Visit Details    Type of service:  Video Visit     Originating Location (pt. Location): Home    Distant Location (provider location):  Off-site  Platform used for Video Visit: [email or web address]    Start Time: 1:30 PM  End Time: 2:10 PM    Samantha has made some changes in her eating and lifestyle, but still needs to be more mindful about her eating. She will follow up with a list of activities and mindful eating strategies. She also needs to complete all of the questionnaires. F32.9; F90.9; E66.01

## 2025-01-31 ENCOUNTER — E-VISIT (OUTPATIENT)
Dept: FAMILY MEDICINE | Facility: CLINIC | Age: 60
End: 2025-01-31
Payer: COMMERCIAL

## 2025-01-31 DIAGNOSIS — Z00.00 ROUTINE GENERAL MEDICAL EXAMINATION AT A HEALTH CARE FACILITY: Primary | ICD-10-CM

## 2025-02-04 DIAGNOSIS — M05.79 SEROPOSITIVE RHEUMATOID ARTHRITIS OF MULTIPLE JOINTS (H): ICD-10-CM

## 2025-02-11 DIAGNOSIS — M05.79 SEROPOSITIVE RHEUMATOID ARTHRITIS OF MULTIPLE JOINTS (H): ICD-10-CM

## 2025-02-11 DIAGNOSIS — Z79.899 HIGH RISK MEDICATION USE: ICD-10-CM

## 2025-02-12 DIAGNOSIS — M05.79 SEROPOSITIVE RHEUMATOID ARTHRITIS OF MULTIPLE JOINTS (H): ICD-10-CM

## 2025-02-12 RX ORDER — HYDROXYCHLOROQUINE SULFATE 200 MG/1
200 TABLET, FILM COATED ORAL 2 TIMES DAILY
Qty: 180 TABLET | Refills: 0 | Status: SHIPPED | OUTPATIENT
Start: 2025-02-12

## 2025-02-17 ENCOUNTER — VIRTUAL VISIT (OUTPATIENT)
Dept: SURGERY | Facility: CLINIC | Age: 60
End: 2025-02-17
Payer: COMMERCIAL

## 2025-02-17 ENCOUNTER — MYC MEDICAL ADVICE (OUTPATIENT)
Dept: FAMILY MEDICINE | Facility: CLINIC | Age: 60
End: 2025-02-17

## 2025-02-17 ENCOUNTER — TRANSFERRED RECORDS (OUTPATIENT)
Dept: HEALTH INFORMATION MANAGEMENT | Facility: CLINIC | Age: 60
End: 2025-02-17

## 2025-02-17 DIAGNOSIS — E66.01 MORBID OBESITY (H): Primary | ICD-10-CM

## 2025-02-17 DIAGNOSIS — E66.9 OBESITY (BMI 30-39.9): Primary | ICD-10-CM

## 2025-02-17 DIAGNOSIS — Z71.3 PRE-BARIATRIC SURGERY NUTRITION EVALUATION: ICD-10-CM

## 2025-02-17 PROCEDURE — 97803 MED NUTRITION INDIV SUBSEQ: CPT | Mod: 95

## 2025-02-17 NOTE — PATIENT INSTRUCTIONS
Diet Advancement Overview    Bariatric 2-Week Pre-Surgery Liquid Diet    3 Protein Shakes per Day    Cream of Wheat, Cream of Rice or Grits (plain), quinoa flakes    Sugar-free pudding, unsweetened applesauce and low-sugar, non-fat Greek yogurt or Light yogurt    Soups: beef/chicken and veggies, tomato soup and low-fat cream soups    Bariatric Clear Liquid Diet: Day before Surgery and 1-2 Meals after Surgery    Water, sugar-free clear liquid drinks, diluted fruit juice    Sugar-free Jell-O, Sugar-free Popsicles     Vegetable, chicken, or beef broth (regular or low-sodium)    Sip   ounce every 3-5 minutes (Post Op)      Bariatric Full Liquid Diet: First Week after Surgery (Duration: 1 Week RNY; 2 Weeks DS, LSG)   Cream of Wheat, Cream of Rice or Quinoa flakes   Sugar-free pudding, unsweetened applesauce, blended canned fruit in light juice or water    Protein Shakes (see approved list)   Light Yogurt or plain non-fat Greek yogurt (no fruit chunks: vanilla, lemon, etc.)   Low-fat blended & strained soups    Bariatric Pureed Diet:   RNY Gastric Bypass & Lap Band: 2 week duration   Duodenal Switch and Gastric Sleeve: 3 week duration   Blended canned tuna, chicken, or salmon mixed with light pereira/Greek yogurt   Blended moist meats--lean ground turkey with spaghetti sauce and oregano, lean pork/beef/turkey/fish or baby food meats   1% cottage cheese or ricotta cheese, Fat free refried beans   Thinned instant mashed potatoes (with added protein powder)   Blended fruits and vegetables (avoid skins, peels, and membranes)    Bariatric Soft Solids   Tender or moist meat, poultry, fish   Soft cooked vegetables    Tuna, chicken, crab, or egg salad (made with plain Greek yogurt or light mayonnaise)   Banana, seedless melons, canned or other soft fruits without skins/membranes   Whole-grain crackers (ingredient listed with the word  whole )     Bariatric Regular Diet:    Avoid oatmeal, bread, rice, pasta, noodles, and white  crackers (Saltines, Ritz, etc.) and foods with skins, peels, membranes, and seeds for 3 months after surgery.    Skinless, boneless moist chicken and turkey breasts     Pork loin, pork tenderloin--add pureed apricots/unsweetened applesauce for moisture    93%-96% lean beef (sirloin, round, flank, hamburger)    Fresh or frozen fruits and veggies     Toasted whole grain bread--sandwich thins, bagel thins, light wheat bread    Brown/wild rice, quinoa, whole-grain crackers, etc.

## 2025-02-17 NOTE — PROGRESS NOTES
Virtual Visit Details    Type of service:  Video Visit     Originating Location (pt. Location): Home    Distant Location (provider location):  Off-site  Platform used for Video Visit: Zoom (Telehealth)    Start Time: 7:48 AM  End Time: 8:11 AM    Samantha has made quality changes in eating and lifestyle and has been more mindful about her eating. She is now ready to proceed with surgery. A report was sent to the clinic. F32.9; F90.9; E66.01

## 2025-02-17 NOTE — LETTER
February 18, 2025      Keiry Mchugh  [address]  [place] [address]        To Whom It May Concern,     Keiry Mchugh is pursuing bariatric weight management surgery. To date, she has been unsuccessful with non-surgical weight loss methods.  She is actively working with the comprehensive weight management team. I recommend she proceed with weight loss surgery given her diagnosis of morbid obesity based on BMI of 37.6 along with comorbidities including HARDIN (nonalcoholic steatohepatitis) and dyslipidemia.    Thank you for your consideration.      Sincerely,        Yissel Schmidt, DO    Electronically signed

## 2025-02-17 NOTE — LETTER
February 18, 2025      Keiry Mchugh  [address]  [place] [address]        To Whom It May Concern,     Keiry Mchugh is pursuing bariatric weight management surgery. To date, she has been unsuccessful with non-surgical weight loss methods.  She is actively working with the comprehensive weight management team. I recommend she proceed with weight loss surgery given her diagnosis of morbid obesity based on BMI of 37.6 along with comorbidities including HARDIN (nonalcoholic steatohepatitis) and dyslipidemia.     Thank you for your consideration.    Sincerely,        Yissel Schmidt, DO    Electronically signed

## 2025-02-17 NOTE — PROGRESS NOTES
"Keiry Mchugh is a 59 year old who is being evaluated via a billable video visit.      How would you like to obtain your AVS? MyChart  If the video visit is dropped, the invitation should be resent by: Text to cell phone: [phone]  Will anyone else be joining your video visit? No        Follow Up Surgical Weight Loss Supervised Diet Evaluation    Assessment:  Pt. is being seen today for a follow up RD nutritional evaluation. Pt. has been unsuccessful with non-surgical weight loss methods and is interested in bariatric surgery. Today we reviewed current eating habits and level of physical activity, and instructed on the changes that are required for successful bariatric outcomes.    Surgery of interest per pt:  Undecided .  Weight loss medication: Phentermine     Workflow review:  Support Group: Not completed. - planning to complete 2/18/2025  Psychology:Completed.  Lab work:Completed.  SWL:No       Weight goal: At or below initial.    Anthropometrics:  Pt's weight is 233 lbs  Initial weight: 215 lbs  Weight change: patient reports her true weight when she weighed herself after her video visit with Dr. Gómez was 230 lbs   BMI: 37.61  Ideal body weight: 59.3 kg (130 lb 11.7 oz)  Adjusted ideal body weight: 77.9 kg (171 lb 10.2 oz)    Medical History:  Patient Active Problem List   Diagnosis    Chronic insomnia    HARDIN (nonalcoholic steatohepatitis)    Sigmoid diverticulitis    Dyslipidemia    Metabolic syndrome    Celiac disease    Low bone mass    Profound fatigue    Cervical spondylosis without myelopathy    Cervical radiculitis    Class 2 severe obesity due to excess calories with serious comorbidity in adult (H)    Hiatal hernia with gastroesophageal reflux    Diaphragmatic hernia      Diabetes: none  HbA1c:  No results found for: \"HGBA1C\"      Food allergies/intolerances/cultural or religous food customs: Yes CELIAC DISEASE -NO GLUTEN     Diet history: Patient noted she tries to eat 3 meals per day but on " work days she tends to skips meals. 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 Was dx with Celiac disease ~5 years ago. Has hx with dieting and is aware of what balanced meals consistent of   -Is taking phentermine   - does not drink soda   - water intake can be very poor on busy work days. Can range from 8oz to 64oz daily.         Vitamins/Mineral currently taking: B-Complex, coQ-10,         Progress over past month: Patient reports she is doing well. Reports her goal is to maintaining her weight. She is struggling with pain from her RA. Is prepping her dinners so she does not stop anywhere on her way home. Noted she is prepping her lunches now.  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   - sleep is poor and interrupted - long hx of this         Diet Recall/Time  Breakfast: 2-3 Hard boiled eggs with turkey sausage   Lunch: chicken or salmon with vegetables or salad   Dinner: greek yogurt with fruit     Typical Snacks: fruit     Overnight eating: No    Eating out: rarely     Meal duration: 20-30 minutes.      fluids by 30 minutes before, during meal, and waiting 30 minutes after meal before drinking fluids: Yes and no - catches herself drinking and will remover her beverage       Beverages  Water     Exercise  Tracks her steps at work 10k - 13k     PES statement:     Obesity related to excessive energy intake as evidenced by Intake of high caloric density foods/beverages (juice, soda, alcohol) at meals and/or snacks; large portions; frequent grazing; Estimated intake that exceeds estimated daily energy intake; Binge eating patterns; Frequent excessive fast food or restaurant intake; and BMI 37.61     Intervention    Nutrition Education:   Provided general overview of diet and lifestyle modifications needed to be a deemed a safe  candidate for bariatric surgery.   Educated patient on how to read a food label: choosing foods with than 10 grams fat and 10 grams sugar per serving to avoid dumping syndrome.  Dumping Syndrome: Described the mechanisms of syndrome, symptoms, and prevention tools from a dietary perspective.   Vitamins: Educated on post-op vitamin regimen including MVI+ 18 mg Fe two times a day, calcium citrate 400-600 mg two times a day, 7719-9938 mcg sublingual B12 daily, 5000 IU vitamin D3 daily.     Food/Nutrient Delivery:  Educated patient on eating three meals, with cutting out snacking.  Bariatric Plate: Patient and I discussed the importance of including a lean protein source (20-30 grams/meal), vegetables (included at lunch and dinner), one serving (15g) of carbohydrate, and limited added fat (1 tb/day) at each meal.   Educated patient on how to complete a food journal and benefits of meal planning.   Educated patient on using a protein powder drink as a meal replacement and/or supplement after bariatric surgery.   Discussed importance of adequate hydration after surgery, with goal of at least 64 oz of fluids/day.  Addressed avoiding all carbonated, caffeinated and sweetened drinks to prepare for bariatric surgery.     Nutrition Counseling:  Mindful eating techniques: Encouraged slow meal pace, chewing foods to applesauce consistency for 20-30 minutes/meal.   Discussed  fluids 30 minutes before, during, and after meal to prevent dumping syndrome and discomfort post bariatric surgery.   Discussed pre/post operative diet progression, post op vitamin regimen, gave review of surgery process.     Instructions/Goals:     Continue implementing bariatric surgery lifestyle modifications.    Handouts Provided:   [facility] diet advancement     Monitor/Evaluation:  Pt. s target weight: no gain from initial visit, pt. verbalized understanding.     Pt has completed all nutrition requirements and is well-informed of the dietary and  physical activity requirements that are necessary for successful bariatric outcomes. This pt is an appropriate candidate for surgery from a nutrition standpoint at this time. The patient understands that surgery is a tool, not a cure, and post operative follow up is essential.     Plan for next visit:   Pre op class       Video-Visit Details    Type of service:  Video Visit    Video Start Time (time video started): 9:31 am    Video End Time (time video stopped): 9:47 am    Originating Location (pt. Location): Home      Distant Location (provider location):  Off-site    Mode of Communication:  Video Conference via [facility]    Physician has received verbal consent for a Video Visit from the patient? Yes      Genie Butler RD

## 2025-02-17 NOTE — LETTER
2/17/2025      Keiry Mchugh  [address]  [place] [address]      Dear Colleague,    Thank you for referring your patient, Keiry Mchugh, to the [facility]. Please see a copy of my visit note below.    Keiry Mchugh is a 59 year old who is being evaluated via a billable video visit.      How would you like to obtain your AVS? MyChart  If the video visit is dropped, the invitation should be resent by: Text to cell phone: [phone]  Will anyone else be joining your video visit? No        Follow Up Surgical Weight Loss Supervised Diet Evaluation    Assessment:  Pt. is being seen today for a follow up RD nutritional evaluation. Pt. has been unsuccessful with non-surgical weight loss methods and is interested in bariatric surgery. Today we reviewed current eating habits and level of physical activity, and instructed on the changes that are required for successful bariatric outcomes.    Surgery of interest per pt:  Undecided .  Weight loss medication: Phentermine     Workflow review:  Support Group: Not completed. - planning to complete 2/18/2025  Psychology:Completed.  Lab work:Completed.  SWL:No       Weight goal: At or below initial.    Anthropometrics:  Pt's weight is 233 lbs  Initial weight: 215 lbs  Weight change: patient reports her true weight when she weighed herself after her video visit with Dr. Gómez was 230 lbs   BMI: 37.61  Ideal body weight: 59.3 kg (130 lb 11.7 oz)  Adjusted ideal body weight: 77.9 kg (171 lb 10.2 oz)    Medical History:  Patient Active Problem List   Diagnosis     Chronic insomnia     HARDIN (nonalcoholic steatohepatitis)     Sigmoid diverticulitis     Dyslipidemia     Metabolic syndrome     Celiac disease     Low bone mass     Profound fatigue     Cervical spondylosis without myelopathy     Cervical radiculitis     Class 2 severe obesity due to excess calories with serious comorbidity in adult (H)     Hiatal hernia with  "gastroesophageal reflux     Diaphragmatic hernia      Diabetes: none  HbA1c:  No results found for: \"HGBA1C\"      Food allergies/intolerances/cultural or religous food customs: Yes CELIAC DISEASE -NO GLUTEN     Diet history: Patient noted she tries to eat 3 meals per day but on work days she tends to skips meals. 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 Was dx with Celiac disease ~5 years ago. Has hx with dieting and is aware of what balanced meals consistent of   -Is taking phentermine   - does not drink soda   - water intake can be very poor on busy work days. Can range from 8oz to 64oz daily.         Vitamins/Mineral currently taking: B-Complex, coQ-10,         Progress over past month: Patient reports she is doing well. Reports her goal is to maintaining her weight. She is struggling with pain from her RA. Is prepping her dinners so she does not stop anywhere on her way home. Noted she is prepping her lunches now.  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   - sleep is poor and interrupted - long hx of this         Diet Recall/Time  Breakfast: 2-3 Hard boiled eggs with turkey sausage   Lunch: chicken or salmon with vegetables or salad   Dinner: greek yogurt with fruit     Typical Snacks: fruit     Overnight eating: No    Eating out: rarely     Meal duration: 20-30 minutes.      fluids by 30 minutes before, during meal, and waiting 30 minutes after meal before drinking fluids: Yes and no - catches herself drinking and will remover her beverage       Beverages  Water     Exercise  Tracks her steps at work 10k - 13k     PES statement:     Obesity related to excessive energy intake as evidenced by Intake of high caloric density foods/beverages (juice, soda, alcohol) at meals and/or snacks; large portions; frequent grazing; " Estimated intake that exceeds estimated daily energy intake; Binge eating patterns; Frequent excessive fast food or restaurant intake; and BMI 37.61     Intervention    Nutrition Education:   Provided general overview of diet and lifestyle modifications needed to be a deemed a safe candidate for bariatric surgery.   Educated patient on how to read a food label: choosing foods with than 10 grams fat and 10 grams sugar per serving to avoid dumping syndrome.  Dumping Syndrome: Described the mechanisms of syndrome, symptoms, and prevention tools from a dietary perspective.   Vitamins: Educated on post-op vitamin regimen including MVI+ 18 mg Fe two times a day, calcium citrate 400-600 mg two times a day, 9020-1792 mcg sublingual B12 daily, 5000 IU vitamin D3 daily.     Food/Nutrient Delivery:  Educated patient on eating three meals, with cutting out snacking.  Bariatric Plate: Patient and I discussed the importance of including a lean protein source (20-30 grams/meal), vegetables (included at lunch and dinner), one serving (15g) of carbohydrate, and limited added fat (1 tb/day) at each meal.   Educated patient on how to complete a food journal and benefits of meal planning.   Educated patient on using a protein powder drink as a meal replacement and/or supplement after bariatric surgery.   Discussed importance of adequate hydration after surgery, with goal of at least 64 oz of fluids/day.  Addressed avoiding all carbonated, caffeinated and sweetened drinks to prepare for bariatric surgery.     Nutrition Counseling:  Mindful eating techniques: Encouraged slow meal pace, chewing foods to applesauce consistency for 20-30 minutes/meal.   Discussed  fluids 30 minutes before, during, and after meal to prevent dumping syndrome and discomfort post bariatric surgery.   Discussed pre/post operative diet progression, post op vitamin regimen, gave review of surgery process.     Instructions/Goals:     Continue implementing  bariatric surgery lifestyle modifications.    Handouts Provided:   [facility]     Monitor/Evaluation:  Pt. s target weight: no gain from initial visit, pt. verbalized understanding.     Pt has completed all nutrition requirements and is well-informed of the dietary and physical activity requirements that are necessary for successful bariatric outcomes. This pt is an appropriate candidate for surgery from a nutrition standpoint at this time. The patient understands that surgery is a tool, not a cure, and post operative follow up is essential.     Plan for next visit:   Pre op class       Video-Visit Details    Type of service:  Video Visit    Video Start Time (time video started): 9:31 am    Video End Time (time video stopped): 9:47 am    Originating Location (pt. Location): Home      Distant Location (provider location):  Off-site    Mode of Communication:  Video Conference via [facility]    Physician has received verbal consent for a Video Visit from the patient? Yes      Genie Butler RD             Again, thank you for allowing me to participate in the care of your patient.        Sincerely,        Genie Butler RD    Electronically signed

## 2025-02-17 NOTE — TELEPHONE ENCOUNTER
Pt has follow up 2/19.     Per notes from 12/20 Isabel rheum  That being said, she does still have persistence in her ALT and if she wishes to minimize hepatic toxic medications after discontinuation of hydroxychloroquine and assuming no worsening in disease activity, she could try to hold her methotrexate for anywhere from 2-4 weeks to see if she notices any decline in her rheumatoid arthritis disease control. I did relate to her that many patients are on TNF inhibitor monotherapy and tend to do quite well. She will consider this and discuss with her local rheumatologist.   Component      Latest Ref Rng 12/28/2024  10:47 AM 2/14/2025  8:02 AM   ALT      0 - 50 U/L 38  80 (H)    ALT      0 - 50 U/L  95 (H)       Legend:  (H) High

## 2025-02-17 NOTE — LETTER
February 18, 2025      Keiry Mchugh  [address]  [place] [address]        To Whom It May Concern,     I am the primary care physician for Keiry Mchugh. I am writing to request reconsideration for coverage of a GLP-1 agent like Zepbound.     She is actively pursuing weight management strategies as she has failed non-surgical weight loss. She is in the process of pursuing surgical weight loss. I request reconsideration for coverage of GLP-1 medication like Zepbound for weight management as this medication has been found to have comparable success to patients who undergo bariatric surgery without dealing with the postoperative malabsorption complications.     Given her morbid obesity diagnosis with BMI 37.6 along with comorbidities including HARDIN and dyslipidemia, she is an excellent candidate for medication managed weight loss. She has tried alternative agents including phentermine and wellbutrin. She has plateaued with phentermine. She did not tolerate Wellbutrin due to side effects. Please reconsider coverage for this highly effective treatment.    Sincerely,        Yissel Schmidt, DO    Electronically signed

## 2025-02-18 ENCOUNTER — TELEPHONE (OUTPATIENT)
Dept: FAMILY MEDICINE | Facility: CLINIC | Age: 60
End: 2025-02-18
Payer: COMMERCIAL

## 2025-02-18 NOTE — TELEPHONE ENCOUNTER
Called patient to discuss results message below and assist in scheduling follow up with pcp. LMTCB    PANCHO Rubalcava, Ciara Conner PA-C  [facility]  Patient needs appt with PCP to address abnormal lab results.    Ciara Moraes PA-C

## 2025-02-18 NOTE — TELEPHONE ENCOUNTER
Information relayed to patient. She states she did see results on MyChart and had sent a message to PCP to follow-up. Should would like to wait for PCPs response as to how to move forward. She is open to scheduling an appointment with PCP, if advised.     Patient would like to note that she recently started estrogen patches and read online that this could increase her triglyceride levels.    Patient will await response from PCP at this time.     Jennifer Graham RN     Neck , no lymphadenopathy

## 2025-02-19 ENCOUNTER — OFFICE VISIT (OUTPATIENT)
Dept: RHEUMATOLOGY | Facility: CLINIC | Age: 60
End: 2025-02-19
Payer: COMMERCIAL

## 2025-02-19 ENCOUNTER — DOCUMENTATION ONLY (OUTPATIENT)
Dept: RHEUMATOLOGY | Facility: CLINIC | Age: 60
End: 2025-02-19

## 2025-02-19 ENCOUNTER — TELEPHONE (OUTPATIENT)
Dept: PULMONOLOGY | Facility: CLINIC | Age: 60
End: 2025-02-19

## 2025-02-19 VITALS
DIASTOLIC BLOOD PRESSURE: 82 MMHG | BODY MASS INDEX: 38.53 KG/M2 | OXYGEN SATURATION: 99 % | HEART RATE: 88 BPM | SYSTOLIC BLOOD PRESSURE: 140 MMHG | WEIGHT: 238.7 LBS

## 2025-02-19 DIAGNOSIS — K75.81 NASH (NONALCOHOLIC STEATOHEPATITIS): ICD-10-CM

## 2025-02-19 DIAGNOSIS — R76.8 RHEUMATOID FACTOR POSITIVE: ICD-10-CM

## 2025-02-19 DIAGNOSIS — Z79.899 HIGH RISK MEDICATION USE: ICD-10-CM

## 2025-02-19 DIAGNOSIS — M15.0 PRIMARY OSTEOARTHRITIS INVOLVING MULTIPLE JOINTS: ICD-10-CM

## 2025-02-19 DIAGNOSIS — M05.79 SEROPOSITIVE RHEUMATOID ARTHRITIS OF MULTIPLE JOINTS (H): Primary | ICD-10-CM

## 2025-02-19 PROCEDURE — 99214 OFFICE O/P EST MOD 30 MIN: CPT | Performed by: INTERNAL MEDICINE

## 2025-02-19 PROCEDURE — G2211 COMPLEX E/M VISIT ADD ON: HCPCS | Performed by: INTERNAL MEDICINE

## 2025-02-19 RX ORDER — PREDNISONE 5 MG/1
5 TABLET ORAL DAILY
Qty: 30 TABLET | Refills: 0 | Status: SHIPPED | OUTPATIENT
Start: 2025-02-19 | End: 2025-03-21

## 2025-02-19 NOTE — TELEPHONE ENCOUNTER
First Attempt: I sent a my chart message to the patient with Dr Stone below response to please contact our office to schedule a Physical + Med Check appt or just an OV if preferred.

## 2025-02-19 NOTE — TELEPHONE ENCOUNTER
I sent patient letters through [employer].  Can you forward her appeal letter for GLP-1 to her insurance company?    I didn't order her lipid panel and recommend we discuss in a visit. She is due for physical. If she wants a problem based discussion, can submit e-visit to review and discuss.    Yissel Schmidt, DO

## 2025-02-19 NOTE — TELEPHONE ENCOUNTER
I'd recommend patient schedule physical + med check. We can discuss lipid results at appointment.  Alternatively, she can schedule a problem based visit to review lipid panel and discuss management.   Yissel Schmidt, DO

## 2025-02-19 NOTE — PROGRESS NOTES
If have questions about Humira refills call [facility] @ [phone] not [facility].     Karo MÁRQUEZ

## 2025-02-19 NOTE — PROGRESS NOTES
Rheumatology follow-up visit note     Keiry is a 59 year old female presents today for follow-up.    Samantha was seen today for recheck.    Diagnoses and all orders for this visit:    Seropositive rheumatoid arthritis of multiple joints (H)  -     Adalimumab (HUMIRA *CF*) 40 MG/0.4ML pen kit; Inject 0.4 mLs (40 mg) subcutaneously every 14 days.  -     predniSONE (DELTASONE) 5 MG tablet; Take 1 tablet (5 mg) by mouth daily.    High risk medication use    HARDIN (nonalcoholic steatohepatitis)    Rheumatoid factor positive    Primary osteoarthritis involving multiple joints        This patient is seropositive rheumatoid arthritis unfortunately has not been able to get her Humira from her pharmacy for unknown clear reasons.  She is going to get an urgent supply from [facility] and the rest then from [facility].  Meanwhile given the severity of her symptoms, synovitis I will ask her to take prednisone for a short while as noted.  Meanwhile continue hydroxychloroquine, however given persistent elevation of ALT will discontinue methotrexate.  Take folic acid for 1 month and then discontinue that as well..  The longitudinal plan of care for the diagnosis(es)/condition(s) as documented were addressed during this visit. Due to the added complexity in care, I will continue to support Samantha in the subsequent management and with ongoing continuity of care.      Follow up in 2 months.    HPI    Keiry Mchugh is a 59 year old female is here for follow-up of   rheumatoid arthritis seropositive. On her previous visit Humira was started in view of ongoing rheumatoid activity, meanwhile her liver function studiesBecome abnormal she does have a history of HARDIN. She is following up at [facility] for question of Cushing's/hyperparathyroidism. Meanwhile her methotrexate dose was reduced.  For unclear reasons her pharmacy was not able to send her Humira.  This is discussed with her we called her pharmacy.  She is getting this now from [facility]  at least 1 month supply.  Meanwhile her liver function studies remain abnormal I will ask her to discontinue methotrexate.  She had her eyes examined she recalls earlier this year in March for hydroxychloroquine toxicity monitoring.     DETAILED EXAMINATION  02/19/25  :    Vitals:    02/19/25 0838   BP: (!) 140/82   BP Location: Right arm   Patient Position: Sitting   Cuff Size: Adult Large   Pulse: 88   SpO2: 99%   Weight: 108.3 kg (238 lb 11.2 oz)     Alert oriented. Head including the face is examined for malar rash, heliotropes, scarring, lupus pernio. Eyes examined for redness such as in episcleritis/scleritis, periorbital lesions.   Neck/ Face examined for parotid gland swelling, range of motion of neck.  Left upper and lower and right upper and lower extremities examined for tenderness, swelling, warmth of the appendicular joints, range of motion, edema, rash.  Some of the important findings included: Multiple PIP and MCP tenderness.  Right wrist tender compared to the left.  Patient Active Problem List    Diagnosis Date Noted    Hiatal hernia with gastroesophageal reflux 09/21/2024     Priority: Medium    Class 2 severe obesity due to excess calories with serious comorbidity in adult (H) 09/13/2024     Priority: Medium    Diaphragmatic hernia 08/28/2024     Priority: Medium    Cervical spondylosis without myelopathy 10/13/2023     Priority: Medium    Cervical radiculitis 10/13/2023     Priority: Medium    Low bone mass 07/21/2023     Priority: Medium    Profound fatigue 07/21/2023     Priority: Medium    Dyslipidemia      Priority: Medium    Metabolic syndrome      Priority: Medium    Celiac disease      Priority: Medium    Sigmoid diverticulitis 08/29/2021     Priority: Medium    HARDIN (nonalcoholic steatohepatitis) 07/23/2021     Priority: Medium    Chronic insomnia 06/11/2020     Priority: Medium     Insomnia associated with inadequate sleep hygiene and suspected sleep apnea.       Past Surgical History:    Procedure Laterality Date    APPENDECTOMY      CHOLECYSTECTOMY      COLON SURGERY      COLONOSCOPY N/A 3/18/2019    Procedure: COLONOSCOPY;  Surgeon: Davis Mosqueda MD;  Location: [facility] OR;  Service: General    CYSTOSCOPY N/A 7/16/2015    Procedure: CYSTOSCOPY;  Surgeon: Nate Horta MD;  Location: [facility];  Service:     ESOPHAGOSCOPY, GASTROSCOPY, DUODENOSCOPY (EGD), COMBINED N/A 9/16/2024    Procedure: Esophagogastroduodenoscopy with biopsies;  Surgeon: Guevara Livingston MD;  Location: [facility] OR    HC CORRECT BUNION,METATARSAL OSTEOTOMY      Description: Bunion Correction With Metatarsal Osteotomy [facility] Procedure;  Proc Date: 07/16/2010;    HYSTERECTOMY      HYSTERECTOMY, PAP NO LONGER INDICATED  02/2009    NEPHRECTOMY LIVING DONOR Left APRIL 2012    IN LAP,BILIARY TRACT,UNLISTED N/A 3/19/2019    Procedure: BIOPSY, LIVER, LAPAROSCOPIC;  Surgeon: Davis Mosqueda MD;  Location: [facility];  Service: General    IN LAP,SLING OPERATION      Description: Laparoscopic Sling Operation For Stress Incontinence;  Recorded: 02/17/2009;    IN LAP,SURG,COLECTOMY, PARTIAL, W/ANAST N/A 3/19/2019    Procedure: LAPAROSCOPIC SIGMOID COLON RESECTION;  Surgeon: Davis Mosqueda MD;  Location: [facility];  Service: General    SEPTOPLASTY, TURBINOPLASTY, COMBINED N/A 8/3/2021    Procedure: SEPTOPLASTY, NOSE, WITH TURBINOPLASTY RADIOFREQUENCY BALLON ASSISTED, CRYOBLATION OF NASAL TISSUE;  Surgeon: Randy Case MD;  Location: [facility]    SIGMOIDOSCOPY FLEXIBLE N/A 3/19/2019    Procedure: FLEXIBLE SIGMOIDOSCOPY;  Surgeon: Davis Mosqueda MD;  Location: [facility];  Service: General      Past Medical History:   Diagnosis Date    Anemia     Anxiety     Celiac disease     Celiac disease     Cervical spondylosis without myelopathy 10/13/2023    Chronic kidney disease     donated 1 kidney    Class 2 severe obesity due to excess calories with serious comorbidity in  "adult (H) 9/13/2024    Cough     Depression     Seasonal    Diverticulitis     Dyslipidemia     Enlarged LV     wall    GERD (gastroesophageal reflux disease)     Hepatitis     Hiatal hernia     Hypertension     Low bone mass     Metabolic syndrome     Migraines     HARDIN (nonalcoholic steatohepatitis)     Peripheral neuropathy     PONV (postoperative nausea and vomiting)     Profound fatigue     Ventral hernia      Allergies   Allergen Reactions    Gluten Meal Nausea and Vomiting    Doxycycline Hives    Contrast Dye      Hives, resolved with benadryl alone in the past (two episode)    Nsaids Other (See Comments)     Avoid - one kidney    Ondansetron Headache    Pcn [Penicillins] Hives     As a child    Rocephin [Ceftriaxone] Hives     Patient states this started immediately    Tramadol Hives    Zithromax [Azithromycin] Tinnitus     \"ear ringing and hearing loss\"     Current Outpatient Medications   Medication Sig Dispense Refill    Adalimumab (HUMIRA *CF*) 40 MG/0.4ML pen kit Inject 0.4 mLs (40 mg) subcutaneously every 14 days. 2 each 1    albuterol (PROVENTIL) (2.5 MG/3ML) 0.083% neb solution Take 1 vial (2.5 mg) by nebulization every 6 hours as needed for shortness of breath, wheezing or cough. 180 mL 11    atenolol (TENORMIN) 50 MG tablet Take 1 tablet (50 mg) by mouth daily as needed (Migraines) 30 tablet 1    B Complex-C (RA B-COMPLEX/VITAMIN C CR) TBCR Take 1 tablet by mouth daily      baclofen (LIORESAL) 10 MG tablet TAKE 1 TABLET BY MOUTH TWICE DAILY TO THREE TIMES DAILY AS NEEDED FOR MUSCLE SPASMS. (Patient not taking: Reported on 1/24/2025) 30 tablet 3    Coenzyme Q10 (COQ-10) 400 MG CAPS Take 1 capsule by mouth daily       FLUoxetine (PROZAC) 20 MG capsule Take 1 capsule (20 mg) by mouth daily 90 capsule 3    fluticasone-salmeterol (ADVAIR) 250-50 MCG/ACT inhaler Inhale 1 puff into the lungs every 12 hours. (Patient not taking: Reported on 12/6/2024) 1 each 0    folic acid (FOLVITE) 1 MG tablet TAKE 3 " TABLETS(3 MG) BY MOUTH DAILY 270 tablet 0    hydroxychloroquine (PLAQUENIL) 200 MG tablet TAKE 1 TABLET(200 MG) BY MOUTH TWICE DAILY 180 tablet 0    methotrexate 2.5 MG tablet TAKE 3 TABLETS(7.5 MG) BY MOUTH EVERY 7 DAYS 36 tablet 0    metoclopramide (REGLAN) 10 MG tablet Take 1 tablet (10 mg) by mouth 3 times daily as needed for nausea. 12 tablet 0    OnabotulinumtoxinA (BOTOX IJ) For migraines - 155 units q 12 weeks.      phentermine (ADIPEX-P) 37.5 MG tablet Take 1 tablet (37.5 mg) by mouth every morning (before breakfast). 90 tablet 1    polyethylene glycol (MIRALAX) 17 GM/Dose powder Take 17 g by mouth daily as needed.      sucralfate (CARAFATE) 1 GM tablet Take 1 tablet (1 g) by mouth 4 times daily. 30 tablet 0    SUMAtriptan (IMITREX) 100 MG tablet TAKE 1/2 TO 1 TABLET BY MOUTH AT ONSET OF HEADACHE 9 tablet 5    tirzepatide-Weight Management (ZEPBOUND) 2.5 MG/0.5ML prefilled pen Inject 0.5 mLs (2.5 mg) subcutaneously every 7 days. 2 mL 0     family history includes Breast Cancer (age of onset: 50) in her maternal aunt and mother; Graves' disease in her sister; Hashimoto's thyroiditis in her maternal aunt, maternal grandfather, and mother; Hypothyroidism in her maternal aunt, maternal aunt, and sister.  Social Connections: Moderately Isolated (2/27/2023)    [facility]    Social Connection and Isolation Panel [NHANES]     Frequency of Communication with Friends and Family: Twice a week     Frequency of Social Gatherings with Friends and Family: Once a week     Attends Religion Services: 1 to 4 times per year     Active Member of Clubs or Organizations: No     Attends Club or Organization Meetings: Not on file     Marital Status: Never           WBC Count   Date Value Ref Range Status   02/14/2025 5.1 4.0 - 11.0 10e3/uL Final     RBC Count   Date Value Ref Range Status   02/14/2025 4.38 3.80 - 5.20 10e6/uL Final     Hemoglobin   Date Value Ref Range Status   02/14/2025 13.7  11.7 - 15.7 g/dL Final     Hematocrit   Date Value Ref Range Status   02/14/2025 41.4 35.0 - 47.0 % Final     MCV   Date Value Ref Range Status   02/14/2025 95 78 - 100 fL Final     MCH   Date Value Ref Range Status   02/14/2025 31.3 26.5 - 33.0 pg Final     Platelet Count   Date Value Ref Range Status   02/14/2025 293 150 - 450 10e3/uL Final     % Lymphocytes   Date Value Ref Range Status   12/28/2024 43 % Final     AST   Date Value Ref Range Status   02/14/2025 56 (H) 0 - 45 U/L Final     ALT   Date Value Ref Range Status   02/14/2025 80 (H) 0 - 50 U/L Final   02/14/2025 95 (H) 0 - 50 U/L Final     Albumin   Date Value Ref Range Status   02/14/2025 4.2 3.5 - 5.2 g/dL Final   02/14/2025 4.3 3.5 - 5.2 g/dL Final   09/08/2021 4.3 3.5 - 5.0 g/dL Final     Alkaline Phosphatase   Date Value Ref Range Status   02/14/2025 103 40 - 150 U/L Final     Creatinine   Date Value Ref Range Status   02/14/2025 0.95 0.51 - 0.95 mg/dL Final   02/14/2025 0.96 (H) 0.51 - 0.95 mg/dL Final     GFR Estimate   Date Value Ref Range Status   02/14/2025 70 >60 mL/min/1.73m2 Final     Comment:     eGFR calculated using 2021 CKD-EPI equation.   02/14/2025 69 >60 mL/min/1.73m2 Final     Comment:     eGFR calculated using 2021 CKD-EPI equation.   12/01/2020 61 >=60 mL/min/BSA Final   06/19/2020 >60 >60 mL/min/1.73m2 Final     GFR Estimate If Black   Date Value Ref Range Status   06/19/2020 >60 >60 mL/min/1.73m2 Final     GFREstimate If Black   Date Value Ref Range Status   12/01/2020 71 >=60 mL/min/BSA Final     Erythrocyte Sedimentation Rate   Date Value Ref Range Status   05/03/2024 13 0 - 30 mm/hr Final     CRP   Date Value Ref Range Status   05/26/2023 3.1 (H) 0.0 - <0.8 mg/dL Final

## 2025-02-20 RX ORDER — FOLIC ACID 1 MG/1
1 TABLET ORAL DAILY
Qty: 30 TABLET | Refills: 0 | Status: SHIPPED | OUTPATIENT
Start: 2025-02-20 | End: 2025-03-22

## 2025-02-20 RX ORDER — METHOTREXATE 2.5 MG/1
TABLET ORAL
Qty: 36 TABLET | Refills: 0 | OUTPATIENT
Start: 2025-02-20

## 2025-03-15 ENCOUNTER — HOSPITAL ENCOUNTER (EMERGENCY)
Facility: HOSPITAL | Age: 60
Discharge: HOME | End: 2025-03-15
Attending: FAMILY MEDICINE | Admitting: FAMILY MEDICINE
Payer: COMMERCIAL

## 2025-03-15 ENCOUNTER — APPOINTMENT (OUTPATIENT)
Dept: CT IMAGING | Facility: HOSPITAL | Age: 60
End: 2025-03-15
Attending: FAMILY MEDICINE
Payer: COMMERCIAL

## 2025-03-15 VITALS
HEART RATE: 85 BPM | OXYGEN SATURATION: 93 % | RESPIRATION RATE: 27 BRPM | DIASTOLIC BLOOD PRESSURE: 79 MMHG | SYSTOLIC BLOOD PRESSURE: 161 MMHG | TEMPERATURE: 99.3 F

## 2025-03-15 DIAGNOSIS — J98.11 ATELECTASIS: ICD-10-CM

## 2025-03-15 DIAGNOSIS — J04.0 LARYNGITIS: ICD-10-CM

## 2025-03-15 LAB
ANION GAP SERPL CALCULATED.3IONS-SCNC: 7 MMOL/L (ref 7–15)
BASE EXCESS BLDV CALC-SCNC: 2.4 MMOL/L (ref -3–3)
BASOPHILS # BLD AUTO: 0 10E3/UL (ref 0–0.2)
BASOPHILS NFR BLD AUTO: 0 %
BUN SERPL-MCNC: 13.7 MG/DL (ref 6–20)
CALCIUM SERPL-MCNC: 9.1 MG/DL (ref 8.8–10.4)
CHLORIDE SERPL-SCNC: 107 MMOL/L (ref 98–107)
CREAT SERPL-MCNC: 0.82 MG/DL (ref 0.51–0.95)
EGFRCR SERPLBLD CKD-EPI 2021: 83 ML/MIN/1.73M2
EOSINOPHIL # BLD AUTO: 0.2 10E3/UL (ref 0–0.7)
EOSINOPHIL NFR BLD AUTO: 1 %
ERYTHROCYTE [DISTWIDTH] IN BLOOD BY AUTOMATED COUNT: 12 % (ref 10–15)
GLUCOSE SERPL-MCNC: 145 MG/DL (ref 70–99)
HCO3 BLDV-SCNC: 27 MMOL/L (ref 21–28)
HCO3 SERPL-SCNC: 26 MMOL/L (ref 22–29)
HCT VFR BLD AUTO: 36.7 % (ref 35–47)
HGB BLD-MCNC: 12.4 G/DL (ref 11.7–15.7)
IMM GRANULOCYTES # BLD: 0.1 10E3/UL
IMM GRANULOCYTES NFR BLD: 0 %
LYMPHOCYTES # BLD AUTO: 3.8 10E3/UL (ref 0.8–5.3)
LYMPHOCYTES NFR BLD AUTO: 33 %
MCH RBC QN AUTO: 30.9 PG (ref 26.5–33)
MCHC RBC AUTO-ENTMCNC: 33.8 G/DL (ref 31.5–36.5)
MCV RBC AUTO: 92 FL (ref 78–100)
MONOCYTES # BLD AUTO: 0.9 10E3/UL (ref 0–1.3)
MONOCYTES NFR BLD AUTO: 7 %
NEUTROPHILS # BLD AUTO: 6.8 10E3/UL (ref 1.6–8.3)
NEUTROPHILS NFR BLD AUTO: 58 %
NRBC # BLD AUTO: 0 10E3/UL
NRBC BLD AUTO-RTO: 0 /100
O2/TOTAL GAS SETTING VFR VENT: 21 %
OXYHGB MFR BLDV: 81 % (ref 70–75)
PCO2 BLDV: 42 MM HG (ref 40–50)
PH BLDV: 7.42 [PH] (ref 7.32–7.43)
PLATELET # BLD AUTO: 307 10E3/UL (ref 150–450)
PO2 BLDV: 45 MM HG (ref 25–47)
POTASSIUM SERPL-SCNC: 3.2 MMOL/L (ref 3.4–5.3)
RBC # BLD AUTO: 4.01 10E6/UL (ref 3.8–5.2)
SAO2 % BLDV: 82.4 % (ref 70–75)
SODIUM SERPL-SCNC: 140 MMOL/L (ref 135–145)
WBC # BLD AUTO: 11.7 10E3/UL (ref 4–11)

## 2025-03-15 PROCEDURE — 80048 BASIC METABOLIC PNL TOTAL CA: CPT | Performed by: FAMILY MEDICINE

## 2025-03-15 PROCEDURE — 94640 AIRWAY INHALATION TREATMENT: CPT

## 2025-03-15 PROCEDURE — 70491 CT SOFT TISSUE NECK W/DYE: CPT

## 2025-03-15 PROCEDURE — 71260 CT THORAX DX C+: CPT

## 2025-03-15 PROCEDURE — 82374 ASSAY BLOOD CARBON DIOXIDE: CPT | Performed by: FAMILY MEDICINE

## 2025-03-15 PROCEDURE — 82805 BLOOD GASES W/O2 SATURATION: CPT | Performed by: FAMILY MEDICINE

## 2025-03-15 PROCEDURE — 36415 COLL VENOUS BLD VENIPUNCTURE: CPT | Performed by: FAMILY MEDICINE

## 2025-03-15 PROCEDURE — 250N000011 HC RX IP 250 OP 636: Performed by: FAMILY MEDICINE

## 2025-03-15 PROCEDURE — 99285 EMERGENCY DEPT VISIT HI MDM: CPT | Mod: 25

## 2025-03-15 PROCEDURE — 85025 COMPLETE CBC W/AUTO DIFF WBC: CPT | Performed by: FAMILY MEDICINE

## 2025-03-15 PROCEDURE — 250N000013 HC RX MED GY IP 250 OP 250 PS 637: Performed by: FAMILY MEDICINE

## 2025-03-15 PROCEDURE — 250N000009 HC RX 250: Performed by: FAMILY MEDICINE

## 2025-03-15 PROCEDURE — 96374 THER/PROPH/DIAG INJ IV PUSH: CPT | Mod: 59

## 2025-03-15 RX ORDER — LIDOCAINE HYDROCHLORIDE 20 MG/ML
10 SOLUTION OROPHARYNGEAL ONCE
Status: COMPLETED | OUTPATIENT
Start: 2025-03-15 | End: 2025-03-15

## 2025-03-15 RX ORDER — LEVOFLOXACIN 750 MG/1
750 TABLET, FILM COATED ORAL DAILY
Qty: 14 TABLET | Refills: 0 | Status: SHIPPED | OUTPATIENT
Start: 2025-03-15

## 2025-03-15 RX ORDER — DIPHENHYDRAMINE HYDROCHLORIDE 50 MG/ML
25 INJECTION, SOLUTION INTRAMUSCULAR; INTRAVENOUS ONCE
Status: COMPLETED | OUTPATIENT
Start: 2025-03-15 | End: 2025-03-15

## 2025-03-15 RX ORDER — IOPAMIDOL 755 MG/ML
90 INJECTION, SOLUTION INTRAVASCULAR ONCE
Status: COMPLETED | OUTPATIENT
Start: 2025-03-15 | End: 2025-03-15

## 2025-03-15 RX ORDER — METHYLPREDNISOLONE 4 MG/1
TABLET ORAL
Qty: 21 TABLET | Refills: 0 | Status: SHIPPED | OUTPATIENT
Start: 2025-03-15

## 2025-03-15 RX ORDER — LIDOCAINE HYDROCHLORIDE 20 MG/ML
10 JELLY TOPICAL ONCE
Status: COMPLETED | OUTPATIENT
Start: 2025-03-15 | End: 2025-03-15

## 2025-03-15 RX ADMIN — LIDOCAINE HYDROCHLORIDE 10 ML: 20 JELLY TOPICAL at 04:24

## 2025-03-15 RX ADMIN — LIDOCAINE HYDROCHLORIDE 10 ML: 20 SOLUTION ORAL at 04:33

## 2025-03-15 RX ADMIN — RACEPINEPHRINE HYDROCHLORIDE 0.5 ML: 11.25 SOLUTION RESPIRATORY (INHALATION) at 03:34

## 2025-03-15 RX ADMIN — DIPHENHYDRAMINE HYDROCHLORIDE 25 MG: 50 INJECTION, SOLUTION INTRAMUSCULAR; INTRAVENOUS at 03:50

## 2025-03-15 RX ADMIN — DIPHENHYDRAMINE HYDROCHLORIDE 25 MG: 50 INJECTION, SOLUTION INTRAMUSCULAR; INTRAVENOUS at 03:57

## 2025-03-15 RX ADMIN — IOPAMIDOL 90 ML: 755 INJECTION, SOLUTION INTRAVENOUS at 05:18

## 2025-03-15 ASSESSMENT — ENCOUNTER SYMPTOMS
FEVER: 0
SORE THROAT: 0
COUGH: 1
SHORTNESS OF BREATH: 1

## 2025-03-15 ASSESSMENT — ACTIVITIES OF DAILY LIVING (ADL)
ADLS_ACUITY_SCORE: 52

## 2025-03-15 ASSESSMENT — COLUMBIA-SUICIDE SEVERITY RATING SCALE - C-SSRS
2. HAVE YOU ACTUALLY HAD ANY THOUGHTS OF KILLING YOURSELF IN THE PAST MONTH?: NO
1. IN THE PAST MONTH, HAVE YOU WISHED YOU WERE DEAD OR WISHED YOU COULD GO TO SLEEP AND NOT WAKE UP?: NO
6. HAVE YOU EVER DONE ANYTHING, STARTED TO DO ANYTHING, OR PREPARED TO DO ANYTHING TO END YOUR LIFE?: NO

## 2025-03-15 NOTE — ED PROVIDER NOTES
EMERGENCY DEPARTMENT ENCOUNTER      NAME: Keiry Mchugh  AGE: 59 year old female  YOB: 1965  MRN: [id number]  EVALUATION DATE & TIME: 3/15/2025  3:15 AM    PCP: Yissel Schmidt    ED PROVIDER: Reece Resendez M.D.    Chief Complaint   Patient presents with    Shortness of Breath       FINAL IMPRESSION:  1. Laryngitis    2. Atelectasis        ED COURSE & MEDICAL DECISION MAKING:    Pertinent Labs & Imaging studies independently interpreted by me. (See chart for details)  Reviewed most recent rheumatology visit from February 2025 which was follow up for rheumatoid arthritis, patient on Humira and prednisone chronically, also history of HARDIN.  Also reviewed prior pulmonology visit from December 2024, chronic cough with reflux and bronchiectasis at that time with normal PFTs, albuterol as needed.  ED Course as of 03/15/25 0611   Sat Mar 15, 2025   0328 Patient seen and examined, presents today with shortness of breath for the last couple of days.  Uses albuterol at home, he DuoNeb and Solu-Medrol given by EMS en route.  She notes hoarse voice, no chest pain, notes a cough with some phlegm.  No nausea or vomiting.  On exam here, patient is awake and alert, no apparent distress, has upper airway wheezing but lungs generally clear, oxygen saturation 100%, no respiratory distress.  Possible laryngitis/pharyngitis, possibly related to chronic reflux.  Labs ordered, CT scan of the neck as well as lungs will be performed.   0410 Labs independently interpreted by me with normal venous blood gas, mild hypokalemia, mild leukocytosis.   0429 Patient feels better after epinephrine nebulizer treatment, however does complain of burning sensation in her nose.  Uro-Jet topical lidocaine ordered, as well as viscous lidocaine due to feeling of burning on her hard palate.   0532 CT scan of the neck independently interpreted by me with widely patent airway, no epiglottic swelling.  CT scan of the chest independently  interpreted by me with infiltrate versus atelectasis on the left.   0600 CT Chest w Contrast  IMPRESSION:   1.  Evidence of remote granulomatous disease. Strands of platelike atelectasis have developed in the lower lungs, more apparent involving the left lower lobe. No suspicious adenopathy or pleural effusion on either side.  2.  Normal cardiac size. Mild coronary artery calcifications. No pericardial effusion.   3.  Surgical clips in the neck adjacent to the thyroid gland. Retroperitoneal surgical clips. Cholecystectomy clips.  4.  Diffusely fatty infiltrated enlarged liver without discrete lesion.   0609 Patient rechecked, she is feeling better.  We had a long discussion about atelectasis and laryngitis.  Is wondering about antibiotics as well as steroid.  Given her chronic immunosuppression and atelectasis, is not unreasonable to start a round of antibiotics for her.  Prescriptions are sent and stable for discharge         At the conclusion of the encounter I discussed the results of all of the tests and the disposition. The questions were answered. The patient or family acknowledged understanding and was agreeable with the care plan.     Medical Decision Making  Obtained supplemental history:Supplemental history obtained?: No  Reviewed external records: External records reviewed?: Documented in chart  Care impacted by chronic illness:Chronic Kidney Disease, Hypertension, and Mental Health  Did you consider but not order tests?: Work up considered but not performed and documented in chart, if applicable  Did you interpret images independently?: Independent interpretation of ECG and images noted in documentation, when applicable.  Consultation discussion with other provider:Did you involve another provider (consultant, , pharmacy, etc.)?: No  Discharge. I prescribed additional prescription strength medication(s) as charted. I considered admission, but discharged patient after significant clinical  improvement.    MIPS (CTPE, Dental pain, Muñiz, Sinusitis, Asthma/COPD, Head Trauma): Not Applicable    MEDICATIONS GIVEN IN THE EMERGENCY:  Medications   racEPINEPHrine neb solution 0.5 mL (0.5 mLs Nebulization $Given 3/15/25 0334)   diphenhydrAMINE (BENADRYL) injection 25 mg (25 mg Intravenous $Given 3/15/25 0350)   diphenhydrAMINE (BENADRYL) injection 25 mg (25 mg Intravenous $Given 3/15/25 1917)   lidocaine (XYLOCAINE) 2 % external gel 10 mL (10 mLs Topical $Given 3/15/25 0424)   lidocaine (viscous) (XYLOCAINE) 2 % solution 10 mL (10 mLs Mouth/Throat $Given 3/15/25 5003)   iopamidol (ISOVUE-370) solution 90 mL (90 mLs Intravenous $Given 3/15/25 0518)       NEW PRESCRIPTIONS STARTED AT TODAY'S ER VISIT  New Prescriptions    LEVOFLOXACIN (LEVAQUIN) 750 MG TABLET    Take 1 tablet (750 mg) by mouth daily.    METHYLPREDNISOLONE (MEDROL DOSEPAK) 4 MG TABLET THERAPY PACK    Follow Package Directions       =================================================================    HPI    Patient information was obtained from: The patient      Keiry Mchugh is a 59 year old female with a pertinent history of cough, depression, HTN, CKD stage 3, who presents to this ED via walk-in for evaluation of shortness of breath.    The patient reports of a chronic cough since August, 2024. Her cough and shortness of breath has worsened in the last 3 days. No complaints of chest pain, sore throat, fever, or any other associated symptoms.       REVIEW OF SYSTEMS   Review of Systems   Constitutional:  Negative for fever.   HENT:  Negative for sore throat.    Respiratory:  Positive for cough and shortness of breath.    Cardiovascular:  Negative for chest pain.      All other systems reviewed and negative    PAST MEDICAL HISTORY:  Past Medical History:   Diagnosis Date    Anemia     Anxiety     Celiac disease     Celiac disease     Cervical spondylosis without myelopathy 10/13/2023    Chronic kidney disease     donated 1 kidney    Class 2  severe obesity due to excess calories with serious comorbidity in adult (H) 9/13/2024    Cough     Depression     Seasonal    Diverticulitis     Dyslipidemia     Enlarged LV     wall    GERD (gastroesophageal reflux disease)     Hepatitis     Hiatal hernia     Hypertension     Low bone mass     Metabolic syndrome     Migraines     HARDIN (nonalcoholic steatohepatitis)     Peripheral neuropathy     PONV (postoperative nausea and vomiting)     Profound fatigue     Ventral hernia        PAST SURGICAL HISTORY:  Past Surgical History:   Procedure Laterality Date    APPENDECTOMY      CHOLECYSTECTOMY      COLON SURGERY      COLONOSCOPY N/A 3/18/2019    Procedure: COLONOSCOPY;  Surgeon: Davis Mosqueda MD;  Location: [facility];  Service: General    CYSTOSCOPY N/A 7/16/2015    Procedure: CYSTOSCOPY;  Surgeon: Nate Horta MD;  Location: [facility];  Service:     ESOPHAGOSCOPY, GASTROSCOPY, DUODENOSCOPY (EGD), COMBINED N/A 9/16/2024    Procedure: Esophagogastroduodenoscopy with biopsies;  Surgeon: Guevara Livingston MD;  Location:  OR    HC CORRECT BUNION,METATARSAL OSTEOTOMY      Description: Bunion Correction With Metatarsal Osteotomy Herman Procedure;  Proc Date: 07/16/2010;    HYSTERECTOMY      HYSTERECTOMY, PAP NO LONGER INDICATED  02/2009    NEPHRECTOMY LIVING DONOR Left APRIL 2012    CT LAP,BILIARY TRACT,UNLISTED N/A 3/19/2019    Procedure: BIOPSY, LIVER, LAPAROSCOPIC;  Surgeon: Davis Mosqueda MD;  Location: [facility];  Service: General    CT LAP,SLING OPERATION      Description: Laparoscopic Sling Operation For Stress Incontinence;  Recorded: 02/17/2009;    CT LAP,SURG,COLECTOMY, PARTIAL, W/ANAST N/A 3/19/2019    Procedure: LAPAROSCOPIC SIGMOID COLON RESECTION;  Surgeon: Davis Mosqueda MD;  Location: [facility];  Service: General    SEPTOPLASTY, TURBINOPLASTY, COMBINED N/A 8/3/2021    Procedure: SEPTOPLASTY, NOSE, WITH TURBINOPLASTY DAGMAR LOZA  ASSISTED, CRYOBLATION OF NASAL TISSUE;  Surgeon: Randy Case MD;  Location: [facility] OR    SIGMOIDOSCOPY FLEXIBLE N/A 3/19/2019    Procedure: FLEXIBLE SIGMOIDOSCOPY;  Surgeon: Davis Mosqueda MD;  Location: [facility] OR;  Service: General       CURRENT MEDICATIONS:    No current facility-administered medications for this encounter.     Current Outpatient Medications   Medication Sig Dispense Refill    methylPREDNISolone (MEDROL DOSEPAK) 4 MG tablet therapy pack Follow Package Directions 21 tablet 0    Adalimumab (HUMIRA *CF*) 40 MG/0.4ML pen kit Inject 0.4 mLs (40 mg) subcutaneously every 14 days. 2 each 1    albuterol (PROVENTIL) (2.5 MG/3ML) 0.083% neb solution Take 1 vial (2.5 mg) by nebulization every 6 hours as needed for shortness of breath, wheezing or cough. 180 mL 11    atenolol (TENORMIN) 50 MG tablet Take 1 tablet (50 mg) by mouth daily as needed (Migraines) 30 tablet 1    B Complex-C (RA B-COMPLEX/VITAMIN C CR) TBCR Take 1 tablet by mouth daily      baclofen (LIORESAL) 10 MG tablet TAKE 1 TABLET BY MOUTH TWICE DAILY TO THREE TIMES DAILY AS NEEDED FOR MUSCLE SPASMS. 30 tablet 3    Coenzyme Q10 (COQ-10) 400 MG CAPS Take 1 capsule by mouth daily       FLUoxetine (PROZAC) 20 MG capsule Take 1 capsule (20 mg) by mouth daily 90 capsule 3    folic acid (FOLVITE) 1 MG tablet Take 1 tablet (1 mg) by mouth daily. 30 tablet 0    hydroxychloroquine (PLAQUENIL) 200 MG tablet TAKE 1 TABLET(200 MG) BY MOUTH TWICE DAILY 180 tablet 0    levofloxacin (LEVAQUIN) 750 MG tablet Take 1 tablet (750 mg) by mouth daily. 14 tablet 0    OnabotulinumtoxinA (BOTOX IJ) For migraines - 155 units q 12 weeks.      phentermine (ADIPEX-P) 37.5 MG tablet Take 1 tablet (37.5 mg) by mouth every morning (before breakfast). 90 tablet 1    polyethylene glycol (MIRALAX) 17 GM/Dose powder Take 17 g by mouth daily as needed.      predniSONE (DELTASONE) 5 MG tablet Take 1 tablet (5 mg) by mouth daily. 30 tablet 0    SUMAtriptan  "(IMITREX) 100 MG tablet TAKE 1/2 TO 1 TABLET BY MOUTH AT ONSET OF HEADACHE 9 tablet 5    tirzepatide-Weight Management (ZEPBOUND) 2.5 MG/0.5ML prefilled pen Inject 0.5 mLs (2.5 mg) subcutaneously every 7 days. 2 mL 0       ALLERGIES:  Allergies   Allergen Reactions    Gluten Meal Nausea and Vomiting    Doxycycline Hives    Contrast Dye      Hives, resolved with benadryl alone in the past (two episode)    Nsaids Other (See Comments)     Avoid - one kidney    Ondansetron Headache    Pcn [Penicillins] Hives     As a child    Rocephin [Ceftriaxone] Hives     Patient states this started immediately    Tramadol Hives    Zithromax [Azithromycin] Tinnitus     \"ear ringing and hearing loss\"       FAMILY HISTORY:  Family History   Problem Relation Age of Onset    Hashimoto's thyroiditis Mother     Breast Cancer Mother 50        currently patient has two different types of Br Ca 84y    Graves' disease Sister     Hypothyroidism Sister     Hashimoto's thyroiditis Maternal Grandfather     Hypothyroidism Maternal Aunt     Breast Cancer Maternal Aunt 50    Hypothyroidism Maternal Aunt     Hashimoto's thyroiditis Maternal Aunt     Anesthesia Reaction No family hx of        SOCIAL HISTORY:   Social History     Socioeconomic History    Marital status: Single    Number of children: 2   Tobacco Use    Smoking status: Former     Current packs/day: 0.00     Types: Cigarettes     Quit date: 1999     Years since quittin.2    Smokeless tobacco: Never   Vaping Use    Vaping status: Never Used   Substance and Sexual Activity    Alcohol use: No     Comment: rare    Drug use: Not Currently    Sexual activity: Not Currently     Partners: Male   Social History Narrative    Single. 2 adult children living in the area. Daughter lives in [place]. Son lives with her as his girlfriend finishes nursing school. 3 grandchildren.     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      Social Drivers of Health     Financial Resource Strain: Low Risk  " (10/8/2023)    Financial Resource Strain     Within the past 12 months, have you or your family members you live with been unable to get utilities (heat, electricity) when it was really needed?: No   Food Insecurity: No Food Insecurity (8/7/2024)    Received from [facility]    Hunger Vital Sign     Worried About Running Out of Food in the Last Year: Never true     Ran Out of Food in the Last Year: Never true   Transportation Needs: No Transportation Needs (8/7/2024)    Received from [facility]    PRAPARE - Transportation     Lack of Transportation (Medical): No     Lack of Transportation (Non-Medical): No   Physical Activity: Patient Declined (8/7/2024)    Received from [facility]    Exercise Vital Sign     Days of Exercise per Week: Patient declined     Minutes of Exercise per Session: Patient declined   Stress: Stress Concern Present (2/27/2023)    [facility]    [place] [facility] [employer] - Occupational Stress Questionnaire     Feeling of Stress : Very much   Social Connections: Moderately Isolated (2/27/2023)    [facility]    Social Connection and Isolation Panel [NHANES]     Frequency of Communication with Friends and Family: Twice a week     Frequency of Social Gatherings with Friends and Family: Once a week     Attends [employer] Services: 1 to 4 times per year     Active Member of Clubs or Organizations: No     Marital Status: Never    Interpersonal Safety: Low Risk  (9/16/2024)    Interpersonal Safety     Do you feel physically and emotionally safe where you currently live?: Yes     Within the past 12 months, have you been hit, slapped, kicked or otherwise physically hurt by someone?: No     Within the past 12 months, have you been humiliated or emotionally abused in other ways by your partner or ex-partner?: No   Housing Stability: Low Risk  (8/7/2024)    Received from [facility]    Housing Stability     What is your living  situation today?: I have a steady place to live       VITALS:  BP (!) 146/70   Pulse 91   Temp 99.3  F (37.4  C) (Oral)   Resp 24   LMP  (LMP Unknown)   SpO2 98%     PHYSICAL EXAM:  Physical Exam  Vitals and nursing note reviewed.   Constitutional:       Appearance: Normal appearance.   HENT:      Head: Normocephalic and atraumatic.      Right Ear: External ear normal.      Left Ear: External ear normal.      Nose: Nose normal.      Mouth/Throat:      Mouth: Mucous membranes are moist.   Eyes:      Extraocular Movements: Extraocular movements intact.      Conjunctiva/sclera: Conjunctivae normal.      Pupils: Pupils are equal, round, and reactive to light.   Cardiovascular:      Rate and Rhythm: Normal rate and regular rhythm.   Pulmonary:      Effort: Pulmonary effort is normal.      Breath sounds: Rales present.      Comments: Upper airway wheezes bilaterally. Trace rales in right lung.  Abdominal:      General: Abdomen is flat. There is no distension.      Palpations: Abdomen is soft.      Tenderness: There is no abdominal tenderness. There is no guarding.   Musculoskeletal:         General: Normal range of motion.      Cervical back: Normal range of motion and neck supple.      Right lower leg: No edema.      Left lower leg: No edema.   Lymphadenopathy:      Cervical: No cervical adenopathy.   Skin:     General: Skin is warm and dry.   Neurological:      General: No focal deficit present.      Mental Status: She is alert and oriented to person, place, and time. Mental status is at baseline.      Comments: No gross focal neurologic deficits   Psychiatric:         Mood and Affect: Mood normal.         Behavior: Behavior normal.         Thought Content: Thought content normal.          LAB:  All pertinent labs reviewed and interpreted.  Results for orders placed or performed during the hospital encounter of 03/15/25   Soft tissue neck CT w contrast    Impression    IMPRESSION:   1.  Unremarkable soft tissue  neck CT.   CT Chest w Contrast    Impression    IMPRESSION:   1.  Evidence of remote granulomatous disease. Strands of platelike atelectasis have developed in the lower lungs, more apparent involving the left lower lobe. No suspicious adenopathy or pleural effusion on either side.    2.  Normal cardiac size. Mild coronary artery calcifications. No pericardial effusion.    3.  Surgical clips in the neck adjacent to the thyroid gland. Retroperitoneal surgical clips. Cholecystectomy clips.    4.  Diffusely fatty infiltrated enlarged liver without discrete lesion.   Basic metabolic panel   Result Value Ref Range    Sodium 140 135 - 145 mmol/L    Potassium 3.2 (L) 3.4 - 5.3 mmol/L    Chloride 107 98 - 107 mmol/L    Carbon Dioxide (CO2) 26 22 - 29 mmol/L    Anion Gap 7 7 - 15 mmol/L    Urea Nitrogen 13.7 6.0 - 20.0 mg/dL    Creatinine 0.82 0.51 - 0.95 mg/dL    GFR Estimate 83 >60 mL/min/1.73m2    Calcium 9.1 8.8 - 10.4 mg/dL    Glucose 145 (H) 70 - 99 mg/dL   Blood gas venous   Result Value Ref Range    pH Venous 7.42 7.32 - 7.43    pCO2 Venous 42 40 - 50 mm Hg    pO2 Venous 45 25 - 47 mm Hg    Bicarbonate Venous 27 21 - 28 mmol/L    Base Excess/Deficit Venous 2.4 -3.0 - 3.0 mmol/L    FIO2 21     Oxyhemoglobin Venous 81 (H) 70 - 75 %    O2 Sat, Venous 82.4 (H) 70.0 - 75.0 %   CBC with platelets and differential   Result Value Ref Range    WBC Count 11.7 (H) 4.0 - 11.0 10e3/uL    RBC Count 4.01 3.80 - 5.20 10e6/uL    Hemoglobin 12.4 11.7 - 15.7 g/dL    Hematocrit 36.7 35.0 - 47.0 %    MCV 92 78 - 100 fL    MCH 30.9 26.5 - 33.0 pg    MCHC 33.8 31.5 - 36.5 g/dL    RDW 12.0 10.0 - 15.0 %    Platelet Count 307 150 - 450 10e3/uL    % Neutrophils 58 %    % Lymphocytes 33 %    % Monocytes 7 %    % Eosinophils 1 %    % Basophils 0 %    % Immature Granulocytes 0 %    NRBCs per 100 WBC 0 <1 /100    Absolute Neutrophils 6.8 1.6 - 8.3 10e3/uL    Absolute Lymphocytes 3.8 0.8 - 5.3 10e3/uL    Absolute Monocytes 0.9 0.0 - 1.3 10e3/uL     Absolute Eosinophils 0.2 0.0 - 0.7 10e3/uL    Absolute Basophils 0.0 0.0 - 0.2 10e3/uL    Absolute Immature Granulocytes 0.1 <=0.4 10e3/uL    Absolute NRBCs 0.0 10e3/uL       RADIOLOGY:  Reviewed all pertinent imaging. Please see official radiology report.  Soft tissue neck CT w contrast   Final Result   IMPRESSION:    1.  Unremarkable soft tissue neck CT.      CT Chest w Contrast   Final Result   IMPRESSION:    1.  Evidence of remote granulomatous disease. Strands of platelike atelectasis have developed in the lower lungs, more apparent involving the left lower lobe. No suspicious adenopathy or pleural effusion on either side.      2.  Normal cardiac size. Mild coronary artery calcifications. No pericardial effusion.      3.  Surgical clips in the neck adjacent to the thyroid gland. Retroperitoneal surgical clips. Cholecystectomy clips.      4.  Diffusely fatty infiltrated enlarged liver without discrete lesion.          I, Zeus Maguire, am serving as a scribe to document services personally performed by Dr. Resendez based on my observation and the provider's statements to me. I, Reece Resendez MD attest that Zeus Maguire is acting in a scribe capacity, has observed my performance of the services and has documented them in accordance with my direction.    Reece Resendez M.D.  Emergency Medicine  [facility] EMERGENCY DEPARTMENT  [address] [id number] [phone]  Dept: [phone]       Reece Resendez MD  03/15/25 0611

## 2025-03-15 NOTE — ED TRIAGE NOTES
Patient arrives via EMS for shortness of breath for the last couple days but much worse the last 4 hours. Patient states she had a neb this morning and a neb this evening. Patient states she has a history of bronchiolitis and her typical cough has got worse and her sputum has changed to yellow. EMS gave 125 solumedrol and a Duo neb. MD at bedside.

## 2025-03-15 NOTE — ED NOTES
Bed: [place] [address]  Expected date: 3/15/25  Expected time: 3:10 AM  Means of arrival: Ambulance  Comments:  MPW- 59F sob, hx bronchiolitis, home albuterol, duoneb and solumedrol given , HR 85, HTN, 100% with neb and 8L

## 2025-03-15 NOTE — DISCHARGE INSTRUCTIONS
Your CT scan shows atelectasis, which is compression of the small air sacs in the lungs.  This can be from viral infections, can also be caused by decreased deep breathing.  This can contribute to cough, shortness of breath.  Use incentive spirometer as directed.

## 2025-03-17 ENCOUNTER — OFFICE VISIT (OUTPATIENT)
Dept: URGENT CARE | Facility: URGENT CARE | Age: 60
End: 2025-03-17
Payer: COMMERCIAL

## 2025-03-17 ENCOUNTER — HOSPITAL ENCOUNTER (EMERGENCY)
Facility: HOSPITAL | Age: 60
Discharge: HOME | End: 2025-03-17
Attending: EMERGENCY MEDICINE | Admitting: EMERGENCY MEDICINE
Payer: COMMERCIAL

## 2025-03-17 VITALS
SYSTOLIC BLOOD PRESSURE: 171 MMHG | HEIGHT: 66 IN | WEIGHT: 230 LBS | BODY MASS INDEX: 36.96 KG/M2 | DIASTOLIC BLOOD PRESSURE: 94 MMHG | OXYGEN SATURATION: 99 % | RESPIRATION RATE: 25 BRPM | HEART RATE: 91 BPM | TEMPERATURE: 99.3 F

## 2025-03-17 VITALS
RESPIRATION RATE: 20 BRPM | DIASTOLIC BLOOD PRESSURE: 88 MMHG | HEART RATE: 97 BPM | OXYGEN SATURATION: 99 % | SYSTOLIC BLOOD PRESSURE: 143 MMHG | TEMPERATURE: 99.3 F

## 2025-03-17 DIAGNOSIS — R06.1 STRIDOR: ICD-10-CM

## 2025-03-17 DIAGNOSIS — J04.0 LARYNGITIS: ICD-10-CM

## 2025-03-17 DIAGNOSIS — R06.2 WHEEZING: Primary | ICD-10-CM

## 2025-03-17 PROCEDURE — 94640 AIRWAY INHALATION TREATMENT: CPT | Performed by: FAMILY MEDICINE

## 2025-03-17 PROCEDURE — 99283 EMERGENCY DEPT VISIT LOW MDM: CPT

## 2025-03-17 PROCEDURE — 99214 OFFICE O/P EST MOD 30 MIN: CPT | Mod: 25 | Performed by: FAMILY MEDICINE

## 2025-03-17 PROCEDURE — 93005 ELECTROCARDIOGRAM TRACING: CPT | Performed by: EMERGENCY MEDICINE

## 2025-03-17 PROCEDURE — 3079F DIAST BP 80-89 MM HG: CPT | Performed by: FAMILY MEDICINE

## 2025-03-17 PROCEDURE — 93005 ELECTROCARDIOGRAM TRACING: CPT

## 2025-03-17 PROCEDURE — 3077F SYST BP >= 140 MM HG: CPT | Performed by: FAMILY MEDICINE

## 2025-03-17 RX ORDER — ESTRADIOL 0.04 MG/D
1 PATCH, EXTENDED RELEASE TRANSDERMAL
COMMUNITY
Start: 2025-02-01

## 2025-03-17 RX ORDER — IPRATROPIUM BROMIDE AND ALBUTEROL SULFATE 2.5; .5 MG/3ML; MG/3ML
3 SOLUTION RESPIRATORY (INHALATION) ONCE
Status: COMPLETED | OUTPATIENT
Start: 2025-03-17 | End: 2025-03-17

## 2025-03-17 RX ADMIN — IPRATROPIUM BROMIDE AND ALBUTEROL SULFATE 3 ML: 2.5; .5 SOLUTION RESPIRATORY (INHALATION) at 16:06

## 2025-03-17 ASSESSMENT — ACTIVITIES OF DAILY LIVING (ADL): ADLS_ACUITY_SCORE: 52

## 2025-03-17 NOTE — Clinical Note
Keiry Mchugh was seen and treated in our emergency department on 3/17/2025.  She may return to work on 03/24/2025.       If you have any questions or concerns, please don't hesitate to call.      Nataly Galvan MD

## 2025-03-17 NOTE — ED PROVIDER NOTES
EMERGENCY DEPARTMENT ENCOUNTER      NAME: Keiry Mchugh  AGE: 59 year old female  YOB: 1965  MRN: [id number]  EVALUATION DATE & TIME: 3/17/2025  5:21 PM    PCP: Yissel Schmidt    ED PROVIDER: Nataly Galvan M.D.      Chief Complaint   Patient presents with    Shortness of Breath    Medication Reaction         FINAL IMPRESSION:  1. Laryngitis          ED COURSE & MEDICAL DECISION MAKING:    ED Course as of 03/17/25 1757   Mon Mar 17, 2025   1750 Patient with sense of lump in throat with normal tongue and lips, cough recently, on steroids and nebs and antibiotics, no hoarse voice but whispering with laryngitis, when she rests no stridor at all, with viral URTI with laryngitis with normal exam when calm reassuringly. As she is already on nebs without wheeze, abx without fever or PNA demonstrated, steroids for comfort, continued supportive care recommended. Patient discharged after being provided with extensive anticipatory guidance and given return precautions, importance of PMD follow-up emphasized.        5:35 PM I met with the patient, obtained history, performed an initial exam, and discussed options and plan for diagnostics and treatment here in the ED.   We discussed the plan for discharge and the patient is agreeable. Reviewed supportive cares, symptomatic treatment, outpatient follow up, and reasons to return to the Emergency Department. All questions and concerns were addressed. Patient to be discharged by ED RN.       Pertinent Labs & Imaging studies reviewed. (See chart for details)    Medical Decision Making  Obtained supplemental history:Supplemental history obtained?: Family Member/Significant Other  Reviewed external records: External records reviewed?: Documented in chart  Care impacted by chronic illness:Hyperlipidemia and Hypertension  Did you consider but not order tests?: Work up considered but not performed and documented in chart, if applicable  Did you interpret images  "independently?: Independent interpretation of ECG and images noted in documentation, when applicable.  Consultation discussion with other provider:Did you involve another provider (consultant, , pharmacy, etc.)?: No  Discharge. No recommendations on prescription strength medication(s). See documentation for any additional details.    MIPS (CTPE, Dental pain, Muñiz, Sinusitis, Asthma/COPD, Head Trauma): Not Applicable    SEPSIS: None        At the conclusion of the encounter I discussed the results of all of the tests and the disposition. The questions were answered. The patient or family acknowledged understanding and was agreeable with the care plan.     MEDICATIONS GIVEN IN THE EMERGENCY:  Medications - No data to display    NEW PRESCRIPTIONS STARTED AT TODAY'S ER VISIT  New Prescriptions    No medications on file          =================================================================    HPI      Keiry Mchugh is a 59 year old female with PMHx of GERD, hypertension, and hyperlipidemia who presents to the ED today via private vehicle with shortness of breath.    Patient reports having a difficulty breathing and has been evaluated several times recently for this concern. Her oxygen saturation is 100% on room air, however she endorses feeling a \"lump\" in her throat that has made it difficult for her to speak more than a whisper. Her cough began 6 days ago, and was diagnosed with bronchitis. She was started on Levaquin, and has been taking this. She also notes that she was started on Humira, and has had a \"bad reaction\" similar to this in the past.     Patient denies any other complaints at this time.      Chart Review:  03/15/2025: Patient was seen at [facility] ED for evaluation of shortness of breath. Clinical impression showed laryngitis and atelectasis. Blood work was normal other than mild hypokalemia and mild leukocytosis. Patient was treated with an epinephrine nebulizer " treatment and reported improvement after this. Patient was started on Levaquin and Medrol Dosepak. Patient discharged.       REVIEW OF SYSTEMS   All other systems reviewed and are negative except as noted above in HPI.    PAST MEDICAL HISTORY:  Past Medical History:   Diagnosis Date    Anemia     Anxiety     Celiac disease     Celiac disease     Cervical spondylosis without myelopathy 10/13/2023    Chronic kidney disease     donated 1 kidney    Class 2 severe obesity due to excess calories with serious comorbidity in adult (H) 9/13/2024    Cough     Depression     Seasonal    Diverticulitis     Dyslipidemia     Enlarged LV     wall    GERD (gastroesophageal reflux disease)     Hepatitis     Hiatal hernia     Hypertension     Low bone mass     Metabolic syndrome     Migraines     HARDIN (nonalcoholic steatohepatitis)     Peripheral neuropathy     PONV (postoperative nausea and vomiting)     Profound fatigue     Ventral hernia        PAST SURGICAL HISTORY:  Past Surgical History:   Procedure Laterality Date    APPENDECTOMY      CHOLECYSTECTOMY      COLON SURGERY      COLONOSCOPY N/A 3/18/2019    Procedure: COLONOSCOPY;  Surgeon: Davis Mosqueda MD;  Location: [facility];  Service: General    CYSTOSCOPY N/A 7/16/2015    Procedure: CYSTOSCOPY;  Surgeon: Nate Horta MD;  Location: [facility];  Service:     ESOPHAGOSCOPY, GASTROSCOPY, DUODENOSCOPY (EGD), COMBINED N/A 9/16/2024    Procedure: Esophagogastroduodenoscopy with biopsies;  Surgeon: Guevara Livingston MD;  Location:  OR     CORRECT BUNION,METATARSAL OSTEOTOMY      Description: Bunion Correction With Metatarsal Osteotomy Herman Procedure;  Proc Date: 07/16/2010;    HYSTERECTOMY      HYSTERECTOMY, PAP NO LONGER INDICATED  02/2009    NEPHRECTOMY LIVING DONOR Left APRIL 2012    DE LAP,BILIARY TRACT,UNLISTED N/A 3/19/2019    Procedure: BIOPSY, LIVER, LAPAROSCOPIC;  Surgeon: Davis Mosqueda MD;  Location: [facility];   Service: General    KS LAP,SLING OPERATION      Description: Laparoscopic Sling Operation For Stress Incontinence;  Recorded: 02/17/2009;    KS LAP,SURG,COLECTOMY, PARTIAL, W/ANAST N/A 3/19/2019    Procedure: LAPAROSCOPIC SIGMOID COLON RESECTION;  Surgeon: Davis Mosqueda MD;  [place] [facility]  Service: General    SEPTOPLASTY, TURBINOPLASTY, COMBINED N/A 8/3/2021    Procedure: SEPTOPLASTY, NOSE, WITH TURBINOPLASTY RADIOFREQUENCY BALLON ASSISTED, CRYOBLATION OF NASAL TISSUE;  Surgeon: Randy Case MD;  Location: [facility]    SIGMOIDOSCOPY FLEXIBLE N/A 3/19/2019    Procedure: FLEXIBLE SIGMOIDOSCOPY;  Surgeon: Davis Mosqueda MD;  [place] [facility]  Service: General       CURRENT MEDICATIONS:    Adalimumab (HUMIRA *CF*) 40 MG/0.4ML pen kit  albuterol (PROVENTIL) (2.5 MG/3ML) 0.083% neb solution  atenolol (TENORMIN) 50 MG tablet  B Complex-C (RA B-COMPLEX/VITAMIN C CR) TBCR  baclofen (LIORESAL) 10 MG tablet  Coenzyme Q10 (COQ-10) 400 MG CAPS  estradiol (VIVELLE-DOT) 0.0375 MG/24HR BIW patch  FLUoxetine (PROZAC) 20 MG capsule  folic acid (FOLVITE) 1 MG tablet  hydroxychloroquine (PLAQUENIL) 200 MG tablet  levofloxacin (LEVAQUIN) 750 MG tablet  methylPREDNISolone (MEDROL DOSEPAK) 4 MG tablet therapy pack  OnabotulinumtoxinA (BOTOX IJ)  phentermine (ADIPEX-P) 37.5 MG tablet  polyethylene glycol (MIRALAX) 17 GM/Dose powder  predniSONE (DELTASONE) 5 MG tablet  SUMAtriptan (IMITREX) 100 MG tablet  tirzepatide-Weight Management (ZEPBOUND) 2.5 MG/0.5ML prefilled pen        ALLERGIES:  Allergies   Allergen Reactions    Gluten Meal Nausea and Vomiting    Doxycycline Hives    Contrast Dye      Hives, resolved with benadryl alone in the past (two episode)    Nsaids Other (See Comments)     Avoid - one kidney    Ondansetron Headache    Pcn [Penicillins] Hives     As a child    Rocephin [Ceftriaxone] Hives     Patient states this started immediately    Tramadol Hives    Zithromax  "[Azithromycin] Tinnitus     \"ear ringing and hearing loss\"       FAMILY HISTORY:  Family History   Problem Relation Age of Onset    Hashimoto's thyroiditis Mother     Breast Cancer Mother 50        currently patient has two different types of Br Ca 84y    Graves' disease Sister     Hypothyroidism Sister     Hashimoto's thyroiditis Maternal Grandfather     Hypothyroidism Maternal Aunt     Breast Cancer Maternal Aunt 50    Hypothyroidism Maternal Aunt     Hashimoto's thyroiditis Maternal Aunt     Anesthesia Reaction No family hx of        SOCIAL HISTORY:   Social History     Socioeconomic History    Marital status: Single    Number of children: 2   Tobacco Use    Smoking status: Former     Current packs/day: 0.00     Types: Cigarettes     Quit date: 1999     Years since quittin.2    Smokeless tobacco: Never   Vaping Use    Vaping status: Never Used   Substance and Sexual Activity    Alcohol use: No     Comment: rare    Drug use: Not Currently    Sexual activity: Not Currently     Partners: Male   Social History Narrative    Single. 2 adult children living in the area. Daughter lives in [place]. Son lives with her as his girlfriend finishes nursing school. 3 grandchildren.     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      Social Drivers of Health     Financial Resource Strain: Low Risk  (10/8/2023)    Financial Resource Strain     Within the past 12 months, have you or your family members you live with been unable to get utilities (heat, electricity) when it was really needed?: No   Food Insecurity: No Food Insecurity (2024)    Received from [facility]    Hunger Vital Sign     Worried About Running Out of Food in the Last Year: Never true     Ran Out of Food in the Last Year: Never true   Transportation Needs: No Transportation Needs (2024)    Received from [facility]    PRAPARE - Transportation     Lack of Transportation (Medical): No     Lack of Transportation (Non-Medical): No   Physical " "Activity: Patient Declined (8/7/2024)    Received from [facility]    Exercise Vital Sign     Days of Exercise per Week: Patient declined     Minutes of Exercise per Session: Patient declined   Stress: Stress Concern Present (2/27/2023)    [facility]    [employer] [place] [employer] - Occupational Stress Questionnaire     Feeling of Stress : Very much   Social Connections: Moderately Isolated (2/27/2023)    [facility]    Social Connection and Isolation Panel [NHANES]     Frequency of Communication with Friends and Family: Twice a week     Frequency of Social Gatherings with Friends and Family: Once a week     Attends Quaker Services: 1 to 4 times per year     Active Member of Clubs or Organizations: No     Marital Status: Never    Interpersonal Safety: Low Risk  (9/16/2024)    Interpersonal Safety     Do you feel physically and emotionally safe where you currently live?: Yes     Within the past 12 months, have you been hit, slapped, kicked or otherwise physically hurt by someone?: No     Within the past 12 months, have you been humiliated or emotionally abused in other ways by your partner or ex-partner?: No   Housing Stability: Low Risk  (8/7/2024)    Received from [facility]    Housing Stability     What is your living situation today?: I have a steady place to live       VITALS:  Patient Vitals for the past 24 hrs:   BP Temp Temp src Pulse Resp SpO2 Height Weight   03/17/25 1715 (!) 161/107 99.3  F (37.4  C) Oral 102 24 100 % 1.676 m (5' 6\") 104.3 kg (230 lb)       PHYSICAL EXAM    GENERAL: Awake, alert.  In no acute distress.   HEENT: Normocephalic, atraumatic.  Pupils equal, round and reactive.  Conjunctiva normal.  EOMI.  NECK: No stridor or apparent deformity.  PULMONARY: Symmetrical breath sounds without distress.  Lungs clear to auscultation bilaterally without wheezes, rhonchi or rales.  CARDIO: Regular rate and rhythm.  No " significant murmur, rub or gallop.  Radial pulses strong and symmetrical.  ABDOMINAL: Abdomen soft, non-distended and non-tender to palpation.  No CVAT, no palpable hepatosplenomegaly.  EXTREMITIES: No lower extremity swelling or edema.    NEURO: Alert and oriented to person, place and time.  Cranial nerves grossly intact.  No focal motor deficit.  PSYCH: Normal mood and affect  SKIN: No rashes          I, Grant Umanzor, am serving as a scribe to document services personally performed by Dr. Nataly Galvan based on my observation and the provider's statements to me. I, Nataly Galvan MD attest that Grant Umanzor is acting in a scribe capacity, has observed my performance of the services and has documented them in accordance with my direction.       Nataly Galvan MD  03/17/25 4183

## 2025-03-17 NOTE — ED TRIAGE NOTES
Pt here d/t SOB. Inc WOB, difficulty moving air. Received recemic epi at recent visit. Voice is very hoarse. Has concerns it might be related to her Humira.      Triage Assessment (Adult)       Row Name 03/17/25 1717          Triage Assessment    Airway WDL X;airway symptoms     Airway Symptoms stridor     Additional Documentation Breath Sounds (Group)        Respiratory WDL    Respiratory WDL rhythm/pattern  cap refill +3     Rhythm/Pattern, Respiratory dyspnea upon exertion;gasping;head bobbing;labored;shortness of breath;tachypneic        Breath Sounds    Breath Sounds All Fields     All Lung Fields Breath Sounds diminished        Peripheral/Neurovascular WDL    Peripheral Neurovascular WDL capillary refill     Capillary Refill, General greater than 3 secs        Cognitive/Neuro/Behavioral WDL    Cognitive/Neuro/Behavioral WDL WDL

## 2025-03-17 NOTE — ED NOTES
Expected Patient Referral to ED  4:49 PM    Referring Clinic/Provider:  Walk in [facility]    Reason for referral/Clinical facts:  Patient laryngitis and stridor, recently seen in the emergency department for same.  On steroid and Levaquin.  No hypoxia, lungs clear.    Recommendations provided:  Send to ED for further evaluation    Caller was informed that this institution does possess the capabilities and/or resources to provide for patient and should be transferred to our facility.    Discussed that if direct admit is sought and any hurdles are encountered, this ED would be happy to see the patient and evaluate.    Informed caller that recommendations provided are recommendations based only on the facts provided and that they responsible to accept or reject the advice, or to seek a formal in person consultation as needed and that this ED will see/treat patient should they arrive.      Reece Resendez MD  [facility] EMERGENCY DEPARTMENT  [address] [place] [id number]  [phone]     Reece Resendez MD  03/17/25 5017

## 2025-03-17 NOTE — ED NOTES
Bed: JNED-05  Expected date:   Expected time:   Means of arrival:   Comments:  BEsvinS in waiting room

## 2025-03-17 NOTE — PROGRESS NOTES
OUTPATIENT VISIT NOTE                                                   Date of Visit: 3/17/2025     Chief Complaint   Patient presents with:  Urgent Care: Pt was in the ER on Friday   Pt still feels SOB/cough/wheezing   Pt has been using Neb every 6 hours not helping             History of Present Illness   Keiry Mchugh is a 59 year old female in c/o of unable to talk or breath.  Patient was in ER two days ago with same symptoms.  Given epi neb with improvement.  Start on medrol dose gloria.  She has been using albuterol nebs every 6 hours with not much help.      Patient had similar symptoms late last summer/fall.  At that time, she was on humira for Rheumatoid Arthritis.  Due to insurance problems, she was off the humira until about 15 days ago.    Current symptoms started about 5 days ago.       MEDICATIONS   No current facility-administered medications for this visit.     Current Outpatient Medications   Medication Sig Dispense Refill    Adalimumab (HUMIRA *CF*) 40 MG/0.4ML pen kit Inject 0.4 mLs (40 mg) subcutaneously every 14 days. 2 each 1    albuterol (PROVENTIL) (2.5 MG/3ML) 0.083% neb solution Take 1 vial (2.5 mg) by nebulization every 6 hours as needed for shortness of breath, wheezing or cough. 180 mL 11    atenolol (TENORMIN) 50 MG tablet Take 1 tablet (50 mg) by mouth daily as needed (Migraines) 30 tablet 1    B Complex-C (RA B-COMPLEX/VITAMIN C CR) TBCR Take 1 tablet by mouth daily      baclofen (LIORESAL) 10 MG tablet TAKE 1 TABLET BY MOUTH TWICE DAILY TO THREE TIMES DAILY AS NEEDED FOR MUSCLE SPASMS. 30 tablet 3    Coenzyme Q10 (COQ-10) 400 MG CAPS Take 1 capsule by mouth daily       estradiol (VIVELLE-DOT) 0.0375 MG/24HR BIW patch Apply 1 patch topically twice a week.      FLUoxetine (PROZAC) 20 MG capsule Take 1 capsule (20 mg) by mouth daily 90 capsule 3    folic acid (FOLVITE) 1 MG tablet Take 1 tablet (1 mg) by mouth daily. 30 tablet 0    hydroxychloroquine (PLAQUENIL) 200 MG tablet TAKE 1  TABLET(200 MG) BY MOUTH TWICE DAILY 180 tablet 0    levofloxacin (LEVAQUIN) 750 MG tablet Take 1 tablet (750 mg) by mouth daily. 14 tablet 0    methylPREDNISolone (MEDROL DOSEPAK) 4 MG tablet therapy pack Follow Package Directions 21 tablet 0    OnabotulinumtoxinA (BOTOX IJ) For migraines - 155 units q 12 weeks.      phentermine (ADIPEX-P) 37.5 MG tablet Take 1 tablet (37.5 mg) by mouth every morning (before breakfast). 90 tablet 1    polyethylene glycol (MIRALAX) 17 GM/Dose powder Take 17 g by mouth daily as needed.      predniSONE (DELTASONE) 5 MG tablet Take 1 tablet (5 mg) by mouth daily. 30 tablet 0    SUMAtriptan (IMITREX) 100 MG tablet TAKE 1/2 TO 1 TABLET BY MOUTH AT ONSET OF HEADACHE 9 tablet 5    tirzepatide-Weight Management (ZEPBOUND) 2.5 MG/0.5ML prefilled pen Inject 0.5 mLs (2.5 mg) subcutaneously every 7 days. 2 mL 0         SOCIAL HISTORY   Social History     Tobacco Use    Smoking status: Former     Current packs/day: 0.00     Types: Cigarettes     Quit date: 1999     Years since quittin.2    Smokeless tobacco: Never   Substance Use Topics    Alcohol use: No     Comment: rare           Physical Exam   Vitals:    25 1548   BP: (!) 143/88   Pulse: 97   Resp: 20   Temp: 99.3  F (37.4  C)   SpO2: 99%        GENERAL:   Alert. Oriented. Initially unable to talk. Stridor present   EYES: Clear  HENT:  Ears: R TM pearly gray. Normal landmarks. L TM pearly gray.  Normal landmarks  Nose: Clear.  Sinuses: Nontender.  Oropharynx:  No erythema. No exudate.  NECK: Supple. No adenopathy.  LUNGS: Clear to ascultation.  No crackles.  No wheezing  HEART: RRR       Nebulization treatment:  Neb administered by CA  Post treatment: lungs clear.  Good air movement.  Patient able to talk with continued stridor and hoarse voice    Assessment and Plan     Wheezing    - ipratropium - albuterol 0.5 mg/2.5 mg/3 mL (DUONEB) neb solution 3 mL    Stridor  O2 sat normal.  Normal air movement.  Breathing slightly  easier after neb, but still with stridor.  Anxious.  Review of chart shows she had chest CT with some atelectasis and soft tissue neck CT that was normal at the ER.  She has not improved in two days with the medrol.  She has a concern that it is reaction to the humira.  Also concern for vocal cord spasm.  Sent to the eR for further evaluation/treatment.                 Discussed signs / symptoms that warrant urgent / emergent medical attention.   Recheck if worsening or not improving.       Claudette Osuna MD          Pertinent History     The following portions of the patient's history were reviewed and updated as appropriate: allergies, current medications, past family history, past medical history, past social history, past surgical history and problem list.

## 2025-03-18 ENCOUNTER — TRANSFERRED RECORDS (OUTPATIENT)
Dept: HEALTH INFORMATION MANAGEMENT | Facility: CLINIC | Age: 60
End: 2025-03-18
Payer: COMMERCIAL

## 2025-03-19 LAB
ATRIAL RATE - MUSE: 97 BPM
DIASTOLIC BLOOD PRESSURE - MUSE: NORMAL MMHG
INTERPRETATION ECG - MUSE: NORMAL
P AXIS - MUSE: 64 DEGREES
PR INTERVAL - MUSE: 154 MS
QRS DURATION - MUSE: 70 MS
QT - MUSE: 358 MS
QTC - MUSE: 454 MS
R AXIS - MUSE: 50 DEGREES
SYSTOLIC BLOOD PRESSURE - MUSE: NORMAL MMHG
T AXIS - MUSE: 63 DEGREES
VENTRICULAR RATE- MUSE: 97 BPM

## 2025-03-20 ENCOUNTER — TRANSFERRED RECORDS (OUTPATIENT)
Dept: HEALTH INFORMATION MANAGEMENT | Facility: CLINIC | Age: 60
End: 2025-03-20
Payer: COMMERCIAL

## 2025-03-26 ENCOUNTER — DOCUMENTATION ONLY (OUTPATIENT)
Dept: SURGERY | Facility: CLINIC | Age: 60
End: 2025-03-26

## 2025-03-26 NOTE — PROGRESS NOTES
I have sent a message to Samantha about rescheduling her surgical consult with Dr. Mosqueda.  She canceled due to a death in the family

## 2025-03-28 PROBLEM — K57.32 SIGMOID DIVERTICULITIS: Status: RESOLVED | Noted: 2021-08-29 | Resolved: 2025-03-28

## 2025-03-28 PROBLEM — M05.9 SEROPOSITIVE RHEUMATOID ARTHRITIS (H): Status: ACTIVE | Noted: 2025-03-28

## 2025-03-28 PROBLEM — Z90.89 HX OF PARATHYROIDECTOMY: Status: ACTIVE | Noted: 2025-03-28

## 2025-03-28 PROBLEM — Z98.890 HX OF PARATHYROIDECTOMY: Status: ACTIVE | Noted: 2025-03-28

## 2025-03-28 PROBLEM — K57.30 DIVERTICULOSIS OF LARGE INTESTINE: Status: ACTIVE | Noted: 2025-03-28

## 2025-03-31 ENCOUNTER — MYC MEDICAL ADVICE (OUTPATIENT)
Dept: FAMILY MEDICINE | Facility: CLINIC | Age: 60
End: 2025-03-31
Payer: COMMERCIAL

## 2025-03-31 ENCOUNTER — TELEPHONE (OUTPATIENT)
Dept: FAMILY MEDICINE | Facility: CLINIC | Age: 60
End: 2025-03-31
Payer: COMMERCIAL

## 2025-03-31 DIAGNOSIS — E66.01 CLASS 2 SEVERE OBESITY DUE TO EXCESS CALORIES WITH SERIOUS COMORBIDITY AND BODY MASS INDEX (BMI) OF 37.0 TO 37.9 IN ADULT (H): Primary | ICD-10-CM

## 2025-03-31 DIAGNOSIS — E66.812 CLASS 2 SEVERE OBESITY DUE TO EXCESS CALORIES WITH SERIOUS COMORBIDITY AND BODY MASS INDEX (BMI) OF 37.0 TO 37.9 IN ADULT (H): Primary | ICD-10-CM

## 2025-03-31 NOTE — TELEPHONE ENCOUNTER
Retail Pharmacy Prior Authorization Team   Phone: [phone]    [place] Initiation    Medication: ZEPBOUND 2.5 MG/0.5ML SC SOAJ  Insurance Company: [employer]- Phone: [phone] Fax: [phone]  Pharmacy Filling the Rx: [id number] [place], [place] - [facility] AT [facility] [address]  Filling Pharmacy Phone: [phone]  Filling Pharmacy Fax:    Start Date: 3/31/2025    [id number]

## 2025-04-01 NOTE — TELEPHONE ENCOUNTER
Class 2 severe obesity due to excess calories with serious comorbidity and body mass index (BMI) of 37.0 to 37.9 in adult (H) (Primary)  - metFORMIN (GLUCOPHAGE) 850 MG tablet; Take 1 tablet (850 mg) by mouth daily (with dinner) for 30 days, THEN 1 tablet (850 mg) 2 times daily (with meals).  Dispense: 150 tablet; Refill: 0     Yissel Schmidt DO

## 2025-04-03 NOTE — TELEPHONE ENCOUNTER
PRIOR AUTHORIZATION DENIED    Medication: ZEPBOUND 2.5 MG/0.5ML SC SOAJ  Insurance Company: [employer]- Phone: [phone] Fax: [phone]  Denial Date: 4/3/2025  Denial Reason(s):           Appeal Information:         Patient Notified: No

## 2025-04-14 ENCOUNTER — LAB (OUTPATIENT)
Dept: LAB | Facility: CLINIC | Age: 60
End: 2025-04-14
Payer: COMMERCIAL

## 2025-04-14 DIAGNOSIS — M05.79 SEROPOSITIVE RHEUMATOID ARTHRITIS OF MULTIPLE JOINTS (H): ICD-10-CM

## 2025-04-14 LAB
ALBUMIN SERPL BCG-MCNC: 4.4 G/DL (ref 3.5–5.2)
ALT SERPL W P-5'-P-CCNC: 23 U/L (ref 0–50)
CREAT SERPL-MCNC: 1.05 MG/DL (ref 0.51–0.95)
EGFRCR SERPLBLD CKD-EPI 2021: 61 ML/MIN/1.73M2
ERYTHROCYTE [DISTWIDTH] IN BLOOD BY AUTOMATED COUNT: 11.7 % (ref 10–15)
HCT VFR BLD AUTO: 40.7 % (ref 35–47)
HGB BLD-MCNC: 13.7 G/DL (ref 11.7–15.7)
MCH RBC QN AUTO: 30.8 PG (ref 26.5–33)
MCHC RBC AUTO-ENTMCNC: 33.7 G/DL (ref 31.5–36.5)
MCV RBC AUTO: 92 FL (ref 78–100)
PLATELET # BLD AUTO: 285 10E3/UL (ref 150–450)
RBC # BLD AUTO: 4.45 10E6/UL (ref 3.8–5.2)
WBC # BLD AUTO: 4.4 10E3/UL (ref 4–11)

## 2025-04-14 PROCEDURE — 82040 ASSAY OF SERUM ALBUMIN: CPT

## 2025-04-14 PROCEDURE — 36415 COLL VENOUS BLD VENIPUNCTURE: CPT

## 2025-04-14 PROCEDURE — 84460 ALANINE AMINO (ALT) (SGPT): CPT

## 2025-04-14 PROCEDURE — 82565 ASSAY OF CREATININE: CPT

## 2025-04-14 PROCEDURE — 85027 COMPLETE CBC AUTOMATED: CPT

## 2025-04-16 ENCOUNTER — TRANSFERRED RECORDS (OUTPATIENT)
Dept: HEALTH INFORMATION MANAGEMENT | Facility: CLINIC | Age: 60
End: 2025-04-16
Payer: COMMERCIAL

## 2025-04-17 ENCOUNTER — OFFICE VISIT (OUTPATIENT)
Dept: RHEUMATOLOGY | Facility: CLINIC | Age: 60
End: 2025-04-17
Payer: COMMERCIAL

## 2025-04-17 VITALS
HEART RATE: 86 BPM | DIASTOLIC BLOOD PRESSURE: 99 MMHG | SYSTOLIC BLOOD PRESSURE: 167 MMHG | WEIGHT: 233.4 LBS | BODY MASS INDEX: 37.67 KG/M2 | OXYGEN SATURATION: 98 %

## 2025-04-17 DIAGNOSIS — M05.79 SEROPOSITIVE RHEUMATOID ARTHRITIS OF MULTIPLE JOINTS (H): Primary | ICD-10-CM

## 2025-04-17 DIAGNOSIS — Z79.899 HIGH RISK MEDICATION USE: ICD-10-CM

## 2025-04-17 DIAGNOSIS — R76.8 RHEUMATOID FACTOR POSITIVE: ICD-10-CM

## 2025-04-17 DIAGNOSIS — M15.0 PRIMARY OSTEOARTHRITIS INVOLVING MULTIPLE JOINTS: ICD-10-CM

## 2025-04-17 DIAGNOSIS — K75.81 NASH (NONALCOHOLIC STEATOHEPATITIS): ICD-10-CM

## 2025-04-17 RX ORDER — HYDROXYCHLOROQUINE SULFATE 200 MG/1
200 TABLET, FILM COATED ORAL 2 TIMES DAILY
Qty: 180 TABLET | Refills: 0 | Status: SHIPPED | OUTPATIENT
Start: 2025-04-17

## 2025-04-17 NOTE — PROGRESS NOTES
Rheumatology follow-up visit note     Keiry is a 60 year old female presents today for follow-up.    Samantha was seen today for recheck.    Diagnoses and all orders for this visit:    Seropositive rheumatoid arthritis of multiple joints (H)  -     hydroxychloroquine (PLAQUENIL) 200 MG tablet; Take 1 tablet (200 mg) by mouth 2 times daily.    High risk medication use    HARDIN (nonalcoholic steatohepatitis)    Rheumatoid factor positive    Primary osteoarthritis involving multiple joints        Continue Humira and hydroxychloroquine as now, no longer on methotrexate, liver function studies are back to normal.  She feels that Humira has not done as well this time as it had done previously when she was also on methotrexate.  We reviewed those aspects.   .  The longitudinal plan of care for the diagnosis(es)/condition(s) as documented were addressed during this visit. Due to the added complexity in care, I will continue to support Samantha in the subsequent management and with ongoing continuity of care.      Follow up in 2 months.    HPI    Keiry Mchugh is a 60 year old female is here for follow-up of   rheumatoid arthritis seropositive. On her previous visit Humira was started in view of ongoing rheumatoid activity, meanwhile her liver function studiesBecome abnormal she does have a history of HARDIN. She is following up at [facility] for question of Cushing's/hyperparathyroidism. Meanwhile her methotrexate dose was reduced.  Yet her liver function remains abnormal this was discontinued.  She is on hydroxychloroquine.  Eye examination.  She was seen at [facility] rheumatology those data are reviewed.  Including the comments regarding hydroxychloroquine.  So far she has only been able to get 3 injections of Humira and then for the upcoming surgery in 2 weeks will have the last one coming up tomorrow.  After the surgery.  Of surgery but now going for the C-spine requested that information from orthopedics.  She feels that Humira  "has not \"kicked in\" she feels her fingers are like \"sausages\"her pulmonary note is reviewed. She had her eyes examined she recalls earlier this year in March for hydroxychloroquine toxicity monitoring.         DETAILED EXAMINATION  04/17/25  :    Vitals:    04/17/25 0811 04/17/25 0814   BP: (!) 158/83 (!) 167/99   BP Location: Right arm Left arm   Pulse: 86    SpO2: 98%    Weight: 105.9 kg (233 lb 6.4 oz)      Alert oriented. Head including the face is examined for malar rash, heliotropes, scarring, lupus pernio. Eyes examined for redness such as in episcleritis/scleritis, periorbital lesions.   Neck/ Face examined for parotid gland swelling, range of motion of neck.  Left upper and lower and right upper and lower extremities examined for tenderness, swelling, warmth of the appendicular joints, range of motion, edema, rash.  Some of the important findings included: she does not have evidence of synovitis in the palpable joints of the upper extremities.  No significant deformities of the digits.  no Heberden nodes.  Range of motion of the shoulders  show full abduction.  No JLT effusion or warmth of the knees.  she does not have dactylitis of the digits.     Patient Active Problem List    Diagnosis Date Noted    Diverticulosis of large intestine 03/28/2025     Priority: Medium    Seropositive rheumatoid arthritis (H) 03/28/2025     Priority: Medium    Hx of parathyroidectomy 03/28/2025     Priority: Medium    Hiatal hernia with gastroesophageal reflux 09/21/2024     Priority: Medium    Class 2 severe obesity due to excess calories with serious comorbidity in adult (H) 09/13/2024     Priority: Medium    Diaphragmatic hernia 08/28/2024     Priority: Medium    Cervical spondylosis without myelopathy 10/13/2023     Priority: Medium    Cervical radiculitis 10/13/2023     Priority: Medium    Low bone mass 07/21/2023     Priority: Medium    Profound fatigue 07/21/2023     Priority: Medium    Dyslipidemia      Priority: " Medium    Metabolic syndrome      Priority: Medium    Benign essential hypertension      Priority: Medium    Celiac disease      Priority: Medium    HARDIN (nonalcoholic steatohepatitis) 07/23/2021     Priority: Medium    Chronic insomnia 06/11/2020     Priority: Medium     Insomnia associated with inadequate sleep hygiene and suspected sleep apnea.       Past Surgical History:   Procedure Laterality Date    ABDOMEN SURGERY      APPENDECTOMY      BIOPSY      CHOLECYSTECTOMY      COLON SURGERY      COLONOSCOPY N/A 03/18/2019    Procedure: COLONOSCOPY;  Surgeon: Davis Mosqueda MD;  Location: [facility] OR;  Service: General    CYSTOSCOPY N/A 07/16/2015    Procedure: CYSTOSCOPY;  Surgeon: Nate Horta MD;  Location: [facility] OR;  Service:     ESOPHAGOSCOPY, GASTROSCOPY, DUODENOSCOPY (EGD), COMBINED N/A 09/16/2024    Procedure: Esophagogastroduodenoscopy with biopsies;  Surgeon: Guevara Livingston MD;  Location:  OR    HC CORRECT BUNION,METATARSAL OSTEOTOMY      Description: Bunion Correction With Metatarsal Osteotomy Herman Procedure;  Proc Date: 07/16/2010;    HERNIA REPAIR      HYSTERECTOMY      HYSTERECTOMY, PAP NO LONGER INDICATED  02/01/2009    NEPHRECTOMY LIVING DONOR Left 04/01/2012    PARATHYROIDECTOMY  05/2023    OR LAP,BILIARY TRACT,UNLISTED N/A 03/19/2019    Procedure: BIOPSY, LIVER, LAPAROSCOPIC;  Surgeon: Davis Mosqueda MD;  Location: [facility];  Service: General    OR LAP,SLING OPERATION      Description: Laparoscopic Sling Operation For Stress Incontinence;  Recorded: 02/17/2009;    OR LAP,SURG,COLECTOMY, PARTIAL, W/ANAST N/A 03/19/2019    Procedure: LAPAROSCOPIC SIGMOID COLON RESECTION;  Surgeon: Davis Mosqueda MD;  Location: [facility];  Service: General    SEPTOPLASTY, TURBINOPLASTY, COMBINED N/A 08/03/2021    Procedure: SEPTOPLASTY, NOSE, WITH TURBINOPLASTY RADIOFREQUENCY BALLON ASSISTED, CRYOBLATION OF NASAL TISSUE;  Surgeon: Randy Case MD;   "Location: [facility] OR    SIGMOIDOSCOPY FLEXIBLE N/A 03/19/2019    Procedure: FLEXIBLE SIGMOIDOSCOPY;  Surgeon: Davis Mosqueda MD;  Location: [facility];  Service: General      Past Medical History:   Diagnosis Date    Anemia     Anxiety     Arthritis     RA    Celiac disease     Celiac disease     Cervical spondylosis without myelopathy 10/13/2023    Chronic kidney disease     donated 1 kidney    Class 2 severe obesity due to excess calories with serious comorbidity in adult (H) 09/13/2024    COPD (chronic obstructive pulmonary disease) (H)     Cough     Depression     Seasonal    Diverticulitis     Dyslipidemia     Enlarged LV     wall    GERD (gastroesophageal reflux disease)     Hepatitis     Hiatal hernia     Hypertension     Low bone mass     Metabolic syndrome     Migraines     HARDIN (nonalcoholic steatohepatitis)     Peripheral neuropathy     PONV (postoperative nausea and vomiting)     Profound fatigue     Sigmoid diverticulitis 08/29/2021    Ventral hernia      Allergies   Allergen Reactions    Gluten Meal Nausea and Vomiting    Doxycycline Hives    Contrast Dye      Hives, resolved with benadryl alone in the past (two episode)    Nsaids Other (See Comments)     Avoid - one kidney    Ondansetron Headache    Pcn [Penicillins] Hives     As a child    Rocephin [Ceftriaxone] Hives     Patient states this started immediately    Tramadol Hives    Zithromax [Azithromycin] Tinnitus     \"ear ringing and hearing loss\"     Current Outpatient Medications   Medication Sig Dispense Refill    Adalimumab (HUMIRA *CF*) 40 MG/0.4ML pen kit Inject 0.4 mLs (40 mg) subcutaneously every 14 days. 2 each 1    albuterol (PROVENTIL) (2.5 MG/3ML) 0.083% neb solution Take 1 vial (2.5 mg) by nebulization every 6 hours as needed for shortness of breath, wheezing or cough. (Patient not taking: Reported on 3/28/2025) 180 mL 11    atenolol (TENORMIN) 50 MG tablet Take 1 tablet (50 mg) by mouth daily. 90 tablet 3    B " Complex-C (RA B-COMPLEX/VITAMIN C CR) TBCR Take 1 tablet by mouth daily      baclofen (LIORESAL) 10 MG tablet TAKE 1 TABLET BY MOUTH TWICE DAILY TO THREE TIMES DAILY AS NEEDED FOR MUSCLE SPASMS. 30 tablet 1    Coenzyme Q10 (COQ-10) 400 MG CAPS Take 1 capsule by mouth daily       estradiol (VIVELLE-DOT) 0.0375 MG/24HR BIW patch Apply 1 patch topically twice a week.      FLUoxetine (PROZAC) 20 MG capsule Take 1 capsule (20 mg) by mouth daily. 90 capsule 3    hydroxychloroquine (PLAQUENIL) 200 MG tablet TAKE 1 TABLET(200 MG) BY MOUTH TWICE DAILY 180 tablet 0    levofloxacin (LEVAQUIN) 750 MG tablet Take 1 tablet (750 mg) by mouth daily. 14 tablet 0    metFORMIN (GLUCOPHAGE) 850 MG tablet Take 1 tablet (850 mg) by mouth daily (with dinner) for 30 days, THEN 1 tablet (850 mg) 2 times daily (with meals). 150 tablet 0    methylPREDNISolone (MEDROL DOSEPAK) 4 MG tablet therapy pack Follow Package Directions 21 tablet 0    omeprazole (PRILOSEC OTC) 20 MG EC tablet Take 20 mg by mouth daily.      OnabotulinumtoxinA (BOTOX IJ) For migraines - 155 units q 12 weeks.      phentermine (ADIPEX-P) 37.5 MG tablet Take 1 tablet (37.5 mg) by mouth every morning (before breakfast). 90 tablet 1    polyethylene glycol (MIRALAX) 17 GM/Dose powder Take 17 g by mouth daily as needed.      SUMAtriptan (IMITREX) 100 MG tablet TAKE 1/2 TO 1 TABLET BY MOUTH AT ONSET OF HEADACHE 9 tablet 1    tirzepatide-Weight Management (ZEPBOUND) 2.5 MG/0.5ML prefilled pen Inject 0.5 mLs (2.5 mg) subcutaneously every 7 days. (Patient not taking: Reported on 3/28/2025) 2 mL 0     family history includes Breast Cancer (age of onset: 50) in her maternal aunt and mother; Diabetes in her maternal grandmother, sister, and sister; Graves' disease in her sister; Hashimoto's thyroiditis in her maternal aunt, maternal grandfather, and mother; Hyperlipidemia in her mother; Hypertension in her mother; Hypothyroidism in her maternal aunt, maternal aunt, and sister;  Osteoporosis in her mother; Thyroid Disease in her mother, sister, sister, sister, and sister.  Social Connections: Unknown (3/25/2025)    Social Connection and Isolation Panel [NHANES]     Frequency of Communication with Friends and Family: Not on file     Frequency of Social Gatherings with Friends and Family: Twice a week     Attends Baptism Services: Not on file     Active Member of Clubs or Organizations: Not on file     Attends Club or Organization Meetings: Not on file     Marital Status: Not on file          WBC Count   Date Value Ref Range Status   04/14/2025 4.4 4.0 - 11.0 10e3/uL Final     RBC Count   Date Value Ref Range Status   04/14/2025 4.45 3.80 - 5.20 10e6/uL Final     Hemoglobin   Date Value Ref Range Status   04/14/2025 13.7 11.7 - 15.7 g/dL Final     Hematocrit   Date Value Ref Range Status   04/14/2025 40.7 35.0 - 47.0 % Final     MCV   Date Value Ref Range Status   04/14/2025 92 78 - 100 fL Final     MCH   Date Value Ref Range Status   04/14/2025 30.8 26.5 - 33.0 pg Final     Platelet Count   Date Value Ref Range Status   04/14/2025 285 150 - 450 10e3/uL Final     % Lymphocytes   Date Value Ref Range Status   03/15/2025 33 % Final     AST   Date Value Ref Range Status   03/28/2025 22 0 - 45 U/L Final     ALT   Date Value Ref Range Status   04/14/2025 23 0 - 50 U/L Final     Albumin   Date Value Ref Range Status   04/14/2025 4.4 3.5 - 5.2 g/dL Final   09/08/2021 4.3 3.5 - 5.0 g/dL Final     Alkaline Phosphatase   Date Value Ref Range Status   03/28/2025 90 40 - 150 U/L Final     Creatinine   Date Value Ref Range Status   04/14/2025 1.05 (H) 0.51 - 0.95 mg/dL Final     GFR Estimate   Date Value Ref Range Status   04/14/2025 61 >60 mL/min/1.73m2 Final     Comment:     eGFR calculated using 2021 CKD-EPI equation.   12/01/2020 61 >=60 mL/min/BSA Final   06/19/2020 >60 >60 mL/min/1.73m2 Final     GFR Estimate If Black   Date Value Ref Range Status   06/19/2020 >60 >60 mL/min/1.73m2 Final      GFREstimate If Black   Date Value Ref Range Status   12/01/2020 71 >=60 mL/min/BSA Final     Erythrocyte Sedimentation Rate   Date Value Ref Range Status   05/03/2024 13 0 - 30 mm/hr Final     CRP   Date Value Ref Range Status   05/26/2023 3.1 (H) 0.0 - <0.8 mg/dL Final

## 2025-04-21 ENCOUNTER — HOSPITAL ENCOUNTER (OUTPATIENT)
Dept: BONE DENSITY | Facility: HOSPITAL | Age: 60
Discharge: HOME | End: 2025-04-21
Attending: FAMILY MEDICINE | Admitting: FAMILY MEDICINE
Payer: COMMERCIAL

## 2025-04-21 DIAGNOSIS — Z98.890 HX OF PARATHYROIDECTOMY: ICD-10-CM

## 2025-04-21 DIAGNOSIS — Z90.89 HX OF PARATHYROIDECTOMY: ICD-10-CM

## 2025-04-21 PROCEDURE — 77080 DXA BONE DENSITY AXIAL: CPT

## 2025-04-22 NOTE — RESULT ENCOUNTER NOTE
Patient updated by [employer] message with DEXA results.       Tennille Singh,  Your bone density scan shows osteopenia (low bone density). Your bones are not in the osteoporosis range.  For low bone density, we recommend treatment based on your FRAX risk (personalized estimated risk of a fracture). Your estimated risk is below the treatment threshold.   I recommend calcium and vitamin D supplementation, weightbearing exercise, and avoiding trip/fall hazards to reduce risk of a fracture.   I recommend we recheck your DEXA scan for bone density in 3 years.  Please reach out by [facility] with any follow up questions or concerns.  Yissel Schmidt, DO

## 2025-04-24 ENCOUNTER — HOSPITAL ENCOUNTER (OUTPATIENT)
Dept: CT IMAGING | Facility: HOSPITAL | Age: 60
Discharge: HOME | End: 2025-04-24
Attending: FAMILY MEDICINE
Payer: COMMERCIAL

## 2025-04-24 ENCOUNTER — OFFICE VISIT (OUTPATIENT)
Dept: RHEUMATOLOGY | Facility: CLINIC | Age: 60
End: 2025-04-24
Payer: COMMERCIAL

## 2025-04-24 ENCOUNTER — OFFICE VISIT (OUTPATIENT)
Dept: FAMILY MEDICINE | Facility: CLINIC | Age: 60
End: 2025-04-24
Payer: COMMERCIAL

## 2025-04-24 ENCOUNTER — HOSPITAL ENCOUNTER (OUTPATIENT)
Dept: GENERAL RADIOLOGY | Facility: HOSPITAL | Age: 60
Discharge: HOME | End: 2025-04-24
Attending: INTERNAL MEDICINE
Payer: COMMERCIAL

## 2025-04-24 VITALS
SYSTOLIC BLOOD PRESSURE: 144 MMHG | HEART RATE: 72 BPM | RESPIRATION RATE: 16 BRPM | OXYGEN SATURATION: 97 % | DIASTOLIC BLOOD PRESSURE: 84 MMHG | TEMPERATURE: 98.1 F | WEIGHT: 232 LBS | HEIGHT: 66 IN | BODY MASS INDEX: 37.28 KG/M2

## 2025-04-24 VITALS
BODY MASS INDEX: 37.07 KG/M2 | WEIGHT: 229.7 LBS | OXYGEN SATURATION: 96 % | HEART RATE: 73 BPM | DIASTOLIC BLOOD PRESSURE: 75 MMHG | SYSTOLIC BLOOD PRESSURE: 146 MMHG

## 2025-04-24 DIAGNOSIS — M71.21 BAKER'S CYST OF KNEE, RIGHT: ICD-10-CM

## 2025-04-24 DIAGNOSIS — M05.79 SEROPOSITIVE RHEUMATOID ARTHRITIS OF MULTIPLE JOINTS (H): ICD-10-CM

## 2025-04-24 DIAGNOSIS — R52 PAIN: ICD-10-CM

## 2025-04-24 DIAGNOSIS — Z01.818 PREOP GENERAL PHYSICAL EXAM: Primary | ICD-10-CM

## 2025-04-24 DIAGNOSIS — M71.21 BAKER'S CYST OF KNEE, RIGHT: Primary | ICD-10-CM

## 2025-04-24 DIAGNOSIS — M47.812 CERVICAL SPONDYLOSIS WITHOUT MYELOPATHY: ICD-10-CM

## 2025-04-24 DIAGNOSIS — M54.12 CERVICAL RADICULITIS: ICD-10-CM

## 2025-04-24 DIAGNOSIS — M25.471 EDEMA OF RIGHT ANKLE: ICD-10-CM

## 2025-04-24 LAB
ERYTHROCYTE [DISTWIDTH] IN BLOOD BY AUTOMATED COUNT: 11.6 % (ref 10–15)
HCT VFR BLD AUTO: 44.6 % (ref 35–47)
HGB BLD-MCNC: 14.5 G/DL (ref 11.7–15.7)
MCH RBC QN AUTO: 30.1 PG (ref 26.5–33)
MCHC RBC AUTO-ENTMCNC: 32.5 G/DL (ref 31.5–36.5)
MCV RBC AUTO: 93 FL (ref 78–100)
PLATELET # BLD AUTO: 333 10E3/UL (ref 150–450)
RBC # BLD AUTO: 4.82 10E6/UL (ref 3.8–5.2)
WBC # BLD AUTO: 4.9 10E3/UL (ref 4–11)

## 2025-04-24 PROCEDURE — 70486 CT MAXILLOFACIAL W/O DYE: CPT

## 2025-04-24 PROCEDURE — 73562 X-RAY EXAM OF KNEE 3: CPT | Mod: 50

## 2025-04-24 ASSESSMENT — ANXIETY QUESTIONNAIRES
GAD7 TOTAL SCORE: 0
3. WORRYING TOO MUCH ABOUT DIFFERENT THINGS: NOT AT ALL
2. NOT BEING ABLE TO STOP OR CONTROL WORRYING: NOT AT ALL
1. FEELING NERVOUS, ANXIOUS, OR ON EDGE: NOT AT ALL
5. BEING SO RESTLESS THAT IT IS HARD TO SIT STILL: NOT AT ALL
7. FEELING AFRAID AS IF SOMETHING AWFUL MIGHT HAPPEN: NOT AT ALL
GAD7 TOTAL SCORE: 0
6. BECOMING EASILY ANNOYED OR IRRITABLE: NOT AT ALL

## 2025-04-24 ASSESSMENT — PATIENT HEALTH QUESTIONNAIRE - PHQ9
SUM OF ALL RESPONSES TO PHQ QUESTIONS 1-9: 8
5. POOR APPETITE OR OVEREATING: NOT AT ALL

## 2025-04-24 NOTE — PROGRESS NOTES
Preoperative Evaluation  [facility]  [address] [facility] [address]  Phone: [phone]  Fax: [phone]  Primary Provider: Yissel Schmidt DO  Pre-op Performing Provider: Yissel Schmidt DO  Apr 24, 2025 4/23/2025   Surgical Information   What procedure is being done? Neck surgery (neck fusion)   Facility or Hospital where procedure/surgery will be performed: [facility]   Who is doing the procedure / surgery? [facility]   Date of surgery / procedure: Not sure   Time of surgery / procedure: Not sure   Where do you plan to recover after surgery? at home alone     Fax number for surgical facility: [phone]    Assessment & Plan     The proposed surgical procedure is considered INTERMEDIATE risk.    1. Preop general physical exam (Primary)  2. Cervical spondylosis without myelopathy  3. Cervical radiculitis  - Basic metabolic panel  (Ca, Cl, CO2, Creat, Gluc, K, Na, BUN); Future  - CBC with platelets; Future     - No identified additional risk factors other than previously addressed    Medication Instructions  HOLD (do not take) your Humira or vitamins/supplements for at least 2 weeks before surgery.  HOLD (do not take) phentermine for 7 days before surgery.   HOLD (do not take) sumatriptan, miralax, omeprazole, or metformin on morning of procedure.   HOLD (do not use) estrogen patch until through surgery.  OKAY to take atenolol, prozac, and plaquenil as scheduled.     Recommendation  Approval given to proceed with proposed procedure, without further diagnostic evaluation.  Stable, chronic laryngitis symptoms. I have sent message to pulmonary clinic for follow up.       Chapito Singh is a 60 year old, presenting for the following:  Pre-Op Exam          4/24/2025     8:53 AM   Additional Questions   Roomed by Jeff ROJAS MA     HPI:     NECK SYMPTOMS:    Working with [facility], diagnosed severe left sided C6-C7 neuroforaminal stenosis with neurological  deficits.       FAMILY HX VTE:  Mother -  DVT in leg, in surgery, traveled to [place]. Was during gallbladder surgery. She has not had any further blood clots. Doesn't think positive for hypercoagulable state.   No other family history VTE.       ELEVATED BLOOD PRESSURE:   BP Readings from Last 6 Encounters:   04/24/25 (!) 144/84   04/17/25 (!) 167/99   03/28/25 (!) 132/101   03/17/25 (!) 171/94   03/17/25 (!) 143/88   03/15/25 (!) 161/79     Checks at work. Ranging 130-140 systolic / 80s        LARYNGITIS:   Ongoing symptoms, ongoing cough. Albuterol not helpful.   With strong cough, productive with hard chunks.   Next pulmonary appointment July.   About 6 weeks ago, took 2 week course of Levaquin, doesn't think it helped.   Omeprazole for hiatal hernia, doesn't think this is effecting her laryngitis symptoms.   Saw ENT 6 weeks ago, completed endoscopy, vocal cords looked fine. Some swelling.          4/23/2025   Pre-Op Questionnaire   Have you ever had a heart attack or stroke? No   Have you ever had surgery on your heart or blood vessels, such as a stent placement, a coronary artery bypass, or surgery on an artery in your head, neck, heart, or legs? No   Do you have chest pain with activity? No   Do you have a history of heart failure? No   Do you currently have a cold, bronchitis or symptoms of other infection? (!) UNKNOWN    Do you have a cough, shortness of breath, or wheezing? (!) UNKNOWN    Do you or anyone in your family have previous history of blood clots? (!) YES     Do you or does anyone in your family have a serious bleeding problem such as prolonged bleeding following surgeries or cuts? No   Have you ever had problems with anemia or been told to take iron pills? No   Have you had any abnormal blood loss such as black, tarry or bloody stools, or abnormal vaginal bleeding? No   Have you ever had a blood transfusion? No   Are you willing to have a blood transfusion if it is medically needed before, during, or  after your surgery? Yes   Have you or any of your relatives ever had problems with anesthesia? No   Do you have sleep apnea, excessive snoring or daytime drowsiness? No   Do you have any artifical heart valves or other implanted medical devices like a pacemaker, defibrillator, or continuous glucose monitor? No   Do you have artificial joints? No   Are you allergic to latex? No     Advance Care Planning    Discussed advance care planning with patient; however, patient declined at this time.  Discussed last visit.     Preoperative Review of    reviewed - no record of controlled substances prescribed.      Status of Chronic Conditions:  See problem list for active medical problems.  Problems all longstanding and stable, except as noted/documented.  See ROS for pertinent symptoms related to these conditions.    Patient Active Problem List    Diagnosis Date Noted    Diverticulosis of large intestine 03/28/2025     Priority: Medium    Seropositive rheumatoid arthritis (H) 03/28/2025     Priority: Medium    Hx of parathyroidectomy 03/28/2025     Priority: Medium    Hiatal hernia with gastroesophageal reflux 09/21/2024     Priority: Medium    Class 2 severe obesity due to excess calories with serious comorbidity in adult (H) 09/13/2024     Priority: Medium    Diaphragmatic hernia 08/28/2024     Priority: Medium    Cervical spondylosis without myelopathy 10/13/2023     Priority: Medium    Cervical radiculitis 10/13/2023     Priority: Medium    Low bone mass 07/21/2023     Priority: Medium    Profound fatigue 07/21/2023     Priority: Medium    Dyslipidemia      Priority: Medium    Metabolic syndrome      Priority: Medium    Benign essential hypertension      Priority: Medium    Celiac disease      Priority: Medium    HARDIN (nonalcoholic steatohepatitis) 07/23/2021     Priority: Medium    Chronic insomnia 06/11/2020     Priority: Medium     Insomnia associated with inadequate sleep hygiene and suspected sleep apnea.         Past Medical History:   Diagnosis Date    Anemia     Anxiety     Arthritis     RA    Celiac disease     Celiac disease     Cervical spondylosis without myelopathy 10/13/2023    Chronic kidney disease     donated 1 kidney    Class 2 severe obesity due to excess calories with serious comorbidity in adult (H) 09/13/2024    COPD (chronic obstructive pulmonary disease) (H)     Cough     Depression     Seasonal    Diverticulitis     Dyslipidemia     Enlarged LV     wall    GERD (gastroesophageal reflux disease)     Hepatitis     Hiatal hernia     Hypertension     Low bone mass     Metabolic syndrome     Migraines     HARDIN (nonalcoholic steatohepatitis)     Peripheral neuropathy     PONV (postoperative nausea and vomiting)     Profound fatigue     Sigmoid diverticulitis 08/29/2021    Ventral hernia      Past Surgical History:   Procedure Laterality Date    ABDOMEN SURGERY      APPENDECTOMY      BIOPSY      CHOLECYSTECTOMY      COLON SURGERY      COLONOSCOPY N/A 03/18/2019    Procedure: COLONOSCOPY;  Surgeon: Davis Mosqueda MD;  Location: [facility];  Service: General    CYSTOSCOPY N/A 07/16/2015    Procedure: CYSTOSCOPY;  Surgeon: Nate Horta MD;  Location: [facility];  Service:     ESOPHAGOSCOPY, GASTROSCOPY, DUODENOSCOPY (EGD), COMBINED N/A 09/16/2024    Procedure: Esophagogastroduodenoscopy with biopsies;  Surgeon: Guevara Livingston MD;  Location:  OR    HC CORRECT BUNION,METATARSAL OSTEOTOMY      Description: Bunion Correction With Metatarsal Osteotomy [facility] Procedure;  Proc Date: 07/16/2010;    HERNIA REPAIR      HYSTERECTOMY      HYSTERECTOMY, PAP NO LONGER INDICATED  02/01/2009    NEPHRECTOMY LIVING DONOR Left 04/01/2012    PARATHYROIDECTOMY  05/2023    WY LAP,BILIARY TRACT,UNLISTED N/A 03/19/2019    Procedure: BIOPSY, LIVER, LAPAROSCOPIC;  Surgeon: Davis Mosqueda MD;  Location: [facility];  Service: General    WY LAP,SLING OPERATION      Description: Laparoscopic  Sling Operation For Stress Incontinence;  Recorded: 02/17/2009;    CO LAP,SURG,COLECTOMY, PARTIAL, W/ANAST N/A 03/19/2019    Procedure: LAPAROSCOPIC SIGMOID COLON RESECTION;  Surgeon: Davis Mosqueda MD;  Location: [facility];  Service: General    SEPTOPLASTY, TURBINOPLASTY, COMBINED N/A 08/03/2021    Procedure: SEPTOPLASTY, NOSE, WITH TURBINOPLASTY RADIOFREQUENCY BALLON ASSISTED, CRYOBLATION OF NASAL TISSUE;  Surgeon: Randy Case MD;  Location: [facility]    SIGMOIDOSCOPY FLEXIBLE N/A 03/19/2019    Procedure: FLEXIBLE SIGMOIDOSCOPY;  Surgeon: Davis Mosqueda MD;  Location: [facility];  Service: General     Current Outpatient Medications   Medication Sig Dispense Refill    Adalimumab (HUMIRA *CF*) 40 MG/0.4ML pen kit Inject 0.4 mLs (40 mg) subcutaneously every 14 days. 2 each 1    albuterol (PROVENTIL) (2.5 MG/3ML) 0.083% neb solution Take 1 vial (2.5 mg) by nebulization every 6 hours as needed for shortness of breath, wheezing or cough. 180 mL 11    atenolol (TENORMIN) 50 MG tablet Take 1 tablet (50 mg) by mouth daily. 90 tablet 3    B Complex-C (RA B-COMPLEX/VITAMIN C CR) TBCR Take 1 tablet by mouth daily      baclofen (LIORESAL) 10 MG tablet TAKE 1 TABLET BY MOUTH TWICE DAILY TO THREE TIMES DAILY AS NEEDED FOR MUSCLE SPASMS. 30 tablet 1    Coenzyme Q10 (COQ-10) 400 MG CAPS Take 1 capsule by mouth daily       estradiol (VIVELLE-DOT) 0.0375 MG/24HR BIW patch Apply 1 patch topically twice a week.      FLUoxetine (PROZAC) 20 MG capsule Take 1 capsule (20 mg) by mouth daily. 90 capsule 3    hydroxychloroquine (PLAQUENIL) 200 MG tablet Take 1 tablet (200 mg) by mouth 2 times daily. 180 tablet 0    metFORMIN (GLUCOPHAGE) 850 MG tablet Take 1 tablet (850 mg) by mouth daily (with dinner) for 30 days, THEN 1 tablet (850 mg) 2 times daily (with meals). 150 tablet 0    omeprazole (PRILOSEC OTC) 20 MG EC tablet Take 20 mg by mouth daily.      OnabotulinumtoxinA (BOTOX IJ) For migraines - 155  "units q 12 weeks.      phentermine (ADIPEX-P) 37.5 MG tablet Take 1 tablet (37.5 mg) by mouth every morning (before breakfast). 90 tablet 1    polyethylene glycol (MIRALAX) 17 GM/Dose powder Take 17 g by mouth daily as needed.      SUMAtriptan (IMITREX) 100 MG tablet TAKE 1/2 TO 1 TABLET BY MOUTH AT ONSET OF HEADACHE 9 tablet 1       Allergies   Allergen Reactions    Gluten Meal Nausea and Vomiting    Doxycycline Hives    Contrast Dye      Hives, resolved with benadryl alone in the past (two episode)    Nsaids Other (See Comments)     Avoid - one kidney    Ondansetron Headache    Pcn [Penicillins] Hives     As a child    Rocephin [Ceftriaxone] Hives     Patient states this started immediately    Tramadol Hives    Zithromax [Azithromycin] Tinnitus     \"ear ringing and hearing loss\"        Social History     Tobacco Use    Smoking status: Former     Current packs/day: 0.00     Types: Cigarettes     Quit date: 1999     Years since quittin.3    Smokeless tobacco: Never   Substance Use Topics    Alcohol use: No     Comment: rare     Family History   Problem Relation Age of Onset    Hashimoto's thyroiditis Mother     Breast Cancer Mother 50        currently patient has two different types of Br Ca 84y    Hypertension Mother     Hyperlipidemia Mother     Osteoporosis Mother     Thyroid Disease Mother     Graves' disease Sister     Diabetes Sister     Thyroid Disease Sister     Hypothyroidism Sister     Thyroid Disease Sister     Hashimoto's thyroiditis Maternal Grandfather     Hypothyroidism Maternal Aunt     Breast Cancer Maternal Aunt 50    Hypothyroidism Maternal Aunt     Hashimoto's thyroiditis Maternal Aunt     Diabetes Maternal Grandmother     Diabetes Sister     Thyroid Disease Sister     Thyroid Disease Sister     Anesthesia Reaction No family hx of      History   Drug Use Unknown             Review of Systems  Constitutional, HEENT, cardiovascular, pulmonary, GI, , musculoskeletal, neuro, skin, " "endocrine and psych systems are negative, except as otherwise noted.    Objective    BP (!) 144/84   Pulse 72   Temp 98.1  F (36.7  C) (Oral)   Resp 16   Ht 1.676 m (5' 6\")   Wt 105.2 kg (232 lb)   LMP  (LMP Unknown)   SpO2 97%   BMI 37.45 kg/m     Estimated body mass index is 37.45 kg/m  as calculated from the following:    Height as of this encounter: 1.676 m (5' 6\").    Weight as of this encounter: 105.2 kg (232 lb).  Physical Exam  GENERAL: alert and no distress  EYES: Eyes grossly normal to inspection, PERRL and conjunctivae and sclerae normal  HENT: ear canals and TM's normal, nose and mouth without ulcers or lesions  NECK: no adenopathy, no asymmetry, masses, or scars  RESP: lungs clear to auscultation - no rales, rhonchi or wheezes  CV: regular rate and rhythm, normal S1 S2, no S3 or S4, no murmur, click or rub, no peripheral edema  ABDOMEN: soft, nontender, no hepatosplenomegaly, no masses and bowel sounds normal  MS: no gross musculoskeletal defects noted, no edema  SKIN: no suspicious lesions or rashes  NEURO: Normal strength and tone, mentation intact and speech normal  PSYCH: mentation appears normal, affect normal/bright    Recent Labs   Lab Test 04/14/25  0815 03/28/25  1045 03/15/25  0346 02/14/25  0812 02/14/25  0802 12/06/24  0927 11/14/24  1608   HGB 13.7 14.5 12.4   < >  --    < > 13.6    335 307   < >  --    < > 366   NA  --  140 140  --  140   < > 139   POTASSIUM  --  4.2 3.2*  --  4.3   < > 4.5   CR 1.05* 0.91 0.82  --  0.96*  0.95   < > 1.18*   A1C  --   --   --   --  5.2  --  5.5    < > = values in this interval not displayed.        Diagnostics    Labs pending at this time.  Results will be reviewed when available.     No EKG this visit, completed in the last 90 days.    Revised Cardiac Risk Index (RCRI)  The patient has the following serious cardiovascular risks for perioperative complications:   - No serious cardiac risks = 0 points     RCRI Interpretation: 0 points: Class " I (very low risk - 0.4% complication rate)         Signed Electronically by: Yissel Schmidt,   A copy of this evaluation report is provided to the requesting physician.

## 2025-04-24 NOTE — PATIENT INSTRUCTIONS
How to Take Your Medication Before Surgery  Medication Instructions  HOLD (do not take) your Humira or vitamins/supplements for at least 2 weeks before surgery.  HOLD (do not take) phentermine for 7 days before surgery.   HOLD (do not take) sumatriptan, miralax, omeprazole, or metformin on morning of procedure.   HOLD (do not use) estrogen patch until through surgery.  OKAY to take atenolol, prozac, and plaquenil as scheduled.   Patient Education   Preparing for Your Surgery  For Adults  Getting started  In most cases, a nurse will call to review your health history and instructions. They will give you an arrival time based on your scheduled surgery time. Please be ready to share:  Your doctor's clinic name and phone number  Your medical, surgical, and anesthesia history  A list of allergies and sensitivities  A list of medicines, including herbal treatments and over-the-counter drugs  Whether the patient has a legal guardian (ask how to send us the papers in advance)  Note: You may not receive a call if you were seen at our PAC (Preoperative Assessment Center).  Please tell us if you're pregnant--or if there's any chance you might be pregnant. Some surgeries may injure a fetus (unborn baby), so they require a pregnancy test. Surgeries that are safe for a fetus don't always need a test, and you can choose whether to have one.   Preparing for surgery  Within 10 to 30 days of surgery: Have a pre-op exam (sometimes called an H&P, or History and Physical). This can be done at a clinic or pre-operative center.  If you're having a , you may not need this exam. Talk to your care team.  At your pre-op exam, talk to your care team about all medicines you take. (This includes CBD oil and any drugs, such as THC, marijuana, and other forms of cannabis.) If you need to stop any medicine before surgery, ask when to start taking it again.  This is for your safety. Many medicines and drugs can make you bleed too much  during surgery. Some change how well surgery (anesthesia) drugs work.  Call your insurance company to let them know you're having surgery. (If you don't have insurance, call [phone].)  Call your clinic if there's any change in your health. This includes a scrape or scratch near the surgery site, or any signs of a cold (sore throat, runny nose, cough, rash, fever).  Eating and drinking guidelines  For your safety: Unless your surgeon tells you otherwise, follow the guidelines below.  Eat and drink as normal until 8 hours before you arrive for surgery. After that, no food or milk. You can spit out gum when you arrive.  Drink clear liquids until 2 hours before you arrive. These are liquids you can see through, like water, Gatorade, and Propel Water. They also include plain black coffee and tea (no cream or milk).  No alcohol for 24 hours before you arrive. The night before surgery, stop any drinks that contain THC.  If your care team tells you to take medicine on the morning of surgery, it's okay to take it with a sip of water. No other medicines or drugs are allowed (including CBD oil)--follow your care team's instructions.  If you have questions the day of surgery, call your hospital or surgery center.   Preventing infection  Shower or bathe the night before and the morning of surgery. Follow the instructions your clinic gave you. (If no instructions, use regular soap.)  Don't shave or clip hair near your surgery site. We'll remove the hair if needed.  Don't smoke or vape the morning of surgery. No chewing tobacco for 6 hours before you arrive. A nicotine patch is okay. You may spit out nicotine gum when you arrive.  For some surgeries, the surgeon will tell you to fully quit smoking and nicotine.  We will make every effort to keep you safe from infection. We will:  Clean our hands often with soap and water (or an alcohol-based hand rub).  Clean the skin at your surgery site with a special soap that kills  germs.  Give you a special gown to keep you warm. (Cold raises the risk of infection.)  Wear hair covers, masks, gowns, and gloves during surgery.  Give antibiotic medicine, if prescribed. Not all surgeries need this medicine.  What to bring on the day of surgery  Photo ID and insurance card  Copy of your health care directive, if you have one  Glasses and hearing aids (bring cases)  You can't wear contacts during surgery  Inhaler and eye drops, if you use them (tell us about these when you arrive)  CPAP machine or breathing device, if you use them  A few personal items, if spending the night  If you have . . .  A pacemaker, ICD (cardiac defibrillator), or other implant: Bring the ID card.  An implanted stimulator: Bring the remote control.  A legal guardian: Bring a copy of the certified (court-stamped) guardianship papers.  Please remove any jewelry, including body piercings. Leave jewelry and other valuables at home.  If you're going home the day of surgery  You must have a responsible adult drive you home. They should stay with you overnight as well.  If you don't have someone to stay with you, and you aren't safe to go home alone, we may keep you overnight. Insurance often won't pay for this.  After surgery  If it's hard to control your pain or you need more pain medicine, please call your surgeon's office.  Questions?   If you have any questions for your care team, list them here:   ____________________________________________________________________________________________________________________________________________________________________________________________________________________________________________________________  For informational purposes only. Not to replace the advice of your health care provider. Copyright   2003, 2019 [facility]. All rights reserved. Clinically reviewed by Rob Quiroga MD. [employer] [id number] - REV 08/24.

## 2025-04-24 NOTE — PROGRESS NOTES
"      Rheumatology follow-up visit note     Keiry is a 60 year old female presents today for follow-up.    Samantha was seen today for recheck.    Diagnoses and all orders for this visit:    Baker's cyst of knee, right  -     XR Knee Standing Bilateral 3 Views; Future    Edema of right ankle    Seropositive rheumatoid arthritis of multiple joints (H)        The sequence of events of her heart seem to be consistent with a ruptured right Baker's cyst.  Will take x-rays of the knees today.  We reviewed how this can happen.  One of the key concerns in this context i.e. DVT of the affected lower extremity, is already been addressed and ruled out.  She is not in such pain that we both were in agreement that there is no indication for doing a corticosteroid injection into her right knee.  We will meet her again as previously scheduled.The longitudinal plan of care for the diagnosis(es)/condition(s) as documented were addressed during this visit. Due to the added complexity in care, I will continue to support Samantha in the subsequent management and with ongoing continuity of care.      HPI    Keiry Mchugh is a 60 year old female is here for   follow-up of recent visit to the urgency  room.  She was seen her only recently with rheumatoid arthritis follow-up.  Those data are reviewed.  Soon thereafter she started experiencing worsening pain in her right knee and swelling of the right ankle.  She went to a local \"urgency room\".  DVT was considered this was ruled out with an ultrasound the report of which is available but it found a Baker's cyst on the same side.  She thinks there may have been some bruising along the lateral malleolus on the right side.  Definitely there was so much pitting edema on that a few seconds of dipping her finger into that swelling would leave pit for several minutes.  The pain in her knee which was quite significant had gradually improved since then.  Following is the excerpt from a previous " "note:    rheumatoid arthritis seropositive. On her previous visit Humira was started in view of ongoing rheumatoid activity, meanwhile her liver function studiesBecome abnormal she does have a history of HARDIN. She is following up at [place] for question of Cushing's/hyperparathyroidism. Meanwhile her methotrexate dose was reduced.  Yet her liver function remains abnormal this was discontinued.  She is on hydroxychloroquine.  Eye examination.  She was seen at [facility] rheumatology those data are reviewed.  Including the comments regarding hydroxychloroquine.  So far she has only been able to get 3 injections of Humira and then for the upcoming surgery in 2 weeks will have the last one coming up tomorrow.  After the surgery.  Of surgery but now going for the C-spine requested that information from orthopedics.  She feels that Humira has not \"kicked in\" she feels her fingers are like \"sausages\"her pulmonary note is reviewed. She had her eyes examined she recalls earlier this year in March for hydroxychloroquine toxicity monitoring.     DETAILED EXAMINATION  04/24/25  :    Vitals:    04/24/25 1222   BP: (!) 146/75   BP Location: Right arm   Pulse: 73   SpO2: 96%   Weight: 104.2 kg (229 lb 11.2 oz)     Alert oriented. Head including the face is examined for malar rash, heliotropes, scarring, lupus pernio. Eyes examined for redness such as in episcleritis/scleritis, periorbital lesions.   Neck/ Face examined for parotid gland swelling, range of motion of neck.  Left upper and lower and right upper and lower extremities examined for tenderness, swelling, warmth of the appendicular joints, range of motion, edema, rash.  Some of the important findings included: she does not have evidence of synovitis in the palpable joints of the upper extremities.  No significant deformities of the digits.  She has edema still on the right ankle.  This is without signals of inflammation examination of the tibiotalar joint with the ultrasound shows " no effusion or signals of inflammation.  She has tiny effusion in the right knee.  The popliteal cyst seems to have subsided quite significantly.  Patient Active Problem List    Diagnosis Date Noted    Diverticulosis of large intestine 03/28/2025     Priority: Medium    Seropositive rheumatoid arthritis (H) 03/28/2025     Priority: Medium    Hx of parathyroidectomy 03/28/2025     Priority: Medium    Hiatal hernia with gastroesophageal reflux 09/21/2024     Priority: Medium    Class 2 severe obesity due to excess calories with serious comorbidity in adult (H) 09/13/2024     Priority: Medium    Diaphragmatic hernia 08/28/2024     Priority: Medium    Cervical spondylosis without myelopathy 10/13/2023     Priority: Medium    Cervical radiculitis 10/13/2023     Priority: Medium    Low bone mass 07/21/2023     Priority: Medium    Profound fatigue 07/21/2023     Priority: Medium    Dyslipidemia      Priority: Medium    Metabolic syndrome      Priority: Medium    Benign essential hypertension      Priority: Medium    Celiac disease      Priority: Medium    HARDIN (nonalcoholic steatohepatitis) 07/23/2021     Priority: Medium    Chronic insomnia 06/11/2020     Priority: Medium     Insomnia associated with inadequate sleep hygiene and suspected sleep apnea.       Past Surgical History:   Procedure Laterality Date    ABDOMEN SURGERY      APPENDECTOMY      BIOPSY      CHOLECYSTECTOMY      COLON SURGERY      COLONOSCOPY N/A 03/18/2019    Procedure: COLONOSCOPY;  Surgeon: Davis Mosqueda MD;  Location: [facility];  Service: General    CYSTOSCOPY N/A 07/16/2015    Procedure: CYSTOSCOPY;  Surgeon: Nate Horta MD;  Location: [facility];  Service:     ESOPHAGOSCOPY, GASTROSCOPY, DUODENOSCOPY (EGD), COMBINED N/A 09/16/2024    Procedure: Esophagogastroduodenoscopy with biopsies;  Surgeon: Guevara Livingston MD;  Location:  OR     CORRECT BUNION,METATARSAL OSTEOTOMY      Description: Bunion Correction  With Metatarsal Osteotomy Herman Procedure;  Proc Date: 07/16/2010;    HERNIA REPAIR      HYSTERECTOMY      HYSTERECTOMY, PAP NO LONGER INDICATED  02/01/2009    NEPHRECTOMY LIVING DONOR Left 04/01/2012    PARATHYROIDECTOMY  05/2023    AK LAP,BILIARY TRACT,UNLISTED N/A 03/19/2019    Procedure: BIOPSY, LIVER, LAPAROSCOPIC;  Surgeon: Davis Mosqueda MD;  [place] [facility] [place];  Service: General    AK LAP,SLING OPERATION      Description: Laparoscopic Sling Operation For Stress Incontinence;  Recorded: 02/17/2009;    AK LAP,SURG,COLECTOMY, PARTIAL, W/ANAST N/A 03/19/2019    Procedure: LAPAROSCOPIC SIGMOID COLON RESECTION;  Surgeon: Davis Mosqueda MD;  [place] [facility] [place];  Service: General    SEPTOPLASTY, TURBINOPLASTY, COMBINED N/A 08/03/2021    Procedure: SEPTOPLASTY, NOSE, WITH TURBINOPLASTY RADIOFREQUENCY BALLON ASSISTED, CRYOBLATION OF NASAL TISSUE;  Surgeon: Randy Case MD;  Location: [facility]    SIGMOIDOSCOPY FLEXIBLE N/A 03/19/2019    Procedure: FLEXIBLE SIGMOIDOSCOPY;  Surgeon: Davis Mosqueda MD;  [place] [facility] [place];  Service: General      Past Medical History:   Diagnosis Date    Anemia     Anxiety     Arthritis     RA    Celiac disease     Celiac disease     Cervical spondylosis without myelopathy 10/13/2023    Chronic kidney disease     donated 1 kidney    Class 2 severe obesity due to excess calories with serious comorbidity in adult (H) 09/13/2024    COPD (chronic obstructive pulmonary disease) (H)     Cough     Depression     Seasonal    Diverticulitis     Dyslipidemia     Enlarged LV     wall    GERD (gastroesophageal reflux disease)     Hepatitis     Hiatal hernia     Hypertension     Low bone mass     Metabolic syndrome     Migraines     HARDIN (nonalcoholic steatohepatitis)     Peripheral neuropathy     PONV (postoperative nausea and vomiting)     Profound fatigue     Sigmoid diverticulitis 08/29/2021    Ventral hernia      Allergies   Allergen Reactions  "   Gluten Meal Nausea and Vomiting    Doxycycline Hives    Contrast Dye      Hives, resolved with benadryl alone in the past (two episode)    Nsaids Other (See Comments)     Avoid - one kidney    Ondansetron Headache    Pcn [Penicillins] Hives     As a child    Rocephin [Ceftriaxone] Hives     Patient states this started immediately    Tramadol Hives    Zithromax [Azithromycin] Tinnitus     \"ear ringing and hearing loss\"     Current Outpatient Medications   Medication Sig Dispense Refill    Adalimumab (HUMIRA *CF*) 40 MG/0.4ML pen kit Inject 0.4 mLs (40 mg) subcutaneously every 14 days. 2 each 1    albuterol (PROVENTIL) (2.5 MG/3ML) 0.083% neb solution Take 1 vial (2.5 mg) by nebulization every 6 hours as needed for shortness of breath, wheezing or cough. 180 mL 11    atenolol (TENORMIN) 50 MG tablet Take 1 tablet (50 mg) by mouth daily. 90 tablet 3    B Complex-C (RA B-COMPLEX/VITAMIN C CR) TBCR Take 1 tablet by mouth daily      baclofen (LIORESAL) 10 MG tablet TAKE 1 TABLET BY MOUTH TWICE DAILY TO THREE TIMES DAILY AS NEEDED FOR MUSCLE SPASMS. 30 tablet 1    Coenzyme Q10 (COQ-10) 400 MG CAPS Take 1 capsule by mouth daily       estradiol (VIVELLE-DOT) 0.0375 MG/24HR BIW patch Apply 1 patch topically twice a week.      FLUoxetine (PROZAC) 20 MG capsule Take 1 capsule (20 mg) by mouth daily. 90 capsule 3    hydroxychloroquine (PLAQUENIL) 200 MG tablet Take 1 tablet (200 mg) by mouth 2 times daily. 180 tablet 0    metFORMIN (GLUCOPHAGE) 850 MG tablet Take 1 tablet (850 mg) by mouth daily (with dinner) for 30 days, THEN 1 tablet (850 mg) 2 times daily (with meals). 150 tablet 0    omeprazole (PRILOSEC OTC) 20 MG EC tablet Take 20 mg by mouth daily.      OnabotulinumtoxinA (BOTOX IJ) For migraines - 155 units q 12 weeks.      phentermine (ADIPEX-P) 37.5 MG tablet Take 1 tablet (37.5 mg) by mouth every morning (before breakfast). 90 tablet 1    polyethylene glycol (MIRALAX) 17 GM/Dose powder Take 17 g by mouth daily as " needed.      SUMAtriptan (IMITREX) 100 MG tablet TAKE 1/2 TO 1 TABLET BY MOUTH AT ONSET OF HEADACHE 9 tablet 1     family history includes Breast Cancer (age of onset: 50) in her maternal aunt and mother; Diabetes in her maternal grandmother, sister, and sister; Graves' disease in her sister; Hashimoto's thyroiditis in her maternal aunt, maternal grandfather, and mother; Hyperlipidemia in her mother; Hypertension in her mother; Hypothyroidism in her maternal aunt, maternal aunt, and sister; Osteoporosis in her mother; Thyroid Disease in her mother, sister, sister, sister, and sister.  Social Connections: Unknown (3/25/2025)    Social Connection and Isolation Panel [NHANES]     Frequency of Communication with Friends and Family: Not on file     Frequency of Social Gatherings with Friends and Family: Twice a week     Attends [employer] Services: Not on file     Active Member of Clubs or Organizations: Not on file     Attends Club or Organization Meetings: Not on file     Marital Status: Not on file          WBC Count   Date Value Ref Range Status   04/24/2025 4.9 4.0 - 11.0 10e3/uL Final     RBC Count   Date Value Ref Range Status   04/24/2025 4.82 3.80 - 5.20 10e6/uL Final     Hemoglobin   Date Value Ref Range Status   04/24/2025 14.5 11.7 - 15.7 g/dL Final     Hematocrit   Date Value Ref Range Status   04/24/2025 44.6 35.0 - 47.0 % Final     MCV   Date Value Ref Range Status   04/24/2025 93 78 - 100 fL Final     MCH   Date Value Ref Range Status   04/24/2025 30.1 26.5 - 33.0 pg Final     Platelet Count   Date Value Ref Range Status   04/24/2025 333 150 - 450 10e3/uL Final     % Lymphocytes   Date Value Ref Range Status   03/15/2025 33 % Final     AST   Date Value Ref Range Status   03/28/2025 22 0 - 45 U/L Final     ALT   Date Value Ref Range Status   04/14/2025 23 0 - 50 U/L Final     Albumin   Date Value Ref Range Status   04/14/2025 4.4 3.5 - 5.2 g/dL Final   09/08/2021 4.3 3.5 - 5.0 g/dL Final     Alkaline  Phosphatase   Date Value Ref Range Status   03/28/2025 90 40 - 150 U/L Final     Creatinine   Date Value Ref Range Status   04/14/2025 1.05 (H) 0.51 - 0.95 mg/dL Final     GFR Estimate   Date Value Ref Range Status   04/14/2025 61 >60 mL/min/1.73m2 Final     Comment:     eGFR calculated using 2021 CKD-EPI equation.   12/01/2020 61 >=60 mL/min/BSA Final   06/19/2020 >60 >60 mL/min/1.73m2 Final     GFR Estimate If Black   Date Value Ref Range Status   06/19/2020 >60 >60 mL/min/1.73m2 Final     GFREstimate If Black   Date Value Ref Range Status   12/01/2020 71 >=60 mL/min/BSA Final     Erythrocyte Sedimentation Rate   Date Value Ref Range Status   05/03/2024 13 0 - 30 mm/hr Final     CRP   Date Value Ref Range Status   05/26/2023 3.1 (H) 0.0 - <0.8 mg/dL Final

## 2025-04-25 ENCOUNTER — ANCILLARY PROCEDURE (OUTPATIENT)
Dept: MAMMOGRAPHY | Facility: CLINIC | Age: 60
End: 2025-04-25
Attending: FAMILY MEDICINE
Payer: COMMERCIAL

## 2025-04-25 ENCOUNTER — TRANSFERRED RECORDS (OUTPATIENT)
Dept: HEALTH INFORMATION MANAGEMENT | Facility: CLINIC | Age: 60
End: 2025-04-25
Payer: COMMERCIAL

## 2025-04-25 PROCEDURE — 77063 BREAST TOMOSYNTHESIS BI: CPT

## 2025-05-06 ENCOUNTER — MYC REFILL (OUTPATIENT)
Dept: SURGERY | Facility: CLINIC | Age: 60
End: 2025-05-06
Payer: COMMERCIAL

## 2025-05-06 DIAGNOSIS — E66.01 SEVERE OBESITY (H): ICD-10-CM

## 2025-05-06 DIAGNOSIS — E88.810 METABOLIC SYNDROME: ICD-10-CM

## 2025-05-13 RX ORDER — PHENTERMINE HYDROCHLORIDE 37.5 MG/1
37.5 TABLET ORAL
Qty: 90 TABLET | Refills: 1 | Status: SHIPPED | OUTPATIENT
Start: 2025-05-13

## 2025-05-15 ENCOUNTER — TELEPHONE (OUTPATIENT)
Dept: FAMILY MEDICINE | Facility: CLINIC | Age: 60
End: 2025-05-15

## 2025-05-15 NOTE — TELEPHONE ENCOUNTER
Reason for Call:  Appointment Request    Patient requesting this type of appt:  Hospital/ED Follow-Up or lab only    Requested provider: Yissel Schmidt    Reason patient unable to be scheduled: no appointments available with Dr Schmidt    When does patient want to be seen/preferred time: 1-2 days    Comments: pt is at [facility] and being discharged today from neck surgery.,  She has elevated liver enzymes and needs to be seen or have labs redrawn withing 2 days.    Could we send this information to you in [facility] or would you prefer to receive a phone call?:   Patient would prefer a phone call   Okay to leave a detailed message?: Yes at Cell number on file:    Telephone Information:   Mobile [phone]       Call taken on 5/15/2025 at 12:02 PM by Pam J. Behr

## 2025-05-20 ENCOUNTER — OFFICE VISIT (OUTPATIENT)
Dept: FAMILY MEDICINE | Facility: CLINIC | Age: 60
End: 2025-05-20
Payer: COMMERCIAL

## 2025-05-20 VITALS
DIASTOLIC BLOOD PRESSURE: 88 MMHG | BODY MASS INDEX: 36.32 KG/M2 | TEMPERATURE: 98.4 F | RESPIRATION RATE: 18 BRPM | HEART RATE: 97 BPM | OXYGEN SATURATION: 96 % | HEIGHT: 66 IN | SYSTOLIC BLOOD PRESSURE: 135 MMHG

## 2025-05-20 DIAGNOSIS — Z98.1 S/P CERVICAL SPINAL FUSION: ICD-10-CM

## 2025-05-20 DIAGNOSIS — Z09 HOSPITAL DISCHARGE FOLLOW-UP: Primary | ICD-10-CM

## 2025-05-20 DIAGNOSIS — R74.8 ELEVATED LIVER ENZYMES: ICD-10-CM

## 2025-05-20 DIAGNOSIS — I10 BENIGN ESSENTIAL HYPERTENSION: ICD-10-CM

## 2025-05-20 DIAGNOSIS — M05.79 SEROPOSITIVE RHEUMATOID ARTHRITIS OF MULTIPLE JOINTS (H): ICD-10-CM

## 2025-05-20 PROBLEM — Z90.5 S/P NEPHRECTOMY: Status: ACTIVE | Noted: 2025-05-13

## 2025-05-20 PROBLEM — N18.9 CKD (CHRONIC KIDNEY DISEASE): Status: ACTIVE | Noted: 2025-05-13

## 2025-05-20 PROCEDURE — 3079F DIAST BP 80-89 MM HG: CPT | Performed by: FAMILY MEDICINE

## 2025-05-20 PROCEDURE — G2211 COMPLEX E/M VISIT ADD ON: HCPCS | Performed by: FAMILY MEDICINE

## 2025-05-20 PROCEDURE — 1111F DSCHRG MED/CURRENT MED MERGE: CPT | Performed by: FAMILY MEDICINE

## 2025-05-20 PROCEDURE — 3075F SYST BP GE 130 - 139MM HG: CPT | Performed by: FAMILY MEDICINE

## 2025-05-20 PROCEDURE — 80053 COMPREHEN METABOLIC PANEL: CPT | Performed by: FAMILY MEDICINE

## 2025-05-20 PROCEDURE — 91320 SARSCV2 VAC 30MCG TRS-SUC IM: CPT | Performed by: FAMILY MEDICINE

## 2025-05-20 PROCEDURE — 90480 ADMN SARSCOV2 VAC 1/ONLY CMP: CPT | Performed by: FAMILY MEDICINE

## 2025-05-20 PROCEDURE — 99215 OFFICE O/P EST HI 40 MIN: CPT | Performed by: FAMILY MEDICINE

## 2025-05-20 PROCEDURE — 36415 COLL VENOUS BLD VENIPUNCTURE: CPT | Performed by: FAMILY MEDICINE

## 2025-05-20 RX ORDER — METHOCARBAMOL 500 MG/1
500 TABLET, FILM COATED ORAL
COMMUNITY
Start: 2025-05-14

## 2025-05-20 RX ORDER — OXYCODONE HYDROCHLORIDE 5 MG/1
5 TABLET ORAL EVERY 6 HOURS PRN
Qty: 20 TABLET | Refills: 0 | Status: SHIPPED | OUTPATIENT
Start: 2025-05-20

## 2025-05-20 RX ORDER — AMOXICILLIN 250 MG
1-4 CAPSULE ORAL
COMMUNITY
Start: 2025-05-14

## 2025-05-20 RX ORDER — HYDROXYZINE PAMOATE 25 MG/1
25-50 CAPSULE ORAL
COMMUNITY
Start: 2025-05-14 | End: 2025-05-20

## 2025-05-20 RX ORDER — OXYCODONE HYDROCHLORIDE 5 MG/1
5-10 TABLET ORAL
COMMUNITY
Start: 2025-05-14 | End: 2025-05-20

## 2025-05-20 RX ORDER — ACETAMINOPHEN 500 MG
1000 TABLET ORAL
COMMUNITY
Start: 2025-05-14

## 2025-05-20 RX ORDER — HYDROXYZINE PAMOATE 25 MG/1
50 CAPSULE ORAL 4 TIMES DAILY PRN
Qty: 40 CAPSULE | Refills: 0 | Status: SHIPPED | OUTPATIENT
Start: 2025-05-20

## 2025-05-20 RX ORDER — CETIRIZINE HYDROCHLORIDE 10 MG/1
10 TABLET ORAL
COMMUNITY

## 2025-05-21 ENCOUNTER — RESULTS FOLLOW-UP (OUTPATIENT)
Dept: FAMILY MEDICINE | Facility: CLINIC | Age: 60
End: 2025-05-21

## 2025-05-21 LAB
ALBUMIN SERPL BCG-MCNC: 4.3 G/DL (ref 3.5–5.2)
ALP SERPL-CCNC: 129 U/L (ref 40–150)
ALT SERPL W P-5'-P-CCNC: 95 U/L (ref 0–50)
ANION GAP SERPL CALCULATED.3IONS-SCNC: 12 MMOL/L (ref 7–15)
AST SERPL W P-5'-P-CCNC: 35 U/L (ref 0–45)
BILIRUB SERPL-MCNC: 0.3 MG/DL
BUN SERPL-MCNC: 19.1 MG/DL (ref 6–20)
CALCIUM SERPL-MCNC: 9.4 MG/DL (ref 8.8–10.4)
CHLORIDE SERPL-SCNC: 106 MMOL/L (ref 98–107)
CREAT SERPL-MCNC: 0.95 MG/DL (ref 0.51–0.95)
EGFRCR SERPLBLD CKD-EPI 2021: 69 ML/MIN/1.73M2
GLUCOSE SERPL-MCNC: 130 MG/DL (ref 70–99)
HCO3 SERPL-SCNC: 24 MMOL/L (ref 22–29)
POTASSIUM SERPL-SCNC: 4.3 MMOL/L (ref 3.4–5.3)
PROT SERPL-MCNC: 7.5 G/DL (ref 6.4–8.3)
SODIUM SERPL-SCNC: 142 MMOL/L (ref 135–145)

## 2025-06-16 ENCOUNTER — RESULTS FOLLOW-UP (OUTPATIENT)
Dept: RHEUMATOLOGY | Facility: CLINIC | Age: 60
End: 2025-06-16

## 2025-06-17 ENCOUNTER — OFFICE VISIT (OUTPATIENT)
Dept: RHEUMATOLOGY | Facility: CLINIC | Age: 60
End: 2025-06-17
Payer: COMMERCIAL

## 2025-06-17 VITALS
DIASTOLIC BLOOD PRESSURE: 84 MMHG | SYSTOLIC BLOOD PRESSURE: 142 MMHG | BODY MASS INDEX: 36.56 KG/M2 | OXYGEN SATURATION: 99 % | WEIGHT: 226.5 LBS | HEART RATE: 88 BPM

## 2025-06-17 DIAGNOSIS — R76.8 RHEUMATOID FACTOR POSITIVE: ICD-10-CM

## 2025-06-17 DIAGNOSIS — Z79.899 HIGH RISK MEDICATION USE: ICD-10-CM

## 2025-06-17 DIAGNOSIS — M05.79 SEROPOSITIVE RHEUMATOID ARTHRITIS OF MULTIPLE JOINTS (H): Primary | ICD-10-CM

## 2025-06-17 DIAGNOSIS — K75.81 NASH (NONALCOHOLIC STEATOHEPATITIS): ICD-10-CM

## 2025-06-17 PROCEDURE — G2211 COMPLEX E/M VISIT ADD ON: HCPCS | Performed by: INTERNAL MEDICINE

## 2025-06-17 PROCEDURE — 3077F SYST BP >= 140 MM HG: CPT | Performed by: INTERNAL MEDICINE

## 2025-06-17 PROCEDURE — 99214 OFFICE O/P EST MOD 30 MIN: CPT | Performed by: INTERNAL MEDICINE

## 2025-06-17 PROCEDURE — 3079F DIAST BP 80-89 MM HG: CPT | Performed by: INTERNAL MEDICINE

## 2025-06-17 RX ORDER — HYDROXYCHLOROQUINE SULFATE 200 MG/1
200 TABLET, FILM COATED ORAL 2 TIMES DAILY
Qty: 180 TABLET | Refills: 0 | Status: SHIPPED | OUTPATIENT
Start: 2025-06-17

## 2025-06-17 NOTE — PROGRESS NOTES
"      Rheumatology follow-up visit note     Keiry is a 60 year old female presents today for follow-up.    Samantha was seen today for recheck.    Diagnoses and all orders for this visit:    Seropositive rheumatoid arthritis of multiple joints (H)  -     hydroxychloroquine (PLAQUENIL) 200 MG tablet; Take 1 tablet (200 mg) by mouth 2 times daily.    High risk medication use    HARDIN (nonalcoholic steatohepatitis)    Rheumatoid factor positive        The longitudinal plan of care for the diagnosis(es)/condition(s) as documented were addressed during this visit. Due to the added complexity in care, I will continue to support Samantha in the subsequent management and with ongoing continuity of care.      Follow up in 3 months.    HPI    Keiry Mchugh is a 60 year old female is here for follow-up of rheumatoid arthritis.  This is polyarticular longstanding seropositive.  l   she is status post C6-C7 fusion since her previous visit.  Over the past few weeks she has been off Humira.  She still is on hydroxychloroquine.  To her pleasant surprise she found that the fatigue which is quite profound to the point where it limited her ability to do some of the day-to-day activities, walking etc. has lifted since she has been off Humira.  She has not had any other tumor necrosis factor inhibitor.  She has not had a flareup of joint symptoms.  She recently had her labs drawn within normal range.  She took methotrexate in the past but that was discontinued because of HARDIN and methotrexate related liver function abnormalities.  Does not seem like she has had leflunomide.  She also observed today that with the corticosteroid injections that she has had elsewhere she would regularly get nearly \"2 months\" of feeling flushed and hot to the point where she had started taking estrogens but finally was able to put \"2 and 2 together\" and found out that it was a corticosteroid injections which had such a prolonged adverse reaction for her.  With " regards to Humira she rather not take it anymore and if her RA does get to a point where other options are needed to look at different intervention.  For the knee pain on the right side medially she uses diclofenac gel with good benefit.  Monitor for eye examination with hydroxychloroquine.  She has an appointment coming up in a week.    DETAILED EXAMINATION  06/17/25  :    Vitals:    06/17/25 1208   BP: (!) 142/84   BP Location: Right arm   Patient Position: Sitting   Cuff Size: Adult Large   Pulse: 88   SpO2: 99%   Weight: 102.7 kg (226 lb 8 oz)     Alert oriented. Head including the face is examined for malar rash, heliotropes, scarring, lupus pernio. Eyes examined for redness such as in episcleritis/scleritis, periorbital lesions.   Neck/ Face examined for parotid gland swelling, range of motion of neck.  Left upper and lower and right upper and lower extremities examined for tenderness, swelling, warmth of the appendicular joints, range of motion, edema, rash.  Some of the important findings included: she does not have evidence of synovitis in the palpable joints of the upper extremities.  No significant deformities of the digits.  no Heberden nodes.  Range of motion of the shoulders  show full abduction.  No JLT effusion or warmth of the knees.  she does not have dactylitis of the digits.     Patient Active Problem List    Diagnosis Date Noted    CKD (chronic kidney disease) 05/13/2025     Priority: Medium    S/p nephrectomy 05/13/2025     Priority: Medium    Diverticulosis of large intestine 03/28/2025     Priority: Medium    Seropositive rheumatoid arthritis (H) 03/28/2025     Priority: Medium    Hx of parathyroidectomy 03/28/2025     Priority: Medium    Hiatal hernia with gastroesophageal reflux 09/21/2024     Priority: Medium    Class 2 severe obesity due to excess calories with serious comorbidity in adult (H) 09/13/2024     Priority: Medium    Diaphragmatic hernia 08/28/2024     Priority: Medium     Cervical spondylosis without myelopathy 10/13/2023     Priority: Medium    Cervical radiculitis 10/13/2023     Priority: Medium    Low bone mass 07/21/2023     Priority: Medium    Profound fatigue 07/21/2023     Priority: Medium    Dyslipidemia      Priority: Medium    Metabolic syndrome      Priority: Medium    Benign essential hypertension      Priority: Medium    Celiac disease      Priority: Medium    HARDIN (nonalcoholic steatohepatitis) 07/23/2021     Priority: Medium    Chronic insomnia 06/11/2020     Priority: Medium     Insomnia associated with inadequate sleep hygiene and suspected sleep apnea.       Past Surgical History:   Procedure Laterality Date    ABDOMEN SURGERY      APPENDECTOMY      BIOPSY      CHOLECYSTECTOMY      COLON SURGERY      COLONOSCOPY N/A 03/18/2019    Procedure: COLONOSCOPY;  Surgeon: Davis Mosqueda MD;  Location: [facility];  Service: General    CYSTOSCOPY N/A 07/16/2015    Procedure: CYSTOSCOPY;  Surgeon: Nate Horta MD;  Location: [facility];  Service:     ESOPHAGOSCOPY, GASTROSCOPY, DUODENOSCOPY (EGD), COMBINED N/A 09/16/2024    Procedure: Esophagogastroduodenoscopy with biopsies;  Surgeon: Guevara Livingston MD;  Location:  OR     CORRECT BUNION,METATARSAL OSTEOTOMY      Description: Bunion Correction With Metatarsal Osteotomy Herman Procedure;  Proc Date: 07/16/2010;    HERNIA REPAIR      HYSTERECTOMY      HYSTERECTOMY, PAP NO LONGER INDICATED  02/01/2009    NEPHRECTOMY LIVING DONOR Left 04/01/2012    PARATHYROIDECTOMY  05/2023    NV LAP,BILIARY TRACT,UNLISTED N/A 03/19/2019    Procedure: BIOPSY, LIVER, LAPAROSCOPIC;  Surgeon: Davis Mosqueda MD;  Location: [facility];  Service: General    NV LAP,SLING OPERATION      Description: Laparoscopic Sling Operation For Stress Incontinence;  Recorded: 02/17/2009;    NV LAP,SURG,COLECTOMY, PARTIAL, W/ANAST N/A 03/19/2019    Procedure: LAPAROSCOPIC SIGMOID COLON RESECTION;  Surgeon: Jaylin  "Davis ROJAS MD;  Location: [facility];  Service: General    SEPTOPLASTY, TURBINOPLASTY, COMBINED N/A 08/03/2021    Procedure: SEPTOPLASTY, NOSE, WITH TURBINOPLASTY RADIOFREQUENCY BALLON ASSISTED, CRYOBLATION OF NASAL TISSUE;  Surgeon: Randy Case MD;  Location: [facility]    SIGMOIDOSCOPY FLEXIBLE N/A 03/19/2019    Procedure: FLEXIBLE SIGMOIDOSCOPY;  Surgeon: Davis Mosqueda MD;  Location: [facility];  Service: General      Past Medical History:   Diagnosis Date    Anemia     Anxiety     Arthritis     RA    Celiac disease     Celiac disease     Cervical spondylosis without myelopathy 10/13/2023    Chronic kidney disease     donated 1 kidney    Class 2 severe obesity due to excess calories with serious comorbidity in adult (H) 09/13/2024    COPD (chronic obstructive pulmonary disease) (H)     Cough     Depression     Seasonal    Diverticulitis     Dyslipidemia     Enlarged LV     wall    GERD (gastroesophageal reflux disease)     Hepatitis     Hiatal hernia     Hypertension     Low bone mass     Metabolic syndrome     Migraines     HARDIN (nonalcoholic steatohepatitis)     Peripheral neuropathy     PONV (postoperative nausea and vomiting)     Profound fatigue     Sigmoid diverticulitis 08/29/2021    Ventral hernia      Allergies   Allergen Reactions    Gluten Meal Nausea and Vomiting    Doxycycline Hives    Contrast Dye      Hives, resolved with benadryl alone in the past (two episode)    Nsaids Other (See Comments)     Avoid - one kidney    Ondansetron Headache    Pcn [Penicillins] Hives     As a child    Rocephin [Ceftriaxone] Hives     Patient states this started immediately    Tramadol Hives    Zithromax [Azithromycin] Tinnitus     \"ear ringing and hearing loss\"     Current Outpatient Medications   Medication Sig Dispense Refill    acetaminophen (TYLENOL) 500 MG tablet Take 1,000 mg by mouth.      Adalimumab (HUMIRA *CF*) 40 MG/0.4ML pen kit Inject 0.4 mLs (40 mg) subcutaneously every 14 " days. 2 each 1    albuterol (PROVENTIL) (2.5 MG/3ML) 0.083% neb solution Take 1 vial (2.5 mg) by nebulization every 6 hours as needed for shortness of breath, wheezing or cough. 180 mL 11    atenolol (TENORMIN) 50 MG tablet Take 1 tablet (50 mg) by mouth daily. 90 tablet 3    B Complex-C (RA B-COMPLEX/VITAMIN C CR) TBCR Take 1 tablet by mouth daily      baclofen (LIORESAL) 10 MG tablet TAKE 1 TABLET BY MOUTH TWICE DAILY TO THREE TIMES DAILY AS NEEDED FOR MUSCLE SPASMS. 30 tablet 1    cetirizine (ZYRTEC) 10 MG tablet Take 10 mg by mouth.      Coenzyme Q10 (COQ-10) 400 MG CAPS Take 1 capsule by mouth daily       FLUoxetine (PROZAC) 20 MG capsule Take 1 capsule (20 mg) by mouth daily. 90 capsule 3    hydroxychloroquine (PLAQUENIL) 200 MG tablet Take 1 tablet (200 mg) by mouth 2 times daily. 180 tablet 0    hydrOXYzine erin (VISTARIL) 25 MG capsule Take 2 capsules (50 mg) by mouth 4 times daily as needed for anxiety. 40 capsule 0    metFORMIN (GLUCOPHAGE) 850 MG tablet Take 1 tablet (850 mg) by mouth daily (with dinner) for 30 days, THEN 1 tablet (850 mg) 2 times daily (with meals). 150 tablet 0    methocarbamol (ROBAXIN) 500 MG tablet Take 500 mg by mouth.      omeprazole (PRILOSEC OTC) 20 MG EC tablet Take 20 mg by mouth daily.      OnabotulinumtoxinA (BOTOX IJ) For migraines - 155 units q 12 weeks.      phentermine (ADIPEX-P) 37.5 MG tablet Take 1 tablet (37.5 mg) by mouth every morning (before breakfast). 90 tablet 1    polyethylene glycol (MIRALAX) 17 GM/Dose powder Take 17 g by mouth daily as needed.      senna-docusate (SENOKOT-S/PERICOLACE) 8.6-50 MG tablet Take 1-4 tablets by mouth.      SUMAtriptan (IMITREX) 100 MG tablet TAKE 1/2 TO 1 TABLET BY MOUTH AT ONSET OF HEADACHE 9 tablet 1     family history includes Breast Cancer (age of onset: 50) in her maternal aunt and mother; Diabetes in her maternal grandmother, sister, and sister; Graves' disease in her sister; Hashimoto's thyroiditis in her maternal aunt,  maternal grandfather, and mother; Hyperlipidemia in her mother; Hypertension in her mother; Hypothyroidism in her maternal aunt, maternal aunt, and sister; Osteoporosis in her mother; Thyroid Disease in her mother, sister, sister, sister, and sister.  Social Connections: Unknown (3/25/2025)    Social Connection and Isolation Panel [NHANES]     Frequency of Communication with Friends and Family: Not on file     Frequency of Social Gatherings with Friends and Family: Twice a week     Attends [employer] Services: Not on file     Active Member of Clubs or Organizations: Not on file     Attends Club or Organization Meetings: Not on file     Marital Status: Not on file          WBC Count   Date Value Ref Range Status   06/13/2025 5.9 4.0 - 11.0 10e3/uL Final     RBC Count   Date Value Ref Range Status   06/13/2025 4.63 3.80 - 5.20 10e6/uL Final     Hemoglobin   Date Value Ref Range Status   06/13/2025 13.9 11.7 - 15.7 g/dL Final     Hematocrit   Date Value Ref Range Status   06/13/2025 41.3 35.0 - 47.0 % Final     MCV   Date Value Ref Range Status   06/13/2025 89 78 - 100 fL Final     MCH   Date Value Ref Range Status   06/13/2025 30.0 26.5 - 33.0 pg Final     Platelet Count   Date Value Ref Range Status   06/13/2025 330 150 - 450 10e3/uL Final     % Lymphocytes   Date Value Ref Range Status   03/15/2025 33 % Final     AST   Date Value Ref Range Status   05/20/2025 35 0 - 45 U/L Final     ALT   Date Value Ref Range Status   06/13/2025 30 0 - 50 U/L Final     Albumin   Date Value Ref Range Status   06/13/2025 4.5 3.5 - 5.2 g/dL Final   09/08/2021 4.3 3.5 - 5.0 g/dL Final     Alkaline Phosphatase   Date Value Ref Range Status   05/20/2025 129 40 - 150 U/L Final     Creatinine   Date Value Ref Range Status   06/13/2025 0.88 0.51 - 0.95 mg/dL Final     GFR Estimate   Date Value Ref Range Status   06/13/2025 76 >60 mL/min/1.73m2 Final     Comment:     eGFR calculated using 2021 CKD-EPI equation.   12/01/2020 61 >=60 mL/min/BSA  Final   06/19/2020 >60 >60 mL/min/1.73m2 Final     GFR Estimate If Black   Date Value Ref Range Status   06/19/2020 >60 >60 mL/min/1.73m2 Final     GFREstimate If Black   Date Value Ref Range Status   12/01/2020 71 >=60 mL/min/BSA Final     Erythrocyte Sedimentation Rate   Date Value Ref Range Status   05/03/2024 13 0 - 30 mm/hr Final     CRP   Date Value Ref Range Status   05/26/2023 3.1 (H) 0.0 - <0.8 mg/dL Final

## 2025-06-19 DIAGNOSIS — E66.812 CLASS 2 SEVERE OBESITY DUE TO EXCESS CALORIES WITH SERIOUS COMORBIDITY AND BODY MASS INDEX (BMI) OF 37.0 TO 37.9 IN ADULT (H): ICD-10-CM

## 2025-06-19 DIAGNOSIS — E66.01 CLASS 2 SEVERE OBESITY DUE TO EXCESS CALORIES WITH SERIOUS COMORBIDITY AND BODY MASS INDEX (BMI) OF 37.0 TO 37.9 IN ADULT (H): ICD-10-CM

## 2025-06-24 NOTE — TELEPHONE ENCOUNTER
Class 2 severe obesity due to excess calories with serious comorbidity and body mass index (BMI) of 37.0 to 37.9 in adult (H)  - metFORMIN (GLUCOPHAGE) 850 MG tablet; Take 1 tablet (850 mg) by mouth 2 times daily (with meals).  Dispense: 180 tablet; Refill: 2     Prescription updated and renewed at pharmacy to reflect current dosing.    Yissel Schmidt, DO

## 2025-06-24 NOTE — TELEPHONE ENCOUNTER
Patient confirms taking metformin 1 tablet two times daily. States she experienced stomach upset for the first 1-2 weeks after increasing, but this has now resolved. First week or two - stomach upset stomach

## 2025-06-27 ENCOUNTER — TRANSFERRED RECORDS (OUTPATIENT)
Dept: HEALTH INFORMATION MANAGEMENT | Facility: CLINIC | Age: 60
End: 2025-06-27
Payer: COMMERCIAL

## 2025-07-15 ENCOUNTER — NURSE TRIAGE (OUTPATIENT)
Dept: NURSING | Facility: CLINIC | Age: 60
End: 2025-07-15
Payer: COMMERCIAL

## 2025-07-16 NOTE — TELEPHONE ENCOUNTER
Nurse Triage SBAR    Is this a 2nd Level Triage? NO    Situation: Left lower abdominal pain    Background: Patient had colon resection in 2019 with diverticulitis X2 since surgery. Currently on Levaquin for sinus infection.    Assessment: Patient reports onset of left lower abdominal pain since yesterday, 8/10 pain for 3 hours with cramping lasting up to 2 minutes. She reports a fever and abdominal bloating. She had 3 soft stools before noon today. Denies severe weakness, confusion, or blood in stool.    Protocol Recommended Disposition:   Go to ED Now    Recommendation: According to the protocol, patient should go to ED now.  Care advice given. Patient verbalizes understanding and agrees with plan of care. Plans to go to ED.    Jeanette Tyler RN  07/15/25 10:44 PM  [facility] Nurse Advisor    Reason for Disposition   [1] SEVERE pain (e.g., excruciating) AND [2] present > 1 hour    Additional Information   Negative: Shock suspected (e.g., cold/pale/clammy skin, too weak to stand, low BP, rapid pulse)   Negative: Difficult to awaken or acting confused (e.g., disoriented, slurred speech)   Negative: Passed out (i.e., lost consciousness, collapsed and was not responding)   Negative: Sounds like a life-threatening emergency to the triager   Negative: Followed an abdomen (stomach) injury   Negative: Chest pain    Protocols used: Abdominal Pain - Female-A-AH

## 2025-07-24 ENCOUNTER — OFFICE VISIT (OUTPATIENT)
Dept: PULMONOLOGY | Facility: CLINIC | Age: 60
End: 2025-07-24
Payer: COMMERCIAL

## 2025-07-24 VITALS
OXYGEN SATURATION: 99 % | BODY MASS INDEX: 35.15 KG/M2 | HEART RATE: 93 BPM | WEIGHT: 217.8 LBS | DIASTOLIC BLOOD PRESSURE: 84 MMHG | SYSTOLIC BLOOD PRESSURE: 133 MMHG

## 2025-07-24 DIAGNOSIS — K21.9 GASTROESOPHAGEAL REFLUX DISEASE WITHOUT ESOPHAGITIS: ICD-10-CM

## 2025-07-24 DIAGNOSIS — J47.9 BRONCHIECTASIS WITHOUT COMPLICATION (H): ICD-10-CM

## 2025-07-24 DIAGNOSIS — Z91.09 ENVIRONMENTAL ALLERGIES: ICD-10-CM

## 2025-07-24 DIAGNOSIS — R05.3 CHRONIC COUGH: Primary | ICD-10-CM

## 2025-07-24 RX ORDER — METRONIDAZOLE 500 MG/1
TABLET ORAL
COMMUNITY
Start: 2025-07-16

## 2025-07-24 RX ORDER — LEVOFLOXACIN 750 MG/1
750 TABLET, FILM COATED ORAL
COMMUNITY
Start: 2025-07-16

## 2025-07-24 RX ORDER — ESTRADIOL 0.1 MG/G
1 CREAM VAGINAL
COMMUNITY
Start: 2025-06-12 | End: 2026-06-12

## 2025-07-24 RX ORDER — CELECOXIB 100 MG/1
100 CAPSULE ORAL 2 TIMES DAILY PRN
COMMUNITY
Start: 2025-06-25

## 2025-07-24 RX ORDER — TROSPIUM CHLORIDE 20 MG/1
20 TABLET, FILM COATED ORAL
COMMUNITY
Start: 2025-06-10

## 2025-07-24 RX ORDER — HYDROXYZINE HYDROCHLORIDE 25 MG/1
TABLET, FILM COATED ORAL
COMMUNITY
Start: 2025-07-16

## 2025-07-24 RX ORDER — OXYCODONE HYDROCHLORIDE 5 MG/1
5 TABLET ORAL EVERY 4 HOURS PRN
COMMUNITY
Start: 2025-07-16

## 2025-07-24 RX ORDER — CHOLECALCIFEROL (VITAMIN D3) 50 MCG
50 TABLET ORAL DAILY
COMMUNITY

## 2025-07-24 NOTE — PATIENT INSTRUCTIONS
It was a pleasure to see you in clinic today.   Here is what we discussed:    If you develop an upper respiratory infection, let us know and we will order a chest CT.  If this is abnormal, we will move forward with a bronchoscopy.   Continue Albuterol nebulized every 4-6 hours as needed for chest congestion or mucous.   Continue the allergy pill.  See ENT and GI.   Call [phone] with any change or worsening of your breathing, ask to speak to the lung clinic nurse.   Follow-up as needed.     Cindy Levy, CNP  Pulmonary Medicine  [facility]  [phone]

## 2025-07-24 NOTE — PROGRESS NOTES
Pulmonary Clinic Follow-up          Assessment/Plan:     60 year old female with a history of RA, GERD, hiatal hernia, presenting for follow-up of chronic cough.      Chronic cough  GERD  Bronchiectasis  Environmental allergies  Patient presented 12/2024 for evaluation of chronic cough.  It has been chronic for years, but does subside during winter and spring.  At the end of summer/fall, she feels her allergies worsen and her cough worsens as well.  She has hx of multiple episodes of bronchitis, sometimes requiring hospitalization.  She has trialed Albuterol, Pulmicort, and Advair - all without improvement in cough.  This fall when her cough worsened, she received levaquin x2 and then started to improve.  Chest CT 11/1/24 with normal lung parenchyma, mild bronchial wall thickening and bronchiectasis.     Pulmonary function testing with normal spirometry, lung volumes, and increased diffusion capacity.   She saw ENT in 2021, they referred her to Dr Proctor and recommended esophagram, thought symptoms related to reflux.  She did see general surgeon, ordered manometry and pH probe, I don't see any further follow-up.  She does have hiatal hernia, and noted esophagus that is distended and debris filled on imaging.  EGD 9/2024 with reflux esophagitis, erythematous mucosa in the entire stomach.  Started pantoprazole 40mg BID.     I suspect that something outside of her lungs is causing the cough and airway thickening, most notably her possible allergies and GERD.  IgE 7, eosinophils 0.2.  Last visit we started albuterol nebulized to help with pulmonary hygiene.  This has not been helpful for any symptoms.  Last chest CT while in ED in March without any infiltrates or opacities, mild bronchiectasis in bases.  She feels her cough is improved from last visit.    Plan:  - discussed that she should contact us with any further URIs or worsening symptoms, we will order chest CT, any abnormalities we will proceed with bronchoscopy.    - continue albuterol nebulized PRN for pulmonary hygiene related to bronchiectasis.    - upcoming appt with GI, needs to discuss ongoing symptoms.    - upcoming appt with ENT, chronic sinusitis could contribute to symptoms as well.   - she is UTD with all respiratory vaccinations.    Follow-up:  - as needed    Cindy Levy CNP  Pulmonary Medicine  [facility]  [phone]       CC:     Cough     HPI:     60 year old female with a history of RA, GERD, hiatal hernia, presenting for follow-up of chronic cough.      Takes allergy pill daily.   Cough improved from previous.   Multiple sinus infections since last visit.  Did end up in ED with 'laryngitis', couldn't breath, CT was clear and she was told her lungs sound fine.  Given epinephrine neb.   Albuterol neb does not help symptoms.  Coughs up mucous.       Previous HPI:  Cough started first week in August.   Has had bronchitis a number of times.  Sometimes in hospital.  Usually needs prednisone and inhalers.   No issues as child.  Daily cough with phlegm.  Was not given prednisone, working up for [place].  Due to frequent steroid injections for shoulder injury.   Two rounds of levaquin.   Doing better now.  After second round.  But did take a few weeks.   Has albuterol, doesn't help cough.   Pulmicort, no benefit.   Advair xfew weeks, did not help cough.   Black mold just found in closet.   Now on allegra or zyrtec daily - helping.       ROS:     A 6-system review was obtained and was negative with the exception of the symptoms endorsed in the HPI.       Medical history:       PMH:  Past Medical History:   Diagnosis Date    Anemia     Anxiety     Arthritis     RA    Celiac disease     Celiac disease     Cervical spondylosis without myelopathy 10/13/2023    Chronic kidney disease     donated 1 kidney    Class 2 severe obesity due to excess calories with serious comorbidity in adult (H) 09/13/2024    COPD (chronic  obstructive pulmonary disease) (H)     Cough     Depression     Seasonal    Diverticulitis     Dyslipidemia     Enlarged LV     wall    GERD (gastroesophageal reflux disease)     Hepatitis     Hiatal hernia     Hypertension     Low bone mass     Metabolic syndrome     Migraines     HARDIN (nonalcoholic steatohepatitis)     Peripheral neuropathy     PONV (postoperative nausea and vomiting)     Profound fatigue     Sigmoid diverticulitis 08/29/2021    Ventral hernia        PSH:  Past Surgical History:   Procedure Laterality Date    ABDOMEN SURGERY      APPENDECTOMY      BIOPSY      CHOLECYSTECTOMY      COLON SURGERY      COLONOSCOPY N/A 03/18/2019    Procedure: COLONOSCOPY;  Surgeon: Davis Mosqueda MD;  Location: [facility];  Service: General    CYSTOSCOPY N/A 07/16/2015    Procedure: CYSTOSCOPY;  Surgeon: Nate Horta MD;  Location: [facility] OR;  Service:     ESOPHAGOSCOPY, GASTROSCOPY, DUODENOSCOPY (EGD), COMBINED N/A 09/16/2024    Procedure: Esophagogastroduodenoscopy with biopsies;  Surgeon: Guevara Livingston MD;  Location:  OR    HC CORRECT BUNION,METATARSAL OSTEOTOMY      Description: Bunion Correction With Metatarsal Osteotomy Herman Procedure;  Proc Date: 07/16/2010;    HERNIA REPAIR      HYSTERECTOMY      HYSTERECTOMY, PAP NO LONGER INDICATED  02/01/2009    NEPHRECTOMY LIVING DONOR Left 04/01/2012    PARATHYROIDECTOMY  05/2023    WY LAP,BILIARY TRACT,UNLISTED N/A 03/19/2019    Procedure: BIOPSY, LIVER, LAPAROSCOPIC;  Surgeon: Davis Mosqueda MD;  Location: [facility];  Service: General    WY LAP,SLING OPERATION      Description: Laparoscopic Sling Operation For Stress Incontinence;  Recorded: 02/17/2009;    WY LAP,SURG,COLECTOMY, PARTIAL, W/ANAST N/A 03/19/2019    Procedure: LAPAROSCOPIC SIGMOID COLON RESECTION;  Surgeon: Davis Mosqueda MD;  Location: [facility];  Service: General    SEPTOPLASTY, TURBINOPLASTY, COMBINED N/A 08/03/2021    Procedure:  "SEPTOPLASTY, NOSE, WITH TURBINOPLASTY RADIOFREQUENCY BALLON ASSISTED, CRYOBLATION OF NASAL TISSUE;  Surgeon: Randy Case MD;  Location: [facility] OR    SIGMOIDOSCOPY FLEXIBLE N/A 03/19/2019    Procedure: FLEXIBLE SIGMOIDOSCOPY;  Surgeon: Davis Mosqueda MD;  Location: [facility];  Service: General       Allergies:  Allergies   Allergen Reactions    Gluten Meal Nausea and Vomiting    Oxybutynin Headache and Rash    Doxycycline Hives    Contrast Dye      Hives, resolved with benadryl alone in the past (two episode)    Nsaids Other (See Comments)     Avoid - one kidney    Ondansetron Headache    Pcn [Penicillins] Hives     As a child    Rocephin [Ceftriaxone] Hives     Patient states this started immediately    Tramadol Hives    Zithromax [Azithromycin] Tinnitus     \"ear ringing and hearing loss\"       Family Hx:  Family History   Problem Relation Age of Onset    Hashimoto's thyroiditis Mother     Breast Cancer Mother 50        currently patient has two different types of Br Ca 84y    Hypertension Mother     Hyperlipidemia Mother     Osteoporosis Mother     Thyroid Disease Mother     Graves' disease Sister     Diabetes Sister     Thyroid Disease Sister     Hypothyroidism Sister     Thyroid Disease Sister     Hashimoto's thyroiditis Maternal Grandfather     Hypothyroidism Maternal Aunt     Breast Cancer Maternal Aunt 50    Hypothyroidism Maternal Aunt     Hashimoto's thyroiditis Maternal Aunt     Diabetes Maternal Grandmother     Diabetes Sister     Thyroid Disease Sister     Thyroid Disease Sister     Anesthesia Reaction No family hx of        Social Hx:  Social History     Socioeconomic History    Marital status: Single     Spouse name: Not on file    Number of children: 2    Years of education: Not on file    Highest education level: Not on file   Occupational History    Not on file   Tobacco Use    Smoking status: Former     Current packs/day: 0.00     Types: Cigarettes     Quit date: 1/1/1999     " Years since quittin.5    Smokeless tobacco: Never   Vaping Use    Vaping status: Never Used   Substance and Sexual Activity    Alcohol use: No     Comment: rare    Drug use: Never    Sexual activity: Not Currently     Partners: Male     Birth control/protection: Post-menopausal, Female Surgical   Other Topics Concern    Parent/sibling w/ CABG, MI or angioplasty before 65F 55M? No   Social History Narrative    Single. 2 adult children living in the area. Daughter lives in [place]. Son lives with her as his girlfriend finishes nursing school. 3 grandchildren.     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      Social Drivers of Health     Financial Resource Strain: Low Risk  (3/25/2025)    Financial Resource Strain     Within the past 12 months, have you or your family members you live with been unable to get utilities (heat, electricity) when it was really needed?: No   Food Insecurity: Low Risk  (3/25/2025)    Food Insecurity     Within the past 12 months, did you worry that your food would run out before you got money to buy more?: No     Within the past 12 months, did the food you bought just not last and you didn t have money to get more?: No   Transportation Needs: Low Risk  (3/25/2025)    Transportation Needs     Within the past 12 months, has lack of transportation kept you from medical appointments, getting your medicines, non-medical meetings or appointments, work, or from getting things that you need?: No   Physical Activity: Insufficiently Active (3/25/2025)    Exercise Vital Sign     Days of Exercise per Week: 2 days     Minutes of Exercise per Session: 20 min   Stress: No Stress Concern Present (3/25/2025)    [employer] - Occupational Stress Questionnaire     Feeling of Stress : Only a little   Social Connections: Unknown (3/25/2025)    Social Connection and Isolation Panel [NHANES]     Frequency of Communication with Friends and Family: Not on file     Frequency of  Social Gatherings with Friends and Family: Twice a week     Attends [employer]ology Services: Not on file     Active Member of Clubs or Organizations: Not on file     Attends Club or Organization Meetings: Not on file     Marital Status: Not on file   Interpersonal Safety: Low Risk  (3/28/2025)    Interpersonal Safety     Do you feel physically and emotionally safe where you currently live?: Yes     Within the past 12 months, have you been hit, slapped, kicked or otherwise physically hurt by someone?: No     Within the past 12 months, have you been humiliated or emotionally abused in other ways by your partner or ex-partner?: No   Housing Stability: Low Risk  (3/25/2025)    Housing Stability     Do you have housing? : Yes     Are you worried about losing your housing?: No       Current Meds:  Current Outpatient Medications   Medication Sig Dispense Refill    acetaminophen (TYLENOL) 500 MG tablet Take 1,000 mg by mouth.      albuterol (PROVENTIL) (2.5 MG/3ML) 0.083% neb solution Take 1 vial (2.5 mg) by nebulization every 6 hours as needed for shortness of breath, wheezing or cough. 180 mL 11    atenolol (TENORMIN) 50 MG tablet Take 1 tablet (50 mg) by mouth daily. 90 tablet 3    B Complex-C (RA B-COMPLEX/VITAMIN C CR) TBCR Take 1 tablet by mouth daily      baclofen (LIORESAL) 10 MG tablet TAKE 1 TABLET BY MOUTH TWICE DAILY TO THREE TIMES DAILY AS NEEDED FOR MUSCLE SPASMS. 30 tablet 1    celecoxib (CELEBREX) 100 MG capsule Take 100 mg by mouth 2 times daily as needed.      cetirizine (ZYRTEC) 10 MG tablet Take 10 mg by mouth.      Coenzyme Q10 (COQ-10) 400 MG CAPS Take 1 capsule by mouth daily       estradiol (ESTRACE) 0.1 MG/GM vaginal cream Place 1 g vaginally.      FLUoxetine (PROZAC) 20 MG capsule Take 1 capsule (20 mg) by mouth daily. 90 capsule 3    hydroxychloroquine (PLAQUENIL) 200 MG tablet Take 1 tablet (200 mg) by mouth 2 times daily. 180 tablet 0    hydrOXYzine HCl (ATARAX) 25 MG tablet       levofloxacin  (LEVAQUIN) 750 MG tablet Take 750 mg by mouth.      metFORMIN (GLUCOPHAGE) 850 MG tablet Take 1 tablet (850 mg) by mouth 2 times daily (with meals). 180 tablet 2    methocarbamol (ROBAXIN) 500 MG tablet Take 500 mg by mouth.      metroNIDAZOLE (FLAGYL) 500 MG tablet       omeprazole (PRILOSEC OTC) 20 MG EC tablet Take 20 mg by mouth daily.      OnabotulinumtoxinA (BOTOX IJ) For migraines - 155 units q 12 weeks.      oxyCODONE (ROXICODONE) 5 MG tablet Take 5 mg by mouth every 4 hours as needed for pain.      phentermine (ADIPEX-P) 37.5 MG tablet Take 1 tablet (37.5 mg) by mouth every morning (before breakfast). 90 tablet 1    polyethylene glycol (MIRALAX) 17 GM/Dose powder Take 17 g by mouth daily as needed.      SUMAtriptan (IMITREX) 100 MG tablet TAKE 1/2 TO 1 TABLET BY MOUTH AT ONSET OF HEADACHE 9 tablet 1    trospium (SANCTURA) 20 MG tablet Take 20 mg by mouth.      vibegron (GEMTESA) 75 MG TABS tablet Take 75 mg by mouth.      hydrOXYzine erin (VISTARIL) 25 MG capsule Take 2 capsules (50 mg) by mouth 4 times daily as needed for anxiety. 40 capsule 0    senna-docusate (SENOKOT-S/PERICOLACE) 8.6-50 MG tablet Take 1-4 tablets by mouth.      vitamin D3 (CHOLECALCIFEROL) 50 mcg (2000 units) tablet Take 50 mcg by mouth daily.            Physical Exam:     /84 (BP Location: Left arm, Patient Position: Sitting, Cuff Size: Adult Large)   Pulse 93   Wt 98.8 kg (217 lb 12.8 oz)   LMP  (LMP Unknown)   SpO2 99%   BMI 35.15 kg/m    Gen: adult female, appears in NAD  HEENT: clear conjunctivae, moist mucous membranes  CV: RRR, no M/G/R  Resp: CTAB, no focal crackles or wheezes.  Respirations even and unlabored.  On RA.   Skin: no apparent rashes on visible skin  Ext: no cyanosis, clubbing or edema  Neuro: alert and answering questions appropriately       Data:     Labs:  reviewed    Imaging studies:  I have personally reviewed all pertinent imaging studies and PFT results unless otherwise noted.    Recent Results  (from the past 744 hours)   CT External Imaging Abdomen    Narrative    Images were obtained from an external facility.  Click PACS Images   hyperlink to view images.  Textual results have been scanned into the   media tab.   CT ABDOMEN PELVIS WO    Narrative    For Patients: As a result of the 21st Century Cures Act, medical imaging exams and procedure reports are released immediately into your electronic medical record. You may view this report before your referring provider. If you have questions, please contact your health care provider.    EXAM: CT ABDOMEN PELVIS WO  LOCATION: [facility] [place]  DATE: 7/16/2025    INDICATION: LLQ abdominal pain  COMPARISON: 12/28/2024  TECHNIQUE: CT scan of the abdomen and pelvis was performed without IV contrast. Multiplanar reformats were obtained. Dose reduction techniques were used.  CONTRAST: None.    FINDINGS:   LOWER CHEST: Normal.    HEPATOBILIARY: Hepatic steatosis. Cholecystectomy.    PANCREAS: Normal.    SPLEEN: Normal.    ADRENAL GLANDS: Normal.    KIDNEYS/BLADDER: Left nephrectomy. Right kidney within normal limits.    BOWEL: Bowel wall thickening and inflammatory change of the proximal sigmoid colon compatible with diverticulitis. No abscess or perforation. Diverticulosis noted. Remaining bowel within normal limits. Postsurgical changes are noted to the sigmoid colon.    LYMPH NODES: Normal.    VASCULATURE: Mild atherosclerotic disease of the abdominal aorta branches.    PELVIC ORGANS: Bladder within normal limits.    MUSCULOSKELETAL: Degenerative changes of the spine.    Impression    Acute diverticulitis of the proximal sigmoid colon with no evidence of abscess or perforation.         Pulmonary Function Testing

## 2025-08-01 ENCOUNTER — TRANSFERRED RECORDS (OUTPATIENT)
Dept: HEALTH INFORMATION MANAGEMENT | Facility: CLINIC | Age: 60
End: 2025-08-01
Payer: COMMERCIAL

## 2025-08-04 DIAGNOSIS — G43.109 MIGRAINE WITH AURA AND WITHOUT STATUS MIGRAINOSUS, NOT INTRACTABLE: ICD-10-CM

## 2025-08-11 ENCOUNTER — TRANSFERRED RECORDS (OUTPATIENT)
Dept: HEALTH INFORMATION MANAGEMENT | Facility: CLINIC | Age: 60
End: 2025-08-11
Payer: COMMERCIAL

## 2025-08-15 ENCOUNTER — TRANSFERRED RECORDS (OUTPATIENT)
Dept: HEALTH INFORMATION MANAGEMENT | Facility: CLINIC | Age: 60
End: 2025-08-15
Payer: COMMERCIAL

## 2025-08-20 RX ORDER — SUMATRIPTAN SUCCINATE 100 MG/1
TABLET ORAL
Qty: 9 TABLET | Refills: 1 | Status: SHIPPED | OUTPATIENT
Start: 2025-08-20

## 2025-08-24 ENCOUNTER — HOSPITAL ENCOUNTER (INPATIENT)
Facility: HOSPITAL | Age: 60
DRG: 392 | End: 2025-08-24
Attending: EMERGENCY MEDICINE | Admitting: HOSPITALIST
Payer: COMMERCIAL

## 2025-08-24 ENCOUNTER — APPOINTMENT (OUTPATIENT)
Dept: CT IMAGING | Facility: HOSPITAL | Age: 60
DRG: 392 | End: 2025-08-24
Payer: COMMERCIAL

## 2025-08-24 PROBLEM — K57.92 DIVERTICULITIS: Status: ACTIVE | Noted: 2025-08-24

## 2025-08-24 PROCEDURE — 74176 CT ABD & PELVIS W/O CONTRAST: CPT

## 2025-08-24 ASSESSMENT — ACTIVITIES OF DAILY LIVING (ADL)
ADLS_ACUITY_SCORE: 52

## 2025-08-25 ASSESSMENT — ACTIVITIES OF DAILY LIVING (ADL)
ADLS_ACUITY_SCORE: 52
ADLS_ACUITY_SCORE: 52
ADLS_ACUITY_SCORE: 29
ADLS_ACUITY_SCORE: 52
ADLS_ACUITY_SCORE: 29
ADLS_ACUITY_SCORE: 29
ADLS_ACUITY_SCORE: 52
ADLS_ACUITY_SCORE: 29
ADLS_ACUITY_SCORE: 52
ADLS_ACUITY_SCORE: 29
ADLS_ACUITY_SCORE: 52
ADLS_ACUITY_SCORE: 52
ADLS_ACUITY_SCORE: 29
ADLS_ACUITY_SCORE: 52
ADLS_ACUITY_SCORE: 29
ADLS_ACUITY_SCORE: 52

## 2025-08-26 ASSESSMENT — ACTIVITIES OF DAILY LIVING (ADL)
ADLS_ACUITY_SCORE: 29
ADLS_ACUITY_SCORE: 31
ADLS_ACUITY_SCORE: 29
ADLS_ACUITY_SCORE: 31
ADLS_ACUITY_SCORE: 29
ADLS_ACUITY_SCORE: 29

## 2025-08-27 ENCOUNTER — APPOINTMENT (OUTPATIENT)
Dept: RADIOLOGY | Facility: HOSPITAL | Age: 60
DRG: 392 | End: 2025-08-27
Attending: HOSPITALIST
Payer: COMMERCIAL

## 2025-08-27 PROCEDURE — 74018 RADEX ABDOMEN 1 VIEW: CPT

## 2025-08-27 ASSESSMENT — ACTIVITIES OF DAILY LIVING (ADL)
ADLS_ACUITY_SCORE: 31

## 2025-08-28 ASSESSMENT — ACTIVITIES OF DAILY LIVING (ADL)
ADLS_ACUITY_SCORE: 31

## 2025-08-29 ENCOUNTER — APPOINTMENT (OUTPATIENT)
Dept: CT IMAGING | Facility: HOSPITAL | Age: 60
DRG: 392 | End: 2025-08-29
Attending: SURGERY
Payer: COMMERCIAL

## 2025-08-29 PROCEDURE — 74176 CT ABD & PELVIS W/O CONTRAST: CPT

## 2025-08-29 ASSESSMENT — ACTIVITIES OF DAILY LIVING (ADL)
ADLS_ACUITY_SCORE: 31

## 2025-08-30 ENCOUNTER — APPOINTMENT (OUTPATIENT)
Dept: RADIOLOGY | Facility: HOSPITAL | Age: 60
DRG: 392 | End: 2025-08-30
Attending: INTERNAL MEDICINE
Payer: COMMERCIAL

## 2025-08-30 PROCEDURE — 71045 X-RAY EXAM CHEST 1 VIEW: CPT

## 2025-08-30 ASSESSMENT — ACTIVITIES OF DAILY LIVING (ADL)
ADLS_ACUITY_SCORE: 35
ADLS_ACUITY_SCORE: 31
ADLS_ACUITY_SCORE: 31
ADLS_ACUITY_SCORE: 35
ADLS_ACUITY_SCORE: 31
ADLS_ACUITY_SCORE: 35
ADLS_ACUITY_SCORE: 31
ADLS_ACUITY_SCORE: 35
ADLS_ACUITY_SCORE: 31
ADLS_ACUITY_SCORE: 35
ADLS_ACUITY_SCORE: 31
ADLS_ACUITY_SCORE: 35
ADLS_ACUITY_SCORE: 31
ADLS_ACUITY_SCORE: 31

## 2025-08-31 ASSESSMENT — ACTIVITIES OF DAILY LIVING (ADL)
ADLS_ACUITY_SCORE: 35

## 2025-09-02 ENCOUNTER — PATIENT OUTREACH (OUTPATIENT)
Dept: CARE COORDINATION | Facility: CLINIC | Age: 60
End: 2025-09-02
Payer: COMMERCIAL

## 2025-09-02 ENCOUNTER — TELEPHONE (OUTPATIENT)
Dept: PHYSICAL MEDICINE AND REHAB | Facility: CLINIC | Age: 60
End: 2025-09-02
Payer: COMMERCIAL

## 2025-09-03 ENCOUNTER — OFFICE VISIT (OUTPATIENT)
Dept: SURGERY | Facility: CLINIC | Age: 60
End: 2025-09-03
Attending: NURSE PRACTITIONER
Payer: COMMERCIAL

## 2025-09-03 VITALS — SYSTOLIC BLOOD PRESSURE: 122 MMHG | DIASTOLIC BLOOD PRESSURE: 80 MMHG

## 2025-09-03 DIAGNOSIS — K57.92 DIVERTICULITIS: ICD-10-CM

## 2025-09-03 RX ORDER — ONDANSETRON 2 MG/ML
4 INJECTION INTRAMUSCULAR; INTRAVENOUS
OUTPATIENT
Start: 2025-09-03

## 2025-09-03 RX ORDER — ACETAMINOPHEN, ASPIRIN AND CAFFEINE 250; 250; 65 MG/1; MG/1; MG/1
TABLET ORAL DAILY PRN
COMMUNITY

## 2025-09-03 RX ORDER — LIDOCAINE 40 MG/G
CREAM TOPICAL
OUTPATIENT
Start: 2025-09-03

## 2025-09-04 ENCOUNTER — VIRTUAL VISIT (OUTPATIENT)
Dept: FAMILY MEDICINE | Facility: CLINIC | Age: 60
End: 2025-09-04
Attending: INTERNAL MEDICINE
Payer: COMMERCIAL

## 2025-09-04 DIAGNOSIS — K75.81 NASH (NONALCOHOLIC STEATOHEPATITIS): ICD-10-CM

## 2025-09-04 DIAGNOSIS — E66.812 CLASS 2 SEVERE OBESITY DUE TO EXCESS CALORIES WITH SERIOUS COMORBIDITY AND BODY MASS INDEX (BMI) OF 37.0 TO 37.9 IN ADULT (H): ICD-10-CM

## 2025-09-04 DIAGNOSIS — E88.810 METABOLIC SYNDROME: ICD-10-CM

## 2025-09-04 DIAGNOSIS — K57.92 ACUTE DIVERTICULITIS: Primary | ICD-10-CM

## 2025-09-04 DIAGNOSIS — E66.01 CLASS 2 SEVERE OBESITY DUE TO EXCESS CALORIES WITH SERIOUS COMORBIDITY AND BODY MASS INDEX (BMI) OF 37.0 TO 37.9 IN ADULT (H): ICD-10-CM

## 2025-09-04 DIAGNOSIS — R19.5 LOOSE STOOLS: ICD-10-CM

## 2025-09-04 RX ORDER — LIRAGLUTIDE 6 MG/ML
INJECTION, SOLUTION SUBCUTANEOUS
Qty: 38 ML | Refills: 0 | Status: SHIPPED | OUTPATIENT
Start: 2025-09-04 | End: 2025-12-03

## 2025-09-04 ASSESSMENT — PATIENT HEALTH QUESTIONNAIRE - PHQ9
SUM OF ALL RESPONSES TO PHQ QUESTIONS 1-9: 8
10. IF YOU CHECKED OFF ANY PROBLEMS, HOW DIFFICULT HAVE THESE PROBLEMS MADE IT FOR YOU TO DO YOUR WORK, TAKE CARE OF THINGS AT HOME, OR GET ALONG WITH OTHER PEOPLE: VERY DIFFICULT
SUM OF ALL RESPONSES TO PHQ QUESTIONS 1-9: 8

## (undated) DEVICE — SOL WATER IRRIG 1000ML BOTTLE 2F7114

## (undated) DEVICE — SUCTION MANIFOLD NEPTUNE 2 SYS 4 PORT 0702-020-000

## (undated) DEVICE — BAG CLEAR TRASH 1.3M 39X33" P4040C

## (undated) DEVICE — MANIFOLD NEPTUNE 4 PORT 700-20

## (undated) DEVICE — ENDO FORCEP ENDOJAW BIOPSY 2.8MMX160CM FB-220K

## (undated) DEVICE — TUBING SUCTION MEDI-VAC SOFT 3/16"X20' N520A

## (undated) RX ORDER — FENTANYL CITRATE 50 UG/ML
INJECTION, SOLUTION INTRAMUSCULAR; INTRAVENOUS
Status: DISPENSED
Start: 2024-09-16